# Patient Record
Sex: MALE | Race: WHITE | Employment: OTHER | URBAN - METROPOLITAN AREA
[De-identification: names, ages, dates, MRNs, and addresses within clinical notes are randomized per-mention and may not be internally consistent; named-entity substitution may affect disease eponyms.]

---

## 2017-02-15 ENCOUNTER — OFFICE VISIT (OUTPATIENT)
Dept: INTERNAL MEDICINE CLINIC | Age: 72
End: 2017-02-15

## 2017-02-15 VITALS
BODY MASS INDEX: 28.14 KG/M2 | WEIGHT: 201 LBS | SYSTOLIC BLOOD PRESSURE: 120 MMHG | RESPIRATION RATE: 14 BRPM | HEIGHT: 71 IN | HEART RATE: 70 BPM | DIASTOLIC BLOOD PRESSURE: 80 MMHG

## 2017-02-15 DIAGNOSIS — N13.8 ENLARGED PROSTATE WITH URINARY OBSTRUCTION: ICD-10-CM

## 2017-02-15 DIAGNOSIS — I10 ESSENTIAL HYPERTENSION: ICD-10-CM

## 2017-02-15 DIAGNOSIS — K21.9 GASTROESOPHAGEAL REFLUX DISEASE WITHOUT ESOPHAGITIS: ICD-10-CM

## 2017-02-15 DIAGNOSIS — J44.9 OBSTRUCTIVE CHRONIC BRONCHITIS WITHOUT EXACERBATION (HCC): ICD-10-CM

## 2017-02-15 DIAGNOSIS — J44.9 CHRONIC OBSTRUCTIVE PULMONARY DISEASE, UNSPECIFIED COPD TYPE (HCC): ICD-10-CM

## 2017-02-15 DIAGNOSIS — N40.1 ENLARGED PROSTATE WITH URINARY OBSTRUCTION: ICD-10-CM

## 2017-02-15 DIAGNOSIS — I48.0 PAROXYSMAL ATRIAL FIBRILLATION (HCC): ICD-10-CM

## 2017-02-15 DIAGNOSIS — R91.1 PULMONARY NODULE, RIGHT: ICD-10-CM

## 2017-02-15 DIAGNOSIS — I10 ESSENTIAL HYPERTENSION: Primary | ICD-10-CM

## 2017-02-15 LAB
A/G RATIO: 1.3 (ref 1.1–2.2)
ALBUMIN SERPL-MCNC: 4.3 G/DL (ref 3.4–5)
ALP BLD-CCNC: 81 U/L (ref 40–129)
ALT SERPL-CCNC: 7 U/L (ref 10–40)
ANION GAP SERPL CALCULATED.3IONS-SCNC: 15 MMOL/L (ref 3–16)
AST SERPL-CCNC: 9 U/L (ref 15–37)
BASOPHILS ABSOLUTE: 0.1 K/UL (ref 0–0.2)
BASOPHILS RELATIVE PERCENT: 0.9 %
BILIRUB SERPL-MCNC: 0.3 MG/DL (ref 0–1)
BUN BLDV-MCNC: 15 MG/DL (ref 7–20)
CALCIUM SERPL-MCNC: 9.6 MG/DL (ref 8.3–10.6)
CHLORIDE BLD-SCNC: 100 MMOL/L (ref 99–110)
CO2: 22 MMOL/L (ref 21–32)
CREAT SERPL-MCNC: 1.2 MG/DL (ref 0.8–1.3)
EOSINOPHILS ABSOLUTE: 0.2 K/UL (ref 0–0.6)
EOSINOPHILS RELATIVE PERCENT: 2.4 %
GFR AFRICAN AMERICAN: >60
GFR NON-AFRICAN AMERICAN: 60
GLOBULIN: 3.2 G/DL
GLUCOSE BLD-MCNC: 86 MG/DL (ref 70–99)
HCT VFR BLD CALC: 44.1 % (ref 40.5–52.5)
HEMOGLOBIN: 15 G/DL (ref 13.5–17.5)
LYMPHOCYTES ABSOLUTE: 3.3 K/UL (ref 1–5.1)
LYMPHOCYTES RELATIVE PERCENT: 36.9 %
MCH RBC QN AUTO: 31.2 PG (ref 26–34)
MCHC RBC AUTO-ENTMCNC: 33.9 G/DL (ref 31–36)
MCV RBC AUTO: 92 FL (ref 80–100)
MONOCYTES ABSOLUTE: 0.6 K/UL (ref 0–1.3)
MONOCYTES RELATIVE PERCENT: 7.1 %
NEUTROPHILS ABSOLUTE: 4.7 K/UL (ref 1.7–7.7)
NEUTROPHILS RELATIVE PERCENT: 52.7 %
PDW BLD-RTO: 15 % (ref 12.4–15.4)
PLATELET # BLD: 270 K/UL (ref 135–450)
PMV BLD AUTO: 8.2 FL (ref 5–10.5)
POTASSIUM SERPL-SCNC: 4.3 MMOL/L (ref 3.5–5.1)
RBC # BLD: 4.79 M/UL (ref 4.2–5.9)
SODIUM BLD-SCNC: 137 MMOL/L (ref 136–145)
TOTAL PROTEIN: 7.5 G/DL (ref 6.4–8.2)
WBC # BLD: 8.9 K/UL (ref 4–11)

## 2017-02-15 PROCEDURE — 99214 OFFICE O/P EST MOD 30 MIN: CPT | Performed by: INTERNAL MEDICINE

## 2017-02-15 RX ORDER — ATORVASTATIN CALCIUM 10 MG/1
TABLET, FILM COATED ORAL
Qty: 30 TABLET | Refills: 5 | Status: SHIPPED | OUTPATIENT
Start: 2017-02-15 | End: 2017-08-15 | Stop reason: SDUPTHER

## 2017-02-15 RX ORDER — LISINOPRIL AND HYDROCHLOROTHIAZIDE 20; 12.5 MG/1; MG/1
TABLET ORAL
Qty: 30 TABLET | Refills: 5 | Status: SHIPPED | OUTPATIENT
Start: 2017-02-15 | End: 2017-08-15 | Stop reason: SDUPTHER

## 2017-02-15 RX ORDER — TAMSULOSIN HYDROCHLORIDE 0.4 MG/1
0.4 CAPSULE ORAL DAILY
Qty: 30 CAPSULE | Refills: 5 | Status: SHIPPED | OUTPATIENT
Start: 2017-02-15 | End: 2017-08-15 | Stop reason: SDUPTHER

## 2017-02-15 RX ORDER — ALBUTEROL SULFATE 90 UG/1
2 AEROSOL, METERED RESPIRATORY (INHALATION) EVERY 6 HOURS PRN
Qty: 1 INHALER | Refills: 0 | Status: SHIPPED | OUTPATIENT
Start: 2017-02-15 | End: 2018-01-10 | Stop reason: SDUPTHER

## 2017-02-15 RX ORDER — DILTIAZEM HYDROCHLORIDE 240 MG/1
CAPSULE, COATED, EXTENDED RELEASE ORAL
Qty: 90 CAPSULE | Refills: 2 | Status: SHIPPED | OUTPATIENT
Start: 2017-02-15 | End: 2017-08-15 | Stop reason: SDUPTHER

## 2017-02-15 RX ORDER — BUDESONIDE AND FORMOTEROL FUMARATE DIHYDRATE 160; 4.5 UG/1; UG/1
2 AEROSOL RESPIRATORY (INHALATION) 2 TIMES DAILY
Qty: 1 INHALER | Refills: 5 | Status: SHIPPED | OUTPATIENT
Start: 2017-02-15 | End: 2017-08-15 | Stop reason: SDUPTHER

## 2017-02-15 RX ORDER — OMEPRAZOLE 40 MG/1
CAPSULE, DELAYED RELEASE ORAL
Qty: 30 CAPSULE | Refills: 5 | Status: SHIPPED | OUTPATIENT
Start: 2017-02-15 | End: 2017-08-15 | Stop reason: SDUPTHER

## 2017-02-15 ASSESSMENT — ENCOUNTER SYMPTOMS
SHORTNESS OF BREATH: 0
VOMITING: 0
BACK PAIN: 0
NAUSEA: 0
RHINORRHEA: 0
ABDOMINAL PAIN: 0
WHEEZING: 0

## 2017-04-19 ENCOUNTER — HOSPITAL ENCOUNTER (OUTPATIENT)
Dept: CT IMAGING | Facility: MEDICAL CENTER | Age: 72
Discharge: HOME | End: 2017-04-19
Attending: INTERNAL MEDICINE | Admitting: INTERNAL MEDICINE

## 2017-04-19 DIAGNOSIS — R91.1 PULMONARY NODULE, RIGHT: ICD-10-CM

## 2017-04-19 DIAGNOSIS — R91.1 SOLITARY PULMONARY NODULE: ICD-10-CM

## 2017-04-27 ENCOUNTER — TELEPHONE (OUTPATIENT)
Dept: INTERNAL MEDICINE CLINIC | Age: 72
End: 2017-04-27

## 2017-06-19 ENCOUNTER — CARE COORDINATION (OUTPATIENT)
Dept: CARE COORDINATION | Age: 72
End: 2017-06-19

## 2017-08-15 ENCOUNTER — TELEPHONE (OUTPATIENT)
Dept: INTERNAL MEDICINE CLINIC | Age: 72
End: 2017-08-15

## 2017-08-15 ENCOUNTER — OFFICE VISIT (OUTPATIENT)
Dept: INTERNAL MEDICINE CLINIC | Age: 72
End: 2017-08-15

## 2017-08-15 VITALS
HEIGHT: 69 IN | HEART RATE: 144 BPM | DIASTOLIC BLOOD PRESSURE: 80 MMHG | WEIGHT: 189 LBS | BODY MASS INDEX: 27.99 KG/M2 | SYSTOLIC BLOOD PRESSURE: 130 MMHG | RESPIRATION RATE: 14 BRPM

## 2017-08-15 DIAGNOSIS — I10 ESSENTIAL HYPERTENSION: ICD-10-CM

## 2017-08-15 DIAGNOSIS — N40.1 ENLARGED PROSTATE WITH URINARY OBSTRUCTION: ICD-10-CM

## 2017-08-15 DIAGNOSIS — K21.9 GASTROESOPHAGEAL REFLUX DISEASE WITHOUT ESOPHAGITIS: ICD-10-CM

## 2017-08-15 DIAGNOSIS — I71.20 THORACIC AORTIC ANEURYSM WITHOUT RUPTURE: ICD-10-CM

## 2017-08-15 DIAGNOSIS — Z00.00 ROUTINE MEDICAL EXAM: ICD-10-CM

## 2017-08-15 DIAGNOSIS — I10 ESSENTIAL HYPERTENSION: Primary | ICD-10-CM

## 2017-08-15 DIAGNOSIS — I48.0 PAROXYSMAL ATRIAL FIBRILLATION (HCC): ICD-10-CM

## 2017-08-15 DIAGNOSIS — N13.8 ENLARGED PROSTATE WITH URINARY OBSTRUCTION: ICD-10-CM

## 2017-08-15 DIAGNOSIS — J44.9 CHRONIC OBSTRUCTIVE PULMONARY DISEASE, UNSPECIFIED COPD TYPE (HCC): ICD-10-CM

## 2017-08-15 DIAGNOSIS — Z00.00 ROUTINE MEDICAL EXAM: Primary | ICD-10-CM

## 2017-08-15 DIAGNOSIS — R00.0 TACHYCARDIA: ICD-10-CM

## 2017-08-15 LAB
A/G RATIO: 1.5 (ref 1.1–2.2)
ALBUMIN SERPL-MCNC: 4.4 G/DL (ref 3.4–5)
ALP BLD-CCNC: 70 U/L (ref 40–129)
ALT SERPL-CCNC: 19 U/L (ref 10–40)
ANION GAP SERPL CALCULATED.3IONS-SCNC: 16 MMOL/L (ref 3–16)
AST SERPL-CCNC: 20 U/L (ref 15–37)
BASOPHILS ABSOLUTE: 0.1 K/UL (ref 0–0.2)
BASOPHILS RELATIVE PERCENT: 0.8 %
BILIRUB SERPL-MCNC: 0.5 MG/DL (ref 0–1)
BUN BLDV-MCNC: 22 MG/DL (ref 7–20)
CALCIUM SERPL-MCNC: 9.4 MG/DL (ref 8.3–10.6)
CHLORIDE BLD-SCNC: 102 MMOL/L (ref 99–110)
CHOLESTEROL, TOTAL: 136 MG/DL (ref 0–199)
CO2: 22 MMOL/L (ref 21–32)
CREAT SERPL-MCNC: 1.2 MG/DL (ref 0.8–1.3)
EOSINOPHILS ABSOLUTE: 0.2 K/UL (ref 0–0.6)
EOSINOPHILS RELATIVE PERCENT: 2.5 %
GFR AFRICAN AMERICAN: >60
GFR NON-AFRICAN AMERICAN: 60
GLOBULIN: 2.9 G/DL
GLUCOSE BLD-MCNC: 88 MG/DL (ref 70–99)
HCT VFR BLD CALC: 43.1 % (ref 40.5–52.5)
HDLC SERPL-MCNC: 49 MG/DL (ref 40–60)
HEMOGLOBIN: 14.3 G/DL (ref 13.5–17.5)
LDL CHOLESTEROL CALCULATED: 72 MG/DL
LYMPHOCYTES ABSOLUTE: 2.4 K/UL (ref 1–5.1)
LYMPHOCYTES RELATIVE PERCENT: 28.5 %
MCH RBC QN AUTO: 31.6 PG (ref 26–34)
MCHC RBC AUTO-ENTMCNC: 33.2 G/DL (ref 31–36)
MCV RBC AUTO: 95.1 FL (ref 80–100)
MONOCYTES ABSOLUTE: 0.6 K/UL (ref 0–1.3)
MONOCYTES RELATIVE PERCENT: 7.6 %
NEUTROPHILS ABSOLUTE: 5.1 K/UL (ref 1.7–7.7)
NEUTROPHILS RELATIVE PERCENT: 60.6 %
PDW BLD-RTO: 16.1 % (ref 12.4–15.4)
PLATELET # BLD: 227 K/UL (ref 135–450)
PMV BLD AUTO: 8.8 FL (ref 5–10.5)
POTASSIUM SERPL-SCNC: 4.3 MMOL/L (ref 3.5–5.1)
PROSTATE SPECIFIC ANTIGEN: 2.9 NG/ML (ref 0–4)
RBC # BLD: 4.53 M/UL (ref 4.2–5.9)
SODIUM BLD-SCNC: 140 MMOL/L (ref 136–145)
TOTAL PROTEIN: 7.3 G/DL (ref 6.4–8.2)
TRIGL SERPL-MCNC: 77 MG/DL (ref 0–150)
TSH SERPL DL<=0.05 MIU/L-ACNC: 1.63 UIU/ML (ref 0.27–4.2)
URIC ACID, SERUM: 6.6 MG/DL (ref 3.5–7.2)
VLDLC SERPL CALC-MCNC: 15 MG/DL
WBC # BLD: 8.3 K/UL (ref 4–11)

## 2017-08-15 PROCEDURE — 99215 OFFICE O/P EST HI 40 MIN: CPT | Performed by: INTERNAL MEDICINE

## 2017-08-15 PROCEDURE — 93000 ELECTROCARDIOGRAM COMPLETE: CPT | Performed by: INTERNAL MEDICINE

## 2017-08-15 RX ORDER — DILTIAZEM HYDROCHLORIDE 240 MG/1
CAPSULE, COATED, EXTENDED RELEASE ORAL
Qty: 90 CAPSULE | Refills: 1 | Status: ON HOLD | OUTPATIENT
Start: 2017-08-15 | End: 2017-08-18 | Stop reason: HOSPADM

## 2017-08-15 RX ORDER — BUDESONIDE AND FORMOTEROL FUMARATE DIHYDRATE 160; 4.5 UG/1; UG/1
2 AEROSOL RESPIRATORY (INHALATION) 2 TIMES DAILY
Qty: 3 INHALER | Refills: 1 | Status: SHIPPED | OUTPATIENT
Start: 2017-08-15 | End: 2018-10-11 | Stop reason: SDUPTHER

## 2017-08-15 RX ORDER — LISINOPRIL AND HYDROCHLOROTHIAZIDE 20; 12.5 MG/1; MG/1
TABLET ORAL
Qty: 90 TABLET | Refills: 1 | Status: ON HOLD | OUTPATIENT
Start: 2017-08-15 | End: 2017-08-18 | Stop reason: HOSPADM

## 2017-08-15 RX ORDER — MELOXICAM 15 MG/1
15 TABLET ORAL DAILY
Qty: 30 TABLET | Refills: 0 | Status: ON HOLD | OUTPATIENT
Start: 2017-08-15 | End: 2017-08-18 | Stop reason: HOSPADM

## 2017-08-15 RX ORDER — ATORVASTATIN CALCIUM 10 MG/1
TABLET, FILM COATED ORAL
Qty: 90 TABLET | Refills: 1 | Status: SHIPPED | OUTPATIENT
Start: 2017-08-15 | End: 2017-10-04 | Stop reason: SDUPTHER

## 2017-08-15 RX ORDER — OMEPRAZOLE 40 MG/1
CAPSULE, DELAYED RELEASE ORAL
Qty: 90 CAPSULE | Refills: 1 | Status: SHIPPED | OUTPATIENT
Start: 2017-08-15 | End: 2018-01-11 | Stop reason: SDUPTHER

## 2017-08-15 RX ORDER — TAMSULOSIN HYDROCHLORIDE 0.4 MG/1
0.4 CAPSULE ORAL DAILY
Qty: 90 CAPSULE | Refills: 1 | Status: SHIPPED | OUTPATIENT
Start: 2017-08-15 | End: 2018-09-04

## 2017-08-15 ASSESSMENT — ENCOUNTER SYMPTOMS
ABDOMINAL PAIN: 0
VOMITING: 0
SHORTNESS OF BREATH: 0
NAUSEA: 0
WHEEZING: 0
RHINORRHEA: 0
BACK PAIN: 0

## 2017-08-17 ENCOUNTER — TELEPHONE (OUTPATIENT)
Dept: CASE MANAGEMENT | Age: 72
End: 2017-08-17

## 2017-08-21 ENCOUNTER — TELEPHONE (OUTPATIENT)
Dept: PHARMACY | Facility: CLINIC | Age: 72
End: 2017-08-21

## 2017-08-21 ENCOUNTER — TELEPHONE (OUTPATIENT)
Dept: INTERNAL MEDICINE CLINIC | Age: 72
End: 2017-08-21

## 2017-08-21 ENCOUNTER — CARE COORDINATION (OUTPATIENT)
Dept: CASE MANAGEMENT | Age: 72
End: 2017-08-21

## 2017-08-23 ENCOUNTER — TELEPHONE (OUTPATIENT)
Dept: INTERNAL MEDICINE CLINIC | Age: 72
End: 2017-08-23

## 2017-08-25 ENCOUNTER — CARE COORDINATION (OUTPATIENT)
Dept: CASE MANAGEMENT | Age: 72
End: 2017-08-25

## 2017-08-28 ENCOUNTER — OFFICE VISIT (OUTPATIENT)
Dept: INTERNAL MEDICINE CLINIC | Age: 72
End: 2017-08-28

## 2017-08-28 VITALS
SYSTOLIC BLOOD PRESSURE: 150 MMHG | HEIGHT: 71 IN | WEIGHT: 194 LBS | RESPIRATION RATE: 14 BRPM | BODY MASS INDEX: 27.16 KG/M2 | HEART RATE: 70 BPM | DIASTOLIC BLOOD PRESSURE: 80 MMHG

## 2017-08-28 DIAGNOSIS — Z23 NEED FOR PNEUMOCOCCAL VACCINATION: ICD-10-CM

## 2017-08-28 DIAGNOSIS — I50.22 CHRONIC SYSTOLIC CHF (CONGESTIVE HEART FAILURE) (HCC): ICD-10-CM

## 2017-08-28 DIAGNOSIS — I10 ESSENTIAL HYPERTENSION: ICD-10-CM

## 2017-08-28 DIAGNOSIS — I48.3 TYPICAL ATRIAL FLUTTER (HCC): Primary | ICD-10-CM

## 2017-08-28 DIAGNOSIS — J44.9 CHRONIC OBSTRUCTIVE PULMONARY DISEASE, UNSPECIFIED COPD TYPE (HCC): ICD-10-CM

## 2017-08-28 DIAGNOSIS — J18.9 CAP (COMMUNITY ACQUIRED PNEUMONIA): ICD-10-CM

## 2017-08-28 DIAGNOSIS — I42.0 DILATED CARDIOMYOPATHY (HCC): ICD-10-CM

## 2017-08-28 PROCEDURE — G0009 ADMIN PNEUMOCOCCAL VACCINE: HCPCS | Performed by: INTERNAL MEDICINE

## 2017-08-28 PROCEDURE — 90732 PPSV23 VACC 2 YRS+ SUBQ/IM: CPT | Performed by: INTERNAL MEDICINE

## 2017-08-28 PROCEDURE — 99495 TRANSJ CARE MGMT MOD F2F 14D: CPT | Performed by: INTERNAL MEDICINE

## 2017-08-28 RX ORDER — FUROSEMIDE 40 MG/1
40 TABLET ORAL DAILY
Qty: 30 TABLET | Refills: 2 | Status: SHIPPED | OUTPATIENT
Start: 2017-08-28 | End: 2017-10-04 | Stop reason: SDUPTHER

## 2017-08-28 RX ORDER — POTASSIUM CHLORIDE 20 MEQ/1
20 TABLET, EXTENDED RELEASE ORAL DAILY
Qty: 30 TABLET | Refills: 2 | Status: SHIPPED | OUTPATIENT
Start: 2017-08-28 | End: 2017-10-04 | Stop reason: SDUPTHER

## 2017-09-01 ENCOUNTER — CARE COORDINATION (OUTPATIENT)
Dept: CASE MANAGEMENT | Age: 72
End: 2017-09-01

## 2017-09-18 RX ORDER — CARVEDILOL 6.25 MG/1
TABLET ORAL
Qty: 60 TABLET | Refills: 0 | Status: SHIPPED | OUTPATIENT
Start: 2017-09-18 | End: 2017-09-20

## 2017-09-20 RX ORDER — CARVEDILOL 6.25 MG/1
TABLET ORAL
Qty: 60 TABLET | Refills: 1 | Status: SHIPPED | OUTPATIENT
Start: 2017-09-20 | End: 2017-10-04 | Stop reason: SDUPTHER

## 2017-09-20 RX ORDER — RIVAROXABAN 20 MG/1
TABLET, FILM COATED ORAL
Qty: 30 TABLET | Refills: 1 | Status: SHIPPED | OUTPATIENT
Start: 2017-09-20 | End: 2017-10-04 | Stop reason: SDUPTHER

## 2017-10-04 ENCOUNTER — OFFICE VISIT (OUTPATIENT)
Dept: CARDIOLOGY CLINIC | Age: 72
End: 2017-10-04

## 2017-10-04 VITALS
OXYGEN SATURATION: 96 % | DIASTOLIC BLOOD PRESSURE: 78 MMHG | HEIGHT: 71 IN | WEIGHT: 200.12 LBS | SYSTOLIC BLOOD PRESSURE: 134 MMHG | BODY MASS INDEX: 28.02 KG/M2 | HEART RATE: 65 BPM

## 2017-10-04 DIAGNOSIS — I73.9 CLAUDICATION OF RIGHT LOWER EXTREMITY (HCC): ICD-10-CM

## 2017-10-04 DIAGNOSIS — I50.22 CHRONIC SYSTOLIC CHF (CONGESTIVE HEART FAILURE) (HCC): ICD-10-CM

## 2017-10-04 DIAGNOSIS — I42.0 DILATED CARDIOMYOPATHY (HCC): ICD-10-CM

## 2017-10-04 DIAGNOSIS — J44.9 CHRONIC OBSTRUCTIVE PULMONARY DISEASE, UNSPECIFIED COPD TYPE (HCC): ICD-10-CM

## 2017-10-04 DIAGNOSIS — I48.3 TYPICAL ATRIAL FLUTTER (HCC): Primary | ICD-10-CM

## 2017-10-04 DIAGNOSIS — I10 ESSENTIAL HYPERTENSION: ICD-10-CM

## 2017-10-04 PROCEDURE — 99214 OFFICE O/P EST MOD 30 MIN: CPT | Performed by: INTERNAL MEDICINE

## 2017-10-04 RX ORDER — POTASSIUM CHLORIDE 20 MEQ/1
20 TABLET, EXTENDED RELEASE ORAL DAILY
Qty: 30 TABLET | Refills: 11 | Status: SHIPPED | OUTPATIENT
Start: 2017-10-04 | End: 2017-10-04 | Stop reason: DRUGHIGH

## 2017-10-04 RX ORDER — FUROSEMIDE 40 MG/1
40 TABLET ORAL DAILY
Qty: 30 TABLET | Refills: 11 | Status: SHIPPED | OUTPATIENT
Start: 2017-10-04 | End: 2017-10-04 | Stop reason: DRUGHIGH

## 2017-10-04 RX ORDER — LOSARTAN POTASSIUM 25 MG/1
25 TABLET ORAL DAILY
Qty: 30 TABLET | Refills: 11 | Status: SHIPPED | OUTPATIENT
Start: 2017-10-04 | End: 2018-12-18 | Stop reason: SDUPTHER

## 2017-10-04 RX ORDER — CARVEDILOL 6.25 MG/1
6.25 TABLET ORAL 2 TIMES DAILY WITH MEALS
Qty: 60 TABLET | Refills: 11 | Status: SHIPPED | OUTPATIENT
Start: 2017-10-04 | End: 2018-11-15 | Stop reason: SDUPTHER

## 2017-10-04 RX ORDER — AMIODARONE HYDROCHLORIDE 200 MG/1
200 TABLET ORAL DAILY
Qty: 30 TABLET | Refills: 11 | Status: SHIPPED | OUTPATIENT
Start: 2017-10-04 | End: 2018-11-27 | Stop reason: SDUPTHER

## 2017-10-04 RX ORDER — ATORVASTATIN CALCIUM 10 MG/1
TABLET, FILM COATED ORAL
Qty: 30 TABLET | Refills: 11 | Status: SHIPPED | OUTPATIENT
Start: 2017-10-04 | End: 2018-10-11 | Stop reason: SDUPTHER

## 2017-10-04 RX ORDER — POTASSIUM CHLORIDE 20 MEQ/1
10 TABLET, EXTENDED RELEASE ORAL DAILY
Qty: 30 TABLET | Refills: 11 | Status: SHIPPED
Start: 2017-10-04 | End: 2019-01-25 | Stop reason: SDUPTHER

## 2017-10-04 RX ORDER — FUROSEMIDE 40 MG/1
20 TABLET ORAL DAILY
Qty: 30 TABLET | Refills: 11 | Status: SHIPPED
Start: 2017-10-04 | End: 2018-11-27 | Stop reason: SDUPTHER

## 2017-10-04 NOTE — COMMUNICATION BODY
[facility] Office Note  10/4/2017     Subjective:  Mr. Baldomero Mcfarland is is being seen today for hospital follow up of new aflutter, cmp, htn, systolic chf, copd  Today he reports to feeling much better and reports SOB has improved, still feels weak. He reports returning to hospital a few days after DC for breathing issues SOB dx with exac copd. Denies chest pain, shortness of breath, edema, dizziness, palpitations and syncope. He reports stopping smoking 8/15/17. He reports pain in right leg between hip and thigh. Nothing improves pain unless he stretches leg out at night. Nothing aggravates his pain. HPI: Mari Wesley is a 81 y.o. patient went to see his family doctor for 6 month check up 8/15/17. He was found to have irreg heart beat and sent to hospital. He has atrial flutter 2:1 conduction. Remote history of atrial fib 5 yrs ago when he had pneumonia. He has been under significant financial stress last few weeks eating only one meal a day and has lost 10 lbs weight. He denies any chest pain SOB palp or syncope. He very tired and weak    Review of Systems:  12 point ROS negative in all areas as listed below except as in [address]  Constitutional, EENT, Cardiovascular, pulmonary, GI, , Musculoskeletal, skin, neurological, hematological, endocrine, Psychiatric    Reviewed past medical history, social, and family history.      Past Medical History:   Diagnosis Date    A-fib [facility])     2/14/12    Atrial fibrillation with RVR ([facility] [address] [facility]     2/14/12     Avulsion fracture of ankle 9/21/2015    Benign localized hyperplasia of prostate with urinary obstruction and other lower urinary tract symptoms (LUTS) 8/17/2016    Chronic systolic CHF (congestive heart failure) ([address] 8/28/2017    Contusion of leg, right 9/21/2015    COPD (chronic obstructive pulmonary disease) (HCC)     Fractures     GERD (gastroesophageal reflux disease) 6/15/2015    Hypertension     Hypertrophy of prostate without urinary

## 2017-10-04 NOTE — PROGRESS NOTES
[address] Office Note  10/4/2017     Subjective:  Mr. Ashley Jimenes is is being seen today for hospital follow up of new aflutter, cmp, htn, systolic chf, copd  Today he reports to feeling much better and reports SOB has improved, still feels weak. He reports returning to hospital a few days after DC for breathing issues SOB dx with exac copd. Denies chest pain, shortness of breath, edema, dizziness, palpitations and syncope. He reports stopping smoking 8/15/17. He reports pain in right leg between hip and thigh. Nothing improves pain unless he stretches leg out at night. Nothing aggravates his pain. HPI: Madison Raman is a 81 y.o. patient went to see his family doctor for 6 month check up 8/15/17. He was found to have irreg heart beat and sent to hospital. He has atrial flutter 2:1 conduction. Remote history of atrial fib 5 yrs ago when he had pneumonia. He has been under significant financial stress last few weeks eating only one meal a day and has lost 10 lbs weight. He denies any chest pain SOB palp or syncope. He very tired and weak    Review of Systems:  12 point ROS negative in all areas as listed below except as in [address]  Constitutional, EENT, Cardiovascular, pulmonary, GI, , Musculoskeletal, skin, neurological, hematological, endocrine, Psychiatric    Reviewed past medical history, social, and family history.      Past Medical History:   Diagnosis Date    A-fib [facility])     2/14/12    Atrial fibrillation with RVR ([facility] [address]     2/14/12     Avulsion fracture of ankle 9/21/2015    Benign localized hyperplasia of prostate with urinary obstruction and other lower urinary tract symptoms (LUTS) 8/17/2016    Chronic systolic CHF (congestive heart failure) ([address] 8/28/2017    Contusion of leg, right 9/21/2015    COPD (chronic obstructive pulmonary disease) (HCC)     Fractures     GERD (gastroesophageal reflux disease) 6/15/2015    Hypertension     Hypertrophy of prostate without urinary obstruction and other lower urinary tract symptoms (LUTS) 6/15/2015    No history of procedure     no previos colonoscopy    Pneumonia     Thoracic aortic aneurysm [facility])      Past Surgical History:   Procedure Laterality Date    COLONOSCOPY  2/24/2016    sigmoid polyps    HERNIA REPAIR         Objective:   /78  Pulse 65  Ht 5' 11\" (1.803 m)  Wt 200 lb 1.9 oz (90.8 kg)  SpO2 96%  BMI 27.91 kg/m2    Wt Readings from Last 3 Encounters:   10/04/17 200 lb 1.9 oz (90.8 kg)   08/28/17 194 lb (88 kg)   08/19/17 190 lb (86.2 kg)       Physical Exam:  General: No Respiratory distress, appears well developed and well nourished. Eyes:  Sclera nonicteric  Nose/Sinuses:  negative findings: nose shows no deformity, asymmetry, or inflammation, nasal mucosa normal, septum midline with no perforation or bleeding  Back:  no pain to palpation  Joint:  no active joint inflammation  Musculoskeletal:  negative  Skin:  Warm and dry  Neck:  Negative for JVD and Carotid Bruits. Chest:  Clear to auscultation, respiration easy  Cardiovascular:  RRR, 64 bpm S1S2 normal, no murmur, no rub or thrill.   Abdomen:  Soft normal liver and spleen  Extremities:   No edema, clubbing, cyanosis,  Absent pedal pulses 2+ left femoral absent right femoral  Pulses: Femoral and pedal pulses are normal.  Neuro: intact    Medications:   Outpatient Encounter Prescriptions as of 10/4/2017   Medication Sig Dispense Refill    carvedilol (COREG) 6.25 MG tablet Take 1 tablet by mouth 2 times daily (with meals) 60 tablet 11    rivaroxaban (XARELTO) 20 MG TABS tablet Take 1 tablet by mouth daily (with breakfast) 30 tablet 11    amiodarone (CORDARONE) 200 MG tablet Take 1 tablet by mouth daily 30 tablet 11    losartan (COZAAR) 25 MG tablet Take 1 tablet by mouth daily 30 tablet 11    atorvastatin (LIPITOR) 10 MG tablet TAKE 1 TABLET BY MOUTH DAILY 30 tablet 11    furosemide (LASIX) 40 MG tablet Take 0.5 tablets by mouth daily 30 tablet 11    Component Value Date    CHOL 136 08/15/2017    CHOL 173 08/17/2016    CHOL 207 (H) 02/17/2016    CHOL 156 02/17/2016     Lab Results   Component Value Date    TRIG 77 08/15/2017    TRIG 96 08/17/2016    TRIG 115 02/17/2016     Lab Results   Component Value Date    HDL 49 08/15/2017    HDL 49 08/17/2016    HDL 51 02/17/2016     Lab Results   Component Value Date    LDLCALC 72 08/15/2017    LDLCALC 105 (H) 08/17/2016    LDLCALC 133 (H) 02/17/2016     Lab Results   Component Value Date    LABVLDL 15 08/15/2017    LABVLDL 19 08/17/2016     Lab Results   Component Value Date    CHOLHDLRATIO 4.1 02/17/2016     PT/INR: No results for input(s): PROTIME, INR in the last 72 hours. A1C: No results found for: LABA1C  BNP:  No results for input(s): BNP in the last 72 hours. IMAGING:   NANCY 8/17/17  Summary   There is no evidence of mass or thrombus in the left atrium or appendage.   Mild mitral regurgitation.   Moderate tricuspid regurgitation.   A small mobile filament is attached to the aortic valve which is felt to be   Lambl's excrescence in the absence of the aortic regurgitation.   Moderate amount of layered and protruding plaque in the aorta. EKG 8.17.17  Atrial flutter RVR  Echo doppler of heart 8.16.17  Summary   The ejection fraction measured by Miller's method is 33 %.   The left ventricular systolic function is moderately reduced with an   ejection fraction of 30-35 %.   Moderate global hypokinesis is present.   Normal left ventricular diastolic filling pressure.   The left atrium is moderately dilated.   The right atrium is moderately dilated.   Mild mitral annular calcification.   Mild mitral regurgitation.   Moderate tricuspid regurgitation.   Systolic pulmonary artery pressure (SPAP) is estimated at 41 mmHg consistent   with mild pulmonary hypertension (RA pressure 15 mmHg).    Assessment:  Krystal Alexandre was seen today for follow-up from hospital, discuss labs, results, cardiomyopathy, congestive heart failure and

## 2017-10-04 NOTE — MR AVS SNAPSHOT
After Visit Summary             Starla Levy   10/4/2017 3:45 PM   Office Visit    Description:  Male : 1945   Provider:  Je Swanson MD   Department:  [facility] Cardiology [facility] [address]              Your Follow-Up and Future Appointments         Below is a list of your follow-up and future appointments. This may not be a complete list as you may have made appointments directly with providers that we are not aware of or your providers may have made some for you. Please call your providers to confirm appointments. It is important to keep your appointments. Please bring your current insurance card, photo ID, co-pay, and all medication bottles to your appointment. If self-pay, payment is expected at the time of service. Your To-Do List     Future Appointments Provider Department Dept Phone    10/9/2017 2:30 PM Doyle Cogan, MD [facility] [phone]    Please arrive 15 minutes prior to appointment, bring photo ID and insurance card. Future Orders Complete By Expires    Ultrasound doppler arterial legs bilateral [CAR28 Custom]  10/5/2017 (Approximate) 10/4/2018    Follow-Up    Return in about 6 months (around 2018). Information from Your Visit        Department     Name Address Phone Fax    [facility] Cardiology [facility] [address] [facility] [place] [address] [phone]      You Were Seen for:         Comments    Typical atrial flutter [facility])   [[id number]]         Vital Signs     Blood Pressure Pulse Height Weight Oxygen Saturation Body Mass Index    134/78 65 5' 11\" (1.803 m) 200 lb 1.9 oz (90.8 kg) 96% 27.91 kg/m2    Smoking Status                   Former Smoker           Additional Information about your Body Mass Index (BMI)           Your BMI as listed above is considered overweight (25.0-29.9). BMI is an estimate of body fat, calculated from your height and weight.   The higher your BMI, the greater your risk of heart disease, high blood pressure, type 2 diabetes, stroke, gallstones, arthritis, sleep apnea, and certain cancers. BMI is not perfect. It may overestimate body fat in athletes and people who are more muscular. If your body fat is high you can improve your BMI by decreasing your calorie intake and becoming more physically active. Learn more at: [employer]             Today's Medication Changes          These changes are accurate as of: 10/4/17  1:47 PM.  If you have any questions, ask your nurse or doctor. CHANGE how you take these medications           carvedilol 6.25 MG tablet   Commonly known as:  COREG   Instructions: Take 1 tablet by mouth 2 times daily (with meals)   Quantity:  60 tablet   Refills:  11   What changed:  See the new instructions. Changed by:  Marti Nielsen MD       rivaroxaban 20 MG Tabs tablet   Commonly known as:  XARELTO   Instructions: Take 1 tablet by mouth daily (with breakfast)   Quantity:  30 tablet   Refills:  11   What changed:  See the new instructions.    Changed by:  Marti Nielsen MD            Where to Get Your Medications      These medications were sent to [address] [place] [address] [phone]  [address] [place] [address], [address] [place] [address],[address] [place] [address]     Phone:  [phone]     amiodarone 200 MG tablet    atorvastatin 10 MG tablet    carvedilol 6.25 MG tablet    furosemide 40 MG tablet    losartan 25 MG tablet    potassium chloride 20 MEQ extended release tablet    rivaroxaban 20 MG Tabs tablet               Your Current Medications Are              carvedilol (COREG) 6.25 MG tablet Take 1 tablet by mouth 2 times daily (with meals)    rivaroxaban (XARELTO) 20 MG TABS tablet Take 1 tablet by mouth daily (with breakfast)    amiodarone (CORDARONE) 200 MG tablet Take 1 tablet by mouth daily furosemide (LASIX) 40 MG tablet Take 1 tablet by mouth daily    potassium chloride (KLOR-CON M) 20 MEQ extended release tablet Take 1 tablet by mouth daily    losartan (COZAAR) 25 MG tablet Take 1 tablet by mouth daily    atorvastatin (LIPITOR) 10 MG tablet TAKE 1 TABLET BY MOUTH DAILY    omeprazole (PRILOSEC) 40 MG delayed release capsule TAKE 1 CAPSULE BY MOUTH DAILY    tamsulosin (FLOMAX) 0.4 MG capsule Take 1 capsule by mouth daily    budesonide-formoterol (SYMBICORT) 160-4.5 MCG/ACT AERO Inhale 2 puffs into the lungs 2 times daily    albuterol sulfate HFA (PROVENTIL HFA) 108 (90 BASE) MCG/ACT inhaler Inhale 2 puffs into the lungs every 6 hours as needed for Wheezing (with spacer)      Allergies           No Known Allergies         Additional Information        Basic Information     Date Of Birth Sex Race Ethnicity Preferred Language    1945 Male White Non-/Non  English      Problem List as of 10/4/2017  Date Reviewed: 10/4/2017                Chronic systolic CHF (congestive heart failure) (HCC)    Dilated cardiomyopathy (HCC)    Typical atrial flutter (HCC)    Thoracic aortic aneurysm without rupture (HCC)    Enlarged prostate with urinary obstruction    Pulmonary nodule, right    COPD (chronic obstructive pulmonary disease) (HCC)    GERD (gastroesophageal reflux disease)    Hoarseness of voice    Hypertension      Immunizations as of 10/4/2017     Name Date    Influenza Whole 9/15/2011    Pneumococcal 13-valent Conjugate (Cqcobbq84) 8/17/2016    Pneumococcal Polysaccharide ([id number]) 8/28/2017, 2/12/2012, 9/13/2007    Td 4/10/2012      Preventive Care        Date Due    Hepatitis C screening is recommended for all adults regardless of risk factors born between [facility] at least once (lifetime) who have never been tested.  1945    Diabetes Screening 6/2/1987    Zoster Vaccine 6/2/2007    Tetanus Combination Vaccine (1 - Tdap) 4/11/2012    Yearly Flu Vaccine (1) 9/1/2017 Cholesterol Screening 8/15/2022    Colonoscopy 2/17/2026            MyChart Signup           [employer] allows you to send messages to your doctor, view your test results, renew your prescriptions, schedule appointments, view visit notes, and more. How Do I Sign Up? 1. In your Internet browser, go to [email or web address]  2. Click on the Sign Up Now link in the Sign In box. You will see the New Member Sign Up page. 3. Enter your [employer] Access Code exactly as it appears below. You will not need to use this code after youve completed the sign-up process. If you do not sign up before the expiration date, you must request a new code. [employer] Access Code: [email or web address]  Expires: 10/14/2017 10:24 AM    4. Enter your Social Security Number ([id number]) and Date of Birth (mm/dd/yyyy) as indicated and click Submit. You will be taken to the next sign-up page. 5. Create a [employer] ID. This will be your [employer] login ID and cannot be changed, so think of one that is secure and easy to remember. 6. Create a [employer] password. You can change your password at any time. 7. Enter your Password Reset Question and Answer. This can be used at a later time if you forget your password. 8. Enter your e-mail address. You will receive e-mail notification when new information is available in [address] 9. Click Sign Up. You can now view your medical record. Additional Information  If you have questions, please contact the physician practice where you receive care. Remember, [employer] is NOT to be used for urgent needs. For medical emergencies, dial 911. For questions regarding your [employer] account call [phone]. If you have a clinical question, please call your doctor's office.

## 2017-10-04 NOTE — LETTER
[address] [facility] [address]  Phone: [phone]  Fax: [phone] [address] [place], [place]        October 4, 2017     Elana Mayfield MD  [address], [address] [facility] [address]    Patient: Glory Ennis  MR Number: [id number]  YOB: 1945  Date of Visit: 10/4/2017    Dear Dr. Elana Mayfield:    Thank you for the request for consultation for Evelyne Hogan to me for the evaluation. Below are the relevant portions of my assessment and plan of care. [address] Office Note  10/4/2017     Subjective:  Mr. Dallas Singh is is being seen today for hospital follow up of new aflutter, cmp, htn, systolic chf, copd  Today he reports to feeling much better and reports SOB has improved, still feels weak. He reports returning to hospital a few days after DC for breathing issues SOB dx with exac copd. Denies chest pain, shortness of breath, edema, dizziness, palpitations and syncope. He reports stopping smoking 8/15/17. He reports pain in right leg between hip and thigh. Nothing improves pain unless he stretches leg out at night. Nothing aggravates his pain. HPI: Glory Ennis is a 81 y.o. patient went to see his family doctor for 6 month check up 8/15/17. He was found to have irreg heart beat and sent to hospital. He has atrial flutter 2:1 conduction. Remote history of atrial fib 5 yrs ago when he had pneumonia. He has been under significant financial stress last few weeks eating only one meal a day and has lost 10 lbs weight. He denies any chest pain SOB palp or syncope. He very tired and weak    Review of Systems:  12 point ROS negative in all areas as listed below except as in [address]  Constitutional, EENT, Cardiovascular, pulmonary, GI, , Musculoskeletal, skin, neurological, hematological, endocrine, Psychiatric    Reviewed past medical history, social, and family history.      Past Medical History: Diagnosis Date    A-fib [facility])     2/14/12    Atrial fibrillation with RVR ([facility]     2/14/12     Avulsion fracture of ankle 9/21/2015    Benign localized hyperplasia of prostate with urinary obstruction and other lower urinary tract symptoms (LUTS) 8/17/2016    Chronic systolic CHF (congestive heart failure) ([facility] [facility] 8/28/2017    Contusion of leg, right 9/21/2015    COPD (chronic obstructive pulmonary disease) (HCC)     Fractures     GERD (gastroesophageal reflux disease) 6/15/2015    Hypertension     Hypertrophy of prostate without urinary obstruction and other lower urinary tract symptoms (LUTS) 6/15/2015    No history of procedure     no previos colonoscopy    Pneumonia     Thoracic aortic aneurysm [facility])      Past Surgical History:   Procedure Laterality Date    COLONOSCOPY  2/24/2016    sigmoid polyps    HERNIA REPAIR         Objective:   /78  Pulse 65  Ht 5' 11\" (1.803 m)  Wt 200 lb 1.9 oz (90.8 kg)  SpO2 96%  BMI 27.91 kg/m2    Wt Readings from Last 3 Encounters:   10/04/17 200 lb 1.9 oz (90.8 kg)   08/28/17 194 lb (88 kg)   08/19/17 190 lb (86.2 kg)       Physical Exam:  General: No Respiratory distress, appears well developed and well nourished. Eyes:  Sclera nonicteric  Nose/Sinuses:  negative findings: nose shows no deformity, asymmetry, or inflammation, nasal mucosa normal, septum midline with no perforation or bleeding  Back:  no pain to palpation  Joint:  no active joint inflammation  Musculoskeletal:  negative  Skin:  Warm and dry  Neck:  Negative for JVD and Carotid Bruits. Chest:  Clear to auscultation, respiration easy  Cardiovascular:  RRR, 64 bpm S1S2 normal, no murmur, no rub or thrill.   Abdomen:  Soft normal liver and spleen  Extremities:   No edema, clubbing, cyanosis,  Absent pedal pulses 2+ left femoral absent right femoral  Pulses: Femoral and pedal pulses are normal.  Neuro: intact    Medications:   Outpatient Encounter Prescriptions as of 10/4/2017 Medication Sig Dispense Refill    carvedilol (COREG) 6.25 MG tablet Take 1 tablet by mouth 2 times daily (with meals) 60 tablet 11    rivaroxaban (XARELTO) 20 MG TABS tablet Take 1 tablet by mouth daily (with breakfast) 30 tablet 11    amiodarone (CORDARONE) 200 MG tablet Take 1 tablet by mouth daily 30 tablet 11    losartan (COZAAR) 25 MG tablet Take 1 tablet by mouth daily 30 tablet 11    atorvastatin (LIPITOR) 10 MG tablet TAKE 1 TABLET BY MOUTH DAILY 30 tablet 11    furosemide (LASIX) 40 MG tablet Take 0.5 tablets by mouth daily 30 tablet 11    potassium chloride (KLOR-CON M) 20 MEQ extended release tablet Take 0.5 tablets by mouth daily 30 tablet 11    omeprazole (PRILOSEC) 40 MG delayed release capsule TAKE 1 CAPSULE BY MOUTH DAILY 90 capsule 1    tamsulosin (FLOMAX) 0.4 MG capsule Take 1 capsule by mouth daily 90 capsule 1    budesonide-formoterol (SYMBICORT) 160-4.5 MCG/ACT AERO Inhale 2 puffs into the lungs 2 times daily 3 Inhaler 1    albuterol sulfate HFA (PROVENTIL HFA) 108 (90 BASE) MCG/ACT inhaler Inhale 2 puffs into the lungs every 6 hours as needed for Wheezing (with spacer) 1 Inhaler 0    [DISCONTINUED] furosemide (LASIX) 40 MG tablet Take 1 tablet by mouth daily 30 tablet 11    [DISCONTINUED] potassium chloride (KLOR-CON M) 20 MEQ extended release tablet Take 1 tablet by mouth daily 30 tablet 11    [DISCONTINUED] carvedilol (COREG) 6.25 MG tablet TAKE 1 TABLET BY MOUTH 2 TIMES DAILY (WITH MEALS) 60 tablet 1    [DISCONTINUED] XARELTO 20 MG TABS tablet TAKE ONE TABLET BY MOUTH DAILY 30 tablet 1    [DISCONTINUED] amiodarone (CORDARONE) 200 MG tablet Take 1 tablet by mouth daily 30 tablet 2    [DISCONTINUED] furosemide (LASIX) 40 MG tablet Take 1 tablet by mouth daily 30 tablet 2    [DISCONTINUED] potassium chloride (KLOR-CON M) 20 MEQ extended release tablet Take 1 tablet by mouth daily 30 tablet 2    [DISCONTINUED] losartan (COZAAR) 25 MG tablet Take 1 tablet by mouth daily 30 tablet 1    [DISCONTINUED] atorvastatin (LIPITOR) 10 MG tablet TAKE 1 TABLET BY MOUTH DAILY 90 tablet 1     No facility-administered encounter medications on file as of 10/4/2017. Lab Data:  CBC: No results for input(s): WBC, HGB, HCT, MCV, PLT in the last 72 hours. BMP: No results for input(s): NA, K, CL, CO2, PHOS, BUN, CREATININE in the last 72 hours. Invalid input(s): CA  LIVER PROFILE: No results for input(s): AST, ALT, LIPASE, BILIDIR, BILITOT, ALKPHOS in the last 72 hours. Invalid input(s): AMYLASE,  ALB  LIPID:   Lab Results   Component Value Date    CHOL 136 08/15/2017    CHOL 173 08/17/2016    CHOL 207 (H) 02/17/2016    CHOL 156 02/17/2016     Lab Results   Component Value Date    TRIG 77 08/15/2017    TRIG 96 08/17/2016    TRIG 115 02/17/2016     Lab Results   Component Value Date    HDL 49 08/15/2017    HDL 49 08/17/2016    HDL 51 02/17/2016     Lab Results   Component Value Date    LDLCALC 72 08/15/2017    LDLCALC 105 (H) 08/17/2016    LDLCALC 133 (H) 02/17/2016     Lab Results   Component Value Date    LABVLDL 15 08/15/2017    LABVLDL 19 08/17/2016     Lab Results   Component Value Date    CHOLHDLRATIO 4.1 02/17/2016     PT/INR: No results for input(s): PROTIME, INR in the last 72 hours. A1C: No results found for: LABA1C  BNP:  No results for input(s): BNP in the last 72 hours. IMAGING:   NANCY 8/17/17  Summary   There is no evidence of mass or thrombus in the left atrium or appendage.   Mild mitral regurgitation.   Moderate tricuspid regurgitation.   A small mobile filament is attached to the aortic valve which is felt to be   Lambl's excrescence in the absence of the aortic regurgitation.   Moderate amount of layered and protruding plaque in the aorta.   EKG 8.17.17  Atrial flutter RVR  Echo doppler of heart 8.16.17  Summary   The ejection fraction measured by Miller's method is 33 %.   The left ventricular systolic function is moderately reduced with an to following Sarah Dean along with you.     Sincerely,        Yuliet Montoya MD

## 2017-10-05 ENCOUNTER — HOSPITAL ENCOUNTER (OUTPATIENT)
Dept: VASCULAR LAB | Age: 72
Discharge: HOME | End: 2017-10-05
Attending: INTERNAL MEDICINE | Admitting: INTERNAL MEDICINE

## 2017-10-05 DIAGNOSIS — I73.9 PERIPHERAL VASCULAR DISEASE (HCC): ICD-10-CM

## 2017-10-06 ENCOUNTER — TELEPHONE (OUTPATIENT)
Dept: CARDIOLOGY CLINIC | Age: 72
End: 2017-10-06

## 2017-10-06 NOTE — TELEPHONE ENCOUNTER
Mr Keny Olvia notified of Dr Lee Delarosa message. I gave him Dr Scott Heath number, [phone]. He voiced his understanding.

## 2017-10-06 NOTE — TELEPHONE ENCOUNTER
----- Message from Gamaliel Jack MD sent at 10/6/2017  1:28 PM EDT -----  He needs to see Dr Renate Smith for circulation problem in right leg.  Please call patient he needs to schedule visit with Dr Caleb Palencia give patient phone number

## 2017-10-09 ENCOUNTER — OFFICE VISIT (OUTPATIENT)
Dept: INTERNAL MEDICINE CLINIC | Age: 72
End: 2017-10-09

## 2017-10-09 VITALS
DIASTOLIC BLOOD PRESSURE: 80 MMHG | BODY MASS INDEX: 27.86 KG/M2 | SYSTOLIC BLOOD PRESSURE: 130 MMHG | RESPIRATION RATE: 14 BRPM | HEART RATE: 70 BPM | HEIGHT: 71 IN | WEIGHT: 199 LBS

## 2017-10-09 DIAGNOSIS — I73.9 PAD (PERIPHERAL ARTERY DISEASE) (HCC): ICD-10-CM

## 2017-10-09 DIAGNOSIS — Z23 NEED FOR INFLUENZA VACCINATION: ICD-10-CM

## 2017-10-09 DIAGNOSIS — I10 ESSENTIAL HYPERTENSION: Primary | ICD-10-CM

## 2017-10-09 PROCEDURE — G0008 ADMIN INFLUENZA VIRUS VAC: HCPCS | Performed by: INTERNAL MEDICINE

## 2017-10-09 PROCEDURE — 90662 IIV NO PRSV INCREASED AG IM: CPT | Performed by: INTERNAL MEDICINE

## 2017-10-09 PROCEDURE — 99213 OFFICE O/P EST LOW 20 MIN: CPT | Performed by: INTERNAL MEDICINE

## 2017-10-09 RX ORDER — CILOSTAZOL 100 MG/1
100 TABLET ORAL 2 TIMES DAILY
Qty: 60 TABLET | Refills: 2 | Status: SHIPPED | OUTPATIENT
Start: 2017-10-09 | End: 2018-01-10 | Stop reason: SDUPTHER

## 2017-10-09 ASSESSMENT — ENCOUNTER SYMPTOMS
RHINORRHEA: 0
VOMITING: 0
WHEEZING: 0
SHORTNESS OF BREATH: 0
ABDOMINAL PAIN: 0
NAUSEA: 0
BACK PAIN: 0

## 2017-10-09 NOTE — PROGRESS NOTES
Rfl: 1    tamsulosin (FLOMAX) 0.4 MG capsule, Take 1 capsule by mouth daily, Disp: 90 capsule, Rfl: 1    budesonide-formoterol (SYMBICORT) 160-4.5 MCG/ACT AERO, Inhale 2 puffs into the lungs 2 times daily, Disp: 3 Inhaler, Rfl: 1    albuterol sulfate HFA (PROVENTIL HFA) 108 (90 BASE) MCG/ACT inhaler, Inhale 2 puffs into the lungs every 6 hours as needed for Wheezing (with spacer), Disp: 1 Inhaler, Rfl: 0    /80 (Site: Right Arm, Position: Sitting, Cuff Size: Medium Adult)   Pulse 70   Resp 14   Ht 5' 11\" (1.803 m)   Wt 199 lb (90.3 kg)   BMI 27.75 kg/m²     Objective:   Physical Exam   Constitutional: He is oriented to person, place, and time. He appears well-developed and well-nourished. HENT:   Head: Normocephalic. Eyes: Conjunctivae and EOM are normal. Pupils are equal, round, and reactive to light. Neck: Trachea normal and normal range of motion. Neck supple. No JVD present. Carotid bruit is not present. No thyroid mass and no thyromegaly present. Cardiovascular: Normal rate and normal heart sounds. An irregular rhythm present. Exam reveals no gallop. No murmur heard. Pulmonary/Chest: Effort normal and breath sounds normal. No respiratory distress. He has no wheezes. He has no rales. Abdominal: Soft. Bowel sounds are normal. He exhibits no distension and no mass. There is no splenomegaly or hepatomegaly. There is no tenderness. Musculoskeletal: Normal range of motion. He exhibits no edema. Lymphadenopathy:     He has no cervical adenopathy. Neurological: He is alert and oriented to person, place, and time. He has normal strength and normal reflexes. No cranial nerve deficit. Skin: Skin is warm and dry. No rash noted. Psychiatric: He has a normal mood and affect. His behavior is normal. Judgment and thought content normal.       Assessment:      1. Essential hypertension     2. PAD (peripheral artery disease) (HCC)             Plan:      BP is stable.   Start Pletal.

## 2017-10-20 ENCOUNTER — OFFICE VISIT (OUTPATIENT)
Dept: VASCULAR SURGERY | Age: 72
End: 2017-10-20

## 2017-10-20 VITALS
WEIGHT: 201.8 LBS | HEART RATE: 87 BPM | OXYGEN SATURATION: 96 % | SYSTOLIC BLOOD PRESSURE: 120 MMHG | HEIGHT: 71 IN | DIASTOLIC BLOOD PRESSURE: 68 MMHG | BODY MASS INDEX: 28.25 KG/M2

## 2017-10-20 DIAGNOSIS — I73.9 PAD (PERIPHERAL ARTERY DISEASE) (HCC): Primary | ICD-10-CM

## 2017-10-20 DIAGNOSIS — M79.651 PAIN OF RIGHT THIGH: ICD-10-CM

## 2017-10-20 PROCEDURE — 99203 OFFICE O/P NEW LOW 30 MIN: CPT | Performed by: SURGERY

## 2017-10-20 NOTE — PROGRESS NOTES
Outpatient Consultation / H&P    Date of Consultation:  10/20/2017    PCP:  Boo Campos MD     Referring Provider:  Dr. Jourdan Young     Chief Complaint:   Chief Complaint   Patient presents with    Other     patient was sent her by DR Jourdan Young for right le arterial stenosis. pamlr        History of Present Illness: We are asked to see this patient in consultation by Dr. Jourdan Young regarding Vascular disease. Skylar Mortensen is a 81 y.o. male who has a history of atrial fib/flutter status post cardioversion. Patient reports numbness and tingling in the right thigh. This occurs at rest in both the sitting and standing position. Does report when he walks he has some discomfort in his right hip but adamantly denies any calf tightness or cramping. He denies any pain in his foot. He does have a significant smoking history having smoked at least 2 packs a day for some time he quit several months ago.      Past Medical History:  Past Medical History:   Diagnosis Date    A-fib [facility])     2/14/12    Atrial fibrillation with RVR ([facility] [address]     2/14/12     Avulsion fracture of ankle 9/21/2015    Benign localized hyperplasia of prostate with urinary obstruction and other lower urinary tract symptoms (LUTS)(600.21) 8/17/2016    Chronic systolic CHF (congestive heart failure) ([facility]) 8/28/2017    Contusion of leg, right 9/21/2015    COPD (chronic obstructive pulmonary disease) ([facility])     Fractures     GERD (gastroesophageal reflux disease) 6/15/2015    Hypertension     Hypertrophy of prostate without urinary obstruction and other lower urinary tract symptoms (LUTS) 6/15/2015    No history of procedure     no previos colonoscopy    PAD (peripheral artery disease) ([facility] [address] 10/9/2017    Pneumonia     Thoracic aortic aneurysm [facility])        Past Surgical History:  Past Surgical History:   Procedure Laterality Date    COLONOSCOPY  2/24/2016    sigmoid polyps    HERNIA REPAIR         Home Medications:   Prior to Admission medications    Medication Sig Start Date End Date Taking? Authorizing Provider   cilostazol (PLETAL) 100 MG tablet Take 1 tablet by mouth 2 times daily 10/9/17  Yes Chaim Leger MD   carvedilol (COREG) 6.25 MG tablet Take 1 tablet by mouth 2 times daily (with meals) 10/4/17  Yes Camryn Ingram MD   rivaroxaban (XARELTO) 20 MG TABS tablet Take 1 tablet by mouth daily (with breakfast) 10/4/17  Yes Camryn Ingram MD   amiodarone (CORDARONE) 200 MG tablet Take 1 tablet by mouth daily 10/4/17  Yes Camryn Ingram MD   losartan (COZAAR) 25 MG tablet Take 1 tablet by mouth daily 10/4/17  Yes Camryn Ingram MD   atorvastatin (LIPITOR) 10 MG tablet TAKE 1 TABLET BY MOUTH DAILY 10/4/17  Yes Carmyn Ingram MD   furosemide (LASIX) 40 MG tablet Take 0.5 tablets by mouth daily 10/4/17  Yes Camryn Ingram MD   potassium chloride (KLOR-CON M) 20 MEQ extended release tablet Take 0.5 tablets by mouth daily 10/4/17  Yes Camryn Ingram MD   omeprazole (PRILOSEC) 40 MG delayed release capsule TAKE 1 CAPSULE BY MOUTH DAILY 8/15/17  Yes Chaim Leger MD   tamsulosin (FLOMAX) 0.4 MG capsule Take 1 capsule by mouth daily 8/15/17  Yes Chaim Leger MD   budesonide-formoterol (SYMBICORT) 160-4.5 MCG/ACT AERO Inhale 2 puffs into the lungs 2 times daily 8/15/17  Yes Chaim Leger MD   albuterol sulfate HFA (PROVENTIL HFA) 108 (90 BASE) MCG/ACT inhaler Inhale 2 puffs into the lungs every 6 hours as needed for Wheezing (with spacer) 2/15/17  Yes Chaim Leger MD        Allergies:  Review of patient's allergies indicates no known allergies. Social History:      Social History     Social History    Marital status:      Spouse name: N/A    Number of children: N/A    Years of education: N/A     Occupational History    Not on file.      Social History Main Topics    Smoking status: Former Smoker     Packs/day: 1.00     Years: 55.00     Quit date: 8/22/2017    Smokeless tobacco: Never Used      Comment: smoked 2 darnell a day for 55 years    Alcohol use Yes      Comment: occassionally    Drug use: No    Sexual activity: Not Currently     Other Topics Concern    Not on file     Social History Narrative    No narrative on file       Family History:    No family history on file. Review of Systems:  A 14 point review of systems was completed. Pertinent positives identified in the HPI, all other review of systems negative. Physical Examination:    /68 (Site: Left Arm)   Pulse 87   Ht 5' 11\" (1.803 m)   Wt 201 lb 12.8 oz (91.5 kg)   SpO2 96%   BMI 28.15 kg/m²     Weight: 201 lb 12.8 oz (91.5 kg)       General appearance: NAD  Eyes: PERRLA  Neck: no JVD, no lymphadenopathy. Respiratory: effort is unlabored, no crackles, wheezes or rubs. Cardiovascular: regular, no murmur. No carotid bruits. No edema or varicosities. Pulses:    femoral DP   RIGHT 2 1   LEFT 2 2   GI: abdomen soft, nondistended, no organomegaly. Musculoskeletal: strength and tone normal.  Extremities: warm and pink. Skin: no dermatitis or ulceration. Neuro/psychiatric: grossly intact. MEDICAL DECISION MAKING/TESTING      Lower Extremity Arterial:    Right Impression   1. The right ankle/brachial index is 0.89.   2. Segmental pressures are abnormal indicating superficial femoral to   popliteal artery disease. 3. Pulse volume recordings are abnormal indicating superficial femoral to   popliteal artery disease. 4. Continuous wave Doppler of the common femoral artery demonstrates   multiphasic flow, monophasic flow is demonstrates in the popliteal, posterior   tibial and peroneal veins. Left Impression   1. The left ankle/brachial index is 1.21.   2. Segmental pressures are within normal limits in the dorsalis pedis and   calf, the thigh pressures are falsely elevated.  The posterior tibial artery   pressure is abnormal.   3. Pulse volume recordings are abnormal indicating calf vessel

## 2017-11-14 ENCOUNTER — TELEPHONE (OUTPATIENT)
Dept: INTERNAL MEDICINE CLINIC | Age: 72
End: 2017-11-14

## 2017-11-14 NOTE — TELEPHONE ENCOUNTER
Called patient to find out if he has had eye exam per insurance. Left message for patient to call back.

## 2018-01-10 ENCOUNTER — OFFICE VISIT (OUTPATIENT)
Dept: INTERNAL MEDICINE CLINIC | Age: 73
End: 2018-01-10

## 2018-01-10 VITALS
HEIGHT: 71 IN | WEIGHT: 210 LBS | RESPIRATION RATE: 14 BRPM | DIASTOLIC BLOOD PRESSURE: 80 MMHG | HEART RATE: 70 BPM | SYSTOLIC BLOOD PRESSURE: 110 MMHG | BODY MASS INDEX: 29.4 KG/M2

## 2018-01-10 DIAGNOSIS — N13.8 ENLARGED PROSTATE WITH URINARY OBSTRUCTION: ICD-10-CM

## 2018-01-10 DIAGNOSIS — N40.1 ENLARGED PROSTATE WITH URINARY OBSTRUCTION: ICD-10-CM

## 2018-01-10 DIAGNOSIS — J44.9 CHRONIC OBSTRUCTIVE PULMONARY DISEASE, UNSPECIFIED COPD TYPE (HCC): ICD-10-CM

## 2018-01-10 DIAGNOSIS — I73.9 PAD (PERIPHERAL ARTERY DISEASE) (HCC): ICD-10-CM

## 2018-01-10 DIAGNOSIS — K21.9 GASTROESOPHAGEAL REFLUX DISEASE WITHOUT ESOPHAGITIS: ICD-10-CM

## 2018-01-10 DIAGNOSIS — I50.22 HEART FAILURE, SYSTOLIC, CHRONIC (HCC): ICD-10-CM

## 2018-01-10 DIAGNOSIS — I50.22 CHRONIC SYSTOLIC CHF (CONGESTIVE HEART FAILURE) (HCC): ICD-10-CM

## 2018-01-10 DIAGNOSIS — I42.0 DILATED CARDIOMYOPATHY (HCC): ICD-10-CM

## 2018-01-10 DIAGNOSIS — R07.9 CHEST PAIN, UNSPECIFIED TYPE: ICD-10-CM

## 2018-01-10 DIAGNOSIS — I10 ESSENTIAL HYPERTENSION: Primary | ICD-10-CM

## 2018-01-10 DIAGNOSIS — I71.20 THORACIC AORTIC ANEURYSM WITHOUT RUPTURE: ICD-10-CM

## 2018-01-10 DIAGNOSIS — I48.3 TYPICAL ATRIAL FLUTTER (HCC): ICD-10-CM

## 2018-01-10 DIAGNOSIS — I73.9 PERIPHERAL VASCULAR DISEASE (HCC): ICD-10-CM

## 2018-01-10 DIAGNOSIS — J44.9 OBSTRUCTIVE CHRONIC BRONCHITIS WITHOUT EXACERBATION (HCC): ICD-10-CM

## 2018-01-10 PROCEDURE — 99215 OFFICE O/P EST HI 40 MIN: CPT | Performed by: INTERNAL MEDICINE

## 2018-01-10 PROCEDURE — 93000 ELECTROCARDIOGRAM COMPLETE: CPT | Performed by: INTERNAL MEDICINE

## 2018-01-10 RX ORDER — ALBUTEROL SULFATE 90 UG/1
2 AEROSOL, METERED RESPIRATORY (INHALATION) EVERY 6 HOURS PRN
Qty: 1 INHALER | Refills: 2 | Status: SHIPPED | OUTPATIENT
Start: 2018-01-10 | End: 2018-04-16 | Stop reason: SDUPTHER

## 2018-01-10 RX ORDER — CILOSTAZOL 100 MG/1
100 TABLET ORAL 2 TIMES DAILY
Qty: 60 TABLET | Refills: 2 | Status: SHIPPED | OUTPATIENT
Start: 2018-01-10 | End: 2018-04-16 | Stop reason: SDUPTHER

## 2018-01-10 ASSESSMENT — ENCOUNTER SYMPTOMS
NAUSEA: 0
BACK PAIN: 0
SHORTNESS OF BREATH: 0
ABDOMINAL PAIN: 0
RHINORRHEA: 0
WHEEZING: 0
VOMITING: 0

## 2018-01-10 ASSESSMENT — PATIENT HEALTH QUESTIONNAIRE - PHQ9
SUM OF ALL RESPONSES TO PHQ9 QUESTIONS 1 & 2: 0
SUM OF ALL RESPONSES TO PHQ QUESTIONS 1-9: 0
1. LITTLE INTEREST OR PLEASURE IN DOING THINGS: 0
2. FEELING DOWN, DEPRESSED OR HOPELESS: 0

## 2018-01-10 NOTE — PROGRESS NOTES
Hypertension     Hypertrophy of prostate without urinary obstruction and other lower urinary tract symptoms (LUTS) 6/15/2015    No history of procedure     no previos colonoscopy    PAD (peripheral artery disease) ([facility] [address] 10/9/2017    Pneumonia     Thoracic aortic aneurysm [facility])        Past Surgical History:   Procedure Laterality Date    COLONOSCOPY  2/24/2016    sigmoid polyps    HERNIA REPAIR       No family history on file. Social History   Substance Use Topics    Smoking status: Former Smoker     Packs/day: 1.00     Years: 55.00     Quit date: 8/22/2017    Smokeless tobacco: Never Used      Comment: smoked 2 darnell a day for 55 years    Alcohol use Yes      Comment: occassionally   .     Current Outpatient Prescriptions:     cilostazol (PLETAL) 100 MG tablet, Take 1 tablet by mouth 2 times daily, Disp: 60 tablet, Rfl: 2    carvedilol (COREG) 6.25 MG tablet, Take 1 tablet by mouth 2 times daily (with meals), Disp: 60 tablet, Rfl: 11    rivaroxaban (XARELTO) 20 MG TABS tablet, Take 1 tablet by mouth daily (with breakfast), Disp: 30 tablet, Rfl: 11    amiodarone (CORDARONE) 200 MG tablet, Take 1 tablet by mouth daily, Disp: 30 tablet, Rfl: 11    losartan (COZAAR) 25 MG tablet, Take 1 tablet by mouth daily, Disp: 30 tablet, Rfl: 11    atorvastatin (LIPITOR) 10 MG tablet, TAKE 1 TABLET BY MOUTH DAILY, Disp: 30 tablet, Rfl: 11    furosemide (LASIX) 40 MG tablet, Take 0.5 tablets by mouth daily, Disp: 30 tablet, Rfl: 11    potassium chloride (KLOR-CON M) 20 MEQ extended release tablet, Take 0.5 tablets by mouth daily, Disp: 30 tablet, Rfl: 11    omeprazole (PRILOSEC) 40 MG delayed release capsule, TAKE 1 CAPSULE BY MOUTH DAILY, Disp: 90 capsule, Rfl: 1    tamsulosin (FLOMAX) 0.4 MG capsule, Take 1 capsule by mouth daily, Disp: 90 capsule, Rfl: 1    budesonide-formoterol (SYMBICORT) 160-4.5 MCG/ACT AERO, Inhale 2 puffs into the lungs 2 times daily, Disp: 3 Inhaler, Rfl: 1    albuterol sulfate HFA

## 2018-01-12 RX ORDER — OMEPRAZOLE 40 MG/1
CAPSULE, DELAYED RELEASE ORAL
Qty: 90 CAPSULE | Refills: 0 | Status: SHIPPED | OUTPATIENT
Start: 2018-01-12 | End: 2018-04-16 | Stop reason: SDUPTHER

## 2018-01-24 ENCOUNTER — HOSPITAL ENCOUNTER (OUTPATIENT)
Dept: NON INVASIVE DIAGNOSTICS | Age: 73
Discharge: HOME | End: 2018-01-24
Attending: INTERNAL MEDICINE | Admitting: INTERNAL MEDICINE

## 2018-01-24 VITALS — TEMPERATURE: 98.1 F

## 2018-01-24 LAB
LV EF: 62 %
LVEF MODALITY: NORMAL

## 2018-01-24 RX ORDER — AMINOPHYLLINE DIHYDRATE 25 MG/ML
500 INJECTION, SOLUTION INTRAVENOUS ONCE
Status: COMPLETED | OUTPATIENT
Start: 2018-01-24 | End: 2018-01-24

## 2018-01-24 RX ADMIN — AMINOPHYLLINE DIHYDRATE 100 MG: 25 INJECTION, SOLUTION INTRAVENOUS at 09:44

## 2018-01-24 ASSESSMENT — PAIN - FUNCTIONAL ASSESSMENT
PAIN_FUNCTIONAL_ASSESSMENT: 0-10
PAIN_FUNCTIONAL_ASSESSMENT: 0-10

## 2018-03-14 ENCOUNTER — OFFICE VISIT (OUTPATIENT)
Dept: INTERNAL MEDICINE CLINIC | Age: 73
End: 2018-03-14

## 2018-03-14 VITALS
DIASTOLIC BLOOD PRESSURE: 80 MMHG | HEIGHT: 71 IN | SYSTOLIC BLOOD PRESSURE: 140 MMHG | BODY MASS INDEX: 30.8 KG/M2 | TEMPERATURE: 97.6 F | OXYGEN SATURATION: 94 % | WEIGHT: 220 LBS | HEART RATE: 84 BPM

## 2018-03-14 DIAGNOSIS — J44.1 CHRONIC OBSTRUCTIVE PULMONARY DISEASE WITH ACUTE EXACERBATION (HCC): Primary | ICD-10-CM

## 2018-03-14 DIAGNOSIS — I10 ESSENTIAL HYPERTENSION: ICD-10-CM

## 2018-03-14 DIAGNOSIS — J44.9 CHRONIC OBSTRUCTIVE PULMONARY DISEASE, UNSPECIFIED COPD TYPE (HCC): ICD-10-CM

## 2018-03-14 PROCEDURE — 99213 OFFICE O/P EST LOW 20 MIN: CPT | Performed by: PHYSICIAN ASSISTANT

## 2018-03-14 RX ORDER — DOXYCYCLINE HYCLATE 100 MG
100 TABLET ORAL 2 TIMES DAILY
Qty: 20 TABLET | Refills: 0 | Status: SHIPPED | OUTPATIENT
Start: 2018-03-14 | End: 2018-03-24

## 2018-03-14 RX ORDER — PREDNISONE 20 MG/1
40 TABLET ORAL DAILY
Qty: 10 TABLET | Refills: 0 | Status: SHIPPED | OUTPATIENT
Start: 2018-03-14 | End: 2018-03-19

## 2018-03-14 ASSESSMENT — ENCOUNTER SYMPTOMS
WHEEZING: 0
SINUS PAIN: 0
NAUSEA: 0
ABDOMINAL DISTENTION: 0
SORE THROAT: 0
COUGH: 0
VOMITING: 0
SHORTNESS OF BREATH: 1
RHINORRHEA: 0
DIARRHEA: 0

## 2018-03-14 NOTE — PROGRESS NOTES
Chief Complaint:   Bonnie Moore is a 81 y.o. male who presents for   Chief Complaint   Patient presents with    Shortness of Breath       HPI:    Pt states he has had increasing dyspnea since august of last year. He states it has been getting worse in the past month or so. He endorses feeling short of breath with exertion & orthopnea. Pt states he does have a hx of COPD and systolic CHF. He is not on home O2. He is a former smoker and quit 7 months ago. He denies any cough, LE swelling, chest pain, and palpitations. He denies sick contacts. Review of Systems  Review of Systems   Constitutional: Positive for unexpected weight change (states he put on about 50 lb since he quit smoking in August). Negative for appetite change. HENT: Negative for congestion, rhinorrhea, sinus pain and sore throat. Respiratory: Positive for shortness of breath. Negative for cough and wheezing. Cardiovascular: Negative for chest pain and palpitations. Gastrointestinal: Negative for abdominal distention, diarrhea, nausea and vomiting. Musculoskeletal: Negative for arthralgias and myalgias. Allergies  Patient has no known allergies.       Vitals  BP (!) 140/80   Pulse 84   Temp 97.6 °F (36.4 °C)   Ht 5' 11\" (1.803 m)   Wt 220 lb (99.8 kg)   SpO2 94%   BMI 30.68 kg/m²     Current Medications  Current Outpatient Prescriptions   Medication Sig Dispense Refill    predniSONE (DELTASONE) 20 MG tablet Take 2 tablets by mouth daily for 5 days 10 tablet 0    doxycycline hyclate (VIBRA-TABS) 100 MG tablet Take 1 tablet by mouth 2 times daily for 10 days 20 tablet 0    omeprazole (PRILOSEC) 40 MG delayed release capsule TAKE 1 CAPSULE BY MOUTH DAILY 90 capsule 0    cilostazol (PLETAL) 100 MG tablet Take 1 tablet by mouth 2 times daily 60 tablet 2    albuterol sulfate HFA (PROVENTIL HFA) 108 (90 Base) MCG/ACT inhaler Inhale 2 puffs into the lungs every 6 hours as needed for Wheezing (with spacer) 1 Inhaler 2

## 2018-04-16 ENCOUNTER — OFFICE VISIT (OUTPATIENT)
Dept: INTERNAL MEDICINE CLINIC | Age: 73
End: 2018-04-16

## 2018-04-16 VITALS
SYSTOLIC BLOOD PRESSURE: 130 MMHG | BODY MASS INDEX: 30.8 KG/M2 | WEIGHT: 220 LBS | HEART RATE: 72 BPM | HEIGHT: 71 IN | DIASTOLIC BLOOD PRESSURE: 80 MMHG | RESPIRATION RATE: 14 BRPM

## 2018-04-16 DIAGNOSIS — Z00.00 ROUTINE GENERAL MEDICAL EXAMINATION AT A HEALTH CARE FACILITY: ICD-10-CM

## 2018-04-16 DIAGNOSIS — R53.83 FATIGUE, UNSPECIFIED TYPE: ICD-10-CM

## 2018-04-16 DIAGNOSIS — I10 ESSENTIAL HYPERTENSION: Primary | ICD-10-CM

## 2018-04-16 DIAGNOSIS — F17.219 NICOTINE DEPENDENCE, CIGARETTES, WITH UNSPECIFIED NICOTINE-INDUCED DISORDERS: ICD-10-CM

## 2018-04-16 DIAGNOSIS — I10 ESSENTIAL HYPERTENSION: ICD-10-CM

## 2018-04-16 DIAGNOSIS — J44.9 CHRONIC OBSTRUCTIVE PULMONARY DISEASE, UNSPECIFIED COPD TYPE (HCC): ICD-10-CM

## 2018-04-16 DIAGNOSIS — K21.9 GASTROESOPHAGEAL REFLUX DISEASE WITHOUT ESOPHAGITIS: ICD-10-CM

## 2018-04-16 PROCEDURE — G0296 VISIT TO DETERM LDCT ELIG: HCPCS | Performed by: INTERNAL MEDICINE

## 2018-04-16 PROCEDURE — G0439 PPPS, SUBSEQ VISIT: HCPCS | Performed by: INTERNAL MEDICINE

## 2018-04-16 RX ORDER — ALBUTEROL SULFATE 90 UG/1
2 AEROSOL, METERED RESPIRATORY (INHALATION) EVERY 6 HOURS PRN
Qty: 1 INHALER | Refills: 11 | Status: SHIPPED | OUTPATIENT
Start: 2018-04-16

## 2018-04-16 RX ORDER — CILOSTAZOL 100 MG/1
100 TABLET ORAL 2 TIMES DAILY
Qty: 60 TABLET | Refills: 11 | Status: SHIPPED | OUTPATIENT
Start: 2018-04-16 | End: 2018-09-04

## 2018-04-16 RX ORDER — OMEPRAZOLE 40 MG/1
CAPSULE, DELAYED RELEASE ORAL
Qty: 90 CAPSULE | Refills: 3 | Status: SHIPPED | OUTPATIENT
Start: 2018-04-16 | End: 2018-11-26 | Stop reason: SDUPTHER

## 2018-04-16 ASSESSMENT — LIFESTYLE VARIABLES: HOW OFTEN DO YOU HAVE A DRINK CONTAINING ALCOHOL: 0

## 2018-04-16 ASSESSMENT — ANXIETY QUESTIONNAIRES: GAD7 TOTAL SCORE: 2

## 2018-04-16 ASSESSMENT — PATIENT HEALTH QUESTIONNAIRE - PHQ9: SUM OF ALL RESPONSES TO PHQ QUESTIONS 1-9: 2

## 2018-04-17 LAB
A/G RATIO: 1.6 (ref 1.1–2.2)
ALBUMIN SERPL-MCNC: 4.4 G/DL (ref 3.4–5)
ALP BLD-CCNC: 68 U/L (ref 40–129)
ALT SERPL-CCNC: 16 U/L (ref 10–40)
ANION GAP SERPL CALCULATED.3IONS-SCNC: 16 MMOL/L (ref 3–16)
AST SERPL-CCNC: 15 U/L (ref 15–37)
BASOPHILS ABSOLUTE: 0.1 K/UL (ref 0–0.2)
BASOPHILS RELATIVE PERCENT: 1.6 %
BILIRUB SERPL-MCNC: 0.4 MG/DL (ref 0–1)
BUN BLDV-MCNC: 18 MG/DL (ref 7–20)
CALCIUM SERPL-MCNC: 9.1 MG/DL (ref 8.3–10.6)
CHLORIDE BLD-SCNC: 98 MMOL/L (ref 99–110)
CO2: 24 MMOL/L (ref 21–32)
CREAT SERPL-MCNC: 1.6 MG/DL (ref 0.8–1.3)
EOSINOPHILS ABSOLUTE: 0.5 K/UL (ref 0–0.6)
EOSINOPHILS RELATIVE PERCENT: 7.1 %
GFR AFRICAN AMERICAN: 52
GFR NON-AFRICAN AMERICAN: 43
GLOBULIN: 2.8 G/DL
GLUCOSE BLD-MCNC: 97 MG/DL (ref 70–99)
HCT VFR BLD CALC: 41.7 % (ref 40.5–52.5)
HEMOGLOBIN: 14.1 G/DL (ref 13.5–17.5)
LYMPHOCYTES ABSOLUTE: 2.5 K/UL (ref 1–5.1)
LYMPHOCYTES RELATIVE PERCENT: 33.8 %
MCH RBC QN AUTO: 31 PG (ref 26–34)
MCHC RBC AUTO-ENTMCNC: 33.9 G/DL (ref 31–36)
MCV RBC AUTO: 91.4 FL (ref 80–100)
MONOCYTES ABSOLUTE: 0.7 K/UL (ref 0–1.3)
MONOCYTES RELATIVE PERCENT: 9.9 %
NEUTROPHILS ABSOLUTE: 3.5 K/UL (ref 1.7–7.7)
NEUTROPHILS RELATIVE PERCENT: 47.6 %
PDW BLD-RTO: 15.2 % (ref 12.4–15.4)
PLATELET # BLD: 255 K/UL (ref 135–450)
PMV BLD AUTO: 8.6 FL (ref 5–10.5)
POTASSIUM SERPL-SCNC: 4.5 MMOL/L (ref 3.5–5.1)
RBC # BLD: 4.56 M/UL (ref 4.2–5.9)
SODIUM BLD-SCNC: 138 MMOL/L (ref 136–145)
TOTAL PROTEIN: 7.2 G/DL (ref 6.4–8.2)
TSH SERPL DL<=0.05 MIU/L-ACNC: 4.41 UIU/ML (ref 0.27–4.2)
VITAMIN B-12: 368 PG/ML (ref 211–911)
WBC # BLD: 7.4 K/UL (ref 4–11)

## 2018-04-23 ENCOUNTER — OFFICE VISIT (OUTPATIENT)
Dept: INTERNAL MEDICINE CLINIC | Age: 73
End: 2018-04-23

## 2018-04-23 VITALS
WEIGHT: 218 LBS | OXYGEN SATURATION: 94 % | SYSTOLIC BLOOD PRESSURE: 130 MMHG | BODY MASS INDEX: 30.52 KG/M2 | HEART RATE: 90 BPM | HEIGHT: 71 IN | RESPIRATION RATE: 16 BRPM | DIASTOLIC BLOOD PRESSURE: 80 MMHG

## 2018-04-23 DIAGNOSIS — J20.9 ACUTE BRONCHITIS, UNSPECIFIED ORGANISM: ICD-10-CM

## 2018-04-23 DIAGNOSIS — J44.1 COPD EXACERBATION (HCC): Primary | ICD-10-CM

## 2018-04-23 PROCEDURE — 99213 OFFICE O/P EST LOW 20 MIN: CPT | Performed by: INTERNAL MEDICINE

## 2018-04-23 RX ORDER — PREDNISONE 10 MG/1
TABLET ORAL
Qty: 30 TABLET | Refills: 0 | Status: SHIPPED | OUTPATIENT
Start: 2018-04-23 | End: 2018-06-25 | Stop reason: ALTCHOICE

## 2018-04-23 RX ORDER — IPRATROPIUM BROMIDE AND ALBUTEROL SULFATE 2.5; .5 MG/3ML; MG/3ML
1 SOLUTION RESPIRATORY (INHALATION) EVERY 4 HOURS PRN
Qty: 360 ML | Refills: 2 | Status: SHIPPED | OUTPATIENT
Start: 2018-04-23 | End: 2018-07-20 | Stop reason: SDUPTHER

## 2018-04-23 RX ORDER — DOXYCYCLINE HYCLATE 100 MG
100 TABLET ORAL 2 TIMES DAILY
Qty: 14 TABLET | Refills: 0 | Status: SHIPPED | OUTPATIENT
Start: 2018-04-23 | End: 2018-04-30

## 2018-04-23 ASSESSMENT — ENCOUNTER SYMPTOMS
WHEEZING: 1
COUGH: 1
SHORTNESS OF BREATH: 1

## 2018-04-26 ENCOUNTER — TELEPHONE (OUTPATIENT)
Dept: CASE MANAGEMENT | Age: 73
End: 2018-04-26

## 2018-04-26 ENCOUNTER — HOSPITAL ENCOUNTER (OUTPATIENT)
Dept: CT IMAGING | Facility: MEDICAL CENTER | Age: 73
Discharge: HOME | End: 2018-04-26
Attending: INTERNAL MEDICINE | Admitting: INTERNAL MEDICINE

## 2018-04-26 DIAGNOSIS — F17.219 CIGARETTE NICOTINE DEPENDENCE WITH NICOTINE-INDUCED DISORDER: ICD-10-CM

## 2018-04-26 DIAGNOSIS — F17.219 NICOTINE DEPENDENCE, CIGARETTES, WITH UNSPECIFIED NICOTINE-INDUCED DISORDERS: ICD-10-CM

## 2018-05-10 ENCOUNTER — TELEPHONE (OUTPATIENT)
Dept: INTERNAL MEDICINE CLINIC | Age: 73
End: 2018-05-10

## 2018-05-11 ENCOUNTER — TELEPHONE (OUTPATIENT)
Dept: INTERNAL MEDICINE CLINIC | Age: 73
End: 2018-05-11

## 2018-05-14 ENCOUNTER — TELEPHONE (OUTPATIENT)
Dept: INTERNAL MEDICINE CLINIC | Age: 73
End: 2018-05-14

## 2018-05-14 ENCOUNTER — TELEPHONE (OUTPATIENT)
Dept: CASE MANAGEMENT | Age: 73
End: 2018-05-14

## 2018-06-01 ENCOUNTER — OFFICE VISIT (OUTPATIENT)
Dept: INTERNAL MEDICINE CLINIC | Age: 73
End: 2018-06-01

## 2018-06-01 VITALS
HEIGHT: 71 IN | SYSTOLIC BLOOD PRESSURE: 120 MMHG | HEART RATE: 88 BPM | BODY MASS INDEX: 30.52 KG/M2 | OXYGEN SATURATION: 97 % | DIASTOLIC BLOOD PRESSURE: 80 MMHG | WEIGHT: 218 LBS

## 2018-06-01 DIAGNOSIS — J44.1 COPD WITH ACUTE EXACERBATION (HCC): Primary | ICD-10-CM

## 2018-06-01 PROCEDURE — 99213 OFFICE O/P EST LOW 20 MIN: CPT | Performed by: INTERNAL MEDICINE

## 2018-06-01 RX ORDER — PREDNISONE 20 MG/1
TABLET ORAL
Qty: 15 TABLET | Refills: 0 | Status: SHIPPED | OUTPATIENT
Start: 2018-06-01 | End: 2018-06-25 | Stop reason: ALTCHOICE

## 2018-06-01 RX ORDER — DOXYCYCLINE HYCLATE 100 MG
100 TABLET ORAL 2 TIMES DAILY
Qty: 14 TABLET | Refills: 0 | Status: SHIPPED | OUTPATIENT
Start: 2018-06-01 | End: 2018-06-08

## 2018-06-01 ASSESSMENT — ENCOUNTER SYMPTOMS
VOMITING: 0
CHEST TIGHTNESS: 0
COUGH: 1
BLOOD IN STOOL: 0
WHEEZING: 1
ABDOMINAL PAIN: 0
DIARRHEA: 0
SHORTNESS OF BREATH: 1
NAUSEA: 0

## 2018-07-20 ENCOUNTER — OFFICE VISIT (OUTPATIENT)
Dept: INTERNAL MEDICINE CLINIC | Age: 73
End: 2018-07-20

## 2018-07-20 VITALS
DIASTOLIC BLOOD PRESSURE: 80 MMHG | WEIGHT: 226 LBS | HEART RATE: 80 BPM | SYSTOLIC BLOOD PRESSURE: 136 MMHG | BODY MASS INDEX: 31.64 KG/M2 | RESPIRATION RATE: 14 BRPM | HEIGHT: 71 IN

## 2018-07-20 DIAGNOSIS — I42.0 DILATED CARDIOMYOPATHY (HCC): ICD-10-CM

## 2018-07-20 DIAGNOSIS — I50.22 CHRONIC SYSTOLIC CHF (CONGESTIVE HEART FAILURE) (HCC): ICD-10-CM

## 2018-07-20 DIAGNOSIS — J44.9 CHRONIC OBSTRUCTIVE PULMONARY DISEASE, UNSPECIFIED COPD TYPE (HCC): Primary | ICD-10-CM

## 2018-07-20 DIAGNOSIS — I73.9 PAD (PERIPHERAL ARTERY DISEASE) (HCC): ICD-10-CM

## 2018-07-20 DIAGNOSIS — K21.9 GASTROESOPHAGEAL REFLUX DISEASE WITHOUT ESOPHAGITIS: ICD-10-CM

## 2018-07-20 DIAGNOSIS — I48.3 TYPICAL ATRIAL FLUTTER (HCC): ICD-10-CM

## 2018-07-20 DIAGNOSIS — I10 ESSENTIAL HYPERTENSION: ICD-10-CM

## 2018-07-20 DIAGNOSIS — I71.20 THORACIC AORTIC ANEURYSM WITHOUT RUPTURE: ICD-10-CM

## 2018-07-20 PROCEDURE — 99214 OFFICE O/P EST MOD 30 MIN: CPT | Performed by: INTERNAL MEDICINE

## 2018-07-20 RX ORDER — ALBUTEROL SULFATE 2.5 MG/3ML
2.5 SOLUTION RESPIRATORY (INHALATION) EVERY 4 HOURS PRN
Qty: 50 EACH | Refills: 2 | Status: CANCELLED | OUTPATIENT
Start: 2018-07-20

## 2018-07-20 RX ORDER — IPRATROPIUM BROMIDE AND ALBUTEROL SULFATE 2.5; .5 MG/3ML; MG/3ML
1 SOLUTION RESPIRATORY (INHALATION) EVERY 4 HOURS PRN
Qty: 360 ML | Refills: 2 | Status: SHIPPED | OUTPATIENT
Start: 2018-07-20 | End: 2018-10-15 | Stop reason: SDUPTHER

## 2018-07-20 ASSESSMENT — ENCOUNTER SYMPTOMS
BACK PAIN: 0
RHINORRHEA: 0
VOMITING: 0
ABDOMINAL PAIN: 0
SHORTNESS OF BREATH: 0
NAUSEA: 0
WHEEZING: 0

## 2018-07-20 NOTE — PROGRESS NOTES
Subjective:      Patient ID: Hilda Stone is a 72 y.o. male. HPI      Patient is here for a follow up. He has a history of hypertension. It is well controlled. He is compliant and has no med side effects. Patient's COPD is controlled on present bronchodilator regimen. Patient is taking medications as instructed, no medication side effects noted, no significant ongoing wheezing or shortness of breath, using bronchodilator MDI less than twice a week. He quit smoking for over 4 months. No ER visits. No flare up in six months. He has history of a fib. He is in NSR. He has GERD. It is stable. He is urinating well. Review of Systems   Constitutional: Negative for activity change and appetite change. HENT: Negative for postnasal drip and rhinorrhea. Respiratory: Negative for shortness of breath and wheezing. Cardiovascular: Negative for chest pain, palpitations and leg swelling. Gastrointestinal: Negative for abdominal pain, nausea and vomiting. Genitourinary: Positive for frequency. Negative for difficulty urinating. Musculoskeletal: Negative for back pain and joint swelling. Skin: Negative for rash. Neurological: Negative for light-headedness. Psychiatric/Behavioral: Negative for sleep disturbance.      Past Medical History:   Diagnosis Date    A-fib [facility])     2/14/12    Atrial fibrillation with RVR ([facility] [address]     2/14/12     Avulsion fracture of ankle 9/21/2015    Benign localized hyperplasia of prostate with urinary obstruction and other lower urinary tract symptoms (LUTS)(600.21) 8/17/2016    Chronic systolic CHF (congestive heart failure) ([facility]) 8/28/2017    Contusion of leg, right 9/21/2015    COPD (chronic obstructive pulmonary disease) ([facility])     Fractures     GERD (gastroesophageal reflux disease) 6/15/2015    Hypertension     Hypertrophy of prostate without urinary obstruction and other lower urinary tract symptoms (LUTS) 6/15/2015    No history of procedure     no which are stable since 2015.  As there is a history of smoking,   recommend yearly low-dose lung cancer screening CT. Assessment:       Diagnosis Orders   1. Chronic obstructive pulmonary disease, unspecified COPD type ([facility] [address]     2. [facility]     3. Typical atrial flutter ([facility]     4. Dilated cardiomyopathy ([facility]     5. Chronic systolic CHF (congestive heart failure) ([facility]     6. PAD (peripheral artery disease) ([facility]     7. Essential hypertension     8. Gastroesophageal reflux disease without esophagitis            Plan:          #  Hypertension:  Well controlled. Check labs. His HR is up. #  A fib: in SR. #  COPD stable. No flare up. on Symbicort. Proventil prn. #  GERD: on Prilosec. #  PN right: stable. #  BPH with symptoms. On Flomax. #  Hyperlipidemia on Lipitor. Discussed use, benefit, and side effects of prescribed medications. Barriers to medication compliance addressed. All patient questions answered. Pt voiced understanding. Decrease calorie intake. Exercise,weight loss recommended. The current medical regimen is effective;  continue present plan and medications. See orders.

## 2018-08-22 ENCOUNTER — OFFICE VISIT (OUTPATIENT)
Dept: INTERNAL MEDICINE CLINIC | Age: 73
End: 2018-08-22

## 2018-08-22 VITALS
SYSTOLIC BLOOD PRESSURE: 130 MMHG | BODY MASS INDEX: 31.08 KG/M2 | RESPIRATION RATE: 14 BRPM | HEIGHT: 71 IN | DIASTOLIC BLOOD PRESSURE: 86 MMHG | HEART RATE: 80 BPM | WEIGHT: 222 LBS

## 2018-08-22 DIAGNOSIS — I42.0 DILATED CARDIOMYOPATHY (HCC): ICD-10-CM

## 2018-08-22 DIAGNOSIS — Z01.818 PREOP EXAM FOR INTERNAL MEDICINE: Primary | ICD-10-CM

## 2018-08-22 DIAGNOSIS — I50.22 CHRONIC SYSTOLIC CHF (CONGESTIVE HEART FAILURE) (HCC): ICD-10-CM

## 2018-08-22 DIAGNOSIS — I73.9 PAD (PERIPHERAL ARTERY DISEASE) (HCC): ICD-10-CM

## 2018-08-22 DIAGNOSIS — J44.9 CHRONIC OBSTRUCTIVE PULMONARY DISEASE, UNSPECIFIED COPD TYPE (HCC): ICD-10-CM

## 2018-08-22 DIAGNOSIS — K21.9 GASTROESOPHAGEAL REFLUX DISEASE WITHOUT ESOPHAGITIS: ICD-10-CM

## 2018-08-22 DIAGNOSIS — I71.20 THORACIC AORTIC ANEURYSM WITHOUT RUPTURE: ICD-10-CM

## 2018-08-22 DIAGNOSIS — H25.9 AGE-RELATED CATARACT OF BOTH EYES, UNSPECIFIED AGE-RELATED CATARACT TYPE: ICD-10-CM

## 2018-08-22 DIAGNOSIS — I10 ESSENTIAL HYPERTENSION: ICD-10-CM

## 2018-08-22 DIAGNOSIS — I48.3 TYPICAL ATRIAL FLUTTER (HCC): ICD-10-CM

## 2018-08-22 PROCEDURE — 99214 OFFICE O/P EST MOD 30 MIN: CPT | Performed by: INTERNAL MEDICINE

## 2018-09-04 NOTE — PRE-PROCEDURE INSTRUCTIONS

## 2018-09-06 ENCOUNTER — ANESTHESIA (OUTPATIENT)
Dept: OPERATING ROOM | Age: 73
End: 2018-09-06
Payer: MEDICARE

## 2018-09-06 ENCOUNTER — HOSPITAL ENCOUNTER (OUTPATIENT)
Age: 73
Setting detail: OUTPATIENT SURGERY
Discharge: HOME | End: 2018-09-06
Attending: OPHTHALMOLOGY | Admitting: OPHTHALMOLOGY
Payer: MEDICARE

## 2018-09-06 ENCOUNTER — ANESTHESIA EVENT (OUTPATIENT)
Dept: OPERATING ROOM | Age: 73
End: 2018-09-06
Payer: MEDICARE

## 2018-09-06 VITALS — DIASTOLIC BLOOD PRESSURE: 103 MMHG | SYSTOLIC BLOOD PRESSURE: 175 MMHG | OXYGEN SATURATION: 100 %

## 2018-09-06 VITALS
WEIGHT: 222 LBS | TEMPERATURE: 98.4 F | SYSTOLIC BLOOD PRESSURE: 174 MMHG | RESPIRATION RATE: 16 BRPM | BODY MASS INDEX: 31.08 KG/M2 | OXYGEN SATURATION: 97 % | HEIGHT: 71 IN | HEART RATE: 64 BPM | DIASTOLIC BLOOD PRESSURE: 88 MMHG

## 2018-09-06 PROCEDURE — 6370000000 HC RX 637 (ALT 250 FOR IP): Performed by: ANESTHESIOLOGY

## 2018-09-06 PROCEDURE — 2500000003 HC RX 250 WO HCPCS: Performed by: NURSE ANESTHETIST, CERTIFIED REGISTERED

## 2018-09-06 PROCEDURE — 6370000000 HC RX 637 (ALT 250 FOR IP): Performed by: OPHTHALMOLOGY

## 2018-09-06 PROCEDURE — 2709999900 HC NON-CHARGEABLE SUPPLY: Performed by: OPHTHALMOLOGY

## 2018-09-06 PROCEDURE — 3700000000 HC ANESTHESIA ATTENDED CARE: Performed by: OPHTHALMOLOGY

## 2018-09-06 PROCEDURE — 7100000010 HC PHASE II RECOVERY - FIRST 15 MIN: Performed by: OPHTHALMOLOGY

## 2018-09-06 PROCEDURE — 6360000002 HC RX W HCPCS: Performed by: OPHTHALMOLOGY

## 2018-09-06 PROCEDURE — 2500000003 HC RX 250 WO HCPCS: Performed by: ANESTHESIOLOGY

## 2018-09-06 PROCEDURE — 6360000002 HC RX W HCPCS: Performed by: NURSE ANESTHETIST, CERTIFIED REGISTERED

## 2018-09-06 PROCEDURE — 3700000001 HC ADD 15 MINUTES (ANESTHESIA): Performed by: OPHTHALMOLOGY

## 2018-09-06 PROCEDURE — 2500000003 HC RX 250 WO HCPCS: Performed by: OPHTHALMOLOGY

## 2018-09-06 PROCEDURE — 7100000011 HC PHASE II RECOVERY - ADDTL 15 MIN: Performed by: OPHTHALMOLOGY

## 2018-09-06 PROCEDURE — V2632 POST CHMBR INTRAOCULAR LENS: HCPCS | Performed by: OPHTHALMOLOGY

## 2018-09-06 PROCEDURE — 2580000003 HC RX 258: Performed by: ANESTHESIOLOGY

## 2018-09-06 PROCEDURE — 3600000003 HC SURGERY LEVEL 3 BASE: Performed by: OPHTHALMOLOGY

## 2018-09-06 PROCEDURE — 2580000003 HC RX 258: Performed by: NURSE ANESTHETIST, CERTIFIED REGISTERED

## 2018-09-06 PROCEDURE — 3600000013 HC SURGERY LEVEL 3 ADDTL 15MIN: Performed by: OPHTHALMOLOGY

## 2018-09-06 DEVICE — ACRYSOF(R) SINGLE-PIECE ACRYLIC FOLDABLE IOL,UV-ABSORBING POSTERIOR CHAMBER LENS (IOL/PC),13.0MM LENGTH, 6.0MM BICONVEX OPTIC, PLANARHAPTICS.
Type: IMPLANTABLE DEVICE | Site: EYE | Status: FUNCTIONAL
Brand: ACRYSOF®

## 2018-09-06 RX ORDER — LIDOCAINE HYDROCHLORIDE 10 MG/ML
0.3 INJECTION, SOLUTION EPIDURAL; INFILTRATION; INTRACAUDAL; PERINEURAL
Status: COMPLETED | OUTPATIENT
Start: 2018-09-06 | End: 2018-09-06

## 2018-09-06 RX ORDER — SODIUM CHLORIDE 0.9 % (FLUSH) 0.9 %
10 SYRINGE (ML) INJECTION PRN
Status: DISCONTINUED | OUTPATIENT
Start: 2018-09-06 | End: 2018-09-06 | Stop reason: HOSPADM

## 2018-09-06 RX ORDER — PROPOFOL 10 MG/ML
INJECTION, EMULSION INTRAVENOUS PRN
Status: DISCONTINUED | OUTPATIENT
Start: 2018-09-06 | End: 2018-09-06 | Stop reason: SDUPTHER

## 2018-09-06 RX ORDER — PREDNISOLONE ACETATE 10 MG/ML
1 SUSPENSION/ DROPS OPHTHALMIC SEE ADMIN INSTRUCTIONS
Status: DISCONTINUED | OUTPATIENT
Start: 2018-09-06 | End: 2018-09-06 | Stop reason: HOSPADM

## 2018-09-06 RX ORDER — LIDOCAINE HYDROCHLORIDE 20 MG/ML
INJECTION, SOLUTION INFILTRATION; PERINEURAL PRN
Status: DISCONTINUED | OUTPATIENT
Start: 2018-09-06 | End: 2018-09-06 | Stop reason: SDUPTHER

## 2018-09-06 RX ORDER — PREDNISOLONE ACETATE 10 MG/ML
SUSPENSION/ DROPS OPHTHALMIC PRN
Status: DISCONTINUED | OUTPATIENT
Start: 2018-09-06 | End: 2018-09-06 | Stop reason: HOSPADM

## 2018-09-06 RX ORDER — SODIUM CHLORIDE, SODIUM LACTATE, POTASSIUM CHLORIDE, CALCIUM CHLORIDE 600; 310; 30; 20 MG/100ML; MG/100ML; MG/100ML; MG/100ML
INJECTION, SOLUTION INTRAVENOUS CONTINUOUS PRN
Status: DISCONTINUED | OUTPATIENT
Start: 2018-09-06 | End: 2018-09-06 | Stop reason: SDUPTHER

## 2018-09-06 RX ORDER — TOBRAMYCIN AND DEXAMETHASONE 3; 1 MG/ML; MG/ML
SUSPENSION/ DROPS OPHTHALMIC PRN
Status: DISCONTINUED | OUTPATIENT
Start: 2018-09-06 | End: 2018-09-06 | Stop reason: HOSPADM

## 2018-09-06 RX ORDER — CARVEDILOL 6.25 MG/1
6.25 TABLET ORAL ONCE
Status: COMPLETED | OUTPATIENT
Start: 2018-09-06 | End: 2018-09-06

## 2018-09-06 RX ORDER — SODIUM CHLORIDE 0.9 % (FLUSH) 0.9 %
10 SYRINGE (ML) INJECTION EVERY 12 HOURS SCHEDULED
Status: DISCONTINUED | OUTPATIENT
Start: 2018-09-06 | End: 2018-09-06 | Stop reason: HOSPADM

## 2018-09-06 RX ORDER — TETRACAINE HYDROCHLORIDE 5 MG/ML
SOLUTION OPHTHALMIC PRN
Status: DISCONTINUED | OUTPATIENT
Start: 2018-09-06 | End: 2018-09-06 | Stop reason: HOSPADM

## 2018-09-06 RX ORDER — TOBRAMYCIN AND DEXAMETHASONE 3; 1 MG/ML; MG/ML
1 SUSPENSION/ DROPS OPHTHALMIC SEE ADMIN INSTRUCTIONS
Status: DISCONTINUED | OUTPATIENT
Start: 2018-09-06 | End: 2018-09-06 | Stop reason: HOSPADM

## 2018-09-06 RX ORDER — SODIUM CHLORIDE, SODIUM LACTATE, POTASSIUM CHLORIDE, CALCIUM CHLORIDE 600; 310; 30; 20 MG/100ML; MG/100ML; MG/100ML; MG/100ML
INJECTION, SOLUTION INTRAVENOUS CONTINUOUS
Status: DISCONTINUED | OUTPATIENT
Start: 2018-09-06 | End: 2018-09-06 | Stop reason: HOSPADM

## 2018-09-06 RX ORDER — PILOCARPINE HYDROCHLORIDE 20 MG/ML
SOLUTION/ DROPS OPHTHALMIC PRN
Status: DISCONTINUED | OUTPATIENT
Start: 2018-09-06 | End: 2018-09-06 | Stop reason: HOSPADM

## 2018-09-06 RX ADMIN — LIDOCAINE HYDROCHLORIDE 0.3 ML: 10 INJECTION, SOLUTION EPIDURAL; INFILTRATION; INTRACAUDAL; PERINEURAL at 09:47

## 2018-09-06 RX ADMIN — LIDOCAINE HYDROCHLORIDE 30 MG: 20 INJECTION, SOLUTION INFILTRATION; PERINEURAL at 10:10

## 2018-09-06 RX ADMIN — Medication 0.3 ML: at 09:23

## 2018-09-06 RX ADMIN — PROPOFOL 80 MG: 10 INJECTION, EMULSION INTRAVENOUS at 10:10

## 2018-09-06 RX ADMIN — SODIUM CHLORIDE, POTASSIUM CHLORIDE, SODIUM LACTATE AND CALCIUM CHLORIDE: 600; 310; 30; 20 INJECTION, SOLUTION INTRAVENOUS at 10:10

## 2018-09-06 RX ADMIN — Medication 0.3 ML: at 09:34

## 2018-09-06 RX ADMIN — Medication 0.3 ML: at 09:47

## 2018-09-06 RX ADMIN — SODIUM CHLORIDE, POTASSIUM CHLORIDE, SODIUM LACTATE AND CALCIUM CHLORIDE: 600; 310; 30; 20 INJECTION, SOLUTION INTRAVENOUS at 09:47

## 2018-09-06 RX ADMIN — BROMFENAC SODIUM 1 DROP: 0.7 SOLUTION/ DROPS OPHTHALMIC at 09:23

## 2018-09-06 RX ADMIN — CARVEDILOL 6.25 MG: 6.25 TABLET, FILM COATED ORAL at 09:50

## 2018-09-06 ASSESSMENT — PULMONARY FUNCTION TESTS
PIF_VALUE: 0

## 2018-09-06 ASSESSMENT — PAIN SCALES - GENERAL
PAINLEVEL_OUTOF10: 0
PAINLEVEL_OUTOF10: 0

## 2018-09-06 ASSESSMENT — COPD QUESTIONNAIRES: CAT_SEVERITY: MODERATE

## 2018-09-06 NOTE — OP NOTE
OPERATIVE NOTE    Patient Name   Date of Birth Age  MRN#  Alvaro Adjutant   1945   72 y.o.  [id number]       Surgeon        Surgery Date  Brita Litten        9/6/2018       Preoperative Diagnosis: Nuclear Sclerotic Cataract right eye    Postoperative Diagnosis: Nuclear Sclerotic Cataract right eye    Operative Procedure: Phacoemulsification/ Posterior Chamber Intraocular Lens Implantation          Anesthesia:   Peribulbar Block with MAC    Details of Procedure: The patient was brought to the operating room on the operative stretcher in the supine position with the head resting in the head rest.  After appropriate anesthesia monitoring devices were applied, IV sedation was carried out per the anesthesia service. Once sedation was achieved, a peribulbar block was performed, using a 5 mL combination solution containing 4 mL of 2% lidocaine with 1 mL of Vitrase. Approximately 2-3 mL of this solution was administered in a peribulbar fashion through the lower lid of the operative eye. Once appropriate akinesia and anesthesia were demonstrated, the operative eye was prepped and draped in the usual sterile fashion. Tobradex drops and Tetracain drops were administered. A wire lid speculum was then inserted into the operative eye. A paracentesis was then performed using a 15 degree supersharp blade to enter the globe at the limbus, and a .12 mm colibri forceps was used to stabilize the globe. Viscoat was then instilled into the anterior chamber. A temporal clear cornea groove was then created using the 15 degree supersharp blade. The cornea was then entered using the Jacinto 2.4 mm clearcut keratome blade. The capsulotomy was then performed using a cystotome, and the capsulorrhexis was completed using uttrata forceps. The nucleus of the cataract was then hydrodissected and hydrodelineated using sterile BSS on a 27 gauge cannula.   The nucleus of the cataract was then removed using the phacoemilsification/aspiration device. This was performed in a bimanual/CHOP technique. The remaining cortex was then removed using the irrigation/aspiration device. The posterior capsule was polished using the I/A device with CAP/VAC settings. The capsular bag was reformed using Provisc. The previously selected Jacinto Acrysof +17.0 diopter SA60AT intraocular lens implant was then inserted using the cartridge system. It was spun in place using a Kuglen hook. It was centered and found to be in good, stable position. The remaining viscoelastic was then removed using the I/A device. The corneal incision was then hydrated using sterile BSS on a 30 gauge cannula. It was checked and found to be water-tight. Pilocarpine 2%, followed by Tobradex ophthalmic solutions were then instilled into the operative eye. The wire lid speculum was removed, and a postoperative protective shield was applied. The patient was then transferred to the postanesthesia recovery unit in good stable condition. The patient tolerated the procedure well without complications. A postoperative instruction sheet was given, and the patient will follow up with Dr. Cavanaugh Cast office as scheduled.       EBL: none    Specimen: none

## 2018-09-06 NOTE — ANESTHESIA PRE PROCEDURE
Department of Anesthesiology  Preprocedure Note       Name:  Rodo Pradhan   Age:  72 y.o.  :  1945                                          MRN:  [id number]         Date:  2018      Surgeon:   Erika Sesay MD    Procedure:  DR SEJAL HENDRICKSON BRIT [facility] EMULSIFICATION OF CATARACT WITH INTRAOCULAR LENS IMPLANT RIGHT EYE    HPI:  This is a 73 y/o male with multiple co-morbidities including COPD, A Flutter, PVD, CM and GERD, who presents for treatment of a right eye cataract. Medications prior to admission:   ipratropium-albuterol (DUONEB)  nebulizer solution Inhale 3 mLs into the lungs every 4 hours as needed for Shortness of Breath   albuterol (PROVENTIL) (2.5 MG/3ML) 0.083% nebulizer solution Take 3 mLs by nebulization every 4 hours as needed for Wheezing   methylPREDNISolone (MEDROL, JONATHON,) 4 MG tablet Take by mouth.    omeprazole (PRILOSEC) 40 MG delayed release capsule TAKE 1 CAPSULE BY MOUTH DAILY   albuterol sulfate HFA (PROVENTIL HFA)   inhaler Inhale 2 puffs  every 6 hours as needed for Wheezing (with spacer)   carvedilol (COREG) 6.25 MG tablet Take 1 tablet by mouth 2 times daily (with meals)   rivaroxaban (XARELTO) 20 MG TABS tablet Take 1 tablet by mouth daily (with breakfast)   amiodarone (CORDARONE) 200 MG tablet Take 1 tablet by mouth daily   losartan (COZAAR) 25 MG tablet Take 1 tablet by mouth daily   atorvastatin (LIPITOR) 10 MG tablet TAKE 1 TABLET BY MOUTH DAILY   furosemide (LASIX) 40 MG tablet Take 0.5 tablets by mouth daily   potassium chloride (KLOR-CON M) 20 MEQ ER Take 0.5 tablets by mouth daily   budesonide-formoterol (SYMBICORT) 160-4.5 MCG/ACT AERO Inhale 2 puffs into the lungs 2 times daily     Allergies:  No Known Allergies    Problem List:     Hypertension I10    Hoarseness of voice R49.0    COPD (chronic obstructive pulmonary disease) (HCC) J44.9    GERD (gastroesophageal reflux disease) K21.9    Pulmonary nodule, right R91.1    Enlarged prostate with urinary obstruction N40.1, N13.8  Thoracic aortic aneurysm without rupture (HCC) I71.2    Typical atrial flutter ([facility]) I48.3    Dilated cardiomyopathy ([facility]) I42.0    Chronic systolic CHF (congestive heart failure) ([facility]) I50.22    PAD (peripheral artery disease) ([facility]) I73.9    Pain of right thigh M79.651     Past Medical History:     A-fib [facility]     12    Atrial fibrillation with RVR ([facility]     12     Avulsion fracture of ankle 2015    Benign localized hyperplasia of prostate with urinary obstruction and other lower urinary tract symptoms (LUTS)(600.21) 2016    Chronic systolic CHF (congestive heart failure) ([facility]) 2017    Contusion of leg, right 2015    COPD (chronic obstructive pulmonary disease) ([facility])     Fractures     GERD (gastroesophageal reflux disease) 6/15/2015    Hypertension     Hypertrophy of prostate without urinary obstruction and other lower urinary tract symptoms (LUTS) 6/15/2015    No history of procedure     no previos colonoscopy    PAD (peripheral artery disease) ([facility] 10/9/2017    Pneumonia     Thoracic aortic aneurysm ([facility])      Past Surgical History:     COLONOSCOPY  2016    sigmoid polyps    HERNIA REPAIR       Social History:     Smoking status: Former Smoker     Packs/day: 2.00     Years: 55.00     Quit date: 2017    Smokeless tobacco: Never Used      Comment: smoked 2 darnell a day for 55 years    Alcohol use Yes      Comment: occassionally     Vital Signs (Current): BP: 184/98  Pulse: 64 Resp: 14  SpO2: 97 Temp: 98.2 °F (36.8 °C) Height: 5' 11\" (1.803 m)  (18)  Weight: 222 lb (100.7 kg)  (18) BMI: 31    NPO Status:> 8 hrs    EK2018  Normal sinus rhythm 71; Normal axis; when compared with ECG of 2018: No significant change was found   ECHO: The left ventricular systolic function is moderately reduced with an ejection fraction of 30-35 %. Moderate global hypokinesis is present. Normal left ventricular diastolic filling pressure.  The left atrium is moderately dilated. The right atrium is moderately dilated. Mild mitral annular calcification. Mild mitral regurgitation. Moderate tricuspid regurgitation. Systolic pulmonary artery pressure (SPAP) is estimated at 41 mmHg consistent with mild pulmonary hypertension (RA pressure 15 mmHg). CBC:    WBC 6.6 06/25/2018    RBC 4.44 06/25/2018    HGB 13.8 06/25/2018    HCT 41.3 06/25/2018     06/25/2018     CMP:     06/25/2018    K 4.1 06/25/2018     06/25/2018    CO2 25 06/25/2018    BUN 22 06/25/2018    CREATININE 1.3 06/25/2018    GLUCOSE 119 06/25/2018    PROT 7.6 06/25/2018    CALCIUM 9.4 06/25/2018    BILITOT <0.2 06/25/2018    ALKPHOS 68 06/25/2018    AST 14 06/25/2018    ALT 15 06/25/2018     Coags:    PROTIME 25.4 08/19/2017    INR 2.22 08/19/2017    APTT 32.3 04/17/2012     Anesthesia Evaluation  Patient summary reviewed and Nursing notes reviewed  Airway: Mallampati: II  TM distance: >3 FB   Neck ROM: full  Mouth opening: > = 3 FB Dental:    (+) edentulous, upper dentures and lower dentures      Pulmonary:   (+) COPD: moderate and no interval change,                             Cardiovascular:    (+) hypertension:, CHF:,       ECG reviewed      Echocardiogram reviewed         Beta Blocker:  Dose within 24 Hrs      ROS comment: +Cardiomyopathy with LVEF 30-35%     Neuro/Psych:      (-) seizures, TIA and CVA           GI/Hepatic/Renal:   (+) GERD: well controlled,      (-) no renal disease       Endo/Other:        (-) diabetes mellitus, hypothyroidism               Abdominal:           Vascular:     - DVT and PE.       ROS comment: On Xarelto for A Flutter- none x 2 days. Anesthesia Plan      TIVA and MAC     ASA 3       Induction: intravenous. Anesthetic plan and risks discussed with patient. Plan discussed with CRNA.             Isael Man MD

## 2018-09-10 ENCOUNTER — TELEPHONE (OUTPATIENT)
Dept: INTERNAL MEDICINE CLINIC | Age: 73
End: 2018-09-10

## 2018-09-10 ENCOUNTER — HOSPITAL ENCOUNTER (OUTPATIENT)
Age: 73
Discharge: HOME | End: 2018-09-10
Payer: MEDICARE

## 2018-09-10 ENCOUNTER — HOSPITAL ENCOUNTER (OUTPATIENT)
Dept: GENERAL RADIOLOGY | Age: 73
Discharge: HOME | End: 2018-09-10
Payer: MEDICARE

## 2018-09-10 DIAGNOSIS — R93.89 OPACITY NOTED ON IMAGING STUDY: Primary | ICD-10-CM

## 2018-09-10 DIAGNOSIS — R06.02 SHORTNESS OF BREATH: ICD-10-CM

## 2018-09-10 DIAGNOSIS — R05.9 COUGH: ICD-10-CM

## 2018-09-10 DIAGNOSIS — R06.02 SHORTNESS OF BREATH: Primary | ICD-10-CM

## 2018-09-10 PROCEDURE — 71046 X-RAY EXAM CHEST 2 VIEWS: CPT

## 2018-09-10 RX ORDER — DOXYCYCLINE HYCLATE 100 MG
100 TABLET ORAL 2 TIMES DAILY
Qty: 14 TABLET | Refills: 0 | Status: SHIPPED | OUTPATIENT
Start: 2018-09-10 | End: 2018-09-17

## 2018-09-10 NOTE — TELEPHONE ENCOUNTER
----- Message from Mckenzie Cui MD sent at 9/10/2018 12:07 PM EDT -----  Contact: pt [phone]  Start Levaquin 500 mg po daily # 7  ----- Message -----  From: Corrine Milner  Sent: 9/10/2018   8:51 AM  To: Mckenzie Cui MD    Pt thinks he has pneumonia, coughing really bad, shortness of breath and sweating.     -am

## 2018-09-17 ENCOUNTER — HOSPITAL ENCOUNTER (EMERGENCY)
Age: 73
Discharge: HOME | End: 2018-09-17
Attending: EMERGENCY MEDICINE
Payer: MEDICARE

## 2018-09-17 ENCOUNTER — APPOINTMENT (OUTPATIENT)
Dept: CT IMAGING | Age: 73
End: 2018-09-17
Payer: MEDICARE

## 2018-09-17 ENCOUNTER — TELEPHONE (OUTPATIENT)
Dept: INTERNAL MEDICINE CLINIC | Age: 73
End: 2018-09-17

## 2018-09-17 VITALS
HEIGHT: 70 IN | WEIGHT: 220 LBS | TEMPERATURE: 97.7 F | OXYGEN SATURATION: 97 % | BODY MASS INDEX: 31.5 KG/M2 | SYSTOLIC BLOOD PRESSURE: 122 MMHG | HEART RATE: 78 BPM | RESPIRATION RATE: 18 BRPM | DIASTOLIC BLOOD PRESSURE: 78 MMHG

## 2018-09-17 DIAGNOSIS — J44.1 COPD EXACERBATION (HCC): Primary | ICD-10-CM

## 2018-09-17 LAB
ANION GAP SERPL CALCULATED.3IONS-SCNC: 13 MMOL/L (ref 3–16)
BASOPHILS ABSOLUTE: 0.1 K/UL (ref 0–0.2)
BASOPHILS RELATIVE PERCENT: 1.2 %
BUN BLDV-MCNC: 18 MG/DL (ref 7–20)
CALCIUM SERPL-MCNC: 9 MG/DL (ref 8.3–10.6)
CHLORIDE BLD-SCNC: 103 MMOL/L (ref 99–110)
CO2: 24 MMOL/L (ref 21–32)
CREAT SERPL-MCNC: 1.4 MG/DL (ref 0.8–1.3)
EOSINOPHILS ABSOLUTE: 1 K/UL (ref 0–0.6)
EOSINOPHILS RELATIVE PERCENT: 10.6 %
GFR AFRICAN AMERICAN: >60
GFR NON-AFRICAN AMERICAN: 50
GLUCOSE BLD-MCNC: 143 MG/DL (ref 70–99)
HCT VFR BLD CALC: 41.6 % (ref 40.5–52.5)
HEMOGLOBIN: 13.6 G/DL (ref 13.5–17.5)
LYMPHOCYTES ABSOLUTE: 1.8 K/UL (ref 1–5.1)
LYMPHOCYTES RELATIVE PERCENT: 19.1 %
MAGNESIUM: 2.2 MG/DL (ref 1.8–2.4)
MCH RBC QN AUTO: 30.5 PG (ref 26–34)
MCHC RBC AUTO-ENTMCNC: 32.7 G/DL (ref 31–36)
MCV RBC AUTO: 93.1 FL (ref 80–100)
MONOCYTES ABSOLUTE: 0.7 K/UL (ref 0–1.3)
MONOCYTES RELATIVE PERCENT: 7.4 %
NEUTROPHILS ABSOLUTE: 5.9 K/UL (ref 1.7–7.7)
NEUTROPHILS RELATIVE PERCENT: 61.7 %
PDW BLD-RTO: 15.7 % (ref 12.4–15.4)
PLATELET # BLD: 221 K/UL (ref 135–450)
PMV BLD AUTO: 8.2 FL (ref 5–10.5)
POTASSIUM SERPL-SCNC: 4.2 MMOL/L (ref 3.5–5.1)
RBC # BLD: 4.47 M/UL (ref 4.2–5.9)
SODIUM BLD-SCNC: 140 MMOL/L (ref 136–145)
WBC # BLD: 9.6 K/UL (ref 4–11)

## 2018-09-17 PROCEDURE — 93010 ELECTROCARDIOGRAM REPORT: CPT | Performed by: INTERNAL MEDICINE

## 2018-09-17 PROCEDURE — 71260 CT THORAX DX C+: CPT

## 2018-09-17 PROCEDURE — 6360000002 HC RX W HCPCS: Performed by: EMERGENCY MEDICINE

## 2018-09-17 PROCEDURE — 6370000000 HC RX 637 (ALT 250 FOR IP): Performed by: EMERGENCY MEDICINE

## 2018-09-17 PROCEDURE — 85025 COMPLETE CBC W/AUTO DIFF WBC: CPT

## 2018-09-17 PROCEDURE — 83735 ASSAY OF MAGNESIUM: CPT

## 2018-09-17 PROCEDURE — 99285 EMERGENCY DEPT VISIT HI MDM: CPT

## 2018-09-17 PROCEDURE — 80048 BASIC METABOLIC PNL TOTAL CA: CPT

## 2018-09-17 PROCEDURE — 36415 COLL VENOUS BLD VENIPUNCTURE: CPT

## 2018-09-17 PROCEDURE — 93005 ELECTROCARDIOGRAM TRACING: CPT | Performed by: EMERGENCY MEDICINE

## 2018-09-17 PROCEDURE — 96374 THER/PROPH/DIAG INJ IV PUSH: CPT

## 2018-09-17 PROCEDURE — 6360000004 HC RX CONTRAST MEDICATION: Performed by: EMERGENCY MEDICINE

## 2018-09-17 RX ORDER — IPRATROPIUM BROMIDE AND ALBUTEROL SULFATE 2.5; .5 MG/3ML; MG/3ML
1 SOLUTION RESPIRATORY (INHALATION) ONCE
Status: COMPLETED | OUTPATIENT
Start: 2018-09-17 | End: 2018-09-17

## 2018-09-17 RX ORDER — PREDNISONE 10 MG/1
TABLET ORAL
Qty: 20 TABLET | Refills: 0 | Status: SHIPPED | OUTPATIENT
Start: 2018-09-18 | End: 2018-09-22

## 2018-09-17 RX ORDER — GUAIFENESIN 600 MG/1
1200 TABLET, EXTENDED RELEASE ORAL 2 TIMES DAILY
Qty: 20 TABLET | Refills: 0 | Status: SHIPPED | OUTPATIENT
Start: 2018-09-17 | End: 2018-09-22

## 2018-09-17 RX ORDER — GUAIFENESIN 600 MG/1
600 TABLET, EXTENDED RELEASE ORAL ONCE
Status: COMPLETED | OUTPATIENT
Start: 2018-09-17 | End: 2018-09-17

## 2018-09-17 RX ORDER — DEXAMETHASONE SODIUM PHOSPHATE 10 MG/ML
10 INJECTION INTRAMUSCULAR; INTRAVENOUS ONCE
Status: COMPLETED | OUTPATIENT
Start: 2018-09-17 | End: 2018-09-17

## 2018-09-17 RX ADMIN — IPRATROPIUM BROMIDE AND ALBUTEROL SULFATE 1 AMPULE: .5; 3 SOLUTION RESPIRATORY (INHALATION) at 08:31

## 2018-09-17 RX ADMIN — IPRATROPIUM BROMIDE AND ALBUTEROL SULFATE 1 AMPULE: .5; 3 SOLUTION RESPIRATORY (INHALATION) at 09:11

## 2018-09-17 RX ADMIN — DEXAMETHASONE SODIUM PHOSPHATE 10 MG: 10 INJECTION, SOLUTION INTRAMUSCULAR; INTRAVENOUS at 08:31

## 2018-09-17 RX ADMIN — IOPAMIDOL 75 ML: 755 INJECTION, SOLUTION INTRAVENOUS at 09:01

## 2018-09-17 RX ADMIN — GUAIFENESIN 600 MG: 600 TABLET, EXTENDED RELEASE ORAL at 09:11

## 2018-09-17 NOTE — ED PROVIDER NOTES
[facility]  eMERGENCY dEPARTMENT eNCOUnter        Pt Name: Charla Vasques  MRN: [id number]  [place] 1945  Date of evaluation: 9/17/2018  Provider: Catalina Young DO  PCP: Kelley Shafer MD      [address] [place] [address]       Chief Complaint   Patient presents with    Shortness of Breath     Patient complains of shortness of breath ongoing for 1 week, with increase in difficulty. Patient also complains of forceful non productive cough. HISTORY OF PRESENT ILLNESS   (Location/Symptom, Timing/Onset, Context/Setting, Quality, Duration, Modifying Factors, Severity)  Note limiting factors. Charla Vasques is a 72 y.o. male  with a history of a known thoracic aneurysm, A. fib, hypertension, reflux disease, known COPD without oxygen dependency who presented today because of shortness of breath and coughing. He does have a history of heart failure as well. His been sick for a little over a week. He's been coughing, is wheezing and short of breath. The dry hacking cough. He denies fevers or chills, reason why is here now because he cannot sleep at all over the past couple nights because of shortness of breath. He does become very dyspneic when he walks around and he denies any chest pain or tightness of any kind. Denies swelling or weight gain. He called his doctor several days ago, was prescribed doxycycline. He was last on steroids about 2-3 weeks ago for similar reasons. Apparently had an x-ray of his chest done that showed \"a spot on my lung\" and was scheduled to have a CT of the chest done tomorrow. He is not really keeping make the appointment. Denies travel history, hormone use, leg cramping or other risk factor for PE or DVT. He is very weak and very fatigued and has a loss of appetite. He denies any recent sick contacts or other hospitalization. He denies any phlegm or sputum.     Nursing Notes were all reviewed and agreed with or any disagreements were daily    ATORVASTATIN (LIPITOR) 10 MG TABLET    TAKE 1 TABLET BY MOUTH DAILY    BUDESONIDE-FORMOTEROL (SYMBICORT) 160-4.5 MCG/ACT AERO    Inhale 2 puffs into the lungs 2 times daily    CARVEDILOL (COREG) 6.25 MG TABLET    Take 1 tablet by mouth 2 times daily (with meals)    DOXYCYCLINE HYCLATE (VIBRA-TABS) 100 MG TABLET    Take 1 tablet by mouth 2 times daily for 7 days    FUROSEMIDE (LASIX) 40 MG TABLET    Take 0.5 tablets by mouth daily    IPRATROPIUM-ALBUTEROL (DUONEB) 0.5-2.5 (3) MG/3ML SOLN NEBULIZER SOLUTION    Inhale 3 mLs into the lungs every 4 hours as needed for Shortness of Breath    LOSARTAN (COZAAR) 25 MG TABLET    Take 1 tablet by mouth daily    OMEPRAZOLE (PRILOSEC) 40 MG DELAYED RELEASE CAPSULE    TAKE 1 CAPSULE BY MOUTH DAILY    POTASSIUM CHLORIDE (KLOR-CON M) 20 MEQ EXTENDED RELEASE TABLET    Take 0.5 tablets by mouth daily    RIVAROXABAN (XARELTO) 20 MG TABS TABLET    Take 1 tablet by mouth daily (with breakfast)       ALLERGIES     Patient has no known allergies. FAMILY HISTORY     History reviewed. No pertinent family history.        SOCIAL HISTORY       Social History     Social History    Marital status:      Spouse name: N/A    Number of children: N/A    Years of education: N/A     Social History Main Topics    Smoking status: Former Smoker     Packs/day: 2.00     Years: 55.00     Quit date: 8/22/2017    Smokeless tobacco: Never Used      Comment: smoked 2 darnell a day for 54 years    Alcohol use Yes      Comment: occassionally    Drug use: No    Sexual activity: Not Currently     Other Topics Concern    None     Social History Narrative    None       SCREENINGS             PHYSICAL EXAM    (up to 7 for level 4, 8 or more for level 5)     ED Triage Vitals [09/17/18 0802]   BP Temp Temp Source Pulse Resp SpO2 Height Weight   (!) 166/95 97.7 °F (36.5 °C) Oral 77 18 96 % 5' 10\" (1.778 m) 220 lb (99.8 kg)       Physical Exam       General Appearance:  Alert, cooperative, no (1.778 m)        Patient was given the following medications:  Medications   dexamethasone (DECADRON) injection 10 mg (10 mg Intravenous Given 9/17/18 0831)   ipratropium-albuterol (DUONEB) nebulizer solution 1 ampule (1 ampule Inhalation Given 9/17/18 0831)   iopamidol (ISOVUE-370) 76 % injection 75 mL (75 mLs Intravenous Given 9/17/18 0901)   ipratropium-albuterol (DUONEB) nebulizer solution 1 ampule (1 ampule Inhalation Given 9/17/18 0911)   guaiFENesin (MUCINEX) extended release tablet 600 mg (600 mg Oral Given 9/17/18 0911)       This is a 61-year-old male presents with coughing and shortness of breath with a history as stated. Differential would include upper versus lower respiratory tract emergencies including bronchitis, pneumonia, COPD with exacerbation, pleural or pericardial disease, heart failure or other acute processes. IV established, given DuoNeb nebs, steroids as well. Laboratory studies are performed. Crying and stable 1.4, CBC and chemistries otherwise are unremarkable. Magnesium normal.  CTA of the chest confirms no PE, granulomatous disease noted, there is a lot of other chronic findings such as possible gallbladder sludge, coronary artery disease, as well as a right-sided renal cyst and a stable aneurysm. At this time he given the number DuoNeb treatment, also gave him some Mucinex for coughing. Patient did ambulate with pulse oximetry, he dropped to 89% very transiently, but as soon as he sat down he was above 94% and he said he only had a slight amount of difficulty breathing. At this time clinical impression is acute exacerbation of COPD. I see no evidence of pneumonia, is already on a 6 out of 7 day regimen of doxycycline. A offer the patient admission but he does feel comfortable going home today.   We'll send him home with steroids, Mucinex, he is to call his family doctor today to review the results of the CAT scan that he had today that was already scheduled to be done,

## 2018-09-17 NOTE — ED NOTES
Pt states he feels \"A little better\" after HHN tx, noted with faint exp wheezes remaining bilat. Resps even and unlab, labs pending.  Pt alert and talking with family @ bedside     [facility]  09/17/18 8086

## 2018-09-17 NOTE — ED NOTES
Pt alert and without s/s distress. Resps even and unlab. Noted with occas NP barky cough. Denies further c/o's.       Namita Damico RN  09/17/18 5943

## 2018-09-18 LAB
EKG ATRIAL RATE: 71 BPM
EKG DIAGNOSIS: NORMAL
EKG P AXIS: 63 DEGREES
EKG P-R INTERVAL: 198 MS
EKG Q-T INTERVAL: 428 MS
EKG QRS DURATION: 98 MS
EKG QTC CALCULATION (BAZETT): 465 MS
EKG R AXIS: 60 DEGREES
EKG T AXIS: 68 DEGREES
EKG VENTRICULAR RATE: 71 BPM

## 2018-10-03 ENCOUNTER — ANESTHESIA EVENT (OUTPATIENT)
Dept: OPERATING ROOM | Age: 73
End: 2018-10-03
Payer: MEDICARE

## 2018-10-03 RX ORDER — PREDNISOLONE ACETATE 10 MG/ML
1 SUSPENSION/ DROPS OPHTHALMIC CONTINUOUS
Status: CANCELLED | OUTPATIENT
Start: 2018-10-04

## 2018-10-03 RX ORDER — TOBRAMYCIN AND DEXAMETHASONE 3; 1 MG/ML; MG/ML
1 SUSPENSION/ DROPS OPHTHALMIC CONTINUOUS
Status: CANCELLED | OUTPATIENT
Start: 2018-10-04

## 2018-10-04 ENCOUNTER — ANESTHESIA (OUTPATIENT)
Dept: OPERATING ROOM | Age: 73
End: 2018-10-04
Payer: MEDICARE

## 2018-10-11 RX ORDER — ATORVASTATIN CALCIUM 10 MG/1
TABLET, FILM COATED ORAL
Qty: 30 TABLET | Refills: 0 | Status: SHIPPED | OUTPATIENT
Start: 2018-10-11 | End: 2018-11-26 | Stop reason: SDUPTHER

## 2018-10-12 RX ORDER — BUDESONIDE AND FORMOTEROL FUMARATE DIHYDRATE 160; 4.5 UG/1; UG/1
2 AEROSOL RESPIRATORY (INHALATION) 2 TIMES DAILY
Qty: 3 INHALER | Refills: 1 | Status: SHIPPED | OUTPATIENT
Start: 2018-10-12 | End: 2018-11-26 | Stop reason: SDUPTHER

## 2018-10-15 ENCOUNTER — OFFICE VISIT (OUTPATIENT)
Dept: INTERNAL MEDICINE CLINIC | Age: 73
End: 2018-10-15

## 2018-10-15 VITALS
HEIGHT: 70 IN | BODY MASS INDEX: 32.5 KG/M2 | DIASTOLIC BLOOD PRESSURE: 80 MMHG | RESPIRATION RATE: 14 BRPM | SYSTOLIC BLOOD PRESSURE: 160 MMHG | WEIGHT: 227 LBS | HEART RATE: 73 BPM | OXYGEN SATURATION: 97 %

## 2018-10-15 DIAGNOSIS — J20.9 ACUTE TRACHEOBRONCHITIS: Primary | ICD-10-CM

## 2018-10-15 DIAGNOSIS — J44.1 COPD EXACERBATION (HCC): ICD-10-CM

## 2018-10-15 PROCEDURE — 99213 OFFICE O/P EST LOW 20 MIN: CPT | Performed by: INTERNAL MEDICINE

## 2018-10-15 RX ORDER — CEFDINIR 300 MG/1
300 CAPSULE ORAL 2 TIMES DAILY
Qty: 14 CAPSULE | Refills: 0 | Status: SHIPPED | OUTPATIENT
Start: 2018-10-15 | End: 2018-10-22

## 2018-10-15 RX ORDER — IPRATROPIUM BROMIDE AND ALBUTEROL SULFATE 2.5; .5 MG/3ML; MG/3ML
1 SOLUTION RESPIRATORY (INHALATION) EVERY 4 HOURS PRN
Qty: 360 ML | Refills: 2 | Status: SHIPPED | OUTPATIENT
Start: 2018-10-15 | End: 2018-11-26 | Stop reason: SDUPTHER

## 2018-10-15 RX ORDER — PREDNISONE 10 MG/1
TABLET ORAL
Qty: 30 TABLET | Refills: 0 | Status: SHIPPED | OUTPATIENT
Start: 2018-10-15 | End: 2019-02-26

## 2018-10-15 RX ORDER — LEVOFLOXACIN 500 MG/1
500 TABLET, FILM COATED ORAL DAILY
Qty: 7 TABLET | Refills: 0 | Status: SHIPPED | OUTPATIENT
Start: 2018-10-15 | End: 2018-10-15

## 2018-10-15 ASSESSMENT — ENCOUNTER SYMPTOMS
SHORTNESS OF BREATH: 1
COUGH: 1
HEMOPTYSIS: 0

## 2018-10-17 ASSESSMENT — COPD QUESTIONNAIRES: CAT_SEVERITY: MODERATE

## 2018-10-17 NOTE — ANESTHESIA PRE PROCEDURE
(chronic obstructive pulmonary disease) ([facility]) J44.9    GERD (gastroesophageal reflux disease) K21.9    Pulmonary nodule, right R91.1    Enlarged prostate with urinary obstruction N40.1, N13.8    Thoracic aortic aneurysm without rupture ([facility]) I71.2    Typical atrial flutter ([facility]) I48.3    Dilated cardiomyopathy ([facility]) I42.0    Chronic systolic CHF (congestive heart failure) ([facility]) I50.22    PAD (peripheral artery disease) ([facility]) I73.9    Pain of right thigh M79.651     Past Medical History:     A-fib ([address]     2/14/12    Atrial fibrillation with RVR ([address]     2/14/12     Avulsion fracture of ankle 9/21/2015    Benign localized hyperplasia of prostate with urinary obstruction and other lower urinary tract symptoms (LUTS)(600.21) 8/17/2016    Chronic systolic CHF (congestive heart failure) ([facility]) 8/28/2017    Contusion of leg, right 9/21/2015    COPD (chronic obstructive pulmonary disease) ([facility])     Fractures     GERD (gastroesophageal reflux disease) 6/15/2015    Hypertension     Hypertrophy of prostate without urinary obstruction and other lower urinary tract symptoms (LUTS) 6/15/2015    No history of procedure     no previos colonoscopy    PAD (peripheral artery disease) ([address] 10/9/2017    Pneumonia     Thoracic aortic aneurysm ([facility])      Past Surgical History:     CATARACT REMOVAL WITH IMPLANT Right 09/06/2018     [facility] EMULSIFICATION OF CATARACT WITH INTRAOCULAR LENS IMPLANT RIGHT EYE    COLONOSCOPY  2/24/2016    sigmoid polyps    HERNIA REPAIR      ID REMV CATARACT EXTRACAP,INSERT LENS Right 9/6/2018    [facility] EMULSIFICATION OF CATARACT WITH INTRAOCULAR LENS IMPLANT RIGHT EYE performed by Anson Dimas MD at [facility] OR     Social History:     Smoking status: Former Smoker     Packs/day: 2.00     Years: 55.00     Quit date: 8/22/2017    Smokeless tobacco: Never Used      Comment: smoked 2 darnell a day for 55 years    Alcohol use Yes      Comment: occassionally     Vital Signs (Current): BP: 170/104 Pulse: 66   Resp: 18 SpO2: 95   Temp: 97.1 °F (36.2 °C)   Height: 6' 5\" (1.956 m)  (10/18/18) Weight: 227 lb (103 kg)  (10/16/18)   BMI: 32.6                      BP Readings from Last 3 Encounters:   10/15/18 (!) 160/80   18 122/78   18 (!) 174/88     NPO Status:>8 hrs    EK-SEP-2018 Normal sinus rhythm; nl axis; PRWP; Nonspecific T wave abnormality; Prolonged QT   ECHO: The left ventricular systolic function is moderately reduced with an ejection fraction of 30-35 %. Moderate global hypokinesis is present. Normal left ventricular diastolic filling pressure. The left atrium is moderately dilated. The right atrium is moderately dilated. Mild mitral annular calcification. Mild mitral regurgitation. Moderate tricuspid regurgitation. Systolic pulmonary artery pressure (SPAP) is estimated at 41 mmHg consistent with mild pulmonary hypertension (RA pressure 15 mmHg).     CBC:    WBC 9.6 2018    HGB 13.6 2018    HCT 41.6 2018     2018     CMP:     2018    K 4.2 2018     2018    CO2 24 2018    BUN 18 2018    CREATININE 1.4 2018    GLUCOSE 143 2018    PROT 7.6 2018    CALCIUM 9.0 2018    BILITOT <0.2 2018    ALKPHOS 68 2018    AST 14 2018    ALT 15 2018     Coags:    PROTIME 25.4 2017    INR 2.22 2017     Anesthesia Evaluation  Patient summary reviewed and Nursing notes reviewed  Airway: Mallampati: II  TM distance: >3 FB   Neck ROM: full  Mouth opening: > = 3 FB Dental:    (+) upper dentures, lower dentures and edentulous      Pulmonary: breath sounds clear to auscultation  (+) COPD: moderate and no interval change,  recent URI: resolved,      (-) rhonchi and wheezes                           Cardiovascular:    (+) hypertension:, CHF:,     (-) murmur    ECG reviewed  Rhythm: regular  Rate: normal  Echocardiogram reviewed               ROS comment: +CM- LVEF

## 2018-10-18 ENCOUNTER — HOSPITAL ENCOUNTER (OUTPATIENT)
Age: 73
Setting detail: OUTPATIENT SURGERY
Discharge: HOME | End: 2018-10-18
Attending: OPHTHALMOLOGY | Admitting: OPHTHALMOLOGY
Payer: MEDICARE

## 2018-10-18 VITALS
WEIGHT: 227 LBS | OXYGEN SATURATION: 95 % | HEIGHT: 77 IN | DIASTOLIC BLOOD PRESSURE: 92 MMHG | TEMPERATURE: 97.7 F | BODY MASS INDEX: 26.8 KG/M2 | HEART RATE: 65 BPM | SYSTOLIC BLOOD PRESSURE: 160 MMHG | RESPIRATION RATE: 18 BRPM

## 2018-10-18 VITALS — DIASTOLIC BLOOD PRESSURE: 96 MMHG | OXYGEN SATURATION: 100 % | SYSTOLIC BLOOD PRESSURE: 160 MMHG

## 2018-10-18 PROCEDURE — 6370000000 HC RX 637 (ALT 250 FOR IP): Performed by: OPHTHALMOLOGY

## 2018-10-18 PROCEDURE — 2580000003 HC RX 258: Performed by: ANESTHESIOLOGY

## 2018-10-18 PROCEDURE — 6360000002 HC RX W HCPCS: Performed by: OPHTHALMOLOGY

## 2018-10-18 PROCEDURE — 7100000011 HC PHASE II RECOVERY - ADDTL 15 MIN: Performed by: OPHTHALMOLOGY

## 2018-10-18 PROCEDURE — 2500000003 HC RX 250 WO HCPCS: Performed by: ANESTHESIOLOGY

## 2018-10-18 PROCEDURE — 2500000003 HC RX 250 WO HCPCS: Performed by: OPHTHALMOLOGY

## 2018-10-18 PROCEDURE — 7100000010 HC PHASE II RECOVERY - FIRST 15 MIN: Performed by: OPHTHALMOLOGY

## 2018-10-18 PROCEDURE — 2709999900 HC NON-CHARGEABLE SUPPLY: Performed by: OPHTHALMOLOGY

## 2018-10-18 PROCEDURE — 3700000001 HC ADD 15 MINUTES (ANESTHESIA): Performed by: OPHTHALMOLOGY

## 2018-10-18 PROCEDURE — 3600000013 HC SURGERY LEVEL 3 ADDTL 15MIN: Performed by: OPHTHALMOLOGY

## 2018-10-18 PROCEDURE — 3700000000 HC ANESTHESIA ATTENDED CARE: Performed by: OPHTHALMOLOGY

## 2018-10-18 PROCEDURE — 3600000003 HC SURGERY LEVEL 3 BASE: Performed by: OPHTHALMOLOGY

## 2018-10-18 PROCEDURE — 6360000002 HC RX W HCPCS: Performed by: NURSE ANESTHETIST, CERTIFIED REGISTERED

## 2018-10-18 PROCEDURE — V2632 POST CHMBR INTRAOCULAR LENS: HCPCS | Performed by: OPHTHALMOLOGY

## 2018-10-18 PROCEDURE — S0028 INJECTION, FAMOTIDINE, 20 MG: HCPCS | Performed by: ANESTHESIOLOGY

## 2018-10-18 DEVICE — ACRYSOF(R) SINGLE-PIECE ACRYLIC FOLDABLE IOL,UV-ABSORBING POSTERIOR CHAMBER LENS (IOL/PC),13.0MM LENGTH, 6.0MM BICONVEX OPTIC, PLANARHAPTICS.
Type: IMPLANTABLE DEVICE | Site: EYE | Status: FUNCTIONAL
Brand: ACRYSOF®

## 2018-10-18 RX ORDER — SODIUM CHLORIDE 9 MG/ML
INJECTION, SOLUTION INTRAVENOUS CONTINUOUS
Status: DISCONTINUED | OUTPATIENT
Start: 2018-10-18 | End: 2018-10-18 | Stop reason: ALTCHOICE

## 2018-10-18 RX ORDER — PROPOFOL 10 MG/ML
INJECTION, EMULSION INTRAVENOUS PRN
Status: DISCONTINUED | OUTPATIENT
Start: 2018-10-18 | End: 2018-10-18 | Stop reason: SDUPTHER

## 2018-10-18 RX ORDER — HYDRALAZINE HYDROCHLORIDE 20 MG/ML
10 INJECTION INTRAMUSCULAR; INTRAVENOUS
Status: CANCELLED | OUTPATIENT
Start: 2018-10-18

## 2018-10-18 RX ORDER — TOBRAMYCIN AND DEXAMETHASONE 3; 1 MG/ML; MG/ML
SUSPENSION/ DROPS OPHTHALMIC PRN
Status: DISCONTINUED | OUTPATIENT
Start: 2018-10-18 | End: 2018-10-18 | Stop reason: HOSPADM

## 2018-10-18 RX ORDER — TOBRAMYCIN AND DEXAMETHASONE 3; 1 MG/ML; MG/ML
1 SUSPENSION/ DROPS OPHTHALMIC SEE ADMIN INSTRUCTIONS
Status: DISCONTINUED | OUTPATIENT
Start: 2018-10-18 | End: 2018-10-18 | Stop reason: HOSPADM

## 2018-10-18 RX ORDER — SODIUM CHLORIDE 0.9 % (FLUSH) 0.9 %
10 SYRINGE (ML) INJECTION EVERY 12 HOURS SCHEDULED
Status: DISCONTINUED | OUTPATIENT
Start: 2018-10-18 | End: 2018-10-18 | Stop reason: HOSPADM

## 2018-10-18 RX ORDER — SODIUM CHLORIDE 0.9 % (FLUSH) 0.9 %
10 SYRINGE (ML) INJECTION PRN
Status: DISCONTINUED | OUTPATIENT
Start: 2018-10-18 | End: 2018-10-18 | Stop reason: HOSPADM

## 2018-10-18 RX ORDER — PREDNISOLONE ACETATE 10 MG/ML
SUSPENSION/ DROPS OPHTHALMIC PRN
Status: DISCONTINUED | OUTPATIENT
Start: 2018-10-18 | End: 2018-10-18 | Stop reason: HOSPADM

## 2018-10-18 RX ORDER — SODIUM CHLORIDE 0.9 % (FLUSH) 0.9 %
10 SYRINGE (ML) INJECTION EVERY 12 HOURS SCHEDULED
Status: DISCONTINUED | OUTPATIENT
Start: 2018-10-18 | End: 2018-10-18 | Stop reason: SDUPTHER

## 2018-10-18 RX ORDER — PREDNISOLONE ACETATE 10 MG/ML
1 SUSPENSION/ DROPS OPHTHALMIC SEE ADMIN INSTRUCTIONS
Status: DISCONTINUED | OUTPATIENT
Start: 2018-10-18 | End: 2018-10-18 | Stop reason: HOSPADM

## 2018-10-18 RX ORDER — PILOCARPINE HYDROCHLORIDE 20 MG/ML
SOLUTION/ DROPS OPHTHALMIC PRN
Status: DISCONTINUED | OUTPATIENT
Start: 2018-10-18 | End: 2018-10-18 | Stop reason: HOSPADM

## 2018-10-18 RX ORDER — SODIUM CHLORIDE, POTASSIUM CHLORIDE, CALCIUM CHLORIDE, MAGNESIUM CHLORIDE, SODIUM ACETATE, AND SODIUM CITRATE 6.4; .75; .48; .3; 3.9; 1.7 MG/ML; MG/ML; MG/ML; MG/ML; MG/ML; MG/ML
SOLUTION IRRIGATION PRN
Status: DISCONTINUED | OUTPATIENT
Start: 2018-10-18 | End: 2018-10-18 | Stop reason: HOSPADM

## 2018-10-18 RX ORDER — SODIUM CHLORIDE, SODIUM LACTATE, POTASSIUM CHLORIDE, CALCIUM CHLORIDE 600; 310; 30; 20 MG/100ML; MG/100ML; MG/100ML; MG/100ML
INJECTION, SOLUTION INTRAVENOUS CONTINUOUS
Status: DISCONTINUED | OUTPATIENT
Start: 2018-10-18 | End: 2018-10-18 | Stop reason: HOSPADM

## 2018-10-18 RX ORDER — LIDOCAINE HYDROCHLORIDE 10 MG/ML
1 INJECTION, SOLUTION EPIDURAL; INFILTRATION; INTRACAUDAL; PERINEURAL
Status: COMPLETED | OUTPATIENT
Start: 2018-10-18 | End: 2018-10-18

## 2018-10-18 RX ORDER — TETRACAINE HYDROCHLORIDE 5 MG/ML
SOLUTION OPHTHALMIC PRN
Status: DISCONTINUED | OUTPATIENT
Start: 2018-10-18 | End: 2018-10-18 | Stop reason: HOSPADM

## 2018-10-18 RX ORDER — SODIUM CHLORIDE 0.9 % (FLUSH) 0.9 %
10 SYRINGE (ML) INJECTION PRN
Status: DISCONTINUED | OUTPATIENT
Start: 2018-10-18 | End: 2018-10-18 | Stop reason: SDUPTHER

## 2018-10-18 RX ADMIN — PROPOFOL 80 MG: 10 INJECTION, EMULSION INTRAVENOUS at 11:58

## 2018-10-18 RX ADMIN — SODIUM CHLORIDE, POTASSIUM CHLORIDE, SODIUM LACTATE AND CALCIUM CHLORIDE: 600; 310; 30; 20 INJECTION, SOLUTION INTRAVENOUS at 11:31

## 2018-10-18 RX ADMIN — LIDOCAINE HYDROCHLORIDE 0.3 ML: 10 INJECTION, SOLUTION EPIDURAL; INFILTRATION; INTRACAUDAL; PERINEURAL at 11:30

## 2018-10-18 RX ADMIN — Medication 0.3 ML: at 11:20

## 2018-10-18 RX ADMIN — Medication 0.3 ML: at 11:07

## 2018-10-18 RX ADMIN — BROMFENAC SODIUM 1 DROP: 0.7 SOLUTION/ DROPS OPHTHALMIC at 11:07

## 2018-10-18 RX ADMIN — FAMOTIDINE 20 MG: 10 INJECTION, SOLUTION INTRAVENOUS at 11:37

## 2018-10-18 RX ADMIN — Medication 0.3 ML: at 11:31

## 2018-10-18 ASSESSMENT — PULMONARY FUNCTION TESTS
PIF_VALUE: 0
PIF_VALUE: 1
PIF_VALUE: 0

## 2018-10-18 NOTE — H&P
No data recorded. CONSTITUTIONAL:  awake, alert, cooperative, no apparent distress, and appears stated age  EYES:  Lids and lashes normal, pupils equal, round and reactive to light, extra ocular muscles intact, sclera clear, conjunctiva normal  ENT:  Normocephalic, without obvious abnormality, atramatic, sinuses nontender on palpation, external ears without lesions, oral pharynx with moist mucus membranes, tonsils without erythema or exudates, gums normal and good dentition. NECK:  Supple, symmetrical, trachea midline, no adenopathy, thyroid symmetric, not enlarged and no tenderness, skin normal  LUNGS:  No increased work of breathing, good air exchange, clear to auscultation bilaterally, no crackles or wheezing  CARDIOVASCULAR:  Normal apical impulse, regular rate and rhythm, normal S1 and S2, no S3 or S4, and no murmur noted  ABDOMEN:  No scars, normal bowel sounds, soft, non-distended, non-tender, no masses palpated, no hepatosplenomegally  MUSCULOSKELETAL:  There is no redness, warmth, or swelling of the joints. Full range of motion noted. Motor strength is 5 out of 5 all extremities bilaterally. Tone is normal.  NEUROLOGIC:  Awake, alert, oriented to name, place and time. Cranial nerves II-XII are grossly intact. Motor is 5 out of 5 bilaterally. Cerebellar finger to nose, heel to shin intact. Sensory is intact.   Babinski down going, Romberg negative, and gait is normal.                 Erby Stalling

## 2018-10-30 RX ORDER — RIVAROXABAN 20 MG/1
TABLET, FILM COATED ORAL
Qty: 30 TABLET | Refills: 0 | Status: SHIPPED | OUTPATIENT
Start: 2018-10-30 | End: 2018-11-27 | Stop reason: SDUPTHER

## 2018-11-15 RX ORDER — CARVEDILOL 6.25 MG/1
TABLET ORAL
Qty: 60 TABLET | Refills: 0 | Status: SHIPPED | OUTPATIENT
Start: 2018-11-15 | End: 2018-11-26 | Stop reason: SDUPTHER

## 2018-11-19 ENCOUNTER — CARE COORDINATION (OUTPATIENT)
Dept: INTERNAL MEDICINE CLINIC | Age: 73
End: 2018-11-19

## 2018-11-26 ENCOUNTER — OFFICE VISIT (OUTPATIENT)
Dept: INTERNAL MEDICINE CLINIC | Age: 73
End: 2018-11-26

## 2018-11-26 VITALS
HEART RATE: 70 BPM | BODY MASS INDEX: 33.36 KG/M2 | SYSTOLIC BLOOD PRESSURE: 130 MMHG | WEIGHT: 233 LBS | RESPIRATION RATE: 14 BRPM | HEIGHT: 70 IN | DIASTOLIC BLOOD PRESSURE: 80 MMHG

## 2018-11-26 DIAGNOSIS — I73.9 PAD (PERIPHERAL ARTERY DISEASE) (HCC): ICD-10-CM

## 2018-11-26 DIAGNOSIS — K21.9 GASTROESOPHAGEAL REFLUX DISEASE WITHOUT ESOPHAGITIS: ICD-10-CM

## 2018-11-26 DIAGNOSIS — N13.8 ENLARGED PROSTATE WITH URINARY OBSTRUCTION: ICD-10-CM

## 2018-11-26 DIAGNOSIS — I71.20 THORACIC AORTIC ANEURYSM WITHOUT RUPTURE: ICD-10-CM

## 2018-11-26 DIAGNOSIS — I10 ESSENTIAL HYPERTENSION: Primary | ICD-10-CM

## 2018-11-26 DIAGNOSIS — I48.3 TYPICAL ATRIAL FLUTTER (HCC): ICD-10-CM

## 2018-11-26 DIAGNOSIS — I10 ESSENTIAL HYPERTENSION: ICD-10-CM

## 2018-11-26 DIAGNOSIS — I50.22 CHRONIC SYSTOLIC CHF (CONGESTIVE HEART FAILURE) (HCC): ICD-10-CM

## 2018-11-26 DIAGNOSIS — J44.9 CHRONIC OBSTRUCTIVE PULMONARY DISEASE, UNSPECIFIED COPD TYPE (HCC): ICD-10-CM

## 2018-11-26 DIAGNOSIS — I42.0 DILATED CARDIOMYOPATHY (HCC): ICD-10-CM

## 2018-11-26 DIAGNOSIS — Z23 NEED FOR INFLUENZA VACCINATION: ICD-10-CM

## 2018-11-26 DIAGNOSIS — N40.1 ENLARGED PROSTATE WITH URINARY OBSTRUCTION: ICD-10-CM

## 2018-11-26 DIAGNOSIS — D49.2 SKIN NEOPLASM: ICD-10-CM

## 2018-11-26 PROCEDURE — 99214 OFFICE O/P EST MOD 30 MIN: CPT | Performed by: INTERNAL MEDICINE

## 2018-11-26 PROCEDURE — G0008 ADMIN INFLUENZA VIRUS VAC: HCPCS | Performed by: INTERNAL MEDICINE

## 2018-11-26 PROCEDURE — 90662 IIV NO PRSV INCREASED AG IM: CPT | Performed by: INTERNAL MEDICINE

## 2018-11-26 RX ORDER — ATORVASTATIN CALCIUM 10 MG/1
TABLET, FILM COATED ORAL
Qty: 30 TABLET | Refills: 2 | Status: SHIPPED | OUTPATIENT
Start: 2018-11-26 | End: 2018-11-27 | Stop reason: DRUGHIGH

## 2018-11-26 RX ORDER — ALBUTEROL SULFATE 2.5 MG/3ML
2.5 SOLUTION RESPIRATORY (INHALATION) EVERY 4 HOURS PRN
Qty: 50 EACH | Refills: 2 | Status: ON HOLD | OUTPATIENT
Start: 2018-11-26 | End: 2019-08-08 | Stop reason: HOSPADM

## 2018-11-26 RX ORDER — CARVEDILOL 6.25 MG/1
TABLET ORAL
Qty: 60 TABLET | Refills: 2 | Status: SHIPPED | OUTPATIENT
Start: 2018-11-26 | End: 2019-02-26 | Stop reason: SDUPTHER

## 2018-11-26 RX ORDER — IPRATROPIUM BROMIDE AND ALBUTEROL SULFATE 2.5; .5 MG/3ML; MG/3ML
1 SOLUTION RESPIRATORY (INHALATION) EVERY 4 HOURS PRN
Qty: 360 ML | Refills: 2 | Status: SHIPPED | OUTPATIENT
Start: 2018-11-26 | End: 2019-07-06 | Stop reason: SDUPTHER

## 2018-11-26 RX ORDER — BUDESONIDE AND FORMOTEROL FUMARATE DIHYDRATE 160; 4.5 UG/1; UG/1
2 AEROSOL RESPIRATORY (INHALATION) 2 TIMES DAILY
Qty: 3 INHALER | Refills: 2 | Status: SHIPPED | OUTPATIENT
Start: 2018-11-26 | End: 2019-02-26 | Stop reason: SDUPTHER

## 2018-11-26 RX ORDER — OMEPRAZOLE 40 MG/1
CAPSULE, DELAYED RELEASE ORAL
Qty: 30 CAPSULE | Refills: 2 | Status: SHIPPED | OUTPATIENT
Start: 2018-11-26 | End: 2019-02-26 | Stop reason: SDUPTHER

## 2018-11-26 ASSESSMENT — ENCOUNTER SYMPTOMS
WHEEZING: 0
NAUSEA: 0
ABDOMINAL PAIN: 0
SHORTNESS OF BREATH: 0
RHINORRHEA: 0
BACK PAIN: 0
VOMITING: 0

## 2018-11-26 NOTE — PROGRESS NOTES
questions answered. Pt voiced understanding. Decrease calorie intake. Exercise,weight loss recommended. The current medical regimen is effective;  continue present plan and medications. See orders.

## 2018-11-26 NOTE — PATIENT INSTRUCTIONS
Patient Self-Management Goal for Health Maintenance  Goal: I will schedule a yearly preventative care visit.   Barriers: none  Plan for overcoming my barriers: N/A  Confidence: 10/10  Anticipated Goal Completion Date: 02/26/19

## 2018-11-27 LAB
A/G RATIO: 1.5 (ref 1.1–2.2)
ALBUMIN SERPL-MCNC: 4.3 G/DL (ref 3.4–5)
ALP BLD-CCNC: 72 U/L (ref 40–129)
ALT SERPL-CCNC: 22 U/L (ref 10–40)
ANION GAP SERPL CALCULATED.3IONS-SCNC: 12 MMOL/L (ref 3–16)
AST SERPL-CCNC: 19 U/L (ref 15–37)
BASOPHILS ABSOLUTE: 0.1 K/UL (ref 0–0.2)
BASOPHILS RELATIVE PERCENT: 1.4 %
BILIRUB SERPL-MCNC: 0.4 MG/DL (ref 0–1)
BUN BLDV-MCNC: 12 MG/DL (ref 7–20)
CALCIUM SERPL-MCNC: 9.1 MG/DL (ref 8.3–10.6)
CHLORIDE BLD-SCNC: 106 MMOL/L (ref 99–110)
CHOLESTEROL, TOTAL: 202 MG/DL (ref 0–199)
CO2: 19 MMOL/L (ref 21–32)
CREAT SERPL-MCNC: 1.1 MG/DL (ref 0.8–1.3)
EOSINOPHILS ABSOLUTE: 0.6 K/UL (ref 0–0.6)
EOSINOPHILS RELATIVE PERCENT: 7.1 %
GFR AFRICAN AMERICAN: >60
GFR NON-AFRICAN AMERICAN: >60
GLOBULIN: 2.8 G/DL
GLUCOSE BLD-MCNC: 86 MG/DL (ref 70–99)
HCT VFR BLD CALC: 40.3 % (ref 40.5–52.5)
HDLC SERPL-MCNC: 68 MG/DL (ref 40–60)
HEMOGLOBIN: 13.3 G/DL (ref 13.5–17.5)
LDL CHOLESTEROL CALCULATED: 113 MG/DL
LYMPHOCYTES ABSOLUTE: 2.2 K/UL (ref 1–5.1)
LYMPHOCYTES RELATIVE PERCENT: 24.6 %
MCH RBC QN AUTO: 30.5 PG (ref 26–34)
MCHC RBC AUTO-ENTMCNC: 33.1 G/DL (ref 31–36)
MCV RBC AUTO: 91.9 FL (ref 80–100)
MONOCYTES ABSOLUTE: 0.9 K/UL (ref 0–1.3)
MONOCYTES RELATIVE PERCENT: 10 %
NEUTROPHILS ABSOLUTE: 5 K/UL (ref 1.7–7.7)
NEUTROPHILS RELATIVE PERCENT: 56.9 %
PDW BLD-RTO: 16.2 % (ref 12.4–15.4)
PLATELET # BLD: 227 K/UL (ref 135–450)
PMV BLD AUTO: 8.5 FL (ref 5–10.5)
POTASSIUM SERPL-SCNC: 4.5 MMOL/L (ref 3.5–5.1)
RBC # BLD: 4.38 M/UL (ref 4.2–5.9)
SODIUM BLD-SCNC: 137 MMOL/L (ref 136–145)
TOTAL PROTEIN: 7.1 G/DL (ref 6.4–8.2)
TRIGL SERPL-MCNC: 106 MG/DL (ref 0–150)
URIC ACID, SERUM: 4.5 MG/DL (ref 3.5–7.2)
VLDLC SERPL CALC-MCNC: 21 MG/DL
WBC # BLD: 8.9 K/UL (ref 4–11)

## 2018-11-27 RX ORDER — ATORVASTATIN CALCIUM 20 MG/1
20 TABLET, FILM COATED ORAL DAILY
Qty: 30 TABLET | Refills: 2 | Status: SHIPPED | OUTPATIENT
Start: 2018-11-27 | End: 2019-02-26 | Stop reason: SDUPTHER

## 2018-11-27 NOTE — TELEPHONE ENCOUNTER
----- Message from Pao Jackman MD sent at 11/27/2018  7:30 AM EST -----  Increase dose of Lipitor to 20 mg daily

## 2018-11-28 RX ORDER — FUROSEMIDE 40 MG/1
TABLET ORAL
Qty: 30 TABLET | Refills: 0 | Status: SHIPPED | OUTPATIENT
Start: 2018-11-28 | End: 2019-02-06 | Stop reason: SDUPTHER

## 2018-11-28 RX ORDER — AMIODARONE HYDROCHLORIDE 200 MG/1
TABLET ORAL
Qty: 30 TABLET | Refills: 0 | Status: SHIPPED | OUTPATIENT
Start: 2018-11-28 | End: 2019-02-06 | Stop reason: SDUPTHER

## 2018-11-28 RX ORDER — RIVAROXABAN 20 MG/1
TABLET, FILM COATED ORAL
Qty: 30 TABLET | Refills: 0 | Status: SHIPPED | OUTPATIENT
Start: 2018-11-28 | End: 2018-12-27 | Stop reason: SDUPTHER

## 2018-12-18 RX ORDER — LOSARTAN POTASSIUM 25 MG/1
TABLET ORAL
Qty: 30 TABLET | Refills: 0 | Status: SHIPPED | OUTPATIENT
Start: 2018-12-18 | End: 2019-02-26 | Stop reason: SDUPTHER

## 2018-12-27 ENCOUNTER — TELEPHONE (OUTPATIENT)
Dept: INTERNAL MEDICINE CLINIC | Age: 73
End: 2018-12-27

## 2018-12-27 NOTE — TELEPHONE ENCOUNTER
----- Message from Amelie Figueroa MD sent at 12/27/2018  2:17 PM EST -----  Contact: 1 [address] [place] [address] [phone]  Can wait  Refill    ----- Message -----  From: Ayaka Suleman  Sent: 12/27/2018   1:56 PM  To: Amelie Figueroa MD    Pharmacy requesting refill for Xarelto, but patient informed pharmacy that he spoke with Dr Romana Murillo about changing it to Plavix. I do not see anything in his chart about that though and it looks like Dr Alexia Wade is the one that prescribes the Xarelto. Please advise.

## 2019-01-02 RX ORDER — RIVAROXABAN 20 MG/1
TABLET, FILM COATED ORAL
Qty: 30 TABLET | Refills: 0 | Status: SHIPPED | OUTPATIENT
Start: 2019-01-02 | End: 2019-02-06 | Stop reason: SDUPTHER

## 2019-01-07 ENCOUNTER — CARE COORDINATION (OUTPATIENT)
Dept: CARE COORDINATION | Age: 74
End: 2019-01-07

## 2019-01-25 RX ORDER — POTASSIUM CHLORIDE 20 MEQ/1
TABLET, EXTENDED RELEASE ORAL
Qty: 30 TABLET | Refills: 0 | Status: SHIPPED | OUTPATIENT
Start: 2019-01-25 | End: 2019-03-25 | Stop reason: SDUPTHER

## 2019-02-07 RX ORDER — FUROSEMIDE 40 MG/1
TABLET ORAL
Qty: 15 TABLET | Refills: 0 | Status: SHIPPED | OUTPATIENT
Start: 2019-02-07 | End: 2019-05-28 | Stop reason: SDUPTHER

## 2019-02-07 RX ORDER — AMIODARONE HYDROCHLORIDE 200 MG/1
TABLET ORAL
Qty: 15 TABLET | Refills: 0 | Status: SHIPPED | OUTPATIENT
Start: 2019-02-07 | End: 2019-05-02 | Stop reason: SINTOL

## 2019-02-07 RX ORDER — RIVAROXABAN 20 MG/1
TABLET, FILM COATED ORAL
Qty: 15 TABLET | Refills: 0 | Status: SHIPPED | OUTPATIENT
Start: 2019-02-07 | End: 2019-02-26 | Stop reason: SDUPTHER

## 2019-02-19 ENCOUNTER — CARE COORDINATION (OUTPATIENT)
Dept: CARE COORDINATION | Age: 74
End: 2019-02-19

## 2019-02-26 ENCOUNTER — OFFICE VISIT (OUTPATIENT)
Dept: INTERNAL MEDICINE CLINIC | Age: 74
End: 2019-02-26

## 2019-02-26 VITALS
WEIGHT: 231 LBS | HEIGHT: 70 IN | SYSTOLIC BLOOD PRESSURE: 160 MMHG | OXYGEN SATURATION: 95 % | DIASTOLIC BLOOD PRESSURE: 80 MMHG | HEART RATE: 76 BPM | RESPIRATION RATE: 14 BRPM | BODY MASS INDEX: 33.07 KG/M2

## 2019-02-26 DIAGNOSIS — I71.20 THORACIC AORTIC ANEURYSM WITHOUT RUPTURE: ICD-10-CM

## 2019-02-26 DIAGNOSIS — N40.1 ENLARGED PROSTATE WITH URINARY OBSTRUCTION: ICD-10-CM

## 2019-02-26 DIAGNOSIS — I48.3 TYPICAL ATRIAL FLUTTER (HCC): ICD-10-CM

## 2019-02-26 DIAGNOSIS — J44.9 CHRONIC OBSTRUCTIVE PULMONARY DISEASE, UNSPECIFIED COPD TYPE (HCC): Primary | ICD-10-CM

## 2019-02-26 DIAGNOSIS — K21.9 GASTROESOPHAGEAL REFLUX DISEASE WITHOUT ESOPHAGITIS: ICD-10-CM

## 2019-02-26 DIAGNOSIS — I73.9 PAD (PERIPHERAL ARTERY DISEASE) (HCC): ICD-10-CM

## 2019-02-26 DIAGNOSIS — I42.0 DILATED CARDIOMYOPATHY (HCC): ICD-10-CM

## 2019-02-26 DIAGNOSIS — I10 ESSENTIAL HYPERTENSION: ICD-10-CM

## 2019-02-26 DIAGNOSIS — I50.22 CHRONIC SYSTOLIC CHF (CONGESTIVE HEART FAILURE) (HCC): ICD-10-CM

## 2019-02-26 DIAGNOSIS — N13.8 ENLARGED PROSTATE WITH URINARY OBSTRUCTION: ICD-10-CM

## 2019-02-26 PROCEDURE — 99214 OFFICE O/P EST MOD 30 MIN: CPT | Performed by: INTERNAL MEDICINE

## 2019-02-26 RX ORDER — BUDESONIDE AND FORMOTEROL FUMARATE DIHYDRATE 160; 4.5 UG/1; UG/1
2 AEROSOL RESPIRATORY (INHALATION) 2 TIMES DAILY
Qty: 3 INHALER | Refills: 2 | Status: SHIPPED | OUTPATIENT
Start: 2019-02-26 | End: 2019-05-28 | Stop reason: SDUPTHER

## 2019-02-26 RX ORDER — LOSARTAN POTASSIUM 25 MG/1
25 TABLET ORAL DAILY
Qty: 30 TABLET | Refills: 2 | Status: SHIPPED | OUTPATIENT
Start: 2019-02-26 | End: 2019-05-28 | Stop reason: SDUPTHER

## 2019-02-26 RX ORDER — CARVEDILOL 6.25 MG/1
TABLET ORAL
Qty: 60 TABLET | Refills: 2 | Status: SHIPPED | OUTPATIENT
Start: 2019-02-26 | End: 2019-05-17 | Stop reason: SDUPTHER

## 2019-02-26 RX ORDER — ATORVASTATIN CALCIUM 20 MG/1
20 TABLET, FILM COATED ORAL DAILY
Qty: 30 TABLET | Refills: 2 | Status: SHIPPED | OUTPATIENT
Start: 2019-02-26 | End: 2019-05-28 | Stop reason: SDUPTHER

## 2019-02-26 RX ORDER — OMEPRAZOLE 40 MG/1
CAPSULE, DELAYED RELEASE ORAL
Qty: 30 CAPSULE | Refills: 2 | Status: SHIPPED | OUTPATIENT
Start: 2019-02-26 | End: 2019-05-28 | Stop reason: SDUPTHER

## 2019-02-26 ASSESSMENT — ENCOUNTER SYMPTOMS
WHEEZING: 0
BACK PAIN: 0
SHORTNESS OF BREATH: 0
NAUSEA: 0
ABDOMINAL PAIN: 0
RHINORRHEA: 0
VOMITING: 0

## 2019-03-25 RX ORDER — POTASSIUM CHLORIDE 20 MEQ/1
TABLET, EXTENDED RELEASE ORAL
Qty: 30 TABLET | Refills: 3 | Status: SHIPPED | OUTPATIENT
Start: 2019-03-25 | End: 2019-05-28 | Stop reason: SDUPTHER

## 2019-03-27 ENCOUNTER — CARE COORDINATION (OUTPATIENT)
Dept: CARE COORDINATION | Age: 74
End: 2019-03-27

## 2019-04-02 ENCOUNTER — INITIAL CONSULT (OUTPATIENT)
Dept: SURGERY | Age: 74
End: 2019-04-02
Payer: MEDICARE

## 2019-04-02 ENCOUNTER — PREP FOR PROCEDURE (OUTPATIENT)
Dept: SURGERY | Age: 74
End: 2019-04-02

## 2019-04-02 VITALS
WEIGHT: 229 LBS | BODY MASS INDEX: 32.78 KG/M2 | DIASTOLIC BLOOD PRESSURE: 82 MMHG | SYSTOLIC BLOOD PRESSURE: 130 MMHG | HEIGHT: 70 IN

## 2019-04-02 DIAGNOSIS — D48.5 NEOPLASM OF UNCERTAIN BEHAVIOR OF SKIN: Primary | ICD-10-CM

## 2019-04-02 PROCEDURE — 99202 OFFICE O/P NEW SF 15 MIN: CPT | Performed by: SURGERY

## 2019-04-02 RX ORDER — SODIUM CHLORIDE 0.9 % (FLUSH) 0.9 %
10 SYRINGE (ML) INJECTION PRN
Status: CANCELLED | OUTPATIENT
Start: 2019-04-02

## 2019-04-02 RX ORDER — SODIUM CHLORIDE 0.9 % (FLUSH) 0.9 %
10 SYRINGE (ML) INJECTION EVERY 12 HOURS SCHEDULED
Status: CANCELLED | OUTPATIENT
Start: 2019-04-02

## 2019-04-02 NOTE — PROGRESS NOTES
New Patient Via Pablo Powell MD    [address], [address] [facility]  [place], [facility] [address]   YOB: 1945    Date of Visit:  4/2/2019    Gerber Alvarez MD    Chief Complaint: Scalp lesion    HPI:  Patient presents for evaluation of a skin lesion on his scalp. He states he's had it for a few months. It is getting larger. It has become irritated. It itches and bleeds when he scratches it.   Some blisters formed around it and then turned in the more lesion    No Known Allergies  Outpatient Medications Marked as Taking for the 4/2/19 encounter (Initial consult) with Radha Sahni MD   Medication Sig Dispense Refill    potassium chloride (KLOR-CON M) 20 MEQ extended release tablet TAKE ONE (1) TABLET BY MOUTH DAILY *NEEDS APPOINTMENT* 30 tablet 3    atorvastatin (LIPITOR) 20 MG tablet Take 1 tablet by mouth daily 30 tablet 2    budesonide-formoterol (SYMBICORT) 160-4.5 MCG/ACT AERO Inhale 2 puffs into the lungs 2 times daily 3 Inhaler 2    carvedilol (COREG) 6.25 MG tablet TAKE ONE (1) TABLET BY MOUTH TWICE DAILY WITH MEALS 60 tablet 2    losartan (COZAAR) 25 MG tablet Take 1 tablet by mouth daily 30 tablet 2    omeprazole (PRILOSEC) 40 MG delayed release capsule TAKE 1 CAPSULE BY MOUTH DAILY 30 capsule 2    rivaroxaban (XARELTO) 20 MG TABS tablet Take 1 tablet by mouth daily (with breakfast) 30 tablet 2    amiodarone (CORDARONE) 200 MG tablet TAKE ONE (1) TABLET BY MOUTH DAILY 15 tablet 0    furosemide (LASIX) 40 MG tablet TAKE ONE (1) TABLET BY MOUTH DAILY 15 tablet 0    ipratropium-albuterol (DUONEB) 0.5-2.5 (3) MG/3ML SOLN nebulizer solution Inhale 3 mLs into the lungs every 4 hours as needed for Shortness of Breath 360 mL 2    albuterol (PROVENTIL) (2.5 MG/3ML) 0.083% nebulizer solution Take 3 mLs by nebulization every 4 hours as needed for Wheezing 50 each 2    albuterol sulfate HFA (PROVENTIL HFA) 108 (90 Base) MCG/ACT inhaler Inhale 2 puffs into the lungs every 6 hours as needed for Wheezing (with spacer) 1 Inhaler 11       Past Medical History:   Diagnosis Date    A-fib ([address]     2/14/12    Atrial fibrillation with RVR ([address]     2/14/12     Avulsion fracture of ankle 9/21/2015    Benign localized hyperplasia of prostate with urinary obstruction and other lower urinary tract symptoms (LUTS)(600.21) 8/17/2016    Chronic systolic CHF [facility]congestive heart failure) ([facility]) 8/28/2017    Contusion of leg, right 9/21/2015    COPD (chronic obstructive pulmonary disease) ([facility])     Fractures     GERD (gastroesophageal reflux disease) 6/15/2015    Hypertension     Hypertrophy of prostate without urinary obstruction and other lower urinary tract symptoms (LUTS) 6/15/2015    No history of procedure     no previos colonoscopy    PAD [facility]peripheral artery disease) ([facility] 10/9/2017    Pneumonia     Thoracic aortic aneurysm [facility])      Past Surgical History:   Procedure Laterality Date    CATARACT REMOVAL WITH IMPLANT Right 09/06/2018     PHACO EMULSIFICATION OF CATARACT WITH INTRAOCULAR LENS IMPLANT RIGHT EYE    COLONOSCOPY  2/24/2016    sigmoid polyps    HERNIA REPAIR      TX REMV CATARACT EXTRACAP,INSERT LENS Right 9/6/2018    PHACO EMULSIFICATION OF CATARACT WITH INTRAOCULAR LENS IMPLANT RIGHT EYE performed by Marli Weeks MD at [facility] Left 10/18/2018    PHACO EMULSIFICATION OF CATARACT WITH  INTRAOCULAR LENS IMPLANT LEFT EYE performed by Marli Weeks MD at [address] [place] [address]. No pertinent family history.   Social History     Socioeconomic History    Marital status:      Spouse name: Not on file    Number of children: Not on file    Years of education: Not on file    Highest education level: Not on file   Occupational History    Not on file   Social Needs    Financial resource strain: Not on file   StudyMax-Rafael no tenderness, supple   Respiratory:  No respiratory distress, normal breath sounds, no rales, no wheezing   Cardiovascular:  Normal rate, normal rhythm  Integument:  There is a 1.5 x 1.2 cm raised brown scalp lesion. On one end of this is a polypoid verruca's protuberance that is inflamed  Lymphatic:  No lymphadenopathy noted   Neurologic:  Alert & oriented x 3, no focal deficits noted   Psychiatric:  Speech and behavior appropriate       DATA:  N/A      Assessment:  1. Neoplasm of uncertain behavior of skin        Plan: Excision of skin lesion. I explained the procedure including risks, benefits, and alternatives. Questions were answered and the patient agrees to proceed.

## 2019-04-09 ENCOUNTER — TELEPHONE (OUTPATIENT)
Dept: INTERNAL MEDICINE CLINIC | Age: 74
End: 2019-04-09

## 2019-04-09 NOTE — TELEPHONE ENCOUNTER
----- Message from Garrett Sultana MD sent at 4/9/2019  4:44 PM EDT -----  Contact: Dr. Leon Curtis office, [phone]  yes  ----- Message -----  From: Manasa Kemp  Sent: 4/9/2019   3:03 PM  To: MD Dr. Leon Del Rio office, [phone], called and said pt is scheduled for an incision on 4-23-19, they want to make sure with you that it is okay to hold pt's Xarelto for 3 days before the incision, they would like a call back. Last appt. 2-26-19. Next appt. 5-28-19.

## 2019-04-23 ENCOUNTER — HOSPITAL ENCOUNTER (EMERGENCY)
Age: 74
Discharge: HOME | End: 2019-04-23
Attending: EMERGENCY MEDICINE
Payer: MEDICARE

## 2019-04-23 ENCOUNTER — APPOINTMENT (OUTPATIENT)
Dept: CT IMAGING | Age: 74
End: 2019-04-23
Payer: MEDICARE

## 2019-04-23 ENCOUNTER — APPOINTMENT (OUTPATIENT)
Dept: GENERAL RADIOLOGY | Age: 74
End: 2019-04-23
Payer: MEDICARE

## 2019-04-23 VITALS
OXYGEN SATURATION: 96 % | DIASTOLIC BLOOD PRESSURE: 87 MMHG | BODY MASS INDEX: 30.1 KG/M2 | RESPIRATION RATE: 21 BRPM | WEIGHT: 215 LBS | TEMPERATURE: 98.1 F | HEART RATE: 92 BPM | HEIGHT: 71 IN | SYSTOLIC BLOOD PRESSURE: 152 MMHG

## 2019-04-23 DIAGNOSIS — R07.9 CHEST PAIN, UNSPECIFIED TYPE: Primary | ICD-10-CM

## 2019-04-23 LAB
A/G RATIO: 1.1 (ref 1.1–2.2)
ALBUMIN SERPL-MCNC: 3.9 G/DL (ref 3.4–5)
ALP BLD-CCNC: 92 U/L (ref 40–129)
ALT SERPL-CCNC: 15 U/L (ref 10–40)
ANION GAP SERPL CALCULATED.3IONS-SCNC: 13 MMOL/L (ref 3–16)
AST SERPL-CCNC: 13 U/L (ref 15–37)
BASOPHILS ABSOLUTE: 0.1 K/UL (ref 0–0.2)
BASOPHILS RELATIVE PERCENT: 0.8 %
BILIRUB SERPL-MCNC: 0.4 MG/DL (ref 0–1)
BUN BLDV-MCNC: 18 MG/DL (ref 7–20)
CALCIUM SERPL-MCNC: 9.5 MG/DL (ref 8.3–10.6)
CHLORIDE BLD-SCNC: 107 MMOL/L (ref 99–110)
CO2: 22 MMOL/L (ref 21–32)
CREAT SERPL-MCNC: 1.1 MG/DL (ref 0.8–1.3)
EKG ATRIAL RATE: 99 BPM
EKG DIAGNOSIS: NORMAL
EKG P AXIS: 61 DEGREES
EKG P-R INTERVAL: 176 MS
EKG Q-T INTERVAL: 368 MS
EKG QRS DURATION: 96 MS
EKG QTC CALCULATION (BAZETT): 472 MS
EKG R AXIS: 3 DEGREES
EKG T AXIS: 51 DEGREES
EKG VENTRICULAR RATE: 99 BPM
EOSINOPHILS ABSOLUTE: 0.5 K/UL (ref 0–0.6)
EOSINOPHILS RELATIVE PERCENT: 7.5 %
GFR AFRICAN AMERICAN: >60
GFR NON-AFRICAN AMERICAN: >60
GLOBULIN: 3.4 G/DL
GLUCOSE BLD-MCNC: 111 MG/DL (ref 70–99)
HCT VFR BLD CALC: 41 % (ref 40.5–52.5)
HEMOGLOBIN: 13.7 G/DL (ref 13.5–17.5)
LYMPHOCYTES ABSOLUTE: 1.9 K/UL (ref 1–5.1)
LYMPHOCYTES RELATIVE PERCENT: 30.3 %
MCH RBC QN AUTO: 29.8 PG (ref 26–34)
MCHC RBC AUTO-ENTMCNC: 33.3 G/DL (ref 31–36)
MCV RBC AUTO: 89.5 FL (ref 80–100)
MONOCYTES ABSOLUTE: 0.8 K/UL (ref 0–1.3)
MONOCYTES RELATIVE PERCENT: 12.5 %
NEUTROPHILS ABSOLUTE: 3 K/UL (ref 1.7–7.7)
NEUTROPHILS RELATIVE PERCENT: 48.9 %
PDW BLD-RTO: 15.5 % (ref 12.4–15.4)
PLATELET # BLD: 218 K/UL (ref 135–450)
PMV BLD AUTO: 8.3 FL (ref 5–10.5)
POTASSIUM SERPL-SCNC: 4.1 MMOL/L (ref 3.5–5.1)
RBC # BLD: 4.58 M/UL (ref 4.2–5.9)
SODIUM BLD-SCNC: 142 MMOL/L (ref 136–145)
TOTAL PROTEIN: 7.3 G/DL (ref 6.4–8.2)
TROPONIN: <0.01 NG/ML
TROPONIN: <0.01 NG/ML
WBC # BLD: 6.2 K/UL (ref 4–11)

## 2019-04-23 PROCEDURE — 85025 COMPLETE CBC W/AUTO DIFF WBC: CPT

## 2019-04-23 PROCEDURE — 84484 ASSAY OF TROPONIN QUANT: CPT

## 2019-04-23 PROCEDURE — 6360000004 HC RX CONTRAST MEDICATION: Performed by: EMERGENCY MEDICINE

## 2019-04-23 PROCEDURE — 99285 EMERGENCY DEPT VISIT HI MDM: CPT

## 2019-04-23 PROCEDURE — 93005 ELECTROCARDIOGRAM TRACING: CPT | Performed by: EMERGENCY MEDICINE

## 2019-04-23 PROCEDURE — 71046 X-RAY EXAM CHEST 2 VIEWS: CPT

## 2019-04-23 PROCEDURE — 80053 COMPREHEN METABOLIC PANEL: CPT

## 2019-04-23 PROCEDURE — 93010 ELECTROCARDIOGRAM REPORT: CPT | Performed by: INTERNAL MEDICINE

## 2019-04-23 PROCEDURE — 71275 CT ANGIOGRAPHY CHEST: CPT

## 2019-04-23 RX ADMIN — IOPAMIDOL 85 ML: 755 INJECTION, SOLUTION INTRAVENOUS at 14:18

## 2019-04-23 ASSESSMENT — PAIN SCALES - GENERAL: PAINLEVEL_OUTOF10: 6

## 2019-04-23 NOTE — ED PROVIDER NOTES
Triage Chief Complaint:    Chest Pain (pt was over in outpt surgery to have a mole removed today then pt suddenly started to have heavy chest pain)    [place]:  Claudia Patino is a 72 y.o. male that presents to emergency Department with complaints of having a chest pain. Patient states that chest pain began while he was here having outpatient procedure done. The patient was seen by the previous physician, and had been worked up with sets of serial cardiac enzymes, along with a CT scan of the chest.    Nursing Notes Reviewed    Physical Exam:  ED Triage Vitals [04/23/19 1127]   BP Temp Temp Source Pulse Resp SpO2 Height Weight   (!) 179/98 98.1 °F (36.7 °C) Oral 102 25 96 % 5' 11\" (1.803 m) 215 lb (97.5 kg)     GENERAL APPEARANCE: Awake and alert. Cooperative. No acute distress. HEAD:Normocephalic. Atraumatic. EYES: EOM's grossly intact. Sclera anicteric. ENT: Mucous membranes are moist. Tolerates saliva. No trismus. NECK: Supple. No meningismus. Trachea midline. HEART: RRR. LUNGS: Respirations unlabored. CTAB  ABDOMEN: Soft. Non-tender. No guarding or rebound. EXTREMITIES: No acute deformities. SKIN: Warm and dry.     Physical Exam    I have reviewed and interpreted all of the currently availablelab results from this visit (if applicable):  Results for orders placed or performed during the hospital encounter of 04/23/19   CBC auto differential   Result Value Ref Range    WBC 6.2 4.0 - 11.0 K/uL    RBC 4.58 4.20 - 5.90 M/uL    Hemoglobin 13.7 13.5 - 17.5 g/dL    Hematocrit 41.0 40.5 - 52.5 %    MCV 89.5 80.0 - 100.0 fL    MCH 29.8 26.0 - 34.0 pg    MCHC 33.3 31.0 - 36.0 g/dL    RDW 15.5 (H) 12.4 - 15.4 %    Platelets 499 032 - 877 K/uL    MPV 8.3 5.0 - 10.5 fL    Neutrophils % 48.9 %    Lymphocytes % 30.3 %    Monocytes % 12.5 %    Eosinophils % 7.5 %    Basophils % 0.8 %    Neutrophils # 3.0 1.7 - 7.7 K/uL    Lymphocytes # 1.9 1.0 - 5.1 K/uL    Monocytes # 0.8 0.0 - 1.3 K/uL    Eosinophils # 0.5 0.0 - 0.6 K/uL    Basophils # 0.1 0.0 - 0.2 K/uL   Comprehensive metabolic panel   Result Value Ref Range    Sodium 142 136 - 145 mmol/L    Potassium 4.1 3.5 - 5.1 mmol/L    Chloride 107 99 - 110 mmol/L    CO2 22 21 - 32 mmol/L    Anion Gap 13 3 - 16    Glucose 111 (H) 70 - 99 mg/dL    BUN 18 7 - 20 mg/dL    CREATININE 1.1 0.8 - 1.3 mg/dL    GFR Non-African American >60 >60    GFR African American >60 >60    Calcium 9.5 8.3 - 10.6 mg/dL    Total Protein 7.3 6.4 - 8.2 g/dL    Alb 3.9 3.4 - 5.0 g/dL    Albumin/Globulin Ratio 1.1 1.1 - 2.2    Total Bilirubin 0.4 0.0 - 1.0 mg/dL    Alkaline Phosphatase 92 40 - 129 U/L    ALT 15 10 - 40 U/L    AST 13 (L) 15 - 37 U/L    Globulin 3.4 g/dL   Troponin   Result Value Ref Range    Troponin <0.01 <0.01 ng/mL   Troponin   Result Value Ref Range    Troponin <0.01 <0.01 ng/mL   EKG 12 Lead   Result Value Ref Range    Ventricular Rate 99 BPM    Atrial Rate 99 BPM    P-R Interval 176 ms    QRS Duration 96 ms    Q-T Interval 368 ms    QTc Calculation (Bazett) 472 ms    P Axis 61 degrees    R Axis 3 degrees    T Axis 51 degrees    Diagnosis       Normal sinus rhythmNonspecific ST abnormalityWhen compared with ECG of 23-APR-2019 08:10, (unconfirmed)No significant change was foundConfirmed by ANIKA Gonzalez MD ([id number]) on 4/23/2019 4:58:35 PM        Radiographs (if obtained):  [] The following radiograph was interpreted by myself in the absence of a radiologist:  [x] Radiologist's Report Reviewed:  CTA CHEST W CONTRAST   Final Result   No thoracic aortic aneurysm or dissection. Stable small ductus diverticulum. No acute findings in the chest.         XR CHEST STANDARD (2 VW)   Final Result   Bibasilar heterogeneous opacities without focal consolidation may relate to   subsegmental atelectasis/scarring. Hyperexpanded lung volume and diffusely prominent interstitium can be seen in   the setting of COPD.              EKG (if obtained): (All EKG's are interpreted by myself in theabsence of a cardiologist)  Normal sinus rhythm, normal QRS, no STEMI. Procedures    MDM:  Patient was turned over to me awaiting the results of the patient's CT scan, and the patient's blood work. The patient's second set of cardiac enzymes did come back within normal limits. The patient also had a chest CT done which shows no signs of any thoracic aortic aneurysm, dissection, pneumothorax, pneumonia, or PE. At this point, I feel the patient can be safely discharged home. I did go over the results with the patient, and he is aware that there is no acute abnormalities on his testing today. The patient will follow with his primary care doctor for scheduled appointment on May 1st.    Clinical Impression:  1.  Chest pain, unspecified type      (Please note that portions of this note Dawit Coulterne been completed with a voice recognition program. Efforts were made to edit the dictations but occasionally words are mis-transcribed.)    MD Edna Huggins MD  04/23/19 7250

## 2019-04-23 NOTE — ED NOTES
Bed: 05  Expected date:   Expected time:   Means of arrival:   Comments:     Tiara Whelan RN  04/23/19 112

## 2019-04-23 NOTE — ED PROVIDER NOTES
[address] ED  eMERGENCYdEPARTMENT eNCOUnter      Pt Name: Luiz Miller  MRN: [id number]  [place] 1945  Date of evaluation: 4/23/2019  Provider:Gen Prieto MD    [address] [place] [address]       Chief Complaint   Patient presents with    Chest Pain     pt was over in outpt surgery to have a mole removed today then pt suddenly started to have heavy chest pain         HISTORY OF PRESENT ILLNESS    Luiz Miller is a 72 y.o. male who presents to the emergency department with chest pain. At roughly 1115 patient was about to have a procedure done to remove a lesion on his head, he had sudden onset of sternal chest pain described as pressure, 6 out of 10 in severity, not better or worse than anything. Associated with shortness of breath. Gradually improving since then without intervention. No syncope. Nursing Notes were reviewed. REVIEW OF SYSTEMS       Review of Systems    10 point review of systems was performed and was negative exceptas specifically noted in the HPI.       PAST MEDICAL HISTORY     Past Medical History:   Diagnosis Date    A-fib [facility])     2/14/12    Atrial fibrillation with RVR ([facility]     2/14/12     Avulsion fracture of ankle 9/21/2015    Benign localized hyperplasia of prostate with urinary obstruction and other lower urinary tract symptoms (LUTS)(600.21) 8/17/2016    Chronic systolic CHF (congestive [facility] failure) [facility] 8/28/2017    Contusion of leg, right 9/21/2015    COPD (chronic obstructive pulmonary disease) ([facility])     Fractures     GERD (gastroesophageal reflux disease) 6/15/2015    Hypertension     Hypertrophy of prostate without urinary obstruction and other lower urinary tract symptoms (LUTS) 6/15/2015    No history of procedure     no previos colonoscopy    PAD (peripheral artery disease) ([facility] 10/9/2017    Pneumonia     Thoracic aortic aneurysm [facility])          SURGICAL HISTORY       Past Surgical History:   Procedure Laterality Date    CATARACT SOCIAL HISTORY       Social History     Socioeconomic History    Marital status:      Spouse name: None    Number of children: None    Years of education: None    Highest education level: None   Occupational History    None   Social Needs    Financial resource strain: None    Food insecurity:     Worry: None     Inability: None    Transportation needs:     Medical: None     Non-medical: None   Tobacco Use    Smoking status: Former Smoker     Packs/day: 2.00     Years: 55.00     Pack years: 110.00     Last attempt to quit: 2017     Years since quittin.6    Smokeless tobacco: Never Used    Tobacco comment: smoked 2 darnell a day for 55 years   Substance and Sexual Activity    Alcohol use: Not Currently     Comment: occassionally    Drug use: No    Sexual activity: Not Currently   Lifestyle    Physical activity:     Days per week: None     Minutes per session: None    Stress: None   Relationships    Social connections:     Talks on phone: None     Gets together: None     Attends Jew service: None     Active member of club or organization: None     Attends meetings of clubs or organizations: None     Relationship status: None    Intimate partner violence:     Fear of current or ex partner: None     Emotionally abused: None     Physically abused: None     Forced sexual activity: None   Other Topics Concern    None   Social History Narrative    None       SCREENINGS   [email or web address] Coma Scale  Eye Opening: Spontaneous  Best Verbal Response: Oriented  Best Motor Response: Obeys commands  José Antonio Coma Scale Score: 15        PHYSICAL EXAM       ED Triage Vitals [19 1127]   BP Temp Temp Source Pulse Resp SpO2 Height Weight   (!) 179/98 98.1 °F (36.7 °C) Oral 102 25 96 % 5' 11\" (1.803 m) 215 lb (97.5 kg)       Physical Exam  General appearance: Alert, cooperative, no distress, appears stated age.   Head:  Normocephalic, without obvious abnormality, atraumatic. HEENT: Mucous membranes moist.  Neck: Full ROM, trachea midline, no JVD  Lungs: No respiratory distress  Cardiovasular: Perfusing extremities  Abdomen: Nontender, no guarding  Extremities: Atraumatic, full ROM  Skin: No rashes or lesions to exposed skin  Neurologic: Alert and oriented x3, motor grossly normal, clear speech    DIAGNOSTIC RESULTS     EKG: EKG shows a normal sinus rhythm without STEMI, arrhythmogenic condition such as Brugada/ARVD. QTC/AL intervals normal.    RADIOLOGY:   Non-plain film images such as CT, Ultrasound and MRI are read by the radiologist.Plain radiographic images are visualized and preliminarily interpreted by the emergency physician with the below findings:    No pneumonia    Interpretation per the Radiologist below, if available at the time of this note:    XR CHEST STANDARD (2 VW)   Final Result   Bibasilar heterogeneous opacities without focal consolidation may relate to   subsegmental atelectasis/scarring. Hyperexpanded lung volume and diffusely prominent interstitium can be seen in   the setting of COPD.          CTA CHEST W CONTRAST    (Results Pending)         EDBEDSIDE ULTRASOUND:   Performed by Shira Snow - none    LABS:  Labs Reviewed   CBC WITH AUTO DIFFERENTIAL - Abnormal; Notable for the following components:       Result Value    RDW 15.5 (*)     All other components within normal limits    Narrative:     Performed at:  [place] [address],  [place], [facility]   Phone [phone]   COMPREHENSIVE METABOLIC PANEL - Abnormal; Notable for the following components:    Glucose 111 (*)     AST 13 (*)     All other components within normal limits    Narrative:     Performed at:  [place] [address],  [place], [facility]   Phone [phone]   TROPONIN    Narrative:     Performed at:  [facility] - [place] [address],  [place], [facility] Phone [phone]   TROPONIN       All other labs were within normal range or not returned as of this dictation. EMERGENCY DEPARTMENT COURSE and DIFFERENTIAL DIAGNOSIS/MDM:   Vitals:    Vitals:    04/23/19 1328 04/23/19 1446 04/23/19 1504 04/23/19 1524   BP: (!) 151/82 (!) 153/93 (!) 157/84 (!) 134/97   Pulse: 91 91 87 88   Resp: 22 15 11 19   Temp:       TempSrc:       SpO2: 94% 93% 93% 94%   Weight:       Height:           MDM  Patient presents with chest pain. At presentation vital signs are stable. On examination no acute abnormal this, when I interview the patient is no longer in any pain and is not having shortness of breath. Initial labs are unremarkable with a negative troponin, no leukocytosis. No unilateral peripheral edema suggestive of DVT/PE. Due to recency of onset of pain we will perform a three-hour delta troponin. Additionally, the patient does have a history of a thoracic aneurysm. Although it is small, we will verify that it hasn't enlarged and no dissection with CTA. Signed out to oncoming provider Dr. Donell Andrew pending CTA and repeat troponin. REASSESSMENT          CRITICAL CARE TIME   Total Critical Care time was 0 minutes, excluding separately reportable procedures. There was a high probability of clinically significant/life threatening deteriorationin the patient's condition which required my urgent intervention. CONSULTS:  None     PROCEDURES:  Unless otherwise noted below, none     Procedures    FINAL IMPRESSION      1.  Chest pain, unspecified type          DISPOSITION/PLAN   DISPOSITION Decision To Discharge 04/23/2019 04:23:06 PM      PATIENT REFERRED TO:  Higinio Keyes [facility]  [address] [facility]  [phone]    Schedule an appointment as soon as possible for a visit         DISCHARGE MEDICATIONS:  New Prescriptions    No medications on file          (Please note that portions of this note were completed with a voicerecognition

## 2019-04-30 NOTE — PROGRESS NOTES
[facility] Office Note  5/1/2019     Subjective:  Mr. Rina Stein is  being seen today for  follow up of  aflutter, afib, cmp, htn, systolic chf, copd; today reports SOB and fatigue   He has not followed up for about 2 yrs with me. [place]:   Today he reports SOB and fatigue with minimal exertion. He reports night sweats x 3-4 months. He was at [facility] for removal of mole and developed Chest pain. He went to the ED Cardiac work up was negative he was discharged home. He denies chest pain since. Denies,, edema, dizziness, palpitations and syncope. He quit smoking 19 months ago       PMH:   aflutter, afib, cmp, htn, systolic chf, copd    Review of Systems:  12 point ROS negative in all areas as listed below except as in [place]  Constitutional, EENT, Cardiovascular, pulmonary, GI, , Musculoskeletal, skin, neurological, hematological, endocrine, Psychiatric        Reviewed past medical history, social, and family history. Smoked 55 yrs until 19 months ago when quit. Smoked 1-3 packs per day.   Past Medical History:   Diagnosis Date    A-fib [facility])     2/14/12    Atrial fibrillation with RVR ([facility]     2/14/12     Avulsion fracture of ankle 9/21/2015    Benign localized hyperplasia of prostate with urinary obstruction and other lower urinary tract symptoms (LUTS)(600.21) 8/17/2016    Chronic systolic CHF (congestive heart failure) ([facility]) 8/28/2017    Contusion of leg, right 9/21/2015    COPD (chronic obstructive pulmonary disease) ([facility])     Fractures     GERD (gastroesophageal reflux disease) 6/15/2015    Hypertension     Hypertrophy of prostate without urinary obstruction and other lower urinary tract symptoms (LUTS) 6/15/2015    No history of procedure     no previos colonoscopy    PAD (peripheral artery disease) ([facility] 10/9/2017    Pneumonia     Thoracic aortic aneurysm [facility])      Past Surgical History:   Procedure Laterality Date    CATARACT REMOVAL WITH IMPLANT Right 09/06/2018     PHACO (COREG) 6.25 MG tablet TAKE ONE (1) TABLET BY MOUTH TWICE DAILY WITH MEALS (Patient taking differently: Take by mouth daily TAKE ONE (1) TABLET BY MOUTH TWICE DAILY WITH MEALS) 60 tablet 2    losartan (COZAAR) 25 MG tablet Take 1 tablet by mouth daily 30 tablet 2    omeprazole (PRILOSEC) 40 MG delayed release capsule TAKE 1 CAPSULE BY MOUTH DAILY 30 capsule 2    rivaroxaban (XARELTO) 20 MG TABS tablet Take 1 tablet by mouth daily (with breakfast) 30 tablet 2    amiodarone (CORDARONE) 200 MG tablet TAKE ONE (1) TABLET BY MOUTH DAILY 15 tablet 0    furosemide (LASIX) 40 MG tablet TAKE ONE (1) TABLET BY MOUTH DAILY 15 tablet 0    ipratropium-albuterol (DUONEB) 0.5-2.5 (3) MG/3ML SOLN nebulizer solution Inhale 3 mLs into the lungs every 4 hours as needed for Shortness of Breath 360 mL 2    albuterol (PROVENTIL) (2.5 MG/3ML) 0.083% nebulizer solution Take 3 mLs by nebulization every 4 hours as needed for Wheezing 50 each 2    albuterol sulfate HFA (PROVENTIL HFA) 108 (90 Base) MCG/ACT inhaler Inhale 2 puffs into the lungs every 6 hours as needed for Wheezing (with spacer) 1 Inhaler 11     No facility-administered encounter medications on file as of 5/1/2019. Lab Data:  CBC: No results for input(s): WBC, HGB, HCT, MCV, PLT in the last 72 hours. BMP: No results for input(s): NA, K, CL, CO2, PHOS, BUN, CREATININE in the last 72 hours. Invalid input(s): CA  LIVER PROFILE: No results for input(s): AST, ALT, LIPASE, BILIDIR, BILITOT, ALKPHOS in the last 72 hours. Invalid input(s):   AMYLASE,  ALB  LIPID:   Lab Results   Component Value Date    CHOL 202 (H) 11/26/2018    CHOL 136 08/15/2017    CHOL 173 08/17/2016     Lab Results   Component Value Date    TRIG 106 11/26/2018    TRIG 77 08/15/2017    TRIG 96 08/17/2016     Lab Results   Component Value Date    HDL 68 (H) 11/26/2018    HDL 49 08/15/2017    HDL 49 08/17/2016     Lab Results   Component Value Date    LDLCALC 113 (H) 11/26/2018    LDLCALC 72 08/15/2017    LDLCALC 105 (H) 08/17/2016     Lab Results   Component Value Date    LABVLDL 21 11/26/2018    LABVLDL 15 08/15/2017    LABVLDL 19 08/17/2016     Lab Results   Component Value Date    CHOLHDLRATIO 4.1 02/17/2016     PT/INR: No results for input(s): PROTIME, INR in the last 72 hours. A1C: No results found for: LABA1C  BNP:  No results for input(s): BNP in the last 72 hours. IMAGING:     Chest Xray 4/23/19    Bibasilar heterogeneous opacities without focal consolidation may relate to   subsegmental atelectasis/scarring.       Hyperexpanded lung volume and diffusely prominent interstitium can be seen in   the setting of COPD. CT chest 4/23/19  No thoracic aortic aneurysm or dissection. Stable small ductus diverticulum. No acute findings in the chest.     ECHO 1/24/18  Summary  There is normal isotope uptake at stress and rest. There is no evidence of  myocardial ischemia or scar. Normal LV function with ejection fraction of  62%. There are no regional wall motion abnormalities. Low risk study. EKG 4/23/19  Normal sinus rhythmNonspecific ST abnormalityWhen compared with ECG of 23-APR-2019 08:10, (unconfirmed)No significant change was foundConfirmed by ANIKA Celis MD ([id number]) on 4/23/2019 4:58:35 PM    Stress Test 1/24/18   Summary  There is normal isotope uptake at stress and rest. There is no evidence of  myocardial ischemia or scar. Normal LV function with ejection fraction of  62%. There are no regional wall motion abnormalities. Low risk study. NANCY 8/17/17  Summary   There is no evidence of mass or thrombus in the left atrium or appendage.   Mild mitral regurgitation.   Moderate tricuspid regurgitation.   A small mobile filament is attached to the aortic valve which is felt to be   Lambl's excrescence in the absence of the aortic regurgitation.   Moderate amount of layered and protruding plaque in the aorta.   EKG 8.17.17  Atrial flutter RVR  Echo doppler of heart anticoagulation:  Yes     This note was scribed in the presence of Iman Tobar MD by Ramya Santos, RN  I, Dr. Natacha Noble, personally performed the services described in this documentation, as scribed by the above signed scribe in my presence. It is both accurate and complete to my knowledge. I agree with the details independently gathered by the clinical support staff, while the remaining scribed note accurately describes my personal service to the patient.       [address] [place] [address] MD Luther 5/1/2019 3:47 PM

## 2019-05-01 ENCOUNTER — HOSPITAL ENCOUNTER (OUTPATIENT)
Age: 74
Discharge: HOME | End: 2019-05-01
Payer: MEDICARE

## 2019-05-01 ENCOUNTER — OFFICE VISIT (OUTPATIENT)
Dept: CARDIOLOGY CLINIC | Age: 74
End: 2019-05-01
Payer: MEDICARE

## 2019-05-01 VITALS
OXYGEN SATURATION: 95 % | DIASTOLIC BLOOD PRESSURE: 84 MMHG | HEART RATE: 95 BPM | WEIGHT: 213 LBS | SYSTOLIC BLOOD PRESSURE: 144 MMHG | HEIGHT: 71 IN | BODY MASS INDEX: 29.82 KG/M2

## 2019-05-01 DIAGNOSIS — I42.0 DILATED CARDIOMYOPATHY (HCC): ICD-10-CM

## 2019-05-01 DIAGNOSIS — R06.02 SOB (SHORTNESS OF BREATH): ICD-10-CM

## 2019-05-01 DIAGNOSIS — I10 ESSENTIAL HYPERTENSION: ICD-10-CM

## 2019-05-01 DIAGNOSIS — J44.9 CHRONIC OBSTRUCTIVE PULMONARY DISEASE, UNSPECIFIED COPD TYPE (HCC): ICD-10-CM

## 2019-05-01 DIAGNOSIS — R06.02 SOB (SHORTNESS OF BREATH): Primary | ICD-10-CM

## 2019-05-01 DIAGNOSIS — I48.3 TYPICAL ATRIAL FLUTTER (HCC): ICD-10-CM

## 2019-05-01 LAB
T4 FREE: >7.8 NG/DL (ref 0.9–1.8)
TSH REFLEX FT4: <0.01 UIU/ML (ref 0.27–4.2)

## 2019-05-01 PROCEDURE — 84443 ASSAY THYROID STIM HORMONE: CPT

## 2019-05-01 PROCEDURE — 99214 OFFICE O/P EST MOD 30 MIN: CPT | Performed by: INTERNAL MEDICINE

## 2019-05-01 PROCEDURE — 84439 ASSAY OF FREE THYROXINE: CPT

## 2019-05-01 PROCEDURE — 36415 COLL VENOUS BLD VENIPUNCTURE: CPT

## 2019-05-01 NOTE — LETTER
[address] Office Note  5/1/2019     Subjective:  Mr. Fany Garcia is  being seen today for  follow up of  aflutter, afib, cmp, htn, systolic chf, copd; today reports SOB and fatigue   He has not followed up for about 2 yrs with me. [place]:   Today he reports SOB and fatigue with minimal exertion. He reports night sweats x 3-4 months. He was at [facility] for removal of mole and developed Chest pain. He went to the ED Cardiac work up was negative he was discharged home. He denies chest pain since. Denies,, edema, dizziness, palpitations and syncope. He quit smoking 19 months ago       PMH:   aflutter, afib, cmp, htn, systolic chf, copd    Review of Systems:  12 point ROS negative in all areas as listed below except as in [place]  Constitutional, EENT, Cardiovascular, pulmonary, GI, , Musculoskeletal, skin, neurological, hematological, endocrine, Psychiatric        Reviewed past medical history, social, and family history. Smoked 55 yrs until 19 months ago when quit. Smoked 1-3 packs per day.   Past Medical History:   Diagnosis Date    A-fib [facility])     2/14/12    Atrial fibrillation with RVR ([facility]     2/14/12     Avulsion fracture of ankle 9/21/2015    Benign localized hyperplasia of prostate with urinary obstruction and other lower urinary tract symptoms (LUTS)(600.21) 8/17/2016    Chronic systolic CHF (congestive heart failure) ([facility]) 8/28/2017    Contusion of leg, right 9/21/2015    COPD (chronic obstructive pulmonary disease) ([facility])     Fractures     GERD (gastroesophageal reflux disease) 6/15/2015    Hypertension     Hypertrophy of prostate without urinary obstruction and other lower urinary tract symptoms (LUTS) 6/15/2015    No history of procedure     no previos colonoscopy    PAD (peripheral artery disease) ([facility] 10/9/2017    Pneumonia     Thoracic aortic aneurysm [facility])      Past Surgical History:   Procedure Laterality Date    CATARACT REMOVAL WITH IMPLANT Right 09/06/2018 PHACO EMULSIFICATION OF CATARACT WITH INTRAOCULAR LENS IMPLANT RIGHT EYE    COLONOSCOPY  2/24/2016    sigmoid polyps    HERNIA REPAIR      PA REMV CATARACT EXTRACAP,INSERT LENS Right 9/6/2018    PHACO EMULSIFICATION OF CATARACT WITH INTRAOCULAR LENS IMPLANT RIGHT EYE performed by Flora Ayala MD at [address] [place] [address] Left 10/18/2018    PHACO EMULSIFICATION OF CATARACT WITH  INTRAOCULAR LENS IMPLANT LEFT EYE performed by Flora Ayala MD at [facility] OR       Objective:   BP (!) 144/84   Pulse 95   Ht 5' 11\" (1.803 m)   Wt 213 lb (96.6 kg)   SpO2 95%   BMI 29.71 kg/m²      Wt Readings from Last 3 Encounters:   05/01/19 213 lb (96.6 kg)   04/23/19 215 lb (97.5 kg)   04/16/19 229 lb (103.9 kg)       Physical Exam:  General: No Respiratory distress, appears well developed and well nourished. Eyes:  Sclera nonicteric  Nose/Sinuses:  negative findings: nose shows no deformity, asymmetry, or inflammation, nasal mucosa normal, septum midline with no perforation or bleeding  Back:  no pain to palpation  Joint:  no active joint inflammation  Musculoskeletal:  negative  Skin:  Warm and dry  Neck:  Negative for JVD and Carotid Bruits. Chest:  Crackles bilateral bases , respiration easy  Cardiovascular:  RRR, 100 bpm S1S2 normal, no murmur, no rub or thrill.   Abdomen:  Soft normal liver and spleen  Extremities:   No edema, clubbing, cyanosis,  Absent pedal pulses 2+ left femoral absent right femoral  Pulses: Femoral and pedal pulses are normal.  Neuro: intact    Medications:   Outpatient Encounter Medications as of 5/1/2019   Medication Sig Dispense Refill    potassium chloride (KLOR-CON M) 20 MEQ extended release tablet TAKE ONE (1) TABLET BY MOUTH DAILY *NEEDS APPOINTMENT* 30 tablet 3    atorvastatin (LIPITOR) 20 MG tablet Take 1 tablet by mouth daily 30 tablet 2    budesonide-formoterol (SYMBICORT) 160-4.5 MCG/ACT AERO Inhale 2 puffs into the lungs 2 times daily 3 Inhaler 2  carvedilol (COREG) 6.25 MG tablet TAKE ONE (1) TABLET BY MOUTH TWICE DAILY WITH MEALS (Patient taking differently: Take by mouth daily TAKE ONE (1) TABLET BY MOUTH TWICE DAILY WITH MEALS) 60 tablet 2    losartan (COZAAR) 25 MG tablet Take 1 tablet by mouth daily 30 tablet 2    omeprazole (PRILOSEC) 40 MG delayed release capsule TAKE 1 CAPSULE BY MOUTH DAILY 30 capsule 2    rivaroxaban (XARELTO) 20 MG TABS tablet Take 1 tablet by mouth daily (with breakfast) 30 tablet 2    amiodarone (CORDARONE) 200 MG tablet TAKE ONE (1) TABLET BY MOUTH DAILY 15 tablet 0    furosemide (LASIX) 40 MG tablet TAKE ONE (1) TABLET BY MOUTH DAILY 15 tablet 0    ipratropium-albuterol (DUONEB) 0.5-2.5 (3) MG/3ML SOLN nebulizer solution Inhale 3 mLs into the lungs every 4 hours as needed for Shortness of Breath 360 mL 2    albuterol (PROVENTIL) (2.5 MG/3ML) 0.083% nebulizer solution Take 3 mLs by nebulization every 4 hours as needed for Wheezing 50 each 2    albuterol sulfate HFA (PROVENTIL HFA) 108 (90 Base) MCG/ACT inhaler Inhale 2 puffs into the lungs every 6 hours as needed for Wheezing (with spacer) 1 Inhaler 11     No facility-administered encounter medications on file as of 5/1/2019. Lab Data:  CBC: No results for input(s): WBC, HGB, HCT, MCV, PLT in the last 72 hours. BMP: No results for input(s): NA, K, CL, CO2, PHOS, BUN, CREATININE in the last 72 hours. Invalid input(s): CA  LIVER PROFILE: No results for input(s): AST, ALT, LIPASE, BILIDIR, BILITOT, ALKPHOS in the last 72 hours. Invalid input(s):   AMYLASE,  ALB  LIPID:   Lab Results   Component Value Date    CHOL 202 (H) 11/26/2018    CHOL 136 08/15/2017    CHOL 173 08/17/2016     Lab Results   Component Value Date    TRIG 106 11/26/2018    TRIG 77 08/15/2017    TRIG 96 08/17/2016     Lab Results   Component Value Date    HDL 68 (H) 11/26/2018    HDL 49 08/15/2017    HDL 49 08/17/2016     Lab Results   Component Value Date    LDLCALC 113 (H) 11/26/2018 Echo doppler of heart 8.16.17  Summary   The ejection fraction measured by Miller's method is 33 %.   The left ventricular systolic function is moderately reduced with an   ejection fraction of 30-35 %.   Moderate global hypokinesis is present.   Normal left ventricular diastolic filling pressure.   The left atrium is moderately dilated.   The right atrium is moderately dilated.   Mild mitral annular calcification.   Mild mitral regurgitation.   Moderate tricuspid regurgitation.   Systolic pulmonary artery pressure (SPAP) is estimated at 41 mmHg consistent   with mild pulmonary hypertension (RA pressure 15 mmHg). Assessment:    Encounter Diagnoses   Name Primary?  Typical atrial flutter (HCC)     Essential hypertension     Dilated cardiomyopathy (HCC)     SOB (shortness of breath) Yes    Chronic obstructive pulmonary disease, unspecified COPD type (HCC)      Dyspnea likely due to COPD but ILD in differential    Typical atrial flutter (HCC) resolved clinically    Essential hypertension    Dilated cardiomyopathy ([address] most recent echo 2018 normal EF    Chronic systolic CHF (congestive heart failure) (HCC) does not appear fluid overloaded    Chronic obstructive pulmonary disease, unspecified COPD type (HCC)    Claudication of right lower extremity (HCC)  -     Ultrasound doppler arterial legs bilateral; Future        Plan: 1. Please call office with current list of medications   2 Will check pulmonary function test   3. Will check echo for heart function and structure   4.will Check lexiscan stress test   5. Will check TSH   6. Follow up in 2 months   I walked him 2 min in office he was very SOB O2 sat 97% but /min      QUALITY MEASURES  1. Tobacco Cessation Counseling: Yes  2. Retake of BP if >140/90:   Yes  3. Documentation to PCP/referring for new patient:  Sent to PCP at close of office visit  4. CAD patient on anti-platelet: anticoag.   5. CAD patient on STATIN therapy:  Yes 6. Patient with CHF and aFib on anticoagulation:  Yes     This note was scribed in the presence of Gama Redd MD by Damion Killian RN  I, Dr. Tee Ziegler, personally performed the services described in this documentation, as scribed by the above signed scribe in my presence. It is both accurate and complete to my knowledge. I agree with the details independently gathered by the clinical support staff, while the remaining scribed note accurately describes my personal service to the patient.       [address] [place] [address] MD [place] 5/1/2019 3:47 PM

## 2019-05-01 NOTE — PATIENT INSTRUCTIONS
Plan: 1. Please call office with current list of medications   2 Will check pulmonary function test   3. Will check echo for heart function and structure   4.will Check lexiscan stress test   5. Will check TSH   6.  Follow up in 2 months

## 2019-05-02 DIAGNOSIS — E05.90 HYPERTHYROIDISM: Primary | ICD-10-CM

## 2019-05-15 ENCOUNTER — HOSPITAL ENCOUNTER (OUTPATIENT)
Dept: NUCLEAR MEDICINE | Age: 74
Discharge: HOME | End: 2019-05-15
Payer: MEDICARE

## 2019-05-15 ENCOUNTER — HOSPITAL ENCOUNTER (OUTPATIENT)
Dept: PULMONOLOGY | Age: 74
Discharge: HOME | End: 2019-05-15
Payer: MEDICARE

## 2019-05-15 ENCOUNTER — HOSPITAL ENCOUNTER (OUTPATIENT)
Dept: NON INVASIVE DIAGNOSTICS | Age: 74
Discharge: HOME | End: 2019-05-15
Payer: MEDICARE

## 2019-05-15 DIAGNOSIS — I42.0 DILATED CARDIOMYOPATHY (HCC): ICD-10-CM

## 2019-05-15 DIAGNOSIS — R06.02 SOB (SHORTNESS OF BREATH): ICD-10-CM

## 2019-05-15 LAB
LV EF: 53 %
LVEF MODALITY: NORMAL

## 2019-05-15 PROCEDURE — 93017 CV STRESS TEST TRACING ONLY: CPT

## 2019-05-15 PROCEDURE — 93306 TTE W/DOPPLER COMPLETE: CPT

## 2019-05-15 PROCEDURE — 94618 PULMONARY STRESS TESTING: CPT

## 2019-05-15 PROCEDURE — A9502 TC99M TETROFOSMIN: HCPCS | Performed by: INTERNAL MEDICINE

## 2019-05-15 PROCEDURE — 6360000002 HC RX W HCPCS: Performed by: INTERNAL MEDICINE

## 2019-05-15 PROCEDURE — 78452 HT MUSCLE IMAGE SPECT MULT: CPT

## 2019-05-15 PROCEDURE — 3430000000 HC RX DIAGNOSTIC RADIOPHARMACEUTICAL: Performed by: INTERNAL MEDICINE

## 2019-05-15 RX ADMIN — TETROFOSMIN 30.3 MILLICURIE: 1.38 INJECTION, POWDER, LYOPHILIZED, FOR SOLUTION INTRAVENOUS at 08:59

## 2019-05-16 LAB
LV EF: 45 %
LVEF MODALITY: NORMAL

## 2019-05-16 RX ADMIN — TETROFOSMIN 33.7 MILLICURIE: 1.38 INJECTION, POWDER, LYOPHILIZED, FOR SOLUTION INTRAVENOUS at 07:38

## 2019-05-16 RX ADMIN — REGADENOSON 0.4 MG: 0.08 INJECTION, SOLUTION INTRAVENOUS at 07:37

## 2019-05-16 NOTE — PROGRESS NOTES
Dr Swathi Meeks update re: pt's atrial fib with intermitt rapid ventricular response. Lexiscan completed Pt tolerated fair . heart rate decreased to 100- 120 .  Pt to take morning medication no acute distress

## 2019-05-16 NOTE — PROGRESS NOTES
Pt educated on cardiac stress testing. Heart sounds irregular , lungs few fine rale left base Pt verbalizes understanding to cardiac stress testing. Questions and concerns addressed. Pt is agreeable to proceed with stress testing.

## 2019-05-17 ENCOUNTER — TELEPHONE (OUTPATIENT)
Dept: CARDIOLOGY CLINIC | Age: 74
End: 2019-05-17

## 2019-05-17 RX ORDER — CARVEDILOL 6.25 MG/1
TABLET ORAL
Qty: 180 TABLET | Refills: 3 | Status: SHIPPED | OUTPATIENT
Start: 2019-05-17 | End: 2019-05-28 | Stop reason: SDUPTHER

## 2019-05-17 NOTE — TELEPHONE ENCOUNTER
----- Message from Mikaela Doty MD sent at 5/17/2019 12:27 PM EDT -----  I tried to call patient unable leave message on his mobile  Voice mail box not set up  His heart rate was fast during his visits for echo and stress  Test  I suggest he should take his coreg ( carvedolol ) twice a day. I have noted in my last office note that he has been taking it only once a day.

## 2019-05-20 ENCOUNTER — TELEPHONE (OUTPATIENT)
Dept: CARDIOLOGY CLINIC | Age: 74
End: 2019-05-20

## 2019-05-20 NOTE — TELEPHONE ENCOUNTER
Notes recorded by Mau Dugan MD on 5/17/2019 at 12:27 PM EDT  I tried to call patient unable leave message on his mobile  Voice mail box not set up  His heart rate was fast during his visits for echo and stress  Test  I suggest he should take his coreg ( carvedolol ) twice a day. I have noted in my last office note that he has been taking it only once a day.            Pt informed of test results per Dr. Devin Delgado. Pt is now taking Coreg BID.

## 2019-05-21 RX ORDER — RIVAROXABAN 20 MG/1
TABLET, FILM COATED ORAL
Qty: 30 TABLET | Refills: 0 | Status: SHIPPED | OUTPATIENT
Start: 2019-05-21 | End: 2019-05-28 | Stop reason: SDUPTHER

## 2019-05-28 ENCOUNTER — HOSPITAL ENCOUNTER (OUTPATIENT)
Dept: GENERAL RADIOLOGY | Age: 74
Discharge: HOME | End: 2019-05-28
Payer: MEDICARE

## 2019-05-28 ENCOUNTER — OFFICE VISIT (OUTPATIENT)
Dept: INTERNAL MEDICINE CLINIC | Age: 74
End: 2019-05-28

## 2019-05-28 ENCOUNTER — HOSPITAL ENCOUNTER (OUTPATIENT)
Age: 74
Discharge: HOME | End: 2019-05-28
Payer: MEDICARE

## 2019-05-28 VITALS
HEART RATE: 70 BPM | HEIGHT: 70 IN | RESPIRATION RATE: 14 BRPM | DIASTOLIC BLOOD PRESSURE: 80 MMHG | WEIGHT: 210 LBS | BODY MASS INDEX: 30.06 KG/M2 | SYSTOLIC BLOOD PRESSURE: 130 MMHG

## 2019-05-28 DIAGNOSIS — M75.101 TEAR OF RIGHT ROTATOR CUFF, UNSPECIFIED TEAR EXTENT: ICD-10-CM

## 2019-05-28 DIAGNOSIS — Z72.89 OTHER PROBLEMS RELATED TO LIFESTYLE: ICD-10-CM

## 2019-05-28 DIAGNOSIS — Z71.89 ACP (ADVANCE CARE PLANNING): ICD-10-CM

## 2019-05-28 DIAGNOSIS — Z00.00 ROUTINE GENERAL MEDICAL EXAMINATION AT A HEALTH CARE FACILITY: ICD-10-CM

## 2019-05-28 DIAGNOSIS — M75.101 TEAR OF RIGHT ROTATOR CUFF, UNSPECIFIED TEAR EXTENT: Primary | ICD-10-CM

## 2019-05-28 LAB — HEPATITIS C ANTIBODY INTERPRETATION: NORMAL

## 2019-05-28 PROCEDURE — 99497 ADVNCD CARE PLAN 30 MIN: CPT | Performed by: INTERNAL MEDICINE

## 2019-05-28 PROCEDURE — 73030 X-RAY EXAM OF SHOULDER: CPT

## 2019-05-28 PROCEDURE — G0439 PPPS, SUBSEQ VISIT: HCPCS | Performed by: INTERNAL MEDICINE

## 2019-05-28 RX ORDER — ATORVASTATIN CALCIUM 20 MG/1
20 TABLET, FILM COATED ORAL DAILY
Qty: 30 TABLET | Refills: 2 | Status: SHIPPED | OUTPATIENT
Start: 2019-05-28 | End: 2019-07-10 | Stop reason: SDUPTHER

## 2019-05-28 RX ORDER — BUDESONIDE AND FORMOTEROL FUMARATE DIHYDRATE 160; 4.5 UG/1; UG/1
2 AEROSOL RESPIRATORY (INHALATION) 2 TIMES DAILY
Qty: 3 INHALER | Refills: 2 | Status: SHIPPED | OUTPATIENT
Start: 2019-05-28 | End: 2019-11-26

## 2019-05-28 RX ORDER — CARVEDILOL 6.25 MG/1
TABLET ORAL
Qty: 180 TABLET | Refills: 2 | Status: SHIPPED | OUTPATIENT
Start: 2019-05-28 | End: 2019-07-10 | Stop reason: SDUPTHER

## 2019-05-28 RX ORDER — LOSARTAN POTASSIUM 25 MG/1
25 TABLET ORAL DAILY
Qty: 30 TABLET | Refills: 2 | Status: SHIPPED | OUTPATIENT
Start: 2019-05-28 | End: 2019-06-18 | Stop reason: SDUPTHER

## 2019-05-28 RX ORDER — FUROSEMIDE 40 MG/1
TABLET ORAL
Qty: 30 TABLET | Refills: 2 | Status: SHIPPED | OUTPATIENT
Start: 2019-05-28 | End: 2019-07-10 | Stop reason: SDUPTHER

## 2019-05-28 RX ORDER — OMEPRAZOLE 40 MG/1
CAPSULE, DELAYED RELEASE ORAL
Qty: 30 CAPSULE | Refills: 2 | Status: SHIPPED | OUTPATIENT
Start: 2019-05-28 | End: 2019-09-03 | Stop reason: SDUPTHER

## 2019-05-28 RX ORDER — POTASSIUM CHLORIDE 20 MEQ/1
TABLET, EXTENDED RELEASE ORAL
Qty: 30 TABLET | Refills: 2 | Status: SHIPPED | OUTPATIENT
Start: 2019-05-28 | End: 2019-09-03 | Stop reason: SDUPTHER

## 2019-05-28 ASSESSMENT — PATIENT HEALTH QUESTIONNAIRE - PHQ9
SUM OF ALL RESPONSES TO PHQ QUESTIONS 1-9: 0
SUM OF ALL RESPONSES TO PHQ QUESTIONS 1-9: 0

## 2019-05-28 ASSESSMENT — LIFESTYLE VARIABLES
HOW OFTEN DO YOU HAVE A DRINK CONTAINING ALCOHOL: 1
HOW MANY STANDARD DRINKS CONTAINING ALCOHOL DO YOU HAVE ON A TYPICAL DAY: 0
HOW OFTEN DO YOU HAVE SIX OR MORE DRINKS ON ONE OCCASION: 0
AUDIT-C TOTAL SCORE: 1

## 2019-05-28 ASSESSMENT — ANXIETY QUESTIONNAIRES: GAD7 TOTAL SCORE: 0

## 2019-05-28 NOTE — PATIENT INSTRUCTIONS
require you to get the form notarized. This means that a person called a  watches you sign the form and then he or she signs the form. Some states also require that two or more witnesses sign the form. Be sure to tell your family members and doctors who your health care agent is. Keep your forms in a safe place. But make sure that your loved ones know where the forms are. This could be in your desk where you keep other important papers. Make sure your doctor has a copy of your forms. Where can you learn more? Go to https://chpepiceweb.[employer]. org and sign in to your [employer] account. Enter [id number] in the Run My Errands box to learn more about \"Learning About Durable Power of  for Health Care. \"     If you do not have an account, please click on the \"Sign Up Now\" link. Current as of: April 18, 2018  Content Version: 12.0  © 0639-4121 [employer]. Care instructions adapted under license by [facility] ([facility]). If you have questions about a medical condition or this instruction, always ask your healthcare professional. [address] any warranty or liability for your use of this information. Learning About Living [place]roy  What is a living will? A living will is a legal form you use to write down the kind of care you want at the end of your life. It is used by the health professionals who will treat you if you aren't able to decide for yourself. If you put your wishes in writing, your loved ones and others will know what kind of care you want. They won't need to guess. This can ease your mind and be helpful to others. A living will is not the same as an estate or property will. An estate will explains what you want to happen with your money and property after you die. Is a living will a legal document? A living will is a legal document. Each state has its own laws about living angela.  If you move to another state, make sure that your living will is legal in the state where you now live. Or you might use a universal form that has been approved by many states. This kind of form can sometimes be completed and stored online. Your electronic copy will then be available wherever you have a connection to the Internet. In most cases, doctors will respect your wishes even if you have a form from a different state. · You don't need an  to complete a living will. But legal advice can be helpful if your state's laws are unclear, your health history is complicated, or your family can't agree on what should be in your living will. · You can change your living will at any time. Some people find that their wishes about end-of-life care change as their health changes. · In addition to making a living will, think about completing a medical power of  form. This form lets you name the person you want to make end-of-life treatment decisions for you (your \"health care agent\") if you're not able to. Many hospitals and nursing homes will give you the forms you need to complete a living will and a medical power of . · Your living will is used only if you can't make or communicate decisions for yourself anymore. If you become able to make decisions again, you can accept or refuse any treatment, no matter what you wrote in your living will. · Your state may offer an online registry. This is a place where you can store your living will online so the doctors and nurses who need to treat you can find it right away. What should you think about when creating a living will? Talk about your end-of-life wishes with your family members and your doctor. Let them know what you want. That way the people making decisions for you won't be surprised by your choices. Think about these questions as you make your living will:  · Do you know enough about life support methods that might be used?  If not, talk to your doctor so you know what might be done if you can't breathe on your own, your heart stops, or you're unable to swallow. · What things would you still want to be able to do after you receive life-support methods? Would you want to be able to walk? To speak? To eat on your own? To live without the help of machines? · If you have a choice, where do you want to be cared for? In your home? At a hospital or nursing home? · Do you want certain Episcopal practices performed if you become very ill? · If you have a choice at the end of your life, where would you prefer to die? At home? In a hospital or nursing home? Somewhere else? · Would you prefer to be buried or cremated? · Do you want your organs to be donated after you die? What should you do with your living will? · Make sure that your family members and your health care agent have copies of your living will. · Give your doctor a copy of your living will to keep in your medical record. If you have more than one doctor, make sure that each one has a copy. · You may want to put a copy of your living will where it can be easily found. Where can you learn more? Go to https://ApakaupeLingdong.[email or web address].[email or web address] org and sign in to your [employer] account. Enter E045 in the [employer] box to learn more about \"Learning About Living True . \"     If you do not have an account, please click on the \"Sign Up Now\" link. Current as of: April 18, 2018  Content Version: 12.0  © 8442-3873 [employer]. Care instructions adapted under license by [facility] ([facility]). If you have questions about a medical condition or this instruction, always ask your healthcare professional. [place] [address] any warranty or liability for your use of this information. Personalized Preventive Plan for Luiz Miller - 5/28/2019  Medicare offers a range of preventive health benefits. Some of the tests and screenings are paid in full while other may be subject to a deductible, co-insurance, and/or copay.     Some of these benefits include a comprehensive review of your medical history including lifestyle, illnesses that may run in your family, and various assessments and screenings as appropriate. After reviewing your medical record and screening and assessments performed today your provider may have ordered immunizations, labs, imaging, and/or referrals for you. A list of these orders (if applicable) as well as your Preventive Care list are included within your After Visit Summary for your review. Other Preventive Recommendations:    · A preventive eye exam performed by an eye specialist is recommended every 1-2 years to screen for glaucoma; cataracts, macular degeneration, and other eye disorders. · A preventive dental visit is recommended every 6 months. · Try to get at least 150 minutes of exercise per week or 10,000 steps per day on a pedometer . · Order or download the FREE \"Exercise & Physical Activity: Your Everyday Guide\" from [employer]. Call 9-889.605.8637 or search [employer] online. · You need 1937-5315 mg of calcium and 8903-0638 IU of vitamin D per day. It is possible to meet your calcium requirement with diet alone, but a vitamin D supplement is usually necessary to meet this goal.  · When exposed to the sun, use a sunscreen that protects against both UVA and UVB radiation with an SPF of 30 or greater. Reapply every 2 to 3 hours or after sweating, drying off with a towel, or swimming. · Always wear a seat belt when traveling in a car. Always wear a helmet when riding a bicycle or motorcycle.

## 2019-05-28 NOTE — PROGRESS NOTES
needed for Wheezing (with spacer)  Hollie Paniagua MD     Past Medical History:   Diagnosis Date    A-fib [facility])     2/14/12    Atrial fibrillation with RVR ([facility]     2/14/12     Avulsion fracture of ankle 9/21/2015    Benign localized hyperplasia of prostate with urinary obstruction and other lower urinary tract symptoms (LUTS)(600.21) 8/17/2016    Chronic systolic CHF (congestive heart failure) ([facility]) 8/28/2017    Contusion of leg, right 9/21/2015    COPD (chronic obstructive pulmonary disease) ([facility])     Fractures     GERD (gastroesophageal reflux disease) 6/15/2015    Hypertension     Hypertrophy of prostate without urinary obstruction and other lower urinary tract symptoms (LUTS) 6/15/2015    No history of procedure     no previos colonoscopy    PAD (peripheral artery disease) ([facility] 10/9/2017    Pneumonia     Thoracic aortic aneurysm [facility])      Past Surgical History:   Procedure Laterality Date    CATARACT REMOVAL WITH IMPLANT Right 09/06/2018     PHACO EMULSIFICATION OF CATARACT WITH INTRAOCULAR LENS IMPLANT RIGHT EYE    COLONOSCOPY  2/24/2016    sigmoid polyps    HERNIA REPAIR      [place] REMV CATARACT EXTRACAP,INSERT LENS Right 9/6/2018    PHACO EMULSIFICATION OF CATARACT WITH INTRAOCULAR LENS IMPLANT RIGHT EYE performed by Shaun Peralta MD at [address] [place] [address] Left 10/18/2018    PHACO EMULSIFICATION OF CATARACT WITH  INTRAOCULAR LENS IMPLANT LEFT EYE performed by Shaun Peralta MD at [facility] OR     No family history on file.     CareTeam (Including outside providers/suppliers regularly involved in providing care):   Patient Care Team:  Hollie Paniagua MD as PCP - Eligio Tom MD as PCP - S Attributed Provider  Remington Mercedes RN as Nurse Navigator    Wt Readings from Last 3 Encounters:   05/28/19 210 lb (95.3 kg)   05/01/19 213 lb (96.6 kg)   04/23/19 215 lb (97.5 kg)     Vitals:    05/28/19 1443   BP: 130/80   Site: Left Upper Arm Position: Sitting   Cuff Size: Medium Adult   Pulse: 70   Resp: 14   Weight: 210 lb (95.3 kg)   Height: 5' 10\" (1.778 m)     Body mass index is 30.13 kg/m². Based upon direct observation of the patient, evaluation of cognition reveals recent and remote memory intact. General Appearance: alert and oriented to person, place and time, well developed and well- nourished, in no acute distress  Skin: warm and dry, no rash or erythema  Head: normocephalic and atraumatic  Eyes: pupils equal, round, and reactive to light, extraocular eye movements intact, conjunctivae normal  ENT: tympanic membrane, external ear and ear canal normal on the right and cerumen left ear canal, nose without deformity, nasal mucosa and turbinates normal without polyps  Neck: supple and non-tender without mass, no thyromegaly or thyroid nodules, no cervical lymphadenopathy  Pulmonary/Chest: clear to auscultation bilaterally- no wheezes, rales or rhonchi, normal air movement, no respiratory distress  Cardiovascular: normal rate, regular rhythm, normal S1 and S2, no murmurs, rubs, clicks, or gallops, distal pulses intact, no carotid bruits  Abdomen: soft, non-tender, non-distended, normal bowel sounds, no masses or organomegaly  Extremities: no cyanosis, clubbing or edema  Musculoskeletal:  Right shoulder shows decreased ROM/abduction limited to 40 degree, weakness present. Internal and external ROM decreased. Neurologic: reflexes normal and symmetric, no cranial nerve deficit, gait, coordination and speech normal    Patient's complete Health Risk Assessment and screening values have been reviewed and are found in Flowsheets. The following problems were reviewed today and where indicated follow up appointments were made and/or referrals ordered.     Positive Risk Factor Screenings with Interventions:     General Health:  General  In general, how would you say your health is?: Good  In the past 7 days, have you experienced any of the following? New or Increased Pain, New or Increased Fatigue, Loneliness, Social Isolation, Stress or Anger?: (!) New or Increased Pain  Do you get the social and emotional support that you need?: Yes  Do you have a Living Will?: (!) No  General Health Risk Interventions:  · Pain issues: right shoulder pain. Check xray. Will need MRI. · No Living Will: referred to spiritual care    Health Habits/Nutrition:  Health Habits/Nutrition  Do you exercise for at least 20 minutes 2-3 times per week?: (!) No  Have you lost any weight without trying in the past 3 months?: (!) Yes  Do you eat fewer than 2 meals per day?: No  Have you seen a dentist within the past year?: (dentures)  Body mass index is 30.13 kg/m².   Health Habits/Nutrition Interventions:  · Inadequate physical activity:  patient agrees to increase physical activity as follows: walk more  · Dental exam overdue:  has dentures    Safety:  Safety  Do you have working smoke detectors?: (!) No  Have all throw rugs been removed or fastened?: Yes  Do you have non-slip mats in all bathtubs?: Yes  Do all of your stairways have a railing or banister?: Yes  Are your doorways, halls and stairs free of clutter?: Yes  Do you always fasten your seatbelt when you are in a car?: Yes  Safety Interventions:  · Home safety tips provided    Personalized Preventive Plan   Current Health Maintenance Status  Immunization History   Administered Date(s) Administered    Influenza Whole 09/15/2011    Influenza, High Dose (Fluzone 65 yrs and older) 10/09/2017, 11/26/2018    Pneumococcal 13-valent Conjugate (Magynye25) 08/17/2016    Pneumococcal Polysaccharide (Wtyiaaxau65) 09/13/2007, 02/12/2012, 08/28/2017    Td, unspecified formulation 04/10/2012        Health Maintenance   Topic Date Due    Hepatitis C screen  1945    Shingles Vaccine (1 of 2) 06/02/1997    DTaP/Tdap/Td vaccine (1 - Tdap) 04/11/2012    Potassium monitoring  04/23/2020    Creatinine monitoring  04/23/2020  Low dose CT lung screening  04/23/2020    Lipid screen  11/26/2023    Colon cancer screen colonoscopy  02/17/2026    Flu vaccine  Completed    Pneumococcal 65+ years Vaccine  Completed    AAA screen  Completed     Recommendations for Preventive Services Due: see orders and patient instructions/AVS.  . Recommended screening schedule for the next 5-10 years is provided to the patient in written form: see Patient Instructions/AVS.      Advance Care Planning   Advanced Care Planning: Discussed the patients choices for care and treatment in case of a health event that adversely affects decision-making abilities. Also discussed the patients long-term treatment options. Reviewed with the patient the [address] [place] [address] Declaration forms  Reviewed the process of designating a competent adult as an Agent (or -in-fact) that could take make health care decisions for the patient if incompetent. Patient was asked to complete the declaration forms, either acknowledge the forms by a public notary or an eligible witness and provide a signed copy to the practice office.   Time spent (minutes): 3    Hepatitis C Screening: Patient's exposure considered high risk due to baby boomer

## 2019-05-30 ENCOUNTER — CLINICAL DOCUMENTATION (OUTPATIENT)
Dept: SPIRITUAL SERVICES | Age: 74
End: 2019-05-30

## 2019-05-30 NOTE — PROGRESS NOTES
5/30/2019 13:29    Outpatient Spiritual Care team member attempted telephone call to patient. There was no answer and a voice message was left encouraging patient to contact [facility]. Contact information for [facility] provided in message.

## 2019-05-31 ENCOUNTER — TELEPHONE (OUTPATIENT)
Dept: CASE MANAGEMENT | Age: 74
End: 2019-05-31

## 2019-06-18 RX ORDER — LOSARTAN POTASSIUM 25 MG/1
TABLET ORAL
Qty: 30 TABLET | Refills: 2 | Status: SHIPPED | OUTPATIENT
Start: 2019-06-18 | End: 2019-07-10 | Stop reason: SDUPTHER

## 2019-06-25 ENCOUNTER — CLINICAL DOCUMENTATION (OUTPATIENT)
Dept: SPIRITUAL SERVICES | Age: 74
End: 2019-06-25

## 2019-06-25 NOTE — PROGRESS NOTES
6/25/2019 14:21    Outpatient Spiritual Care team member telephoned patient this afternoon to discuss Advance Directives. Patient requested documents to be mailed to him. Outpatient Spiritual Care team member mailed packet of Advance Directives documentation to patient which included information regarding Power of , Living Will, and Organ Registry.

## 2019-07-08 RX ORDER — IPRATROPIUM BROMIDE AND ALBUTEROL SULFATE 2.5; .5 MG/3ML; MG/3ML
1 SOLUTION RESPIRATORY (INHALATION) EVERY 4 HOURS PRN
Qty: 360 ML | Refills: 0 | Status: SHIPPED | OUTPATIENT
Start: 2019-07-08 | End: 2019-07-10 | Stop reason: SDUPTHER

## 2019-07-08 NOTE — PROGRESS NOTES
[facility] Office Note  7/10/2019     Subjective:  Mr. Richmond Thomas is  being seen today for  follow up of  Paroxysmal aflutter, Afib, CMP, HTN, systolic CHF, COPD;        [place]:   Today he reports SOB at rest and and with exertion. He reports a frequent cough at night-or day when he lays down, non productive x 2 months. He has followed up with Dr. Pernell Torres who prescribed nebulizer treatment. He reports nebulizer has helped with cough and SOB. Cough is still severe and keeps him awake at night. He is short of breath with cough. Denies chest pain, edema, dizziness, palpitations and syncope. He has COPD      Today he reports SOB and fatigue with minimal exertion. He reports night sweats x 3-4 months. PMH:   aflutter, afib, cmp, htn, systolic chf, copd    Review of Systems:  12 point ROS negative in all areas as listed below except as in [place]  Constitutional, EENT, Cardiovascular, pulmonary, GI, , Musculoskeletal, skin, neurological, hematological, endocrine, Psychiatric        Reviewed past medical history, social, and family history. Smoked 57 yrs until 19 months ago when quit. Smoked 1-3 packs per day.   Past Medical History:   Diagnosis Date    A-fib [facility])     2/14/12    Atrial fibrillation with RVR ([facility]     2/14/12     Avulsion fracture of ankle 9/21/2015    Benign localized hyperplasia of prostate with urinary obstruction and other lower urinary tract symptoms (LUTS)(600.21) 8/17/2016    Chronic systolic CHF (congestive heart failure) ([facility]) 8/28/2017    Contusion of leg, right 9/21/2015    [facility]ch[facility] obstructive [facility] disease) [facility])     Fractures     GERD (gastroesophageal reflux disease) 6/15/2015    Hypertension     Hypertrophy of prostate without urinary obstruction and other lower urinary tract symptoms (LUTS) 6/15/2015    No history of procedure     no previos colonoscopy    PAD (peripheral artery disease) ([facility] 10/9/2017    Pneumonia     Thoracic aortic aneurysm ([facility] Past Surgical History:   Procedure Laterality Date    CATARACT REMOVAL WITH IMPLANT Right 09/06/2018     PHACO EMULSIFICATION OF CATARACT WITH INTRAOCULAR LENS IMPLANT RIGHT EYE    COLONOSCOPY  2/24/2016    sigmoid polyps    HERNIA REPAIR      [place] REMV CATARACT EXTRACAP,INSERT LENS Right 9/6/2018    PHACO EMULSIFICATION OF CATARACT WITH INTRAOCULAR LENS IMPLANT RIGHT EYE performed by Soraida Gomez MD at [address] [place] [address] Left 10/18/2018    PHACO EMULSIFICATION OF CATARACT WITH  INTRAOCULAR LENS IMPLANT LEFT EYE performed by Soraida Gomez MD at [facility] OR       Objective:   /84   Pulse 92   Ht 5' 10\" (1.778 m)   Wt 214 lb 6.4 oz (97.3 kg)   SpO2 92%   BMI 30.76 kg/m²     Wt Readings from Last 3 Encounters:   07/10/19 214 lb 6.4 oz (97.3 kg)   05/28/19 210 lb (95.3 kg)   05/01/19 213 lb (96.6 kg)       Physical Exam:  General: No Respiratory distress, appears well developed and well nourished. Eyes:  Sclera nonicteric  Nose/Sinuses:  negative findings: nose shows no deformity, asymmetry, or inflammation, nasal mucosa normal, septum midline with no perforation or bleeding  Back:  no pain to palpation  Joint:  no active joint inflammation  Musculoskeletal:  negative  Skin:  Warm and dry  Neck:  Negative for JVD and Carotid Bruits. Chest:   Bilateral wheezing  , respiration easy  Cardiovascular:  RRR, 100 bpm S1S2 normal, no murmur, no rub or thrill.   Abdomen:  Soft normal liver and spleen  Extremities:   No edema, clubbing, cyanosis,  Absent pedal pulses 2+ left femoral,   absent right femoral  Neuro: intact    Medications:   Outpatient Encounter Medications as of 7/10/2019   Medication Sig Dispense Refill    losartan (COZAAR) 25 MG tablet TAKE ONE TABLET BY MOUTH DAILY 90 tablet 3    atorvastatin (LIPITOR) 20 MG tablet Take 1 tablet by mouth daily 90 tablet 3    carvedilol (COREG) 6.25 MG tablet TAKE ONE (1) TABLET BY MOUTH TWICE DAILY WITH MEALS 180 tablet 3    102 and the  heart rate maximum was 140. The patient commented on hip pain during  the testing. Chest Xray 4/23/19    Bibasilar heterogeneous opacities without focal consolidation may relate to   subsegmental atelectasis/scarring.       Hyperexpanded lung volume and diffusely prominent interstitium can be seen in   the setting of COPD. CT chest 4/23/19  No thoracic aortic aneurysm or dissection. Stable small ductus diverticulum. No acute findings in the chest.     ECHO 1/24/18  Summary  There is normal isotope uptake at stress and rest. There is no evidence of  myocardial ischemia or scar. Normal LV function with ejection fraction of  62%. There are no regional wall motion abnormalities. Low risk study. EKG 4/23/19  Normal sinus rhythmNonspecific ST abnormalityWhen compared with ECG of 23-APR-2019 08:10, (unconfirmed)No significant change was foundConfirmed by ANIKA Cardona MD ([id number]) on 4/23/2019 4:58:35 PM    Stress Test 1/24/18   Summary  There is normal isotope uptake at stress and rest. There is no evidence of  myocardial ischemia or scar. Normal LV function with ejection fraction of  62%. There are no regional wall motion abnormalities. Low risk study. NANCY 8/17/17  Summary   There is no evidence of mass or thrombus in the left atrium or appendage.   Mild mitral regurgitation.   Moderate tricuspid regurgitation.   A small mobile filament is attached to the aortic valve which is felt to be   Lambl's excrescence in the absence of the aortic regurgitation.   Moderate amount of layered and protruding plaque in the aorta.   EKG 8.17.17  Atrial flutter RVR  Echo doppler of heart 8.16.17  Summary   The ejection fraction measured by Miller's method is 33 %.   The left ventricular systolic function is moderately reduced with an   ejection fraction of 30-35 %.   Moderate global hypokinesis is present.   Normal left ventricular diastolic filling pressure.   The left atrium is moderately

## 2019-07-10 ENCOUNTER — OFFICE VISIT (OUTPATIENT)
Dept: CARDIOLOGY CLINIC | Age: 74
End: 2019-07-10
Payer: MEDICARE

## 2019-07-10 VITALS
HEART RATE: 92 BPM | DIASTOLIC BLOOD PRESSURE: 84 MMHG | OXYGEN SATURATION: 92 % | WEIGHT: 214.4 LBS | HEIGHT: 70 IN | SYSTOLIC BLOOD PRESSURE: 136 MMHG | BODY MASS INDEX: 30.69 KG/M2

## 2019-07-10 DIAGNOSIS — I10 ESSENTIAL HYPERTENSION: ICD-10-CM

## 2019-07-10 DIAGNOSIS — I42.0 DILATED CARDIOMYOPATHY (HCC): ICD-10-CM

## 2019-07-10 DIAGNOSIS — I50.22 CHRONIC SYSTOLIC CHF (CONGESTIVE HEART FAILURE) (HCC): ICD-10-CM

## 2019-07-10 DIAGNOSIS — R06.02 SOB (SHORTNESS OF BREATH): Primary | ICD-10-CM

## 2019-07-10 PROCEDURE — 99214 OFFICE O/P EST MOD 30 MIN: CPT | Performed by: INTERNAL MEDICINE

## 2019-07-10 RX ORDER — CARVEDILOL 6.25 MG/1
TABLET ORAL
Qty: 180 TABLET | Refills: 3 | Status: SHIPPED | OUTPATIENT
Start: 2019-07-10 | End: 2019-09-03 | Stop reason: SDUPTHER

## 2019-07-10 RX ORDER — IPRATROPIUM BROMIDE AND ALBUTEROL SULFATE 2.5; .5 MG/3ML; MG/3ML
1 SOLUTION RESPIRATORY (INHALATION) EVERY 4 HOURS PRN
Qty: 360 ML | Refills: 0 | Status: SHIPPED | OUTPATIENT
Start: 2019-07-10 | End: 2019-07-29 | Stop reason: SDUPTHER

## 2019-07-10 RX ORDER — ATORVASTATIN CALCIUM 20 MG/1
20 TABLET, FILM COATED ORAL DAILY
Qty: 90 TABLET | Refills: 3 | Status: SHIPPED | OUTPATIENT
Start: 2019-07-10 | End: 2019-09-03 | Stop reason: SDUPTHER

## 2019-07-10 RX ORDER — FUROSEMIDE 40 MG/1
TABLET ORAL
Qty: 90 TABLET | Refills: 3 | Status: SHIPPED | OUTPATIENT
Start: 2019-07-10 | End: 2019-09-03 | Stop reason: SDUPTHER

## 2019-07-10 RX ORDER — LOSARTAN POTASSIUM 25 MG/1
TABLET ORAL
Qty: 90 TABLET | Refills: 3 | Status: ON HOLD | OUTPATIENT
Start: 2019-07-10 | End: 2019-08-08 | Stop reason: SDUPTHER

## 2019-07-10 NOTE — LETTER
There was no decrease. The patient's resting heart rate was 102 and the  heart rate maximum was 140. The patient commented on hip pain during  the testing. Chest Xray 4/23/19    Bibasilar heterogeneous opacities without focal consolidation may relate to   subsegmental atelectasis/scarring.       Hyperexpanded lung volume and diffusely prominent interstitium can be seen in   the setting of COPD. CT chest 4/23/19  No thoracic aortic aneurysm or dissection. Stable small ductus diverticulum. No acute findings in the chest.     ECHO 1/24/18  Summary  There is normal isotope uptake at stress and rest. There is no evidence of  myocardial ischemia or scar. Normal LV function with ejection fraction of  62%. There are no regional wall motion abnormalities. Low risk study. EKG 4/23/19  Normal sinus rhythmNonspecific ST abnormalityWhen compared with ECG of 23-APR-2019 08:10, (unconfirmed)No significant change was foundConfirmed by ANIKA Breen MD ([id number]) on 4/23/2019 4:58:35 PM    Stress Test 1/24/18   Summary  There is normal isotope uptake at stress and rest. There is no evidence of  myocardial ischemia or scar. Normal LV function with ejection fraction of  62%. There are no regional wall motion abnormalities. Low risk study. NANCY 8/17/17  Summary   There is no evidence of mass or thrombus in the left atrium or appendage.   Mild mitral regurgitation.   Moderate tricuspid regurgitation.   A small mobile filament is attached to the aortic valve which is felt to be   Lambl's excrescence in the absence of the aortic regurgitation.   Moderate amount of layered and protruding plaque in the aorta. EKG 8.17.17  Atrial flutter RVR  Echo doppler of heart 8.16.17  Summary   The ejection fraction measured by Miller's method is 33 %.   The left ventricular systolic function is moderately reduced with an   ejection fraction of 30-35 %.   Moderate global hypokinesis is present.

## 2019-07-29 RX ORDER — IPRATROPIUM BROMIDE AND ALBUTEROL SULFATE 2.5; .5 MG/3ML; MG/3ML
1 SOLUTION RESPIRATORY (INHALATION) EVERY 4 HOURS PRN
Qty: 360 ML | Refills: 0 | Status: SHIPPED | OUTPATIENT
Start: 2019-07-29 | End: 2019-09-03 | Stop reason: SDUPTHER

## 2019-08-04 ENCOUNTER — APPOINTMENT (OUTPATIENT)
Dept: GENERAL RADIOLOGY | Age: 74
DRG: 189 | End: 2019-08-04
Payer: MEDICARE

## 2019-08-04 ENCOUNTER — HOSPITAL ENCOUNTER (INPATIENT)
Age: 74
LOS: 4 days | Discharge: HOME | DRG: 189 | End: 2019-08-08
Attending: EMERGENCY MEDICINE | Admitting: INTERNAL MEDICINE
Payer: MEDICARE

## 2019-08-04 DIAGNOSIS — H61.91 SKIN LESION OF RIGHT EAR: ICD-10-CM

## 2019-08-04 DIAGNOSIS — R06.02 SHORTNESS OF BREATH: ICD-10-CM

## 2019-08-04 DIAGNOSIS — J44.9 CHRONIC OBSTRUCTIVE PULMONARY DISEASE, UNSPECIFIED COPD TYPE (HCC): ICD-10-CM

## 2019-08-04 DIAGNOSIS — J44.1 COPD EXACERBATION (HCC): Primary | ICD-10-CM

## 2019-08-04 DIAGNOSIS — I16.0 HYPERTENSIVE URGENCY: ICD-10-CM

## 2019-08-04 LAB
A/G RATIO: 1.3 (ref 1.1–2.2)
ALBUMIN SERPL-MCNC: 4.1 G/DL (ref 3.4–5)
ALP BLD-CCNC: 117 U/L (ref 40–129)
ALT SERPL-CCNC: 9 U/L (ref 10–40)
ANION GAP SERPL CALCULATED.3IONS-SCNC: 10 MMOL/L (ref 3–16)
AST SERPL-CCNC: 13 U/L (ref 15–37)
BASOPHILS ABSOLUTE: 0.1 K/UL (ref 0–0.2)
BASOPHILS RELATIVE PERCENT: 1.3 %
BILIRUB SERPL-MCNC: <0.2 MG/DL (ref 0–1)
BUN BLDV-MCNC: 13 MG/DL (ref 7–20)
CALCIUM SERPL-MCNC: 9.7 MG/DL (ref 8.3–10.6)
CHLORIDE BLD-SCNC: 107 MMOL/L (ref 99–110)
CO2: 25 MMOL/L (ref 21–32)
CREAT SERPL-MCNC: 1.1 MG/DL (ref 0.8–1.3)
EOSINOPHILS ABSOLUTE: 1.5 K/UL (ref 0–0.6)
EOSINOPHILS RELATIVE PERCENT: 18.1 %
GFR AFRICAN AMERICAN: >60
GFR NON-AFRICAN AMERICAN: >60
GLOBULIN: 3.2 G/DL
GLUCOSE BLD-MCNC: 127 MG/DL (ref 70–99)
HCT VFR BLD CALC: 43.3 % (ref 40.5–52.5)
HEMOGLOBIN: 13.9 G/DL (ref 13.5–17.5)
LYMPHOCYTES ABSOLUTE: 2.3 K/UL (ref 1–5.1)
LYMPHOCYTES RELATIVE PERCENT: 26.9 %
MCH RBC QN AUTO: 28.5 PG (ref 26–34)
MCHC RBC AUTO-ENTMCNC: 32.1 G/DL (ref 31–36)
MCV RBC AUTO: 88.9 FL (ref 80–100)
MONOCYTES ABSOLUTE: 0.6 K/UL (ref 0–1.3)
MONOCYTES RELATIVE PERCENT: 7.7 %
NEUTROPHILS ABSOLUTE: 3.9 K/UL (ref 1.7–7.7)
NEUTROPHILS RELATIVE PERCENT: 46 %
PDW BLD-RTO: 16.7 % (ref 12.4–15.4)
PLATELET # BLD: 238 K/UL (ref 135–450)
PMV BLD AUTO: 7.8 FL (ref 5–10.5)
POTASSIUM REFLEX MAGNESIUM: 4.3 MMOL/L (ref 3.5–5.1)
PRO-BNP: 99 PG/ML (ref 0–124)
RBC # BLD: 4.87 M/UL (ref 4.2–5.9)
SODIUM BLD-SCNC: 142 MMOL/L (ref 136–145)
TOTAL PROTEIN: 7.3 G/DL (ref 6.4–8.2)
TROPONIN: <0.01 NG/ML
WBC # BLD: 8.4 K/UL (ref 4–11)

## 2019-08-04 PROCEDURE — 6370000000 HC RX 637 (ALT 250 FOR IP): Performed by: EMERGENCY MEDICINE

## 2019-08-04 PROCEDURE — 83880 ASSAY OF NATRIURETIC PEPTIDE: CPT

## 2019-08-04 PROCEDURE — 96374 THER/PROPH/DIAG INJ IV PUSH: CPT

## 2019-08-04 PROCEDURE — 93005 ELECTROCARDIOGRAM TRACING: CPT | Performed by: EMERGENCY MEDICINE

## 2019-08-04 PROCEDURE — 85025 COMPLETE CBC W/AUTO DIFF WBC: CPT

## 2019-08-04 PROCEDURE — 71046 X-RAY EXAM CHEST 2 VIEWS: CPT

## 2019-08-04 PROCEDURE — 6360000002 HC RX W HCPCS: Performed by: EMERGENCY MEDICINE

## 2019-08-04 PROCEDURE — 99285 EMERGENCY DEPT VISIT HI MDM: CPT

## 2019-08-04 PROCEDURE — 6360000002 HC RX W HCPCS: Performed by: INTERNAL MEDICINE

## 2019-08-04 PROCEDURE — 6370000000 HC RX 637 (ALT 250 FOR IP)

## 2019-08-04 PROCEDURE — 36415 COLL VENOUS BLD VENIPUNCTURE: CPT

## 2019-08-04 PROCEDURE — 84484 ASSAY OF TROPONIN QUANT: CPT

## 2019-08-04 PROCEDURE — 6370000000 HC RX 637 (ALT 250 FOR IP): Performed by: INTERNAL MEDICINE

## 2019-08-04 PROCEDURE — 80053 COMPREHEN METABOLIC PANEL: CPT

## 2019-08-04 PROCEDURE — 2060000000 HC ICU INTERMEDIATE R&B

## 2019-08-04 PROCEDURE — 2580000003 HC RX 258: Performed by: INTERNAL MEDICINE

## 2019-08-04 RX ORDER — ALBUTEROL SULFATE 2.5 MG/3ML
2.5 SOLUTION RESPIRATORY (INHALATION) ONCE
Status: COMPLETED | OUTPATIENT
Start: 2019-08-04 | End: 2019-08-04

## 2019-08-04 RX ORDER — ALBUTEROL SULFATE 2.5 MG/3ML
5 SOLUTION RESPIRATORY (INHALATION) ONCE
Status: COMPLETED | OUTPATIENT
Start: 2019-08-04 | End: 2019-08-04

## 2019-08-04 RX ORDER — LABETALOL 200 MG/1
TABLET, FILM COATED ORAL
Status: COMPLETED
Start: 2019-08-04 | End: 2019-08-04

## 2019-08-04 RX ORDER — IPRATROPIUM BROMIDE AND ALBUTEROL SULFATE 2.5; .5 MG/3ML; MG/3ML
2 SOLUTION RESPIRATORY (INHALATION) ONCE
Status: COMPLETED | OUTPATIENT
Start: 2019-08-04 | End: 2019-08-04

## 2019-08-04 RX ORDER — SODIUM CHLORIDE 0.9 % (FLUSH) 0.9 %
10 SYRINGE (ML) INJECTION PRN
Status: DISCONTINUED | OUTPATIENT
Start: 2019-08-04 | End: 2019-08-08 | Stop reason: HOSPADM

## 2019-08-04 RX ORDER — ALBUTEROL SULFATE 2.5 MG/3ML
2.5 SOLUTION RESPIRATORY (INHALATION)
Status: DISCONTINUED | OUTPATIENT
Start: 2019-08-04 | End: 2019-08-08 | Stop reason: HOSPADM

## 2019-08-04 RX ORDER — METHYLPREDNISOLONE SODIUM SUCCINATE 125 MG/2ML
125 INJECTION, POWDER, LYOPHILIZED, FOR SOLUTION INTRAMUSCULAR; INTRAVENOUS ONCE
Status: COMPLETED | OUTPATIENT
Start: 2019-08-04 | End: 2019-08-04

## 2019-08-04 RX ORDER — METHYLPREDNISOLONE SODIUM SUCCINATE 40 MG/ML
40 INJECTION, POWDER, LYOPHILIZED, FOR SOLUTION INTRAMUSCULAR; INTRAVENOUS EVERY 12 HOURS
Status: COMPLETED | OUTPATIENT
Start: 2019-08-05 | End: 2019-08-06

## 2019-08-04 RX ORDER — SODIUM CHLORIDE 9 MG/ML
INJECTION, SOLUTION INTRAVENOUS CONTINUOUS
Status: DISCONTINUED | OUTPATIENT
Start: 2019-08-04 | End: 2019-08-05

## 2019-08-04 RX ORDER — FUROSEMIDE 40 MG/1
40 TABLET ORAL DAILY
Status: DISCONTINUED | OUTPATIENT
Start: 2019-08-05 | End: 2019-08-08 | Stop reason: HOSPADM

## 2019-08-04 RX ORDER — PANTOPRAZOLE SODIUM 40 MG/1
40 TABLET, DELAYED RELEASE ORAL
Status: DISCONTINUED | OUTPATIENT
Start: 2019-08-05 | End: 2019-08-08 | Stop reason: HOSPADM

## 2019-08-04 RX ORDER — ATORVASTATIN CALCIUM 10 MG/1
20 TABLET, FILM COATED ORAL DAILY
Status: DISCONTINUED | OUTPATIENT
Start: 2019-08-05 | End: 2019-08-08 | Stop reason: HOSPADM

## 2019-08-04 RX ORDER — ACETAMINOPHEN 325 MG/1
650 TABLET ORAL EVERY 4 HOURS PRN
Status: DISCONTINUED | OUTPATIENT
Start: 2019-08-04 | End: 2019-08-08 | Stop reason: HOSPADM

## 2019-08-04 RX ORDER — ONDANSETRON 2 MG/ML
4 INJECTION INTRAMUSCULAR; INTRAVENOUS EVERY 6 HOURS PRN
Status: DISCONTINUED | OUTPATIENT
Start: 2019-08-04 | End: 2019-08-08 | Stop reason: HOSPADM

## 2019-08-04 RX ORDER — SODIUM CHLORIDE 0.9 % (FLUSH) 0.9 %
10 SYRINGE (ML) INJECTION EVERY 12 HOURS SCHEDULED
Status: DISCONTINUED | OUTPATIENT
Start: 2019-08-04 | End: 2019-08-08 | Stop reason: HOSPADM

## 2019-08-04 RX ORDER — CARVEDILOL 6.25 MG/1
6.25 TABLET ORAL 2 TIMES DAILY
Status: DISCONTINUED | OUTPATIENT
Start: 2019-08-04 | End: 2019-08-08 | Stop reason: HOSPADM

## 2019-08-04 RX ORDER — LABETALOL 100 MG/1
50 TABLET, FILM COATED ORAL EVERY 6 HOURS PRN
Status: DISCONTINUED | OUTPATIENT
Start: 2019-08-04 | End: 2019-08-08 | Stop reason: HOSPADM

## 2019-08-04 RX ORDER — PREDNISONE 20 MG/1
40 TABLET ORAL DAILY
Status: DISCONTINUED | OUTPATIENT
Start: 2019-08-07 | End: 2019-08-08 | Stop reason: HOSPADM

## 2019-08-04 RX ORDER — IPRATROPIUM BROMIDE AND ALBUTEROL SULFATE 2.5; .5 MG/3ML; MG/3ML
1 SOLUTION RESPIRATORY (INHALATION)
Status: DISCONTINUED | OUTPATIENT
Start: 2019-08-05 | End: 2019-08-05

## 2019-08-04 RX ORDER — MAGNESIUM SULFATE 1 G/100ML
1 INJECTION INTRAVENOUS ONCE
Status: COMPLETED | OUTPATIENT
Start: 2019-08-04 | End: 2019-08-04

## 2019-08-04 RX ORDER — LOSARTAN POTASSIUM 25 MG/1
25 TABLET ORAL DAILY
Status: DISCONTINUED | OUTPATIENT
Start: 2019-08-05 | End: 2019-08-05

## 2019-08-04 RX ADMIN — ALBUTEROL SULFATE 5 MG: 2.5 SOLUTION RESPIRATORY (INHALATION) at 20:51

## 2019-08-04 RX ADMIN — METHYLPREDNISOLONE SODIUM SUCCINATE 125 MG: 125 INJECTION, POWDER, FOR SOLUTION INTRAMUSCULAR; INTRAVENOUS at 19:37

## 2019-08-04 RX ADMIN — MAGNESIUM SULFATE HEPTAHYDRATE 1 G: 1 INJECTION, SOLUTION INTRAVENOUS at 20:51

## 2019-08-04 RX ADMIN — RIVAROXABAN 20 MG: 20 TABLET, FILM COATED ORAL at 23:47

## 2019-08-04 RX ADMIN — ALBUTEROL SULFATE 2.5 MG: 2.5 SOLUTION RESPIRATORY (INHALATION) at 19:37

## 2019-08-04 RX ADMIN — AZITHROMYCIN DIHYDRATE 500 MG: 500 INJECTION, POWDER, LYOPHILIZED, FOR SOLUTION INTRAVENOUS at 23:47

## 2019-08-04 RX ADMIN — Medication 10 ML: at 23:47

## 2019-08-04 RX ADMIN — CARVEDILOL 6.25 MG: 6.25 TABLET, FILM COATED ORAL at 23:47

## 2019-08-04 RX ADMIN — IPRATROPIUM BROMIDE AND ALBUTEROL SULFATE 2 AMPULE: .5; 3 SOLUTION RESPIRATORY (INHALATION) at 19:37

## 2019-08-04 RX ADMIN — LABETALOL HYDROCHLORIDE 50 MG: 200 TABLET, FILM COATED ORAL at 20:50

## 2019-08-04 RX ADMIN — SODIUM CHLORIDE: 9 INJECTION, SOLUTION INTRAVENOUS at 23:48

## 2019-08-04 NOTE — ED PROVIDER NOTES
[facility] EMERGENCY DEPARTMENT      CHIEF COMPLAINT  Shortness of Breath (C/o worsening dyspneal exertion x approx 1 month)       HISTORY OF PRESENT ILLNESS  Kate Gordon is a 69 y.o. male  who presents to the ED complaining of shortness of breath. This is been progressively worsening over the past several months. He is seen his PCP and has been prescribed nebulizers which does seem to improve his symptoms but usually only for about 20 minutes or so and will help when he has a coughing \"fit\". He states that is just progressively worse in the point where he just feels like he cannot catch his breath. No known fevers. Cough has been nonproductive. He does have a significant history of A. fib and is currently anticoagulated. He also has a known history of systolic CHF. No leg swelling. He states that intermittently he will occasionally have chest discomfort the last at maximum 3 minutes. States it is a burning type of discomfort he thought it is \"gas\". No chest pain at this time. He is unsure what brings on the chest discomfort. He states that he is being established with a pulmonologist as he was referred by his PCP but has not yet had an appointment in his appointment is coming up in several weeks on September 17th with Dr. Gurwinder Bay. Due to the worsening symptoms that he presents now for further evaluation. No recent travel. No known sick contacts. No nausea vomiting or diarrhea. No congestion or rhinorrhea. No sore throat. No other complaints, modifying factors or associated symptoms. I have reviewed the following from the nursing documentation.     Past Medical History:   Diagnosis Date    A-fib [facility])     2/14/12    Atrial fibrillation with RVR ([address]     2/14/12     Avulsion fracture of ankle 9/21/2015    Benign localized hyperplasia of prostate with urinary obstruction and other lower urinary tract symptoms (LUTS)(600.21) 8/17/2016    Chronic systolic CHF (congestive heart failure) hospital encounter of 08/04/19   CBC Auto Differential   Result Value Ref Range    WBC 8.4 4.0 - 11.0 K/uL    RBC 4.87 4.20 - 5.90 M/uL    Hemoglobin 13.9 13.5 - 17.5 g/dL    Hematocrit 43.3 40.5 - 52.5 %    MCV 88.9 80.0 - 100.0 fL    MCH 28.5 26.0 - 34.0 pg    MCHC 32.1 31.0 - 36.0 g/dL    RDW 16.7 (H) 12.4 - 15.4 %    Platelets 289 180 - 514 K/uL    MPV 7.8 5.0 - 10.5 fL    Neutrophils % 46.0 %    Lymphocytes % 26.9 %    Monocytes % 7.7 %    Eosinophils % 18.1 %    Basophils % 1.3 %    Neutrophils # 3.9 1.7 - 7.7 K/uL    Lymphocytes # 2.3 1.0 - 5.1 K/uL    Monocytes # 0.6 0.0 - 1.3 K/uL    Eosinophils # 1.5 (H) 0.0 - 0.6 K/uL    Basophils # 0.1 0.0 - 0.2 K/uL   Comprehensive Metabolic Panel w/ Reflex to MG   Result Value Ref Range    Sodium 142 136 - 145 mmol/L    Potassium reflex Magnesium 4.3 3.5 - 5.1 mmol/L    Chloride 107 99 - 110 mmol/L    CO2 25 21 - 32 mmol/L    Anion Gap 10 3 - 16    Glucose 127 (H) 70 - 99 mg/dL    BUN 13 7 - 20 mg/dL    CREATININE 1.1 0.8 - 1.3 mg/dL    GFR Non-African American >60 >60    GFR African American >60 >60    Calcium 9.7 8.3 - 10.6 mg/dL    Total Protein 7.3 6.4 - 8.2 g/dL    Alb 4.1 3.4 - 5.0 g/dL    Albumin/Globulin Ratio 1.3 1.1 - 2.2    Total Bilirubin <0.2 0.0 - 1.0 mg/dL    Alkaline Phosphatase 117 40 - 129 U/L    ALT 9 (L) 10 - 40 U/L    AST 13 (L) 15 - 37 U/L    Globulin 3.2 g/dL   Troponin   Result Value Ref Range    Troponin <0.01 <0.01 ng/mL   Brain Natriuretic Peptide   Result Value Ref Range    Pro-BNP 99 0 - 124 pg/mL   EKG 12 Lead   Result Value Ref Range    Ventricular Rate 88 BPM    Atrial Rate 88 BPM    P-R Interval 168 ms    QRS Duration 90 ms    Q-T Interval 364 ms    QTc Calculation (Bazett) 440 ms    P Axis 83 degrees    R Axis 62 degrees    T Axis 76 degrees    Diagnosis       Normal sinus rhythmNonspecific ST abnormalityAbnormal ECGNo previous ECGs available       ECG  The Ekg interpreted by me shows  normal sinus rhythm with a rate of 88  Axis is Normal  QTc is  normal  Intervals and Durations are unremarkable. ST Segments: nonspecific changes  No significant change from prior EKG dated 4/23/19    RADIOLOGY  Xr Chest Standard (2 Vw)    Result Date: 8/4/2019  EXAMINATION: TWO XRAY VIEWS OF THE CHEST 8/4/2019 7:25 pm COMPARISON: Chest radiograph and CT chest angiogram 04/23/2018. HISTORY: ORDERING SYSTEM PROVIDED HISTORY: sob TECHNOLOGIST PROVIDED HISTORY: Reason for exam:->sob Reason for Exam: SOB Acuity: Acute Type of Exam: Initial Relevant Medical/Surgical History: FORMER SMOKER, PNEUMONIA, COPD, HTN, CHF FINDINGS: The cardiomediastinal silhouette is unchanged. Mild left basilar atelectasis. No pneumothorax, vascular congestion, consolidation, or pleural effusion is identified. No acute osseous abnormality. Mild left basilar atelectasis. Pneumonia not excluded. ED COURSE/MDM  Patient seen and evaluated. Old records reviewed. Labs and imaging reviewed and results discussed with patient. Patient presenting for evaluation of significant shortness of breath and has that this is consistent with acute COPD exacerbation. He does have increased work of breathing with tachypnea which has not significantly improved with 3 nebulized stacked breathing treatments and IV Solu-Medrol. He is observed and continues to have expiratory wheezes throughout and mild tachypnea. BiPAP considered but at this time we will continue with nebulized breathing treatments and also IV magnesium. Chest x-ray concerning for left basilar atelectasis. Pneumonia was not excluded but I feel that this is much less likely given lack of fever, productive cough or leukocytosis. I do not feel that antibiotics are indicated at this time. We will continue with nebulized breathing treatments. Due to persistence of symptoms and continued tachypnea, plan for admission at this time. Patient was very much in agreement with this plan, as well as family who is at bedside.     I

## 2019-08-05 LAB
A/G RATIO: 0.9 (ref 1.1–2.2)
ALBUMIN SERPL-MCNC: 3.7 G/DL (ref 3.4–5)
ALP BLD-CCNC: 112 U/L (ref 40–129)
ALT SERPL-CCNC: 8 U/L (ref 10–40)
ANION GAP SERPL CALCULATED.3IONS-SCNC: 10 MMOL/L (ref 3–16)
AST SERPL-CCNC: 11 U/L (ref 15–37)
BASOPHILS ABSOLUTE: 0 K/UL (ref 0–0.2)
BASOPHILS RELATIVE PERCENT: 0.3 %
BILIRUB SERPL-MCNC: <0.2 MG/DL (ref 0–1)
BUN BLDV-MCNC: 15 MG/DL (ref 7–20)
CALCIUM SERPL-MCNC: 9.4 MG/DL (ref 8.3–10.6)
CHLORIDE BLD-SCNC: 104 MMOL/L (ref 99–110)
CO2: 24 MMOL/L (ref 21–32)
CREAT SERPL-MCNC: 1.1 MG/DL (ref 0.8–1.3)
EKG ATRIAL RATE: 88 BPM
EKG DIAGNOSIS: NORMAL
EKG P AXIS: 83 DEGREES
EKG P-R INTERVAL: 168 MS
EKG Q-T INTERVAL: 364 MS
EKG QRS DURATION: 90 MS
EKG QTC CALCULATION (BAZETT): 440 MS
EKG R AXIS: 62 DEGREES
EKG T AXIS: 76 DEGREES
EKG VENTRICULAR RATE: 88 BPM
EOSINOPHILS ABSOLUTE: 0 K/UL (ref 0–0.6)
EOSINOPHILS RELATIVE PERCENT: 0.1 %
GFR AFRICAN AMERICAN: >60
GFR NON-AFRICAN AMERICAN: >60
GLOBULIN: 4.1 G/DL
GLUCOSE BLD-MCNC: 172 MG/DL (ref 70–99)
HCT VFR BLD CALC: 41.3 % (ref 40.5–52.5)
HEMOGLOBIN: 13.5 G/DL (ref 13.5–17.5)
LYMPHOCYTES ABSOLUTE: 0.6 K/UL (ref 1–5.1)
LYMPHOCYTES RELATIVE PERCENT: 10.4 %
MCH RBC QN AUTO: 29.1 PG (ref 26–34)
MCHC RBC AUTO-ENTMCNC: 32.7 G/DL (ref 31–36)
MCV RBC AUTO: 88.9 FL (ref 80–100)
MONOCYTES ABSOLUTE: 0.1 K/UL (ref 0–1.3)
MONOCYTES RELATIVE PERCENT: 1.2 %
NEUTROPHILS ABSOLUTE: 5.4 K/UL (ref 1.7–7.7)
NEUTROPHILS RELATIVE PERCENT: 88 %
PDW BLD-RTO: 16.5 % (ref 12.4–15.4)
PLATELET # BLD: 241 K/UL (ref 135–450)
PMV BLD AUTO: 8 FL (ref 5–10.5)
POTASSIUM REFLEX MAGNESIUM: 4.5 MMOL/L (ref 3.5–5.1)
PROCALCITONIN: 0.04 NG/ML (ref 0–0.15)
RBC # BLD: 4.65 M/UL (ref 4.2–5.9)
SODIUM BLD-SCNC: 138 MMOL/L (ref 136–145)
TOTAL PROTEIN: 7.8 G/DL (ref 6.4–8.2)
WBC # BLD: 6.2 K/UL (ref 4–11)

## 2019-08-05 PROCEDURE — 6360000002 HC RX W HCPCS: Performed by: INTERNAL MEDICINE

## 2019-08-05 PROCEDURE — 2060000000 HC ICU INTERMEDIATE R&B

## 2019-08-05 PROCEDURE — 99232 SBSQ HOSP IP/OBS MODERATE 35: CPT | Performed by: INTERNAL MEDICINE

## 2019-08-05 PROCEDURE — 80053 COMPREHEN METABOLIC PANEL: CPT

## 2019-08-05 PROCEDURE — 6370000000 HC RX 637 (ALT 250 FOR IP): Performed by: INTERNAL MEDICINE

## 2019-08-05 PROCEDURE — 2700000000 HC OXYGEN THERAPY PER DAY

## 2019-08-05 PROCEDURE — 94150 VITAL CAPACITY TEST: CPT

## 2019-08-05 PROCEDURE — 93010 ELECTROCARDIOGRAM REPORT: CPT | Performed by: INTERNAL MEDICINE

## 2019-08-05 PROCEDURE — 84145 PROCALCITONIN (PCT): CPT

## 2019-08-05 PROCEDURE — 99222 1ST HOSP IP/OBS MODERATE 55: CPT | Performed by: INTERNAL MEDICINE

## 2019-08-05 PROCEDURE — 36415 COLL VENOUS BLD VENIPUNCTURE: CPT

## 2019-08-05 PROCEDURE — 94640 AIRWAY INHALATION TREATMENT: CPT

## 2019-08-05 PROCEDURE — 94761 N-INVAS EAR/PLS OXIMETRY MLT: CPT

## 2019-08-05 PROCEDURE — 2580000003 HC RX 258: Performed by: INTERNAL MEDICINE

## 2019-08-05 PROCEDURE — 85025 COMPLETE CBC W/AUTO DIFF WBC: CPT

## 2019-08-05 RX ORDER — IPRATROPIUM BROMIDE AND ALBUTEROL SULFATE 2.5; .5 MG/3ML; MG/3ML
1 SOLUTION RESPIRATORY (INHALATION) EVERY 4 HOURS
Status: DISCONTINUED | OUTPATIENT
Start: 2019-08-05 | End: 2019-08-06

## 2019-08-05 RX ORDER — LOSARTAN POTASSIUM 25 MG/1
50 TABLET ORAL DAILY
Status: DISCONTINUED | OUTPATIENT
Start: 2019-08-06 | End: 2019-08-07

## 2019-08-05 RX ORDER — GUAIFENESIN/DEXTROMETHORPHAN 100-10MG/5
5 SYRUP ORAL EVERY 4 HOURS PRN
Status: DISCONTINUED | OUTPATIENT
Start: 2019-08-05 | End: 2019-08-08 | Stop reason: HOSPADM

## 2019-08-05 RX ORDER — AZITHROMYCIN 250 MG/1
250 TABLET, FILM COATED ORAL DAILY
Status: DISCONTINUED | OUTPATIENT
Start: 2019-08-05 | End: 2019-08-08 | Stop reason: HOSPADM

## 2019-08-05 RX ADMIN — IPRATROPIUM BROMIDE AND ALBUTEROL SULFATE 1 AMPULE: .5; 3 SOLUTION RESPIRATORY (INHALATION) at 10:36

## 2019-08-05 RX ADMIN — IPRATROPIUM BROMIDE AND ALBUTEROL SULFATE 1 AMPULE: .5; 3 SOLUTION RESPIRATORY (INHALATION) at 07:14

## 2019-08-05 RX ADMIN — CARVEDILOL 6.25 MG: 6.25 TABLET, FILM COATED ORAL at 08:54

## 2019-08-05 RX ADMIN — FUROSEMIDE 40 MG: 40 TABLET ORAL at 08:54

## 2019-08-05 RX ADMIN — IPRATROPIUM BROMIDE AND ALBUTEROL SULFATE 1 AMPULE: .5; 3 SOLUTION RESPIRATORY (INHALATION) at 23:17

## 2019-08-05 RX ADMIN — Medication 10 ML: at 09:04

## 2019-08-05 RX ADMIN — IPRATROPIUM BROMIDE AND ALBUTEROL SULFATE 1 AMPULE: .5; 3 SOLUTION RESPIRATORY (INHALATION) at 20:00

## 2019-08-05 RX ADMIN — RIVAROXABAN 20 MG: 20 TABLET, FILM COATED ORAL at 17:37

## 2019-08-05 RX ADMIN — IPRATROPIUM BROMIDE AND ALBUTEROL SULFATE 1 AMPULE: .5; 3 SOLUTION RESPIRATORY (INHALATION) at 16:07

## 2019-08-05 RX ADMIN — PANTOPRAZOLE SODIUM 40 MG: 40 TABLET, DELAYED RELEASE ORAL at 06:29

## 2019-08-05 RX ADMIN — Medication 10 ML: at 22:27

## 2019-08-05 RX ADMIN — LABETALOL HYDROCHLORIDE 50 MG: 200 TABLET, FILM COATED ORAL at 09:59

## 2019-08-05 RX ADMIN — IPRATROPIUM BROMIDE AND ALBUTEROL SULFATE 1 AMPULE: .5; 3 SOLUTION RESPIRATORY (INHALATION) at 03:14

## 2019-08-05 RX ADMIN — LOSARTAN POTASSIUM 25 MG: 25 TABLET, FILM COATED ORAL at 08:54

## 2019-08-05 RX ADMIN — ACETAMINOPHEN 650 MG: 325 TABLET ORAL at 12:28

## 2019-08-05 RX ADMIN — ALBUTEROL SULFATE 2.5 MG: 2.5 SOLUTION RESPIRATORY (INHALATION) at 13:06

## 2019-08-05 RX ADMIN — METHYLPREDNISOLONE SODIUM SUCCINATE 40 MG: 40 INJECTION, POWDER, FOR SOLUTION INTRAMUSCULAR; INTRAVENOUS at 08:55

## 2019-08-05 RX ADMIN — ALBUTEROL SULFATE 2.5 MG: 2.5 SOLUTION RESPIRATORY (INHALATION) at 01:27

## 2019-08-05 RX ADMIN — ATORVASTATIN CALCIUM 20 MG: 10 TABLET, FILM COATED ORAL at 08:54

## 2019-08-05 RX ADMIN — CARVEDILOL 6.25 MG: 6.25 TABLET, FILM COATED ORAL at 22:26

## 2019-08-05 RX ADMIN — METHYLPREDNISOLONE SODIUM SUCCINATE 40 MG: 40 INJECTION, POWDER, FOR SOLUTION INTRAMUSCULAR; INTRAVENOUS at 22:27

## 2019-08-05 RX ADMIN — AZITHROMYCIN MONOHYDRATE 250 MG: 250 TABLET ORAL at 09:07

## 2019-08-05 ASSESSMENT — PAIN DESCRIPTION - PROGRESSION: CLINICAL_PROGRESSION: GRADUALLY WORSENING

## 2019-08-05 ASSESSMENT — PAIN DESCRIPTION - ORIENTATION: ORIENTATION: UPPER

## 2019-08-05 ASSESSMENT — PAIN DESCRIPTION - FREQUENCY: FREQUENCY: INTERMITTENT

## 2019-08-05 ASSESSMENT — PAIN DESCRIPTION - ONSET: ONSET: GRADUAL

## 2019-08-05 ASSESSMENT — PAIN DESCRIPTION - LOCATION: LOCATION: HEAD

## 2019-08-05 ASSESSMENT — PAIN DESCRIPTION - DESCRIPTORS: DESCRIPTORS: HEADACHE

## 2019-08-05 ASSESSMENT — PAIN DESCRIPTION - PAIN TYPE: TYPE: ACUTE PAIN

## 2019-08-05 ASSESSMENT — PAIN - FUNCTIONAL ASSESSMENT: PAIN_FUNCTIONAL_ASSESSMENT: ACTIVITIES ARE NOT PREVENTED

## 2019-08-05 ASSESSMENT — PAIN SCALES - GENERAL: PAINLEVEL_OUTOF10: 5

## 2019-08-05 NOTE — CONSULTS
EMULSIFICATION OF CATARACT WITH  INTRAOCULAR LENS IMPLANT LEFT EYE performed by Fany Allen MD at [address] [facility]:  family history includes Alcohol Abuse in his sister. SOCIAL HISTORY:   reports that he quit smoking about 1 years ago. He has a 110.00 pack-year smoking history. He has never used smokeless tobacco.    Scheduled Meds:   ipratropium-albuterol  1 ampule Inhalation Q4H    atorvastatin  20 mg Oral Daily    carvedilol  6.25 mg Oral BID    furosemide  40 mg Oral Daily    losartan  25 mg Oral Daily    pantoprazole  40 mg Oral QAM AC    rivaroxaban  20 mg Oral Daily    sodium chloride flush  10 mL Intravenous 2 times per day    methylPREDNISolone  40 mg Intravenous Q12H    Followed by   Debbie Damico ON 8/7/2019] predniSONE  40 mg Oral Daily    azithromycin  500 mg Intravenous Q24H     Continuous Infusions:   sodium chloride 75 mL/hr at 08/04/19 2348     PRN Meds:  guaiFENesin-dextromethorphan, labetalol, sodium chloride flush, magnesium hydroxide, ondansetron, albuterol, acetaminophen    ALLERGIES:  Patient has No Known Allergies. REVIEW OF SYSTEMS:  Constitutional: Negative for fever  HENT: Negative for sore throat  Eyes: Negative for redness   Respiratory: + for dyspnea, cough  Cardiovascular: Negative for chest pain  Gastrointestinal: Negative for vomiting, diarrhea   Genitourinary: Negative for hematuria   Musculoskeletal: Negative for arthralgias   Skin: Negative for rash  Neurological: Negative for syncope  Hematological: Negative for adenopathy  Psychiatric/Behavorial: Negative for anxiety    PHYSICAL EXAM:  Blood pressure (!) 160/96, pulse 87, temperature 97.7 °F (36.5 °C), temperature source Oral, resp. rate 20, height 5' 10\" (1.778 m), weight 209 lb 1.6 oz (94.8 kg), SpO2 95 %.' on 1 L  Gen: No distress. Eyes: PERRL. No sclera icterus. No conjunctival injection. ENT: No discharge. Pharynx clear. Neck: Trachea midline. No obvious mass.     Resp: No accessory muscle D/C IVF  Tobacco cessation has been achieved  I recommend annual low dose screening CT scan for the early detection of lung cancer, in this patient with at least 30 pack year tobacco use & between the ages 54 and 78, per the U.S. Preventive Services Task Force guideline.   This CT should be offered as an outpatient in April 2020  Maybe home tomorrow if improving

## 2019-08-05 NOTE — PROGRESS NOTES
4 Eyes Skin Assessment     The patient is being assess for   Admission    I agree that 2 RN's have performed a thorough Head to Toe Skin Assessment on the patient. ALL assessment sites listed below have been assessed. Areas assessed by both nurses:   [x]   Head, Face, and Ears   [x]   Shoulders, Back, and Chest, Abdomen  [x]   Arms, Elbows, and Hands   [x]   Coccyx, Sacrum, and Ischium  [x]   Legs, Feet, and Heels          Co-signer eSignature: Electronically signed by Paz Rodriguez RN on 8/5/19 at 4:08 AM    Does the Patient have Skin Breakdown?   No          Matt Prevention initiated:  Yes   Wound Care Orders initiated:  No      WOC nurse consulted for Pressure Injury (Stage 3,4, Unstageable, DTI, NWPT, Complex wounds)and New or Established Ostomies:  No      Primary Nurse eSignature: Electronically signed by Charla Tejada RN on 8/5/19 at 4:07 AM

## 2019-08-05 NOTE — PROGRESS NOTES
IMPLANT RIGHT EYE performed by [facility]ced[facility] [address] [facility] 10/18/2018    [facility] EMULSIFICATION OF CATARACT WITH  INTRAOCULAR LENS IMPLANT LEFT EYE performed by Mercedez Lama MD at [facility] OR       Level of Consciousness: Alert, Oriented, and Cooperative = 0    Level of Activity: Walking with assistance = 1    Respiratory Pattern: Dyspnea with exertion;Irregular pattern;or RR less than 6 = 2    Breath Sounds: Absent bilaterally and/or with wheezes = 3    Sputum   ,  , Sputum How Obtained: Spontaneous cough  Cough: Strong, spontaneous, non-productive = 0    Vital Signs   BP (!) 154/106   Pulse 90   Temp 97.9 °F (36.6 °C) (Oral)   Resp 20   Ht 5' 10\" (1.778 m)   Wt 212 lb (96.2 kg)   SpO2 95%   BMI 30.42 kg/m²   SPO2 (COPD values may differ): 90-91% on room air or greater than 92% on FiO2 24- 28% = 1    Peak Flow (asthma only): not applicable = 0    RSI: 6-86 = TID (three times daily) and Q4hr PRN for dyspnea        Plan       Goals: medication delivery and improve oxygenation    Patient/caregiver was educated on the proper method of use for Respiratory Care Devices:  Yes      Level of patient/caregiver understanding able to:   ? Verbalize understanding   ? Demonstrate understanding       ? Teach back        ? Needs reinforcement       ? No available caregiver               ? Other:     Response to education:  Good     Is patient being placed on Home Treatment Regimen? No     Does the patient have everything they need prior to discharge? NA     Comments: chart reviewed and patient assessed    Plan of Care: change duoneb q4wa to duoneb q4 (copd exac x 24 hours)    Electronically signed by Mehran Sevilla RCP on 8/5/2019 at 1:54 AM    Respiratory Protocol Guidelines     1. Assessment and treatment by Respiratory Therapy will be initiated for medication and therapeutic interventions upon initiation of aerosolized medication.   2. Physician will be contacted for respiratory rate (RR) greater than 35 breaths per minute. Therapy will be held for heart rate (HR) greater than 140 beats per minute, pending direction from physician. 3. Bronchodilators will be administered via Metered Dose Inhaler (MDI) with spacer when the following criteria are met:  a. Alert and cooperative     b. HR < 140 bpm  c. RR < 30 bpm                d. Can demonstrate a 2-3 second inspiratory hold  4. Bronchodilators will be administered via Hand Held Nebulizer WILL[facility]) to patients when ANY of the following criteria are met  a. Incognizant or uncooperative          b. Patients treated with HHN at Home        c. Unable to demonstrate proper use of MDI with spacer     d. RR > 30 bpm   5. Bronchodilators will be delivered via Metered Dose Inhaler (MDI), HHN, Aerogen to intubated patients on mechanical ventilation. 6. Inhalation medication orders will be delivered and/or substituted as outlined below. Aerosolized Medications Ordering and Administration Guidelines:    1. All Medications will be ordered by a physician, and their frequency and/or modality will be adjusted as defined by the patients Respiratory Severity Index (RSI) score. 2. If the patient does not have documented COPD, consider discontinuing anticholinergics when RSI is less than 9.  3. If the bronchospasm worsens (increased RSI), then the bronchodilator frequency can be increased to a maximum of every 4 hours. If greater than every 4 hours is required, the physician will be contacted. 4. If the bronchospasm improves, the frequency of the bronchodilator can be decreased, based on the patient's RSI, but not less than home treatment regimen frequency. 5. Bronchodilator(s) will be discontinued if patient has a RSI less than 9 and has received no scheduled or as needed treatment for 72  Hrs. Patients Ordered on a Mucolytic Agent:    1. Must always be administered with a bronchodilator.     2. Discontinue if patient experiences worsened

## 2019-08-05 NOTE — FLOWSHEET NOTE
08/04/19 2246   Vital Signs   Temp 97.9 °F (36.6 °C)   Temp Source Oral   Pulse 90   Heart Rate Source Monitor   Resp 20   BP (!) 154/106   Level of Consciousness 0   MEWS Score 1   Height and Weight   Height 5' 10\" (1.778 m)   Weight 212 lb (96.2 kg)   Weight Method Actual;Standing scale   BSA (Calculated - sq m) 2.18 sq meters   BMI (Calculated) 30.5   Oxygen Therapy   SpO2 95 %   Pulse Oximeter Device Mode Continuous   O2 Device None (Room air)   Pt admitted to room 313-2 from the ED. Vital signs stable. Pt is alert and oriented and complains of mild SOB at the moment. Nothing new noted on head to toe assessment. Wheezes noted through out upon auscultation. Pt is NSR on the monitor. Evening medication administration completed. Normal saline infusing at 75 ml/hr. Wife at bedside. Pt denies any further assistance at the moment. Will continue to monitor.

## 2019-08-05 NOTE — CARE COORDINATION
[address] Transitions Interview     2019    Patient: Cory Paredes Patient : 1945   MRN: [id number]  Reason for Admission: aeCOPD, HTN  RARS: Readmission Risk Score: 15         Spoke with: Jaymie Reyna and spouse      Readmission Risk  Patient Active Problem List   Diagnosis    Hypertension    [facility]    Acute respiratory failure with hypoxia ([facility]    Hoarseness of voice    COPD (chronic obstructive pulmonary disease) ([address]    GERD (gastroesophageal reflux disease)    Pulmonary nodule, right    Enlarged prostate with urinary obstruction    Thoracic aortic aneurysm without rupture ([facility]    Typical atrial flutter (HCC)    Dilated cardiomyopathy ([facility]    Chronic systolic CHF (congestive heart failure) (HCC)    PAD (peripheral artery disease) (HCC)    Pain of right thigh    Shortness of breath    Hypertensive urgency       Inpatient Assessment  Care Transitions Summary    Care Transitions Inpatient Review  Medication Review  Do you have all of your prescriptions and are they filled?:  No   Are you able to afford your medications?:  Yes  How often do you have difficulty taking your medications?:  I always take them as prescribed. Housing Review  Who do you live with?:  Partner/Spouse/SO  Are you an active caregiver in your home?:  No  Social Support  Do you have a ?:  No  Do you have a [address] [place] [address]?:  No  Durable Medical Equipment  Patient Home Equipment:  Nebulizer  Functional Review  Ability to seek help/take action for Emergent/Urgent situations i.e. fire, crime, inclement weather or health crisis. :  Independent  Ability handle personal hygiene needs (bathing/dressing/grooming): Independent  Ability to manage medications: Independent  Ability to prepare food:  Independent  Ability to maintain home (clean home, laundry):   Independent  Ability to drive and/or has transportation:  Independent  Ability to do shopping:

## 2019-08-05 NOTE — PROGRESS NOTES
Pulmonology consult called to Dr. Murtaza Conroy on call. Spoke with [address] [id number].     Mery MAYS/MT  08/05/2019

## 2019-08-05 NOTE — PROGRESS NOTES
Report given to Ellie Thomas RN at bedside for transfer of care. Pt denies needs at this time, call light within reach.

## 2019-08-06 ENCOUNTER — TELEPHONE (OUTPATIENT)
Dept: PULMONOLOGY | Age: 74
End: 2019-08-06

## 2019-08-06 LAB — TROPONIN: <0.01 NG/ML

## 2019-08-06 PROCEDURE — 94761 N-INVAS EAR/PLS OXIMETRY MLT: CPT

## 2019-08-06 PROCEDURE — 6370000000 HC RX 637 (ALT 250 FOR IP): Performed by: INTERNAL MEDICINE

## 2019-08-06 PROCEDURE — 97116 GAIT TRAINING THERAPY: CPT

## 2019-08-06 PROCEDURE — 84484 ASSAY OF TROPONIN QUANT: CPT

## 2019-08-06 PROCEDURE — 2060000000 HC ICU INTERMEDIATE R&B

## 2019-08-06 PROCEDURE — 94640 AIRWAY INHALATION TREATMENT: CPT

## 2019-08-06 PROCEDURE — 6360000002 HC RX W HCPCS: Performed by: INTERNAL MEDICINE

## 2019-08-06 PROCEDURE — 97535 SELF CARE MNGMENT TRAINING: CPT

## 2019-08-06 PROCEDURE — 2580000003 HC RX 258: Performed by: INTERNAL MEDICINE

## 2019-08-06 PROCEDURE — 97166 OT EVAL MOD COMPLEX 45 MIN: CPT

## 2019-08-06 PROCEDURE — 36415 COLL VENOUS BLD VENIPUNCTURE: CPT

## 2019-08-06 PROCEDURE — 99232 SBSQ HOSP IP/OBS MODERATE 35: CPT | Performed by: INTERNAL MEDICINE

## 2019-08-06 PROCEDURE — 94150 VITAL CAPACITY TEST: CPT

## 2019-08-06 PROCEDURE — 97530 THERAPEUTIC ACTIVITIES: CPT

## 2019-08-06 PROCEDURE — 97162 PT EVAL MOD COMPLEX 30 MIN: CPT

## 2019-08-06 PROCEDURE — 2700000000 HC OXYGEN THERAPY PER DAY

## 2019-08-06 RX ORDER — IPRATROPIUM BROMIDE AND ALBUTEROL SULFATE 2.5; .5 MG/3ML; MG/3ML
1 SOLUTION RESPIRATORY (INHALATION)
Status: DISCONTINUED | OUTPATIENT
Start: 2019-08-06 | End: 2019-08-08 | Stop reason: HOSPADM

## 2019-08-06 RX ADMIN — LABETALOL HYDROCHLORIDE 50 MG: 200 TABLET, FILM COATED ORAL at 16:50

## 2019-08-06 RX ADMIN — IPRATROPIUM BROMIDE AND ALBUTEROL SULFATE 1 AMPULE: .5; 3 SOLUTION RESPIRATORY (INHALATION) at 18:48

## 2019-08-06 RX ADMIN — ALBUTEROL SULFATE 2.5 MG: 2.5 SOLUTION RESPIRATORY (INHALATION) at 02:43

## 2019-08-06 RX ADMIN — METHYLPREDNISOLONE SODIUM SUCCINATE 40 MG: 40 INJECTION, POWDER, FOR SOLUTION INTRAMUSCULAR; INTRAVENOUS at 09:22

## 2019-08-06 RX ADMIN — IPRATROPIUM BROMIDE AND ALBUTEROL SULFATE 1 AMPULE: .5; 3 SOLUTION RESPIRATORY (INHALATION) at 15:03

## 2019-08-06 RX ADMIN — IPRATROPIUM BROMIDE AND ALBUTEROL SULFATE 1 AMPULE: .5; 3 SOLUTION RESPIRATORY (INHALATION) at 11:06

## 2019-08-06 RX ADMIN — METHYLPREDNISOLONE SODIUM SUCCINATE 40 MG: 40 INJECTION, POWDER, FOR SOLUTION INTRAMUSCULAR; INTRAVENOUS at 21:20

## 2019-08-06 RX ADMIN — IPRATROPIUM BROMIDE AND ALBUTEROL SULFATE 1 AMPULE: .5; 3 SOLUTION RESPIRATORY (INHALATION) at 07:18

## 2019-08-06 RX ADMIN — Medication 10 ML: at 21:20

## 2019-08-06 RX ADMIN — Medication 10 ML: at 09:21

## 2019-08-06 RX ADMIN — RIVAROXABAN 20 MG: 20 TABLET, FILM COATED ORAL at 18:31

## 2019-08-06 RX ADMIN — CARVEDILOL 6.25 MG: 6.25 TABLET, FILM COATED ORAL at 09:21

## 2019-08-06 RX ADMIN — LOSARTAN POTASSIUM 50 MG: 25 TABLET, FILM COATED ORAL at 09:21

## 2019-08-06 RX ADMIN — CARVEDILOL 6.25 MG: 6.25 TABLET, FILM COATED ORAL at 21:20

## 2019-08-06 RX ADMIN — PANTOPRAZOLE SODIUM 40 MG: 40 TABLET, DELAYED RELEASE ORAL at 06:22

## 2019-08-06 RX ADMIN — AZITHROMYCIN MONOHYDRATE 250 MG: 250 TABLET ORAL at 09:21

## 2019-08-06 RX ADMIN — IPRATROPIUM BROMIDE AND ALBUTEROL SULFATE 1 AMPULE: .5; 3 SOLUTION RESPIRATORY (INHALATION) at 22:40

## 2019-08-06 RX ADMIN — FUROSEMIDE 40 MG: 40 TABLET ORAL at 09:21

## 2019-08-06 RX ADMIN — ATORVASTATIN CALCIUM 20 MG: 10 TABLET, FILM COATED ORAL at 09:21

## 2019-08-06 NOTE — PROGRESS NOTES
Bedside report and transfer of care given to Jg Berumen, [facility]. Pt awake in bed and denies needs.

## 2019-08-06 NOTE — PROGRESS NOTES
maintain SaO2 >92%; wean as tolerated    · Prednisone taper  · Inhaled bronchodilators, may benefit from addition of LAMA as outpatient  · Azithomycin D#3/5 (change to PO)   · Tobacco cessation has been achieved  · I recommend annual low dose screening CT scan for the early detection of lung cancer, in this patient with at least 30 pack year tobacco use & between the ages 54 and 78, per the U.S. Preventive Services Task Force guideline.   This CT should be offered as an outpatient in April 2020  · Maybe home tomorrow if improving

## 2019-08-06 NOTE — PROGRESS NOTES
Inpatient Occupational Therapy  Evaluation and Treatment    Unit: PCU  Date:  2019  Patient Name:    Valentin Gary  Admitting diagnosis:  Hypertensive urgency [I16.0]  Admit Date:  2019  Precautions/Restrictions/WB Status/ Lines/ Wounds/ Oxygen: fall risk, supplemental O2 (2L) and telemetry  fall risk, IV, supplemental O2 (2L), telemetry and continuous pulse ox  Treatment Time:  6781-7137  Treatment Number: 1   Billable Treatment Time: 15 minutes   Total Treatment Time:   35  minutes    Patient Goals for Therapy:  \" return home \"      Discharge Recommendations: Home with PRN assist and home therapy  DME needs for discharge: Shower Chair       Therapy recommendations for staff:   Assist of 1 with use of No AD for all ambulation to/from bathroom      Home Health S4 Level Recommendation:  Level one  AM-PAC Score: 21    Preadmission Environment    Pt. Lives with spouse, works during day  Home environment:    two story home with basement              Bedroom and bathroom on first floor, does not need to go to basement  Steps to enter first floor: 3 + 5 steps to enter and one hand rail  Steps to second floor: Full flight of 12-13  Bathroom: Tub/Shower unit, elevated commode  Equipment owned: crutches   Pt sleeps in recliner     Preadmission Status:  Pt. Able to drive: Yes  Pt Fully independent with ADLs: Yes - reports increased difficulty due to R shoulder  Pt. Required assistance from family for: Independent PTA  Pt. Fully independent for transfers and gait and walked with No Device  History of falls No     Pain   Yes  Location: R shoulder- reports arthritis   Ratin /10  Pain Medicine Status: No request made        Cognition    A&O x4   Able to follow 2 step commands    Subjective  Patient lying supine in bed with  family present  Pt agreeable to this OT eval & tx.      Upper Extremity ROM:    Bilateral shoulder flexion limited to 90 degrees   Upper Extremity Strength:    Bilateral shoulder 3-/5    Upper Extremity Sensation    WFL    Upper Extremity Proprioception:   WFL    Coordination and Tone  WFL    Balance  Functional Sitting Balance:  WFL  Functional Standing Balance:Diminished    Bed mobility:    Supine to sit:   Modified Independent  Sit to supine:   Not Tested  Scooting to head of bed:   Not Tested  Scooting in sitting:  Modified Independent  Rolling:   Not Tested  Bridging:   Not Tested    Transfers:    Sit to stand:  SBA  Stand to sit:  SBA  Bed to [facility]:  Not Tested  Bed to chair:   SBA  Standard toilet: NT    Activity Tolerance   Pt completed therapy session with SOB noted w/activity  SpO2: 92% on 2L O2 supine at rest              88% on 2L O2 with sitting up, < 30 sec to recover to 90%              88% on 2L O2 with ambulation, mod BETANCOURT; ~ 1 min to recover to 90%  HR: 85 bpm supine at rest              91-99 bpm with mobility  BP: 159/91 supine at rest              175/97 following ambulation    Dressing:      UE:   Not Tested  LE:    SBA    Bathing:    UE:  Not Tested  LE:  Not Tested    Eating:   Not Tested    Toileting:  Not Tested    Positioning Needs:   Up in chair, call light and needs in reach. Exercise / Activities Initiated:   N/A    Patient/Family Education:   Role of OT  Recommendations for DC  Energy conservation techniques    Assessment of Deficits: Pt seen for Occupational therapy evaluation in acute care setting. Pt demonstrated decreased Activity Tolerance, Balance and Safety Awareness. Pt functioning below baseline and will likely benefit from skilled occupational therapy services to maximize safety and independence. Goal(s) : To be met in 3 Visits:  1). Bed to toilet/BSC: Supervision    To be met in 5 Visits:  1). Supine to Sit: Independent  2). Upper Body Bathing:  Modified Independent  3). Lower Body Bathing:  Supervision  4). Upper Body Dressing: Modified Independent  5). Lower Body Dressing: Supervision  6).  Pt to alyx UE exs x 15 reps    Rehabilitation Potential:  Good

## 2019-08-06 NOTE — PROGRESS NOTES
Inpatient Physical Therapy Evaluation and Treatment    Unit: PCU  Date:  2019  Patient Name:    Nilsa Buchanan  Admitting diagnosis:  Hypertensive urgency [I16.0]  Admit Date:  2019  Precautions/Restrictions/WB Status/ Lines/ Wounds/ Oxygen: fall risk, IV, supplemental O2 (2L), telemetry and continuous pulse ox    Treatment Time:  8287-5572  Treatment Number:  1   Timed Code Treatment Minutes: 27 minutes  Total Treatment Minutes:  37  minutes    Patient Goals for Therapy: \" Go home \"          Discharge Recommendations: Home with PRN assist and home therapy  DME needs for discharge: shower chair       Therapy recommendation for EMS Transport: NA    Therapy recommendations for staff:   Assist of 1 with use of No AD for all transfers and ambulation within halls    Home Health S4 Level Recommendation:  Level 1 Standard  AM-PAC Mobility Score    AM-PAC Inpatient Mobility Raw Score : 20       Preadmission Environment    Pt. Lives with spouse, works during day  Home environment:  two story home with basement   Bedroom and bathroom on first floor, does not need to go to basement  Steps to enter first floor: 3 + 5 steps to enter and one hand rail  Steps to second floor: Full flight of 12-13  Bathroom: Tub/Shower unit, elevated commode  Equipment owned: crutches   Pt sleeps in recliner    Preadmission Status:  Pt. Able to drive: Yes  Pt Fully independent with ADLs: Yes - reports increased difficulty due to R shoulder  Pt. Required assistance from family for: Independent PTA  Pt. Fully independent for transfers and gait and walked with No Device  History of falls No    Pain   Yes  Location: R shoulder- reports arthritis   Ratin /10  Pain Medicine Status: No request made    Cognition    A&O x4   Able to follow 2 step commands    Subjective  Patient lying supine in bed with spouse present  Pt agreeable to this PT eval & tx. Upper Extremity ROM/Strength  Please see OT evaluation.       Lower Extremity ROM /

## 2019-08-06 NOTE — PROGRESS NOTES
CATARACT WITH INTRAOCULAR LENS IMPLANT RIGHT EYE performed by Ela Jones MD at [address] [facility] Left 10/18/2018    PHACO EMULSIFICATION OF CATARACT WITH  INTRAOCULAR LENS IMPLANT LEFT EYE performed by Ela Jones MD at [facility] OR       Level of Consciousness: Alert, Oriented, and Cooperative = 0    Level of Activity: Mostly sedentary, minimal walking = 2    Respiratory Pattern: Dyspnea with exertion;Irregular pattern;or RR less than 6 = 2    Breath Sounds: Absent bilaterally and/or with wheezes = 3    Sputum   ,  , Sputum How Obtained: Spontaneous cough  Cough: Strong, spontaneous, non-productive = 0    Vital Signs   BP (!) 157/90   Pulse 88   Temp 97.3 °F (36.3 °C)   Resp 22   Ht 5' 10\" (1.778 m)   Wt 209 lb 1.6 oz (94.8 kg)   SpO2 93%   BMI 30.00 kg/m²   SPO2 (COPD values may differ): 88-89% on room air or greater than 92% on FiO2 28- 35% = 2    Peak Flow (asthma only): not applicable = 0    RSI: 37-54 = Q6H or QID and Q4HPRN for dyspnea        Plan       Goals:  Medication delivery    Patient/caregiver was educated on the proper method of use for Respiratory Care Devices:  Yes      Level of patient/caregiver understanding able to:   ? Verbalize understanding   ? Demonstrate understanding       ? Teach back        ? Needs reinforcement       ? No available caregiver               ? Other:     Response to education:  Excellent     Is patient being placed on Home Treatment Regimen? No     Does the patient have everything they need prior to discharge? NA     Comments:  Chart reviewed, patient assessed    Plan of Care:  Duoneb Q4WA, Albuterol prn     Electronically signed by Farzana Davila RCP on 8/6/2019 at 1:33 AM    Respiratory Protocol Guidelines     1. Assessment and treatment by Respiratory Therapy will be initiated for medication and therapeutic interventions upon initiation of aerosolized medication.   2. Physician will be contacted for respiratory rate (RR)

## 2019-08-07 PROCEDURE — 99232 SBSQ HOSP IP/OBS MODERATE 35: CPT | Performed by: INTERNAL MEDICINE

## 2019-08-07 PROCEDURE — 2580000003 HC RX 258: Performed by: INTERNAL MEDICINE

## 2019-08-07 PROCEDURE — 94640 AIRWAY INHALATION TREATMENT: CPT

## 2019-08-07 PROCEDURE — 6370000000 HC RX 637 (ALT 250 FOR IP): Performed by: INTERNAL MEDICINE

## 2019-08-07 PROCEDURE — 97110 THERAPEUTIC EXERCISES: CPT

## 2019-08-07 PROCEDURE — 6360000002 HC RX W HCPCS: Performed by: INTERNAL MEDICINE

## 2019-08-07 PROCEDURE — 97116 GAIT TRAINING THERAPY: CPT

## 2019-08-07 PROCEDURE — 2060000000 HC ICU INTERMEDIATE R&B

## 2019-08-07 PROCEDURE — 94761 N-INVAS EAR/PLS OXIMETRY MLT: CPT

## 2019-08-07 PROCEDURE — 2700000000 HC OXYGEN THERAPY PER DAY

## 2019-08-07 PROCEDURE — 97535 SELF CARE MNGMENT TRAINING: CPT

## 2019-08-07 RX ORDER — AMLODIPINE BESYLATE 5 MG/1
5 TABLET ORAL DAILY
Status: DISCONTINUED | OUTPATIENT
Start: 2019-08-07 | End: 2019-08-08 | Stop reason: HOSPADM

## 2019-08-07 RX ORDER — LOSARTAN POTASSIUM 100 MG/1
100 TABLET ORAL DAILY
Status: DISCONTINUED | OUTPATIENT
Start: 2019-08-07 | End: 2019-08-08 | Stop reason: HOSPADM

## 2019-08-07 RX ADMIN — AMLODIPINE BESYLATE 5 MG: 5 TABLET ORAL at 10:33

## 2019-08-07 RX ADMIN — IPRATROPIUM BROMIDE AND ALBUTEROL SULFATE 1 AMPULE: .5; 3 SOLUTION RESPIRATORY (INHALATION) at 18:39

## 2019-08-07 RX ADMIN — CARVEDILOL 6.25 MG: 6.25 TABLET, FILM COATED ORAL at 10:33

## 2019-08-07 RX ADMIN — LABETALOL HYDROCHLORIDE 50 MG: 200 TABLET, FILM COATED ORAL at 02:47

## 2019-08-07 RX ADMIN — Medication 10 ML: at 21:37

## 2019-08-07 RX ADMIN — AZITHROMYCIN MONOHYDRATE 250 MG: 250 TABLET ORAL at 10:33

## 2019-08-07 RX ADMIN — RIVAROXABAN 20 MG: 20 TABLET, FILM COATED ORAL at 18:21

## 2019-08-07 RX ADMIN — IPRATROPIUM BROMIDE AND ALBUTEROL SULFATE 1 AMPULE: .5; 3 SOLUTION RESPIRATORY (INHALATION) at 15:10

## 2019-08-07 RX ADMIN — ATORVASTATIN CALCIUM 20 MG: 10 TABLET, FILM COATED ORAL at 10:33

## 2019-08-07 RX ADMIN — CARVEDILOL 6.25 MG: 6.25 TABLET, FILM COATED ORAL at 21:37

## 2019-08-07 RX ADMIN — PANTOPRAZOLE SODIUM 40 MG: 40 TABLET, DELAYED RELEASE ORAL at 06:35

## 2019-08-07 RX ADMIN — LOSARTAN POTASSIUM 100 MG: 100 TABLET ORAL at 10:33

## 2019-08-07 RX ADMIN — FUROSEMIDE 40 MG: 40 TABLET ORAL at 10:33

## 2019-08-07 RX ADMIN — IPRATROPIUM BROMIDE AND ALBUTEROL SULFATE 1 AMPULE: .5; 3 SOLUTION RESPIRATORY (INHALATION) at 11:11

## 2019-08-07 RX ADMIN — ALBUTEROL SULFATE 2.5 MG: 2.5 SOLUTION RESPIRATORY (INHALATION) at 02:43

## 2019-08-07 RX ADMIN — PREDNISONE 40 MG: 20 TABLET ORAL at 10:33

## 2019-08-07 RX ADMIN — Medication 10 ML: at 10:33

## 2019-08-07 RX ADMIN — IPRATROPIUM BROMIDE AND ALBUTEROL SULFATE 1 AMPULE: .5; 3 SOLUTION RESPIRATORY (INHALATION) at 22:58

## 2019-08-07 RX ADMIN — IPRATROPIUM BROMIDE AND ALBUTEROL SULFATE 1 AMPULE: .5; 3 SOLUTION RESPIRATORY (INHALATION) at 07:24

## 2019-08-07 ASSESSMENT — PAIN SCALES - GENERAL
PAINLEVEL_OUTOF10: 0

## 2019-08-07 NOTE — FLOWSHEET NOTE
08/07/19 0242   Vital Signs   Temp 97.5 °F (36.4 °C)   Pulse 78   Resp 18   BP (!) 191/104   MAP (mmHg) 133   Oxygen Therapy   SpO2 95 %   Pt noted to be hypertensive. PRN labetalol provided per MD orders. Will continue to monitor.

## 2019-08-07 NOTE — PROGRESS NOTES
Patients family at bedside deny any needs at this time, They have been updated after receiving permission from the patient.

## 2019-08-08 ENCOUNTER — TELEPHONE (OUTPATIENT)
Dept: PULMONOLOGY | Age: 74
End: 2019-08-08

## 2019-08-08 VITALS
WEIGHT: 207.3 LBS | DIASTOLIC BLOOD PRESSURE: 79 MMHG | RESPIRATION RATE: 17 BRPM | OXYGEN SATURATION: 92 % | HEART RATE: 72 BPM | TEMPERATURE: 98.4 F | HEIGHT: 70 IN | SYSTOLIC BLOOD PRESSURE: 156 MMHG | BODY MASS INDEX: 29.68 KG/M2

## 2019-08-08 DIAGNOSIS — J44.9 CHRONIC OBSTRUCTIVE PULMONARY DISEASE, UNSPECIFIED COPD TYPE (HCC): Primary | ICD-10-CM

## 2019-08-08 PROCEDURE — 6370000000 HC RX 637 (ALT 250 FOR IP): Performed by: INTERNAL MEDICINE

## 2019-08-08 PROCEDURE — 99232 SBSQ HOSP IP/OBS MODERATE 35: CPT | Performed by: INTERNAL MEDICINE

## 2019-08-08 PROCEDURE — 94640 AIRWAY INHALATION TREATMENT: CPT

## 2019-08-08 PROCEDURE — 97110 THERAPEUTIC EXERCISES: CPT

## 2019-08-08 PROCEDURE — 6360000002 HC RX W HCPCS: Performed by: INTERNAL MEDICINE

## 2019-08-08 PROCEDURE — 99238 HOSP IP/OBS DSCHRG MGMT 30/<: CPT | Performed by: INTERNAL MEDICINE

## 2019-08-08 PROCEDURE — 97116 GAIT TRAINING THERAPY: CPT

## 2019-08-08 PROCEDURE — 2580000003 HC RX 258: Performed by: INTERNAL MEDICINE

## 2019-08-08 RX ORDER — AMLODIPINE BESYLATE 5 MG/1
5 TABLET ORAL DAILY
Qty: 30 TABLET | Refills: 3 | Status: SHIPPED | OUTPATIENT
Start: 2019-08-09 | End: 2019-09-03 | Stop reason: SDUPTHER

## 2019-08-08 RX ORDER — PREDNISONE 10 MG/1
TABLET ORAL
Qty: 18 TABLET | Refills: 0 | Status: ON HOLD | OUTPATIENT
Start: 2019-08-08 | End: 2019-11-10 | Stop reason: SDUPTHER

## 2019-08-08 RX ORDER — LOSARTAN POTASSIUM 100 MG/1
100 TABLET ORAL DAILY
Qty: 30 TABLET | Refills: 1 | Status: SHIPPED | OUTPATIENT
Start: 2019-08-08 | End: 2019-09-03 | Stop reason: SDUPTHER

## 2019-08-08 RX ADMIN — IPRATROPIUM BROMIDE AND ALBUTEROL SULFATE 1 AMPULE: .5; 3 SOLUTION RESPIRATORY (INHALATION) at 11:07

## 2019-08-08 RX ADMIN — LABETALOL HYDROCHLORIDE 50 MG: 200 TABLET, FILM COATED ORAL at 03:30

## 2019-08-08 RX ADMIN — RIVAROXABAN 20 MG: 20 TABLET, FILM COATED ORAL at 17:40

## 2019-08-08 RX ADMIN — FUROSEMIDE 40 MG: 40 TABLET ORAL at 09:27

## 2019-08-08 RX ADMIN — PREDNISONE 40 MG: 20 TABLET ORAL at 09:28

## 2019-08-08 RX ADMIN — AMLODIPINE BESYLATE 5 MG: 5 TABLET ORAL at 09:27

## 2019-08-08 RX ADMIN — AZITHROMYCIN MONOHYDRATE 250 MG: 250 TABLET ORAL at 09:27

## 2019-08-08 RX ADMIN — ATORVASTATIN CALCIUM 20 MG: 10 TABLET, FILM COATED ORAL at 09:27

## 2019-08-08 RX ADMIN — IPRATROPIUM BROMIDE AND ALBUTEROL SULFATE 1 AMPULE: .5; 3 SOLUTION RESPIRATORY (INHALATION) at 15:19

## 2019-08-08 RX ADMIN — IPRATROPIUM BROMIDE AND ALBUTEROL SULFATE 1 AMPULE: .5; 3 SOLUTION RESPIRATORY (INHALATION) at 06:56

## 2019-08-08 RX ADMIN — Medication 10 ML: at 09:28

## 2019-08-08 RX ADMIN — IPRATROPIUM BROMIDE AND ALBUTEROL SULFATE 1 AMPULE: .5; 3 SOLUTION RESPIRATORY (INHALATION) at 03:21

## 2019-08-08 RX ADMIN — LOSARTAN POTASSIUM 100 MG: 100 TABLET ORAL at 09:27

## 2019-08-08 RX ADMIN — ALBUTEROL SULFATE 2.5 MG: 2.5 SOLUTION RESPIRATORY (INHALATION) at 13:48

## 2019-08-08 RX ADMIN — CARVEDILOL 6.25 MG: 6.25 TABLET, FILM COATED ORAL at 09:27

## 2019-08-08 ASSESSMENT — PAIN SCALES - GENERAL
PAINLEVEL_OUTOF10: 0

## 2019-08-08 NOTE — PLAN OF CARE
Problem: Infection:  Goal: Will remain free from infection  Description  Will remain free from infection  Outcome: Ongoing     Problem: Pain:  Goal: Patient's pain/discomfort is manageable  Description  Patient's pain/discomfort is manageable  Outcome: Ongoing

## 2019-08-08 NOTE — DISCHARGE SUMMARY
PRILOSEC  TAKE 1 CAPSULE BY MOUTH DAILY     potassium chloride 20 MEQ extended release tablet  Commonly known as:  KLOR-CON M  TAKE ONE (1) TABLET BY MOUTH DAILY *NEEDS APPOINTMENT*     rivaroxaban 20 MG Tabs tablet  Commonly known as:  XARELTO  TAKE ONE TABLET BY MOUTH DAILY WITH BREAKFAST. .. NEEDS APPOINTMENT           Where to Get Your Medications      These medications were sent to [address] [place] [address] [phone] F [phone]   [address], [address] [place] [address]    Phone:  [phone]   · amLODIPine 5 MG tablet  · losartan 100 MG tablet  · predniSONE 10 MG tablet  · Umeclidinium Bromide 62.5 MCG/INH Aepb           Discharged in stable condition to home     Follow Up:   Follow up with PCP in 1 week     Seabron Boast, MD 9/11/2019 8:27 AM

## 2019-08-08 NOTE — CARE COORDINATION
DISCHARGE ORDER  Date/Time 2019 2:54 PM  Completed by: Krystyna Thornton, Case Management    Patient Name: Cr Wheeler    : 1945  Admitting Diagnosis: Hypertensive urgency [I16.0]  Admit Date/Time: 2019  7:12 PM    Noted discharge order. Confirmed discharge plan with patient / family (pt and spouse): Yes   Discharge Plan: Reviewed chart. Role of discharge planner explained and patient verbalized understanding. Discharge order is noted. Pt is being d/c'd to home today. New home O2 per [facility]. Shantanu Eubanks to meet with pt/family and deliver portable concentrator today at 4:30pm. Prior to discharge. Pt declines home care and prefers to follow up at [facility] PT. Discussed DCp with Devika Alcala. All DCP needs met.

## 2019-08-08 NOTE — PROGRESS NOTES
Sitting:  Good   Static Standing: Good    Tolerance:   Dynamic Standing: Good - , unilateral UE support for standing exercises    Patient Education      Role of PT, POC, Discharge recommendations, safety awareness, transfer techniques, pursed lip breathing, HEP and calling for assist with mobility. Positioning Needs       Pt sitting up in chair, call light and needs in reach. Activity Tolerance   Pt completed therapy session with SOB noted w/activity, pt able to recover with seated rest break  SpO2: 90% on 2L O2 at rest   87% on 2L O2 with ambulation   92% on 2L O2 sitting up in chair at end of treatment  HR: 80 sitting at rest   78 bpm with ambulation  BP: 149/86 supine at rest   148/87 post ambulation    Pt with moderate BETANCOURT with ambulation, breathing treatment requested, notified RT. Other  None. Assessment :  Patient demonstrates decreased functional activity tolerance with increased BETANCOURT with mobility. Pt requires increased rest breaks and cues for PLB. Seated exercises this date due to BETANCOURT. Continue to recommend home PRN assist and home PT at discharge. Goals (all goals ongoing unless otherwise indicated)  To be met in 3 visits:  1). Independent with LE Ex x 10 reps     To be met in 6 visits:    2). Sit to/from stand: Independent  3). Bed to chair: Independent  4). Gait: Ambulate 150 ft.  with  Supervision  and use of LRAD  5). Tolerate B LE exercises 3 sets of 10-15 reps  6). Ascend/descend 3 + 5 steps with Supervision with use of one hand rail and LRAD. Plan   Continue with plan of care. Try steps. William Nichols, PT, DPT #[id number]    If patient discharges from this facility prior to next visit, this note will serve as the Discharge Summary.

## 2019-08-09 ENCOUNTER — TELEPHONE (OUTPATIENT)
Dept: INTERNAL MEDICINE CLINIC | Age: 74
End: 2019-08-09

## 2019-08-12 ENCOUNTER — TELEPHONE (OUTPATIENT)
Dept: PHARMACY | Facility: CLINIC | Age: 74
End: 2019-08-12

## 2019-08-12 DIAGNOSIS — I16.0 HYPERTENSIVE URGENCY: Primary | ICD-10-CM

## 2019-08-12 PROCEDURE — 1111F DSCHRG MED/CURRENT MED MERGE: CPT | Performed by: PHARMACIST

## 2019-08-12 NOTE — TELEPHONE ENCOUNTER
Reviewed and agree with PharmD candidate below.   · Patient reports much improved BPs 140-145/90s and breathing symptoms (reports no further cough like he'd been experiencing)  · As below, if further concern re breathing s/s, future consideration may be given to metoprolol in place of carvedilol (selective beta blocker to lessen potential for interaction with inhaled beta agonists, in patient also with HF and afib)  · Confirms has Incruse and was instructed on how to use (DPI v MDI inhaler); also counseled re 2p BID regular use of Symbicort    Win Crawford, PharmD, [address]  Direct: [phone]  Department, toll free: [phone], option 7     =======================================================   For Pharmacy Admin Tracking Only    TCM Call Made?: No  [facility] Select Patient?: Yes  Total # of Interventions Recommended: 1 -   - Increased Dose #: 1  Total # Interventions Accepted: 1  Intervention Severity:   - Level 1 Intervention Present?: No   - Level 2 #: 0   - Level 3 #: 1  Outreach Status: Review Complete  Care Coordinator Outreach to Patient?: No  Provider Contacted?: No  Time Spent (min): 20

## 2019-08-13 ENCOUNTER — CARE COORDINATION (OUTPATIENT)
Dept: CASE MANAGEMENT | Age: 74
End: 2019-08-13

## 2019-08-20 ENCOUNTER — CARE COORDINATION (OUTPATIENT)
Dept: CASE MANAGEMENT | Age: 74
End: 2019-08-20

## 2019-08-20 NOTE — CARE COORDINATION
[facility] Transitions Follow Up Call    2019    Patient: German Leon  Patient : 1945   MRN: [id number]  Reason for Admission: aeCOPD, HTN  Discharge Date: 19 RARS: Readmission Risk Score: 13       Unable to reach patient by phone. Message left stating purpose of call with contact information requesting return call.        Follow Up  Future Appointments   Date Time Provider Zhane Gramajo   2019  3:00 PM [facility] PULMONARY FUNCTION TEST[facility] None   2019  4:00 PM Abigail Pretty, MD [facility] PULM [facility]   9/3/2019 10:10 AM MD [facility] None       Norm Leon RN

## 2019-08-22 ENCOUNTER — HOSPITAL ENCOUNTER (OUTPATIENT)
Dept: PULMONOLOGY | Age: 74
Discharge: HOME | End: 2019-08-22
Payer: MEDICARE

## 2019-08-22 ENCOUNTER — OFFICE VISIT (OUTPATIENT)
Dept: PULMONOLOGY | Age: 74
End: 2019-08-22
Payer: MEDICARE

## 2019-08-22 VITALS
WEIGHT: 217.2 LBS | HEIGHT: 70 IN | BODY MASS INDEX: 31.09 KG/M2 | TEMPERATURE: 98.2 F | HEART RATE: 77 BPM | OXYGEN SATURATION: 97 % | RESPIRATION RATE: 16 BRPM | SYSTOLIC BLOOD PRESSURE: 124 MMHG | DIASTOLIC BLOOD PRESSURE: 82 MMHG

## 2019-08-22 VITALS — OXYGEN SATURATION: 97 %

## 2019-08-22 DIAGNOSIS — J44.9 CHRONIC OBSTRUCTIVE PULMONARY DISEASE, UNSPECIFIED COPD TYPE (HCC): ICD-10-CM

## 2019-08-22 DIAGNOSIS — J96.01 ACUTE HYPOXEMIC RESPIRATORY FAILURE (HCC): ICD-10-CM

## 2019-08-22 DIAGNOSIS — J44.9 CHRONIC OBSTRUCTIVE PULMONARY DISEASE, UNSPECIFIED COPD TYPE (HCC): Primary | ICD-10-CM

## 2019-08-22 LAB
DLCO %PRED: 53 %
DLCO PRED: NORMAL ML/MIN/MMHG
DLCO/VA %PRED: NORMAL %
DLCO/VA PRED: NORMAL ML/MIN/MMHG
DLCO/VA: NORMAL ML/MIN/MMHG
DLCO: NORMAL ML/MIN/MMHG
EXPIRATORY TIME-POST: NORMAL SEC
EXPIRATORY TIME: NORMAL SEC
FEF 25-75% %CHNG: NORMAL
FEF 25-75% %PRED-POST: NORMAL %
FEF 25-75% %PRED-PRE: NORMAL L/SEC
FEF 25-75% PRED: NORMAL L/SEC
FEF 25-75%-POST: NORMAL L/SEC
FEF 25-75%-PRE: NORMAL L/SEC
FEV1 %PRED-POST: 73 %
FEV1 %PRED-PRE: 70 %
FEV1 PRED: NORMAL L
FEV1-POST: NORMAL L
FEV1-PRE: NORMAL L
FEV1/FVC %PRED-POST: NORMAL %
FEV1/FVC %PRED-PRE: NORMAL %
FEV1/FVC PRED: NORMAL %
FEV1/FVC-POST: 67 %
FEV1/FVC-PRE: 67 %
FVC %PRED-POST: NORMAL L
FVC %PRED-PRE: NORMAL %
FVC PRED: NORMAL L
FVC-POST: NORMAL L
FVC-PRE: NORMAL L
GAW %PRED: NORMAL %
GAW PRED: NORMAL L/S/CMH2O
GAW: NORMAL L/S/CMH2O
IC %PRED: NORMAL %
IC PRED: NORMAL L
IC: NORMAL L
MEP: NORMAL
MIP: NORMAL
MVV %PRED-PRE: NORMAL %
MVV PRED: NORMAL L/MIN
MVV-PRE: NORMAL L/MIN
PEF %PRED-POST: NORMAL %
PEF %PRED-PRE: NORMAL L/SEC
PEF PRED: NORMAL L/SEC
PEF%CHNG: NORMAL
PEF-POST: NORMAL L/SEC
PEF-PRE: NORMAL L/SEC
RAW %PRED: NORMAL %
RAW PRED: NORMAL CMH2O/L/S
RAW: NORMAL CMH2O/L/S
RV %PRED: NORMAL %
RV PRED: NORMAL L
RV: NORMAL L
SVC %PRED: NORMAL %
SVC PRED: NORMAL L
SVC: NORMAL L
TLC %PRED: 92 %
TLC PRED: NORMAL L
TLC: NORMAL L
VA %PRED: NORMAL %
VA PRED: NORMAL L
VA: NORMAL L
VTG %PRED: NORMAL %
VTG PRED: NORMAL L
VTG: NORMAL L

## 2019-08-22 PROCEDURE — 94640 AIRWAY INHALATION TREATMENT: CPT

## 2019-08-22 PROCEDURE — 94729 DIFFUSING CAPACITY: CPT

## 2019-08-22 PROCEDURE — 94060 EVALUATION OF WHEEZING: CPT

## 2019-08-22 PROCEDURE — 94760 N-INVAS EAR/PLS OXIMETRY 1: CPT

## 2019-08-22 PROCEDURE — 94726 PLETHYSMOGRAPHY LUNG VOLUMES: CPT

## 2019-08-22 PROCEDURE — 99214 OFFICE O/P EST MOD 30 MIN: CPT | Performed by: INTERNAL MEDICINE

## 2019-08-22 PROCEDURE — 6360000002 HC RX W HCPCS: Performed by: INTERNAL MEDICINE

## 2019-08-22 RX ORDER — ALBUTEROL SULFATE 2.5 MG/3ML
2.5 SOLUTION RESPIRATORY (INHALATION) ONCE
Status: COMPLETED | OUTPATIENT
Start: 2019-08-22 | End: 2019-08-22

## 2019-08-22 RX ORDER — CILOSTAZOL 50 MG/1
50 TABLET ORAL DAILY
Status: ON HOLD | COMMUNITY
End: 2019-12-23

## 2019-08-22 RX ORDER — AMIODARONE HYDROCHLORIDE 200 MG/1
200 TABLET ORAL 2 TIMES DAILY
COMMUNITY
End: 2020-01-03 | Stop reason: SDUPTHER

## 2019-08-22 RX ADMIN — ALBUTEROL SULFATE 2.5 MG: 2.5 SOLUTION RESPIRATORY (INHALATION) at 14:46

## 2019-08-22 ASSESSMENT — PULMONARY FUNCTION TESTS
FEV1_PERCENT_PREDICTED_POST: 73
FEV1/FVC_POST: 67
FEV1_PERCENT_PREDICTED_PRE: 70
FEV1/FVC_PRE: 67

## 2019-08-23 ENCOUNTER — CARE COORDINATION (OUTPATIENT)
Dept: CASE MANAGEMENT | Age: 74
End: 2019-08-23

## 2019-09-03 ENCOUNTER — OFFICE VISIT (OUTPATIENT)
Dept: INTERNAL MEDICINE CLINIC | Age: 74
End: 2019-09-03

## 2019-09-03 VITALS
HEIGHT: 70 IN | WEIGHT: 218 LBS | DIASTOLIC BLOOD PRESSURE: 80 MMHG | BODY MASS INDEX: 31.21 KG/M2 | RESPIRATION RATE: 14 BRPM | HEART RATE: 70 BPM | SYSTOLIC BLOOD PRESSURE: 120 MMHG

## 2019-09-03 DIAGNOSIS — I10 ESSENTIAL HYPERTENSION: Primary | ICD-10-CM

## 2019-09-03 DIAGNOSIS — I10 BENIGN ESSENTIAL HYPERTENSION: ICD-10-CM

## 2019-09-03 DIAGNOSIS — I71.20 THORACIC AORTIC ANEURYSM WITHOUT RUPTURE: ICD-10-CM

## 2019-09-03 DIAGNOSIS — I42.0 DILATED CARDIOMYOPATHY (HCC): ICD-10-CM

## 2019-09-03 DIAGNOSIS — J44.9 CHRONIC OBSTRUCTIVE PULMONARY DISEASE, UNSPECIFIED COPD TYPE (HCC): ICD-10-CM

## 2019-09-03 DIAGNOSIS — N13.8 ENLARGED PROSTATE WITH URINARY OBSTRUCTION: ICD-10-CM

## 2019-09-03 DIAGNOSIS — I73.9 PAD (PERIPHERAL ARTERY DISEASE) (HCC): ICD-10-CM

## 2019-09-03 DIAGNOSIS — I50.22 CHRONIC SYSTOLIC CHF (CONGESTIVE HEART FAILURE) (HCC): ICD-10-CM

## 2019-09-03 DIAGNOSIS — N40.1 ENLARGED PROSTATE WITH URINARY OBSTRUCTION: ICD-10-CM

## 2019-09-03 DIAGNOSIS — K21.9 GASTROESOPHAGEAL REFLUX DISEASE WITHOUT ESOPHAGITIS: ICD-10-CM

## 2019-09-03 PROBLEM — I16.0 HYPERTENSIVE URGENCY: Status: RESOLVED | Noted: 2019-08-04 | Resolved: 2019-09-03

## 2019-09-03 PROCEDURE — 99214 OFFICE O/P EST MOD 30 MIN: CPT | Performed by: INTERNAL MEDICINE

## 2019-09-03 RX ORDER — OMEPRAZOLE 40 MG/1
CAPSULE, DELAYED RELEASE ORAL
Qty: 90 CAPSULE | Refills: 0 | Status: ON HOLD | OUTPATIENT
Start: 2019-09-03 | End: 2019-11-13 | Stop reason: HOSPADM

## 2019-09-03 RX ORDER — AMLODIPINE BESYLATE 5 MG/1
5 TABLET ORAL DAILY
Qty: 30 TABLET | Refills: 0 | Status: SHIPPED | OUTPATIENT
Start: 2019-09-03 | End: 2019-11-01 | Stop reason: SDUPTHER

## 2019-09-03 RX ORDER — FUROSEMIDE 40 MG/1
TABLET ORAL
Qty: 90 TABLET | Refills: 0 | Status: SHIPPED | OUTPATIENT
Start: 2019-09-03 | End: 2019-12-16 | Stop reason: SDUPTHER

## 2019-09-03 RX ORDER — POTASSIUM CHLORIDE 20 MEQ/1
TABLET, EXTENDED RELEASE ORAL
Qty: 90 TABLET | Refills: 0 | Status: ON HOLD | OUTPATIENT
Start: 2019-09-03 | End: 2019-11-13 | Stop reason: HOSPADM

## 2019-09-03 RX ORDER — ATORVASTATIN CALCIUM 20 MG/1
20 TABLET, FILM COATED ORAL DAILY
Qty: 90 TABLET | Refills: 0 | Status: SHIPPED | OUTPATIENT
Start: 2019-09-03 | End: 2020-01-03 | Stop reason: SDUPTHER

## 2019-09-03 RX ORDER — IPRATROPIUM BROMIDE AND ALBUTEROL SULFATE 2.5; .5 MG/3ML; MG/3ML
1 SOLUTION RESPIRATORY (INHALATION) EVERY 4 HOURS PRN
Qty: 360 ML | Refills: 0 | Status: SHIPPED | OUTPATIENT
Start: 2019-09-03 | End: 2019-10-09 | Stop reason: SDUPTHER

## 2019-09-03 RX ORDER — CARVEDILOL 6.25 MG/1
TABLET ORAL
Qty: 180 TABLET | Refills: 0 | Status: ON HOLD | OUTPATIENT
Start: 2019-09-03 | End: 2019-12-28 | Stop reason: HOSPADM

## 2019-09-03 RX ORDER — LOSARTAN POTASSIUM 100 MG/1
100 TABLET ORAL DAILY
Qty: 30 TABLET | Refills: 1 | Status: SHIPPED | OUTPATIENT
Start: 2019-09-03 | End: 2019-12-16 | Stop reason: SDUPTHER

## 2019-09-03 ASSESSMENT — ENCOUNTER SYMPTOMS
NAUSEA: 0
RHINORRHEA: 0
ABDOMINAL PAIN: 0
VOMITING: 0
SHORTNESS OF BREATH: 0
WHEEZING: 0
BACK PAIN: 0

## 2019-09-03 NOTE — PROGRESS NOTES
BREAKFAST. .. NEEDS APPOINTMENT, Disp: 90 tablet, Rfl: 0    omeprazole (PRILOSEC) 40 MG delayed release capsule, TAKE 1 CAPSULE BY MOUTH DAILY, Disp: 90 capsule, Rfl: 0    potassium chloride (KLOR-CON M) 20 MEQ extended release tablet, TAKE ONE (1) TABLET BY MOUTH DAILY *NEEDS APPOINTMENT*, Disp: 90 tablet, Rfl: 0    ipratropium-albuterol (DUONEB) 0.5-2.5 (3) MG/3ML SOLN nebulizer solution, Inhale 3 mLs into the lungs every 4 hours as needed for Shortness of Breath, Disp: 360 mL, Rfl: 0    amiodarone (CORDARONE) 200 MG tablet, Take 200 mg by mouth 2 times daily, Disp: , Rfl:     cilostazol (PLETAL) 50 MG tablet, Take 50 mg by mouth daily, Disp: , Rfl:     predniSONE (DELTASONE) 10 MG tablet, 30 mg x 3 days, 20 mg x 3 days, 10 mg x 3 days then stop, Disp: 18 tablet, Rfl: 0    Umeclidinium Bromide (INCRUSE ELLIPTA) 62.5 MCG/INH AEPB, Inhale 1 Inhaler into the lungs daily, Disp: 1 each, Rfl: 0    budesonide-formoterol (SYMBICORT) 160-4.5 MCG/ACT AERO, Inhale 2 puffs into the lungs 2 times daily, Disp: 3 Inhaler, Rfl: 2    albuterol sulfate HFA (PROVENTIL HFA) 108 (90 Base) MCG/ACT inhaler, Inhale 2 puffs into the lungs every 6 hours as needed for Wheezing (with spacer), Disp: 1 Inhaler, Rfl: 11      /80 (Site: Right Upper Arm, Position: Sitting, Cuff Size: Medium Adult)   Pulse 70   Resp 14   Ht 5' 10\" (1.778 m)   Wt 218 lb (98.9 kg)   BMI 31.28 kg/m²      Objective:   Physical Exam   Constitutional: He is oriented to person, place, and time. He appears well-developed and well-nourished. HENT:   Head: Normocephalic. Eyes: Pupils are equal, round, and reactive to light. Conjunctivae and EOM are normal.   Neck: Trachea normal and normal range of motion. Neck supple. No JVD present. Carotid bruit is not present. No thyroid mass and no thyromegaly present. Cardiovascular: Normal rate and normal heart sounds. An irregular rhythm present. Exam reveals no gallop. No murmur heard.   Pulmonary/Chest: medical regimen is effective;  continue present plan and medications. See orders.

## 2019-10-03 ENCOUNTER — TELEPHONE (OUTPATIENT)
Dept: PULMONOLOGY | Age: 74
End: 2019-10-03

## 2019-10-03 RX ORDER — PREDNISONE 10 MG/1
TABLET ORAL
Qty: 30 TABLET | Refills: 0 | Status: SHIPPED | OUTPATIENT
Start: 2019-10-03 | End: 2019-10-15

## 2019-10-09 RX ORDER — IPRATROPIUM BROMIDE AND ALBUTEROL SULFATE 2.5; .5 MG/3ML; MG/3ML
1 SOLUTION RESPIRATORY (INHALATION) EVERY 4 HOURS PRN
Qty: 360 ML | Refills: 0 | Status: SHIPPED | OUTPATIENT
Start: 2019-10-09 | End: 2019-11-15 | Stop reason: SDUPTHER

## 2019-11-01 RX ORDER — AMLODIPINE BESYLATE 5 MG/1
5 TABLET ORAL DAILY
Qty: 30 TABLET | Refills: 1 | Status: SHIPPED | OUTPATIENT
Start: 2019-11-01 | End: 2020-01-03 | Stop reason: SDUPTHER

## 2019-11-04 ENCOUNTER — HOSPITAL ENCOUNTER (INPATIENT)
Age: 74
LOS: 9 days | Discharge: HOME | DRG: 190 | End: 2019-11-13
Attending: INTERNAL MEDICINE | Admitting: INTERNAL MEDICINE
Payer: MEDICARE

## 2019-11-04 ENCOUNTER — APPOINTMENT (OUTPATIENT)
Dept: GENERAL RADIOLOGY | Age: 74
DRG: 190 | End: 2019-11-04
Attending: INTERNAL MEDICINE
Payer: MEDICARE

## 2019-11-04 ENCOUNTER — OFFICE VISIT (OUTPATIENT)
Dept: INTERNAL MEDICINE CLINIC | Age: 74
End: 2019-11-04

## 2019-11-04 VITALS
BODY MASS INDEX: 32.93 KG/M2 | SYSTOLIC BLOOD PRESSURE: 146 MMHG | DIASTOLIC BLOOD PRESSURE: 80 MMHG | RESPIRATION RATE: 26 BRPM | HEIGHT: 70 IN | OXYGEN SATURATION: 94 % | HEART RATE: 92 BPM | WEIGHT: 230 LBS

## 2019-11-04 DIAGNOSIS — J44.1 CHRONIC OBSTRUCTIVE PULMONARY DISEASE WITH ACUTE EXACERBATION (HCC): Primary | ICD-10-CM

## 2019-11-04 PROBLEM — I50.23 ACUTE ON CHRONIC SYSTOLIC CHF (CONGESTIVE HEART FAILURE) (HCC): Status: ACTIVE | Noted: 2017-08-28

## 2019-11-04 LAB
A/G RATIO: 1.1 (ref 1.1–2.2)
ALBUMIN SERPL-MCNC: 4.2 G/DL (ref 3.4–5)
ALP BLD-CCNC: 92 U/L (ref 40–129)
ALT SERPL-CCNC: 8 U/L (ref 10–40)
ANION GAP SERPL CALCULATED.3IONS-SCNC: 11 MMOL/L (ref 3–16)
AST SERPL-CCNC: 12 U/L (ref 15–37)
BASOPHILS ABSOLUTE: 0.1 K/UL (ref 0–0.2)
BASOPHILS RELATIVE PERCENT: 2.1 %
BILIRUB SERPL-MCNC: 0.5 MG/DL (ref 0–1)
BUN BLDV-MCNC: 15 MG/DL (ref 7–20)
CALCIUM SERPL-MCNC: 9.5 MG/DL (ref 8.3–10.6)
CHLORIDE BLD-SCNC: 104 MMOL/L (ref 99–110)
CO2: 27 MMOL/L (ref 21–32)
CREAT SERPL-MCNC: 1.3 MG/DL (ref 0.8–1.3)
EOSINOPHILS ABSOLUTE: 1 K/UL (ref 0–0.6)
EOSINOPHILS RELATIVE PERCENT: 15.6 %
GFR AFRICAN AMERICAN: >60
GFR NON-AFRICAN AMERICAN: 54
GLOBULIN: 3.7 G/DL
GLUCOSE BLD-MCNC: 95 MG/DL (ref 70–99)
HCT VFR BLD CALC: 43.3 % (ref 40.5–52.5)
HEMOGLOBIN: 14.1 G/DL (ref 13.5–17.5)
LACTIC ACID: 1 MMOL/L (ref 0.4–2)
LYMPHOCYTES ABSOLUTE: 1.9 K/UL (ref 1–5.1)
LYMPHOCYTES RELATIVE PERCENT: 29.2 %
MCH RBC QN AUTO: 30.3 PG (ref 26–34)
MCHC RBC AUTO-ENTMCNC: 32.7 G/DL (ref 31–36)
MCV RBC AUTO: 92.8 FL (ref 80–100)
MONOCYTES ABSOLUTE: 0.6 K/UL (ref 0–1.3)
MONOCYTES RELATIVE PERCENT: 9.1 %
NEUTROPHILS ABSOLUTE: 2.9 K/UL (ref 1.7–7.7)
NEUTROPHILS RELATIVE PERCENT: 44 %
PDW BLD-RTO: 16.3 % (ref 12.4–15.4)
PLATELET # BLD: 271 K/UL (ref 135–450)
PMV BLD AUTO: 7.8 FL (ref 5–10.5)
POTASSIUM REFLEX MAGNESIUM: 4.9 MMOL/L (ref 3.5–5.1)
PROCALCITONIN: 0.06 NG/ML (ref 0–0.15)
RBC # BLD: 4.66 M/UL (ref 4.2–5.9)
SODIUM BLD-SCNC: 142 MMOL/L (ref 136–145)
TOTAL PROTEIN: 7.9 G/DL (ref 6.4–8.2)
WBC # BLD: 6.6 K/UL (ref 4–11)

## 2019-11-04 PROCEDURE — 2580000003 HC RX 258: Performed by: NURSE PRACTITIONER

## 2019-11-04 PROCEDURE — 94640 AIRWAY INHALATION TREATMENT: CPT

## 2019-11-04 PROCEDURE — 83605 ASSAY OF LACTIC ACID: CPT

## 2019-11-04 PROCEDURE — 94761 N-INVAS EAR/PLS OXIMETRY MLT: CPT

## 2019-11-04 PROCEDURE — 6360000002 HC RX W HCPCS: Performed by: NURSE PRACTITIONER

## 2019-11-04 PROCEDURE — 6370000000 HC RX 637 (ALT 250 FOR IP): Performed by: NURSE PRACTITIONER

## 2019-11-04 PROCEDURE — 99223 1ST HOSP IP/OBS HIGH 75: CPT | Performed by: INTERNAL MEDICINE

## 2019-11-04 PROCEDURE — 99222 1ST HOSP IP/OBS MODERATE 55: CPT | Performed by: NURSE PRACTITIONER

## 2019-11-04 PROCEDURE — 2700000000 HC OXYGEN THERAPY PER DAY

## 2019-11-04 PROCEDURE — 71046 X-RAY EXAM CHEST 2 VIEWS: CPT

## 2019-11-04 PROCEDURE — 1200000000 HC SEMI PRIVATE

## 2019-11-04 PROCEDURE — 80053 COMPREHEN METABOLIC PANEL: CPT

## 2019-11-04 PROCEDURE — 36415 COLL VENOUS BLD VENIPUNCTURE: CPT

## 2019-11-04 PROCEDURE — 6370000000 HC RX 637 (ALT 250 FOR IP): Performed by: INTERNAL MEDICINE

## 2019-11-04 PROCEDURE — 85025 COMPLETE CBC W/AUTO DIFF WBC: CPT

## 2019-11-04 PROCEDURE — 84145 PROCALCITONIN (PCT): CPT

## 2019-11-04 RX ORDER — CARVEDILOL 6.25 MG/1
6.25 TABLET ORAL 2 TIMES DAILY WITH MEALS
Status: DISCONTINUED | OUTPATIENT
Start: 2019-11-04 | End: 2019-11-13 | Stop reason: HOSPADM

## 2019-11-04 RX ORDER — METHYLPREDNISOLONE SODIUM SUCCINATE 40 MG/ML
40 INJECTION, POWDER, LYOPHILIZED, FOR SOLUTION INTRAMUSCULAR; INTRAVENOUS EVERY 6 HOURS
Status: DISCONTINUED | OUTPATIENT
Start: 2019-11-04 | End: 2019-11-05

## 2019-11-04 RX ORDER — POLYETHYLENE GLYCOL 3350 17 G/17G
17 POWDER, FOR SOLUTION ORAL DAILY PRN
Status: DISCONTINUED | OUTPATIENT
Start: 2019-11-04 | End: 2019-11-13 | Stop reason: HOSPADM

## 2019-11-04 RX ORDER — AMIODARONE HYDROCHLORIDE 200 MG/1
200 TABLET ORAL 2 TIMES DAILY
Status: DISCONTINUED | OUTPATIENT
Start: 2019-11-04 | End: 2019-11-13 | Stop reason: HOSPADM

## 2019-11-04 RX ORDER — POTASSIUM CHLORIDE 20 MEQ/1
20 TABLET, EXTENDED RELEASE ORAL
Status: DISCONTINUED | OUTPATIENT
Start: 2019-11-05 | End: 2019-11-13 | Stop reason: HOSPADM

## 2019-11-04 RX ORDER — SODIUM CHLORIDE 0.9 % (FLUSH) 0.9 %
10 SYRINGE (ML) INJECTION PRN
Status: DISCONTINUED | OUTPATIENT
Start: 2019-11-04 | End: 2019-11-13 | Stop reason: HOSPADM

## 2019-11-04 RX ORDER — IPRATROPIUM BROMIDE AND ALBUTEROL SULFATE 2.5; .5 MG/3ML; MG/3ML
1 SOLUTION RESPIRATORY (INHALATION) EVERY 4 HOURS
Status: DISCONTINUED | OUTPATIENT
Start: 2019-11-04 | End: 2019-11-13 | Stop reason: HOSPADM

## 2019-11-04 RX ORDER — FUROSEMIDE 10 MG/ML
40 INJECTION INTRAMUSCULAR; INTRAVENOUS ONCE
Status: COMPLETED | OUTPATIENT
Start: 2019-11-04 | End: 2019-11-04

## 2019-11-04 RX ORDER — PANTOPRAZOLE SODIUM 40 MG/1
40 TABLET, DELAYED RELEASE ORAL
Status: DISCONTINUED | OUTPATIENT
Start: 2019-11-05 | End: 2019-11-13

## 2019-11-04 RX ORDER — ACETAMINOPHEN 325 MG/1
650 TABLET ORAL EVERY 6 HOURS PRN
Status: DISCONTINUED | OUTPATIENT
Start: 2019-11-04 | End: 2019-11-13 | Stop reason: HOSPADM

## 2019-11-04 RX ORDER — AMLODIPINE BESYLATE 5 MG/1
5 TABLET ORAL DAILY
Status: DISCONTINUED | OUTPATIENT
Start: 2019-11-04 | End: 2019-11-13 | Stop reason: HOSPADM

## 2019-11-04 RX ORDER — ONDANSETRON 2 MG/ML
4 INJECTION INTRAMUSCULAR; INTRAVENOUS EVERY 6 HOURS PRN
Status: DISCONTINUED | OUTPATIENT
Start: 2019-11-04 | End: 2019-11-13 | Stop reason: HOSPADM

## 2019-11-04 RX ORDER — ATORVASTATIN CALCIUM 10 MG/1
20 TABLET, FILM COATED ORAL DAILY
Status: DISCONTINUED | OUTPATIENT
Start: 2019-11-04 | End: 2019-11-13 | Stop reason: HOSPADM

## 2019-11-04 RX ORDER — DOXYCYCLINE HYCLATE 100 MG
100 TABLET ORAL EVERY 12 HOURS
Status: DISCONTINUED | OUTPATIENT
Start: 2019-11-04 | End: 2019-11-12

## 2019-11-04 RX ORDER — IPRATROPIUM BROMIDE AND ALBUTEROL SULFATE 2.5; .5 MG/3ML; MG/3ML
1 SOLUTION RESPIRATORY (INHALATION)
Status: DISCONTINUED | OUTPATIENT
Start: 2019-11-04 | End: 2019-11-04

## 2019-11-04 RX ORDER — SODIUM CHLORIDE 0.9 % (FLUSH) 0.9 %
10 SYRINGE (ML) INJECTION EVERY 12 HOURS SCHEDULED
Status: DISCONTINUED | OUTPATIENT
Start: 2019-11-04 | End: 2019-11-13 | Stop reason: HOSPADM

## 2019-11-04 RX ORDER — FUROSEMIDE 40 MG/1
40 TABLET ORAL DAILY
Status: DISCONTINUED | OUTPATIENT
Start: 2019-11-05 | End: 2019-11-13 | Stop reason: HOSPADM

## 2019-11-04 RX ORDER — PREDNISONE 20 MG/1
40 TABLET ORAL DAILY
Status: DISCONTINUED | OUTPATIENT
Start: 2019-11-06 | End: 2019-11-05

## 2019-11-04 RX ORDER — CILOSTAZOL 100 MG/1
50 TABLET ORAL DAILY
Status: DISCONTINUED | OUTPATIENT
Start: 2019-11-04 | End: 2019-11-13 | Stop reason: HOSPADM

## 2019-11-04 RX ORDER — ERYTHROMYCIN 5 MG/G
OINTMENT OPHTHALMIC EVERY 8 HOURS SCHEDULED
Status: DISCONTINUED | OUTPATIENT
Start: 2019-11-04 | End: 2019-11-13 | Stop reason: HOSPADM

## 2019-11-04 RX ORDER — LOSARTAN POTASSIUM 100 MG/1
100 TABLET ORAL DAILY
Status: DISCONTINUED | OUTPATIENT
Start: 2019-11-04 | End: 2019-11-13 | Stop reason: HOSPADM

## 2019-11-04 RX ORDER — ALBUTEROL SULFATE 2.5 MG/3ML
2.5 SOLUTION RESPIRATORY (INHALATION)
Status: DISCONTINUED | OUTPATIENT
Start: 2019-11-04 | End: 2019-11-05

## 2019-11-04 RX ADMIN — Medication 10 ML: at 20:14

## 2019-11-04 RX ADMIN — ACETAMINOPHEN 650 MG: 325 TABLET ORAL at 22:43

## 2019-11-04 RX ADMIN — ERYTHROMYCIN: 5 OINTMENT OPHTHALMIC at 14:25

## 2019-11-04 RX ADMIN — DOXYCYCLINE HYCLATE 100 MG: 100 TABLET, COATED ORAL at 14:25

## 2019-11-04 RX ADMIN — IPRATROPIUM BROMIDE AND ALBUTEROL SULFATE 1 AMPULE: .5; 3 SOLUTION RESPIRATORY (INHALATION) at 19:05

## 2019-11-04 RX ADMIN — ATORVASTATIN CALCIUM 20 MG: 10 TABLET, FILM COATED ORAL at 20:13

## 2019-11-04 RX ADMIN — ALBUTEROL SULFATE 2.5 MG: 2.5 SOLUTION RESPIRATORY (INHALATION) at 15:50

## 2019-11-04 RX ADMIN — METHYLPREDNISOLONE SODIUM SUCCINATE 40 MG: 40 INJECTION, POWDER, FOR SOLUTION INTRAMUSCULAR; INTRAVENOUS at 13:58

## 2019-11-04 RX ADMIN — IPRATROPIUM BROMIDE AND ALBUTEROL SULFATE 1 AMPULE: .5; 3 SOLUTION RESPIRATORY (INHALATION) at 14:02

## 2019-11-04 RX ADMIN — ERYTHROMYCIN: 5 OINTMENT OPHTHALMIC at 20:14

## 2019-11-04 RX ADMIN — FUROSEMIDE 40 MG: 10 INJECTION, SOLUTION INTRAMUSCULAR; INTRAVENOUS at 13:58

## 2019-11-04 RX ADMIN — AMIODARONE HYDROCHLORIDE 200 MG: 200 TABLET ORAL at 20:13

## 2019-11-04 RX ADMIN — IPRATROPIUM BROMIDE AND ALBUTEROL SULFATE 1 AMPULE: .5; 3 SOLUTION RESPIRATORY (INHALATION) at 23:01

## 2019-11-04 RX ADMIN — CARVEDILOL 6.25 MG: 6.25 TABLET, FILM COATED ORAL at 17:01

## 2019-11-04 RX ADMIN — METHYLPREDNISOLONE SODIUM SUCCINATE 40 MG: 40 INJECTION, POWDER, FOR SOLUTION INTRAMUSCULAR; INTRAVENOUS at 20:13

## 2019-11-04 ASSESSMENT — PAIN DESCRIPTION - PAIN TYPE: TYPE: ACUTE PAIN

## 2019-11-04 ASSESSMENT — ENCOUNTER SYMPTOMS
WHEEZING: 1
SHORTNESS OF BREATH: 1
COUGH: 1
VOMITING: 0
ABDOMINAL PAIN: 0
NAUSEA: 0

## 2019-11-04 ASSESSMENT — PAIN SCALES - GENERAL
PAINLEVEL_OUTOF10: 6
PAINLEVEL_OUTOF10: 0
PAINLEVEL_OUTOF10: 0

## 2019-11-04 ASSESSMENT — PAIN DESCRIPTION - ONSET: ONSET: SUDDEN

## 2019-11-04 ASSESSMENT — PAIN DESCRIPTION - LOCATION: LOCATION: HEAD

## 2019-11-04 ASSESSMENT — PAIN DESCRIPTION - DESCRIPTORS: DESCRIPTORS: HEADACHE

## 2019-11-04 ASSESSMENT — PAIN DESCRIPTION - FREQUENCY: FREQUENCY: INTERMITTENT

## 2019-11-05 ENCOUNTER — APPOINTMENT (OUTPATIENT)
Dept: GENERAL RADIOLOGY | Age: 74
DRG: 190 | End: 2019-11-05
Attending: INTERNAL MEDICINE
Payer: MEDICARE

## 2019-11-05 LAB
ANION GAP SERPL CALCULATED.3IONS-SCNC: 15 MMOL/L (ref 3–16)
BUN BLDV-MCNC: 25 MG/DL (ref 7–20)
CALCIUM SERPL-MCNC: 9.7 MG/DL (ref 8.3–10.6)
CHLORIDE BLD-SCNC: 102 MMOL/L (ref 99–110)
CO2: 23 MMOL/L (ref 21–32)
CREAT SERPL-MCNC: 1.5 MG/DL (ref 0.8–1.3)
GFR AFRICAN AMERICAN: 56
GFR NON-AFRICAN AMERICAN: 46
GLUCOSE BLD-MCNC: 155 MG/DL (ref 70–99)
HCT VFR BLD CALC: 43.1 % (ref 40.5–52.5)
HEMOGLOBIN: 14.3 G/DL (ref 13.5–17.5)
MCH RBC QN AUTO: 30.5 PG (ref 26–34)
MCHC RBC AUTO-ENTMCNC: 33.3 G/DL (ref 31–36)
MCV RBC AUTO: 91.7 FL (ref 80–100)
PDW BLD-RTO: 16.7 % (ref 12.4–15.4)
PLATELET # BLD: 260 K/UL (ref 135–450)
PMV BLD AUTO: 7.8 FL (ref 5–10.5)
POTASSIUM REFLEX MAGNESIUM: 4.6 MMOL/L (ref 3.5–5.1)
RBC # BLD: 4.7 M/UL (ref 4.2–5.9)
SODIUM BLD-SCNC: 140 MMOL/L (ref 136–145)
WBC # BLD: 6.2 K/UL (ref 4–11)

## 2019-11-05 PROCEDURE — G0008 ADMIN INFLUENZA VIRUS VAC: HCPCS | Performed by: INTERNAL MEDICINE

## 2019-11-05 PROCEDURE — 94761 N-INVAS EAR/PLS OXIMETRY MLT: CPT

## 2019-11-05 PROCEDURE — 6370000000 HC RX 637 (ALT 250 FOR IP): Performed by: INTERNAL MEDICINE

## 2019-11-05 PROCEDURE — 80048 BASIC METABOLIC PNL TOTAL CA: CPT

## 2019-11-05 PROCEDURE — 6360000002 HC RX W HCPCS: Performed by: INTERNAL MEDICINE

## 2019-11-05 PROCEDURE — 99232 SBSQ HOSP IP/OBS MODERATE 35: CPT | Performed by: INTERNAL MEDICINE

## 2019-11-05 PROCEDURE — 6360000002 HC RX W HCPCS: Performed by: NURSE PRACTITIONER

## 2019-11-05 PROCEDURE — 85027 COMPLETE CBC AUTOMATED: CPT

## 2019-11-05 PROCEDURE — 92611 MOTION FLUOROSCOPY/SWALLOW: CPT

## 2019-11-05 PROCEDURE — 36415 COLL VENOUS BLD VENIPUNCTURE: CPT

## 2019-11-05 PROCEDURE — 71045 X-RAY EXAM CHEST 1 VIEW: CPT

## 2019-11-05 PROCEDURE — 92526 ORAL FUNCTION THERAPY: CPT

## 2019-11-05 PROCEDURE — 94640 AIRWAY INHALATION TREATMENT: CPT

## 2019-11-05 PROCEDURE — 92610 EVALUATE SWALLOWING FUNCTION: CPT

## 2019-11-05 PROCEDURE — 6370000000 HC RX 637 (ALT 250 FOR IP): Performed by: PHYSICIAN ASSISTANT

## 2019-11-05 PROCEDURE — 90686 IIV4 VACC NO PRSV 0.5 ML IM: CPT | Performed by: INTERNAL MEDICINE

## 2019-11-05 PROCEDURE — 2580000003 HC RX 258: Performed by: NURSE PRACTITIONER

## 2019-11-05 PROCEDURE — 1200000000 HC SEMI PRIVATE

## 2019-11-05 PROCEDURE — 2700000000 HC OXYGEN THERAPY PER DAY

## 2019-11-05 PROCEDURE — 6370000000 HC RX 637 (ALT 250 FOR IP): Performed by: NURSE PRACTITIONER

## 2019-11-05 PROCEDURE — 74230 X-RAY XM SWLNG FUNCJ C+: CPT

## 2019-11-05 PROCEDURE — 99233 SBSQ HOSP IP/OBS HIGH 50: CPT | Performed by: INTERNAL MEDICINE

## 2019-11-05 RX ORDER — GUAIFENESIN/DEXTROMETHORPHAN 100-10MG/5
5 SYRUP ORAL EVERY 4 HOURS PRN
Status: DISCONTINUED | OUTPATIENT
Start: 2019-11-05 | End: 2019-11-13 | Stop reason: HOSPADM

## 2019-11-05 RX ORDER — PREDNISONE 20 MG/1
40 TABLET ORAL DAILY
Status: DISCONTINUED | OUTPATIENT
Start: 2019-11-06 | End: 2019-11-12

## 2019-11-05 RX ORDER — ALBUTEROL SULFATE 2.5 MG/3ML
2.5 SOLUTION RESPIRATORY (INHALATION)
Status: DISCONTINUED | OUTPATIENT
Start: 2019-11-05 | End: 2019-11-13 | Stop reason: HOSPADM

## 2019-11-05 RX ORDER — METHYLPREDNISOLONE SODIUM SUCCINATE 40 MG/ML
40 INJECTION, POWDER, LYOPHILIZED, FOR SOLUTION INTRAMUSCULAR; INTRAVENOUS EVERY 12 HOURS
Status: COMPLETED | OUTPATIENT
Start: 2019-11-05 | End: 2019-11-05

## 2019-11-05 RX ADMIN — AMLODIPINE BESYLATE 5 MG: 5 TABLET ORAL at 12:50

## 2019-11-05 RX ADMIN — Medication 10 ML: at 20:06

## 2019-11-05 RX ADMIN — GUAIFENESIN AND DEXTROMETHORPHAN 5 ML: 100; 10 SYRUP ORAL at 09:29

## 2019-11-05 RX ADMIN — INFLUENZA A VIRUS A/BRISBANE/02/2018 IVR-190 (H1N1) ANTIGEN (PROPIOLACTONE INACTIVATED), INFLUENZA A VIRUS A/KANSAS/14/2017 X-327 (H3N2) ANTIGEN (PROPIOLACTONE INACTIVATED), INFLUENZA B VIRUS B/MARYLAND/15/2016 ANTIGEN (PROPIOLACTONE INACTIVATED), INFLUENZA B VIRUS B/PHUKET/3073/2013 BVR-1B ANTIGEN (PROPIOLACTONE INACTIVATED) 0.5 ML: 15; 15; 15; 15 INJECTION, SUSPENSION INTRAMUSCULAR at 13:06

## 2019-11-05 RX ADMIN — POTASSIUM CHLORIDE 20 MEQ: 20 TABLET, EXTENDED RELEASE ORAL at 12:49

## 2019-11-05 RX ADMIN — ATORVASTATIN CALCIUM 20 MG: 10 TABLET, FILM COATED ORAL at 20:06

## 2019-11-05 RX ADMIN — DOXYCYCLINE HYCLATE 100 MG: 100 TABLET, COATED ORAL at 01:55

## 2019-11-05 RX ADMIN — Medication 10 ML: at 01:55

## 2019-11-05 RX ADMIN — IPRATROPIUM BROMIDE AND ALBUTEROL SULFATE 1 AMPULE: .5; 3 SOLUTION RESPIRATORY (INHALATION) at 10:28

## 2019-11-05 RX ADMIN — ERYTHROMYCIN: 5 OINTMENT OPHTHALMIC at 14:56

## 2019-11-05 RX ADMIN — ALBUTEROL SULFATE 2.5 MG: 2.5 SOLUTION RESPIRATORY (INHALATION) at 16:48

## 2019-11-05 RX ADMIN — LOSARTAN POTASSIUM 100 MG: 100 TABLET ORAL at 12:50

## 2019-11-05 RX ADMIN — DOXYCYCLINE HYCLATE 100 MG: 100 TABLET, COATED ORAL at 14:56

## 2019-11-05 RX ADMIN — METHYLPREDNISOLONE SODIUM SUCCINATE 40 MG: 40 INJECTION, POWDER, FOR SOLUTION INTRAMUSCULAR; INTRAVENOUS at 20:06

## 2019-11-05 RX ADMIN — ALBUTEROL SULFATE 2.5 MG: 2.5 SOLUTION RESPIRATORY (INHALATION) at 08:10

## 2019-11-05 RX ADMIN — CARVEDILOL 6.25 MG: 6.25 TABLET, FILM COATED ORAL at 12:50

## 2019-11-05 RX ADMIN — IPRATROPIUM BROMIDE AND ALBUTEROL SULFATE 1 AMPULE: .5; 3 SOLUTION RESPIRATORY (INHALATION) at 18:33

## 2019-11-05 RX ADMIN — Medication 10 ML: at 08:07

## 2019-11-05 RX ADMIN — METHYLPREDNISOLONE SODIUM SUCCINATE 40 MG: 40 INJECTION, POWDER, FOR SOLUTION INTRAMUSCULAR; INTRAVENOUS at 01:55

## 2019-11-05 RX ADMIN — CARVEDILOL 6.25 MG: 6.25 TABLET, FILM COATED ORAL at 17:50

## 2019-11-05 RX ADMIN — FUROSEMIDE 40 MG: 40 TABLET ORAL at 12:50

## 2019-11-05 RX ADMIN — IPRATROPIUM BROMIDE AND ALBUTEROL SULFATE 1 AMPULE: .5; 3 SOLUTION RESPIRATORY (INHALATION) at 03:07

## 2019-11-05 RX ADMIN — CILOSTAZOL 50 MG: 100 TABLET ORAL at 12:49

## 2019-11-05 RX ADMIN — ERYTHROMYCIN: 5 OINTMENT OPHTHALMIC at 05:58

## 2019-11-05 RX ADMIN — AMIODARONE HYDROCHLORIDE 200 MG: 200 TABLET ORAL at 12:50

## 2019-11-05 RX ADMIN — IPRATROPIUM BROMIDE AND ALBUTEROL SULFATE 1 AMPULE: .5; 3 SOLUTION RESPIRATORY (INHALATION) at 14:25

## 2019-11-05 RX ADMIN — IPRATROPIUM BROMIDE AND ALBUTEROL SULFATE 1 AMPULE: .5; 3 SOLUTION RESPIRATORY (INHALATION) at 23:04

## 2019-11-05 RX ADMIN — IPRATROPIUM BROMIDE AND ALBUTEROL SULFATE 1 AMPULE: .5; 3 SOLUTION RESPIRATORY (INHALATION) at 06:18

## 2019-11-05 RX ADMIN — RIVAROXABAN 20 MG: 20 TABLET, FILM COATED ORAL at 12:50

## 2019-11-05 RX ADMIN — ERYTHROMYCIN: 5 OINTMENT OPHTHALMIC at 20:06

## 2019-11-05 RX ADMIN — AMIODARONE HYDROCHLORIDE 200 MG: 200 TABLET ORAL at 20:06

## 2019-11-05 RX ADMIN — METHYLPREDNISOLONE SODIUM SUCCINATE 40 MG: 40 INJECTION, POWDER, FOR SOLUTION INTRAMUSCULAR; INTRAVENOUS at 08:05

## 2019-11-05 RX ADMIN — PANTOPRAZOLE SODIUM 40 MG: 40 TABLET, DELAYED RELEASE ORAL at 06:12

## 2019-11-05 ASSESSMENT — PAIN SCALES - GENERAL: PAINLEVEL_OUTOF10: 0

## 2019-11-06 ENCOUNTER — APPOINTMENT (OUTPATIENT)
Dept: GENERAL RADIOLOGY | Age: 74
DRG: 190 | End: 2019-11-06
Attending: INTERNAL MEDICINE
Payer: MEDICARE

## 2019-11-06 LAB
ANION GAP SERPL CALCULATED.3IONS-SCNC: 14 MMOL/L (ref 3–16)
BASOPHILS ABSOLUTE: 0 K/UL (ref 0–0.2)
BASOPHILS RELATIVE PERCENT: 0.1 %
BUN BLDV-MCNC: 41 MG/DL (ref 7–20)
CALCIUM SERPL-MCNC: 9.5 MG/DL (ref 8.3–10.6)
CHLORIDE BLD-SCNC: 102 MMOL/L (ref 99–110)
CO2: 23 MMOL/L (ref 21–32)
CREAT SERPL-MCNC: 1.4 MG/DL (ref 0.8–1.3)
EKG ATRIAL RATE: 99 BPM
EKG DIAGNOSIS: NORMAL
EKG P AXIS: 66 DEGREES
EKG P-R INTERVAL: 178 MS
EKG Q-T INTERVAL: 368 MS
EKG QRS DURATION: 96 MS
EKG QTC CALCULATION (BAZETT): 472 MS
EKG R AXIS: 44 DEGREES
EKG T AXIS: 77 DEGREES
EKG VENTRICULAR RATE: 99 BPM
EOSINOPHILS ABSOLUTE: 0 K/UL (ref 0–0.6)
EOSINOPHILS RELATIVE PERCENT: 0 %
GFR AFRICAN AMERICAN: >60
GFR NON-AFRICAN AMERICAN: 50
GLUCOSE BLD-MCNC: 153 MG/DL (ref 70–99)
HCT VFR BLD CALC: 40.8 % (ref 40.5–52.5)
HEMOGLOBIN: 13.4 G/DL (ref 13.5–17.5)
LYMPHOCYTES ABSOLUTE: 1 K/UL (ref 1–5.1)
LYMPHOCYTES RELATIVE PERCENT: 7 %
MCH RBC QN AUTO: 30 PG (ref 26–34)
MCHC RBC AUTO-ENTMCNC: 32.8 G/DL (ref 31–36)
MCV RBC AUTO: 91.5 FL (ref 80–100)
MONOCYTES ABSOLUTE: 0.8 K/UL (ref 0–1.3)
MONOCYTES RELATIVE PERCENT: 5.8 %
NEUTROPHILS ABSOLUTE: 12 K/UL (ref 1.7–7.7)
NEUTROPHILS RELATIVE PERCENT: 87.1 %
PDW BLD-RTO: 16.4 % (ref 12.4–15.4)
PLATELET # BLD: 277 K/UL (ref 135–450)
PMV BLD AUTO: 8.1 FL (ref 5–10.5)
POTASSIUM REFLEX MAGNESIUM: 4.6 MMOL/L (ref 3.5–5.1)
RBC # BLD: 4.46 M/UL (ref 4.2–5.9)
SODIUM BLD-SCNC: 139 MMOL/L (ref 136–145)
TROPONIN: <0.01 NG/ML
WBC # BLD: 13.7 K/UL (ref 4–11)

## 2019-11-06 PROCEDURE — 94640 AIRWAY INHALATION TREATMENT: CPT

## 2019-11-06 PROCEDURE — 6370000000 HC RX 637 (ALT 250 FOR IP): Performed by: PHYSICIAN ASSISTANT

## 2019-11-06 PROCEDURE — 6370000000 HC RX 637 (ALT 250 FOR IP): Performed by: INTERNAL MEDICINE

## 2019-11-06 PROCEDURE — 93010 ELECTROCARDIOGRAM REPORT: CPT | Performed by: INTERNAL MEDICINE

## 2019-11-06 PROCEDURE — 80048 BASIC METABOLIC PNL TOTAL CA: CPT

## 2019-11-06 PROCEDURE — 85025 COMPLETE CBC W/AUTO DIFF WBC: CPT

## 2019-11-06 PROCEDURE — 36415 COLL VENOUS BLD VENIPUNCTURE: CPT

## 2019-11-06 PROCEDURE — 1200000000 HC SEMI PRIVATE

## 2019-11-06 PROCEDURE — 99232 SBSQ HOSP IP/OBS MODERATE 35: CPT | Performed by: INTERNAL MEDICINE

## 2019-11-06 PROCEDURE — 6370000000 HC RX 637 (ALT 250 FOR IP): Performed by: NURSE PRACTITIONER

## 2019-11-06 PROCEDURE — 99233 SBSQ HOSP IP/OBS HIGH 50: CPT | Performed by: INTERNAL MEDICINE

## 2019-11-06 PROCEDURE — 2700000000 HC OXYGEN THERAPY PER DAY

## 2019-11-06 PROCEDURE — 94761 N-INVAS EAR/PLS OXIMETRY MLT: CPT

## 2019-11-06 PROCEDURE — 2580000003 HC RX 258: Performed by: NURSE PRACTITIONER

## 2019-11-06 PROCEDURE — 6360000002 HC RX W HCPCS: Performed by: INTERNAL MEDICINE

## 2019-11-06 PROCEDURE — 71045 X-RAY EXAM CHEST 1 VIEW: CPT

## 2019-11-06 PROCEDURE — 93005 ELECTROCARDIOGRAM TRACING: CPT | Performed by: INTERNAL MEDICINE

## 2019-11-06 PROCEDURE — 6370000000 HC RX 637 (ALT 250 FOR IP)

## 2019-11-06 PROCEDURE — 84484 ASSAY OF TROPONIN QUANT: CPT

## 2019-11-06 RX ORDER — NITROGLYCERIN 0.4 MG/1
0.4 TABLET SUBLINGUAL ONCE
Status: COMPLETED | OUTPATIENT
Start: 2019-11-06 | End: 2019-11-06

## 2019-11-06 RX ORDER — NITROGLYCERIN 0.4 MG/1
TABLET SUBLINGUAL
Status: COMPLETED
Start: 2019-11-06 | End: 2019-11-06

## 2019-11-06 RX ORDER — MORPHINE SULFATE 2 MG/ML
2 INJECTION, SOLUTION INTRAMUSCULAR; INTRAVENOUS ONCE
Status: DISCONTINUED | OUTPATIENT
Start: 2019-11-06 | End: 2019-11-06

## 2019-11-06 RX ADMIN — PREDNISONE 40 MG: 20 TABLET ORAL at 08:22

## 2019-11-06 RX ADMIN — IPRATROPIUM BROMIDE AND ALBUTEROL SULFATE 1 AMPULE: .5; 3 SOLUTION RESPIRATORY (INHALATION) at 18:50

## 2019-11-06 RX ADMIN — IPRATROPIUM BROMIDE AND ALBUTEROL SULFATE 1 AMPULE: .5; 3 SOLUTION RESPIRATORY (INHALATION) at 23:07

## 2019-11-06 RX ADMIN — Medication 10 ML: at 08:22

## 2019-11-06 RX ADMIN — ERYTHROMYCIN: 5 OINTMENT OPHTHALMIC at 07:06

## 2019-11-06 RX ADMIN — FUROSEMIDE 40 MG: 40 TABLET ORAL at 08:22

## 2019-11-06 RX ADMIN — DOXYCYCLINE HYCLATE 100 MG: 100 TABLET, COATED ORAL at 02:01

## 2019-11-06 RX ADMIN — IPRATROPIUM BROMIDE AND ALBUTEROL SULFATE 1 AMPULE: .5; 3 SOLUTION RESPIRATORY (INHALATION) at 15:28

## 2019-11-06 RX ADMIN — AMIODARONE HYDROCHLORIDE 200 MG: 200 TABLET ORAL at 19:54

## 2019-11-06 RX ADMIN — RIVAROXABAN 20 MG: 20 TABLET, FILM COATED ORAL at 08:22

## 2019-11-06 RX ADMIN — DOXYCYCLINE HYCLATE 100 MG: 100 TABLET, COATED ORAL at 13:22

## 2019-11-06 RX ADMIN — CARVEDILOL 6.25 MG: 6.25 TABLET, FILM COATED ORAL at 16:52

## 2019-11-06 RX ADMIN — ERYTHROMYCIN: 5 OINTMENT OPHTHALMIC at 13:22

## 2019-11-06 RX ADMIN — LOSARTAN POTASSIUM 100 MG: 100 TABLET ORAL at 08:22

## 2019-11-06 RX ADMIN — AMIODARONE HYDROCHLORIDE 200 MG: 200 TABLET ORAL at 08:22

## 2019-11-06 RX ADMIN — NITROGLYCERIN: 0.4 TABLET SUBLINGUAL at 09:23

## 2019-11-06 RX ADMIN — PANTOPRAZOLE SODIUM 40 MG: 40 TABLET, DELAYED RELEASE ORAL at 07:06

## 2019-11-06 RX ADMIN — ALBUTEROL SULFATE 2.5 MG: 2.5 SOLUTION RESPIRATORY (INHALATION) at 09:31

## 2019-11-06 RX ADMIN — NITROGLYCERIN 0.4 MG: 0.4 TABLET SUBLINGUAL at 09:15

## 2019-11-06 RX ADMIN — GUAIFENESIN AND DEXTROMETHORPHAN 5 ML: 100; 10 SYRUP ORAL at 07:30

## 2019-11-06 RX ADMIN — AMLODIPINE BESYLATE 5 MG: 5 TABLET ORAL at 08:22

## 2019-11-06 RX ADMIN — CARVEDILOL 6.25 MG: 6.25 TABLET, FILM COATED ORAL at 08:22

## 2019-11-06 RX ADMIN — CILOSTAZOL 50 MG: 100 TABLET ORAL at 08:22

## 2019-11-06 RX ADMIN — Medication 10 ML: at 19:53

## 2019-11-06 RX ADMIN — IPRATROPIUM BROMIDE AND ALBUTEROL SULFATE 1 AMPULE: .5; 3 SOLUTION RESPIRATORY (INHALATION) at 06:30

## 2019-11-06 RX ADMIN — IPRATROPIUM BROMIDE AND ALBUTEROL SULFATE 1 AMPULE: .5; 3 SOLUTION RESPIRATORY (INHALATION) at 02:53

## 2019-11-06 RX ADMIN — POTASSIUM CHLORIDE 20 MEQ: 20 TABLET, EXTENDED RELEASE ORAL at 08:22

## 2019-11-06 RX ADMIN — IPRATROPIUM BROMIDE AND ALBUTEROL SULFATE 1 AMPULE: .5; 3 SOLUTION RESPIRATORY (INHALATION) at 10:55

## 2019-11-06 RX ADMIN — ATORVASTATIN CALCIUM 20 MG: 10 TABLET, FILM COATED ORAL at 19:54

## 2019-11-06 RX ADMIN — ERYTHROMYCIN: 5 OINTMENT OPHTHALMIC at 20:00

## 2019-11-06 ASSESSMENT — PAIN SCALES - GENERAL
PAINLEVEL_OUTOF10: 0
PAINLEVEL_OUTOF10: 0
PAINLEVEL_OUTOF10: 5
PAINLEVEL_OUTOF10: 0
PAINLEVEL_OUTOF10: 5

## 2019-11-07 LAB
ANION GAP SERPL CALCULATED.3IONS-SCNC: 10 MMOL/L (ref 3–16)
BASOPHILS ABSOLUTE: 0 K/UL (ref 0–0.2)
BASOPHILS RELATIVE PERCENT: 0.1 %
BUN BLDV-MCNC: 47 MG/DL (ref 7–20)
CALCIUM SERPL-MCNC: 8.9 MG/DL (ref 8.3–10.6)
CHLORIDE BLD-SCNC: 103 MMOL/L (ref 99–110)
CO2: 27 MMOL/L (ref 21–32)
CREAT SERPL-MCNC: 1.6 MG/DL (ref 0.8–1.3)
EOSINOPHILS ABSOLUTE: 0 K/UL (ref 0–0.6)
EOSINOPHILS RELATIVE PERCENT: 0 %
GFR AFRICAN AMERICAN: 52
GFR NON-AFRICAN AMERICAN: 43
GLUCOSE BLD-MCNC: 118 MG/DL (ref 70–99)
HCT VFR BLD CALC: 40.4 % (ref 40.5–52.5)
HEMOGLOBIN: 13.4 G/DL (ref 13.5–17.5)
LYMPHOCYTES ABSOLUTE: 1.8 K/UL (ref 1–5.1)
LYMPHOCYTES RELATIVE PERCENT: 15.9 %
MCH RBC QN AUTO: 30.6 PG (ref 26–34)
MCHC RBC AUTO-ENTMCNC: 33.1 G/DL (ref 31–36)
MCV RBC AUTO: 92.4 FL (ref 80–100)
MONOCYTES ABSOLUTE: 1.1 K/UL (ref 0–1.3)
MONOCYTES RELATIVE PERCENT: 9.5 %
NEUTROPHILS ABSOLUTE: 8.2 K/UL (ref 1.7–7.7)
NEUTROPHILS RELATIVE PERCENT: 74.5 %
PDW BLD-RTO: 16.8 % (ref 12.4–15.4)
PLATELET # BLD: 245 K/UL (ref 135–450)
PMV BLD AUTO: 8.1 FL (ref 5–10.5)
POTASSIUM REFLEX MAGNESIUM: 4 MMOL/L (ref 3.5–5.1)
RBC # BLD: 4.37 M/UL (ref 4.2–5.9)
SODIUM BLD-SCNC: 140 MMOL/L (ref 136–145)
WBC # BLD: 11 K/UL (ref 4–11)

## 2019-11-07 PROCEDURE — 6370000000 HC RX 637 (ALT 250 FOR IP): Performed by: INTERNAL MEDICINE

## 2019-11-07 PROCEDURE — 85025 COMPLETE CBC W/AUTO DIFF WBC: CPT

## 2019-11-07 PROCEDURE — 92526 ORAL FUNCTION THERAPY: CPT

## 2019-11-07 PROCEDURE — 6370000000 HC RX 637 (ALT 250 FOR IP): Performed by: NURSE PRACTITIONER

## 2019-11-07 PROCEDURE — 99232 SBSQ HOSP IP/OBS MODERATE 35: CPT | Performed by: INTERNAL MEDICINE

## 2019-11-07 PROCEDURE — 2700000000 HC OXYGEN THERAPY PER DAY

## 2019-11-07 PROCEDURE — 94640 AIRWAY INHALATION TREATMENT: CPT

## 2019-11-07 PROCEDURE — 94761 N-INVAS EAR/PLS OXIMETRY MLT: CPT

## 2019-11-07 PROCEDURE — 99233 SBSQ HOSP IP/OBS HIGH 50: CPT | Performed by: INTERNAL MEDICINE

## 2019-11-07 PROCEDURE — 2580000003 HC RX 258: Performed by: NURSE PRACTITIONER

## 2019-11-07 PROCEDURE — 1200000000 HC SEMI PRIVATE

## 2019-11-07 PROCEDURE — 80048 BASIC METABOLIC PNL TOTAL CA: CPT

## 2019-11-07 PROCEDURE — 36415 COLL VENOUS BLD VENIPUNCTURE: CPT

## 2019-11-07 RX ADMIN — IPRATROPIUM BROMIDE AND ALBUTEROL SULFATE 1 AMPULE: .5; 3 SOLUTION RESPIRATORY (INHALATION) at 03:18

## 2019-11-07 RX ADMIN — IPRATROPIUM BROMIDE AND ALBUTEROL SULFATE 1 AMPULE: .5; 3 SOLUTION RESPIRATORY (INHALATION) at 15:17

## 2019-11-07 RX ADMIN — Medication 10 ML: at 19:52

## 2019-11-07 RX ADMIN — POTASSIUM CHLORIDE 20 MEQ: 20 TABLET, EXTENDED RELEASE ORAL at 08:14

## 2019-11-07 RX ADMIN — CARVEDILOL 6.25 MG: 6.25 TABLET, FILM COATED ORAL at 17:36

## 2019-11-07 RX ADMIN — DOXYCYCLINE HYCLATE 100 MG: 100 TABLET, COATED ORAL at 12:59

## 2019-11-07 RX ADMIN — PANTOPRAZOLE SODIUM 40 MG: 40 TABLET, DELAYED RELEASE ORAL at 06:11

## 2019-11-07 RX ADMIN — RIVAROXABAN 20 MG: 20 TABLET, FILM COATED ORAL at 08:14

## 2019-11-07 RX ADMIN — AMLODIPINE BESYLATE 5 MG: 5 TABLET ORAL at 08:14

## 2019-11-07 RX ADMIN — CARVEDILOL 6.25 MG: 6.25 TABLET, FILM COATED ORAL at 08:14

## 2019-11-07 RX ADMIN — FUROSEMIDE 40 MG: 40 TABLET ORAL at 08:13

## 2019-11-07 RX ADMIN — IPRATROPIUM BROMIDE AND ALBUTEROL SULFATE 1 AMPULE: .5; 3 SOLUTION RESPIRATORY (INHALATION) at 18:45

## 2019-11-07 RX ADMIN — LOSARTAN POTASSIUM 100 MG: 100 TABLET ORAL at 08:14

## 2019-11-07 RX ADMIN — ERYTHROMYCIN: 5 OINTMENT OPHTHALMIC at 19:53

## 2019-11-07 RX ADMIN — ERYTHROMYCIN: 5 OINTMENT OPHTHALMIC at 06:11

## 2019-11-07 RX ADMIN — ATORVASTATIN CALCIUM 20 MG: 10 TABLET, FILM COATED ORAL at 19:52

## 2019-11-07 RX ADMIN — Medication 10 ML: at 08:13

## 2019-11-07 RX ADMIN — AMIODARONE HYDROCHLORIDE 200 MG: 200 TABLET ORAL at 08:14

## 2019-11-07 RX ADMIN — IPRATROPIUM BROMIDE AND ALBUTEROL SULFATE 1 AMPULE: .5; 3 SOLUTION RESPIRATORY (INHALATION) at 23:37

## 2019-11-07 RX ADMIN — AMIODARONE HYDROCHLORIDE 200 MG: 200 TABLET ORAL at 19:52

## 2019-11-07 RX ADMIN — DOXYCYCLINE HYCLATE 100 MG: 100 TABLET, COATED ORAL at 00:38

## 2019-11-07 RX ADMIN — CILOSTAZOL 50 MG: 100 TABLET ORAL at 08:14

## 2019-11-07 RX ADMIN — IPRATROPIUM BROMIDE AND ALBUTEROL SULFATE 1 AMPULE: .5; 3 SOLUTION RESPIRATORY (INHALATION) at 06:56

## 2019-11-07 RX ADMIN — IPRATROPIUM BROMIDE AND ALBUTEROL SULFATE 1 AMPULE: .5; 3 SOLUTION RESPIRATORY (INHALATION) at 11:12

## 2019-11-07 RX ADMIN — ERYTHROMYCIN: 5 OINTMENT OPHTHALMIC at 12:59

## 2019-11-07 RX ADMIN — PREDNISONE 40 MG: 20 TABLET ORAL at 08:13

## 2019-11-07 ASSESSMENT — PAIN SCALES - GENERAL
PAINLEVEL_OUTOF10: 0
PAINLEVEL_OUTOF10: 0

## 2019-11-08 PROCEDURE — 94761 N-INVAS EAR/PLS OXIMETRY MLT: CPT

## 2019-11-08 PROCEDURE — 99232 SBSQ HOSP IP/OBS MODERATE 35: CPT | Performed by: INTERNAL MEDICINE

## 2019-11-08 PROCEDURE — 6370000000 HC RX 637 (ALT 250 FOR IP): Performed by: PHYSICIAN ASSISTANT

## 2019-11-08 PROCEDURE — 6370000000 HC RX 637 (ALT 250 FOR IP): Performed by: INTERNAL MEDICINE

## 2019-11-08 PROCEDURE — 2700000000 HC OXYGEN THERAPY PER DAY

## 2019-11-08 PROCEDURE — 2580000003 HC RX 258: Performed by: NURSE PRACTITIONER

## 2019-11-08 PROCEDURE — 94640 AIRWAY INHALATION TREATMENT: CPT

## 2019-11-08 PROCEDURE — 6370000000 HC RX 637 (ALT 250 FOR IP): Performed by: NURSE PRACTITIONER

## 2019-11-08 PROCEDURE — 1200000000 HC SEMI PRIVATE

## 2019-11-08 RX ADMIN — AMIODARONE HYDROCHLORIDE 200 MG: 200 TABLET ORAL at 08:48

## 2019-11-08 RX ADMIN — FUROSEMIDE 40 MG: 40 TABLET ORAL at 08:48

## 2019-11-08 RX ADMIN — IPRATROPIUM BROMIDE AND ALBUTEROL SULFATE 1 AMPULE: .5; 3 SOLUTION RESPIRATORY (INHALATION) at 15:37

## 2019-11-08 RX ADMIN — IPRATROPIUM BROMIDE AND ALBUTEROL SULFATE 1 AMPULE: .5; 3 SOLUTION RESPIRATORY (INHALATION) at 03:54

## 2019-11-08 RX ADMIN — PANTOPRAZOLE SODIUM 40 MG: 40 TABLET, DELAYED RELEASE ORAL at 05:53

## 2019-11-08 RX ADMIN — RIVAROXABAN 20 MG: 20 TABLET, FILM COATED ORAL at 08:49

## 2019-11-08 RX ADMIN — IPRATROPIUM BROMIDE AND ALBUTEROL SULFATE 1 AMPULE: .5; 3 SOLUTION RESPIRATORY (INHALATION) at 22:23

## 2019-11-08 RX ADMIN — IPRATROPIUM BROMIDE AND ALBUTEROL SULFATE 1 AMPULE: .5; 3 SOLUTION RESPIRATORY (INHALATION) at 11:24

## 2019-11-08 RX ADMIN — LOSARTAN POTASSIUM 100 MG: 100 TABLET ORAL at 08:48

## 2019-11-08 RX ADMIN — DOXYCYCLINE HYCLATE 100 MG: 100 TABLET, COATED ORAL at 14:03

## 2019-11-08 RX ADMIN — DOXYCYCLINE HYCLATE 100 MG: 100 TABLET, COATED ORAL at 20:47

## 2019-11-08 RX ADMIN — PREDNISONE 40 MG: 20 TABLET ORAL at 08:49

## 2019-11-08 RX ADMIN — ERYTHROMYCIN: 5 OINTMENT OPHTHALMIC at 14:07

## 2019-11-08 RX ADMIN — AMLODIPINE BESYLATE 5 MG: 5 TABLET ORAL at 08:49

## 2019-11-08 RX ADMIN — AMIODARONE HYDROCHLORIDE 200 MG: 200 TABLET ORAL at 20:48

## 2019-11-08 RX ADMIN — IPRATROPIUM BROMIDE AND ALBUTEROL SULFATE 1 AMPULE: .5; 3 SOLUTION RESPIRATORY (INHALATION) at 18:51

## 2019-11-08 RX ADMIN — CARVEDILOL 6.25 MG: 6.25 TABLET, FILM COATED ORAL at 08:49

## 2019-11-08 RX ADMIN — Medication 10 ML: at 08:51

## 2019-11-08 RX ADMIN — CILOSTAZOL 50 MG: 100 TABLET ORAL at 08:50

## 2019-11-08 RX ADMIN — ATORVASTATIN CALCIUM 20 MG: 10 TABLET, FILM COATED ORAL at 20:47

## 2019-11-08 RX ADMIN — DOXYCYCLINE HYCLATE 100 MG: 100 TABLET, COATED ORAL at 01:31

## 2019-11-08 RX ADMIN — ERYTHROMYCIN: 5 OINTMENT OPHTHALMIC at 05:53

## 2019-11-08 RX ADMIN — GUAIFENESIN AND DEXTROMETHORPHAN 5 ML: 100; 10 SYRUP ORAL at 22:44

## 2019-11-08 RX ADMIN — Medication 10 ML: at 20:48

## 2019-11-08 RX ADMIN — IPRATROPIUM BROMIDE AND ALBUTEROL SULFATE 1 AMPULE: .5; 3 SOLUTION RESPIRATORY (INHALATION) at 07:19

## 2019-11-08 RX ADMIN — ERYTHROMYCIN: 5 OINTMENT OPHTHALMIC at 20:48

## 2019-11-08 RX ADMIN — CARVEDILOL 6.25 MG: 6.25 TABLET, FILM COATED ORAL at 19:30

## 2019-11-08 RX ADMIN — POTASSIUM CHLORIDE 20 MEQ: 20 TABLET, EXTENDED RELEASE ORAL at 08:48

## 2019-11-08 ASSESSMENT — PAIN SCALES - GENERAL
PAINLEVEL_OUTOF10: 0
PAINLEVEL_OUTOF10: 0

## 2019-11-09 PROCEDURE — 99232 SBSQ HOSP IP/OBS MODERATE 35: CPT | Performed by: INTERNAL MEDICINE

## 2019-11-09 PROCEDURE — 1200000000 HC SEMI PRIVATE

## 2019-11-09 PROCEDURE — 6370000000 HC RX 637 (ALT 250 FOR IP): Performed by: INTERNAL MEDICINE

## 2019-11-09 PROCEDURE — 94640 AIRWAY INHALATION TREATMENT: CPT

## 2019-11-09 PROCEDURE — 6370000000 HC RX 637 (ALT 250 FOR IP): Performed by: NURSE PRACTITIONER

## 2019-11-09 PROCEDURE — 2700000000 HC OXYGEN THERAPY PER DAY

## 2019-11-09 PROCEDURE — 94761 N-INVAS EAR/PLS OXIMETRY MLT: CPT

## 2019-11-09 PROCEDURE — 6370000000 HC RX 637 (ALT 250 FOR IP): Performed by: PHYSICIAN ASSISTANT

## 2019-11-09 PROCEDURE — 2580000003 HC RX 258: Performed by: NURSE PRACTITIONER

## 2019-11-09 RX ADMIN — ERYTHROMYCIN: 5 OINTMENT OPHTHALMIC at 20:12

## 2019-11-09 RX ADMIN — CILOSTAZOL 50 MG: 100 TABLET ORAL at 08:10

## 2019-11-09 RX ADMIN — CARVEDILOL 6.25 MG: 6.25 TABLET, FILM COATED ORAL at 17:18

## 2019-11-09 RX ADMIN — IPRATROPIUM BROMIDE AND ALBUTEROL SULFATE 1 AMPULE: .5; 3 SOLUTION RESPIRATORY (INHALATION) at 10:45

## 2019-11-09 RX ADMIN — LOSARTAN POTASSIUM 100 MG: 100 TABLET ORAL at 08:10

## 2019-11-09 RX ADMIN — AMIODARONE HYDROCHLORIDE 200 MG: 200 TABLET ORAL at 20:12

## 2019-11-09 RX ADMIN — IPRATROPIUM BROMIDE AND ALBUTEROL SULFATE 1 AMPULE: .5; 3 SOLUTION RESPIRATORY (INHALATION) at 03:01

## 2019-11-09 RX ADMIN — IPRATROPIUM BROMIDE AND ALBUTEROL SULFATE 1 AMPULE: .5; 3 SOLUTION RESPIRATORY (INHALATION) at 15:23

## 2019-11-09 RX ADMIN — FUROSEMIDE 40 MG: 40 TABLET ORAL at 08:10

## 2019-11-09 RX ADMIN — GUAIFENESIN AND DEXTROMETHORPHAN 5 ML: 100; 10 SYRUP ORAL at 08:09

## 2019-11-09 RX ADMIN — Medication 10 ML: at 08:11

## 2019-11-09 RX ADMIN — IPRATROPIUM BROMIDE AND ALBUTEROL SULFATE 1 AMPULE: .5; 3 SOLUTION RESPIRATORY (INHALATION) at 18:54

## 2019-11-09 RX ADMIN — ERYTHROMYCIN: 5 OINTMENT OPHTHALMIC at 05:10

## 2019-11-09 RX ADMIN — AMLODIPINE BESYLATE 5 MG: 5 TABLET ORAL at 08:10

## 2019-11-09 RX ADMIN — IPRATROPIUM BROMIDE AND ALBUTEROL SULFATE 1 AMPULE: .5; 3 SOLUTION RESPIRATORY (INHALATION) at 22:28

## 2019-11-09 RX ADMIN — AMIODARONE HYDROCHLORIDE 200 MG: 200 TABLET ORAL at 08:10

## 2019-11-09 RX ADMIN — CARVEDILOL 6.25 MG: 6.25 TABLET, FILM COATED ORAL at 08:10

## 2019-11-09 RX ADMIN — PANTOPRAZOLE SODIUM 40 MG: 40 TABLET, DELAYED RELEASE ORAL at 05:09

## 2019-11-09 RX ADMIN — IPRATROPIUM BROMIDE AND ALBUTEROL SULFATE 1 AMPULE: .5; 3 SOLUTION RESPIRATORY (INHALATION) at 06:53

## 2019-11-09 RX ADMIN — POTASSIUM CHLORIDE 20 MEQ: 20 TABLET, EXTENDED RELEASE ORAL at 08:10

## 2019-11-09 RX ADMIN — ERYTHROMYCIN: 5 OINTMENT OPHTHALMIC at 14:54

## 2019-11-09 RX ADMIN — PREDNISONE 40 MG: 20 TABLET ORAL at 08:10

## 2019-11-09 RX ADMIN — ATORVASTATIN CALCIUM 20 MG: 10 TABLET, FILM COATED ORAL at 20:12

## 2019-11-09 RX ADMIN — DOXYCYCLINE HYCLATE 100 MG: 100 TABLET, COATED ORAL at 14:57

## 2019-11-09 RX ADMIN — Medication 10 ML: at 20:12

## 2019-11-09 RX ADMIN — RIVAROXABAN 20 MG: 20 TABLET, FILM COATED ORAL at 08:10

## 2019-11-10 PROCEDURE — 94761 N-INVAS EAR/PLS OXIMETRY MLT: CPT

## 2019-11-10 PROCEDURE — 6370000000 HC RX 637 (ALT 250 FOR IP): Performed by: NURSE PRACTITIONER

## 2019-11-10 PROCEDURE — 6360000002 HC RX W HCPCS: Performed by: INTERNAL MEDICINE

## 2019-11-10 PROCEDURE — 99232 SBSQ HOSP IP/OBS MODERATE 35: CPT | Performed by: INTERNAL MEDICINE

## 2019-11-10 PROCEDURE — 97530 THERAPEUTIC ACTIVITIES: CPT

## 2019-11-10 PROCEDURE — 2700000000 HC OXYGEN THERAPY PER DAY

## 2019-11-10 PROCEDURE — 94640 AIRWAY INHALATION TREATMENT: CPT

## 2019-11-10 PROCEDURE — 2580000003 HC RX 258: Performed by: NURSE PRACTITIONER

## 2019-11-10 PROCEDURE — 6370000000 HC RX 637 (ALT 250 FOR IP): Performed by: INTERNAL MEDICINE

## 2019-11-10 PROCEDURE — 97166 OT EVAL MOD COMPLEX 45 MIN: CPT

## 2019-11-10 PROCEDURE — 97162 PT EVAL MOD COMPLEX 30 MIN: CPT

## 2019-11-10 PROCEDURE — 97535 SELF CARE MNGMENT TRAINING: CPT

## 2019-11-10 PROCEDURE — 1200000000 HC SEMI PRIVATE

## 2019-11-10 RX ORDER — PREDNISONE 10 MG/1
TABLET ORAL
Qty: 18 TABLET | Refills: 0 | Status: SHIPPED | OUTPATIENT
Start: 2019-11-10 | End: 2019-11-13 | Stop reason: SDUPTHER

## 2019-11-10 RX ADMIN — IPRATROPIUM BROMIDE AND ALBUTEROL SULFATE 1 AMPULE: .5; 3 SOLUTION RESPIRATORY (INHALATION) at 15:29

## 2019-11-10 RX ADMIN — CILOSTAZOL 50 MG: 100 TABLET ORAL at 08:12

## 2019-11-10 RX ADMIN — Medication 10 ML: at 13:44

## 2019-11-10 RX ADMIN — DOXYCYCLINE HYCLATE 100 MG: 100 TABLET, COATED ORAL at 23:33

## 2019-11-10 RX ADMIN — ERYTHROMYCIN: 5 OINTMENT OPHTHALMIC at 04:24

## 2019-11-10 RX ADMIN — ERYTHROMYCIN: 5 OINTMENT OPHTHALMIC at 20:57

## 2019-11-10 RX ADMIN — IPRATROPIUM BROMIDE AND ALBUTEROL SULFATE 1 AMPULE: .5; 3 SOLUTION RESPIRATORY (INHALATION) at 10:47

## 2019-11-10 RX ADMIN — ATORVASTATIN CALCIUM 20 MG: 10 TABLET, FILM COATED ORAL at 20:57

## 2019-11-10 RX ADMIN — LOSARTAN POTASSIUM 100 MG: 100 TABLET ORAL at 08:11

## 2019-11-10 RX ADMIN — AMLODIPINE BESYLATE 5 MG: 5 TABLET ORAL at 08:12

## 2019-11-10 RX ADMIN — ALBUTEROL SULFATE 2.5 MG: 2.5 SOLUTION RESPIRATORY (INHALATION) at 04:29

## 2019-11-10 RX ADMIN — POTASSIUM CHLORIDE 20 MEQ: 20 TABLET, EXTENDED RELEASE ORAL at 08:12

## 2019-11-10 RX ADMIN — IPRATROPIUM BROMIDE AND ALBUTEROL SULFATE 1 AMPULE: .5; 3 SOLUTION RESPIRATORY (INHALATION) at 23:12

## 2019-11-10 RX ADMIN — CARVEDILOL 6.25 MG: 6.25 TABLET, FILM COATED ORAL at 17:12

## 2019-11-10 RX ADMIN — RIVAROXABAN 20 MG: 20 TABLET, FILM COATED ORAL at 08:12

## 2019-11-10 RX ADMIN — PANTOPRAZOLE SODIUM 40 MG: 40 TABLET, DELAYED RELEASE ORAL at 04:24

## 2019-11-10 RX ADMIN — IPRATROPIUM BROMIDE AND ALBUTEROL SULFATE 1 AMPULE: .5; 3 SOLUTION RESPIRATORY (INHALATION) at 06:48

## 2019-11-10 RX ADMIN — Medication 10 ML: at 20:57

## 2019-11-10 RX ADMIN — IPRATROPIUM BROMIDE AND ALBUTEROL SULFATE 1 AMPULE: .5; 3 SOLUTION RESPIRATORY (INHALATION) at 03:10

## 2019-11-10 RX ADMIN — ACETAMINOPHEN 650 MG: 325 TABLET ORAL at 04:24

## 2019-11-10 RX ADMIN — FUROSEMIDE 40 MG: 40 TABLET ORAL at 08:12

## 2019-11-10 RX ADMIN — PREDNISONE 40 MG: 20 TABLET ORAL at 08:11

## 2019-11-10 RX ADMIN — AMIODARONE HYDROCHLORIDE 200 MG: 200 TABLET ORAL at 20:57

## 2019-11-10 RX ADMIN — DOXYCYCLINE HYCLATE 100 MG: 100 TABLET, COATED ORAL at 00:06

## 2019-11-10 RX ADMIN — AMIODARONE HYDROCHLORIDE 200 MG: 200 TABLET ORAL at 08:12

## 2019-11-10 RX ADMIN — ERYTHROMYCIN: 5 OINTMENT OPHTHALMIC at 13:44

## 2019-11-10 RX ADMIN — DOXYCYCLINE HYCLATE 100 MG: 100 TABLET, COATED ORAL at 15:28

## 2019-11-10 RX ADMIN — CARVEDILOL 6.25 MG: 6.25 TABLET, FILM COATED ORAL at 08:11

## 2019-11-10 RX ADMIN — IPRATROPIUM BROMIDE AND ALBUTEROL SULFATE 1 AMPULE: .5; 3 SOLUTION RESPIRATORY (INHALATION) at 19:30

## 2019-11-10 ASSESSMENT — PAIN DESCRIPTION - FREQUENCY: FREQUENCY: INTERMITTENT

## 2019-11-10 ASSESSMENT — PAIN DESCRIPTION - PAIN TYPE: TYPE: ACUTE PAIN

## 2019-11-10 ASSESSMENT — PAIN - FUNCTIONAL ASSESSMENT: PAIN_FUNCTIONAL_ASSESSMENT: ACTIVITIES ARE NOT PREVENTED

## 2019-11-10 ASSESSMENT — PAIN SCALES - GENERAL
PAINLEVEL_OUTOF10: 3
PAINLEVEL_OUTOF10: 0

## 2019-11-10 ASSESSMENT — PAIN DESCRIPTION - ONSET: ONSET: AWAKENED FROM SLEEP

## 2019-11-10 ASSESSMENT — PAIN DESCRIPTION - DESCRIPTORS: DESCRIPTORS: ACHING

## 2019-11-10 ASSESSMENT — PAIN DESCRIPTION - LOCATION: LOCATION: OTHER (COMMENT)

## 2019-11-10 ASSESSMENT — PAIN SCALES - WONG BAKER: WONGBAKER_NUMERICALRESPONSE: 2

## 2019-11-11 LAB — TROPONIN: <0.01 NG/ML

## 2019-11-11 PROCEDURE — 99232 SBSQ HOSP IP/OBS MODERATE 35: CPT | Performed by: INTERNAL MEDICINE

## 2019-11-11 PROCEDURE — 6370000000 HC RX 637 (ALT 250 FOR IP): Performed by: INTERNAL MEDICINE

## 2019-11-11 PROCEDURE — 1200000000 HC SEMI PRIVATE

## 2019-11-11 PROCEDURE — 36415 COLL VENOUS BLD VENIPUNCTURE: CPT

## 2019-11-11 PROCEDURE — 6360000002 HC RX W HCPCS: Performed by: INTERNAL MEDICINE

## 2019-11-11 PROCEDURE — 93005 ELECTROCARDIOGRAM TRACING: CPT | Performed by: INTERNAL MEDICINE

## 2019-11-11 PROCEDURE — 94761 N-INVAS EAR/PLS OXIMETRY MLT: CPT

## 2019-11-11 PROCEDURE — 6360000002 HC RX W HCPCS

## 2019-11-11 PROCEDURE — 2700000000 HC OXYGEN THERAPY PER DAY

## 2019-11-11 PROCEDURE — 2580000003 HC RX 258: Performed by: NURSE PRACTITIONER

## 2019-11-11 PROCEDURE — 94640 AIRWAY INHALATION TREATMENT: CPT

## 2019-11-11 PROCEDURE — 84484 ASSAY OF TROPONIN QUANT: CPT

## 2019-11-11 PROCEDURE — 6370000000 HC RX 637 (ALT 250 FOR IP): Performed by: NURSE PRACTITIONER

## 2019-11-11 PROCEDURE — 6360000002 HC RX W HCPCS: Performed by: NURSE PRACTITIONER

## 2019-11-11 RX ORDER — ONDANSETRON 2 MG/ML
INJECTION INTRAMUSCULAR; INTRAVENOUS
Status: COMPLETED
Start: 2019-11-11 | End: 2019-11-11

## 2019-11-11 RX ADMIN — ONDANSETRON HYDROCHLORIDE: 2 INJECTION, SOLUTION INTRAVENOUS at 12:27

## 2019-11-11 RX ADMIN — CARVEDILOL 6.25 MG: 6.25 TABLET, FILM COATED ORAL at 17:07

## 2019-11-11 RX ADMIN — IPRATROPIUM BROMIDE AND ALBUTEROL SULFATE 1 AMPULE: .5; 3 SOLUTION RESPIRATORY (INHALATION) at 11:04

## 2019-11-11 RX ADMIN — POTASSIUM CHLORIDE 20 MEQ: 20 TABLET, EXTENDED RELEASE ORAL at 09:23

## 2019-11-11 RX ADMIN — CARVEDILOL 6.25 MG: 6.25 TABLET, FILM COATED ORAL at 09:23

## 2019-11-11 RX ADMIN — CILOSTAZOL 50 MG: 100 TABLET ORAL at 09:24

## 2019-11-11 RX ADMIN — LIDOCAINE HYDROCHLORIDE: 20 SOLUTION ORAL; TOPICAL at 13:49

## 2019-11-11 RX ADMIN — IPRATROPIUM BROMIDE AND ALBUTEROL SULFATE 1 AMPULE: .5; 3 SOLUTION RESPIRATORY (INHALATION) at 15:07

## 2019-11-11 RX ADMIN — IPRATROPIUM BROMIDE AND ALBUTEROL SULFATE 1 AMPULE: .5; 3 SOLUTION RESPIRATORY (INHALATION) at 06:38

## 2019-11-11 RX ADMIN — ONDANSETRON HYDROCHLORIDE 4 MG: 2 INJECTION, SOLUTION INTRAMUSCULAR; INTRAVENOUS at 09:30

## 2019-11-11 RX ADMIN — ACETAMINOPHEN 650 MG: 325 TABLET ORAL at 23:51

## 2019-11-11 RX ADMIN — ACETAMINOPHEN 650 MG: 325 TABLET ORAL at 19:26

## 2019-11-11 RX ADMIN — RIVAROXABAN 20 MG: 20 TABLET, FILM COATED ORAL at 09:24

## 2019-11-11 RX ADMIN — AMIODARONE HYDROCHLORIDE 200 MG: 200 TABLET ORAL at 09:24

## 2019-11-11 RX ADMIN — PREDNISONE 40 MG: 20 TABLET ORAL at 09:23

## 2019-11-11 RX ADMIN — IPRATROPIUM BROMIDE AND ALBUTEROL SULFATE 1 AMPULE: .5; 3 SOLUTION RESPIRATORY (INHALATION) at 02:55

## 2019-11-11 RX ADMIN — PANTOPRAZOLE SODIUM 40 MG: 40 TABLET, DELAYED RELEASE ORAL at 04:34

## 2019-11-11 RX ADMIN — IPRATROPIUM BROMIDE AND ALBUTEROL SULFATE 1 AMPULE: .5; 3 SOLUTION RESPIRATORY (INHALATION) at 23:28

## 2019-11-11 RX ADMIN — ATORVASTATIN CALCIUM 20 MG: 10 TABLET, FILM COATED ORAL at 19:26

## 2019-11-11 RX ADMIN — ALBUTEROL SULFATE 2.5 MG: 2.5 SOLUTION RESPIRATORY (INHALATION) at 09:41

## 2019-11-11 RX ADMIN — AMLODIPINE BESYLATE 5 MG: 5 TABLET ORAL at 09:23

## 2019-11-11 RX ADMIN — ERYTHROMYCIN: 5 OINTMENT OPHTHALMIC at 17:08

## 2019-11-11 RX ADMIN — DOXYCYCLINE HYCLATE 100 MG: 100 TABLET, COATED ORAL at 13:52

## 2019-11-11 RX ADMIN — FUROSEMIDE 40 MG: 40 TABLET ORAL at 09:23

## 2019-11-11 RX ADMIN — IPRATROPIUM BROMIDE AND ALBUTEROL SULFATE 1 AMPULE: .5; 3 SOLUTION RESPIRATORY (INHALATION) at 19:35

## 2019-11-11 RX ADMIN — AMIODARONE HYDROCHLORIDE 200 MG: 200 TABLET ORAL at 19:26

## 2019-11-11 RX ADMIN — Medication 10 ML: at 09:26

## 2019-11-11 RX ADMIN — DOXYCYCLINE HYCLATE 100 MG: 100 TABLET, COATED ORAL at 23:51

## 2019-11-11 RX ADMIN — LOSARTAN POTASSIUM 100 MG: 100 TABLET ORAL at 09:22

## 2019-11-11 RX ADMIN — Medication 10 ML: at 19:27

## 2019-11-11 RX ADMIN — ONDANSETRON HYDROCHLORIDE 4 MG: 2 INJECTION, SOLUTION INTRAMUSCULAR; INTRAVENOUS at 19:26

## 2019-11-11 ASSESSMENT — PAIN DESCRIPTION - FREQUENCY: FREQUENCY: INTERMITTENT

## 2019-11-11 ASSESSMENT — PAIN DESCRIPTION - PAIN TYPE: TYPE: CHRONIC PAIN

## 2019-11-11 ASSESSMENT — PAIN DESCRIPTION - PROGRESSION: CLINICAL_PROGRESSION: NOT CHANGED

## 2019-11-11 ASSESSMENT — PAIN DESCRIPTION - LOCATION: LOCATION: SHOULDER;ARM

## 2019-11-11 ASSESSMENT — PAIN DESCRIPTION - DESCRIPTORS: DESCRIPTORS: ACHING;DISCOMFORT

## 2019-11-11 ASSESSMENT — PAIN SCALES - GENERAL
PAINLEVEL_OUTOF10: 3
PAINLEVEL_OUTOF10: 3

## 2019-11-11 ASSESSMENT — PAIN DESCRIPTION - ORIENTATION: ORIENTATION: RIGHT;UPPER

## 2019-11-11 ASSESSMENT — PAIN - FUNCTIONAL ASSESSMENT: PAIN_FUNCTIONAL_ASSESSMENT: PREVENTS OR INTERFERES SOME ACTIVE ACTIVITIES AND ADLS

## 2019-11-11 ASSESSMENT — PAIN DESCRIPTION - ONSET: ONSET: ON-GOING

## 2019-11-11 ASSESSMENT — PAIN SCALES - WONG BAKER: WONGBAKER_NUMERICALRESPONSE: 10

## 2019-11-12 LAB
EKG ATRIAL RATE: 90 BPM
EKG DIAGNOSIS: NORMAL
EKG P AXIS: 72 DEGREES
EKG P-R INTERVAL: 182 MS
EKG Q-T INTERVAL: 388 MS
EKG QRS DURATION: 100 MS
EKG QTC CALCULATION (BAZETT): 474 MS
EKG R AXIS: 31 DEGREES
EKG T AXIS: 69 DEGREES
EKG VENTRICULAR RATE: 90 BPM

## 2019-11-12 PROCEDURE — 2700000000 HC OXYGEN THERAPY PER DAY

## 2019-11-12 PROCEDURE — 94640 AIRWAY INHALATION TREATMENT: CPT

## 2019-11-12 PROCEDURE — 6370000000 HC RX 637 (ALT 250 FOR IP): Performed by: INTERNAL MEDICINE

## 2019-11-12 PROCEDURE — 94761 N-INVAS EAR/PLS OXIMETRY MLT: CPT

## 2019-11-12 PROCEDURE — 93010 ELECTROCARDIOGRAM REPORT: CPT | Performed by: INTERNAL MEDICINE

## 2019-11-12 PROCEDURE — 99233 SBSQ HOSP IP/OBS HIGH 50: CPT | Performed by: INTERNAL MEDICINE

## 2019-11-12 PROCEDURE — 99232 SBSQ HOSP IP/OBS MODERATE 35: CPT | Performed by: INTERNAL MEDICINE

## 2019-11-12 PROCEDURE — 1200000000 HC SEMI PRIVATE

## 2019-11-12 PROCEDURE — 2580000003 HC RX 258: Performed by: NURSE PRACTITIONER

## 2019-11-12 PROCEDURE — 6370000000 HC RX 637 (ALT 250 FOR IP): Performed by: NURSE PRACTITIONER

## 2019-11-12 RX ORDER — SUCRALFATE 1 G/1
1 TABLET ORAL EVERY 6 HOURS SCHEDULED
Status: DISCONTINUED | OUTPATIENT
Start: 2019-11-12 | End: 2019-11-13

## 2019-11-12 RX ADMIN — IPRATROPIUM BROMIDE AND ALBUTEROL SULFATE 1 AMPULE: .5; 3 SOLUTION RESPIRATORY (INHALATION) at 23:27

## 2019-11-12 RX ADMIN — CARVEDILOL 6.25 MG: 6.25 TABLET, FILM COATED ORAL at 09:27

## 2019-11-12 RX ADMIN — PREDNISONE 30 MG: 10 TABLET ORAL at 09:28

## 2019-11-12 RX ADMIN — Medication 10 ML: at 20:16

## 2019-11-12 RX ADMIN — AMIODARONE HYDROCHLORIDE 200 MG: 200 TABLET ORAL at 09:27

## 2019-11-12 RX ADMIN — FUROSEMIDE 40 MG: 40 TABLET ORAL at 09:27

## 2019-11-12 RX ADMIN — SUCRALFATE 1 G: 1 TABLET ORAL at 13:30

## 2019-11-12 RX ADMIN — SUCRALFATE 1 G: 1 TABLET ORAL at 20:16

## 2019-11-12 RX ADMIN — IPRATROPIUM BROMIDE AND ALBUTEROL SULFATE 1 AMPULE: .5; 3 SOLUTION RESPIRATORY (INHALATION) at 03:04

## 2019-11-12 RX ADMIN — POTASSIUM CHLORIDE 20 MEQ: 20 TABLET, EXTENDED RELEASE ORAL at 09:28

## 2019-11-12 RX ADMIN — IPRATROPIUM BROMIDE AND ALBUTEROL SULFATE 1 AMPULE: .5; 3 SOLUTION RESPIRATORY (INHALATION) at 15:59

## 2019-11-12 RX ADMIN — CARVEDILOL 6.25 MG: 6.25 TABLET, FILM COATED ORAL at 17:23

## 2019-11-12 RX ADMIN — IPRATROPIUM BROMIDE AND ALBUTEROL SULFATE 1 AMPULE: .5; 3 SOLUTION RESPIRATORY (INHALATION) at 07:27

## 2019-11-12 RX ADMIN — CILOSTAZOL 50 MG: 100 TABLET ORAL at 09:28

## 2019-11-12 RX ADMIN — ATORVASTATIN CALCIUM 20 MG: 10 TABLET, FILM COATED ORAL at 20:15

## 2019-11-12 RX ADMIN — SUCRALFATE 1 G: 1 TABLET ORAL at 09:38

## 2019-11-12 RX ADMIN — PANTOPRAZOLE SODIUM 40 MG: 40 TABLET, DELAYED RELEASE ORAL at 04:08

## 2019-11-12 RX ADMIN — AMIODARONE HYDROCHLORIDE 200 MG: 200 TABLET ORAL at 20:15

## 2019-11-12 RX ADMIN — IPRATROPIUM BROMIDE AND ALBUTEROL SULFATE 1 AMPULE: .5; 3 SOLUTION RESPIRATORY (INHALATION) at 19:47

## 2019-11-12 RX ADMIN — Medication 10 ML: at 09:31

## 2019-11-12 RX ADMIN — POLYETHYLENE GLYCOL (3350) 17 G: 17 POWDER, FOR SOLUTION ORAL at 20:22

## 2019-11-12 RX ADMIN — LOSARTAN POTASSIUM 100 MG: 100 TABLET ORAL at 09:27

## 2019-11-12 RX ADMIN — SUCRALFATE 1 G: 1 TABLET ORAL at 22:55

## 2019-11-12 RX ADMIN — AMLODIPINE BESYLATE 5 MG: 5 TABLET ORAL at 09:28

## 2019-11-12 RX ADMIN — IPRATROPIUM BROMIDE AND ALBUTEROL SULFATE 1 AMPULE: .5; 3 SOLUTION RESPIRATORY (INHALATION) at 11:13

## 2019-11-12 ASSESSMENT — ENCOUNTER SYMPTOMS
ALLERGIC/IMMUNOLOGIC NEGATIVE: 1
EYES NEGATIVE: 1
SHORTNESS OF BREATH: 1
GASTROINTESTINAL NEGATIVE: 1

## 2019-11-12 ASSESSMENT — PAIN SCALES - GENERAL
PAINLEVEL_OUTOF10: 1
PAINLEVEL_OUTOF10: 6

## 2019-11-13 ENCOUNTER — ANESTHESIA EVENT (OUTPATIENT)
Dept: ENDOSCOPY | Age: 74
DRG: 190 | End: 2019-11-13
Payer: MEDICARE

## 2019-11-13 ENCOUNTER — ANESTHESIA (OUTPATIENT)
Dept: ENDOSCOPY | Age: 74
DRG: 190 | End: 2019-11-13
Payer: MEDICARE

## 2019-11-13 VITALS
HEIGHT: 71 IN | RESPIRATION RATE: 22 BRPM | TEMPERATURE: 97.1 F | OXYGEN SATURATION: 92 % | DIASTOLIC BLOOD PRESSURE: 80 MMHG | HEART RATE: 78 BPM | WEIGHT: 238.44 LBS | SYSTOLIC BLOOD PRESSURE: 129 MMHG | BODY MASS INDEX: 33.38 KG/M2

## 2019-11-13 VITALS
DIASTOLIC BLOOD PRESSURE: 74 MMHG | OXYGEN SATURATION: 94 % | RESPIRATION RATE: 35 BRPM | SYSTOLIC BLOOD PRESSURE: 112 MMHG

## 2019-11-13 PROCEDURE — 3609012400 HC EGD TRANSORAL BIOPSY SINGLE/MULTIPLE: Performed by: INTERNAL MEDICINE

## 2019-11-13 PROCEDURE — 3700000001 HC ADD 15 MINUTES (ANESTHESIA): Performed by: INTERNAL MEDICINE

## 2019-11-13 PROCEDURE — 2500000003 HC RX 250 WO HCPCS: Performed by: NURSE ANESTHETIST, CERTIFIED REGISTERED

## 2019-11-13 PROCEDURE — 99238 HOSP IP/OBS DSCHRG MGMT 30/<: CPT | Performed by: INTERNAL MEDICINE

## 2019-11-13 PROCEDURE — 99232 SBSQ HOSP IP/OBS MODERATE 35: CPT | Performed by: INTERNAL MEDICINE

## 2019-11-13 PROCEDURE — 6360000002 HC RX W HCPCS: Performed by: NURSE ANESTHETIST, CERTIFIED REGISTERED

## 2019-11-13 PROCEDURE — 94761 N-INVAS EAR/PLS OXIMETRY MLT: CPT

## 2019-11-13 PROCEDURE — 2580000003 HC RX 258: Performed by: NURSE ANESTHETIST, CERTIFIED REGISTERED

## 2019-11-13 PROCEDURE — 88341 IMHCHEM/IMCYTCHM EA ADD ANTB: CPT

## 2019-11-13 PROCEDURE — 6370000000 HC RX 637 (ALT 250 FOR IP): Performed by: INTERNAL MEDICINE

## 2019-11-13 PROCEDURE — 6370000000 HC RX 637 (ALT 250 FOR IP): Performed by: NURSE PRACTITIONER

## 2019-11-13 PROCEDURE — 2580000003 HC RX 258: Performed by: NURSE PRACTITIONER

## 2019-11-13 PROCEDURE — 3700000000 HC ANESTHESIA ATTENDED CARE: Performed by: INTERNAL MEDICINE

## 2019-11-13 PROCEDURE — 88342 IMHCHEM/IMCYTCHM 1ST ANTB: CPT

## 2019-11-13 PROCEDURE — 88305 TISSUE EXAM BY PATHOLOGIST: CPT

## 2019-11-13 PROCEDURE — 94640 AIRWAY INHALATION TREATMENT: CPT

## 2019-11-13 PROCEDURE — 7100000010 HC PHASE II RECOVERY - FIRST 15 MIN: Performed by: INTERNAL MEDICINE

## 2019-11-13 PROCEDURE — 0DB58ZX EXCISION OF ESOPHAGUS, VIA NATURAL OR ARTIFICIAL OPENING ENDOSCOPIC, DIAGNOSTIC: ICD-10-PCS | Performed by: INTERNAL MEDICINE

## 2019-11-13 PROCEDURE — 2700000000 HC OXYGEN THERAPY PER DAY

## 2019-11-13 PROCEDURE — 7100000011 HC PHASE II RECOVERY - ADDTL 15 MIN: Performed by: INTERNAL MEDICINE

## 2019-11-13 PROCEDURE — 2709999900 HC NON-CHARGEABLE SUPPLY: Performed by: INTERNAL MEDICINE

## 2019-11-13 RX ORDER — PANTOPRAZOLE SODIUM 40 MG/1
40 TABLET, DELAYED RELEASE ORAL
Qty: 60 TABLET | Refills: 0 | Status: ON HOLD | OUTPATIENT
Start: 2019-11-13 | End: 2019-12-23 | Stop reason: ALTCHOICE

## 2019-11-13 RX ORDER — PREDNISONE 10 MG/1
TABLET ORAL
Qty: 10 TABLET | Refills: 0 | Status: SHIPPED | OUTPATIENT
Start: 2019-11-13 | End: 2019-12-23

## 2019-11-13 RX ORDER — PROPOFOL 10 MG/ML
INJECTION, EMULSION INTRAVENOUS PRN
Status: DISCONTINUED | OUTPATIENT
Start: 2019-11-13 | End: 2019-11-13 | Stop reason: SDUPTHER

## 2019-11-13 RX ORDER — SUCRALFATE ORAL 1 G/10ML
1 SUSPENSION ORAL
Qty: 280 ML | Refills: 0 | Status: ON HOLD | OUTPATIENT
Start: 2019-11-13 | End: 2019-12-23 | Stop reason: ALTCHOICE

## 2019-11-13 RX ORDER — PANTOPRAZOLE SODIUM 40 MG/1
40 TABLET, DELAYED RELEASE ORAL
Status: DISCONTINUED | OUTPATIENT
Start: 2019-11-13 | End: 2019-11-13 | Stop reason: HOSPADM

## 2019-11-13 RX ORDER — POTASSIUM CITRATE 15 MEQ/1
15 TABLET, EXTENDED RELEASE ORAL DAILY
Qty: 30 TABLET | Refills: 2 | Status: SHIPPED | OUTPATIENT
Start: 2019-11-13 | End: 2020-01-03 | Stop reason: SDUPTHER

## 2019-11-13 RX ORDER — SUCRALFATE ORAL 1 G/10ML
1 SUSPENSION ORAL EVERY 6 HOURS SCHEDULED
Status: DISCONTINUED | OUTPATIENT
Start: 2019-11-13 | End: 2019-11-13 | Stop reason: HOSPADM

## 2019-11-13 RX ORDER — SODIUM CHLORIDE, SODIUM LACTATE, POTASSIUM CHLORIDE, CALCIUM CHLORIDE 600; 310; 30; 20 MG/100ML; MG/100ML; MG/100ML; MG/100ML
INJECTION, SOLUTION INTRAVENOUS CONTINUOUS PRN
Status: DISCONTINUED | OUTPATIENT
Start: 2019-11-13 | End: 2019-11-13 | Stop reason: SDUPTHER

## 2019-11-13 RX ORDER — LIDOCAINE HYDROCHLORIDE 20 MG/ML
INJECTION, SOLUTION INFILTRATION; PERINEURAL PRN
Status: DISCONTINUED | OUTPATIENT
Start: 2019-11-13 | End: 2019-11-13 | Stop reason: SDUPTHER

## 2019-11-13 RX ADMIN — POLYETHYLENE GLYCOL (3350) 17 G: 17 POWDER, FOR SOLUTION ORAL at 10:16

## 2019-11-13 RX ADMIN — LOSARTAN POTASSIUM 100 MG: 100 TABLET ORAL at 10:15

## 2019-11-13 RX ADMIN — FUROSEMIDE 40 MG: 40 TABLET ORAL at 10:15

## 2019-11-13 RX ADMIN — IPRATROPIUM BROMIDE AND ALBUTEROL SULFATE 1 AMPULE: .5; 3 SOLUTION RESPIRATORY (INHALATION) at 03:32

## 2019-11-13 RX ADMIN — AMLODIPINE BESYLATE 5 MG: 5 TABLET ORAL at 10:15

## 2019-11-13 RX ADMIN — LIDOCAINE HYDROCHLORIDE 40 MG: 20 INJECTION, SOLUTION INFILTRATION; PERINEURAL at 11:47

## 2019-11-13 RX ADMIN — PROPOFOL 120 MG: 10 INJECTION, EMULSION INTRAVENOUS at 11:47

## 2019-11-13 RX ADMIN — Medication 10 ML: at 10:15

## 2019-11-13 RX ADMIN — IPRATROPIUM BROMIDE AND ALBUTEROL SULFATE 1 AMPULE: .5; 3 SOLUTION RESPIRATORY (INHALATION) at 11:08

## 2019-11-13 RX ADMIN — POTASSIUM CHLORIDE 20 MEQ: 20 TABLET, EXTENDED RELEASE ORAL at 10:15

## 2019-11-13 RX ADMIN — AMIODARONE HYDROCHLORIDE 200 MG: 200 TABLET ORAL at 10:15

## 2019-11-13 RX ADMIN — CILOSTAZOL 50 MG: 100 TABLET ORAL at 10:14

## 2019-11-13 RX ADMIN — CARVEDILOL 6.25 MG: 6.25 TABLET, FILM COATED ORAL at 10:15

## 2019-11-13 RX ADMIN — SODIUM CHLORIDE, POTASSIUM CHLORIDE, SODIUM LACTATE AND CALCIUM CHLORIDE: 600; 310; 30; 20 INJECTION, SOLUTION INTRAVENOUS at 11:47

## 2019-11-13 RX ADMIN — IPRATROPIUM BROMIDE AND ALBUTEROL SULFATE 1 AMPULE: .5; 3 SOLUTION RESPIRATORY (INHALATION) at 07:04

## 2019-11-13 RX ADMIN — PREDNISONE 30 MG: 10 TABLET ORAL at 10:14

## 2019-11-13 ASSESSMENT — PAIN - FUNCTIONAL ASSESSMENT: PAIN_FUNCTIONAL_ASSESSMENT: 0-10

## 2019-11-13 ASSESSMENT — PAIN DESCRIPTION - DESCRIPTORS: DESCRIPTORS: BURNING

## 2019-11-13 ASSESSMENT — ENCOUNTER SYMPTOMS: SHORTNESS OF BREATH: 1

## 2019-11-14 ENCOUNTER — CARE COORDINATION (OUTPATIENT)
Dept: CASE MANAGEMENT | Age: 74
End: 2019-11-14

## 2019-11-14 DIAGNOSIS — I50.23 ACUTE ON CHRONIC SYSTOLIC CHF (CONGESTIVE HEART FAILURE) (HCC): Primary | ICD-10-CM

## 2019-11-14 PROCEDURE — 1111F DSCHRG MED/CURRENT MED MERGE: CPT | Performed by: INTERNAL MEDICINE

## 2019-11-15 RX ORDER — IPRATROPIUM BROMIDE AND ALBUTEROL SULFATE 2.5; .5 MG/3ML; MG/3ML
SOLUTION RESPIRATORY (INHALATION)
Qty: 360 ML | Refills: 0 | Status: SHIPPED | OUTPATIENT
Start: 2019-11-15 | End: 2019-12-16 | Stop reason: SDUPTHER

## 2019-11-18 ENCOUNTER — CARE COORDINATION (OUTPATIENT)
Dept: CASE MANAGEMENT | Age: 74
End: 2019-11-18

## 2019-11-22 ENCOUNTER — CARE COORDINATION (OUTPATIENT)
Dept: CASE MANAGEMENT | Age: 74
End: 2019-11-22

## 2019-11-26 ENCOUNTER — OFFICE VISIT (OUTPATIENT)
Dept: PULMONOLOGY | Age: 74
End: 2019-11-26
Payer: MEDICARE

## 2019-11-26 VITALS
WEIGHT: 231.2 LBS | OXYGEN SATURATION: 92 % | TEMPERATURE: 98.9 F | DIASTOLIC BLOOD PRESSURE: 90 MMHG | HEART RATE: 91 BPM | SYSTOLIC BLOOD PRESSURE: 140 MMHG | HEIGHT: 71 IN | BODY MASS INDEX: 32.37 KG/M2

## 2019-11-26 DIAGNOSIS — Z87.891 PERSONAL HISTORY OF TOBACCO USE: ICD-10-CM

## 2019-11-26 DIAGNOSIS — J43.2 CENTRILOBULAR EMPHYSEMA (HCC): Primary | ICD-10-CM

## 2019-11-26 PROCEDURE — 99213 OFFICE O/P EST LOW 20 MIN: CPT | Performed by: INTERNAL MEDICINE

## 2019-11-27 ENCOUNTER — CARE COORDINATION (OUTPATIENT)
Dept: CASE MANAGEMENT | Age: 74
End: 2019-11-27

## 2019-12-16 RX ORDER — FUROSEMIDE 40 MG/1
TABLET ORAL
Qty: 30 TABLET | Refills: 0 | Status: SHIPPED | OUTPATIENT
Start: 2019-12-16 | End: 2020-01-03 | Stop reason: SDUPTHER

## 2019-12-16 RX ORDER — LOSARTAN POTASSIUM 100 MG/1
TABLET ORAL
Qty: 30 TABLET | Refills: 0 | Status: SHIPPED | OUTPATIENT
Start: 2019-12-16 | End: 2020-01-03 | Stop reason: SDUPTHER

## 2019-12-16 RX ORDER — IPRATROPIUM BROMIDE AND ALBUTEROL SULFATE 2.5; .5 MG/3ML; MG/3ML
SOLUTION RESPIRATORY (INHALATION)
Qty: 360 ML | Refills: 0 | Status: SHIPPED | OUTPATIENT
Start: 2019-12-16 | End: 2020-01-03 | Stop reason: SDUPTHER

## 2019-12-23 ENCOUNTER — APPOINTMENT (OUTPATIENT)
Dept: GENERAL RADIOLOGY | Age: 74
DRG: 190 | End: 2019-12-23
Payer: MEDICARE

## 2019-12-23 ENCOUNTER — HOSPITAL ENCOUNTER (INPATIENT)
Age: 74
LOS: 5 days | Discharge: HOME | DRG: 190 | End: 2019-12-28
Attending: EMERGENCY MEDICINE | Admitting: INTERNAL MEDICINE
Payer: MEDICARE

## 2019-12-23 DIAGNOSIS — J44.1 COPD EXACERBATION (HCC): ICD-10-CM

## 2019-12-23 DIAGNOSIS — J96.21 ACUTE ON CHRONIC RESPIRATORY FAILURE WITH HYPOXIA (HCC): Primary | ICD-10-CM

## 2019-12-23 LAB
A/G RATIO: 1.3 (ref 1.1–2.2)
ALBUMIN SERPL-MCNC: 4.3 G/DL (ref 3.4–5)
ALP BLD-CCNC: 78 U/L (ref 40–129)
ALT SERPL-CCNC: 10 U/L (ref 10–40)
ANION GAP SERPL CALCULATED.3IONS-SCNC: 11 MMOL/L (ref 3–16)
AST SERPL-CCNC: 13 U/L (ref 15–37)
BASE EXCESS VENOUS: 0.4 MMOL/L (ref -3–3)
BASOPHILS ABSOLUTE: 0.2 K/UL (ref 0–0.2)
BASOPHILS RELATIVE PERCENT: 2.3 %
BILIRUB SERPL-MCNC: 0.6 MG/DL (ref 0–1)
BUN BLDV-MCNC: 19 MG/DL (ref 7–20)
CALCIUM SERPL-MCNC: 9.6 MG/DL (ref 8.3–10.6)
CARBOXYHEMOGLOBIN: 3.5 % (ref 0–1.5)
CHLORIDE BLD-SCNC: 103 MMOL/L (ref 99–110)
CO2: 27 MMOL/L (ref 21–32)
CREAT SERPL-MCNC: 1.4 MG/DL (ref 0.8–1.3)
EKG ATRIAL RATE: 90 BPM
EKG DIAGNOSIS: NORMAL
EKG P AXIS: 83 DEGREES
EKG P-R INTERVAL: 178 MS
EKG Q-T INTERVAL: 368 MS
EKG QRS DURATION: 94 MS
EKG QTC CALCULATION (BAZETT): 450 MS
EKG R AXIS: 61 DEGREES
EKG T AXIS: 80 DEGREES
EKG VENTRICULAR RATE: 90 BPM
EOSINOPHILS ABSOLUTE: 1.1 K/UL (ref 0–0.6)
EOSINOPHILS RELATIVE PERCENT: 11.9 %
GFR AFRICAN AMERICAN: >60
GFR NON-AFRICAN AMERICAN: 50
GLOBULIN: 3.2 G/DL
GLUCOSE BLD-MCNC: 110 MG/DL (ref 70–99)
HCO3 VENOUS: 24.9 MMOL/L (ref 23–29)
HCT VFR BLD CALC: 42.2 % (ref 40.5–52.5)
HEMOGLOBIN: 13.6 G/DL (ref 13.5–17.5)
LACTIC ACID: 1.3 MMOL/L (ref 0.4–2)
LYMPHOCYTES ABSOLUTE: 1.8 K/UL (ref 1–5.1)
LYMPHOCYTES RELATIVE PERCENT: 19.8 %
MCH RBC QN AUTO: 30 PG (ref 26–34)
MCHC RBC AUTO-ENTMCNC: 32.3 G/DL (ref 31–36)
MCV RBC AUTO: 93.1 FL (ref 80–100)
METHEMOGLOBIN VENOUS: 0.3 %
MONOCYTES ABSOLUTE: 0.7 K/UL (ref 0–1.3)
MONOCYTES RELATIVE PERCENT: 8.1 %
NEUTROPHILS ABSOLUTE: 5.3 K/UL (ref 1.7–7.7)
NEUTROPHILS RELATIVE PERCENT: 57.9 %
O2 CONTENT, VEN: 14 VOL %
O2 SAT, VEN: 69 %
O2 THERAPY: ABNORMAL
PCO2, VEN: 40 MMHG (ref 40–50)
PDW BLD-RTO: 16.1 % (ref 12.4–15.4)
PH VENOUS: 7.41 (ref 7.35–7.45)
PLATELET # BLD: 247 K/UL (ref 135–450)
PMV BLD AUTO: 8.1 FL (ref 5–10.5)
PO2, VEN: 35.6 MMHG (ref 25–40)
POTASSIUM REFLEX MAGNESIUM: 4.1 MMOL/L (ref 3.5–5.1)
PRO-BNP: 30 PG/ML (ref 0–124)
RBC # BLD: 4.53 M/UL (ref 4.2–5.9)
SODIUM BLD-SCNC: 141 MMOL/L (ref 136–145)
TCO2 CALC VENOUS: 26 MMOL/L
TOTAL PROTEIN: 7.5 G/DL (ref 6.4–8.2)
TROPONIN: <0.01 NG/ML
WBC # BLD: 9.2 K/UL (ref 4–11)

## 2019-12-23 PROCEDURE — 71045 X-RAY EXAM CHEST 1 VIEW: CPT

## 2019-12-23 PROCEDURE — 83605 ASSAY OF LACTIC ACID: CPT

## 2019-12-23 PROCEDURE — 2580000003 HC RX 258: Performed by: NURSE PRACTITIONER

## 2019-12-23 PROCEDURE — 83880 ASSAY OF NATRIURETIC PEPTIDE: CPT

## 2019-12-23 PROCEDURE — 82803 BLOOD GASES ANY COMBINATION: CPT

## 2019-12-23 PROCEDURE — 94150 VITAL CAPACITY TEST: CPT

## 2019-12-23 PROCEDURE — 6370000000 HC RX 637 (ALT 250 FOR IP): Performed by: NURSE PRACTITIONER

## 2019-12-23 PROCEDURE — 6370000000 HC RX 637 (ALT 250 FOR IP): Performed by: INTERNAL MEDICINE

## 2019-12-23 PROCEDURE — 6360000002 HC RX W HCPCS

## 2019-12-23 PROCEDURE — 2700000000 HC OXYGEN THERAPY PER DAY

## 2019-12-23 PROCEDURE — 94761 N-INVAS EAR/PLS OXIMETRY MLT: CPT

## 2019-12-23 PROCEDURE — 85025 COMPLETE CBC W/AUTO DIFF WBC: CPT

## 2019-12-23 PROCEDURE — 84484 ASSAY OF TROPONIN QUANT: CPT

## 2019-12-23 PROCEDURE — 99285 EMERGENCY DEPT VISIT HI MDM: CPT

## 2019-12-23 PROCEDURE — 94640 AIRWAY INHALATION TREATMENT: CPT

## 2019-12-23 PROCEDURE — 93005 ELECTROCARDIOGRAM TRACING: CPT

## 2019-12-23 PROCEDURE — 99223 1ST HOSP IP/OBS HIGH 75: CPT | Performed by: INTERNAL MEDICINE

## 2019-12-23 PROCEDURE — 6360000002 HC RX W HCPCS: Performed by: EMERGENCY MEDICINE

## 2019-12-23 PROCEDURE — 6360000002 HC RX W HCPCS: Performed by: INTERNAL MEDICINE

## 2019-12-23 PROCEDURE — 80053 COMPREHEN METABOLIC PANEL: CPT

## 2019-12-23 PROCEDURE — 36415 COLL VENOUS BLD VENIPUNCTURE: CPT

## 2019-12-23 PROCEDURE — 93010 ELECTROCARDIOGRAM REPORT: CPT | Performed by: INTERNAL MEDICINE

## 2019-12-23 PROCEDURE — 94669 MECHANICAL CHEST WALL OSCILL: CPT

## 2019-12-23 PROCEDURE — 6370000000 HC RX 637 (ALT 250 FOR IP): Performed by: EMERGENCY MEDICINE

## 2019-12-23 PROCEDURE — 96374 THER/PROPH/DIAG INJ IV PUSH: CPT

## 2019-12-23 PROCEDURE — 1200000000 HC SEMI PRIVATE

## 2019-12-23 RX ORDER — IPRATROPIUM BROMIDE AND ALBUTEROL SULFATE 2.5; .5 MG/3ML; MG/3ML
1 SOLUTION RESPIRATORY (INHALATION) EVERY 4 HOURS
Status: DISCONTINUED | OUTPATIENT
Start: 2019-12-23 | End: 2019-12-28 | Stop reason: HOSPADM

## 2019-12-23 RX ORDER — ALBUTEROL SULFATE 2.5 MG/3ML
2.5 SOLUTION RESPIRATORY (INHALATION)
Status: DISCONTINUED | OUTPATIENT
Start: 2019-12-23 | End: 2019-12-28 | Stop reason: HOSPADM

## 2019-12-23 RX ORDER — POTASSIUM CHLORIDE 20MEQ/15ML
15 LIQUID (ML) ORAL DAILY
Status: DISCONTINUED | OUTPATIENT
Start: 2019-12-23 | End: 2019-12-28 | Stop reason: HOSPADM

## 2019-12-23 RX ORDER — METHYLPREDNISOLONE SODIUM SUCCINATE 125 MG/2ML
125 INJECTION, POWDER, LYOPHILIZED, FOR SOLUTION INTRAMUSCULAR; INTRAVENOUS ONCE
Status: COMPLETED | OUTPATIENT
Start: 2019-12-23 | End: 2019-12-23

## 2019-12-23 RX ORDER — AMLODIPINE BESYLATE 5 MG/1
5 TABLET ORAL DAILY
Status: DISCONTINUED | OUTPATIENT
Start: 2019-12-23 | End: 2019-12-28 | Stop reason: HOSPADM

## 2019-12-23 RX ORDER — METHYLPREDNISOLONE SODIUM SUCCINATE 40 MG/ML
40 INJECTION, POWDER, LYOPHILIZED, FOR SOLUTION INTRAMUSCULAR; INTRAVENOUS EVERY 12 HOURS
Status: COMPLETED | OUTPATIENT
Start: 2019-12-23 | End: 2019-12-25

## 2019-12-23 RX ORDER — IPRATROPIUM BROMIDE AND ALBUTEROL SULFATE 2.5; .5 MG/3ML; MG/3ML
1 SOLUTION RESPIRATORY (INHALATION)
Status: DISCONTINUED | OUTPATIENT
Start: 2019-12-23 | End: 2019-12-23

## 2019-12-23 RX ORDER — ONDANSETRON 2 MG/ML
4 INJECTION INTRAMUSCULAR; INTRAVENOUS EVERY 6 HOURS PRN
Status: DISCONTINUED | OUTPATIENT
Start: 2019-12-23 | End: 2019-12-28 | Stop reason: HOSPADM

## 2019-12-23 RX ORDER — IPRATROPIUM BROMIDE AND ALBUTEROL SULFATE 2.5; .5 MG/3ML; MG/3ML
1 SOLUTION RESPIRATORY (INHALATION) ONCE
Status: COMPLETED | OUTPATIENT
Start: 2019-12-23 | End: 2019-12-23

## 2019-12-23 RX ORDER — PANTOPRAZOLE SODIUM 40 MG/1
40 TABLET, DELAYED RELEASE ORAL
Status: DISCONTINUED | OUTPATIENT
Start: 2019-12-23 | End: 2019-12-23

## 2019-12-23 RX ORDER — CILOSTAZOL 100 MG/1
50 TABLET ORAL DAILY
Status: DISCONTINUED | OUTPATIENT
Start: 2019-12-23 | End: 2019-12-23

## 2019-12-23 RX ORDER — CARVEDILOL 6.25 MG/1
6.25 TABLET ORAL 2 TIMES DAILY WITH MEALS
Status: DISCONTINUED | OUTPATIENT
Start: 2019-12-23 | End: 2019-12-27

## 2019-12-23 RX ORDER — PREDNISONE 20 MG/1
40 TABLET ORAL DAILY
Status: DISCONTINUED | OUTPATIENT
Start: 2019-12-25 | End: 2019-12-28 | Stop reason: HOSPADM

## 2019-12-23 RX ORDER — ALBUTEROL SULFATE 2.5 MG/3ML
SOLUTION RESPIRATORY (INHALATION)
Status: COMPLETED
Start: 2019-12-23 | End: 2019-12-23

## 2019-12-23 RX ORDER — LOSARTAN POTASSIUM 100 MG/1
100 TABLET ORAL DAILY
Status: DISCONTINUED | OUTPATIENT
Start: 2019-12-23 | End: 2019-12-28 | Stop reason: HOSPADM

## 2019-12-23 RX ORDER — FUROSEMIDE 40 MG/1
40 TABLET ORAL DAILY
Status: DISCONTINUED | OUTPATIENT
Start: 2019-12-23 | End: 2019-12-28 | Stop reason: HOSPADM

## 2019-12-23 RX ORDER — POLYETHYLENE GLYCOL 3350 17 G/17G
17 POWDER, FOR SOLUTION ORAL DAILY PRN
Status: DISCONTINUED | OUTPATIENT
Start: 2019-12-23 | End: 2019-12-28 | Stop reason: HOSPADM

## 2019-12-23 RX ORDER — AZITHROMYCIN 250 MG/1
500 TABLET, FILM COATED ORAL ONCE
Status: COMPLETED | OUTPATIENT
Start: 2019-12-23 | End: 2019-12-23

## 2019-12-23 RX ORDER — ATORVASTATIN CALCIUM 10 MG/1
20 TABLET, FILM COATED ORAL DAILY
Status: DISCONTINUED | OUTPATIENT
Start: 2019-12-23 | End: 2019-12-28 | Stop reason: HOSPADM

## 2019-12-23 RX ORDER — ALBUTEROL SULFATE 2.5 MG/3ML
2.5 SOLUTION RESPIRATORY (INHALATION) ONCE
Status: COMPLETED | OUTPATIENT
Start: 2019-12-23 | End: 2019-12-23

## 2019-12-23 RX ORDER — SODIUM CHLORIDE 0.9 % (FLUSH) 0.9 %
10 SYRINGE (ML) INJECTION PRN
Status: DISCONTINUED | OUTPATIENT
Start: 2019-12-23 | End: 2019-12-28 | Stop reason: HOSPADM

## 2019-12-23 RX ORDER — AMIODARONE HYDROCHLORIDE 200 MG/1
200 TABLET ORAL 2 TIMES DAILY
Status: DISCONTINUED | OUTPATIENT
Start: 2019-12-23 | End: 2019-12-27

## 2019-12-23 RX ORDER — AZITHROMYCIN 250 MG/1
250 TABLET, FILM COATED ORAL DAILY
Status: COMPLETED | OUTPATIENT
Start: 2019-12-24 | End: 2019-12-27

## 2019-12-23 RX ORDER — SODIUM CHLORIDE 0.9 % (FLUSH) 0.9 %
10 SYRINGE (ML) INJECTION EVERY 12 HOURS SCHEDULED
Status: DISCONTINUED | OUTPATIENT
Start: 2019-12-23 | End: 2019-12-28 | Stop reason: HOSPADM

## 2019-12-23 RX ADMIN — IPRATROPIUM BROMIDE AND ALBUTEROL SULFATE 1 AMPULE: .5; 3 SOLUTION RESPIRATORY (INHALATION) at 19:29

## 2019-12-23 RX ADMIN — ALBUTEROL SULFATE 2.5 MG: 2.5 SOLUTION RESPIRATORY (INHALATION) at 08:23

## 2019-12-23 RX ADMIN — METHYLPREDNISOLONE SODIUM SUCCINATE 40 MG: 40 INJECTION, POWDER, FOR SOLUTION INTRAMUSCULAR; INTRAVENOUS at 17:15

## 2019-12-23 RX ADMIN — CARVEDILOL 6.25 MG: 6.25 TABLET, FILM COATED ORAL at 17:10

## 2019-12-23 RX ADMIN — IPRATROPIUM BROMIDE AND ALBUTEROL SULFATE 1 AMPULE: .5; 3 SOLUTION RESPIRATORY (INHALATION) at 22:58

## 2019-12-23 RX ADMIN — AMIODARONE HYDROCHLORIDE 200 MG: 200 TABLET ORAL at 21:45

## 2019-12-23 RX ADMIN — IPRATROPIUM BROMIDE AND ALBUTEROL SULFATE 1 AMPULE: .5; 3 SOLUTION RESPIRATORY (INHALATION) at 11:39

## 2019-12-23 RX ADMIN — IPRATROPIUM BROMIDE AND ALBUTEROL SULFATE 1 AMPULE: .5; 3 SOLUTION RESPIRATORY (INHALATION) at 06:34

## 2019-12-23 RX ADMIN — AZITHROMYCIN 500 MG: 250 TABLET, FILM COATED ORAL at 07:47

## 2019-12-23 RX ADMIN — METHYLPREDNISOLONE SODIUM SUCCINATE 125 MG: 125 INJECTION, POWDER, FOR SOLUTION INTRAMUSCULAR; INTRAVENOUS at 06:35

## 2019-12-23 RX ADMIN — IPRATROPIUM BROMIDE AND ALBUTEROL SULFATE 1 AMPULE: .5; 3 SOLUTION RESPIRATORY (INHALATION) at 15:43

## 2019-12-23 RX ADMIN — Medication 10 ML: at 21:45

## 2019-12-23 RX ADMIN — Medication 10 ML: at 17:10

## 2019-12-23 RX ADMIN — ALBUTEROL SULFATE 2.5 MG: 2.5 SOLUTION RESPIRATORY (INHALATION) at 08:11

## 2019-12-23 RX ADMIN — IPRATROPIUM BROMIDE AND ALBUTEROL SULFATE 1 AMPULE: .5; 3 SOLUTION RESPIRATORY (INHALATION) at 06:58

## 2019-12-24 LAB
ANION GAP SERPL CALCULATED.3IONS-SCNC: 11 MMOL/L (ref 3–16)
BUN BLDV-MCNC: 25 MG/DL (ref 7–20)
CALCIUM SERPL-MCNC: 9.6 MG/DL (ref 8.3–10.6)
CHLORIDE BLD-SCNC: 101 MMOL/L (ref 99–110)
CO2: 23 MMOL/L (ref 21–32)
CREAT SERPL-MCNC: 1.1 MG/DL (ref 0.8–1.3)
GFR AFRICAN AMERICAN: >60
GFR NON-AFRICAN AMERICAN: >60
GLUCOSE BLD-MCNC: 150 MG/DL (ref 70–99)
HCT VFR BLD CALC: 41.4 % (ref 40.5–52.5)
HEMOGLOBIN: 13.4 G/DL (ref 13.5–17.5)
MCH RBC QN AUTO: 30.2 PG (ref 26–34)
MCHC RBC AUTO-ENTMCNC: 32.3 G/DL (ref 31–36)
MCV RBC AUTO: 93.5 FL (ref 80–100)
PDW BLD-RTO: 15.7 % (ref 12.4–15.4)
PLATELET # BLD: 253 K/UL (ref 135–450)
PMV BLD AUTO: 8.5 FL (ref 5–10.5)
POTASSIUM REFLEX MAGNESIUM: 4.6 MMOL/L (ref 3.5–5.1)
RBC # BLD: 4.42 M/UL (ref 4.2–5.9)
SODIUM BLD-SCNC: 135 MMOL/L (ref 136–145)
TROPONIN: <0.01 NG/ML
TROPONIN: <0.01 NG/ML
WBC # BLD: 12.8 K/UL (ref 4–11)

## 2019-12-24 PROCEDURE — 36415 COLL VENOUS BLD VENIPUNCTURE: CPT

## 2019-12-24 PROCEDURE — 6370000000 HC RX 637 (ALT 250 FOR IP): Performed by: INTERNAL MEDICINE

## 2019-12-24 PROCEDURE — 80048 BASIC METABOLIC PNL TOTAL CA: CPT

## 2019-12-24 PROCEDURE — 2580000003 HC RX 258: Performed by: NURSE PRACTITIONER

## 2019-12-24 PROCEDURE — 6370000000 HC RX 637 (ALT 250 FOR IP): Performed by: NURSE PRACTITIONER

## 2019-12-24 PROCEDURE — 84484 ASSAY OF TROPONIN QUANT: CPT

## 2019-12-24 PROCEDURE — 6370000000 HC RX 637 (ALT 250 FOR IP): Performed by: PHYSICIAN ASSISTANT

## 2019-12-24 PROCEDURE — 6360000002 HC RX W HCPCS: Performed by: INTERNAL MEDICINE

## 2019-12-24 PROCEDURE — 1200000000 HC SEMI PRIVATE

## 2019-12-24 PROCEDURE — 6360000002 HC RX W HCPCS: Performed by: NURSE PRACTITIONER

## 2019-12-24 PROCEDURE — 93005 ELECTROCARDIOGRAM TRACING: CPT | Performed by: INTERNAL MEDICINE

## 2019-12-24 PROCEDURE — 2700000000 HC OXYGEN THERAPY PER DAY

## 2019-12-24 PROCEDURE — 94761 N-INVAS EAR/PLS OXIMETRY MLT: CPT

## 2019-12-24 PROCEDURE — 85027 COMPLETE CBC AUTOMATED: CPT

## 2019-12-24 PROCEDURE — 99233 SBSQ HOSP IP/OBS HIGH 50: CPT | Performed by: INTERNAL MEDICINE

## 2019-12-24 PROCEDURE — 6360000002 HC RX W HCPCS

## 2019-12-24 PROCEDURE — 94640 AIRWAY INHALATION TREATMENT: CPT

## 2019-12-24 PROCEDURE — 94669 MECHANICAL CHEST WALL OSCILL: CPT

## 2019-12-24 RX ORDER — MORPHINE SULFATE 2 MG/ML
2 INJECTION, SOLUTION INTRAMUSCULAR; INTRAVENOUS ONCE
Status: DISCONTINUED | OUTPATIENT
Start: 2019-12-24 | End: 2019-12-24 | Stop reason: CLARIF

## 2019-12-24 RX ORDER — SODIUM CHLORIDE 0.9 % (FLUSH) 0.9 %
SYRINGE (ML) INJECTION
Status: DISPENSED
Start: 2019-12-24 | End: 2019-12-25

## 2019-12-24 RX ORDER — NITROGLYCERIN 0.4 MG/1
TABLET SUBLINGUAL
Status: DISPENSED
Start: 2019-12-24 | End: 2019-12-25

## 2019-12-24 RX ORDER — NITROGLYCERIN 0.4 MG/1
0.4 TABLET SUBLINGUAL EVERY 5 MIN PRN
Status: DISCONTINUED | OUTPATIENT
Start: 2019-12-24 | End: 2019-12-28 | Stop reason: HOSPADM

## 2019-12-24 RX ORDER — PANTOPRAZOLE SODIUM 20 MG/1
20 TABLET, DELAYED RELEASE ORAL DAILY
Status: DISCONTINUED | OUTPATIENT
Start: 2019-12-24 | End: 2019-12-26

## 2019-12-24 RX ORDER — TETRAHYDROZOLINE HCL 0.05 %
1 DROPS OPHTHALMIC (EYE) 3 TIMES DAILY
Status: DISCONTINUED | OUTPATIENT
Start: 2019-12-24 | End: 2019-12-28 | Stop reason: HOSPADM

## 2019-12-24 RX ORDER — ACETAMINOPHEN 325 MG/1
650 TABLET ORAL EVERY 4 HOURS PRN
Status: DISCONTINUED | OUTPATIENT
Start: 2019-12-24 | End: 2019-12-28 | Stop reason: HOSPADM

## 2019-12-24 RX ORDER — MORPHINE SULFATE 4 MG/ML
INJECTION, SOLUTION INTRAMUSCULAR; INTRAVENOUS
Status: COMPLETED
Start: 2019-12-24 | End: 2019-12-24

## 2019-12-24 RX ORDER — MORPHINE SULFATE 2 MG/ML
2 INJECTION, SOLUTION INTRAMUSCULAR; INTRAVENOUS ONCE
Status: COMPLETED | OUTPATIENT
Start: 2019-12-24 | End: 2019-12-26

## 2019-12-24 RX ADMIN — AMLODIPINE BESYLATE 5 MG: 5 TABLET ORAL at 08:21

## 2019-12-24 RX ADMIN — Medication 10 ML: at 08:22

## 2019-12-24 RX ADMIN — RIVAROXABAN 20 MG: 20 TABLET, FILM COATED ORAL at 17:14

## 2019-12-24 RX ADMIN — AZITHROMYCIN 250 MG: 250 TABLET, FILM COATED ORAL at 08:22

## 2019-12-24 RX ADMIN — IPRATROPIUM BROMIDE AND ALBUTEROL SULFATE 1 AMPULE: .5; 3 SOLUTION RESPIRATORY (INHALATION) at 02:49

## 2019-12-24 RX ADMIN — PANTOPRAZOLE SODIUM 20 MG: 20 TABLET, DELAYED RELEASE ORAL at 17:14

## 2019-12-24 RX ADMIN — Medication 10 ML: at 21:04

## 2019-12-24 RX ADMIN — NITROGLYCERIN 0.4 MG: 0.4 TABLET SUBLINGUAL at 12:08

## 2019-12-24 RX ADMIN — IPRATROPIUM BROMIDE AND ALBUTEROL SULFATE 1 AMPULE: .5; 3 SOLUTION RESPIRATORY (INHALATION) at 11:53

## 2019-12-24 RX ADMIN — ATORVASTATIN CALCIUM 20 MG: 10 TABLET, FILM COATED ORAL at 08:22

## 2019-12-24 RX ADMIN — ONDANSETRON HYDROCHLORIDE 4 MG: 2 INJECTION, SOLUTION INTRAMUSCULAR; INTRAVENOUS at 23:17

## 2019-12-24 RX ADMIN — ACETAMINOPHEN 650 MG: 325 TABLET ORAL at 23:55

## 2019-12-24 RX ADMIN — IPRATROPIUM BROMIDE AND ALBUTEROL SULFATE 1 AMPULE: .5; 3 SOLUTION RESPIRATORY (INHALATION) at 19:00

## 2019-12-24 RX ADMIN — CARVEDILOL 6.25 MG: 6.25 TABLET, FILM COATED ORAL at 17:14

## 2019-12-24 RX ADMIN — IPRATROPIUM BROMIDE AND ALBUTEROL SULFATE 1 AMPULE: .5; 3 SOLUTION RESPIRATORY (INHALATION) at 07:49

## 2019-12-24 RX ADMIN — ALBUTEROL SULFATE 2.5 MG: 2.5 SOLUTION RESPIRATORY (INHALATION) at 05:39

## 2019-12-24 RX ADMIN — POTASSIUM CHLORIDE 15 MEQ: 20 SOLUTION ORAL at 08:25

## 2019-12-24 RX ADMIN — TETRAHYDROZOLINE HYDROCHLORIDE 1 DROP: 0.05 SOLUTION/ DROPS OPHTHALMIC at 11:33

## 2019-12-24 RX ADMIN — AMIODARONE HYDROCHLORIDE 200 MG: 200 TABLET ORAL at 08:22

## 2019-12-24 RX ADMIN — TETRAHYDROZOLINE HYDROCHLORIDE 1 DROP: 0.05 SOLUTION/ DROPS OPHTHALMIC at 21:03

## 2019-12-24 RX ADMIN — AMIODARONE HYDROCHLORIDE 200 MG: 200 TABLET ORAL at 21:02

## 2019-12-24 RX ADMIN — LOSARTAN POTASSIUM 100 MG: 100 TABLET, FILM COATED ORAL at 08:21

## 2019-12-24 RX ADMIN — IPRATROPIUM BROMIDE AND ALBUTEROL SULFATE 1 AMPULE: .5; 3 SOLUTION RESPIRATORY (INHALATION) at 15:10

## 2019-12-24 RX ADMIN — METHYLPREDNISOLONE SODIUM SUCCINATE 40 MG: 40 INJECTION, POWDER, FOR SOLUTION INTRAMUSCULAR; INTRAVENOUS at 05:46

## 2019-12-24 RX ADMIN — FUROSEMIDE 40 MG: 40 TABLET ORAL at 08:22

## 2019-12-24 RX ADMIN — CARVEDILOL 6.25 MG: 6.25 TABLET, FILM COATED ORAL at 08:22

## 2019-12-24 RX ADMIN — METHYLPREDNISOLONE SODIUM SUCCINATE 40 MG: 40 INJECTION, POWDER, FOR SOLUTION INTRAMUSCULAR; INTRAVENOUS at 17:14

## 2019-12-24 RX ADMIN — MORPHINE SULFATE 4 MG: 4 INJECTION, SOLUTION INTRAMUSCULAR; INTRAVENOUS at 13:38

## 2019-12-24 RX ADMIN — TETRAHYDROZOLINE HYDROCHLORIDE 1 DROP: 0.05 SOLUTION/ DROPS OPHTHALMIC at 17:14

## 2019-12-24 RX ADMIN — IPRATROPIUM BROMIDE AND ALBUTEROL SULFATE 1 AMPULE: .5; 3 SOLUTION RESPIRATORY (INHALATION) at 22:40

## 2019-12-24 ASSESSMENT — PAIN DESCRIPTION - PAIN TYPE
TYPE: ACUTE PAIN
TYPE: ACUTE PAIN

## 2019-12-24 ASSESSMENT — PAIN DESCRIPTION - ORIENTATION
ORIENTATION: MID
ORIENTATION: MID

## 2019-12-24 ASSESSMENT — PAIN DESCRIPTION - DESCRIPTORS
DESCRIPTORS: TIGHTNESS;SORE;ACHING
DESCRIPTORS: PRESSURE

## 2019-12-24 ASSESSMENT — PAIN DESCRIPTION - LOCATION
LOCATION: CHEST
LOCATION: CHEST

## 2019-12-24 ASSESSMENT — PAIN SCALES - GENERAL
PAINLEVEL_OUTOF10: 8
PAINLEVEL_OUTOF10: 6

## 2019-12-25 ENCOUNTER — APPOINTMENT (OUTPATIENT)
Dept: GENERAL RADIOLOGY | Age: 74
DRG: 190 | End: 2019-12-25
Payer: MEDICARE

## 2019-12-25 LAB
ANION GAP SERPL CALCULATED.3IONS-SCNC: 8 MMOL/L (ref 3–16)
BASOPHILS ABSOLUTE: 0 K/UL (ref 0–0.2)
BASOPHILS RELATIVE PERCENT: 0.1 %
BUN BLDV-MCNC: 33 MG/DL (ref 7–20)
CALCIUM SERPL-MCNC: 9.4 MG/DL (ref 8.3–10.6)
CHLORIDE BLD-SCNC: 102 MMOL/L (ref 99–110)
CO2: 25 MMOL/L (ref 21–32)
CREAT SERPL-MCNC: 1.3 MG/DL (ref 0.8–1.3)
EKG ATRIAL RATE: 89 BPM
EKG DIAGNOSIS: NORMAL
EKG P AXIS: 68 DEGREES
EKG P-R INTERVAL: 188 MS
EKG Q-T INTERVAL: 356 MS
EKG QRS DURATION: 94 MS
EKG QTC CALCULATION (BAZETT): 433 MS
EKG R AXIS: 6 DEGREES
EKG T AXIS: 77 DEGREES
EKG VENTRICULAR RATE: 89 BPM
EOSINOPHILS ABSOLUTE: 0 K/UL (ref 0–0.6)
EOSINOPHILS RELATIVE PERCENT: 0 %
GFR AFRICAN AMERICAN: >60
GFR NON-AFRICAN AMERICAN: 54
GLUCOSE BLD-MCNC: 142 MG/DL (ref 70–99)
HCT VFR BLD CALC: 39.8 % (ref 40.5–52.5)
HEMOGLOBIN: 13.2 G/DL (ref 13.5–17.5)
LYMPHOCYTES ABSOLUTE: 1 K/UL (ref 1–5.1)
LYMPHOCYTES RELATIVE PERCENT: 7.3 %
MCH RBC QN AUTO: 30.7 PG (ref 26–34)
MCHC RBC AUTO-ENTMCNC: 33.1 G/DL (ref 31–36)
MCV RBC AUTO: 92.8 FL (ref 80–100)
MONOCYTES ABSOLUTE: 0.8 K/UL (ref 0–1.3)
MONOCYTES RELATIVE PERCENT: 5.6 %
NEUTROPHILS ABSOLUTE: 11.9 K/UL (ref 1.7–7.7)
NEUTROPHILS RELATIVE PERCENT: 87 %
PDW BLD-RTO: 16.3 % (ref 12.4–15.4)
PLATELET # BLD: 263 K/UL (ref 135–450)
PMV BLD AUTO: 7.8 FL (ref 5–10.5)
POTASSIUM SERPL-SCNC: 4.9 MMOL/L (ref 3.5–5.1)
RBC # BLD: 4.29 M/UL (ref 4.2–5.9)
SODIUM BLD-SCNC: 135 MMOL/L (ref 136–145)
WBC # BLD: 13.7 K/UL (ref 4–11)

## 2019-12-25 PROCEDURE — 84443 ASSAY THYROID STIM HORMONE: CPT

## 2019-12-25 PROCEDURE — 94761 N-INVAS EAR/PLS OXIMETRY MLT: CPT

## 2019-12-25 PROCEDURE — 85025 COMPLETE CBC W/AUTO DIFF WBC: CPT

## 2019-12-25 PROCEDURE — 6370000000 HC RX 637 (ALT 250 FOR IP): Performed by: INTERNAL MEDICINE

## 2019-12-25 PROCEDURE — 99233 SBSQ HOSP IP/OBS HIGH 50: CPT | Performed by: INTERNAL MEDICINE

## 2019-12-25 PROCEDURE — 94640 AIRWAY INHALATION TREATMENT: CPT

## 2019-12-25 PROCEDURE — 99232 SBSQ HOSP IP/OBS MODERATE 35: CPT | Performed by: INTERNAL MEDICINE

## 2019-12-25 PROCEDURE — 71045 X-RAY EXAM CHEST 1 VIEW: CPT

## 2019-12-25 PROCEDURE — 6360000002 HC RX W HCPCS: Performed by: INTERNAL MEDICINE

## 2019-12-25 PROCEDURE — 1200000000 HC SEMI PRIVATE

## 2019-12-25 PROCEDURE — 36415 COLL VENOUS BLD VENIPUNCTURE: CPT

## 2019-12-25 PROCEDURE — 2700000000 HC OXYGEN THERAPY PER DAY

## 2019-12-25 PROCEDURE — 80048 BASIC METABOLIC PNL TOTAL CA: CPT

## 2019-12-25 PROCEDURE — 94669 MECHANICAL CHEST WALL OSCILL: CPT

## 2019-12-25 PROCEDURE — 93010 ELECTROCARDIOGRAM REPORT: CPT | Performed by: INTERNAL MEDICINE

## 2019-12-25 PROCEDURE — 6370000000 HC RX 637 (ALT 250 FOR IP): Performed by: NURSE PRACTITIONER

## 2019-12-25 PROCEDURE — 2580000003 HC RX 258: Performed by: NURSE PRACTITIONER

## 2019-12-25 RX ADMIN — IPRATROPIUM BROMIDE AND ALBUTEROL SULFATE 1 AMPULE: .5; 3 SOLUTION RESPIRATORY (INHALATION) at 23:10

## 2019-12-25 RX ADMIN — PREDNISONE 40 MG: 20 TABLET ORAL at 17:18

## 2019-12-25 RX ADMIN — AMIODARONE HYDROCHLORIDE 200 MG: 200 TABLET ORAL at 08:14

## 2019-12-25 RX ADMIN — AZITHROMYCIN 250 MG: 250 TABLET, FILM COATED ORAL at 08:14

## 2019-12-25 RX ADMIN — FUROSEMIDE 40 MG: 40 TABLET ORAL at 08:14

## 2019-12-25 RX ADMIN — CARVEDILOL 6.25 MG: 6.25 TABLET, FILM COATED ORAL at 08:14

## 2019-12-25 RX ADMIN — PANTOPRAZOLE SODIUM 20 MG: 20 TABLET, DELAYED RELEASE ORAL at 06:34

## 2019-12-25 RX ADMIN — AMLODIPINE BESYLATE 5 MG: 5 TABLET ORAL at 08:14

## 2019-12-25 RX ADMIN — Medication 5 ML: at 19:14

## 2019-12-25 RX ADMIN — TETRAHYDROZOLINE HYDROCHLORIDE 1 DROP: 0.05 SOLUTION/ DROPS OPHTHALMIC at 13:31

## 2019-12-25 RX ADMIN — CARVEDILOL 6.25 MG: 6.25 TABLET, FILM COATED ORAL at 17:18

## 2019-12-25 RX ADMIN — IPRATROPIUM BROMIDE AND ALBUTEROL SULFATE 1 AMPULE: .5; 3 SOLUTION RESPIRATORY (INHALATION) at 02:40

## 2019-12-25 RX ADMIN — TETRAHYDROZOLINE HYDROCHLORIDE 1 DROP: 0.05 SOLUTION/ DROPS OPHTHALMIC at 20:15

## 2019-12-25 RX ADMIN — IPRATROPIUM BROMIDE AND ALBUTEROL SULFATE 1 AMPULE: .5; 3 SOLUTION RESPIRATORY (INHALATION) at 10:59

## 2019-12-25 RX ADMIN — RIVAROXABAN 20 MG: 20 TABLET, FILM COATED ORAL at 17:17

## 2019-12-25 RX ADMIN — AMIODARONE HYDROCHLORIDE 200 MG: 200 TABLET ORAL at 20:14

## 2019-12-25 RX ADMIN — LOSARTAN POTASSIUM 100 MG: 100 TABLET, FILM COATED ORAL at 08:14

## 2019-12-25 RX ADMIN — IPRATROPIUM BROMIDE AND ALBUTEROL SULFATE 1 AMPULE: .5; 3 SOLUTION RESPIRATORY (INHALATION) at 06:59

## 2019-12-25 RX ADMIN — Medication 10 ML: at 08:15

## 2019-12-25 RX ADMIN — Medication 10 ML: at 20:14

## 2019-12-25 RX ADMIN — POTASSIUM CHLORIDE 15 MEQ: 20 SOLUTION ORAL at 08:14

## 2019-12-25 RX ADMIN — IPRATROPIUM BROMIDE AND ALBUTEROL SULFATE 1 AMPULE: .5; 3 SOLUTION RESPIRATORY (INHALATION) at 15:08

## 2019-12-25 RX ADMIN — IPRATROPIUM BROMIDE AND ALBUTEROL SULFATE 1 AMPULE: .5; 3 SOLUTION RESPIRATORY (INHALATION) at 19:10

## 2019-12-25 RX ADMIN — ACETAMINOPHEN 650 MG: 325 TABLET ORAL at 10:13

## 2019-12-25 RX ADMIN — ATORVASTATIN CALCIUM 20 MG: 10 TABLET, FILM COATED ORAL at 08:14

## 2019-12-25 RX ADMIN — TETRAHYDROZOLINE HYDROCHLORIDE 1 DROP: 0.05 SOLUTION/ DROPS OPHTHALMIC at 08:19

## 2019-12-25 RX ADMIN — METHYLPREDNISOLONE SODIUM SUCCINATE 40 MG: 40 INJECTION, POWDER, FOR SOLUTION INTRAMUSCULAR; INTRAVENOUS at 06:34

## 2019-12-25 RX ADMIN — Medication 5 ML: at 13:31

## 2019-12-25 ASSESSMENT — PAIN SCALES - GENERAL
PAINLEVEL_OUTOF10: 0
PAINLEVEL_OUTOF10: 5
PAINLEVEL_OUTOF10: 0

## 2019-12-26 PROCEDURE — 6370000000 HC RX 637 (ALT 250 FOR IP): Performed by: INTERNAL MEDICINE

## 2019-12-26 PROCEDURE — 6370000000 HC RX 637 (ALT 250 FOR IP): Performed by: NURSE PRACTITIONER

## 2019-12-26 PROCEDURE — 2580000003 HC RX 258: Performed by: NURSE PRACTITIONER

## 2019-12-26 PROCEDURE — 94150 VITAL CAPACITY TEST: CPT

## 2019-12-26 PROCEDURE — 94640 AIRWAY INHALATION TREATMENT: CPT

## 2019-12-26 PROCEDURE — 99223 1ST HOSP IP/OBS HIGH 75: CPT | Performed by: INTERNAL MEDICINE

## 2019-12-26 PROCEDURE — 6360000002 HC RX W HCPCS: Performed by: INTERNAL MEDICINE

## 2019-12-26 PROCEDURE — 2700000000 HC OXYGEN THERAPY PER DAY

## 2019-12-26 PROCEDURE — 99233 SBSQ HOSP IP/OBS HIGH 50: CPT | Performed by: INTERNAL MEDICINE

## 2019-12-26 PROCEDURE — 94761 N-INVAS EAR/PLS OXIMETRY MLT: CPT

## 2019-12-26 PROCEDURE — 2060000000 HC ICU INTERMEDIATE R&B

## 2019-12-26 RX ORDER — MORPHINE SULFATE 2 MG/ML
2 INJECTION, SOLUTION INTRAMUSCULAR; INTRAVENOUS EVERY 4 HOURS PRN
Status: DISCONTINUED | OUTPATIENT
Start: 2019-12-26 | End: 2019-12-27

## 2019-12-26 RX ORDER — MORPHINE SULFATE 2 MG/ML
2 INJECTION, SOLUTION INTRAMUSCULAR; INTRAVENOUS ONCE
Status: COMPLETED | OUTPATIENT
Start: 2019-12-26 | End: 2019-12-26

## 2019-12-26 RX ORDER — PANTOPRAZOLE SODIUM 40 MG/1
40 TABLET, DELAYED RELEASE ORAL
Status: DISCONTINUED | OUTPATIENT
Start: 2019-12-27 | End: 2019-12-27

## 2019-12-26 RX ORDER — PANTOPRAZOLE SODIUM 20 MG/1
20 TABLET, DELAYED RELEASE ORAL ONCE
Status: COMPLETED | OUTPATIENT
Start: 2019-12-26 | End: 2019-12-26

## 2019-12-26 RX ADMIN — AZITHROMYCIN 250 MG: 250 TABLET, FILM COATED ORAL at 08:11

## 2019-12-26 RX ADMIN — FUROSEMIDE 40 MG: 40 TABLET ORAL at 08:11

## 2019-12-26 RX ADMIN — IPRATROPIUM BROMIDE AND ALBUTEROL SULFATE 1 AMPULE: .5; 3 SOLUTION RESPIRATORY (INHALATION) at 15:28

## 2019-12-26 RX ADMIN — ATORVASTATIN CALCIUM 20 MG: 10 TABLET, FILM COATED ORAL at 08:11

## 2019-12-26 RX ADMIN — PANTOPRAZOLE SODIUM 20 MG: 20 TABLET, DELAYED RELEASE ORAL at 15:18

## 2019-12-26 RX ADMIN — IPRATROPIUM BROMIDE AND ALBUTEROL SULFATE 1 AMPULE: .5; 3 SOLUTION RESPIRATORY (INHALATION) at 22:50

## 2019-12-26 RX ADMIN — Medication 10 ML: at 08:11

## 2019-12-26 RX ADMIN — Medication 5 ML: at 20:39

## 2019-12-26 RX ADMIN — AMIODARONE HYDROCHLORIDE 200 MG: 200 TABLET ORAL at 08:10

## 2019-12-26 RX ADMIN — Medication 5 ML: at 10:09

## 2019-12-26 RX ADMIN — IPRATROPIUM BROMIDE AND ALBUTEROL SULFATE 1 AMPULE: .5; 3 SOLUTION RESPIRATORY (INHALATION) at 11:05

## 2019-12-26 RX ADMIN — POTASSIUM CHLORIDE 15 MEQ: 20 SOLUTION ORAL at 08:24

## 2019-12-26 RX ADMIN — MORPHINE SULFATE 2 MG: 2 INJECTION, SOLUTION INTRAMUSCULAR; INTRAVENOUS at 12:35

## 2019-12-26 RX ADMIN — IPRATROPIUM BROMIDE AND ALBUTEROL SULFATE 1 AMPULE: .5; 3 SOLUTION RESPIRATORY (INHALATION) at 19:41

## 2019-12-26 RX ADMIN — RIVAROXABAN 20 MG: 20 TABLET, FILM COATED ORAL at 17:01

## 2019-12-26 RX ADMIN — TETRAHYDROZOLINE HYDROCHLORIDE 1 DROP: 0.05 SOLUTION/ DROPS OPHTHALMIC at 15:18

## 2019-12-26 RX ADMIN — AMLODIPINE BESYLATE 5 MG: 5 TABLET ORAL at 08:11

## 2019-12-26 RX ADMIN — Medication 5 ML: at 05:31

## 2019-12-26 RX ADMIN — TETRAHYDROZOLINE HYDROCHLORIDE 1 DROP: 0.05 SOLUTION/ DROPS OPHTHALMIC at 20:38

## 2019-12-26 RX ADMIN — LOSARTAN POTASSIUM 100 MG: 100 TABLET, FILM COATED ORAL at 08:11

## 2019-12-26 RX ADMIN — PREDNISONE 40 MG: 20 TABLET ORAL at 08:11

## 2019-12-26 RX ADMIN — CARVEDILOL 6.25 MG: 6.25 TABLET, FILM COATED ORAL at 08:11

## 2019-12-26 RX ADMIN — CARVEDILOL 6.25 MG: 6.25 TABLET, FILM COATED ORAL at 17:01

## 2019-12-26 RX ADMIN — Medication 10 ML: at 20:39

## 2019-12-26 RX ADMIN — MORPHINE SULFATE 2 MG: 2 INJECTION, SOLUTION INTRAMUSCULAR; INTRAVENOUS at 12:55

## 2019-12-26 RX ADMIN — MORPHINE SULFATE 2 MG: 2 INJECTION, SOLUTION INTRAMUSCULAR; INTRAVENOUS at 12:54

## 2019-12-26 RX ADMIN — IPRATROPIUM BROMIDE AND ALBUTEROL SULFATE 1 AMPULE: .5; 3 SOLUTION RESPIRATORY (INHALATION) at 03:20

## 2019-12-26 RX ADMIN — IPRATROPIUM BROMIDE AND ALBUTEROL SULFATE 1 AMPULE: .5; 3 SOLUTION RESPIRATORY (INHALATION) at 07:17

## 2019-12-26 RX ADMIN — AMIODARONE HYDROCHLORIDE 200 MG: 200 TABLET ORAL at 20:39

## 2019-12-26 RX ADMIN — PANTOPRAZOLE SODIUM 20 MG: 20 TABLET, DELAYED RELEASE ORAL at 05:31

## 2019-12-26 RX ADMIN — TETRAHYDROZOLINE HYDROCHLORIDE 1 DROP: 0.05 SOLUTION/ DROPS OPHTHALMIC at 08:15

## 2019-12-26 ASSESSMENT — PAIN SCALES - GENERAL
PAINLEVEL_OUTOF10: 7
PAINLEVEL_OUTOF10: 9
PAINLEVEL_OUTOF10: 5
PAINLEVEL_OUTOF10: 2
PAINLEVEL_OUTOF10: 9

## 2019-12-26 ASSESSMENT — PAIN DESCRIPTION - PAIN TYPE: TYPE: ACUTE PAIN

## 2019-12-26 ASSESSMENT — PAIN DESCRIPTION - ORIENTATION: ORIENTATION: MID

## 2019-12-26 ASSESSMENT — PAIN DESCRIPTION - LOCATION: LOCATION: CHEST

## 2019-12-26 ASSESSMENT — PAIN DESCRIPTION - DESCRIPTORS: DESCRIPTORS: SORE;TIGHTNESS

## 2019-12-27 LAB — TSH REFLEX FT4: 1.3 UIU/ML (ref 0.27–4.2)

## 2019-12-27 PROCEDURE — 6370000000 HC RX 637 (ALT 250 FOR IP): Performed by: INTERNAL MEDICINE

## 2019-12-27 PROCEDURE — 92610 EVALUATE SWALLOWING FUNCTION: CPT

## 2019-12-27 PROCEDURE — 99233 SBSQ HOSP IP/OBS HIGH 50: CPT | Performed by: INTERNAL MEDICINE

## 2019-12-27 PROCEDURE — 94761 N-INVAS EAR/PLS OXIMETRY MLT: CPT

## 2019-12-27 PROCEDURE — 94640 AIRWAY INHALATION TREATMENT: CPT

## 2019-12-27 PROCEDURE — 6370000000 HC RX 637 (ALT 250 FOR IP): Performed by: NURSE PRACTITIONER

## 2019-12-27 PROCEDURE — 2700000000 HC OXYGEN THERAPY PER DAY

## 2019-12-27 PROCEDURE — 2060000000 HC ICU INTERMEDIATE R&B

## 2019-12-27 PROCEDURE — 6360000002 HC RX W HCPCS: Performed by: NURSE PRACTITIONER

## 2019-12-27 PROCEDURE — 99232 SBSQ HOSP IP/OBS MODERATE 35: CPT | Performed by: INTERNAL MEDICINE

## 2019-12-27 PROCEDURE — 2580000003 HC RX 258: Performed by: NURSE PRACTITIONER

## 2019-12-27 PROCEDURE — 92526 ORAL FUNCTION THERAPY: CPT

## 2019-12-27 RX ORDER — AMIODARONE HYDROCHLORIDE 200 MG/1
200 TABLET ORAL DAILY
Status: DISCONTINUED | OUTPATIENT
Start: 2019-12-28 | End: 2019-12-27

## 2019-12-27 RX ORDER — PANTOPRAZOLE SODIUM 40 MG/1
40 TABLET, DELAYED RELEASE ORAL
Status: DISCONTINUED | OUTPATIENT
Start: 2019-12-27 | End: 2019-12-28 | Stop reason: HOSPADM

## 2019-12-27 RX ORDER — METOPROLOL SUCCINATE 50 MG/1
50 TABLET, EXTENDED RELEASE ORAL DAILY
Status: DISCONTINUED | OUTPATIENT
Start: 2019-12-28 | End: 2019-12-28 | Stop reason: HOSPADM

## 2019-12-27 RX ADMIN — AZITHROMYCIN 250 MG: 250 TABLET, FILM COATED ORAL at 08:26

## 2019-12-27 RX ADMIN — Medication 10 ML: at 08:28

## 2019-12-27 RX ADMIN — RIVAROXABAN 20 MG: 20 TABLET, FILM COATED ORAL at 18:32

## 2019-12-27 RX ADMIN — Medication 5 ML: at 12:57

## 2019-12-27 RX ADMIN — Medication 10 ML: at 21:10

## 2019-12-27 RX ADMIN — POTASSIUM CHLORIDE 15 MEQ: 20 SOLUTION ORAL at 08:25

## 2019-12-27 RX ADMIN — IPRATROPIUM BROMIDE AND ALBUTEROL SULFATE 1 AMPULE: .5; 3 SOLUTION RESPIRATORY (INHALATION) at 10:39

## 2019-12-27 RX ADMIN — IPRATROPIUM BROMIDE AND ALBUTEROL SULFATE 1 AMPULE: .5; 3 SOLUTION RESPIRATORY (INHALATION) at 03:51

## 2019-12-27 RX ADMIN — TETRAHYDROZOLINE HYDROCHLORIDE 1 DROP: 0.05 SOLUTION/ DROPS OPHTHALMIC at 21:10

## 2019-12-27 RX ADMIN — AMLODIPINE BESYLATE 5 MG: 5 TABLET ORAL at 08:26

## 2019-12-27 RX ADMIN — PANTOPRAZOLE SODIUM 40 MG: 40 TABLET, DELAYED RELEASE ORAL at 07:02

## 2019-12-27 RX ADMIN — LOSARTAN POTASSIUM 100 MG: 100 TABLET, FILM COATED ORAL at 08:27

## 2019-12-27 RX ADMIN — ATORVASTATIN CALCIUM 20 MG: 10 TABLET, FILM COATED ORAL at 08:27

## 2019-12-27 RX ADMIN — TETRAHYDROZOLINE HYDROCHLORIDE 1 DROP: 0.05 SOLUTION/ DROPS OPHTHALMIC at 15:01

## 2019-12-27 RX ADMIN — FUROSEMIDE 40 MG: 40 TABLET ORAL at 08:26

## 2019-12-27 RX ADMIN — PANTOPRAZOLE SODIUM 40 MG: 40 TABLET, DELAYED RELEASE ORAL at 15:07

## 2019-12-27 RX ADMIN — PREDNISONE 40 MG: 20 TABLET ORAL at 08:26

## 2019-12-27 RX ADMIN — CARVEDILOL 6.25 MG: 6.25 TABLET, FILM COATED ORAL at 08:26

## 2019-12-27 RX ADMIN — AMIODARONE HYDROCHLORIDE 200 MG: 200 TABLET ORAL at 08:26

## 2019-12-27 RX ADMIN — IPRATROPIUM BROMIDE AND ALBUTEROL SULFATE 1 AMPULE: .5; 3 SOLUTION RESPIRATORY (INHALATION) at 06:43

## 2019-12-27 RX ADMIN — TETRAHYDROZOLINE HYDROCHLORIDE 1 DROP: 0.05 SOLUTION/ DROPS OPHTHALMIC at 08:25

## 2019-12-27 RX ADMIN — IPRATROPIUM BROMIDE AND ALBUTEROL SULFATE 1 AMPULE: .5; 3 SOLUTION RESPIRATORY (INHALATION) at 20:05

## 2019-12-27 RX ADMIN — IPRATROPIUM BROMIDE AND ALBUTEROL SULFATE 1 AMPULE: .5; 3 SOLUTION RESPIRATORY (INHALATION) at 14:36

## 2019-12-27 ASSESSMENT — PAIN SCALES - GENERAL: PAINLEVEL_OUTOF10: 0

## 2019-12-28 VITALS
HEART RATE: 74 BPM | RESPIRATION RATE: 18 BRPM | SYSTOLIC BLOOD PRESSURE: 117 MMHG | DIASTOLIC BLOOD PRESSURE: 73 MMHG | BODY MASS INDEX: 32.14 KG/M2 | HEIGHT: 71 IN | OXYGEN SATURATION: 95 % | WEIGHT: 229.6 LBS | TEMPERATURE: 98.3 F

## 2019-12-28 LAB
ANION GAP SERPL CALCULATED.3IONS-SCNC: 10 MMOL/L (ref 3–16)
BASOPHILS ABSOLUTE: 0 K/UL (ref 0–0.2)
BASOPHILS RELATIVE PERCENT: 0 %
BUN BLDV-MCNC: 35 MG/DL (ref 7–20)
CALCIUM SERPL-MCNC: 9 MG/DL (ref 8.3–10.6)
CHLORIDE BLD-SCNC: 103 MMOL/L (ref 99–110)
CO2: 27 MMOL/L (ref 21–32)
CREAT SERPL-MCNC: 1.4 MG/DL (ref 0.8–1.3)
EOSINOPHILS ABSOLUTE: 0.2 K/UL (ref 0–0.6)
EOSINOPHILS RELATIVE PERCENT: 2 %
GFR AFRICAN AMERICAN: >60
GFR NON-AFRICAN AMERICAN: 50
GLUCOSE BLD-MCNC: 159 MG/DL (ref 70–99)
HCT VFR BLD CALC: 45.1 % (ref 40.5–52.5)
HEMOGLOBIN: 14.5 G/DL (ref 13.5–17.5)
LYMPHOCYTES ABSOLUTE: 3.9 K/UL (ref 1–5.1)
LYMPHOCYTES RELATIVE PERCENT: 32 %
MCH RBC QN AUTO: 30.2 PG (ref 26–34)
MCHC RBC AUTO-ENTMCNC: 32.1 G/DL (ref 31–36)
MCV RBC AUTO: 94.1 FL (ref 80–100)
MONOCYTES ABSOLUTE: 0.6 K/UL (ref 0–1.3)
MONOCYTES RELATIVE PERCENT: 5 %
NEUTROPHILS ABSOLUTE: 7.4 K/UL (ref 1.7–7.7)
NEUTROPHILS RELATIVE PERCENT: 61 %
PDW BLD-RTO: 15.9 % (ref 12.4–15.4)
PLATELET # BLD: 293 K/UL (ref 135–450)
PLATELET SLIDE REVIEW: ADEQUATE
PMV BLD AUTO: 8.1 FL (ref 5–10.5)
POTASSIUM SERPL-SCNC: 3.5 MMOL/L (ref 3.5–5.1)
RBC # BLD: 4.8 M/UL (ref 4.2–5.9)
SLIDE REVIEW: ABNORMAL
SODIUM BLD-SCNC: 140 MMOL/L (ref 136–145)
WBC # BLD: 12.2 K/UL (ref 4–11)

## 2019-12-28 PROCEDURE — 6370000000 HC RX 637 (ALT 250 FOR IP): Performed by: INTERNAL MEDICINE

## 2019-12-28 PROCEDURE — 85025 COMPLETE CBC W/AUTO DIFF WBC: CPT

## 2019-12-28 PROCEDURE — 80048 BASIC METABOLIC PNL TOTAL CA: CPT

## 2019-12-28 PROCEDURE — 94669 MECHANICAL CHEST WALL OSCILL: CPT

## 2019-12-28 PROCEDURE — 94640 AIRWAY INHALATION TREATMENT: CPT

## 2019-12-28 PROCEDURE — 99232 SBSQ HOSP IP/OBS MODERATE 35: CPT | Performed by: INTERNAL MEDICINE

## 2019-12-28 PROCEDURE — 2580000003 HC RX 258: Performed by: NURSE PRACTITIONER

## 2019-12-28 PROCEDURE — 36415 COLL VENOUS BLD VENIPUNCTURE: CPT

## 2019-12-28 PROCEDURE — 6370000000 HC RX 637 (ALT 250 FOR IP): Performed by: NURSE PRACTITIONER

## 2019-12-28 RX ORDER — PREDNISONE 10 MG/1
TABLET ORAL
Qty: 30 TABLET | Refills: 0 | Status: ON HOLD | OUTPATIENT
Start: 2019-12-28 | End: 2020-01-14 | Stop reason: HOSPADM

## 2019-12-28 RX ORDER — METOPROLOL SUCCINATE 50 MG/1
50 TABLET, EXTENDED RELEASE ORAL DAILY
Qty: 30 TABLET | Refills: 3 | Status: SHIPPED | OUTPATIENT
Start: 2019-12-29 | End: 2020-01-03 | Stop reason: SDUPTHER

## 2019-12-28 RX ORDER — PANTOPRAZOLE SODIUM 40 MG/1
40 TABLET, DELAYED RELEASE ORAL DAILY
Qty: 30 TABLET | Refills: 3 | Status: SHIPPED | OUTPATIENT
Start: 2019-12-28 | End: 2020-01-03 | Stop reason: SDUPTHER

## 2019-12-28 RX ORDER — POLYETHYLENE GLYCOL 3350 17 G/17G
17 POWDER, FOR SOLUTION ORAL DAILY PRN
Qty: 527 G | Refills: 1 | Status: SHIPPED | OUTPATIENT
Start: 2019-12-28 | End: 2020-01-27

## 2019-12-28 RX ADMIN — AMLODIPINE BESYLATE 5 MG: 5 TABLET ORAL at 08:20

## 2019-12-28 RX ADMIN — PANTOPRAZOLE SODIUM 40 MG: 40 TABLET, DELAYED RELEASE ORAL at 16:03

## 2019-12-28 RX ADMIN — IPRATROPIUM BROMIDE AND ALBUTEROL SULFATE 1 AMPULE: .5; 3 SOLUTION RESPIRATORY (INHALATION) at 15:10

## 2019-12-28 RX ADMIN — ATORVASTATIN CALCIUM 20 MG: 10 TABLET, FILM COATED ORAL at 08:20

## 2019-12-28 RX ADMIN — IPRATROPIUM BROMIDE AND ALBUTEROL SULFATE 1 AMPULE: .5; 3 SOLUTION RESPIRATORY (INHALATION) at 10:50

## 2019-12-28 RX ADMIN — PREDNISONE 40 MG: 20 TABLET ORAL at 08:19

## 2019-12-28 RX ADMIN — TETRAHYDROZOLINE HYDROCHLORIDE 1 DROP: 0.05 SOLUTION/ DROPS OPHTHALMIC at 08:20

## 2019-12-28 RX ADMIN — TETRAHYDROZOLINE HYDROCHLORIDE 1 DROP: 0.05 SOLUTION/ DROPS OPHTHALMIC at 16:03

## 2019-12-28 RX ADMIN — FUROSEMIDE 40 MG: 40 TABLET ORAL at 08:20

## 2019-12-28 RX ADMIN — PANTOPRAZOLE SODIUM 40 MG: 40 TABLET, DELAYED RELEASE ORAL at 06:44

## 2019-12-28 RX ADMIN — POTASSIUM CHLORIDE 15 MEQ: 20 SOLUTION ORAL at 08:20

## 2019-12-28 RX ADMIN — LOSARTAN POTASSIUM 100 MG: 100 TABLET, FILM COATED ORAL at 08:19

## 2019-12-28 RX ADMIN — IPRATROPIUM BROMIDE AND ALBUTEROL SULFATE 1 AMPULE: .5; 3 SOLUTION RESPIRATORY (INHALATION) at 07:08

## 2019-12-28 RX ADMIN — METOPROLOL SUCCINATE 50 MG: 50 TABLET, EXTENDED RELEASE ORAL at 08:19

## 2019-12-28 RX ADMIN — IPRATROPIUM BROMIDE AND ALBUTEROL SULFATE 1 AMPULE: .5; 3 SOLUTION RESPIRATORY (INHALATION) at 00:00

## 2019-12-28 RX ADMIN — IPRATROPIUM BROMIDE AND ALBUTEROL SULFATE 1 AMPULE: .5; 3 SOLUTION RESPIRATORY (INHALATION) at 03:00

## 2019-12-28 RX ADMIN — Medication 10 ML: at 08:20

## 2019-12-28 ASSESSMENT — PAIN SCALES - GENERAL: PAINLEVEL_OUTOF10: 0

## 2019-12-29 ENCOUNTER — CARE COORDINATION (OUTPATIENT)
Dept: CASE MANAGEMENT | Age: 74
End: 2019-12-29

## 2019-12-29 DIAGNOSIS — J44.1 COPD EXACERBATION (HCC): Primary | ICD-10-CM

## 2019-12-29 PROCEDURE — 1111F DSCHRG MED/CURRENT MED MERGE: CPT | Performed by: INTERNAL MEDICINE

## 2020-01-02 ENCOUNTER — CARE COORDINATION (OUTPATIENT)
Dept: CASE MANAGEMENT | Age: 75
End: 2020-01-02

## 2020-01-02 NOTE — CARE COORDINATION
[facility] Transitions Follow Up Call    2020    Patient: Chiara Guerra  Patient : 1945   MRN: [id number]  Reason for Admission: resp failure  Discharge Date: 19 RARS: Readmission Risk Score: 19       Unable to reach patient by phone at home or mobile numbers listed. Unable to leave message. Noted patient has TCM visit tomorrow. Care transition following.         Follow Up  Future Appointments   Date Time Provider Zhane Gramajo   1/3/2020  1:00 PM CORRINE Pena - CNP [facility] None   2020  1:00 PM MD Heather Flor [facility]   2020  2:30 PM MHC CT MAIN [facility] CT [facility]   2020  3:30 PM Miladis Huang MD [facility] PULM MMA       Noman Paez RN

## 2020-01-03 ENCOUNTER — OFFICE VISIT (OUTPATIENT)
Dept: INTERNAL MEDICINE CLINIC | Age: 75
End: 2020-01-03

## 2020-01-03 VITALS
DIASTOLIC BLOOD PRESSURE: 60 MMHG | OXYGEN SATURATION: 96 % | HEIGHT: 71 IN | BODY MASS INDEX: 32.48 KG/M2 | WEIGHT: 232 LBS | HEART RATE: 73 BPM | RESPIRATION RATE: 16 BRPM | SYSTOLIC BLOOD PRESSURE: 110 MMHG

## 2020-01-03 PROCEDURE — 99213 OFFICE O/P EST LOW 20 MIN: CPT | Performed by: NURSE PRACTITIONER

## 2020-01-03 RX ORDER — LOSARTAN POTASSIUM 100 MG/1
TABLET ORAL
Qty: 30 TABLET | Refills: 1 | Status: SHIPPED | OUTPATIENT
Start: 2020-01-03 | End: 2020-01-29 | Stop reason: SDUPTHER

## 2020-01-03 RX ORDER — AMLODIPINE BESYLATE 5 MG/1
5 TABLET ORAL DAILY
Qty: 30 TABLET | Refills: 1 | Status: SHIPPED | OUTPATIENT
Start: 2020-01-03 | End: 2020-01-29 | Stop reason: SDUPTHER

## 2020-01-03 RX ORDER — METOPROLOL SUCCINATE 50 MG/1
50 TABLET, EXTENDED RELEASE ORAL DAILY
Qty: 30 TABLET | Refills: 1 | Status: SHIPPED | OUTPATIENT
Start: 2020-01-03 | End: 2020-01-29 | Stop reason: SDUPTHER

## 2020-01-03 RX ORDER — ATORVASTATIN CALCIUM 20 MG/1
20 TABLET, FILM COATED ORAL DAILY
Qty: 30 TABLET | Refills: 1 | Status: SHIPPED | OUTPATIENT
Start: 2020-01-03 | End: 2020-01-29 | Stop reason: SDUPTHER

## 2020-01-03 RX ORDER — FUROSEMIDE 40 MG/1
40 TABLET ORAL DAILY
Qty: 30 TABLET | Refills: 1 | Status: SHIPPED | OUTPATIENT
Start: 2020-01-03 | End: 2020-01-29 | Stop reason: SDUPTHER

## 2020-01-03 RX ORDER — IPRATROPIUM BROMIDE AND ALBUTEROL SULFATE 2.5; .5 MG/3ML; MG/3ML
SOLUTION RESPIRATORY (INHALATION)
Qty: 360 ML | Refills: 0 | Status: SHIPPED | OUTPATIENT
Start: 2020-01-03 | End: 2020-02-05

## 2020-01-03 RX ORDER — POTASSIUM CITRATE 15 MEQ/1
15 TABLET, EXTENDED RELEASE ORAL DAILY
Qty: 30 TABLET | Refills: 1 | Status: SHIPPED | OUTPATIENT
Start: 2020-01-03 | End: 2020-01-29 | Stop reason: SDUPTHER

## 2020-01-03 RX ORDER — AMIODARONE HYDROCHLORIDE 200 MG/1
200 TABLET ORAL 2 TIMES DAILY
Qty: 30 TABLET | Refills: 1 | Status: SHIPPED | OUTPATIENT
Start: 2020-01-03 | End: 2020-01-29

## 2020-01-03 RX ORDER — PANTOPRAZOLE SODIUM 40 MG/1
40 TABLET, DELAYED RELEASE ORAL DAILY
Qty: 30 TABLET | Refills: 3 | Status: SHIPPED | OUTPATIENT
Start: 2020-01-03 | End: 2020-04-30 | Stop reason: SDUPTHER

## 2020-01-03 NOTE — PROGRESS NOTES
Post-Discharge Transitional Care Management Services or Hospital Follow Up      Phil Dior   YOB: 1945    Date of Office Visit:  1/3/2020  Date of Hospital Admission: 12/23/19  Date of Hospital Discharge: 12/28/19  Readmission Risk Score(high >=14%.  Medium >=10%):Readmission Risk Score: 19      Care management risk score Rising risk (score 2-5) and Complex Care (Scores >=6): 11     Non face to face  following discharge, date last encounter closed (first attempt may have been earlier): 12/29/2019 10:13 AM 12/29/2019 10:13 AM    Call initiated 2 business days of discharge: Yes     Patient Active Problem List   Diagnosis    COPD exacerbation ([address]    Acute on chronic respiratory failure with hypoxia (HCC)    Hoarseness of voice    Atrial fibrillation, controlled ([address]    COPD (chronic obstructive pulmonary disease) ([address]    GERD (gastroesophageal reflux disease)    Pulmonary nodule, right    Enlarged prostate with urinary obstruction    Thoracic aortic aneurysm without rupture ([address]    Typical atrial flutter ([address]    Dilated cardiomyopathy ([address]    Acute on chronic systolic CHF (congestive heart failure) ([address]    PAD (peripheral artery disease) (HCC)    Pain of right thigh    Shortness of breath    Benign essential hypertension    Acute conjunctivitis of both eyes    Chronic anticoagulation    Chest pain    Epigastric pain    PAF (paroxysmal atrial fibrillation) (HCC)       No Known Allergies    Medications listed as ordered at the time of discharge from [facility] Medication Instructions ROBERT:    Printed on:01/03/20 2969   Medication Information                      albuterol sulfate HFA (PROVENTIL HFA) 108 (90 Base) MCG/ACT inhaler  Inhale 2 puffs into the lungs every 6 hours as needed for Wheezing (with spacer)             amiodarone (CORDARONE) 200 MG tablet  Take 200 mg by mouth 2 times daily             amLODIPine (NORVASC) 5 MG tablet  Take 1 tablet by mouth daily             atorvastatin (LIPITOR) 20 MG tablet  Take 1 tablet by mouth daily             furosemide (LASIX) 40 MG tablet  TAKE ONE (1) TABLET BY MOUTH DAILY             ipratropium-albuterol (DUONEB) 0.5-2.5 (3) MG/3ML SOLN nebulizer solution  USE ONE UNIT DOSE (3ML) IN NEBULIZER AND INHALE INTO THE LUNGS EVERY 4 HOURS AS NEEDED FOR SHORTNESS OF BREATH             losartan (COZAAR) 100 MG tablet  TAKE ONE TABLET BY MOUTH DAILY             metoprolol succinate (TOPROL XL) 50 MG extended release tablet  Take 1 tablet by mouth daily             pantoprazole (PROTONIX) 40 MG tablet  Take 1 tablet by mouth daily             polyethylene glycol (GLYCOLAX) packet  Take 17 g by mouth daily as needed for Constipation             Potassium Citrate ER 15 MEQ (1620 MG) TBCR  Take 15 mEq by mouth daily             predniSONE (DELTASONE) 10 MG tablet  Take 4 tablets once a day for 3 days, then take 3 tablets once a day for 3 days, then take 2 tablets once a day for 3 days, then take 1 tablet once a day for 3 days, then stop.             rivaroxaban (XARELTO) 20 MG TABS tablet  TAKE ONE TABLET BY MOUTH DAILY WITH BREAKFAST             Umeclidinium Bromide (INCRUSE ELLIPTA) 62.5 MCG/INH AEPB  Inhale 1 Inhaler into the lungs daily                   Medications marked \"taking\" at this time  No outpatient medications have been marked as taking for the 1/3/20 encounter (Office Visit) with CORRINE Gonzales CNP. Medications patient taking as of now reconciled against medications ordered at time of hospital discharge: Yes    Chief Complaint   Patient presents with    Follow-Up from Hospital       HPI    Inpatient course: Discharge summary reviewed- see chart. Interval history/Current status: he is doing fairly well. Cough is better. Shortness of breath is improved. He wears oxygen as needed at 3 L. He states he had an episode of shortness of breath last night.   He c/o stinging sensation to normal.             Assessment/Plan:  1. Chronic obstructive pulmonary disease with acute exacerbation ([facility]  - patient is improved after being admitted to the hospital. Completed prednisone taper  - notify provider if symptoms return/worsen.          Medical Decision Making: moderate complexity

## 2020-01-06 ENCOUNTER — CARE COORDINATION (OUTPATIENT)
Dept: CASE MANAGEMENT | Age: 75
End: 2020-01-06

## 2020-01-06 NOTE — CARE COORDINATION
[facility] Transitions Follow Up Call    2020    Patient: Maritza Becerra  Patient : 1945   MRN: [id number]  Reason for Admission: respiratory failure  Discharge Date: 19 RARS: Readmission Risk Score: 19         Spoke with: Maritza Becerra (patient)    Ashley Lala on his mobile number. He insists he is well. He did not give weight readings. States he is staying \"within one to two pounds\". Reviewed his weight at discharge 228# and at #. CHF education:  -weigh daily in the morning at the same time and in the same clothing  -report weight gain of >3#/day or >5#/week  -report increased SOB, edema, abd fullness, difficulty lying flat  -report palpitations, cough, difficulty urinating  -review of red/yellow/green CHF Zone Tool    Encouraged him to look back to Monday last week and compare today's weight. If >5# to call the doctor SAME DAY. He v/u. States he feels he is doing well limiting salt and fluid but also states they continue to eat out often. Encouraged him to always request his food be prepared without added salt and to pick a fresh side dish rather than a prepared on. He again says he is good. Denies needs today. Agreeable to one more follow up. He was encouraged to call CTN for weight gains or other s/s as listed above. Care transition following.        Care Transitions Subsequent and Final Call    Schedule Follow Up Appointment with PCP:  Completed  Subsequent and Final Calls  Do you have any ongoing symptoms?:  No  Have your medications changed?:  No  Do you have any questions related to your medications?:  No  Do you currently have any active services?:  No  Are you currently active with any services?:  Other, Outpatient/Community Services  Do you have any needs or concerns that I can assist you with?:  No  Identified Barriers:  Impairment  Care Transitions Interventions     Other Services:  Declined (Comment: HC)                           Other Interventions: Follow Up  Future Appointments   Date Time Provider Zhane Mendozai   1/29/2020  1:00 PM MD Jennifer Yeung [facility]   4/13/2020  1:00 PM Verónica Warner MD [facility] None   4/22/2020  2:30 PM MHC CT MAIN [facility] CT [facility]   4/22/2020  3:30 PM Laith Alcaraz MD [facility] PULM MMA       Heriberto Hannah RN

## 2020-01-09 ASSESSMENT — ENCOUNTER SYMPTOMS
SHORTNESS OF BREATH: 1
COUGH: 1
DIARRHEA: 0
VOMITING: 0
NAUSEA: 0

## 2020-01-10 ENCOUNTER — CARE COORDINATION (OUTPATIENT)
Dept: CARE COORDINATION | Age: 75
End: 2020-01-10

## 2020-01-10 ENCOUNTER — CARE COORDINATION (OUTPATIENT)
Dept: CASE MANAGEMENT | Age: 75
End: 2020-01-10

## 2020-01-10 NOTE — LETTER
1/10/2020    [address] [place], [place] [address]r Jacques Carrillo,    My name is Fiona Williamson and I am a registered nurse who partners with Natalia Nath MD to improve patients' health. Natalia Nath MD believes you would benefit from working with me. As a member of your health care team, I would work with other providers involved in your care, offer education for your specific health conditions, and connect you with additional resources as needed. I will collaborate with Natalia Nath MD to support you in following your treatment plan. The additional support I provide is no additional cost to you. My primary focus is to help you achieve specific goals and improve your health. We are committed to walk with you on this journey and look forward to working with you. Please call me to further discuss your healthcare needs. I am available by phone or for appointments at the office. You can reach me at [phone].     In good health,     Fiona Williamson RN

## 2020-01-11 ENCOUNTER — HOSPITAL ENCOUNTER (INPATIENT)
Age: 75
LOS: 3 days | Discharge: HOME HEALTH | DRG: 871 | End: 2020-01-14
Attending: EMERGENCY MEDICINE | Admitting: INTERNAL MEDICINE
Payer: MEDICARE

## 2020-01-11 ENCOUNTER — APPOINTMENT (OUTPATIENT)
Dept: GENERAL RADIOLOGY | Age: 75
DRG: 871 | End: 2020-01-11
Payer: MEDICARE

## 2020-01-11 LAB
ANION GAP SERPL CALCULATED.3IONS-SCNC: 13 MMOL/L (ref 3–16)
BASE EXCESS VENOUS: 0.6 MMOL/L (ref -3–3)
BASOPHILS ABSOLUTE: 0.1 K/UL (ref 0–0.2)
BASOPHILS RELATIVE PERCENT: 0.9 %
BUN BLDV-MCNC: 19 MG/DL (ref 7–20)
CALCIUM SERPL-MCNC: 9.3 MG/DL (ref 8.3–10.6)
CARBOXYHEMOGLOBIN: 2.3 % (ref 0–1.5)
CHLORIDE BLD-SCNC: 103 MMOL/L (ref 99–110)
CO2: 25 MMOL/L (ref 21–32)
CREAT SERPL-MCNC: 1.8 MG/DL (ref 0.8–1.3)
EOSINOPHILS ABSOLUTE: 0.5 K/UL (ref 0–0.6)
EOSINOPHILS RELATIVE PERCENT: 4.3 %
GFR AFRICAN AMERICAN: 45
GFR NON-AFRICAN AMERICAN: 37
GLUCOSE BLD-MCNC: 102 MG/DL (ref 70–99)
HCO3 VENOUS: 25.4 MMOL/L (ref 23–29)
HCT VFR BLD CALC: 42.3 % (ref 40.5–52.5)
HEMOGLOBIN: 13.7 G/DL (ref 13.5–17.5)
LACTIC ACID: 1.7 MMOL/L (ref 0.4–2)
LYMPHOCYTES ABSOLUTE: 0.9 K/UL (ref 1–5.1)
LYMPHOCYTES RELATIVE PERCENT: 7.4 %
MCH RBC QN AUTO: 30.2 PG (ref 26–34)
MCHC RBC AUTO-ENTMCNC: 32.3 G/DL (ref 31–36)
MCV RBC AUTO: 93.4 FL (ref 80–100)
METHEMOGLOBIN VENOUS: 0.3 %
MONOCYTES ABSOLUTE: 0.7 K/UL (ref 0–1.3)
MONOCYTES RELATIVE PERCENT: 6 %
NEUTROPHILS ABSOLUTE: 9.8 K/UL (ref 1.7–7.7)
NEUTROPHILS RELATIVE PERCENT: 81.4 %
O2 CONTENT, VEN: 9 VOL %
O2 SAT, VEN: 45 %
O2 THERAPY: ABNORMAL
PCO2, VEN: 41.2 MMHG (ref 40–50)
PDW BLD-RTO: 16.4 % (ref 12.4–15.4)
PH VENOUS: 7.41 (ref 7.35–7.45)
PLATELET # BLD: 186 K/UL (ref 135–450)
PMV BLD AUTO: 8 FL (ref 5–10.5)
PO2, VEN: 24.8 MMHG (ref 25–40)
POTASSIUM REFLEX MAGNESIUM: 4.6 MMOL/L (ref 3.5–5.1)
PRO-BNP: 42 PG/ML (ref 0–124)
PROCALCITONIN: 0.13 NG/ML (ref 0–0.15)
RAPID INFLUENZA  B AGN: NEGATIVE
RAPID INFLUENZA A AGN: NEGATIVE
RBC # BLD: 4.53 M/UL (ref 4.2–5.9)
SODIUM BLD-SCNC: 141 MMOL/L (ref 136–145)
TCO2 CALC VENOUS: 27 MMOL/L
TROPONIN: <0.01 NG/ML
WBC # BLD: 12 K/UL (ref 4–11)

## 2020-01-11 PROCEDURE — 96365 THER/PROPH/DIAG IV INF INIT: CPT

## 2020-01-11 PROCEDURE — 99285 EMERGENCY DEPT VISIT HI MDM: CPT

## 2020-01-11 PROCEDURE — 96368 THER/DIAG CONCURRENT INF: CPT

## 2020-01-11 PROCEDURE — 84484 ASSAY OF TROPONIN QUANT: CPT

## 2020-01-11 PROCEDURE — 80048 BASIC METABOLIC PNL TOTAL CA: CPT

## 2020-01-11 PROCEDURE — 6370000000 HC RX 637 (ALT 250 FOR IP): Performed by: EMERGENCY MEDICINE

## 2020-01-11 PROCEDURE — G0378 HOSPITAL OBSERVATION PER HR: HCPCS

## 2020-01-11 PROCEDURE — 87040 BLOOD CULTURE FOR BACTERIA: CPT

## 2020-01-11 PROCEDURE — 83880 ASSAY OF NATRIURETIC PEPTIDE: CPT

## 2020-01-11 PROCEDURE — 83605 ASSAY OF LACTIC ACID: CPT

## 2020-01-11 PROCEDURE — 82803 BLOOD GASES ANY COMBINATION: CPT

## 2020-01-11 PROCEDURE — 96375 TX/PRO/DX INJ NEW DRUG ADDON: CPT

## 2020-01-11 PROCEDURE — 6360000002 HC RX W HCPCS: Performed by: EMERGENCY MEDICINE

## 2020-01-11 PROCEDURE — 96374 THER/PROPH/DIAG INJ IV PUSH: CPT

## 2020-01-11 PROCEDURE — 1200000000 HC SEMI PRIVATE

## 2020-01-11 PROCEDURE — 85025 COMPLETE CBC W/AUTO DIFF WBC: CPT

## 2020-01-11 PROCEDURE — 71045 X-RAY EXAM CHEST 1 VIEW: CPT

## 2020-01-11 PROCEDURE — 84145 PROCALCITONIN (PCT): CPT

## 2020-01-11 PROCEDURE — 93005 ELECTROCARDIOGRAM TRACING: CPT | Performed by: EMERGENCY MEDICINE

## 2020-01-11 PROCEDURE — 96367 TX/PROPH/DG ADDL SEQ IV INF: CPT

## 2020-01-11 PROCEDURE — 6360000002 HC RX W HCPCS: Performed by: INTERNAL MEDICINE

## 2020-01-11 PROCEDURE — 87804 INFLUENZA ASSAY W/OPTIC: CPT

## 2020-01-11 PROCEDURE — 2580000003 HC RX 258: Performed by: EMERGENCY MEDICINE

## 2020-01-11 PROCEDURE — 2580000003 HC RX 258: Performed by: INTERNAL MEDICINE

## 2020-01-11 PROCEDURE — 36415 COLL VENOUS BLD VENIPUNCTURE: CPT

## 2020-01-11 RX ORDER — METOPROLOL SUCCINATE 50 MG/1
50 TABLET, EXTENDED RELEASE ORAL DAILY
Status: DISCONTINUED | OUTPATIENT
Start: 2020-01-12 | End: 2020-01-14 | Stop reason: HOSPADM

## 2020-01-11 RX ORDER — ACETAMINOPHEN 325 MG/1
650 TABLET ORAL EVERY 4 HOURS PRN
Status: DISCONTINUED | OUTPATIENT
Start: 2020-01-11 | End: 2020-01-14 | Stop reason: HOSPADM

## 2020-01-11 RX ORDER — IPRATROPIUM BROMIDE AND ALBUTEROL SULFATE 2.5; .5 MG/3ML; MG/3ML
1 SOLUTION RESPIRATORY (INHALATION)
Status: DISCONTINUED | OUTPATIENT
Start: 2020-01-12 | End: 2020-01-12

## 2020-01-11 RX ORDER — METHYLPREDNISOLONE SODIUM SUCCINATE 125 MG/2ML
125 INJECTION, POWDER, LYOPHILIZED, FOR SOLUTION INTRAMUSCULAR; INTRAVENOUS ONCE
Status: COMPLETED | OUTPATIENT
Start: 2020-01-11 | End: 2020-01-11

## 2020-01-11 RX ORDER — ATORVASTATIN CALCIUM 10 MG/1
20 TABLET, FILM COATED ORAL DAILY
Status: DISCONTINUED | OUTPATIENT
Start: 2020-01-12 | End: 2020-01-14 | Stop reason: HOSPADM

## 2020-01-11 RX ORDER — VANCOMYCIN HYDROCHLORIDE 1 G/20ML
INJECTION, POWDER, LYOPHILIZED, FOR SOLUTION INTRAVENOUS
Status: DISCONTINUED
Start: 2020-01-11 | End: 2020-01-11

## 2020-01-11 RX ORDER — AMLODIPINE BESYLATE 5 MG/1
5 TABLET ORAL DAILY
Status: DISCONTINUED | OUTPATIENT
Start: 2020-01-12 | End: 2020-01-14 | Stop reason: HOSPADM

## 2020-01-11 RX ORDER — PREDNISONE 20 MG/1
40 TABLET ORAL
Status: DISCONTINUED | OUTPATIENT
Start: 2020-01-14 | End: 2020-01-14 | Stop reason: HOSPADM

## 2020-01-11 RX ORDER — ONDANSETRON 2 MG/ML
4 INJECTION INTRAMUSCULAR; INTRAVENOUS EVERY 6 HOURS PRN
Status: DISCONTINUED | OUTPATIENT
Start: 2020-01-11 | End: 2020-01-14 | Stop reason: HOSPADM

## 2020-01-11 RX ORDER — POLYETHYLENE GLYCOL 3350 17 G/17G
17 POWDER, FOR SOLUTION ORAL DAILY PRN
Status: DISCONTINUED | OUTPATIENT
Start: 2020-01-11 | End: 2020-01-14 | Stop reason: HOSPADM

## 2020-01-11 RX ORDER — IPRATROPIUM BROMIDE AND ALBUTEROL SULFATE 2.5; .5 MG/3ML; MG/3ML
1 SOLUTION RESPIRATORY (INHALATION) ONCE
Status: COMPLETED | OUTPATIENT
Start: 2020-01-11 | End: 2020-01-11

## 2020-01-11 RX ORDER — DEXTROSE MONOHYDRATE 50 MG/ML
INJECTION, SOLUTION INTRAVENOUS
Status: DISCONTINUED
Start: 2020-01-11 | End: 2020-01-11

## 2020-01-11 RX ORDER — AMIODARONE HYDROCHLORIDE 200 MG/1
200 TABLET ORAL 2 TIMES DAILY
Status: DISCONTINUED | OUTPATIENT
Start: 2020-01-12 | End: 2020-01-14 | Stop reason: HOSPADM

## 2020-01-11 RX ORDER — METHYLPREDNISOLONE SODIUM SUCCINATE 125 MG/2ML
INJECTION, POWDER, LYOPHILIZED, FOR SOLUTION INTRAMUSCULAR; INTRAVENOUS
Status: DISCONTINUED
Start: 2020-01-11 | End: 2020-01-11

## 2020-01-11 RX ORDER — IPRATROPIUM BROMIDE AND ALBUTEROL SULFATE 2.5; .5 MG/3ML; MG/3ML
SOLUTION RESPIRATORY (INHALATION)
Status: DISCONTINUED
Start: 2020-01-11 | End: 2020-01-11

## 2020-01-11 RX ORDER — ALBUTEROL SULFATE 2.5 MG/3ML
2.5 SOLUTION RESPIRATORY (INHALATION)
Status: DISCONTINUED | OUTPATIENT
Start: 2020-01-11 | End: 2020-01-14 | Stop reason: HOSPADM

## 2020-01-11 RX ORDER — SODIUM CHLORIDE 9 MG/ML
INJECTION, SOLUTION INTRAVENOUS CONTINUOUS
Status: DISPENSED | OUTPATIENT
Start: 2020-01-12 | End: 2020-01-13

## 2020-01-11 RX ORDER — METHYLPREDNISOLONE SODIUM SUCCINATE 40 MG/ML
40 INJECTION, POWDER, LYOPHILIZED, FOR SOLUTION INTRAMUSCULAR; INTRAVENOUS EVERY 12 HOURS
Status: COMPLETED | OUTPATIENT
Start: 2020-01-12 | End: 2020-01-13

## 2020-01-11 RX ORDER — PANTOPRAZOLE SODIUM 40 MG/1
40 TABLET, DELAYED RELEASE ORAL DAILY
Status: DISCONTINUED | OUTPATIENT
Start: 2020-01-12 | End: 2020-01-14 | Stop reason: HOSPADM

## 2020-01-11 RX ADMIN — CEFEPIME 2 G: 2 INJECTION, POWDER, FOR SOLUTION INTRAVENOUS at 18:37

## 2020-01-11 RX ADMIN — IPRATROPIUM BROMIDE AND ALBUTEROL SULFATE 1 AMPULE: .5; 3 SOLUTION RESPIRATORY (INHALATION) at 18:08

## 2020-01-11 RX ADMIN — IPRATROPIUM BROMIDE AND ALBUTEROL SULFATE 1 AMPULE: .5; 3 SOLUTION RESPIRATORY (INHALATION) at 20:15

## 2020-01-11 RX ADMIN — VANCOMYCIN HYDROCHLORIDE 2000 MG: 1 INJECTION, POWDER, LYOPHILIZED, FOR SOLUTION INTRAVENOUS at 21:13

## 2020-01-11 RX ADMIN — METHYLPREDNISOLONE SODIUM SUCCINATE 125 MG: 125 INJECTION, POWDER, FOR SOLUTION INTRAMUSCULAR; INTRAVENOUS at 18:08

## 2020-01-11 ASSESSMENT — PAIN DESCRIPTION - PAIN TYPE: TYPE: ACUTE PAIN

## 2020-01-11 ASSESSMENT — PAIN SCALES - GENERAL: PAINLEVEL_OUTOF10: 5

## 2020-01-11 ASSESSMENT — PAIN DESCRIPTION - LOCATION: LOCATION: CHEST

## 2020-01-11 NOTE — ED PROVIDER NOTES
 Number of children: Not on file    Years of education: Not on file    Highest education level: Not on file   Occupational History    Not on file   Social Needs    Financial resource strain: Not on file    Food insecurity:     Worry: Not on file     Inability: Not on file    Transportation needs:     Medical: Not on file     Non-medical: Not on file   Tobacco Use    Smoking status: Former Smoker     Packs/day: 2.00     Years: 55.00     Pack years: 110.00     Last attempt to quit: 2017     Years since quittin.3    Smokeless tobacco: Never Used    Tobacco comment: smoked 2 darnell a day for 55 years   Substance and Sexual Activity    Alcohol use: Not Currently     Comment: occassionally    Drug use: No    Sexual activity: Not Currently     Partners: Female   Lifestyle    Physical activity:     Days per week: Not on file     Minutes per session: Not on file    Stress: Not on file   Relationships    Social connections:     Talks on phone: Not on file     Gets together: Not on file     Attends Orthodoxy service: Not on file     Active member of club or organization: Not on file     Attends meetings of clubs or organizations: Not on file     Relationship status: Not on file    Intimate partner violence:     Fear of current or ex partner: Not on file     Emotionally abused: Not on file     Physically abused: Not on file     Forced sexual activity: Not on file   Other Topics Concern    Not on file   Social History Narrative    Not on file     Current Facility-Administered Medications   Medication Dose Route Frequency Provider Last Rate Last Dose    vancomycin 1.5 g in dextrose 5% 300 mL IVPB  1,500 mg Intravenous Q24H Ann Mckee MD        cefepime (MAXIPIME) 2 g IVPB minibag  2 g Intravenous Q12H Ann Mckee MD        ipratropium-albuterol (DUONEB) nebulizer solution 1 ampule  1 ampule Inhalation Q4H While awake Ann Mckee MD   1 ampule at 20 0885    amiodarone (CORDARONE) tablet 200 mg  200 mg Oral BID Duane Nuñez MD   200 mg at 01/12/20 0759    amLODIPine (NORVASC) tablet 5 mg  5 mg Oral Daily Duane Nuñez MD   5 mg at 01/12/20 0800    atorvastatin (LIPITOR) tablet 20 mg  20 mg Oral Daily Duane Nuñez MD   20 mg at 01/12/20 0801    metoprolol succinate (TOPROL XL) extended release tablet 50 mg  50 mg Oral Daily Duane Nuñez MD   50 mg at 01/12/20 0801    polyethylene glycol (GLYCOLAX) packet 17 g  17 g Oral Daily PRN Duane Nuñez MD        rivaroxaban (XARELTO) tablet 15 mg  15 mg Oral Daily Duane Nuñez MD        pantoprazole (PROTONIX) tablet 40 mg  40 mg Oral Daily Duane Nuñez MD   40 mg at 01/12/20 0500    magnesium hydroxide (MILK OF MAGNESIA) 400 MG/5ML suspension 30 mL  30 mL Oral Daily PRN Duane Nuñez MD        ondansetron [facility]) injection 4 mg  4 mg Intravenous Q6H PRN Duane Nuñez MD        albuterol (PROVENTIL) nebulizer solution 2.5 mg  2.5 mg Nebulization Q2H PRN Hamida Lira MD   2.5 mg at 01/12/20 0058    0.9 % sodium chloride infusion   Intravenous Continuous Duane Nuñez MD 75 mL/hr at 01/12/20 0018      methylPREDNISolone sodium (SOLU-MEDROL) injection 40 mg  40 mg Intravenous Q12H Duane Nuñez MD   40 mg at 01/12/20 0801    Followed by   Flako Garsia ON 1/14/2020] predniSONE (DELTASONE) tablet 40 mg  40 mg Oral Daily with breakfast Duane Nuñez MD        acetaminophen (TYLENOL) tablet 650 mg  650 mg Oral Q4H PRN Duane Nuñez MD         No Known Allergies    Nursing Notes Reviewed    Physical Exam:  Triage VS:    ED Triage Vitals   Enc Vitals Group      BP       Pulse       Resp       Temp       Temp src       SpO2       Weight       Height       Head Circumference       Peak Flow       Pain Score       Pain Loc       Pain Edu? Excl. in [address]? My pulse ox interpretation is - normal    General appearance: Distress  Skin:  Warm. Dry. No pallor. No rash.     Eye:  Normal conjuctiva. no Icterus. Ears, nose, mouth and throat:  Oral mucosa moist   Heart:  Regular rate and rhythm, normal S1 & S2, no extra heart sounds, no murmurs. Perfusion:  Within normal limits. Respiratory: Tachypnea, positive expiratory wheezing,     Abdominal:  Soft. Nontender. Non distended. Extremity:  No edema or tenderness  Neurological:  Alert and oriented,  No focal neuro deficits.      I have reviewed and interpreted all of the currently available lab results from this visit (if applicable):  Results for orders placed or performed during the hospital encounter of 01/11/20   Rapid influenza A/B antigens   Result Value Ref Range    Rapid Influenza A Ag Negative Negative    Rapid Influenza B Ag Negative Negative   Basic Metabolic Panel w/ Reflex to MG   Result Value Ref Range    Sodium 141 136 - 145 mmol/L    Potassium reflex Magnesium 4.6 3.5 - 5.1 mmol/L    Chloride 103 99 - 110 mmol/L    CO2 25 21 - 32 mmol/L    Anion Gap 13 3 - 16    Glucose 102 (H) 70 - 99 mg/dL    BUN 19 7 - 20 mg/dL    CREATININE 1.8 (H) 0.8 - 1.3 mg/dL    GFR Non- 37 (A) >60    GFR  45 (A) >60    Calcium 9.3 8.3 - 10.6 mg/dL   CBC Auto Differential   Result Value Ref Range    WBC 12.0 (H) 4.0 - 11.0 K/uL    RBC 4.53 4.20 - 5.90 M/uL    Hemoglobin 13.7 13.5 - 17.5 g/dL    Hematocrit 42.3 40.5 - 52.5 %    MCV 93.4 80.0 - 100.0 fL    MCH 30.2 26.0 - 34.0 pg    MCHC 32.3 31.0 - 36.0 g/dL    RDW 16.4 (H) 12.4 - 15.4 %    Platelets 296 810 - 138 K/uL    MPV 8.0 5.0 - 10.5 fL    Neutrophils % 81.4 %    Lymphocytes % 7.4 %    Monocytes % 6.0 %    Eosinophils % 4.3 %    Basophils % 0.9 %    Neutrophils Absolute 9.8 (H) 1.7 - 7.7 K/uL    Lymphocytes Absolute 0.9 (L) 1.0 - 5.1 K/uL    Monocytes Absolute 0.7 0.0 - 1.3 K/uL    Eosinophils Absolute 0.5 0.0 - 0.6 K/uL    Basophils Absolute 0.1 0.0 - 0.2 K/uL   Troponin   Result Value Ref Range    Troponin <0.01 <0.01 ng/mL   Brain Natriuretic Peptide Result Value Ref Range    Pro-BNP 42 0 - 124 pg/mL   Blood Gas, Venous   Result Value Ref Range    pH, Hoang 7.407 7.350 - 7.450    pCO2, Hoang 41.2 40.0 - 50.0 mmHg    pO2, Hoang 24.8 (L) 25.0 - 40.0 mmHg    HCO3, Venous 25.4 23.0 - 29.0 mmol/L    Base Excess, Hoang 0.6 -3.0 - 3.0 mmol/L    O2 Sat, Hoang 45 Not Established %    Carboxyhemoglobin 2.3 (H) 0.0 - 1.5 %    MetHgb, Hoang 0.3 <1.5 %    TC02 (Calc), Hoang 27 Not Established mmol/L    O2 Content, Hoang 9 Not Established VOL %    O2 Therapy Unknown    Lactic Acid, Plasma   Result Value Ref Range    Lactic Acid 1.7 0.4 - 2.0 mmol/L   Procalcitonin   Result Value Ref Range    Procalcitonin 0.13 0.00 - 0.15 ng/mL   Urinalysis Reflex to Culture   Result Value Ref Range    Color, UA Yellow Straw/Yellow    Clarity, UA Clear Clear    Glucose, Ur Negative Negative mg/dL    Bilirubin Urine Negative Negative    Ketones, Urine Negative Negative mg/dL    Specific Gravity, UA 1.015 1.005 - 1.030    Blood, Urine TRACE-INTACT (A) Negative    pH, UA 7.5 5.0 - 8.0    Protein, UA TRACE (A) Negative mg/dL    Urobilinogen, Urine 0.2 <2.0 E.U./dL    Nitrite, Urine Negative Negative    Leukocyte Esterase, Urine Negative Negative    Microscopic Examination YES     Urine Type NotGiven     Urine Reflex to Culture Not Indicated    CBC auto differential   Result Value Ref Range    WBC 8.9 4.0 - 11.0 K/uL    RBC 4.16 (L) 4.20 - 5.90 M/uL    Hemoglobin 12.8 (L) 13.5 - 17.5 g/dL    Hematocrit 39.7 (L) 40.5 - 52.5 %    MCV 95.4 80.0 - 100.0 fL    MCH 30.8 26.0 - 34.0 pg    MCHC 32.2 31.0 - 36.0 g/dL    RDW 17.0 (H) 12.4 - 15.4 %    Platelets 636 375 - 599 K/uL    MPV 8.6 5.0 - 10.5 fL    Neutrophils % 95.7 %    Lymphocytes % 3.0 %    Monocytes % 0.9 %    Eosinophils % 0.0 %    Basophils % 0.4 %    Neutrophils Absolute 8.5 (H) 1.7 - 7.7 K/uL    Lymphocytes Absolute 0.3 (L) 1.0 - 5.1 K/uL    Monocytes Absolute 0.1 0.0 - 1.3 K/uL    Eosinophils Absolute 0.0 0.0 - 0.6 K/uL    Basophils Absolute 0.0 0.0 - 0.2 K/uL   Comprehensive Metabolic Panel w/ Reflex to MG   Result Value Ref Range    Sodium 132 (L) 136 - 145 mmol/L    Potassium reflex Magnesium 4.7 3.5 - 5.1 mmol/L    Chloride 100 99 - 110 mmol/L    CO2 20 (L) 21 - 32 mmol/L    Anion Gap 12 3 - 16    Glucose 210 (H) 70 - 99 mg/dL    BUN 28 (H) 7 - 20 mg/dL    CREATININE 1.7 (H) 0.8 - 1.3 mg/dL    GFR Non- 40 (A) >60    GFR  48 (A) >60    Calcium 9.2 8.3 - 10.6 mg/dL    Total Protein 6.8 6.4 - 8.2 g/dL    Alb 3.6 3.4 - 5.0 g/dL    Albumin/Globulin Ratio 1.1 1.1 - 2.2    Total Bilirubin 0.5 0.0 - 1.0 mg/dL    Alkaline Phosphatase 55 40 - 129 U/L    ALT 12 10 - 40 U/L    AST 11 (L) 15 - 37 U/L    Globulin 3.2 g/dL   Microscopic Urinalysis   Result Value Ref Range    Mucus, UA Rare (A) /LPF    WBC, UA 0-2 0 - 5 /HPF    RBC, UA 3-5 (A) 0 - 2 /HPF    Epi Cells 0-2 /HPF   EKG 12 Lead   Result Value Ref Range    Ventricular Rate 105 BPM    Atrial Rate 105 BPM    P-R Interval 170 ms    QRS Duration 90 ms    Q-T Interval 332 ms    QTc Calculation (Bazett) 438 ms    P Axis 76 degrees    R Axis 64 degrees    T Axis 67 degrees    Diagnosis       Sinus tachycardiaNonspecific ST abnormalityAbnormal ECGNo previous ECGs availableConfirmed by JENIFER Delong ([id number]) on 1/12/2020 2:37:18 PM      Radiographs (if obtained):  Radiologist's Report Reviewed:  No results found. EKG (if obtained): (All EKG's are interpreted by myself in the absence of a cardiologist)      MDM:  Presents to the ER for evaluation of febrile illness cough congestion shortness of breath, with evidence of possible nosocomial pneumonia now with positive fever and increasing shortness of breath. He status post hospitalization and discharged on 3 January 2020. He is on chronic oral factor Xa inhibitors. history of atrial fibrillation presents with acute on subacute COPD exacerbation with underlying pneumonia.   Patient received IV steroids respiratory treatments and IV

## 2020-01-12 LAB
A/G RATIO: 1.1 (ref 1.1–2.2)
ALBUMIN SERPL-MCNC: 3.6 G/DL (ref 3.4–5)
ALP BLD-CCNC: 55 U/L (ref 40–129)
ALT SERPL-CCNC: 12 U/L (ref 10–40)
ANION GAP SERPL CALCULATED.3IONS-SCNC: 12 MMOL/L (ref 3–16)
AST SERPL-CCNC: 11 U/L (ref 15–37)
BASOPHILS ABSOLUTE: 0 K/UL (ref 0–0.2)
BASOPHILS RELATIVE PERCENT: 0.4 %
BILIRUB SERPL-MCNC: 0.5 MG/DL (ref 0–1)
BILIRUBIN URINE: NEGATIVE
BLOOD, URINE: ABNORMAL
BUN BLDV-MCNC: 28 MG/DL (ref 7–20)
CALCIUM SERPL-MCNC: 9.2 MG/DL (ref 8.3–10.6)
CHLORIDE BLD-SCNC: 100 MMOL/L (ref 99–110)
CLARITY: CLEAR
CO2: 20 MMOL/L (ref 21–32)
COLOR: YELLOW
CREAT SERPL-MCNC: 1.7 MG/DL (ref 0.8–1.3)
EKG ATRIAL RATE: 105 BPM
EKG DIAGNOSIS: NORMAL
EKG P AXIS: 76 DEGREES
EKG P-R INTERVAL: 170 MS
EKG Q-T INTERVAL: 332 MS
EKG QRS DURATION: 90 MS
EKG QTC CALCULATION (BAZETT): 438 MS
EKG R AXIS: 64 DEGREES
EKG T AXIS: 67 DEGREES
EKG VENTRICULAR RATE: 105 BPM
EOSINOPHILS ABSOLUTE: 0 K/UL (ref 0–0.6)
EOSINOPHILS RELATIVE PERCENT: 0 %
EPITHELIAL CELLS, UA: ABNORMAL /HPF
GFR AFRICAN AMERICAN: 48
GFR NON-AFRICAN AMERICAN: 40
GLOBULIN: 3.2 G/DL
GLUCOSE BLD-MCNC: 210 MG/DL (ref 70–99)
GLUCOSE URINE: NEGATIVE MG/DL
HCT VFR BLD CALC: 39.7 % (ref 40.5–52.5)
HEMOGLOBIN: 12.8 G/DL (ref 13.5–17.5)
KETONES, URINE: NEGATIVE MG/DL
LEUKOCYTE ESTERASE, URINE: NEGATIVE
LYMPHOCYTES ABSOLUTE: 0.3 K/UL (ref 1–5.1)
LYMPHOCYTES RELATIVE PERCENT: 3 %
MCH RBC QN AUTO: 30.8 PG (ref 26–34)
MCHC RBC AUTO-ENTMCNC: 32.2 G/DL (ref 31–36)
MCV RBC AUTO: 95.4 FL (ref 80–100)
MICROSCOPIC EXAMINATION: YES
MONOCYTES ABSOLUTE: 0.1 K/UL (ref 0–1.3)
MONOCYTES RELATIVE PERCENT: 0.9 %
MUCUS: ABNORMAL /LPF
NEUTROPHILS ABSOLUTE: 8.5 K/UL (ref 1.7–7.7)
NEUTROPHILS RELATIVE PERCENT: 95.7 %
NITRITE, URINE: NEGATIVE
PDW BLD-RTO: 17 % (ref 12.4–15.4)
PH UA: 7.5 (ref 5–8)
PLATELET # BLD: 167 K/UL (ref 135–450)
PMV BLD AUTO: 8.6 FL (ref 5–10.5)
POTASSIUM REFLEX MAGNESIUM: 4.7 MMOL/L (ref 3.5–5.1)
PROTEIN UA: ABNORMAL MG/DL
RBC # BLD: 4.16 M/UL (ref 4.2–5.9)
RBC UA: ABNORMAL /HPF (ref 0–2)
SODIUM BLD-SCNC: 132 MMOL/L (ref 136–145)
SPECIFIC GRAVITY UA: 1.01 (ref 1–1.03)
TOTAL PROTEIN: 6.8 G/DL (ref 6.4–8.2)
URINE REFLEX TO CULTURE: ABNORMAL
URINE TYPE: ABNORMAL
UROBILINOGEN, URINE: 0.2 E.U./DL
WBC # BLD: 8.9 K/UL (ref 4–11)
WBC UA: ABNORMAL /HPF (ref 0–5)

## 2020-01-12 PROCEDURE — 1200000000 HC SEMI PRIVATE

## 2020-01-12 PROCEDURE — 93010 ELECTROCARDIOGRAM REPORT: CPT | Performed by: INTERNAL MEDICINE

## 2020-01-12 PROCEDURE — 81001 URINALYSIS AUTO W/SCOPE: CPT

## 2020-01-12 PROCEDURE — 96366 THER/PROPH/DIAG IV INF ADDON: CPT

## 2020-01-12 PROCEDURE — 6370000000 HC RX 637 (ALT 250 FOR IP): Performed by: INTERNAL MEDICINE

## 2020-01-12 PROCEDURE — G0378 HOSPITAL OBSERVATION PER HR: HCPCS

## 2020-01-12 PROCEDURE — 2700000000 HC OXYGEN THERAPY PER DAY

## 2020-01-12 PROCEDURE — 80053 COMPREHEN METABOLIC PANEL: CPT

## 2020-01-12 PROCEDURE — 6360000002 HC RX W HCPCS: Performed by: INTERNAL MEDICINE

## 2020-01-12 PROCEDURE — 94640 AIRWAY INHALATION TREATMENT: CPT

## 2020-01-12 PROCEDURE — 94761 N-INVAS EAR/PLS OXIMETRY MLT: CPT

## 2020-01-12 PROCEDURE — 85025 COMPLETE CBC W/AUTO DIFF WBC: CPT

## 2020-01-12 PROCEDURE — 94669 MECHANICAL CHEST WALL OSCILL: CPT

## 2020-01-12 PROCEDURE — 36415 COLL VENOUS BLD VENIPUNCTURE: CPT

## 2020-01-12 PROCEDURE — 2580000003 HC RX 258: Performed by: INTERNAL MEDICINE

## 2020-01-12 PROCEDURE — 96376 TX/PRO/DX INJ SAME DRUG ADON: CPT

## 2020-01-12 PROCEDURE — 99223 1ST HOSP IP/OBS HIGH 75: CPT | Performed by: INTERNAL MEDICINE

## 2020-01-12 PROCEDURE — 80299 QUANTITATIVE ASSAY DRUG: CPT

## 2020-01-12 RX ORDER — IPRATROPIUM BROMIDE AND ALBUTEROL SULFATE 2.5; .5 MG/3ML; MG/3ML
1 SOLUTION RESPIRATORY (INHALATION)
Status: DISCONTINUED | OUTPATIENT
Start: 2020-01-12 | End: 2020-01-14 | Stop reason: HOSPADM

## 2020-01-12 RX ORDER — IPRATROPIUM BROMIDE AND ALBUTEROL SULFATE 2.5; .5 MG/3ML; MG/3ML
1 SOLUTION RESPIRATORY (INHALATION) EVERY 4 HOURS
Status: DISCONTINUED | OUTPATIENT
Start: 2020-01-12 | End: 2020-01-12

## 2020-01-12 RX ORDER — IPRATROPIUM BROMIDE AND ALBUTEROL SULFATE 2.5; .5 MG/3ML; MG/3ML
1 SOLUTION RESPIRATORY (INHALATION)
Status: DISCONTINUED | OUTPATIENT
Start: 2020-01-12 | End: 2020-01-12

## 2020-01-12 RX ORDER — SODIUM CHLORIDE 0.9 % (FLUSH) 0.9 %
SYRINGE (ML) INJECTION
Status: DISPENSED
Start: 2020-01-12 | End: 2020-01-12

## 2020-01-12 RX ADMIN — IPRATROPIUM BROMIDE AND ALBUTEROL SULFATE 1 AMPULE: .5; 3 SOLUTION RESPIRATORY (INHALATION) at 06:38

## 2020-01-12 RX ADMIN — METHYLPREDNISOLONE SODIUM SUCCINATE 40 MG: 40 INJECTION, POWDER, FOR SOLUTION INTRAMUSCULAR; INTRAVENOUS at 08:01

## 2020-01-12 RX ADMIN — ATORVASTATIN CALCIUM 20 MG: 10 TABLET, FILM COATED ORAL at 08:01

## 2020-01-12 RX ADMIN — AMIODARONE HYDROCHLORIDE 200 MG: 200 TABLET ORAL at 21:27

## 2020-01-12 RX ADMIN — IPRATROPIUM BROMIDE AND ALBUTEROL SULFATE 1 AMPULE: .5; 3 SOLUTION RESPIRATORY (INHALATION) at 23:05

## 2020-01-12 RX ADMIN — IPRATROPIUM BROMIDE AND ALBUTEROL SULFATE 1 AMPULE: .5; 3 SOLUTION RESPIRATORY (INHALATION) at 14:55

## 2020-01-12 RX ADMIN — VANCOMYCIN HYDROCHLORIDE 1.5 G: 5 INJECTION, POWDER, LYOPHILIZED, FOR SOLUTION INTRAVENOUS at 21:27

## 2020-01-12 RX ADMIN — METHYLPREDNISOLONE SODIUM SUCCINATE 40 MG: 40 INJECTION, POWDER, FOR SOLUTION INTRAMUSCULAR; INTRAVENOUS at 21:27

## 2020-01-12 RX ADMIN — AMLODIPINE BESYLATE 5 MG: 5 TABLET ORAL at 08:00

## 2020-01-12 RX ADMIN — SODIUM CHLORIDE: 9 INJECTION, SOLUTION INTRAVENOUS at 00:18

## 2020-01-12 RX ADMIN — IPRATROPIUM BROMIDE AND ALBUTEROL SULFATE 1 AMPULE: .5; 3 SOLUTION RESPIRATORY (INHALATION) at 10:40

## 2020-01-12 RX ADMIN — ALBUTEROL SULFATE 2.5 MG: 2.5 SOLUTION RESPIRATORY (INHALATION) at 00:58

## 2020-01-12 RX ADMIN — AMIODARONE HYDROCHLORIDE 200 MG: 200 TABLET ORAL at 07:59

## 2020-01-12 RX ADMIN — METOPROLOL SUCCINATE 50 MG: 50 TABLET, EXTENDED RELEASE ORAL at 08:01

## 2020-01-12 RX ADMIN — IPRATROPIUM BROMIDE AND ALBUTEROL SULFATE 1 AMPULE: .5; 3 SOLUTION RESPIRATORY (INHALATION) at 18:48

## 2020-01-12 RX ADMIN — CEFEPIME 2 G: 2 INJECTION, POWDER, FOR SOLUTION INTRAVENOUS at 05:00

## 2020-01-12 RX ADMIN — CEFEPIME 2 G: 2 INJECTION, POWDER, FOR SOLUTION INTRAVENOUS at 18:31

## 2020-01-12 RX ADMIN — IPRATROPIUM BROMIDE AND ALBUTEROL SULFATE 1 AMPULE: .5; 3 SOLUTION RESPIRATORY (INHALATION) at 03:02

## 2020-01-12 RX ADMIN — PANTOPRAZOLE SODIUM 40 MG: 40 TABLET, DELAYED RELEASE ORAL at 05:00

## 2020-01-12 NOTE — PROGRESS NOTES
Assessment complete. Vital signs stable on  3l per NC. Call light within reach. ID band and tele intact. Medications given per MAR. Patient noted to be short of breath when talking. Denies any needs at this time. Will continue to monitor.

## 2020-01-12 NOTE — PROGRESS NOTES
Bedside nursing handoff to [facility], [address] [place] [address].   Angeles Bruno RN

## 2020-01-12 NOTE — CONSULTS
CATARACT REMOVAL WITH IMPLANT Right 09/06/2018     PHACO EMULSIFICATION OF CATARACT WITH INTRAOCULAR LENS IMPLANT RIGHT EYE    COLONOSCOPY  2/24/2016    sigmoid polyps    ENDOSCOPY, COLON, DIAGNOSTIC  11/13/2019    egd; esophagitis/gastritis    HERNIA REPAIR      [facility] RMVL INSJ IO LENS PROSTH W/O ECP Right 9/6/2018    PHACO EMULSIFICATION OF CATARACT WITH INTRAOCULAR LENS IMPLANT RIGHT EYE performed by Misty Rodríguez MD at [address] RMVL INSJ IO LENS PROSTH W/O ECP Left 10/18/2018    PHACO EMULSIFICATION OF CATARACT WITH  INTRAOCULAR LENS IMPLANT LEFT EYE performed by Misty Rodríguez MD at [address] N/A 11/13/2019    EGD BIOPSY performed by Jorge Baltazar MD at [facility] :  family history includes Alcohol Abuse in his sister. SOCIAL HISTORY:   reports that he quit smoking about 2 years ago. He has a 110.00 pack-year smoking history. He has never used smokeless tobacco.    Scheduled Meds:   vancomycin  1,500 mg Intravenous Q24H    sodium chloride flush        ipratropium-albuterol  1 ampule Inhalation Q4H    amiodarone  200 mg Oral BID    amLODIPine  5 mg Oral Daily    atorvastatin  20 mg Oral Daily    metoprolol succinate  50 mg Oral Daily    rivaroxaban  15 mg Oral Daily    pantoprazole  40 mg Oral Daily    methylPREDNISolone  40 mg Intravenous Q12H    Followed by   Hoda Gudino ON 1/14/2020] predniSONE  40 mg Oral Daily with breakfast    cefepime  2 g Intravenous Q12H     Continuous Infusions:   sodium chloride 75 mL/hr at 01/12/20 0018     PRN Meds:  polyethylene glycol, magnesium hydroxide, ondansetron, albuterol, acetaminophen    ALLERGIES:  Patient has No Known Allergies.     REVIEW OF SYSTEMS:  Constitutional: Negative for fever  HENT: Negative for sore throat  Eyes: Negative for redness   Respiratory: +  for dyspnea,+ cough  Cardiovascular: Negative for chest pain  Gastrointestinal: Negative [id number], PO2ART in the last 72 hours. Chest imaging was reviewed by me and showed:  CXR: Increasing bibasilar hazy airspace opacities compared with films from 2 weeks previous    ASSESSMENT:  ·  Acute on chronic respiratory failure with hypoxemia- possible HCAP with COPD AE  · Healthcare associated pneumonia - probable gram negative, risk for methicillin resistant Staph aureus as well  · COPD with AE- followed by Dr. Liliana Lee  · Sepsis secondary to pneumonia  · TRACY- baseline creat 1.4  · P afib    PLAN:  Supplemental oxygen to maintain SaO2 >92%; wean as tolerated  Non-invasive positive pressure ventilation if needed  Antibiotics to cover HCAP (vanc and cefepime)  Inhaled bronchodilators   IV steroids  Follow sputum and blood cultures  Continue xarelto  · IVF resuscitation  · Lactic acid is normal      Thank you for the consult.

## 2020-01-12 NOTE — PROGRESS NOTES
01/12/20  0409    132*   K 4.6 4.7    100   CO2 25 20*   BUN 19 28*   CREATININE 1.8* 1.7*   CALCIUM 9.3 9.2     Recent Labs     01/12/20  0409   AST 11*   ALT 12   BILITOT 0.5   ALKPHOS 55     No results for input(s): INR in the last 72 hours. Recent Labs     01/11/20  1810   TROPONINI <0.01       Urinalysis:      Lab Results   Component Value Date    NITRU Negative 01/12/2020    WBCUA 0-2 01/12/2020    BACTERIA 2+ 02/11/2012    RBCUA 3-5 01/12/2020    BLOODU TRACE-INTACT 01/12/2020    SPECGRAV 1.015 01/12/2020    GLUCOSEU Negative 01/12/2020    GLUCOSEU Neg 04/10/2012       Radiology:  XR CHEST PORTABLE   Final Result   Increasing left basilar airspace opacity either atelectasis or pneumonia in   the appropriate clinical setting. Assessment/Plan:    Active Hospital Problems    Diagnosis    HCAP (healthcare-associated pneumonia) [J18.9]     Priority: High    PAF (paroxysmal atrial fibrillation) ([facility] [I48.0]    Acute on chronic respiratory failure with hypoxia ([facility]) [J96.21]    COPD exacerbation ([facility] [address] [J44.1]         1. Severe sepsis (WBC 12, Tmax 100.6, HRmax 106, RRmax 24, likely related to #3 - improved    2. Acute on chronic respiratory failure with hypoxia, increased WOB/resp distress, likely related to #3, supplemental O2 to maintain SPO2 ? 92%, continuous pulse ox. He is currently requiring 3L continuously whereas he normally only requires it PRN and with exertion. Wean as tolerated to home O2 of 3L PRN. Pulm c/s. 3. HCAP with probable aeCOPD, IV solumedrol/pred, IV vanco and cefepime, PRN/CHANDU intensive NEB therapy. Check respiratory culture, normal procalcitonin. Pulm consulted. Hold home regimen for now. 4. TRACY on CKD, Cr 1.8 (baseline 1.3-1.4), gentle IVF (w/ 24 expiration), repeat in am.  Hold home Lasix and ARB. 5. Leukocytosis, 12.0, recently finished steroid taper OP (12/26 DC from hosp on 12 day steroid taper) but also possibly related to sepsis.

## 2020-01-12 NOTE — ED NOTES
[facility], PT AWARE OF TIME FOR TRANSPORT. No New concerns noted at this time.      Nohemy Vázquez RN  01/11/20 2126

## 2020-01-12 NOTE — PROGRESS NOTES
RESPIRATORY THERAPY ASSESSMENT    Name:  [facility] Record Number:  [id number]  Age: 69 y.o. Gender: male  : 1945  Today's Date:  2020  Room:  /0304-01    Assessment     Is the patient being admitted for a COPD or Asthma exacerbation? Yes   (If yes the patient will be seen every 4 hours for the first 24 hours and then reassessed)    Patient Admission Diagnosis      Allergies  No Known Allergies    Minimum Predicted Vital Capacity:     1170          Actual Vital Capacity:      2000              Pulmonary History:COPD and CHF/Pulmonary Edema  Home Oxygen Therapy:  3L  Home Respiratory Therapy:albuterol/duoneb/incruse ellipta   Current Respiratory Therapy:  duoneb q4wa  Treatment Type: HHN  Medications: Albuterol    Respiratory Severity Index(RSI)   Patients with orders for inhalation medications, oxygen, or any therapeutic treatment modality will be placed on Respiratory Protocol. They will be assessed with the first treatment and at least every 72 hours thereafter. The following severity scale will be used to determine frequency of treatment intervention.     Smoking History: Mild Exacerbation = 3    Social History  Social History     Tobacco Use    Smoking status: Former Smoker     Packs/day: 2.00     Years: 55.00     Pack years: 110.00     Last attempt to quit: 2017     Years since quittin.3    Smokeless tobacco: Never Used    Tobacco comment: smoked 2 darnell a day for 55 years   Substance Use Topics    Alcohol use: Not Currently     Comment: occassionally    Drug use: No       Recent Surgical History: None = 0  Past Surgical History  Past Surgical History:   Procedure Laterality Date    CATARACT REMOVAL WITH IMPLANT Right 2018     PHACO EMULSIFICATION OF CATARACT WITH INTRAOCULAR LENS IMPLANT RIGHT EYE    COLONOSCOPY  2016    sigmoid polyps    ENDOSCOPY, COLON, DIAGNOSTIC  2019    egd; esophagitis/gastritis    HERNIA REPAIR      [facility] INSJ IO LENS PROSTH W/O ECP Right 9/6/2018    PHACO EMULSIFICATION OF CATARACT WITH INTRAOCULAR LENS IMPLANT RIGHT EYE performed by Arjun Zimmer MD at [address] [place] [address] RMVL INSJ IO LENS PROSTH W/O ECP Left 10/18/2018    PHACO EMULSIFICATION OF CATARACT WITH  INTRAOCULAR LENS IMPLANT LEFT EYE performed by Arjun Zimmer MD at [address] [facility] N/A 11/13/2019    EGD BIOPSY performed by Jeb Mendoza MD at [facility]       Level of Consciousness: Alert, Oriented, and Cooperative = 0    Level of Activity: Walking unassisted = 0    Respiratory Pattern: Dyspnea with exertion;Irregular pattern;or RR less than 6 = 2    Breath Sounds: Absent bilaterally and/or with wheezes = 3    Sputum   ,  ,    Cough: Strong, spontaneous, non-productive = 0    Vital Signs   /86   Pulse 87   Temp 97 °F (36.1 °C) (Oral)   Resp 20   Ht 5' 11\" (1.803 m)   Wt 225 lb 1.6 oz (102.1 kg)   SpO2 97%   BMI 31.40 kg/m²   SPO2 (COPD values may differ): 88-89% on room air or greater than 92% on FiO2 28- 35% = 2    Peak Flow (asthma only): not applicable = 0    RSI: 6-83 = TID (three times daily) and Q4hr PRN for dyspnea        Plan       Goals: medication delivery    Patient/caregiver was educated on the proper method of use for Respiratory Care Devices:  Yes      Level of patient/caregiver understanding able to:   ? Verbalize understanding   ? Demonstrate understanding       ? Teach back        ? Needs reinforcement       ? No available caregiver               ? Other:     Response to education:  Good     Is patient being placed on Home Treatment Regimen? No     Does the patient have everything they need prior to discharge? NA     Comments: chart reviewed and patient assessed    Plan of Care: change duoneb q4wa to duoneb q4(copd exac x 24 hours)    Electronically signed by Jarrett Valle RCP on 1/12/2020 at 1:22 AM    Respiratory Protocol Guidelines     1.  Assessment and treatment by Respiratory Therapy will be initiated for medication and therapeutic interventions upon initiation of aerosolized medication. 2. Physician will be contacted for respiratory rate (RR) greater than 35 breaths per minute. Therapy will be held for heart rate (HR) greater than 140 beats per minute, pending direction from physician. 3. Bronchodilators will be administered via Metered Dose Inhaler (MDI) with spacer when the following criteria are met:  a. Alert and cooperative     b. HR < 140 bpm  c. RR < 30 bpm                d. Can demonstrate a 2-3 second inspiratory hold  4. Bronchodilators will be administered via Hand Held Nebulizer [facility]) to patients when ANY of the following criteria are met  a. Incognizant or uncooperative          b. Patients treated with HHN at Home        c. Unable to demonstrate proper use of MDI with spacer     d. RR > 30 bpm   5. Bronchodilators will be delivered via Metered Dose Inhaler (MDI), HHN, Aerogen to intubated patients on mechanical ventilation. 6. Inhalation medication orders will be delivered and/or substituted as outlined below. Aerosolized Medications Ordering and Administration Guidelines:    1. All Medications will be ordered by a physician, and their frequency and/or modality will be adjusted as defined by the patients Respiratory Severity Index (RSI) score. 2. If the patient does not have documented COPD, consider discontinuing anticholinergics when RSI is less than 9.  3. If the bronchospasm worsens (increased RSI), then the bronchodilator frequency can be increased to a maximum of every 4 hours. If greater than every 4 hours is required, the physician will be contacted. 4. If the bronchospasm improves, the frequency of the bronchodilator can be decreased, based on the patient's RSI, but not less than home treatment regimen frequency.   5. Bronchodilator(s) will be discontinued if patient has a RSI less than 9 and has received no scheduled or as needed

## 2020-01-12 NOTE — H&P
Hospital Medicine History & Physical      PCP: Tex Moctezuma MD    Date of Service: Pt seen/examined on 1/11/20 and admitted on 1/11/20 to Inpatient     Chief Complaint   Patient presents with    Shortness of Breath     sts it started this morning and has gotten worse. has hx of COPD. History Of Present Illness: The patient is a 69 y.o. male with PMH below, presented to [place] [address] with SOB/BETANCOURT, non-productive cough and congestion. Pt reports the above sx began worsening this am and progressively worsened through out the day. He normally wears 3L O2 at home PRN. Per ED vitals flowsheet he arrived on RA at 92%. He was placed on on 3L and sats have been 95-97%. He is requiring 3L continuously. No CP at this time. Pt was recently admitted for resp failure on 12/26 on 12 day steroid taper which he recently finished.   He completed a course of azithro while in hospital.      Past Medical History:        Diagnosis Date    A-fib [facility])     2/14/12    Acute respiratory failure with hypoxia ([address] 2/13/2012    Atrial fibrillation with RVR ([address]     2/14/12     Avulsion fracture of ankle 9/21/2015    Benign localized hyperplasia of prostate with urinary obstruction and other lower urinary tract symptoms (LUTS)(600.21) 8/17/2016    [facility] systolic [facility]congest[facility] failure[facility] 8/28/2017    Contusion of leg, right 9/21/2015    COPD (chronic obstructive pulmonary disease) ([facility]     Fractures     GERD (gastroesophageal reflux disease) 6/15/2015    Hypertension     Hypertensive urgency 8/4/2019    Hypertrophy of prostate without urinary obstruction and other lower urinary tract symptoms (LUTS) 6/15/2015    No history of procedure     no previos colonoscopy    PAD (peripheral artery disease) ([facility] 10/9/2017    Pneumonia     Thoracic aortic aneurysm [facility])        Past Surgical History:        Procedure Laterality Date    CATARACT REMOVAL WITH IMPLANT Right 09/06/2018     PHACO EMULSIFICATION OF CATARACT WITH INTRAOCULAR LENS IMPLANT RIGHT EYE    COLONOSCOPY  2/24/2016    sigmoid polyps    ENDOSCOPY, COLON, DIAGNOSTIC  11/13/2019    egd; esophagitis/gastritis    HERNIA REPAIR      MI XCAPSL CTRC RMVL INSJ IO LENS PROSTH W/O ECP Right 9/6/2018    PHACO EMULSIFICATION OF CATARACT WITH INTRAOCULAR LENS IMPLANT RIGHT EYE performed by Hoda Galvan MD at [address] [facility] RMVL INSJ IO LENS PROSTH W/O ECP Left 10/18/2018    PHACO EMULSIFICATION OF CATARACT WITH  INTRAOCULAR LENS IMPLANT LEFT EYE performed by Hoda Galvan MD at [facility] N/A 11/13/2019    EGD BIOPSY performed by Shruti Bucio MD at [address] [facility]       Medications Prior to Admission:    Prior to Admission medications    Medication Sig Start Date End Date Taking?  Authorizing Provider   pantoprazole (PROTONIX) 40 MG tablet Take 1 tablet by mouth daily 1/3/20   Micheline Schumacher APRN - CNP   amLODIPine (NORVASC) 5 MG tablet Take 1 tablet by mouth daily 1/3/20   Micheline Schumacher APRN - CNP   atorvastatin (LIPITOR) 20 MG tablet Take 1 tablet by mouth daily 1/3/20   Micheline Murilloine, APRN - CNP   furosemide (LASIX) 40 MG tablet Take 1 tablet by mouth daily 1/3/20   Micheline Murilloine, APRN - CNP   ipratropium-albuterol (DUONEB) 0.5-2.5 (3) MG/3ML SOLN nebulizer solution USE ONE UNIT DOSE (3ML) IN NEBULIZER AND INHALE INTO THE LUNGS EVERY 4 HOURS AS NEEDED FOR SHORTNESS OF BREATH 1/3/20   Micheline Schumacher APRN - CNP   losartan (COZAAR) 100 MG tablet TAKE ONE TABLET BY MOUTH DAILY 1/3/20   Micheline Schumacher, APRN - CNP   metoprolol succinate (TOPROL XL) 50 MG extended release tablet Take 1 tablet by mouth daily 1/3/20   Micheline Schumacher, APRN - CNP   Potassium Citrate ER 15 MEQ (1620 MG) TBCR Take 15 mEq by mouth daily 1/3/20   Micheline Murilloine, APRN - CNP   rivaroxaban (XARELTO) 20 MG TABS tablet TAKE ONE TABLET BY MOUTH DAILY WITH BREAKFAST 1/3/20   Micheline Schumacher APRN - CNP Brisk cap refill. PSY:  Thought process intact, affect appropriate. NORI:  CN III-XII intact, moves all 4 spontaneously, sensory grossly intact. SKIN: No rash or lesions on visible skin. Chart review shows recent radiographs:  Xr Chest Portable    Result Date: 1/11/2020  EXAMINATION: ONE XRAY VIEW OF THE CHEST 1/11/2020 6:05 pm COMPARISON: 12/25/2019 HISTORY: ORDERING SYSTEM PROVIDED HISTORY: sob TECHNOLOGIST PROVIDED HISTORY: Reason for exam:->sob Reason for Exam: SOB, COPD Acuity: Acute Type of Exam: Initial FINDINGS: Increasing left basilar airspace opacity. No other focal consolidation, pleural effusion or pneumothorax. The cardiomediastinal silhouette is stable. No overt pulmonary edema. The osseous structures are stable. Increasing left basilar airspace opacity either atelectasis or pneumonia in the appropriate clinical setting. Xr Chest Portable    Result Date: 12/25/2019  EXAMINATION: ONE XRAY VIEW OF THE CHEST 12/25/2019 12:29 pm COMPARISON: 12/23/2019 HISTORY: ORDERING SYSTEM PROVIDED HISTORY: shortness of breath TECHNOLOGIST PROVIDED HISTORY: Reason for exam:->shortness of breath Reason for Exam: SOB Acuity: Acute Type of Exam: Initial FINDINGS: There has been interval development of linear opacities within the lingula and medial segment of the right lung base. Heart size and vascularity are stable. There is no pneumothorax or effusion. Developing mild bibasilar segmental atelectasis versus pneumonia. Xr Chest Portable    Result Date: 12/23/2019  EXAMINATION: ONE XRAY VIEW OF THE CHEST 12/23/2019 6:31 am COMPARISON: 11/06/2019 HISTORY: ORDERING SYSTEM PROVIDED HISTORY: shortness of breath TECHNOLOGIST PROVIDED HISTORY: Reason for exam:->shortness of breath Reason for Exam: SOB, COPD Acuity: Acute Type of Exam: Initial FINDINGS: Chronic scarring/atelectasis is noted within the lingula. The lungs are otherwise clear.   Heart size, mediastinal contours and pulmonary vascularity are stable. There is no pneumothorax or effusion. No acute pulmonary process. EKG:    EKG 12 Lead [[id number]]    Collected: 01/11/20 1805    Updated: 01/11/20 1805     Ventricular Rate 105 BPM    Atrial Rate 105 BPM    P-R Interval 170 ms    QRS Duration 90 ms    Q-T Interval 332 ms    QTc Calculation (Bazett) 438 ms    P Axis 76 degrees    R Axis 64 degrees    T Axis 67 degrees    Diagnosis Sinus tachycardiaNonspecific ST abnormalityAbnormal ECGNo previous ECGs available         CBC:  Recent Labs     01/11/20 1810   WBC 12.0*   HGB 13.7   HCT 42.3           RENAL  Recent Labs     01/11/20 1810      K 4.6      CO2 25   BUN 19   CREATININE 1.8*   GLUCOSE 102*     CARDIAC ENZYMES:   Recent Labs     01/11/20 1810   TROPONINI <0.01     Lab Results   Component Value Date    PROBNP 42 01/11/2020    PROBNP 30 12/23/2019    PROBNP 99 08/04/2019       LACTIC ACID:  Recent Labs     01/11/20 1810   LACTA 1.7     VBG:  Recent Labs     01/11/20 1810   PHVEN 7.407   QHB8DXK 41.2   AOX0QWC 25.4   PO2VEN 24.8*   A6AUYWHV 45        PHYSICIAN CERTIFICATION  I certify that Nikki Arreola is expected to be hospitalized for 2 midnights based on the following assessment and plan:    ASSESSMENT/PLAN:  1. Severe sepsis (WBC 12, Tmax 100.6, HRmax 106, RRmax 24, likely related to #3.    2. Acute on chronic respiratory failure with hypoxia, increased WOB/resp distress, likely related to #3, supplemental O2 to maintain SPO2 ? 92%, continuous pulse ox. He is currently requiring 3L continuously whereas he normally only requires it PRN and with exertion. Wean as tolerated to home O2 of 3L PRN. Pulm c/s. 3. HCAP with probable aeCOPD, IV solumedrol/pred, IV vanco and cefepime, PRN/CHANDU intensive NEB therapy. Check respiratory culture and procalcitonin. Pulm consult. Hold home regimen for now. 4. TRACY on CKD, Cr 1.8 (baseline 1.3-1.4), gentle IVF (w/ 24 expiration), repeat in am.  Hold home Lasix and ARB. 5. Leukocytosis, 12.0, recently finished steroid taper OP (12/26 DC from hosp on 12 day steroid taper) but also possibly related to sepsis. Monitor. 6. HTN, controlled, cont home regimen except as above. 7. Hx a fib, currently sinus tachy, cont BB, Xarelto (reduced dose to 15 mg/d 2/2 CrCl 46). 8. Hx CHF, BNP 42, think pt actually dry at this time. Cont home regimen except as above. Monitor for vol OL. Daily weights and I/Os. DVT Prophylaxis: Xarelto  Diet: clears pending SLP eval.  Code Status: Full Code   PT/OT Eval Status: Will order if needed and as patient condition allows  Dispo - Admit to inpatient 2w w/ tele. Finn Garrido MD    Thank you Natalia Nath MD for the opportunity to be involved in this patient's care. If you have any questions or concerns please feel free to contact me via the [employer] at [phone]. This chart was generated using the [address] [place] [address] [employer] system. I created this record but it may contain dictation errors given the limitations of this technology.

## 2020-01-12 NOTE — PROGRESS NOTES
4 Eyes Skin Assessment     The patient is being assess for   Admission    I agree that 2 RN's have performed a thorough Head to Toe Skin Assessment on the patient. ALL assessment sites listed below have been assessed. Areas assessed by both nurses:   [x]   Head, Face, and Ears   [x]   Shoulders, Back, and Chest, Abdomen  [x]   Arms, Elbows, and Hands   [x]   Coccyx, Sacrum, and Ischium  [x]   Legs, Feet, and Heels        No skin issues on admission. **SHARE this note so that the co-signing nurse is able to place an eSignature**    Co-signer eSignature: Electronically signed by Bebe Mustafa RN on 1/12/20 at 12:16 AM    Does the Patient have Skin Breakdown?   No          Matt Prevention initiated:  No   Wound Care Orders initiated:  NA      [facility] nurse consulted for Pressure Injury (Stage 3,4, Unstageable, DTI, NWPT, Complex wounds)and New or Established Ostomies:  NA      Primary Nurse eSignature: Electronically signed by Mich Guerra RN on 1/12/20 at 12:04 AM

## 2020-01-12 NOTE — PROGRESS NOTES
Patient admitted from [place] to -1 in no acute distress. NSR on telemetry. 97% on 3L NC. Admission completed. Orientation provided. Instructed to call for assist before attempting out of bed & patient verbalized good understanding. Call in easy reach. Continue to monitor closely.   Mich Guerra RN

## 2020-01-12 NOTE — ED PROVIDER NOTES
Emergency Department Encounter  Location: [address] [place] [address],[address] EMERGENCY DEPARTMENT    Patient: Jaime Ventura  MRN: [id number]  : 1945  Date of evaluation: 2020  ED Provider: Rodrick Colorado MD    7:51 PM  Jaime Ventura was checked out to me by Dr. Pooja Ruiz. Please see his/her initial documentation for details of the patient's initial ED presentation, physical exam and completed studies. In brief, Jaime Ventura is a 69 y.o. male who presented to the emergency department for shortness of breath. Current studies pending and/or plan: Admission    Physical Exam  Vitals signs and nursing note reviewed. Constitutional:       Appearance: Normal appearance. He is ill-appearing. Cardiovascular:      Rate and Rhythm: Regular rhythm. Tachycardia present. Pulses: Normal pulses. Heart sounds: Normal heart sounds. Pulmonary:      Effort: Tachypnea present. No accessory muscle usage. Breath sounds: Decreased air movement present. Wheezing present. Neurological:      Mental Status: He is alert.            I have reviewed and interpreted all of the currently available lab results and diagnostics from this visit:      Procedures/interventions/images ordered for this visit  Orders Placed This Encounter   Procedures    Culture blood #1    Culture blood #2    Rapid influenza A/B antigens    XR CHEST PORTABLE    Basic Metabolic Panel w/ Reflex to MG    CBC Auto Differential    Troponin    Brain Natriuretic Peptide    Blood Gas, Venous    Lactic Acid, Plasma    Procalcitonin    Telemetry Monitoring    Inpatient consult to Hospitalist    EKG 12 Lead    PATIENT STATUS (FROM ED OR OR/PROCEDURAL) Inpatient       Medications ordered for this visit  Orders Placed This Encounter   Medications    ipratropium-albuterol (DUONEB) nebulizer solution 1 ampule    methylPREDNISolone sodium (SOLU-MEDROL) injection 125 mg    ipratropium-albuterol (DUONEB) 0.5-2.5 (3) MG/3ML nebulizer solution

## 2020-01-13 ENCOUNTER — CARE COORDINATION (OUTPATIENT)
Dept: CARE COORDINATION | Age: 75
End: 2020-01-13

## 2020-01-13 ENCOUNTER — APPOINTMENT (OUTPATIENT)
Dept: GENERAL RADIOLOGY | Age: 75
DRG: 871 | End: 2020-01-13
Payer: MEDICARE

## 2020-01-13 PROBLEM — R06.00 DYSPNEA: Status: ACTIVE | Noted: 2019-05-01

## 2020-01-13 LAB
ANION GAP SERPL CALCULATED.3IONS-SCNC: 12 MMOL/L (ref 3–16)
BUN BLDV-MCNC: 28 MG/DL (ref 7–20)
CALCIUM SERPL-MCNC: 9.2 MG/DL (ref 8.3–10.6)
CHLORIDE BLD-SCNC: 105 MMOL/L (ref 99–110)
CO2: 20 MMOL/L (ref 21–32)
CREAT SERPL-MCNC: 1.3 MG/DL (ref 0.8–1.3)
GFR AFRICAN AMERICAN: >60
GFR NON-AFRICAN AMERICAN: 54
GLUCOSE BLD-MCNC: 228 MG/DL (ref 70–99)
INR BLD: 1.49 (ref 0.86–1.14)
POTASSIUM REFLEX MAGNESIUM: 4.8 MMOL/L (ref 3.5–5.1)
PROTHROMBIN TIME: 17.4 SEC (ref 10–13.2)
SODIUM BLD-SCNC: 137 MMOL/L (ref 136–145)

## 2020-01-13 PROCEDURE — 99232 SBSQ HOSP IP/OBS MODERATE 35: CPT | Performed by: INTERNAL MEDICINE

## 2020-01-13 PROCEDURE — 92610 EVALUATE SWALLOWING FUNCTION: CPT

## 2020-01-13 PROCEDURE — G0378 HOSPITAL OBSERVATION PER HR: HCPCS

## 2020-01-13 PROCEDURE — 92526 ORAL FUNCTION THERAPY: CPT

## 2020-01-13 PROCEDURE — 2580000003 HC RX 258: Performed by: INTERNAL MEDICINE

## 2020-01-13 PROCEDURE — C1751 CATH, INF, PER/CENT/MIDLINE: HCPCS

## 2020-01-13 PROCEDURE — 80048 BASIC METABOLIC PNL TOTAL CA: CPT

## 2020-01-13 PROCEDURE — 85610 PROTHROMBIN TIME: CPT

## 2020-01-13 PROCEDURE — 96376 TX/PRO/DX INJ SAME DRUG ADON: CPT

## 2020-01-13 PROCEDURE — 96366 THER/PROPH/DIAG IV INF ADDON: CPT

## 2020-01-13 PROCEDURE — 02HV33Z INSERTION OF INFUSION DEVICE INTO SUPERIOR VENA CAVA, PERCUTANEOUS APPROACH: ICD-10-PCS | Performed by: INTERNAL MEDICINE

## 2020-01-13 PROCEDURE — 2700000000 HC OXYGEN THERAPY PER DAY

## 2020-01-13 PROCEDURE — 94761 N-INVAS EAR/PLS OXIMETRY MLT: CPT

## 2020-01-13 PROCEDURE — 36415 COLL VENOUS BLD VENIPUNCTURE: CPT

## 2020-01-13 PROCEDURE — 6370000000 HC RX 637 (ALT 250 FOR IP): Performed by: INTERNAL MEDICINE

## 2020-01-13 PROCEDURE — 94669 MECHANICAL CHEST WALL OSCILL: CPT

## 2020-01-13 PROCEDURE — 99233 SBSQ HOSP IP/OBS HIGH 50: CPT | Performed by: INTERNAL MEDICINE

## 2020-01-13 PROCEDURE — 36573 INSJ PICC RS&I 5 YR+: CPT

## 2020-01-13 PROCEDURE — 6360000002 HC RX W HCPCS: Performed by: INTERNAL MEDICINE

## 2020-01-13 PROCEDURE — 1200000000 HC SEMI PRIVATE

## 2020-01-13 PROCEDURE — 94640 AIRWAY INHALATION TREATMENT: CPT

## 2020-01-13 RX ORDER — SODIUM CHLORIDE 0.9 % (FLUSH) 0.9 %
10 SYRINGE (ML) INJECTION PRN
Status: DISCONTINUED | OUTPATIENT
Start: 2020-01-13 | End: 2020-01-14 | Stop reason: HOSPADM

## 2020-01-13 RX ORDER — SODIUM CHLORIDE 0.9 % (FLUSH) 0.9 %
10 SYRINGE (ML) INJECTION EVERY 12 HOURS SCHEDULED
Status: DISCONTINUED | OUTPATIENT
Start: 2020-01-13 | End: 2020-01-14 | Stop reason: HOSPADM

## 2020-01-13 RX ORDER — LIDOCAINE HYDROCHLORIDE 10 MG/ML
5 INJECTION, SOLUTION INFILTRATION; PERINEURAL ONCE
Status: DISCONTINUED | OUTPATIENT
Start: 2020-01-13 | End: 2020-01-14 | Stop reason: HOSPADM

## 2020-01-13 RX ADMIN — AMIODARONE HYDROCHLORIDE 200 MG: 200 TABLET ORAL at 20:53

## 2020-01-13 RX ADMIN — METOPROLOL SUCCINATE 50 MG: 50 TABLET, EXTENDED RELEASE ORAL at 10:13

## 2020-01-13 RX ADMIN — CEFEPIME 2 G: 2 INJECTION, POWDER, FOR SOLUTION INTRAVENOUS at 06:03

## 2020-01-13 RX ADMIN — AMLODIPINE BESYLATE 5 MG: 5 TABLET ORAL at 10:13

## 2020-01-13 RX ADMIN — RIVAROXABAN 15 MG: 15 TABLET, FILM COATED ORAL at 10:13

## 2020-01-13 RX ADMIN — IPRATROPIUM BROMIDE AND ALBUTEROL SULFATE 1 AMPULE: .5; 3 SOLUTION RESPIRATORY (INHALATION) at 07:33

## 2020-01-13 RX ADMIN — CEFEPIME 2 G: 2 INJECTION, POWDER, FOR SOLUTION INTRAVENOUS at 17:20

## 2020-01-13 RX ADMIN — METHYLPREDNISOLONE SODIUM SUCCINATE 40 MG: 40 INJECTION, POWDER, FOR SOLUTION INTRAMUSCULAR; INTRAVENOUS at 15:12

## 2020-01-13 RX ADMIN — PANTOPRAZOLE SODIUM 40 MG: 40 TABLET, DELAYED RELEASE ORAL at 06:03

## 2020-01-13 RX ADMIN — IPRATROPIUM BROMIDE AND ALBUTEROL SULFATE 1 AMPULE: .5; 3 SOLUTION RESPIRATORY (INHALATION) at 10:56

## 2020-01-13 RX ADMIN — IPRATROPIUM BROMIDE AND ALBUTEROL SULFATE 1 AMPULE: .5; 3 SOLUTION RESPIRATORY (INHALATION) at 23:55

## 2020-01-13 RX ADMIN — IPRATROPIUM BROMIDE AND ALBUTEROL SULFATE 1 AMPULE: .5; 3 SOLUTION RESPIRATORY (INHALATION) at 19:41

## 2020-01-13 RX ADMIN — IPRATROPIUM BROMIDE AND ALBUTEROL SULFATE 1 AMPULE: .5; 3 SOLUTION RESPIRATORY (INHALATION) at 14:54

## 2020-01-13 RX ADMIN — IPRATROPIUM BROMIDE AND ALBUTEROL SULFATE 1 AMPULE: .5; 3 SOLUTION RESPIRATORY (INHALATION) at 02:53

## 2020-01-13 RX ADMIN — ATORVASTATIN CALCIUM 20 MG: 10 TABLET, FILM COATED ORAL at 10:13

## 2020-01-13 RX ADMIN — Medication 10 ML: at 20:53

## 2020-01-13 RX ADMIN — VANCOMYCIN HYDROCHLORIDE 1.5 G: 5 INJECTION, POWDER, LYOPHILIZED, FOR SOLUTION INTRAVENOUS at 21:20

## 2020-01-13 RX ADMIN — METHYLPREDNISOLONE SODIUM SUCCINATE 40 MG: 40 INJECTION, POWDER, FOR SOLUTION INTRAMUSCULAR; INTRAVENOUS at 20:53

## 2020-01-13 RX ADMIN — AMIODARONE HYDROCHLORIDE 200 MG: 200 TABLET ORAL at 10:13

## 2020-01-13 NOTE — PLAN OF CARE
Problem: Nutrition  Intervention: Swallowing evaluation  Note:   Education completed re: result and recommendations, safe swallow strategies, signs of aspiration. Problem: Nutrition  Intervention: Aspiration precautions  Note:   Education completed re: result and recommendations, safe swallow strategies, signs of aspiration.

## 2020-01-13 NOTE — CARE COORDINATION
Case Management Assessment  Initial Evaluation    Date/Time of Evaluation: 1/13/2020 10:32 AM  Assessment Completed by: Antonette Gipson    Patient Name: Frederick Baker  YOB: 1945  Diagnosis: Acute on chronic respiratory failure with hypoxia ([facility] [J96.21]  Acute on chronic respiratory failure with hypoxia ([facility] [J96.21]  Date / Time: 1/11/2020  5:56 PM  Admission status/Date:in-pt  Chart Reviewed: Yes      Patient Interviewed: Yes   Family Interviewed:  No      Hospitalization in the last 30 days:  Yes    Contacts  :     Relationship to Patient:   Phone Number:    Alternate Contact:     Relationship to Patient:     Phone Number:    Met with:    Current PCP  Thomas Holman MD      Financial  Commercial  Precert required for SNF : Y, N        3 night stay required - Y, N    ADLS  Support Systems:    Transportation: self    Meal Preparation: self    Housing  Home Environment: home with spouse  Steps: two  Plans to Return to Present Housing: Yes  Other Identified Issues: no    Home Care Information  Currently active with [address] : No     Passport/Waiver : No  :                      Phone Number:    Passport/Waiver Services: no          Durable Medical Equipment   DME Provider: [facility]  Equipment: Walker___Cane___RTS___ BSC___Shower Chair___  02_x_ at __3__Liter(s)---Uses___prn_____HHN__x_ CPAP___ BiPap___ Hospital Bed___W/C____Other________      Has Home O2 in place on admit:  Yes  Informed of need to bring portable home O2 tank on day of discharge for nursing to connect prior to leaving:   Yes  Verbalized agreement/Understanding:   Yes    Community Service Affiliation  Dialysis:  No    · Name:  · Location  · Dialysis Schedule:  · Phone:   · Fax: Outpatient PT/OT: No    Cancer Center: No     CHF Clinic: No     Pulmonary Rehab: No  Pain Clinic: No  Community Mental Health: No    Wound Clinic: No     Other: no    DISCHARGE PLAN: Reviewed Chart.  Met with the pt. Role of dcp explained. Pt from home with spouse. Pt active with [facility] for home O2/HHN. Pt declines hhc at this time. Follow. Explained Case Management role/services.

## 2020-01-13 NOTE — PROGRESS NOTES
Speech Language Pathology  Attempt Note    Order received for swallow eval.  ST attempted to see pt, however pt is off the floor for PICC placement. ST to follow up at later time as schedule and pt status allow. Thank you. Nisha Sheth. Kenia Arthur M.A.  [address] [facility]  Speech-Language Pathologist

## 2020-01-13 NOTE — CARE COORDINATION
ACM attempted to reach patient. Unable to leave message. CTN referral. D?C 12/28/19. 2nd attempt. Letter previously mailed.

## 2020-01-13 NOTE — PROGRESS NOTES
decreased, based on the patient's RSI, but not less than home treatment regimen frequency. 5. Bronchodilator(s) will be discontinued if patient has a RSI less than 9 and has received no scheduled or as needed treatment for 72  Hrs. Patients Ordered on a Mucolytic Agent:    1. Must always be administered with a bronchodilator. 2. Discontinue if patient experiences worsened bronchospasm, or secretions have lessened to the point that the patient is able to clear them with a cough. Anti-inflammatory and Combination Medications:    1. If the patient lacks prior history of lung disease, is not using inhaled anti-inflammatory medication at home, and lacks wheezing by examination or by history for at least 24 hours, contact physician for possible discontinuation.

## 2020-01-13 NOTE — PROGRESS NOTES
Vancomycin Day: 3    Patient's labs, cultures, vitals, and vancomycin regimen reviewed. No changes today.   Trough due on 14th at 2030  Zhane GARCIA 1/13/202011:07 AM  .

## 2020-01-13 NOTE — PROGRESS NOTES
Pt is lying in bed with their eyes closed. Respirations are easy and even. Call light within reach bed in lowest position with the wheels locked. Will continue to monitor.  Angle Sham

## 2020-01-13 NOTE — PROGRESS NOTES
was referred for a bedside swallow evaluation to assess the efficiency of his swallow function, identify signs and symptoms of aspiration and make recommendations regarding safe dietary consistencies, effective compensatory strategies, and safe eating environment. Impression  Per MD note, \"The patient is a 69 y.o. male with PMH below, presented to [facility] [place] [address] with SOB/BETANCOURT, non-productive cough and congestion. Pt reports the above sx began worsening this am and progressively worsened through out the day. He normally wears 3L O2 at home PRN. Per ED vitals flowsheet he arrived on RA at 92%. He was placed on on 3L and sats have been 95-97%. He is requiring 3L continuously. No CP at this time. Pt was recently admitted for resp failure on 12/26 on 12 day steroid taper which he recently finished. He completed a course of azithro while in hospital.\"     Pt seen by ST 11/5/2019 for MBS during recent admit. 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 No penetration / aspiration noted with any PO trials during study. No significant pharyngeal residue post-swallow with any PO trials\" Recommendations at that time were for regular solids and thin liquids. Taniya/EZEKIEL bach  for entry. Pt on 3L O2 per nasal cannula which is home oxygen baseline. Pt reports no difficulty swallowing, however he reports he has not been able to refill his reflux medication. Pt reports occasionally feeing food stop in mid-sternal region. Oral motor exam appears WNL. Slight left side asymmetry of lips at rest.  Retraction in symmetrical. Volitional cough is strong. Trials of ice chips, thin liquids, by tsp, cup, and straw, puree, and solids result in timely oral prep and transit. No oral residue remains. No overt clinical signs of aspiration are observed with any consistency assessed.   Pt completes 3 ounce water Pt alert and oriented x4. Pleasant and cooperative. Pt sitting upright in bed. No family present. Behavior/Cognition: Alert; Cooperative;Pleasant mood  Respiratory Status: O2 via nasual cannula  O2 Device: Nasal cannula  Liters of Oxygen: 3 L  Communication Observation: Functional  Follows Directions: Complex  Dentition: Dentures top;Dentures bottom  Patient Positioning: Upright in bed  Baseline Vocal Quality: Hoarse(Mildly hoarse)  Volitional Cough: Strong  Prior Dysphagia History: Pt seen for MBS 11/5/2019 with oropharyngeal swallow deemed WFL. Consistencies Administered: Reg solid;Pudding - teaspoon; Thin - teaspoon; Thin - cup; Thin - straw; Ice Chips           Vision/Hearing  Vision  Vision: Within Functional Limits  Hearing  Hearing: Within functional limits    Oral Motor Deficits  Oral/Motor  Oral Motor: Within functional limits    Oral Phase Dysfunction  Oral Phase  Oral Phase: WFL     Indicators of Pharyngeal Phase Dysfunction   Pharyngeal Phase   Pharyngeal: No overt clinical signs of aspiration observed. Prognosis  Prognosis  Prognosis for safe diet advancement: good  Individuals consulted  Consulted and agree with results and recommendations: Patient;RN    Education  Patient Education: Education completed re: results and recommendations and safe swallow strategies. Patient Education Response: Verbalizes understanding;Demonstrated understanding  Safety Devices in place: Yes  Type of devices: Call light within reach;Nurse notified; Left in bed       Therapy Time  SLP Individual Minutes  Time In: 8841  Time Out: Geraldine 16  Minutes: [address]. Pina Ireland M.A.  [address] [place] [address], [facility]  1/13/2020 3:53 PM

## 2020-01-13 NOTE — PROGRESS NOTES
Pt's IV has infiltrated. IV removed. Dressing applied. Attempted to start new IV but unsuccessful. Clinical RN notified and will attempt to start new IV.

## 2020-01-13 NOTE — FLOWSHEET NOTE
01/12/20 2120   Vital Signs   Temp 97.3 °F (36.3 °C)   Temp Source Oral   Pulse 85   Heart Rate Source Monitor   Resp 18   BP (!) 148/88   BP Location Right upper arm   BP Upper/Lower Upper   Level of Consciousness 0   MEWS Score 1   Oxygen Therapy   SpO2 97 %   O2 Device Nasal cannula   O2 Flow Rate (L/min) 3 L/min   Pt assessment complete. Pt lying in bed quietly. Lung sounds diminished with expiratory wheezes. Pt remains on 02 3liters via nasal canula. Nightly medications given. Box lunch provided per patient request.  No other needs at this time. Call light within reach.

## 2020-01-13 NOTE — CARE COORDINATION
are they filled?:  Yes   Are you able to afford your medications?:  Yes  How often do you have difficulty taking your medications?:  I always take them as prescribed. Housing Review  Who do you live with?:  Partner/Spouse/SO  Are you an active caregiver in your home?:  No  Social Support  Durable Medical Equipment  Patient Home Equipment:  Nebulizer, Oxygen  Functional Review  Ability to seek help/take action for Emergent/Urgent situations i.e. fire, crime, inclement weather or health crisis. :  Independent  Ability handle personal hygiene needs (bathing/dressing/grooming): Independent  Ability to manage medications: Independent  Ability to prepare food:  Independent  Ability to maintain home (clean home, laundry): Independent  Ability to drive and/or has transportation:  Independent  Ability to do shopping:  Independent  Ability to manage finances:   Independent  Is patient able to live independently?:  Yes  Hearing and Vision  Visual Impairment:  Visual impairment (Glasses/contacts)  Hearing Impairment:  None  Care Transitions Interventions                                 Follow Up  Future Appointments   Date Time Provider Zhane Gramajo   1/29/2020  1:00 PM MD Zaira Amaya [facility]   4/13/2020  1:00 PM Boris Gaytan MD [place] [facility] None   4/22/2020  2:30 PM MHC CT MAIN [facility] [place] [facility]   4/22/2020  3:30 PM Yael Mcfarland MD [facility]       Health Maintenance  Health Maintenance Due   Topic Date Due    Lipid screen  11/26/2019       Ari Ayala RN

## 2020-01-14 VITALS
SYSTOLIC BLOOD PRESSURE: 143 MMHG | RESPIRATION RATE: 17 BRPM | WEIGHT: 227.5 LBS | HEART RATE: 81 BPM | HEIGHT: 71 IN | TEMPERATURE: 97.3 F | OXYGEN SATURATION: 95 % | BODY MASS INDEX: 31.85 KG/M2 | DIASTOLIC BLOOD PRESSURE: 79 MMHG

## 2020-01-14 LAB
AMIODARONE LEVEL: <0.3 UG/ML (ref 0.5–2)
DES-AMIOD: <0.3 UG/ML
VANCOMYCIN TROUGH: 8 UG/ML (ref 10–20)

## 2020-01-14 PROCEDURE — 96366 THER/PROPH/DIAG IV INF ADDON: CPT

## 2020-01-14 PROCEDURE — 2580000003 HC RX 258: Performed by: INTERNAL MEDICINE

## 2020-01-14 PROCEDURE — 6360000002 HC RX W HCPCS: Performed by: INTERNAL MEDICINE

## 2020-01-14 PROCEDURE — 6370000000 HC RX 637 (ALT 250 FOR IP): Performed by: INTERNAL MEDICINE

## 2020-01-14 PROCEDURE — 97161 PT EVAL LOW COMPLEX 20 MIN: CPT

## 2020-01-14 PROCEDURE — 97165 OT EVAL LOW COMPLEX 30 MIN: CPT

## 2020-01-14 PROCEDURE — 97116 GAIT TRAINING THERAPY: CPT

## 2020-01-14 PROCEDURE — 94669 MECHANICAL CHEST WALL OSCILL: CPT

## 2020-01-14 PROCEDURE — 94761 N-INVAS EAR/PLS OXIMETRY MLT: CPT

## 2020-01-14 PROCEDURE — G0378 HOSPITAL OBSERVATION PER HR: HCPCS

## 2020-01-14 PROCEDURE — 97530 THERAPEUTIC ACTIVITIES: CPT

## 2020-01-14 PROCEDURE — 2700000000 HC OXYGEN THERAPY PER DAY

## 2020-01-14 PROCEDURE — 99239 HOSP IP/OBS DSCHRG MGMT >30: CPT | Performed by: INTERNAL MEDICINE

## 2020-01-14 PROCEDURE — 2580000003 HC RX 258

## 2020-01-14 PROCEDURE — 80202 ASSAY OF VANCOMYCIN: CPT

## 2020-01-14 PROCEDURE — 94640 AIRWAY INHALATION TREATMENT: CPT

## 2020-01-14 PROCEDURE — 99233 SBSQ HOSP IP/OBS HIGH 50: CPT | Performed by: INTERNAL MEDICINE

## 2020-01-14 RX ORDER — SODIUM CHLORIDE 9 MG/ML
INJECTION, SOLUTION INTRAVENOUS
Status: COMPLETED
Start: 2020-01-14 | End: 2020-01-14

## 2020-01-14 RX ORDER — SODIUM CHLORIDE 9 MG/ML
INJECTION, SOLUTION INTRAVENOUS
Status: DISPENSED
Start: 2020-01-14 | End: 2020-01-14

## 2020-01-14 RX ORDER — PREDNISONE 10 MG/1
TABLET ORAL
Qty: 30 TABLET | Refills: 0 | Status: SHIPPED | OUTPATIENT
Start: 2020-01-14 | End: 2020-02-11 | Stop reason: ALTCHOICE

## 2020-01-14 RX ADMIN — IPRATROPIUM BROMIDE AND ALBUTEROL SULFATE 1 AMPULE: .5; 3 SOLUTION RESPIRATORY (INHALATION) at 11:19

## 2020-01-14 RX ADMIN — IPRATROPIUM BROMIDE AND ALBUTEROL SULFATE 1 AMPULE: .5; 3 SOLUTION RESPIRATORY (INHALATION) at 15:33

## 2020-01-14 RX ADMIN — METOPROLOL SUCCINATE 50 MG: 50 TABLET, EXTENDED RELEASE ORAL at 09:22

## 2020-01-14 RX ADMIN — VANCOMYCIN HYDROCHLORIDE 1.5 G: 5 INJECTION, POWDER, LYOPHILIZED, FOR SOLUTION INTRAVENOUS at 18:24

## 2020-01-14 RX ADMIN — RIVAROXABAN 15 MG: 15 TABLET, FILM COATED ORAL at 09:22

## 2020-01-14 RX ADMIN — IPRATROPIUM BROMIDE AND ALBUTEROL SULFATE 1 AMPULE: .5; 3 SOLUTION RESPIRATORY (INHALATION) at 07:30

## 2020-01-14 RX ADMIN — IPRATROPIUM BROMIDE AND ALBUTEROL SULFATE 1 AMPULE: .5; 3 SOLUTION RESPIRATORY (INHALATION) at 19:57

## 2020-01-14 RX ADMIN — AMLODIPINE BESYLATE 5 MG: 5 TABLET ORAL at 09:22

## 2020-01-14 RX ADMIN — SODIUM CHLORIDE 250 ML: 9 INJECTION, SOLUTION INTRAVENOUS at 18:24

## 2020-01-14 RX ADMIN — ATORVASTATIN CALCIUM 20 MG: 10 TABLET, FILM COATED ORAL at 09:22

## 2020-01-14 RX ADMIN — CEFEPIME 2 G: 2 INJECTION, POWDER, FOR SOLUTION INTRAVENOUS at 16:59

## 2020-01-14 RX ADMIN — CEFEPIME 2 G: 2 INJECTION, POWDER, FOR SOLUTION INTRAVENOUS at 05:00

## 2020-01-14 RX ADMIN — PANTOPRAZOLE SODIUM 40 MG: 40 TABLET, DELAYED RELEASE ORAL at 05:00

## 2020-01-14 RX ADMIN — ALBUTEROL SULFATE 2.5 MG: 2.5 SOLUTION RESPIRATORY (INHALATION) at 03:51

## 2020-01-14 RX ADMIN — PREDNISONE 40 MG: 20 TABLET ORAL at 09:22

## 2020-01-14 RX ADMIN — AMIODARONE HYDROCHLORIDE 200 MG: 200 TABLET ORAL at 09:22

## 2020-01-14 RX ADMIN — Medication 10 ML: at 09:23

## 2020-01-14 NOTE — PROGRESS NOTES
pt able to perform all bed mobility, transfers, and gait without ROM limitation. Upper Extremity Strength:    Strength Assessment (measured on a 0-5 scale):    B UEs 5/5    Upper Extremity Sensation    WFL    Upper Extremity Proprioception:   WFL    Coordination and Tone  WFL    Balance  Functional Sitting Balance:  WFL  Functional Standing Balance:WFL    Bed mobility:    Supine to sit: Independent  Sit to supine:   Not Tested  Scooting to head of bed:   Not Tested  Scooting in sitting:  Independent  Rolling:   Not Tested  Bridging:   Not Tested    Transfers:    Sit to stand:  Independent  Stand to sit: Independent  Bed to [facility]:  Not Tested  Bed to chair:   Independent  Standard toilet: Not Tested    Activity Tolerance   Pt completed therapy session with SOB noted with activity  SpO2: 96% at rest. Dropped to 93% with mobility. HR: 70s  BP:     Dressing:      UE:   Not Tested  LE:    Independent with donning socks     Bathing:    UE:  Not Tested  LE:  Not Tested    Eating:   Independent    Toileting:  Not Tested    Positioning Needs:   Up in chair, call light and needs in reach. Exercise / Activities Initiated:   N/A    Patient/Family Education:   Role of OT  Recommendations for DC    Assessment of Deficits: Pt seen for Occupational therapy evaluation in acute care setting. Pt demonstrated decreased Activity Tolerance and Strength. Pt functioning below baseline and will likely benefit from skilled occupational therapy services to maximize safety and independence. Goal(s) : To be met in 3 Visits:  1). Bed to toilet/BSC: Independent    To be met in 5 Visits:  1). Supine to Sit: Independent  2). Upper Body Bathing:  Independent  3). Lower Body Bathing:  Independent  4). Upper Body Dressing: Independent  5). Lower Body Dressing: Independent  6). Pt to alyx UE exs x 15 reps    Rehabilitation Potential:  Good for goals listed above.     Strengths for achieving goals include: Pt motivated, PLOF and Pt cooperative  Barriers to achieving goals include:  Complexity of condition     Plan: To be seen for 1-2 visits while in acute care setting for therapeutic exercises, bed mobility, transfers, dressing, bathing, family/patient education with adaptive equipment, breathing technique instruction.        Radha Dubon, OTR/L #[id number]        If patient discharges from this facility prior to next visit, this note will serve as the Discharge Summary

## 2020-01-14 NOTE — DISCHARGE SUMMARY
Discharge Medications     Medication List      START taking these medications    cefepime  infusion  Commonly known as:  MAXIPIME  Infuse 2,000 mg intravenously every 12 hours for 4 days Compound per protocol     vancomycin  infusion  Commonly known as:  VANCOCIN  Infuse 1,000 mg intravenously every 24 hours for 4 days Compound per protocol.         CHANGE how you take these medications    predniSONE 10 MG tablet  Commonly known as:  DELTASONE  4 tabs for 3 days 3 tabs for 3 days 2 tabs for 3 days 1 tabs for 3 days  What changed:  additional instructions        CONTINUE taking these medications    albuterol sulfate  (90 Base) MCG/ACT inhaler  Commonly known as:  PROVENTIL HFA  Inhale 2 puffs into the lungs every 6 hours as needed for Wheezing (with spacer)     amiodarone 200 MG tablet  Commonly known as:  CORDARONE  Take 1 tablet by mouth 2 times daily     amLODIPine 5 MG tablet  Commonly known as:  NORVASC  Take 1 tablet by mouth daily     atorvastatin 20 MG tablet  Commonly known as:  LIPITOR  Take 1 tablet by mouth daily     furosemide 40 MG tablet  Commonly known as:  LASIX  Take 1 tablet by mouth daily     ipratropium-albuterol 0.5-2.5 (3) MG/3ML Soln nebulizer solution  Commonly known as:  DUONEB  USE ONE UNIT DOSE (3ML) IN NEBULIZER AND INHALE INTO THE LUNGS EVERY 4 HOURS AS NEEDED FOR SHORTNESS OF BREATH     losartan 100 MG tablet  Commonly known as:  COZAAR  TAKE ONE TABLET BY MOUTH DAILY     metoprolol succinate 50 MG extended release tablet  Commonly known as:  TOPROL XL  Take 1 tablet by mouth daily     pantoprazole 40 MG tablet  Commonly known as:  PROTONIX  Take 1 tablet by mouth daily     polyethylene glycol packet  Commonly known as:  GLYCOLAX  Take 17 g by mouth daily as needed for Constipation     Potassium Citrate ER 15 MEQ (1620 MG) Tbcr  Take 15 mEq by mouth daily     rivaroxaban 20 MG Tabs tablet  Commonly known as:  XARELTO  TAKE ONE TABLET BY MOUTH DAILY WITH BREAKFAST Umeclidinium Bromide 62.5 MCG/INH Aepb  Commonly known as:  INCRUSE ELLIPTA  Inhale 1 Inhaler into the lungs daily           Where to Get Your Medications      These medications were sent to [address] [place] [address] [phone] - [phone]   [address], [address] [place] [address]    Phone:  [phone]   · predniSONE 10 MG tablet     You can get these medications from any pharmacy    Bring a paper prescription for each of these medications  · cefepime  infusion  · vancomycin  infusion       Discharged in stable condition to home with [facility] Sanches for IV Abx    Follow Up:   Follow up with PCP in 1 week       Spent more than 30 minutes on discharge      [facility] 1/15/2020 7:19 PM

## 2020-01-14 NOTE — CARE COORDINATION
DISCHARGE ORDER  Date/Time 2020 2:41 PM  Completed by: Sarah Platt, Case Management    Patient Name: Jose Lomeli    : 1945  Admitting Diagnosis: Acute on chronic respiratory failure with hypoxia ([facility] [J96.21]  Acute on chronic respiratory failure with hypoxia ([facility] [J96.21]      Admit order Date and Status:2020 inpt  (verify MD's last order for status of admission)      Noted discharge order. Confirmed discharge plan with patient / family (pt): Yes    If pt confirmed DC plan does family need to be contacted by CM No  Discharge Plan: Chart reviewed and role of dcp explained. Pt is returning home with spouse and [facility]. Referral called to [facility] with [facility]. Per Tagoodies they are out of network and she will run benefits. TC from [facility] and cost of ivabx will be $13.46 per day. Met with the pt at this time and he is now stating he does not want anyone in his house. TC to Taniya at [facility] to cancel ivabx. Dr Josie Pineda and writer met with pt at bedside and he is now agreeable to ivabx at home. Pt is aware the cost is $53.84 for the three days and he is agreeable. TC to [facility] and made her aware as well as Taniya [facility] [address].     1600 TC to Taniya @ [facility] and prescriptions faxed to her at this time. Taniya states she has all needed documentation for a [facility] tomorrow morning. 1605 TC to Timmy Healy @ [facility] and she is aware pt will discharge home today for a [facility] tomorrow morning. All needed documentation faxed to her @ [phone] at this time. No further dc needs voiced or identified. Reviewed chart. Role of discharge planner explained and patient verbalized understanding. Discharge order is noted.     Has Home O2 in place on admit:  Yes  Informed of need to bring portable home O2 tank on day of discharge for nursing to connect prior to leaving:   Yes  Verbalized agreement/Understanding:   Yes  Pt is being d/c'd to home today. Pt's O2 sats are 96% on 3. Discharge timeout done with Taniya. All discharge needs and concerns addressed.

## 2020-01-14 NOTE — FLOWSHEET NOTE
01/14/20 0920   Vital Signs   Temp 96.9 °F (36.1 °C)   Temp Source Oral   Pulse 78   Heart Rate Source Monitor   Resp 22   /74   BP Location Left upper arm   Level of Consciousness 0   MEWS Score 2   Patient Currently in Pain Denies   Oxygen Therapy   SpO2 97 %   O2 Device Nasal cannula   O2 Flow Rate (L/min) 3 L/min   Assessment completed and documented VSS, SR on monitor and remains on 3L NC.

## 2020-01-14 NOTE — PLAN OF CARE
Problem: Infection - Central Venous Catheter-Associated Bloodstream Infection:  Goal: Will show no infection signs and symptoms  Description  Will show no infection signs and symptoms  1/14/2020 0007 by Harvey Velazquez RN  Outcome: Ongoing

## 2020-01-14 NOTE — CARE COORDINATION
[facility]  Received referral regarding home IV infusion from [facility]. [facility] and [facility] are not in network with pt's insurance. Telephone call to [facility] and LM with referral information for intake. Cefepime 2g IV Q12 x4 days and Vancomycin 1g IV Q24 x4 days. Informed Kitty RUSSELL.    2:04 Spoke with Taniya with [facility]. Pt will be covered at 85% until his total OOP is met. Pt has a OOP of $3000. Pt has met $23 of total OOP. Pt will have a copay of $13.46 per day for meds and supplies. [facility] is still looking for [facility]. agency. Informed Kitty VILLASENOR    2:28 Received call from [facility] stating pt is now refusing home IV infusion. Per Cherrie Calvo, she has notified [facility]. 2:35 Received call from [facility] that pt is now agreeable to home IV infusion. Per Cherrie Calvo, [facility] and [facility] will be servicing pt for [facility]. needs.       Electronically signed by Michael Fraga RN on 1/14/2020 at 2:30 PM

## 2020-01-14 NOTE — PROGRESS NOTES
Pulmonary Progress Note  CC: pneumonia, sob    Subjective:   Still has ayala. Less pleuritic pain    PICC 1/13    EXAM: /74   Pulse 78   Temp 96.9 °F (36.1 °C) (Oral)   Resp 22   Ht 5' 11\" (1.803 m)   Wt 227 lb 8 oz (103.2 kg)   SpO2 97%   BMI 31.73 kg/m²  on 3lpm  Constitutional:  No acute distress. Eyes: PERRL. Conjunctivae anicteric. ENT: Normal nose. Normal tongue. Neck:  Trachea is midline. No thyroid tenderness. Respiratory: No accessory muscle usage. + wheezes. No rales. No Rhonchi. Cardiovascular: Normal S1S2. No digit clubbing. No digit cyanosis. No LE edema. Psychiatric: No anxiety or Agitation. Alert and Oriented to person, place and time.     Scheduled Meds:   lidocaine 1 % injection  5 mL Intradermal Once    sodium chloride flush  10 mL Intravenous 2 times per day    vancomycin  1,500 mg Intravenous Q24H    cefepime  2 g Intravenous Q12H    ipratropium-albuterol  1 ampule Inhalation Q4H While awake    amiodarone  200 mg Oral BID    amLODIPine  5 mg Oral Daily    atorvastatin  20 mg Oral Daily    metoprolol succinate  50 mg Oral Daily    rivaroxaban  15 mg Oral Daily    pantoprazole  40 mg Oral Daily    predniSONE  40 mg Oral Daily with breakfast     Continuous Infusions:   sodium chloride       PRN Meds:  sodium chloride flush, polyethylene glycol, magnesium hydroxide, ondansetron, albuterol, acetaminophen    Labs:  CBC:   Recent Labs     01/11/20  1810 01/12/20  0409   WBC 12.0* 8.9   HGB 13.7 12.8*   HCT 42.3 39.7*   MCV 93.4 95.4    167     BMP:   Recent Labs     01/11/20  1810 01/12/20  0409 01/13/20  0902    132* 137   K 4.6 4.7 4.8    100 105   CO2 25 20* 20*   BUN 19 28* 28*   CREATININE 1.8* 1.7* 1.3       Cultures:    Films:    ASSESSMENT:  · Sepsis  · Acute hypoxic respiratory failure  · Pneumonia  · COPD AE  · TRACY with metabolic acidosis  · paroxysmal afib      PLAN:   Supplemental oxygen to maintain SaO2 >92%; wean as tolerated   Intensive inhaled bronchodilator therapy. Prednisone taper   vanc and Cefepime for 7-8 days  PICC placed  Sputum GS&C.   Felipe Womack planning on IV abx

## 2020-01-14 NOTE — PLAN OF CARE
Problem: Discharge Planning:  Goal: Discharged to appropriate level of care  Description  Discharged to appropriate level of care  1/14/2020 0006 by Carol Ann Mccoy RN  Outcome: Ongoing    Goal: Participates in care planning  Description  Participates in care planning  1/14/2020 0006 by Carol Ann Mccoy RN  Outcome: Ongoing       Problem: Airway Clearance - Ineffective:  Goal: Clear lung sounds  Description  Clear lung sounds  1/14/2020 0006 by Carol Ann Mccoy RN  Outcome: Ongoing    Goal: Ability to maintain a clear airway will improve  Description  Ability to maintain a clear airway will improve  1/14/2020 0006 by Carol Ann Mccoy RN  Outcome: Ongoing       Problem: Fluid Volume - Deficit:  Goal: Achieves intake and output within specified parameters  Description  Achieves intake and output within specified parameters  1/14/2020 0006 by Carol Ann Mccoy RN  Outcome: Ongoing       Problem: Gas Exchange - Impaired:  Goal: Levels of oxygenation will improve  Description  Levels of oxygenation will improve  1/14/2020 0006 by Carol Ann Mccoy RN  Outcome: Ongoing    Problem: Hyperthermia:  Goal: Ability to maintain a body temperature in the normal range will improve  Description  Ability to maintain a body temperature in the normal range will improve  1/14/2020 0006 by Carol Ann Mccoy RN  Outcome: Ongoing       Problem: OXYGENATION/RESPIRATORY FUNCTION  Goal: Patient will maintain patent airway  1/14/2020 0006 by Carol Ann Mccoy RN  Outcome: Ongoing    Goal: Patient will achieve/maintain normal respiratory rate/effort  Description  Respiratory rate and effort will be within normal limits for the patient  1/14/2020 0006 by Carol Ann Mccoy RN  Outcome: Ongoing       Problem: HEMODYNAMIC STATUS  Goal: Patient has stable vital signs and fluid balance  1/14/2020 0006 by Carol Ann Mccoy RN  Outcome: Ongoing       Problem: FLUID AND ELECTROLYTE IMBALANCE  Goal: Fluid and electrolyte balance are

## 2020-01-14 NOTE — PROGRESS NOTES
step commands    Subjective  Patient lying supine in bed with no family present  Pt agreeable to this PT eval & tx. Upper Extremity ROM/Strength  Please see OT evaluation. Lower Extremity ROM / Strength    AROM WFL: Yes  ROM limitations: N/A    Strength Assessment (measured on a 0-5 scale):  R LE   Quad   5/5   Ant Tib  5/5   Hamstring 5/5   Iliopsoas 5/5  L LE  Quad   5/5   Ant Tib  5/5   Hamstring 4+   Iliopsoas 5/5    Lower Extremity Sensation    WFL    Lower Extremity Proprioception:   NT    Coordination and Tone  NT    Balance  Static Sitting:  Normal  Dynamic Sitting:  Normal  Static Standing: Good    Tolerance: x 8 minutes  Dynamic Standing: Good     Bed Mobility   Supine to Sit:   Modified Independent  Sit to Supine:  Not Tested  Rolling:   Not Tested  Scooting at EOB: Independent  Scooting to [facility]:  Not Tested    Transfer Training     Sit to stand:   Independent  Stand to sit: Independent  Bed to Chair:  Independent with use of No AD    Gait gait completed as indicated below  Distance:  80 ft  Deviations (firm surface/linoleum): decreased david and Increased JW   Assistive Device Used:  No AD  Level of Assist: SBA  Comment: Patient manages O2 tubing independently. Requires cues for pursed lip breathing as he prefers to talk while he is walking, and becomes SOB.  Required 2x standing rest breaks to recover from SOB    Stair Training deferred, pt unsafe/not appropriate to complete stairs at this time      Activity Tolerance   Pt completed therapy session with SOB noted with ambulation  SpO2: 93-95% on 3L O2  HR: 70s    Positioning Needs   Pt instructed and educated on use of call light to call for assist if desiring to get up or change position, call light handed to pt and needs within reach., Pt sitting up in chair and call light provided and all needs within reach    Exercises Initiated    N/A    Patient/Family Education   Pt educated on role of inpatient PT, POC, importance of continued activity, DC recommendations, pursed lip breathing, energy conservation, pacing activity and calling for assist with mobility. Assessment  Pt seen for Physical Therapy evaluation in acute care setting. Pt demonstrated decreased Activity tolerance and decreased independence with Ambulation. Pt demos SOB with ambulation, and requires cues throughout to perform pursed lip breathing, which he has difficulty with due to wanting to talk while walking. Patient will continue to benefit from skilled physical therapy to address functional mobility deficits and improve independence for recommended discharge to home with PRN assist, and eventual pulmonary rehabilitation. Goals : To be met in 3 visits:  1). Independent with LE Ex x 10 reps    To be met in 6 visits:  1). Supine to/from sit: N/A (pt demos Mod I at [facility])  2). Sit to/from stand: N/A (pt demos I at [facility])  3). Bed to chair: N/A (pt demos I at [facility])  4). Gait: Ambulate 150 ft.  with  Independent  and use of No AD, demonstrating pursed lip breathing throughout  5). Tolerate B LE exercises 3 sets of 10-15 reps  6). Ascend/descend 7 steps with SBA with use of one hand rail and No AD. Rehabilitation Potential: Good  Strengths for achieving goals include:   Pt motivated and Pt cooperative  Barriers to achieving goals include:    Complexity of condition    Plan    To be seen 3-5 x / week  while in acute care setting for therapeutic exercises, bed mobility, transfers, progressive gait training, balance training, and family/patient education. Adam Pruitt, PT, DPT      If patient discharges from this facility prior to next visit, this note will serve as the Discharge Summary.

## 2020-01-14 NOTE — DISCHARGE INSTR - COC
12/14/2016    Influenza Whole 09/15/2011    Influenza, High Dose (Fluzone 65 yrs and older) 10/09/2017, 11/26/2018    Influenza, Quadv, IM, PF (6 mo and older Fluzone, Flulaval, Fluarix, and 3 yrs and older Afluria) 11/05/2019    Pneumococcal Conjugate 13-valent ([id number]) 08/17/2016    Pneumococcal Polysaccharide ([id number]) 09/13/2007, 02/12/2012, 08/28/2017    Td, unspecified formulation 04/10/2012       Active Problems:  Patient Active Problem List   Diagnosis Code    HCAP (healthcare-associated pneumonia) J18.9    [facility] J44.1    Acute on chronic respiratory failure with hypoxia ([facility]) J96.21    Hoarseness of voice R49.0    Atrial fibrillation, controlled ([facility] I48.91    COPD (chronic obstructive pulmonary disease) ([facility]) J44.9    GERD (gastroesophageal reflux disease) K21.9    Pulmonary nodule, right R91.1    Enlarged prostate with urinary obstruction N40.1, N13.8    Thoracic aortic aneurysm without rupture ([facility]) I71.2    Typical atrial flutter ([facility]) I48.3    Dilated cardiomyopathy ([facility]) I42.0    Acute on chronic systolic CHF (congestive heart failure) ([facility]) I50.23    PAD (peripheral artery disease) ([facility]) I73.9    Pain of right thigh M79.651    Dyspnea R06.00    Benign essential hypertension I10    Acute conjunctivitis of both eyes H10.33    Chronic anticoagulation Z79.01    Chest pain R07.9    Epigastric pain R10.13    PAF (paroxysmal atrial fibrillation) ([facility]) I48.0       Isolation/Infection:   Isolation          No Isolation        Patient Infection Status     None to display          Nurse Assessment:  Last Vital Signs: BP (!) 143/79   Pulse 81   Temp 97.3 °F (36.3 °C)   Resp 17   Ht 5' 11\" (1.803 m)   Wt 227 lb 8 oz (103.2 kg)   SpO2 94%   BMI 31.73 kg/m²     Last documented pain score (0-10 scale): Pain Level: 5  Last Weight:   Wt Readings from Last 1 Encounters:   01/14/20 227 lb 8 oz (103.2 kg)     Mental Status:  {IP PT MENTAL STATUS:20030}    IV Access:  [address] IV ACCESS:954848296}    Nursing Mobility/ADLs:  Walking   {P DME YSYJ:169570061}  Transfer  {CHP DME UWND:968136166}  Bathing  {CHP DME WRHZ:896812142}  Dressing  {CHP DME HOME:590215202}  Toileting  {CHP DME GJLL:762953451}  Feeding  {[facility] DME VHXX:972968300}  Med Admin  {P DME GJMZ:747519543}  Med Delivery   {[facility] MED Delivery:679515269}    Wound Care Documentation and Therapy:        Elimination:  Continence:   · Bowel: {YES / WI:43336}  · Bladder: {YES / JI:67210}  Urinary Catheter: {Urinary Catheter:410473969}   Colostomy/Ileostomy/Ileal Conduit: {YES / AP:55866}       Date of Last BM: ***    Intake/Output Summary (Last 24 hours) at 2020 1605  Last data filed at 2020 0923  Gross per 24 hour   Intake 830 ml   Output 850 ml   Net -20 ml     I/O last 3 completed shifts:   In: 1 [P.O.:480; I.V.:300; IV Piggyback:50]  Out: 850 [Urine:850]    Safety Concerns:     [address] Safety Concerns:373159470}    Impairments/Disabilities:      [address] Impairments/Disabilities:946629517}    Nutrition Therapy:  Current Nutrition Therapy:   [address] Diet List:058948972}    Routes of Feeding: {[facility] DME Other Feedings:639548195}  Liquids: {Slp liquid thickness:27816}  Daily Fluid Restriction: {P DME Yes amt example:610841389}  Last Modified Barium Swallow with Video (Video Swallowing Test): {Done Not Done NUFM:988819821}    Treatments at the Time of Hospital Discharge:   Respiratory Treatments: ***  Oxygen Therapy:  {Therapy; copd oxygen:35278}  Ventilator:    {[facility] Vent FSPV:634984498}    Rehab Therapies: {THERAPEUTIC INTERVENTION:0835894378}  Weight Bearing Status/Restrictions: [address]  Weight Bearin}  Other Medical Equipment (for information only, NOT a DME order):  {EQUIPMENT:079885571}  Other Treatments: ***    Patient's personal belongings (please select all that are sent with patient):  {[facility] DME Belongings:131433769}    RN SIGNATURE:  {Esignature:775227015}    CASE MANAGEMENT/SOCIAL WORK SECTION    Inpatient Status Date: 1/11/2020    Readmission Risk Assessment Score:  Readmission Risk              Risk of Unplanned Readmission:        24           Discharging to Facility/ Agency   · Name: [facility]  · Address:  · Phone:[phone]  · Fax:[phone]    Dialysis Facility (if applicable)   · Name:  · Address:  · Dialysis Schedule:  · Phone:  · Fax:    / signature: Electronically signed by Renee Pena RN on 1/14/20 at 4:06 PM    PHYSICIAN SECTION    Prognosis: Good    Condition at Discharge: Stable    Rehab Potential (if transferring to Rehab):     Recommended Labs or Other Treatments After Discharge:     Physician Certification: I certify the above information and transfer of Jamas Risk  is necessary for the continuing treatment of the diagnosis listed and that he requires Home Care for less 30 days.      Update Admission H&P: No change in H&P    PHYSICIAN SIGNATURE: CECILIO Gillette MD/ Electronically signed by Renee Pena RN on 1/14/20 at 4:06 PM

## 2020-01-15 ENCOUNTER — CARE COORDINATION (OUTPATIENT)
Dept: CARE COORDINATION | Age: 75
End: 2020-01-15

## 2020-01-15 ENCOUNTER — CARE COORDINATION (OUTPATIENT)
Dept: CASE MANAGEMENT | Age: 75
End: 2020-01-15

## 2020-01-15 ENCOUNTER — TELEPHONE (OUTPATIENT)
Dept: PULMONOLOGY | Age: 75
End: 2020-01-15

## 2020-01-15 NOTE — CARE COORDINATION
Spoke with Jessica Pena CM in follow up. Change in med order for home IV infusion. Received new script from [facility]. Telephone call to [facility] and spoke with Maribeth Berry, Pharmacy. Informed Maribeth Berry in change of med order. Change to Vancomycin 1000mg IV BID x4 days with labs. Faxed updated script to Maribeth Berry at [phone]. Telephone call to [facility] to update. Informed Sandra Clarke of change in medication and need for labs.     Electronically signed by Keagan Carlson RN on 1/15/2020 at 2:30 PM

## 2020-01-15 NOTE — CARE COORDINATION
Ambulatory Care Coordination Note  1/15/2020  CM Risk Score: 11  Charlson 10 Year Mortality Risk Score: 98%     ACC: Sean Hoyos, RN    Summary Note: ACM outreach to patient per request of Sharona. She was having difficult getting him to answer the phone from her location. ACM called from PCP office and was able to reach him by cell. He was winded when he answered the phone but says it is not any worse but it was because he was up and moving around. Patient had not yet spoken with Care Coordination for home health set up. Explained to him the importance of connecting with him to complete his antibiotics. He is home with PICC line and to start IV antibiotics. He told me that the supplies were on the front porch when he arrived home from hospital. I have provided him with the number for [facility] an asked him to call and schedule. I have also provided him the number for [facility] that will follow him during care transition period. Advised him that he need to contact her as well for follow up. Explained that I would also be working with him after CTN is complete. He is agreeable. During call, he did tell me that there someone in the driveway and he thought it may be the nurse. Patient completed call.      Past Medical History:   Diagnosis Date    A-fib [facility])     2/14/12    Acute respiratory failure with hypoxia ([facility] [address] 2/13/2012    Atrial fibrillation with RVR ([facility] [address]     2/14/12     Avulsion fracture of ankle 9/21/2015    Benign localized hyperplasia of prostate with urinary obstruction and other lower urinary tract symptoms (LUTS)(600.21) 8/17/2016    Chronic systolic CHF (congestive heart failure) (HCC) 8/28/2017    Contusion of leg, right 9/21/2015    COPD (chronic obstructive pulmonary disease) ([facility])     Fractures     GERD (gastroesophageal reflux disease) 6/15/2015    Hypertension     Hypertensive urgency 8/4/2019    Hypertrophy of prostate without urinary obstruction and other lower urinary XL) 50 MG extended release tablet Take 1 tablet by mouth daily 1/3/20   Leva Serge, APRN - CNP   Potassium Citrate ER 15 MEQ (1620 MG) TBCR Take 15 mEq by mouth daily 1/3/20   Leva Serge, APRN - CNP   rivaroxaban (XARELTO) 20 MG TABS tablet TAKE ONE TABLET BY MOUTH DAILY WITH BREAKFAST 1/3/20   Leva Serge, APRN - CNP   amiodarone (CORDARONE) 200 MG tablet Take 1 tablet by mouth 2 times daily 1/3/20   Leva Serge, APRN - CNP   polyethylene glycol [facility]) packet Take 17 g by mouth daily as needed for Constipation 12/28/19 1/27/20  Christine Sparks MD   Umeclidinium Bromide (INCRUSE ELLIPTA) 62.5 MCG/INH AEPB Inhale 1 Inhaler into the lungs daily 8/8/19   Suri Garcia MD   albuterol sulfate HFA (PROVENTIL HFA) 108 (90 Base) MCG/ACT inhaler Inhale 2 puffs into the lungs every 6 hours as needed for Wheezing (with spacer) 4/16/18   Jessica Pabon MD       Future Appointments   Date Time Provider Zhane Gramajo   1/29/2020  1:00 PM Darinel Santana MD [facility]   4/13/2020  1:00 PM Jessica Pabon MD [facility] None   4/22/2020  2:30 PM MHC CT MAIN [facility]Z CT [facility] [place] [facility]   4/22/2020  3:30 PM Stefan Dailey MD CLERM PULM MMA

## 2020-01-15 NOTE — TELEPHONE ENCOUNTER
Pt has f/u on 4/22/2020 for ct chest and same day appt with Dr Brenda Grier. Is this date soon enough for hospital f/u as well? 1/14/2020 hospital Dr Breen    ASSESSMENT:  · Sepsis  · Acute hypoxic respiratory failure  · Pneumonia  · COPD AE  · TRACY with metabolic acidosis  · paroxysmal afib        PLAN:   · Supplemental oxygen to maintain SaO2 >92%; wean as tolerated   · Intensive inhaled bronchodilator therapy. · Prednisone taper   · vanc and Cefepime for 7-8 days  · PICC placed  · Sputum GS&C.   · Xarelto  · Dc planning on IV abx

## 2020-01-15 NOTE — CARE COORDINATION
Audra 45 Transitions Initial Follow Up Call    Call within 2 business days of discharge: Yes    Patient: Chiara Guerra Patient : 1945   MRN: [id number]  Reason for Admission: sepsis, HCAP, TRACY  Discharge Date: 20 RARS: Readmission Risk Score: 24      Last Discharge [facility]       Complaint Diagnosis Description Type Department Provider    20 Shortness of Breath COPD exacerbation ([facility] [address] . .. ED to Hosp-Admission (Discharged) (ADMITTED) [address] [facility] Morena Laureano MD; [facility]. .. Spoke with: Chiara Guerra (patient)    Facility: [facility]    Non-face-to-face services provided:  Communication with home health agencies or other community services the patient is currently using-[facility]       Unable to reach patient at home or mobile numbers. Only able to leave urgent message on mobile for him to return call. Included request for him to also call Dr Kyle Rojas office to schedule hospital follow up. Will await return call. Attempted to reach spouse Zeke on her mobile (patient gave permission to writer to call her). Unable to reach or leave message - no voice mail. Outreach to [facility]. Per Castro villalta, they are also unable to reach him. They have a call out to [facility] to see if they were able to make contact to deliver IV abx and supplies. [facility] will continue to try to reach patient. Outreach to [facility] requesting her to attempt outreach from PCP office line to see if Jairo Raman might answer that number. She will attempt outreach and ask him to call Ivett Segovia, someone. See her note for outcome. Care transition following.        Follow Up  Future Appointments   Date Time Provider Zhane Gramajo   2020  1:00 PM MD Heather Bradley MMA   2020  1:00 PM MD [facility] Int None   2020  2:30 PM MHC CT MAIN MHCZ CT SC [facility]   2020  3:30 PM MD DANA MarksRM PULRAMY MMA Heriberto Hannah, RN

## 2020-01-15 NOTE — TELEPHONE ENCOUNTER
Altagracia Pugh MD  [address] [facility] [address] [place]             Needs f/u for pneumonia and copd

## 2020-01-16 ENCOUNTER — CARE COORDINATION (OUTPATIENT)
Dept: CASE MANAGEMENT | Age: 75
End: 2020-01-16

## 2020-01-16 ENCOUNTER — TELEPHONE (OUTPATIENT)
Dept: INTERNAL MEDICINE CLINIC | Age: 75
End: 2020-01-16

## 2020-01-16 LAB
BLOOD CULTURE, ROUTINE: NORMAL
CULTURE, BLOOD 2: NORMAL

## 2020-01-16 PROCEDURE — 1111F DSCHRG MED/CURRENT MED MERGE: CPT | Performed by: INTERNAL MEDICINE

## 2020-01-16 NOTE — CARE COORDINATION
Audra 45 Transitions Follow Up Call    2020    Patient: Nikki Arreola  Patient : 1945   MRN: [id number]  Reason for Admission: resp failure  Discharge Date: 20 RARS: Readmission Risk Score: 24       Received return call back from [facility] at Dr Suhas Bowman office. Nikki Arreola is to resume his furosemide at discharge. Outreach to Nikki Arreola. Told him to resume his furosemide and take a dose NOW for today. He states he will right after this call. He is audibly SOB in conversation at rest. Again reviewed wearing oxygen continuous, using nebulizer, energy conservation techniques. He voices understanding. Told him to get on the scale to see his weight today to start the new habit and again in the morning. Outreach to [facility]. Spoke with clinical Alyssa Gusman. Reviewed that patient was holding his furosemide but was instructed to resume today. Also reviewed oxygen is continuous. She states after [facility] yesterday the doctor changed the IV order to just vanco and a trough is due tomorrow morning. The nurse is scheduled to go out for this. She will contact the RN case manager and update her about the furosemide and the oxygen so additional education and monitoring can be done. Care transition following.      Follow Up  Future Appointments   Date Time Provider Zhane Gramajo   2020  2:00 PM CORRINE Mills - CNP [facility] None   2020  1:00 PM MD Coty Ferris [facility]   2020  2:30 PM MD Taty Manning MMA   2020  1:00 PM Jessica Pabon MD [place] [facility] None   2020  2:30 PM MHC CT MAIN MHCZ CT SC [facility]   2020  3:30 PM MD Taty Manning, EZEKIEL
from hospital on Tuesday. He states he does not remember to weigh daily and laughs. He has a scale. Educated him again on the importance of daily weights, fluid restrictions, salt restrictions and s/s to report. Suggested he hang a reminder near the scale to weigh daily in the morning - same time/same clothes and log this weight for comparisons. CHF education:  -weigh daily in the morning at the same time and in the same clothing  -report weight gain of >3#/day or >5#/week  -report increased SOB, edema, abd fullness, difficulty lying flat  -report palpitations, cough, difficulty urinating    Writer will call Dr Bianca Billings to clarify the furosemide. Notified Maricarmen Juan will call back with recommendation. Outreach to Dr Bianca Billings office. He has left for the day and Mynor Stafford will send the message to Dr María Elena Trujillo for recommendation. Care transition following. Care Transitions 24 Hour Call    Schedule Follow Up Appointment with PCP:  Completed  Do you have any ongoing symptoms?:  Yes  Patient-reported symptoms:  Shortness of Breath  Interventions for patient-reported symptoms:  Notified PCP/Physician  Do you have a copy of your discharge instructions?:  Yes  Do you have all of your prescriptions and are they filled?:  Yes  Have you been contacted by a [id number] [employer] Pharmacist?:  No  Have you scheduled your follow up appointment?:  Yes  How are you going to get to your appointment?:  Car - family or friend to transport  Were you discharged with any Home Care or Post Acute Services:  Yes  Post Acute Services:   Other, Outpatient/Community Services, Home Health (Comment: [facility] (oxygen and nebulizer))  Patient Home Equipment:  Nebulizer, Oxygen  Do you have support at home?:  Partner/Spouse/SO  Do you feel like you have everything you need to keep you well at home?:  Yes  Are you an active caregiver in your home?:  No  Care Transitions Interventions

## 2020-01-17 ENCOUNTER — CARE COORDINATION (OUTPATIENT)
Dept: CASE MANAGEMENT | Age: 75
End: 2020-01-17

## 2020-01-17 ENCOUNTER — HOSPITAL ENCOUNTER (OUTPATIENT)
Age: 75
Setting detail: SPECIMEN
Discharge: HOME | End: 2020-01-17
Payer: MEDICARE

## 2020-01-17 ENCOUNTER — TELEPHONE (OUTPATIENT)
Dept: INTERNAL MEDICINE CLINIC | Age: 75
End: 2020-01-17

## 2020-01-17 LAB — VANCOMYCIN TROUGH: 15.7 UG/ML (ref 10–20)

## 2020-01-17 PROCEDURE — 80202 ASSAY OF VANCOMYCIN: CPT

## 2020-01-17 PROCEDURE — 36415 COLL VENOUS BLD VENIPUNCTURE: CPT

## 2020-01-17 NOTE — TELEPHONE ENCOUNTER
----- Message from Donnell Steen MD sent at 1/17/2020 11:28 AM EST -----  Contact: [phone]    ----- Message -----  From: Ryan Myers  Sent: 1/17/2020  11:04 AM EST  To: Donnell Steen MD    Josephine with [facility] called and stated that Pt will be done with his IV antibiotic this weekend and wants to know if its okay to pull his IV? Please advise. [phone].

## 2020-01-20 ENCOUNTER — OFFICE VISIT (OUTPATIENT)
Dept: INTERNAL MEDICINE CLINIC | Age: 75
End: 2020-01-20

## 2020-01-20 VITALS
OXYGEN SATURATION: 94 % | WEIGHT: 231 LBS | HEIGHT: 71 IN | BODY MASS INDEX: 32.34 KG/M2 | DIASTOLIC BLOOD PRESSURE: 70 MMHG | RESPIRATION RATE: 18 BRPM | SYSTOLIC BLOOD PRESSURE: 118 MMHG | HEART RATE: 72 BPM

## 2020-01-20 PROCEDURE — 99213 OFFICE O/P EST LOW 20 MIN: CPT | Performed by: NURSE PRACTITIONER

## 2020-01-20 ASSESSMENT — ENCOUNTER SYMPTOMS
SHORTNESS OF BREATH: 1
COUGH: 1

## 2020-01-20 NOTE — PROGRESS NOTES
Post-Discharge Transitional Care Management Services or Hospital Follow Up      Rosalee Nesbitt   YOB: 1945    Date of Office Visit:  1/20/2020  Date of Hospital Admission: 1/11/20  Date of Hospital Discharge: 1/14/20  Readmission Risk Score(high >=14%.  Medium >=10%):Readmission Risk Score: 24      Care management risk score Rising risk (score 2-5) and Complex Care (Scores >=6): 11     Non face to face  following discharge, date last encounter closed (first attempt may have been earlier): 1/16/2020  3:11 PM 1/16/2020  3:11 PM    Call initiated 2 business days of discharge: Yes     Patient Active Problem List   Diagnosis    HCAP (healthcare-associated pneumonia)    COPD exacerbation ([address]    Acute on chronic respiratory failure with hypoxia ([address]    Hoarseness of voice    Atrial fibrillation, controlled ([address]    COPD (chronic obstructive pulmonary disease) ([address]    GERD (gastroesophageal reflux disease)    Pulmonary nodule, right    Enlarged prostate with urinary obstruction    Thoracic aortic aneurysm without rupture ([address]    Typical atrial flutter ([address]    Dilated cardiomyopathy ([address]    Acute on chronic systolic CHF (congestive heart failure) ([address]    PAD (peripheral artery disease) ([address]    Pain of right thigh    Dyspnea    Benign essential hypertension    Acute conjunctivitis of both eyes    Chronic anticoagulation    Chest pain    Epigastric pain    PAF (paroxysmal atrial fibrillation) ([address]       No Known Allergies    Medications listed as ordered at the time of discharge from hospital   Byron Ulises Macias   Home Medication Instructions ROBERT:    Printed on:01/20/20 1171   Medication Information                      albuterol sulfate HFA (PROVENTIL HFA) 108 (90 Base) MCG/ACT inhaler  Inhale 2 puffs into the lungs every 6 hours as needed for Wheezing (with spacer)             amiodarone (CORDARONE) 200 MG tablet  Take 1 tablet by mouth 2 times daily             amLODIPine received IVFs. This resolved. He has resumed these medications. Interval history/Current status: He continues to feel fatigued. He is better. He wears oxygen as needed and at night. Shortness of breath with exertion. At baseline for patient. He is taking prednisone taper and has resumed Lasix. He has one more dose of IV Antibiotics. Okay to pull PICC line after this. Review of Systems   Constitutional: Positive for fatigue. Respiratory: Positive for cough and shortness of breath (at baseline). Neurological: Positive for weakness. Vitals:    01/20/20 1344   Weight: 231 lb (104.8 kg)   Height: 5' 11\" (1.803 m)     Body mass index is 32.22 kg/m². Wt Readings from Last 3 Encounters:   01/20/20 231 lb (104.8 kg)   01/14/20 227 lb 8 oz (103.2 kg)   01/03/20 232 lb (105.2 kg)     BP Readings from Last 3 Encounters:   01/14/20 (!) 143/79   01/03/20 110/60   12/28/19 117/73     Vitals:    01/20/20 1344   BP: 118/70   Pulse: 72   Resp: 18   SpO2: 94%       Physical Exam  Constitutional:       Appearance: Normal appearance. HENT:      Head: Normocephalic and atraumatic. Eyes:      Conjunctiva/sclera: Conjunctivae normal.      Pupils: Pupils are equal, round, and reactive to light. Neck:      Musculoskeletal: Neck supple. Cardiovascular:      Rate and Rhythm: Normal rate and regular rhythm. Heart sounds: Normal heart sounds. No murmur. No friction rub. No gallop. Pulmonary:      Effort: Pulmonary effort is normal.      Breath sounds: Normal breath sounds. Comments: LS diminished  Musculoskeletal:      Right lower leg: No edema. Left lower leg: No edema. Lymphadenopathy:      Cervical: No cervical adenopathy. Skin:     General: Skin is warm and dry. Neurological:      General: No focal deficit present. Mental Status: He is alert and oriented to person, place, and time.    Psychiatric:         Mood and Affect: Mood normal.         Behavior: Behavior normal. Thought Content: Thought content normal.         Judgment: Judgment normal.             Assessment/Plan:    Chronic respiratory failure with hypoxia  - supplemental O2. Wears as needed and at night. He leaves it in the car when he goes places. His SpO2 is 94% on RA.      HCAP   -Concern for gram negative organisms, and concern for possible MRSA   -Received IV Vanco and cefepime 4 days while inpatient. PICC line placed. Received   4 more days outpatient  - repeat CXR on 2/13. F/w Dr. Adrianna Steen that same day.      COPD with acute exacerbation  -received IV steroids, IV antibiotics for HCAP as above, IBD  -Supplemental O2 PRN  -complete prednisone taper    CKD Stage III  -taking ARB and Lasix.      HTN  - controlled  - cont home regimen    Hx a fib  - currently sinus   - cont BB, Xarelto     Hx CHF  -continue Lasix/ARB  -has F/u Appointment with Dr. Carlos Garcia. He needs CXR, and F/u with Dr. Adrianna Steen 2/13/2020. Also with need CT chest in the future.      Medical Decision Making: moderate complexity    Brandy Rodriguez [facility]  1/20/2020

## 2020-01-21 ENCOUNTER — CARE COORDINATION (OUTPATIENT)
Dept: CASE MANAGEMENT | Age: 75
End: 2020-01-21

## 2020-01-23 ENCOUNTER — CARE COORDINATION (OUTPATIENT)
Dept: CASE MANAGEMENT | Age: 75
End: 2020-01-23

## 2020-01-24 ENCOUNTER — CARE COORDINATION (OUTPATIENT)
Dept: CASE MANAGEMENT | Age: 75
End: 2020-01-24

## 2020-01-24 NOTE — CARE COORDINATION
Audra 45 Transitions Follow Up Call    2020    Patient: Humberto Lester  Patient : 1945   MRN: [id number]  Reason for Admission: sepsis, HCAP, TRACY  Discharge Date: 20 RARS: Readmission Risk Score: 24         Spoke with: Humberto Lester (patient)    Reached Liang Peres on wife's mobile number. They are locked out of their house waiting for son to arrive with a spare key. Reports he is feeling well. Breathing improved, back to baseline. Oxygen on prn. Monitoring with pulse oximeter. States his weight is down to 218# on home scale. (Weight at TCM visit 4 days ago was documented as 231#.) Denies fever, chills, cough. PICC pulled, abx complete. Reviewed appt with Brooks Buerger next week. Denies needs going into weekend. Reviewed s/s to report SAME DAY to MD or CTN to prevent ED visit or readmission. He verbalizes understanding. Care transition following. Care Transitions Subsequent and Final Call    Schedule Follow Up Appointment with PCP:  Completed  Subsequent and Final Calls  Do you have any ongoing symptoms?:  No  Have your medications changed?:  No  Do you have any questions related to your medications?:  No  Do you currently have any active services?:  Yes  Are you currently active with any services?:  Other, Outpatient/Community Services, Home Health  Do you have any needs or concerns that I can assist you with?:  No  Identified Barriers:  Impairment, Other, Lack of Education, Lack of Motivation  Care Transitions Interventions     Other Services:   (Comment: HC)                           Other Interventions:             Follow Up  Future Appointments   Date Time Provider Zhane Gramajo   2020  1:00 PM MD Sherry Lyons Car MMA   2020  2:30 PM King Cockayne, MD [facility] P[facility]M MMA   2020  1:00 PM Tarun Smith MD [place] [facility] None   2020  2:30 PM MHC CT MAIN MHCZ CT SC [facility]   2020  3:30 PM King Cockayne, MD [facility] PUL MMA       Tiny Pringle RN

## 2020-01-27 NOTE — PROGRESS NOTES
[address] Office Note  1/29/2020     Subjective:  Mr. Charlee Zepeda is  being seen today for  follow up of  Paroxysmal aflutter, Afib, CMP, HTN, systolic CHF, COPD;        Resighini:    He was admitted to hospital 1/11/19 for COPD, HCAP. He reports feeling better. He is wearing oxygen at night and PRN during the day. Denies chest pain, shortness of breath, edema, dizziness, palpitations and syncope. Amiodarone was discontinued, due to thyrotoxicosis but is on his med list post discharge. I personally called the pharmacy he was prescribed Amiodarone at discharge from hospital          PMH:   aflutter, afib, cmp, htn, systolic chf, copd  Admitted 12/23/19 for increased SOB x 2 weeks and diagnosed with COPD exacerbation. Uses 2-L supplemental O2 PRN at home. Now on 5L. Admit EKG 12/23/19 showed NSR; Nonspecific ST abnormality (no change from 11/1/19 EKG). Note CXR 12/25/19 showed developing mild bibasilar segmental atelectasis versus pneumonia. FINN <0.01, <0.01, <0.01; BNP=30; BUN/Cr=33/1.3; K+ 4.9.               12/26/19 developed CP starting under both breasts but radiating into lower mid-chest and mid-epigastrium region. Felt sharp and lasted 5 minutes. No associated symptoms. Relieved with PRN morphine but returned and 2nd dose of pain med relieved entirely. Note he admits to running out of PPI and off for about 1 month. PPI resumed. D/venancio to home treated with steroid taper and Zithromax. Readmission 1/11/19 COPD ex., HCAP, sepsis      Review of Systems:  12 point ROS negative in all areas as listed below except as in Resighini  Constitutional, EENT, Cardiovascular, pulmonary, GI, , Musculoskeletal, skin, neurological, hematological, endocrine, Psychiatric        Reviewed past medical history, social, and family history. Smoked 55 yrs until 19 months ago when quit. Smoked 1-3 packs per day.   Past Medical History:   Diagnosis Date    A-fib [facility])     2/14/12    Acute respiratory failure with hypoxia (HCC) well nourished. Eyes:  Sclera nonicteric  Nose/Sinuses:  negative findings: nose shows no deformity, asymmetry, or inflammation, nasal mucosa normal, septum midline with no perforation or bleeding  Back:  no pain to palpation  Joint:  no active joint inflammation  Musculoskeletal:  negative  Skin:  Warm and dry  Neck:  Negative for JVD and Carotid Bruits. Chest: clear , respiration easy  Cardiovascular:  RRR,  S1S2 normal, no murmur, no rub or thrill.   Abdomen:  Soft normal liver and spleen  Extremities:   No edema, clubbing, cyanosis,  Absent pedal pulses 2+ left femoral,   absent right femoral  Neuro: intact    Medications:   Outpatient Encounter Medications as of 1/29/2020   Medication Sig Dispense Refill    amLODIPine (NORVASC) 5 MG tablet Take 1 tablet by mouth daily 30 tablet 1    atorvastatin (LIPITOR) 20 MG tablet Take 1 tablet by mouth daily 30 tablet 1    furosemide (LASIX) 40 MG tablet Take 1 tablet by mouth daily 30 tablet 1    losartan (COZAAR) 100 MG tablet TAKE ONE TABLET BY MOUTH DAILY 90 tablet 3    metoprolol succinate (TOPROL XL) 50 MG extended release tablet Take 1 tablet by mouth daily 90 tablet 3    Potassium Citrate ER 15 MEQ (1620 MG) TBCR Take 15 mEq by mouth daily 90 tablet 3    rivaroxaban (XARELTO) 20 MG TABS tablet TAKE ONE TABLET BY MOUTH DAILY WITH BREAKFAST 90 tablet 3    predniSONE (DELTASONE) 10 MG tablet 4 tabs for 3 days 3 tabs for 3 days 2 tabs for 3 days 1 tabs for 3 days 30 tablet 0    pantoprazole (PROTONIX) 40 MG tablet Take 1 tablet by mouth daily 30 tablet 3    ipratropium-albuterol (DUONEB) 0.5-2.5 (3) MG/3ML SOLN nebulizer solution USE ONE UNIT DOSE (3ML) IN NEBULIZER AND INHALE INTO THE LUNGS EVERY 4 HOURS AS NEEDED FOR SHORTNESS OF BREATH 360 mL 0    Umeclidinium Bromide (INCRUSE ELLIPTA) 62.5 MCG/INH AEPB Inhale 1 Inhaler into the lungs daily 1 each 0    albuterol sulfate HFA (PROVENTIL HFA) 108 (90 Base) MCG/ACT inhaler Inhale 2 puffs into the lungs LABVLDL 15 08/15/2017    LABVLDL 19 08/17/2016     Lab Results   Component Value Date    LAURIE 4.1 02/17/2016     PT/INR: No results for input(s): PROTIME, INR in the last 72 hours. A1C: No results found for: LABA1C  BNP:  No results for input(s): BNP in the last 72 hours. IMAGING:     EKG 1/11/20   Sinus tachycardiaNonspecific ST abnormalityAbnormal ECGNo previous ECGs availableConfirmed by Ami Ferguson ([id number]) on 1/12/2020 2:37:18 PM    Stress 5/15/19  Summary  Abnormal low risk myocardial perfusion study. There is a fixed defect within the inferior-basal wall consistent with  attenuation artifact vs prior sub endocardial infarct. There is no ischemia. The estimated left ventricular function is 45% with mild global hypokinesis. ECHO 5/15/19  Summary   Left ventricular systolic function is low normal with ejection fraction   estimated at 50-55 %. No regional wall motion abnormalities are noted. Left ventricular size is decreased. There is mild concentric left ventricular hypertrophy. Elevated left ventricular diastolic filling pressure: Septal E/e'' = 16.4 . Moderate posterior mitral annular calcification is present. Mild tricuspid regurgitation. Normal systolic pulmonary artery pressure (SPAP) estimated at 41 mmHg (RA   pressure 3 mmHg). PFT 6 minute walk test 5/15/19  FINDINGS:  1. A six-minute walk test was performed per [facility] protocol. The patient walked 700 feet. The resting SpO2 on room air was 97%. There was no decrease. The patient's resting heart rate was 102 and the  heart rate maximum was 140. The patient commented on hip pain during  the testing. Chest Xray 4/23/19    Bibasilar heterogeneous opacities without focal consolidation may relate to   subsegmental atelectasis/scarring.       Hyperexpanded lung volume and diffusely prominent interstitium can be seen in   the setting of COPD.        CT chest 4/23/19  No thoracic aortic aneurysm or  PAF (paroxysmal atrial fibrillation) (HCC) Yes    Dilated cardiomyopathy (HCC)     Acute on chronic systolic CHF (congestive heart failure) (HCC)     Benign essential hypertension      Dyspnea likely due to COPD but ILD in differential    Typical atrial flutter (HCC) resolved     Essential hypertension    Dilated cardiomyopathy ([address] most recent echo  May 2019 near  normal EF    Chronic systolic CHF (congestive heart failure) (HCC) does not appear fluid overloaded    Chronic obstructive pulmonary disease, unspecified COPD type (HCC)    Claudication of right lower extremity (HCC)  -     Ultrasound doppler arterial legs bilateral; Future        Plan:  1. Meds reviewed. Refills as warranted   2. DO NOT take amiodarone. I personally  called [address] and discontinued. 3. Follow up in 6 months     This note was scribed in the presence of Luis M Marrero MD by Simon Oliver RN      QUALITY MEASURES  1. Tobacco Cessation Counseling: Yes  2. Retake of BP if >140/90:   Yes  3. Documentation to PCP/referring for new patient:  Sent to PCP at close of office visit  4. CAD patient on anti-platelet: anticoag. 5. CAD patient on STATIN therapy:  Yes  6. Patient with CHF and aFib on anticoagulation:  Yes   I, Dr. Dominga Simpson, personally performed the services described in this documentation, as scribed by the above signed scribe in my presence. It is both accurate and complete to my knowledge. I agree with the details independently gathered by the clinical support staff, while the remaining scribed note accurately describes my personal service to the patient.           Josephine Paniagua MD 1/29/2020 1:22 PM

## 2020-01-29 ENCOUNTER — OFFICE VISIT (OUTPATIENT)
Dept: CARDIOLOGY CLINIC | Age: 75
End: 2020-01-29
Payer: MEDICARE

## 2020-01-29 VITALS
SYSTOLIC BLOOD PRESSURE: 118 MMHG | HEART RATE: 66 BPM | DIASTOLIC BLOOD PRESSURE: 70 MMHG | OXYGEN SATURATION: 98 % | BODY MASS INDEX: 32.34 KG/M2 | HEIGHT: 71 IN | WEIGHT: 231 LBS

## 2020-01-29 PROCEDURE — 99214 OFFICE O/P EST MOD 30 MIN: CPT | Performed by: INTERNAL MEDICINE

## 2020-01-29 RX ORDER — FUROSEMIDE 40 MG/1
40 TABLET ORAL DAILY
Qty: 30 TABLET | Refills: 1 | Status: SHIPPED | OUTPATIENT
Start: 2020-01-29 | End: 2020-04-02

## 2020-01-29 RX ORDER — AMLODIPINE BESYLATE 5 MG/1
5 TABLET ORAL DAILY
Qty: 30 TABLET | Refills: 1 | Status: SHIPPED | OUTPATIENT
Start: 2020-01-29 | End: 2020-04-02

## 2020-01-29 RX ORDER — POTASSIUM CITRATE 15 MEQ/1
15 TABLET, EXTENDED RELEASE ORAL DAILY
Qty: 90 TABLET | Refills: 3 | Status: SHIPPED | OUTPATIENT
Start: 2020-01-29 | End: 2021-02-01 | Stop reason: SDUPTHER

## 2020-01-29 RX ORDER — ATORVASTATIN CALCIUM 20 MG/1
20 TABLET, FILM COATED ORAL DAILY
Qty: 30 TABLET | Refills: 1 | Status: SHIPPED | OUTPATIENT
Start: 2020-01-29 | End: 2020-04-02

## 2020-01-29 RX ORDER — AMIODARONE HYDROCHLORIDE 200 MG/1
200 TABLET ORAL 2 TIMES DAILY
Qty: 180 TABLET | Refills: 3 | Status: CANCELLED | OUTPATIENT
Start: 2020-01-29

## 2020-01-29 RX ORDER — METOPROLOL SUCCINATE 50 MG/1
50 TABLET, EXTENDED RELEASE ORAL DAILY
Qty: 90 TABLET | Refills: 3 | Status: SHIPPED | OUTPATIENT
Start: 2020-01-29 | End: 2021-02-01 | Stop reason: SDUPTHER

## 2020-01-29 RX ORDER — LOSARTAN POTASSIUM 100 MG/1
TABLET ORAL
Qty: 90 TABLET | Refills: 3 | Status: SHIPPED | OUTPATIENT
Start: 2020-01-29 | End: 2021-02-01 | Stop reason: SDUPTHER

## 2020-01-29 NOTE — LETTER
[facility] Office Note  1/29/2020     Subjective:  Mr. Lety Isabel is  being seen today for  follow up of  Paroxysmal aflutter, Afib, CMP, HTN, systolic CHF, COPD;        Kiana:    He was admitted to hospital 1/11/19 for COPD, HCAP. He reports feeling better. He is wearing oxygen at night and PRN during the day. Denies chest pain, shortness of breath, edema, dizziness, palpitations and syncope. Amiodarone was discontinued, due to thyrotoxicosis but is on his med list post discharge. I personally called the pharmacy he was prescribed Amiodarone at discharge from hospital          PMH:   aflutter, afib, cmp, htn, systolic chf, copd  Admitted 12/23/19 for increased SOB x 2 weeks and diagnosed with COPD exacerbation. Uses 2-L supplemental O2 PRN at home. Now on 5L. Admit EKG 12/23/19 showed NSR; Nonspecific ST abnormality (no change from 11/1/19 EKG). Note CXR 12/25/19 showed developing mild bibasilar segmental atelectasis versus pneumonia. FINN <0.01, <0.01, <0.01; BNP=30; BUN/Cr=33/1.3; K+ 4.9.               12/26/19 developed CP starting under both breasts but radiating into lower mid-chest and mid-epigastrium region. Felt sharp and lasted 5 minutes. No associated symptoms. Relieved with PRN morphine but returned and 2nd dose of pain med relieved entirely. Note he admits to running out of PPI and off for about 1 month. PPI resumed. D/venancio to home treated with steroid taper and Zithromax. Readmission 1/11/19 COPD ex., HCAP, sepsis      Review of Systems:  12 point ROS negative in all areas as listed below except as in Kiana  Constitutional, EENT, Cardiovascular, pulmonary, GI, , Musculoskeletal, skin, neurological, hematological, endocrine, Psychiatric        Reviewed past medical history, social, and family history. Smoked 55 yrs until 19 months ago when quit. Smoked 1-3 packs per day.   Past Medical History:   Diagnosis Date    A-fib [facility])     2/14/12  albuterol sulfate HFA (PROVENTIL HFA) 108 (90 Base) MCG/ACT inhaler Inhale 2 puffs into the lungs every 6 hours as needed for Wheezing (with spacer) 1 Inhaler 11    [DISCONTINUED] amLODIPine (NORVASC) 5 MG tablet Take 1 tablet by mouth daily 30 tablet 1    [DISCONTINUED] atorvastatin (LIPITOR) 20 MG tablet Take 1 tablet by mouth daily 30 tablet 1    [DISCONTINUED] furosemide (LASIX) 40 MG tablet Take 1 tablet by mouth daily 30 tablet 1    [DISCONTINUED] losartan (COZAAR) 100 MG tablet TAKE ONE TABLET BY MOUTH DAILY 30 tablet 1    [DISCONTINUED] metoprolol succinate (TOPROL XL) 50 MG extended release tablet Take 1 tablet by mouth daily 30 tablet 1    [DISCONTINUED] Potassium Citrate ER 15 MEQ (1620 MG) TBCR Take 15 mEq by mouth daily 30 tablet 1    [DISCONTINUED] rivaroxaban (XARELTO) 20 MG TABS tablet TAKE ONE TABLET BY MOUTH DAILY WITH BREAKFAST 30 tablet 2    [DISCONTINUED] amiodarone (CORDARONE) 200 MG tablet Take 1 tablet by mouth 2 times daily 30 tablet 1    [] polyethylene glycol (GLYCOLAX) packet Take 17 g by mouth daily as needed for Constipation 527 g 1     No facility-administered encounter medications on file as of 2020. Lab Data:  CBC: No results for input(s): WBC, HGB, HCT, MCV, PLT in the last 72 hours. BMP: No results for input(s): NA, K, CL, CO2, PHOS, BUN, CREATININE in the last 72 hours. Invalid input(s): CA  LIVER PROFILE: No results for input(s): AST, ALT, LIPASE, BILIDIR, BILITOT, ALKPHOS in the last 72 hours. Invalid input(s):   AMYLASE,  ALB  LIPID:   Lab Results   Component Value Date    CHOL 202 (H) 2018    CHOL 136 08/15/2017    CHOL 173 2016     Lab Results   Component Value Date    TRIG 106 2018    TRIG 77 08/15/2017    TRIG 96 2016     Lab Results   Component Value Date    HDL 68 (H) 2018    HDL 49 08/15/2017    HDL 49 2016     Lab Results   Component Value Date    LDLCALC 113 (H) 2018 LDLCALC 72 08/15/2017    LDLCALC 105 (H) 08/17/2016     Lab Results   Component Value Date    LABVLDL 21 11/26/2018    LABVLDL 15 08/15/2017    LABVLDL 19 08/17/2016     Lab Results   Component Value Date    CHOLHDLRATIO 4.1 02/17/2016     PT/INR: No results for input(s): PROTIME, INR in the last 72 hours. A1C: No results found for: LABA1C  BNP:  No results for input(s): BNP in the last 72 hours. IMAGING:     EKG 1/11/20   Sinus tachycardiaNonspecific ST abnormalityAbnormal ECGNo previous ECGs availableConfirmed by Neha Oconnor ([id number]) on 1/12/2020 2:37:18 PM    Stress 5/15/19  Summary  Abnormal low risk myocardial perfusion study. There is a fixed defect within the inferior-basal wall consistent with  attenuation artifact vs prior sub endocardial infarct. There is no ischemia. The estimated left ventricular function is 45% with mild global hypokinesis. ECHO 5/15/19  Summary   Left ventricular systolic function is low normal with ejection fraction   estimated at 50-55 %. No regional wall motion abnormalities are noted. Left ventricular size is decreased. There is mild concentric left ventricular hypertrophy. Elevated left ventricular diastolic filling pressure: Septal E/e'' = 16.4 . Moderate posterior mitral annular calcification is present. Mild tricuspid regurgitation. Normal systolic pulmonary artery pressure (SPAP) estimated at 41 mmHg (RA   pressure 3 mmHg). PFT 6 minute walk test 5/15/19  FINDINGS:  1. A six-minute walk test was performed per [facility] protocol. The patient walked 700 feet. The resting SpO2 on room air was 97%. There was no decrease. The patient's resting heart rate was 102 and the  heart rate maximum was 140. The patient commented on hip pain during  the testing.         Chest Xray 4/23/19    Bibasilar heterogeneous opacities without focal consolidation may relate to   subsegmental atelectasis/scarring.     Hyperexpanded lung volume and diffusely prominent interstitium can be seen in   the setting of COPD. CT chest 4/23/19  No thoracic aortic aneurysm or dissection. Stable small ductus diverticulum. No acute findings in the chest.     ECHO 1/24/18  Summary  There is normal isotope uptake at stress and rest. There is no evidence of  myocardial ischemia or scar. Normal LV function with ejection fraction of  62%. There are no regional wall motion abnormalities. Low risk study. EKG 4/23/19  Normal sinus rhythmNonspecific ST abnormalityWhen compared with ECG of 23-APR-2019 08:10, (unconfirmed)No significant change was foundConfirmed by ANIKA Ellsworth MD ([id number]) on 4/23/2019 4:58:35 PM    Stress Test 1/24/18   Summary  There is normal isotope uptake at stress and rest. There is no evidence of  myocardial ischemia or scar. Normal LV function with ejection fraction of  62%. There are no regional wall motion abnormalities. Low risk study. NANCY 8/17/17  Summary   There is no evidence of mass or thrombus in the left atrium or appendage.   Mild mitral regurgitation.   Moderate tricuspid regurgitation.   A small mobile filament is attached to the aortic valve which is felt to be   Lambl's excrescence in the absence of the aortic regurgitation.   Moderate amount of layered and protruding plaque in the aorta.   EKG 8.17.17  Atrial flutter RVR  Echo doppler of heart 8.16.17  Summary   The ejection fraction measured by Miller's method is 33 %.   The left ventricular systolic function is moderately reduced with an   ejection fraction of 30-35 %.   Moderate global hypokinesis is present.   Normal left ventricular diastolic filling pressure.   The left atrium is moderately dilated.   The right atrium is moderately dilated.   Mild mitral annular calcification.   Mild mitral regurgitation.   Moderate tricuspid regurgitation.   Systolic pulmonary artery pressure (SPAP) is estimated at 41 mmHg consistent

## 2020-01-30 ENCOUNTER — CARE COORDINATION (OUTPATIENT)
Dept: CASE MANAGEMENT | Age: 75
End: 2020-01-30

## 2020-01-30 NOTE — CARE COORDINATION
[address] [facility] Transitions Follow Up Call    2020    Patient: Jacques Carrillo  Patient : 1945   MRN: [id number]  Reason for Admission: sepsis, HCAP, TRACY  Discharge Date: 20 RARS: Readmission Risk Score: 24       Unable to reach for final CTN outreach. Will notify ACM.        Follow Up  Future Appointments   Date Time Provider Zhane Gramajo   2020  2:30 PM MD MELYSSA Baumann [facility]   2020  1:00 PM Jaime Castillo MD [facility]   2020  2:30 PM MHC CT MAIN [facility]   2020  3:30 PM MD MELYSSA Baumann [facility]   2020  2:15 PM Jazlyn Zapata MD [facility] JAROD Montgomery RN

## 2020-02-03 ENCOUNTER — CARE COORDINATION (OUTPATIENT)
Dept: CARE COORDINATION | Age: 75
End: 2020-02-03

## 2020-02-04 ENCOUNTER — CARE COORDINATION (OUTPATIENT)
Dept: CARE COORDINATION | Age: 75
End: 2020-02-04

## 2020-02-04 SDOH — ECONOMIC STABILITY: FOOD INSECURITY: WITHIN THE PAST 12 MONTHS, YOU WORRIED THAT YOUR FOOD WOULD RUN OUT BEFORE YOU GOT MONEY TO BUY MORE.: NEVER TRUE

## 2020-02-04 SDOH — SOCIAL STABILITY: SOCIAL INSECURITY: WITHIN THE LAST YEAR, HAVE YOU BEEN HUMILIATED OR EMOTIONALLY ABUSED IN OTHER WAYS BY YOUR PARTNER OR EX-PARTNER?: NO

## 2020-02-04 SDOH — SOCIAL STABILITY: SOCIAL NETWORK: ARE YOU MARRIED, WIDOWED, DIVORCED, SEPARATED, NEVER MARRIED, OR LIVING WITH A PARTNER?: MARRIED

## 2020-02-04 SDOH — ECONOMIC STABILITY: TRANSPORTATION INSECURITY
IN THE PAST 12 MONTHS, HAS THE LACK OF TRANSPORTATION KEPT YOU FROM MEDICAL APPOINTMENTS OR FROM GETTING MEDICATIONS?: NO

## 2020-02-04 SDOH — ECONOMIC STABILITY: INCOME INSECURITY: HOW HARD IS IT FOR YOU TO PAY FOR THE VERY BASICS LIKE FOOD, HOUSING, MEDICAL CARE, AND HEATING?: NOT VERY HARD

## 2020-02-04 SDOH — ECONOMIC STABILITY: TRANSPORTATION INSECURITY
IN THE PAST 12 MONTHS, HAS LACK OF TRANSPORTATION KEPT YOU FROM MEETINGS, WORK, OR FROM GETTING THINGS NEEDED FOR DAILY LIVING?: NO

## 2020-02-04 SDOH — SOCIAL STABILITY: SOCIAL INSECURITY: WITHIN THE LAST YEAR, HAVE YOU BEEN AFRAID OF YOUR PARTNER OR EX-PARTNER?: NO

## 2020-02-04 SDOH — SOCIAL STABILITY: SOCIAL INSECURITY
WITHIN THE LAST YEAR, HAVE TO BEEN RAPED OR FORCED TO HAVE ANY KIND OF SEXUAL ACTIVITY BY YOUR PARTNER OR EX-PARTNER?: NO

## 2020-02-04 SDOH — ECONOMIC STABILITY: FOOD INSECURITY: WITHIN THE PAST 12 MONTHS, THE FOOD YOU BOUGHT JUST DIDN'T LAST AND YOU DIDN'T HAVE MONEY TO GET MORE.: NEVER TRUE

## 2020-02-04 SDOH — HEALTH STABILITY: MENTAL HEALTH
STRESS IS WHEN SOMEONE FEELS TENSE, NERVOUS, ANXIOUS, OR CAN'T SLEEP AT NIGHT BECAUSE THEIR MIND IS TROUBLED. HOW STRESSED ARE YOU?: NOT AT ALL

## 2020-02-04 SDOH — SOCIAL STABILITY: SOCIAL INSECURITY
WITHIN THE LAST YEAR, HAVE YOU BEEN KICKED, HIT, SLAPPED, OR OTHERWISE PHYSICALLY HURT BY YOUR PARTNER OR EX-PARTNER?: NO

## 2020-02-04 SDOH — HEALTH STABILITY: PHYSICAL HEALTH: ON AVERAGE, HOW MANY DAYS PER WEEK DO YOU ENGAGE IN MODERATE TO STRENUOUS EXERCISE (LIKE A BRISK WALK)?: 0 DAYS

## 2020-02-04 SDOH — HEALTH STABILITY: MENTAL HEALTH: HOW OFTEN DO YOU HAVE A DRINK CONTAINING ALCOHOL?: NEVER

## 2020-02-04 SDOH — HEALTH STABILITY: PHYSICAL HEALTH: ON AVERAGE, HOW MANY MINUTES DO YOU ENGAGE IN EXERCISE AT THIS LEVEL?: 0 MIN

## 2020-02-04 NOTE — CARE COORDINATION
Ambulatory Care Coordination Note  2/4/2020  CM Risk Score: 11  Charlson 10 Year Mortality Risk Score: 98%     ACC: Abel Bowden RN    Summary Note: Reports he is doing well. He is no longer active with home health. Able to speak to s/s of chf and copd exacerabations. Zone teaching ongoing. Weight stable at 232 lbs. He has just completed cardiology follow up and has pulmonary follow up planned in 2 weeks. Plan for a follow up CXR per patient. He is working to improve compliance with his fluid limits. Has decreased coffee in take to 2-3 cups a day and using decaf. Reviewed right care, right place, right time. Discussion included when to contact primary care physician, when to seek walk in care and conditions that should result in an ER visit.        Past Medical History:   Diagnosis Date    A-fib [facility])     2/14/12    Acute respiratory failure with hypoxia ([facility] [address] 2/13/2012    Atrial fibrillation with RVR ([facility] [address]     2/14/12     Avulsion fracture of ankle 9/21/2015    Benign localized hyperplasia of prostate with urinary obstruction and other lower urinary tract symptoms (LUTS)(600.21) 8/17/2016    Chronic systolic CHF (congestive heart failure) (HCC) 8/28/2017    Contusion of leg, right 9/21/2015    [facility]ch[facility] obstructive pulmonary [facility])     Fractures     GERD (gastroesophageal reflux disease) 6/15/2015    Hypertension     Hypertensive urgency 8/4/2019    Hypertrophy of prostate without urinary obstruction and other lower urinary tract symptoms (LUTS) 6/15/2015    No history of procedure     no previos colonoscopy    PAD (peripheral artery disease) ([facility] [address] 10/9/2017    Pneumonia     Thoracic aortic aneurysm ([facility] [address]      Plan   Follow up call in 1 week   Plan to complete CHF and COPD education then           Care Coordination Interventions    Program Enrollment:  Rising Risk  Referral from Primary Care Provider:  No  Suggested Interventions and [address]: (Comment: care connections)  Other Services or Interventions:  option are for IV antibiotics at home         Goals Addressed                 This Visit's Progress       Care Coordination     Conditions and Symptoms   Improving     I will schedule office visits, as directed by my provider. I will keep my appointment or reschedule if I have to cancel. I will notify my provider of any barriers to my plan of care. I will follow my Zone Management tool to seek urgent or emergent care. I will notify my provider of any symptoms that indicate a worsening of my condition. Barriers: none  Plan for overcoming my barriers: N/A  Confidence: 7/10  Anticipated Goal Completion Date: 4/20    Reviewed and able to speak to s/s of copd and chf exacerbations. Need to report changes to health care team for early intervention discussed. 2/4/20 trb         Patient Stated (pt-stated)   Improving     Working to decrease his coffee intake and has switched to using decaf  Attempt to control fluid restrictions as directed. Barriers: none  Plan for overcoming my barriers: N/A  Confidence: 7/10  Anticipated Goal Completion Date: 3/20    He is down to 2-3 cups of coffee a day and using decaf mostly unless he eats out. 2/4/20 trb            Prior to Admission medications    Medication Sig Start Date End Date Taking?  Authorizing Provider   amLODIPine (NORVASC) 5 MG tablet Take 1 tablet by mouth daily 1/29/20   Sadia Zaragoza MD   atorvastatin (LIPITOR) 20 MG tablet Take 1 tablet by mouth daily 1/29/20   Sadia Zaragoza MD   furosemide (LASIX) 40 MG tablet Take 1 tablet by mouth daily 1/29/20   Sadia Zaragoza MD   losartan (COZAAR) 100 MG tablet TAKE ONE TABLET BY MOUTH DAILY 1/29/20   Sadia Zaragoza MD   metoprolol succinate (TOPROL XL) 50 MG extended release tablet Take 1 tablet by mouth daily 1/29/20   Sadia Zaragoza MD   Potassium Citrate ER 15 MEQ (1620 MG) TBCR Take 15 mEq by mouth daily 1/29/20   Sadia Zaragoza MD rivaroxaban (XARELTO) 20 MG TABS tablet TAKE ONE TABLET BY MOUTH DAILY WITH BREAKFAST 1/29/20   Jas Salguero MD   predniSONE (DELTASONE) 10 MG tablet 4 tabs for 3 days 3 tabs for 3 days 2 tabs for 3 days 1 tabs for 3 days 1/14/20   Delvin Sandifer, MD   pantoprazole (PROTONIX) 40 MG tablet Take 1 tablet by mouth daily 1/3/20   CORRINE Gonzalez CNP   ipratropium-albuterol (DUONEB) 0.5-2.5 (3) MG/3ML SOLN nebulizer solution USE ONE UNIT DOSE (3ML) IN NEBULIZER AND INHALE INTO THE LUNGS EVERY 4 HOURS AS NEEDED FOR SHORTNESS OF BREATH 1/3/20   CORRINE Gonzalez CNP   Umeclidinium Bromide (INCRUSE ELLIPTA) 62.5 MCG/INH AEPB Inhale 1 Inhaler into the lungs daily 8/8/19   Swathi Starr MD   albuterol sulfate HFA (PROVENTIL HFA) 108 (90 Base) MCG/ACT inhaler Inhale 2 puffs into the lungs every 6 hours as needed for Wheezing (with spacer) 4/16/18   Delvin Sandifer, MD       Future Appointments   Date Time Provider Zhane Gramajo   2/13/2020  2:30 PM MD MELYSSA Robbins [facility]   4/13/2020  1:00 PM Delvin Sandifer, MD [facility] None   4/22/2020  2:30 PM MHC CT MAIN [facility]   4/22/2020  3:30 PM MD MELYSSA Robbins [facility]   7/22/2020  2:15 PM Jas Salguero MD [facility]     ,   Congestive Heart Failure Assessment    Are you currently restricting fluids?:  2000cc  Do you understand a low sodium diet?:  Yes  Do you understand how to read food labels?:  Yes  How many restaurant meals do you eat per week?:  1-2, 0  Do you salt your food before tasting it?:  No     No patient-reported symptoms      Symptoms:   None:  Yes      Symptom course:  stable  Patient-reported weight (lb):  232  Weight trend:  stable  Salt intake watch compared to last visit:  stable      and   COPD Assessment    Does the patient understand envrionmental exposure?:  Yes  Is the patient able to verbalize Rescue vs. Long Acting medications?:  Yes  Does the patient have a

## 2020-02-05 RX ORDER — IPRATROPIUM BROMIDE AND ALBUTEROL SULFATE 2.5; .5 MG/3ML; MG/3ML
SOLUTION RESPIRATORY (INHALATION)
Qty: 360 ML | Refills: 0 | Status: SHIPPED | OUTPATIENT
Start: 2020-02-05 | End: 2020-03-02

## 2020-02-10 ENCOUNTER — TELEPHONE (OUTPATIENT)
Dept: INTERNAL MEDICINE CLINIC | Age: 75
End: 2020-02-10

## 2020-02-11 ENCOUNTER — HOSPITAL ENCOUNTER (INPATIENT)
Age: 75
LOS: 6 days | Discharge: HOME | DRG: 190 | End: 2020-02-17
Attending: EMERGENCY MEDICINE | Admitting: INTERNAL MEDICINE
Payer: MEDICARE

## 2020-02-11 ENCOUNTER — TELEPHONE (OUTPATIENT)
Dept: OTHER | Facility: CLINIC | Age: 75
End: 2020-02-11

## 2020-02-11 ENCOUNTER — APPOINTMENT (OUTPATIENT)
Dept: GENERAL RADIOLOGY | Age: 75
DRG: 190 | End: 2020-02-11
Payer: MEDICARE

## 2020-02-11 ENCOUNTER — APPOINTMENT (OUTPATIENT)
Dept: CT IMAGING | Age: 75
DRG: 190 | End: 2020-02-11
Payer: MEDICARE

## 2020-02-11 LAB
A/G RATIO: 1.2 (ref 1.1–2.2)
ALBUMIN SERPL-MCNC: 3.8 G/DL (ref 3.4–5)
ALP BLD-CCNC: 77 U/L (ref 40–129)
ALT SERPL-CCNC: 8 U/L (ref 10–40)
ANION GAP SERPL CALCULATED.3IONS-SCNC: 13 MMOL/L (ref 3–16)
AST SERPL-CCNC: 12 U/L (ref 15–37)
BASOPHILS ABSOLUTE: 0.1 K/UL (ref 0–0.2)
BASOPHILS RELATIVE PERCENT: 1 %
BILIRUB SERPL-MCNC: 0.3 MG/DL (ref 0–1)
BUN BLDV-MCNC: 20 MG/DL (ref 7–20)
CALCIUM SERPL-MCNC: 9.4 MG/DL (ref 8.3–10.6)
CHLORIDE BLD-SCNC: 106 MMOL/L (ref 99–110)
CO2: 26 MMOL/L (ref 21–32)
CREAT SERPL-MCNC: 1.6 MG/DL (ref 0.8–1.3)
EKG ATRIAL RATE: 89 BPM
EKG DIAGNOSIS: NORMAL
EKG P AXIS: 67 DEGREES
EKG P-R INTERVAL: 192 MS
EKG Q-T INTERVAL: 392 MS
EKG QRS DURATION: 96 MS
EKG QTC CALCULATION (BAZETT): 476 MS
EKG R AXIS: 43 DEGREES
EKG T AXIS: 63 DEGREES
EKG VENTRICULAR RATE: 89 BPM
EOSINOPHILS ABSOLUTE: 0.8 K/UL (ref 0–0.6)
EOSINOPHILS RELATIVE PERCENT: 10.5 %
GFR AFRICAN AMERICAN: 52
GFR NON-AFRICAN AMERICAN: 43
GLOBULIN: 3.3 G/DL
GLUCOSE BLD-MCNC: 115 MG/DL (ref 70–99)
HCT VFR BLD CALC: 39 % (ref 40.5–52.5)
HEMOGLOBIN: 12.7 G/DL (ref 13.5–17.5)
LACTIC ACID: 1.4 MMOL/L (ref 0.4–2)
LYMPHOCYTES ABSOLUTE: 2.2 K/UL (ref 1–5.1)
LYMPHOCYTES RELATIVE PERCENT: 29.3 %
MCH RBC QN AUTO: 30.3 PG (ref 26–34)
MCHC RBC AUTO-ENTMCNC: 32.6 G/DL (ref 31–36)
MCV RBC AUTO: 92.9 FL (ref 80–100)
MONOCYTES ABSOLUTE: 0.6 K/UL (ref 0–1.3)
MONOCYTES RELATIVE PERCENT: 8.4 %
NEUTROPHILS ABSOLUTE: 3.9 K/UL (ref 1.7–7.7)
NEUTROPHILS RELATIVE PERCENT: 50.8 %
PDW BLD-RTO: 16.3 % (ref 12.4–15.4)
PLATELET # BLD: 248 K/UL (ref 135–450)
PMV BLD AUTO: 7.9 FL (ref 5–10.5)
POTASSIUM SERPL-SCNC: 4 MMOL/L (ref 3.5–5.1)
PRO-BNP: 69 PG/ML (ref 0–124)
PROCALCITONIN: 0.08 NG/ML (ref 0–0.15)
RAPID INFLUENZA  B AGN: NEGATIVE
RAPID INFLUENZA A AGN: NEGATIVE
RBC # BLD: 4.2 M/UL (ref 4.2–5.9)
SODIUM BLD-SCNC: 145 MMOL/L (ref 136–145)
TOTAL PROTEIN: 7.1 G/DL (ref 6.4–8.2)
TROPONIN: <0.01 NG/ML
WBC # BLD: 7.6 K/UL (ref 4–11)

## 2020-02-11 PROCEDURE — 99222 1ST HOSP IP/OBS MODERATE 55: CPT | Performed by: INTERNAL MEDICINE

## 2020-02-11 PROCEDURE — 2580000003 HC RX 258: Performed by: PHYSICIAN ASSISTANT

## 2020-02-11 PROCEDURE — 6370000000 HC RX 637 (ALT 250 FOR IP): Performed by: PHYSICIAN ASSISTANT

## 2020-02-11 PROCEDURE — 93005 ELECTROCARDIOGRAM TRACING: CPT | Performed by: EMERGENCY MEDICINE

## 2020-02-11 PROCEDURE — 85025 COMPLETE CBC W/AUTO DIFF WBC: CPT

## 2020-02-11 PROCEDURE — 96365 THER/PROPH/DIAG IV INF INIT: CPT

## 2020-02-11 PROCEDURE — 94761 N-INVAS EAR/PLS OXIMETRY MLT: CPT

## 2020-02-11 PROCEDURE — 84145 PROCALCITONIN (PCT): CPT

## 2020-02-11 PROCEDURE — 94640 AIRWAY INHALATION TREATMENT: CPT

## 2020-02-11 PROCEDURE — 36415 COLL VENOUS BLD VENIPUNCTURE: CPT

## 2020-02-11 PROCEDURE — 71250 CT THORAX DX C-: CPT

## 2020-02-11 PROCEDURE — 6360000002 HC RX W HCPCS: Performed by: PHYSICIAN ASSISTANT

## 2020-02-11 PROCEDURE — 2700000000 HC OXYGEN THERAPY PER DAY

## 2020-02-11 PROCEDURE — 84484 ASSAY OF TROPONIN QUANT: CPT

## 2020-02-11 PROCEDURE — 1200000000 HC SEMI PRIVATE

## 2020-02-11 PROCEDURE — 99223 1ST HOSP IP/OBS HIGH 75: CPT | Performed by: INTERNAL MEDICINE

## 2020-02-11 PROCEDURE — 6370000000 HC RX 637 (ALT 250 FOR IP): Performed by: INTERNAL MEDICINE

## 2020-02-11 PROCEDURE — 80053 COMPREHEN METABOLIC PANEL: CPT

## 2020-02-11 PROCEDURE — 6360000002 HC RX W HCPCS: Performed by: EMERGENCY MEDICINE

## 2020-02-11 PROCEDURE — 2580000003 HC RX 258: Performed by: NURSE PRACTITIONER

## 2020-02-11 PROCEDURE — 83880 ASSAY OF NATRIURETIC PEPTIDE: CPT

## 2020-02-11 PROCEDURE — 2580000003 HC RX 258: Performed by: EMERGENCY MEDICINE

## 2020-02-11 PROCEDURE — 6370000000 HC RX 637 (ALT 250 FOR IP): Performed by: NURSE PRACTITIONER

## 2020-02-11 PROCEDURE — 6370000000 HC RX 637 (ALT 250 FOR IP): Performed by: EMERGENCY MEDICINE

## 2020-02-11 PROCEDURE — 83605 ASSAY OF LACTIC ACID: CPT

## 2020-02-11 PROCEDURE — 93010 ELECTROCARDIOGRAM REPORT: CPT | Performed by: INTERNAL MEDICINE

## 2020-02-11 PROCEDURE — 87804 INFLUENZA ASSAY W/OPTIC: CPT

## 2020-02-11 PROCEDURE — 96375 TX/PRO/DX INJ NEW DRUG ADDON: CPT

## 2020-02-11 PROCEDURE — 6360000002 HC RX W HCPCS: Performed by: NURSE PRACTITIONER

## 2020-02-11 PROCEDURE — 87040 BLOOD CULTURE FOR BACTERIA: CPT

## 2020-02-11 PROCEDURE — 71045 X-RAY EXAM CHEST 1 VIEW: CPT

## 2020-02-11 PROCEDURE — 99285 EMERGENCY DEPT VISIT HI MDM: CPT

## 2020-02-11 RX ORDER — LOSARTAN POTASSIUM 100 MG/1
100 TABLET ORAL DAILY
Status: DISCONTINUED | OUTPATIENT
Start: 2020-02-12 | End: 2020-02-17 | Stop reason: HOSPADM

## 2020-02-11 RX ORDER — METOPROLOL SUCCINATE 50 MG/1
50 TABLET, EXTENDED RELEASE ORAL DAILY
Status: DISCONTINUED | OUTPATIENT
Start: 2020-02-11 | End: 2020-02-17 | Stop reason: HOSPADM

## 2020-02-11 RX ORDER — POTASSIUM CITRATE 15 MEQ/1
15 TABLET, EXTENDED RELEASE ORAL DAILY
Status: DISCONTINUED | OUTPATIENT
Start: 2020-02-11 | End: 2020-02-12 | Stop reason: SDUPTHER

## 2020-02-11 RX ORDER — MEROPENEM 1 G/1
1 INJECTION, POWDER, FOR SOLUTION INTRAVENOUS ONCE
Status: COMPLETED | OUTPATIENT
Start: 2020-02-11 | End: 2020-02-11

## 2020-02-11 RX ORDER — SODIUM CHLORIDE 0.9 % (FLUSH) 0.9 %
10 SYRINGE (ML) INJECTION PRN
Status: DISCONTINUED | OUTPATIENT
Start: 2020-02-11 | End: 2020-02-17 | Stop reason: HOSPADM

## 2020-02-11 RX ORDER — FUROSEMIDE 40 MG/1
40 TABLET ORAL DAILY
Status: DISCONTINUED | OUTPATIENT
Start: 2020-02-11 | End: 2020-02-11

## 2020-02-11 RX ORDER — ALBUTEROL SULFATE 90 UG/1
2 AEROSOL, METERED RESPIRATORY (INHALATION) EVERY 4 HOURS PRN
Status: DISCONTINUED | OUTPATIENT
Start: 2020-02-11 | End: 2020-02-17 | Stop reason: HOSPADM

## 2020-02-11 RX ORDER — ALBUTEROL SULFATE 90 UG/1
2 AEROSOL, METERED RESPIRATORY (INHALATION) EVERY 6 HOURS PRN
Status: DISCONTINUED | OUTPATIENT
Start: 2020-02-11 | End: 2020-02-11

## 2020-02-11 RX ORDER — METHYLPREDNISOLONE SODIUM SUCCINATE 40 MG/ML
40 INJECTION, POWDER, LYOPHILIZED, FOR SOLUTION INTRAMUSCULAR; INTRAVENOUS EVERY 12 HOURS
Status: COMPLETED | OUTPATIENT
Start: 2020-02-11 | End: 2020-02-13

## 2020-02-11 RX ORDER — ATORVASTATIN CALCIUM 10 MG/1
20 TABLET, FILM COATED ORAL DAILY
Status: DISCONTINUED | OUTPATIENT
Start: 2020-02-11 | End: 2020-02-17 | Stop reason: HOSPADM

## 2020-02-11 RX ORDER — ACETAMINOPHEN 325 MG/1
650 TABLET ORAL EVERY 4 HOURS PRN
Status: DISCONTINUED | OUTPATIENT
Start: 2020-02-11 | End: 2020-02-17 | Stop reason: HOSPADM

## 2020-02-11 RX ORDER — LOSARTAN POTASSIUM 100 MG/1
1 TABLET ORAL DAILY
Status: DISCONTINUED | OUTPATIENT
Start: 2020-02-11 | End: 2020-02-11

## 2020-02-11 RX ORDER — SODIUM CHLORIDE 9 MG/ML
INJECTION, SOLUTION INTRAVENOUS
Status: DISPENSED
Start: 2020-02-11 | End: 2020-02-12

## 2020-02-11 RX ORDER — PANTOPRAZOLE SODIUM 40 MG/1
40 TABLET, DELAYED RELEASE ORAL DAILY
Status: DISCONTINUED | OUTPATIENT
Start: 2020-02-11 | End: 2020-02-17 | Stop reason: HOSPADM

## 2020-02-11 RX ORDER — FUROSEMIDE 40 MG/1
40 TABLET ORAL DAILY
Status: DISCONTINUED | OUTPATIENT
Start: 2020-02-12 | End: 2020-02-17 | Stop reason: HOSPADM

## 2020-02-11 RX ORDER — SODIUM CHLORIDE 0.9 % (FLUSH) 0.9 %
10 SYRINGE (ML) INJECTION EVERY 12 HOURS SCHEDULED
Status: DISCONTINUED | OUTPATIENT
Start: 2020-02-11 | End: 2020-02-17 | Stop reason: HOSPADM

## 2020-02-11 RX ORDER — IPRATROPIUM BROMIDE AND ALBUTEROL SULFATE 2.5; .5 MG/3ML; MG/3ML
1 SOLUTION RESPIRATORY (INHALATION) ONCE
Status: COMPLETED | OUTPATIENT
Start: 2020-02-11 | End: 2020-02-11

## 2020-02-11 RX ORDER — METHYLPREDNISOLONE SODIUM SUCCINATE 125 MG/2ML
125 INJECTION, POWDER, LYOPHILIZED, FOR SOLUTION INTRAMUSCULAR; INTRAVENOUS ONCE
Status: COMPLETED | OUTPATIENT
Start: 2020-02-11 | End: 2020-02-11

## 2020-02-11 RX ORDER — IPRATROPIUM BROMIDE AND ALBUTEROL SULFATE 2.5; .5 MG/3ML; MG/3ML
1 SOLUTION RESPIRATORY (INHALATION)
Status: DISCONTINUED | OUTPATIENT
Start: 2020-02-11 | End: 2020-02-11

## 2020-02-11 RX ORDER — ONDANSETRON 2 MG/ML
4 INJECTION INTRAMUSCULAR; INTRAVENOUS EVERY 6 HOURS PRN
Status: DISCONTINUED | OUTPATIENT
Start: 2020-02-11 | End: 2020-02-17 | Stop reason: HOSPADM

## 2020-02-11 RX ORDER — AMLODIPINE BESYLATE 5 MG/1
5 TABLET ORAL DAILY
Status: DISCONTINUED | OUTPATIENT
Start: 2020-02-11 | End: 2020-02-17 | Stop reason: HOSPADM

## 2020-02-11 RX ORDER — IPRATROPIUM BROMIDE AND ALBUTEROL SULFATE 2.5; .5 MG/3ML; MG/3ML
1 SOLUTION RESPIRATORY (INHALATION) EVERY 4 HOURS
Status: DISCONTINUED | OUTPATIENT
Start: 2020-02-11 | End: 2020-02-15

## 2020-02-11 RX ORDER — CARBOXYMETHYLCELLULOSE SODIUM 10 MG/ML
1 GEL OPHTHALMIC 3 TIMES DAILY
Status: DISCONTINUED | OUTPATIENT
Start: 2020-02-11 | End: 2020-02-17 | Stop reason: HOSPADM

## 2020-02-11 RX ORDER — ALBUTEROL SULFATE 2.5 MG/3ML
2.5 SOLUTION RESPIRATORY (INHALATION) ONCE
Status: COMPLETED | OUTPATIENT
Start: 2020-02-11 | End: 2020-02-11

## 2020-02-11 RX ORDER — PREDNISONE 20 MG/1
40 TABLET ORAL DAILY
Status: DISCONTINUED | OUTPATIENT
Start: 2020-02-14 | End: 2020-02-14

## 2020-02-11 RX ADMIN — Medication 10 ML: at 20:35

## 2020-02-11 RX ADMIN — METHYLPREDNISOLONE SODIUM SUCCINATE 40 MG: 40 INJECTION, POWDER, FOR SOLUTION INTRAMUSCULAR; INTRAVENOUS at 20:35

## 2020-02-11 RX ADMIN — METOPROLOL SUCCINATE 50 MG: 50 TABLET, EXTENDED RELEASE ORAL at 13:10

## 2020-02-11 RX ADMIN — VANCOMYCIN HYDROCHLORIDE 750 MG: 10 INJECTION, POWDER, LYOPHILIZED, FOR SOLUTION INTRAVENOUS at 20:43

## 2020-02-11 RX ADMIN — AMLODIPINE BESYLATE 5 MG: 5 TABLET ORAL at 13:10

## 2020-02-11 RX ADMIN — CARBOXYMETHYLCELLULOSE SODIUM 1 DROP: 10 GEL OPHTHALMIC at 15:55

## 2020-02-11 RX ADMIN — RIVAROXABAN 20 MG: 20 TABLET, FILM COATED ORAL at 20:43

## 2020-02-11 RX ADMIN — IPRATROPIUM BROMIDE AND ALBUTEROL SULFATE 1 AMPULE: .5; 3 SOLUTION RESPIRATORY (INHALATION) at 19:35

## 2020-02-11 RX ADMIN — ALBUTEROL SULFATE 2.5 MG: 2.5 SOLUTION RESPIRATORY (INHALATION) at 08:40

## 2020-02-11 RX ADMIN — MEROPENEM 1 G: 1 INJECTION, POWDER, FOR SOLUTION INTRAVENOUS at 17:20

## 2020-02-11 RX ADMIN — MEROPENEM 1 G: 1 INJECTION, POWDER, FOR SOLUTION INTRAVENOUS at 10:01

## 2020-02-11 RX ADMIN — ATORVASTATIN CALCIUM 20 MG: 10 TABLET, FILM COATED ORAL at 13:10

## 2020-02-11 RX ADMIN — IPRATROPIUM BROMIDE AND ALBUTEROL SULFATE 1 AMPULE: .5; 3 SOLUTION RESPIRATORY (INHALATION) at 08:36

## 2020-02-11 RX ADMIN — METHYLPREDNISOLONE SODIUM SUCCINATE 125 MG: 125 INJECTION, POWDER, FOR SOLUTION INTRAMUSCULAR; INTRAVENOUS at 08:36

## 2020-02-11 RX ADMIN — PANTOPRAZOLE SODIUM 40 MG: 40 TABLET, DELAYED RELEASE ORAL at 13:10

## 2020-02-11 RX ADMIN — VANCOMYCIN HYDROCHLORIDE 1000 MG: 1 INJECTION, POWDER, LYOPHILIZED, FOR SOLUTION INTRAVENOUS at 09:15

## 2020-02-11 RX ADMIN — IPRATROPIUM BROMIDE AND ALBUTEROL SULFATE 1 AMPULE: .5; 3 SOLUTION RESPIRATORY (INHALATION) at 23:01

## 2020-02-11 RX ADMIN — CARBOXYMETHYLCELLULOSE SODIUM 1 DROP: 10 GEL OPHTHALMIC at 20:35

## 2020-02-11 RX ADMIN — IPRATROPIUM BROMIDE AND ALBUTEROL SULFATE 1 AMPULE: .5; 3 SOLUTION RESPIRATORY (INHALATION) at 15:18

## 2020-02-11 RX ADMIN — ALBUTEROL SULFATE 2.5 MG: 2.5 SOLUTION RESPIRATORY (INHALATION) at 10:01

## 2020-02-11 ASSESSMENT — ENCOUNTER SYMPTOMS
SPUTUM PRODUCTION: 0
SHORTNESS OF BREATH: 1
RHINORRHEA: 0
VOMITING: 0
DIARRHEA: 0
EYE REDNESS: 0
EYE DISCHARGE: 0
COUGH: 0
ABDOMINAL PAIN: 0
BACK PAIN: 0

## 2020-02-11 ASSESSMENT — PAIN DESCRIPTION - DESCRIPTORS: DESCRIPTORS: ACHING

## 2020-02-11 ASSESSMENT — PAIN DESCRIPTION - PROGRESSION: CLINICAL_PROGRESSION: NOT CHANGED

## 2020-02-11 ASSESSMENT — PAIN DESCRIPTION - FREQUENCY: FREQUENCY: CONTINUOUS

## 2020-02-11 ASSESSMENT — PAIN SCALES - GENERAL
PAINLEVEL_OUTOF10: 0
PAINLEVEL_OUTOF10: 4

## 2020-02-11 ASSESSMENT — PAIN DESCRIPTION - LOCATION: LOCATION: GENERALIZED

## 2020-02-11 ASSESSMENT — PAIN DESCRIPTION - PAIN TYPE: TYPE: ACUTE PAIN

## 2020-02-11 ASSESSMENT — PAIN DESCRIPTION - ONSET: ONSET: ON-GOING

## 2020-02-11 NOTE — ED PROVIDER NOTES
[facility]      Pt Name: Michael Palacois  MRN: [id number]  [place] 1945  Date of evaluation: 2/11/2020  Provider:  Anitha Degroot MD                  HISTORY OF PRESENT ILLNESS   (Location/Symptom, Timing/Onset, Context/Setting, Quality, Duration, Modifying Factors, Severity)  Note limiting factors. Pt states he has a cough and SOB worsening over the last week. He states the cough is dry. He states he has had some swelling to his ankles. He is no longer on lasix but it is included in his pill pack. His wife states he has been wheezing. He did have a nebulizer treatment this morning at 7 am. He is no longer on a steroid. It apparently was tapered off after his last admission. Patient also has inhalers that he is supposed to use but he states he forgets. Pt states he has been wearing his 3L O2 more often- it is not humidified. He is usually PRN. He did use it all day yesterday. Pt is followed by Dr Adrianna Steen for pulmonology. He states he has an appointment Thursday. He is a former smoker x 2 years. Patient denies any chest pain abdominal pain nausea vomiting or diarrhea or dysuria. Patient started smoking when he was 17 and quit smoking approximately 2 years ago. The history is provided by the patient. No  was used. Shortness of Breath   Severity:  Moderate  Onset quality:  Gradual  Timing:  Constant  Progression:  Worsening  Chronicity:  New  Relieved by:  Nothing  Worsened by: Activity and coughing  Ineffective treatments:  Oxygen (nebulizer)  Associated symptoms: no abdominal pain, no chest pain, no cough, no fever, no headaches, no rash, no sputum production and no vomiting            Nursing Notes were reviewed. REVIEW OF SYSTEMS    (2-9 systems for level 4, 10 or more for level 5)     Review of Systems   Constitutional: Negative for fever. HENT: Negative for ear discharge and rhinorrhea.     Eyes: Negative for discharge and redness. Respiratory: Positive for shortness of breath. Negative for cough and sputum production. Cardiovascular: Negative for chest pain and palpitations. Gastrointestinal: Negative for abdominal pain, diarrhea and vomiting. Genitourinary: Negative for difficulty urinating and flank pain. Musculoskeletal: Negative for back pain and joint swelling. Skin: Negative for rash. Neurological: Negative for syncope and headaches. Psychiatric/Behavioral: Negative for confusion. All other systems reviewed and are negative. PAST MEDICAL HISTORY   has a past medical history of A-fib ([facility] Acute respiratory failure with hypoxia ([facility] (2/13/2012), Atrial fibrillation with RVR ([facility] Avulsion fracture of ankle (9/21/2015), Benign localized hyperplasia of prostate with urinary obstruction and other lower urinary tract symptoms (LUTS)(600.21) (4/52/4347), Chronic systolic CHF (congestive heart failure) ([facility] (8/28/2017), Contusion of leg, right (9/21/2015), COPD (chronic obstructive pulmonary disease) ([facility] Fractures, GERD (gastroesophageal reflux disease) (6/15/2015), Hypertension, Hypertensive urgency (8/4/2019), Hypertrophy of prostate without urinary obstruction and other lower urinary tract symptoms (LUTS) (6/15/2015), No history of procedure, PAD (peripheral artery disease) ([facility] (10/9/2017), Pneumonia, and Thoracic aortic aneurysm ([facility] PAST SURGICAL HISTORY   has a past surgical history that includes hernia repair; Colonoscopy (2/24/2016); Cataract removal with implant (Right, 09/06/2018); pr xcapsl ctrc rmvl insj io lens prosth w/o ecp (Right, 9/6/2018); pr xcapsl ctrc rmvl insj io lens prosth w/o ecp (Left, 10/18/2018); Endoscopy, colon, diagnostic (11/13/2019); and Upper gastrointestinal endoscopy (N/A, 11/13/2019). FAMILY HISTORY  family history includes Alcohol Abuse in his sister. SOCIAL HISTORY   reports that he quit smoking about 2 years ago.  He has a 110.00 pack-year smoking history. He has never used smokeless tobacco. He reports previous alcohol use. He reports that he does not use drugs. HOME MEDICATIONS     Prior to Admission medications    Medication Sig Start Date End Date Taking? Authorizing Provider   ipratropium-albuterol (DUONEB) 0.5-2.5 (3) MG/3ML SOLN nebulizer solution USE ONE UNIT DOSE (3ML) IN NEBULIZER AND INHALE INTO THE LUNGS EVERY 4 HOURS AS NEEDED FOR SHORTNESS OF BREATH 2/5/20  Yes Reji Tang MD   amLODIPine (NORVASC) 5 MG tablet Take 1 tablet by mouth daily 1/29/20  Yes Jose Villar MD   atorvastatin (LIPITOR) 20 MG tablet Take 1 tablet by mouth daily 1/29/20  Yes Jose Villar MD   furosemide (LASIX) 40 MG tablet Take 1 tablet by mouth daily 1/29/20  Yes Jose Villar MD   losartan (COZAAR) 100 MG tablet TAKE ONE TABLET BY MOUTH DAILY 1/29/20  Yes Jose Villar MD   metoprolol succinate (TOPROL XL) 50 MG extended release tablet Take 1 tablet by mouth daily 1/29/20  Yes Jose Villar MD   Potassium Citrate ER 15 MEQ (1620 MG) TBCR Take 15 mEq by mouth daily 1/29/20  Yes Jose Villar MD   rivaroxaban (XARELTO) 20 MG TABS tablet TAKE ONE TABLET BY MOUTH DAILY WITH BREAKFAST 1/29/20  Yes Jose Villar MD   pantoprazole (PROTONIX) 40 MG tablet Take 1 tablet by mouth daily 1/3/20  Yes Domonique Massey APRN - CNP   Umeclidinium Bromide (INCRUSE ELLIPTA) 62.5 MCG/INH AEPB Inhale 1 Inhaler into the lungs daily 8/8/19  Yes Adriel Canchola MD   albuterol sulfate HFA (PROVENTIL HFA) 108 (90 Base) MCG/ACT inhaler Inhale 2 puffs into the lungs every 6 hours as needed for Wheezing (with spacer) 4/16/18  Yes Reji Tang MD        ALLERGIES  is allergic to amiodarone.             PHYSICAL EXAM    (up to 7 for level 4, 8 or more for level 5)         ED TRIAGE VITALS      /80   Pulse 95   Temp 98.3 °F (36.8 °C) (Oral)   Resp 20   Ht 5' 11\" (1.803 m)   Wt 234 lb 9.6 oz (106.4 kg)   BMI 32.72 kg/m² Physical Exam  Constitutional:       Appearance: He is well-developed. He is not toxic-appearing or diaphoretic. Comments: Patient does appear dyspneic. HENT:      Head: Normocephalic and atraumatic. Right Ear: Tympanic membrane and ear canal normal. There is no impacted cerumen. Left Ear: Tympanic membrane and ear canal normal. There is no impacted cerumen. Nose: Congestion present. No rhinorrhea. Mouth/Throat:      Pharynx: No oropharyngeal exudate or posterior oropharyngeal erythema. Eyes:      Conjunctiva/sclera: Conjunctivae normal.      Pupils: Pupils are equal, round, and reactive to light. Comments: Patient has slight crusting of his eyelashes. Neck:      Musculoskeletal: Normal range of motion and neck supple. Cardiovascular:      Rate and Rhythm: Normal rate and regular rhythm. Pulses: Normal pulses. Heart sounds: Normal heart sounds. No murmur. No friction rub. No gallop. Pulmonary:      Effort: Pulmonary effort is normal.      Comments: Patient is diminished throughout with rales in the bases. No expiratory wheezes but he is quite diminished. Abdominal:      General: Abdomen is flat. Bowel sounds are normal.      Palpations: Abdomen is soft. Abdomen is not rigid. Tenderness: There is no abdominal tenderness. There is no guarding or rebound. Musculoskeletal: Normal range of motion. Comments: 1+ pedal edema no cords no Homans. Skin:     General: Skin is warm and dry. Capillary Refill: Capillary refill takes less than 2 seconds. Coloration: Skin is not pale. Findings: No bruising, erythema, lesion or rash. Neurological:      General: No focal deficit present. Mental Status: He is alert and oriented to person, place, and time. GCS: GCS eye subscore is 4. GCS verbal subscore is 5. GCS motor subscore is 6. Cranial Nerves: No cranial nerve deficit. Sensory: No sensory deficit.       Motor: No abnormal - 99 mg/dL    BUN 20 7 - 20 mg/dL    CREATININE 1.6 (H) 0.8 - 1.3 mg/dL    GFR Non- 43 (A) >60    GFR  52 (A) >60    Calcium 9.4 8.3 - 10.6 mg/dL    Total Protein 7.1 6.4 - 8.2 g/dL    Alb 3.8 3.4 - 5.0 g/dL    Albumin/Globulin Ratio 1.2 1.1 - 2.2    Total Bilirubin 0.3 0.0 - 1.0 mg/dL    Alkaline Phosphatase 77 40 - 129 U/L    ALT 8 (L) 10 - 40 U/L    AST 12 (L) 15 - 37 U/L    Globulin 3.3 g/dL   Troponin   Result Value Ref Range    Troponin <0.01 <0.01 ng/mL   Brain Natriuretic Peptide   Result Value Ref Range    Pro-BNP 69 0 - 124 pg/mL   Lactic acid, plasma   Result Value Ref Range    Lactic Acid 1.4 0.4 - 2.0 mmol/L   EKG 12 Lead   Result Value Ref Range    Ventricular Rate 89 BPM    Atrial Rate 89 BPM    P-R Interval 192 ms    QRS Duration 96 ms    Q-T Interval 392 ms    QTc Calculation (Bazett) 476 ms    P Axis 67 degrees    R Axis 43 degrees    T Axis 63 degrees    Diagnosis       Normal sinus rhythmNormal ECGNo previous ECGs available               EKG interpreted by   EKG interpreted by Tej Krishnamurthy MD:    Rhythm: normal sinus   Rate: 89  Axis: normal  Ectopy: none  Conduction: normal  ST Segments: no acute change  T Waves: no acute change  Q Waves: none    Similar to previous 1/11/20      PROCEDURES:  Procedures        Emergency Department Course:  7:55 AM  Patient gives permission for family/companions to be present during questioning, answers and results during their emergency room visit. I discussed with the patient a work-up including breathing treatments and steroids. We also discussed the possibility of CHF being involved. And again although patient states he is not on Lasix it is in his pill pack which she is taking. Also discussed the possibility of being admitted. 9:25 AM  Re-evaluated and discussed results with patient. Patient has increased aeration although it is diminished is improved and now has expiratory wheezes throughout.   I discussed with him his results and admission to the hospital and he is agreeable to this. With coughing patient sats will drop to 87 and then improved into the low 90s. He again has no chest pain. 9:49am  Spoke with Carloz NOVAK for  Dr. Arleen Patel and discussed symptoms, exam, objective data and clinical course. Disposition and plan agreed upon. She will admit the patient and give/enter admitting orders. The Clinical Impression is copd exacerbation              RX Monitoring 9/21/2015   Attestation The Prescription Monitoring Report for this patient was reviewed today. Periodic Controlled Substance Monitoring Possible medication side effects, risk of tolerance and/or dependence, and alternative treatments discussed; No signs of potential drug abuse or diversion identified. (Please note that portions of this note were completed with a voice recognition program.  Efforts were made to edit the dictations but occasionally words are mis-transcribed.)    Hilda Pederson (electronically signed)  Attending Emergency Physician      This document serves as a record of the services and decisions personally performed by myself, No att. providers found. It was created on my behalf by Hilda Pederson, 2/11/20   a trained medical scribe. The creation of this document is based on my statements to the medical scribe. This dictation was generated by voice recognition computer software. Although all attempts are made to edit the dictation for accuracy, there may be errors in the transcription that are not intended.            Sandy Bush MD  02/11/20 1547

## 2020-02-11 NOTE — PLAN OF CARE
COPD AE  Possible PNA. Recent admission with HCAP treatment vanc and cefepime, dc'd to home on IV abx and completed 8 day course. CXR with persistent left basilar opacity  No fever, no leucocytosis. Check PCT.   Received vanc and Merrem in ER, held off on further antibiotics on admit orders    Med surg    Severo Spotted PA-C  2/11/2020 10:05 AM

## 2020-02-11 NOTE — CONSULTS
Patient is being seen at the request of Michael Escoto for a consultation for COPD exacerbation    HISTORY OF PRESENT ILLNESS: This is a 68-year-old male with a history of COPD, CHF and atrial fibrillation who was hospitalized 12/23/2019 with COPD and atrial fibrillation, subsequently rehospitalized on 1/11/2020 with pneumonia and COPD, who presented to the emergency department on 2/11/2020 with a one-week history of increasing severe shortness of breath, associated with lower extremity edema and wheezing. This is similar to his prior exacerbations of COPD. He is feeling better after treatment emergency department with inhaled bronchodilators and steroids. Adis Palm     PAST MEDICAL HISTORY:  Past Medical History:   Diagnosis Date    A-fib [facility])     2/14/12    Acute respiratory failure with hypoxia ([address] 2/13/2012    Atrial fibrillation with RVR ([address]     2/14/12     Avulsion fracture of ankle 9/21/2015    Benign localized hyperplasia of prostate with urinary obstruction and other lower urinary tract symptoms (LUTS)(600.21) 8/17/2016    Chronic systolic CHF (congestive heart failure) (HCC) 8/28/2017    Contusion of leg, right 9/21/2015    COPD (chronic obstructive pulmonary disease) (HCC)     Fractures     GERD (gastroesophageal reflux disease) 6/15/2015    Hypertension     Hypertensive urgency 8/4/2019    Hypertrophy of prostate without urinary obstruction and other lower urinary tract symptoms (LUTS) 6/15/2015    No history of procedure     no previos colonoscopy    PAD (peripheral artery disease) ([address] 10/9/2017    Pneumonia     Thoracic aortic aneurysm (HCC)      PAST SURGICAL HISTORY:  Past Surgical History:   Procedure Laterality Date    CATARACT REMOVAL WITH IMPLANT Right 09/06/2018     PHACO EMULSIFICATION OF CATARACT WITH INTRAOCULAR LENS IMPLANT RIGHT EYE    COLONOSCOPY  2/24/2016    sigmoid polyps    ENDOSCOPY, COLON, DIAGNOSTIC  11/13/2019    egd; esophagitis/gastritis    HERNIA REPAIR hematuria   Musculoskeletal: Negative for arthralgias   Skin: Negative for rash  Neurological: Negative for syncope  Hematological: Negative for adenopathy  Psychiatric/Behavorial: Negative for anxiety    PHYSICAL EXAM:  Blood pressure 118/86, pulse 90, temperature 98.3 °F (36.8 °C), temperature source Oral, resp. rate 20, height 5' 11\" (1.803 m), weight 234 lb 9.6 oz (106.4 kg), SpO2 95 %.' on RA  Gen: No distress. Eyes: PERRL. No sclera icterus. No conjunctival injection. ENT: No discharge. Pharynx clear. Neck: Trachea midline. No obvious mass. Resp: No accessory muscle use. No crackles. No wheezes. No rhonchi. No dullness on percussion. CV: Regular rate. Regular rhythm. No murmur or rub. No edema. Peripheral pulses are 2+. Capillary refill is less than 3 seconds. GI: Non-tender. Non-distended. No hernia. Skin: Warm and dry. No nodule on exposed extremities. Lymph: No cervical LAD. No supraclavicular LAD. M/S: No cyanosis. No joint deformity. No clubbing. Neuro: Awake. Alert. Moves all four extremities. Psych: Oriented x 3. No anxiety. LABS:  CBC:   Recent Labs     02/11/20  0825   WBC 7.6   HGB 12.7*   HCT 39.0*   MCV 92.9        BMP:   Recent Labs     02/11/20  0825      K 4.0      CO2 26   BUN 20   CREATININE 1.6*     LIVER PROFILE:   Recent Labs     02/11/20  0825   AST 12*   ALT 8*   BILITOT 0.3   ALKPHOS 77     PT/INR: No results for input(s): PROTIME, INR in the last 72 hours. APTT: No results for input(s): APTT in the last 72 hours. UA:No results for input(s): NITRITE, COLORU, PHUR, LABCAST, WBCUA, RBCUA, MUCUS, TRICHOMONAS, YEAST, BACTERIA, CLARITYU, SPECGRAV, LEUKOCYTESUR, UROBILINOGEN, BILIRUBINUR, BLOODU, GLUCOSEU, AMORPHOUS in the last 72 hours. Invalid input(s): KETONESU  No results for input(s): PHART, UZE3YND, PO2ART in the last 72 hours.     Chest imaging was reviewed by me and showed   2/11/2020 persistent left basilar opacity    ASSESSMENT:  · COPD with acute exacerbation/upper lobe predominant paraseptal and centrilobular emphysema  · I do not favor pneumonia given absence of white count and normal procalcitonin level. The infiltrate is in the same location as his prior imaging. · Persistently abnormal chest x-ray  · Paroxysmal atrial fibrillation on Xarelto  · Chronic kidney disease  · Tobacco abuse in remission, 110-pack-year tobacco history    PLAN:  Supplemental oxygen to maintain SaO2 >92%; wean as tolerated    IV solumedrol with plan to convert to oral prednisone with improvement  Inhaled bronchodilators   Currently on meropenem and vancomycin. I favor discontinuing the antibiotics. Will obtain CT imaging of the chest for persistently abnormal chest x-ray and unless there is an unexpected finding on the CT imaging, will stop the antibiotics at that point.   Tobacco cessation has been achieved

## 2020-02-11 NOTE — PROGRESS NOTES
RESPIRATORY THERAPY ASSESSMENT    Name:  [facility] Record Number:  [id number]  Age: 69 y.o. Gender: male  : 1945  Today's Date:  2020  Room:  0228/0228-02    Assessment     Is the patient being admitted for a COPD or Asthma exacerbation? Yes   (If yes the patient will be seen every 4 hours for the first 24 hours and then reassessed)    Patient Admission Diagnosis      Allergies  Allergies   Allergen Reactions    Amiodarone Anaphylaxis     Thyriod toxic        Minimum Predicted Vital Capacity:     n/a          Actual Vital Capacity:      n/a              Pulmonary History:COPD and CHF/Pulmonary Edema  Home Oxygen Therapy:  3 liters/min via nasal cannula  Home Respiratory Therapy:Albuterol, Albuterol/Ipratropium Bromide HHN and Incruse   Current Respiratory Therapy:  Duoneb Q4H w/a  Treatment Type: HHN  Medications: Albuterol/Ipratropium    Respiratory Severity Index(RSI)   Patients with orders for inhalation medications, oxygen, or any therapeutic treatment modality will be placed on Respiratory Protocol. They will be assessed with the first treatment and at least every 72 hours thereafter. The following severity scale will be used to determine frequency of treatment intervention.     Smoking History: Mild Exacerbation = 3    Social History  Social History     Tobacco Use    Smoking status: Former Smoker     Packs/day: 2.00     Years: 55.00     Pack years: 110.00     Last attempt to quit: 2017     Years since quittin.4    Smokeless tobacco: Never Used    Tobacco comment: smoked 2 darnell a day for 55 years   Substance Use Topics    Alcohol use: Not Currently     Frequency: Never     Comment: occassionally    Drug use: No       Recent Surgical History: None = 0  Past Surgical History  Past Surgical History:   Procedure Laterality Date    CATARACT REMOVAL WITH IMPLANT Right 2018     PHACO EMULSIFICATION OF CATARACT WITH INTRAOCULAR LENS IMPLANT RIGHT EYE    COLONOSCOPY regimen frequency. 5. Bronchodilator(s) will be discontinued if patient has a RSI less than 9 and has received no scheduled or as needed treatment for 72  Hrs. Patients Ordered on a Mucolytic Agent:    1. Must always be administered with a bronchodilator. 2. Discontinue if patient experiences worsened bronchospasm, or secretions have lessened to the point that the patient is able to clear them with a cough. Anti-inflammatory and Combination Medications:    1. If the patient lacks prior history of lung disease, is not using inhaled anti-inflammatory medication at home, and lacks wheezing by examination or by history for at least 24 hours, contact physician for possible discontinuation.

## 2020-02-11 NOTE — H&P
REMOVAL WITH IMPLANT Right 09/06/2018     PHACO EMULSIFICATION OF CATARACT WITH INTRAOCULAR LENS IMPLANT RIGHT EYE    COLONOSCOPY  2/24/2016    sigmoid polyps    ENDOSCOPY, COLON, DIAGNOSTIC  11/13/2019    egd; esophagitis/gastritis    HERNIA REPAIR      KS XCAPSL CTRC RMVL INSJ IO LENS PROSTH W/O ECP Right 9/6/2018    PHACO EMULSIFICATION OF CATARACT WITH INTRAOCULAR LENS IMPLANT RIGHT EYE performed by Leatha Currie MD at [address] [facility] RMVL INSJ IO LENS PROSTH W/O ECP Left 10/18/2018    PHACO EMULSIFICATION OF CATARACT WITH  INTRAOCULAR LENS IMPLANT LEFT EYE performed by Leatha Currie MD at [facility] 11/13/2019    EGD BIOPSY performed by Apolinar Dave MD at [address] [facility]       Medications Prior to Admission:    Prior to Admission medications    Medication Sig Start Date End Date Taking?  Authorizing Provider   ipratropium-albuterol (DUONEB) 0.5-2.5 (3) MG/3ML SOLN nebulizer solution USE ONE UNIT DOSE (3ML) IN NEBULIZER AND INHALE INTO THE LUNGS EVERY 4 HOURS AS NEEDED FOR SHORTNESS OF BREATH 2/5/20  Yes Svetlana Reis MD   amLODIPine (NORVASC) 5 MG tablet Take 1 tablet by mouth daily 1/29/20  Yes Mccoy Shone, MD   atorvastatin (LIPITOR) 20 MG tablet Take 1 tablet by mouth daily 1/29/20  Yes Mccoy Shone, MD   furosemide (LASIX) 40 MG tablet Take 1 tablet by mouth daily 1/29/20  Yes Mccoy Shone, MD   losartan (COZAAR) 100 MG tablet TAKE ONE TABLET BY MOUTH DAILY 1/29/20  Yes Mccoy Shone, MD   metoprolol succinate (TOPROL XL) 50 MG extended release tablet Take 1 tablet by mouth daily 1/29/20  Yes Mccoy Shone, MD   Potassium Citrate ER 15 MEQ (1620 MG) TBCR Take 15 mEq by mouth daily 1/29/20  Yes Mccoy Shone, MD   rivaroxaban (XARELTO) 20 MG TABS tablet TAKE ONE TABLET BY MOUTH DAILY WITH BREAKFAST 1/29/20  Yes Mccoy Shone, MD   pantoprazole (PROTONIX) 40 MG tablet Take 1 tablet by mouth daily 1/3/20  Yes Rebecca Sandoval Daphnie, APRN - CNP   Umeclidinium Bromide (INCRUSE ELLIPTA) 62.5 MCG/INH AEPB Inhale 1 Inhaler into the lungs daily 8/8/19  Yes Danny Minor MD   albuterol sulfate HFA (PROVENTIL HFA) 108 (90 Base) MCG/ACT inhaler Inhale 2 puffs into the lungs every 6 hours as needed for Wheezing (with spacer) 4/16/18  Yes Abhishek Jordan MD       Allergies:  Amiodarone    Social History:    TOBACCO:   reports that he quit smoking about 2 years ago. He has a 110.00 pack-year smoking history. He has never used smokeless tobacco.  ETOH:   reports previous alcohol use. Family History:   Positive as follows:        Problem Relation Age of Onset    Alcohol Abuse Sister        REVIEW OF SYSTEMS:       Constitutional: Negative for chills and fever. HENT: Negative for congestion. Respiratory: Positive for cough, shortness of breath and wheezing. Cardiovascular: Negative for chest pain. Gastrointestinal: Negative for abdominal pain, nausea and vomiting.      PHYSICAL EXAM:    /86   Pulse 90   Temp 98.3 °F (36.8 °C) (Oral)   Resp 20   Ht 5' 11\" (1.803 m)   Wt 234 lb 9.6 oz (106.4 kg)   SpO2 90%   BMI 32.72 kg/m²     Physical Exam   Constitutional: He is oriented to person, place, and time. He appears well-developed and well-nourished. He appears distressed. HENT:   Head: Normocephalic and atraumatic. Eyes: Pupils are equal, round, and reactive to light. Right eye exhibits discharge. Left eye exhibits discharge. Right conjunctiva is injected. Left conjunctiva is injected. Neck: No tracheal deviation present. Cardiovascular: Regular rhythm, regular rate. S1 normal, S2 normal and normal heart sounds. Exam reveals no gallop and no friction rub. No murmur heard. Pulmonary/Chest: Accessory muscle usage present. Tachypnea noted. He has decreased breath sounds. Musculoskeletal: He exhibits edema (Trace BLE). Lymphadenopathy:     He has no cervical adenopathy.    Neurological: He is alert elevated. Continue Losartan, Toprol-XL, Amlodipine. - monitor. GERD  - on PPI. Hyperlipidemia  - on Atorvastatin. CKD stage 3  - stable. Monitor labs.      DVT Prophylaxis: Xarelto    Diet: DIET LOW SODIUM 2 GM;  Code Status: Full Code    Vale Garcia FNP-C  2/11/2020     Agree with above  Changes made to note    Buckley Bence, MD 2/11/2020 4:57 PM

## 2020-02-11 NOTE — PROGRESS NOTES
Admission assessment completed. See flowsheet. A/O x 4, from home with SOB. Lungs sounds with end expiratory wheezes to left side, diminished throughout. Bowel sounds active. Trace BLE edema noted. Urinary continence. Labs reviewed. Cont to monitor.

## 2020-02-11 NOTE — PROGRESS NOTES
4 Eyes Skin Assessment     The patient is being assess for   Admission    I agree that 2 RN's have performed a thorough Head to Toe Skin Assessment on the patient. ALL assessment sites listed below have been assessed. Areas assessed by both nurses:   [x]   Head, Face, and Ears   [x]   Shoulders, Back, and Chest, Abdomen  [x]   Arms, Elbows, and Hands   [x]   Coccyx, Sacrum, and Ischium  [x]   Legs, Feet, and Heels        Scattered bruising, otherwise unremarkable    **SHARE this note so that the co-signing nurse is able to place an eSignature**    Co-signer eSignature: {Esignature:[id number]}    Does the Patient have Skin Breakdown?   No          Matt Prevention initiated:  No   Wound Care Orders initiated:  No      WOC nurse consulted for Pressure Injury (Stage 3,4, Unstageable, DTI, NWPT, Complex wounds)and New or Established Ostomies:  No      Primary Nurse eSignature: Electronically signed by Chuck Hinson RN on 2/11/20 at 12:30 PM

## 2020-02-12 ENCOUNTER — TELEPHONE (OUTPATIENT)
Dept: PULMONOLOGY | Age: 75
End: 2020-02-12

## 2020-02-12 LAB
ANION GAP SERPL CALCULATED.3IONS-SCNC: 15 MMOL/L (ref 3–16)
BASOPHILS ABSOLUTE: 0 K/UL (ref 0–0.2)
BASOPHILS RELATIVE PERCENT: 0.3 %
BUN BLDV-MCNC: 25 MG/DL (ref 7–20)
CALCIUM SERPL-MCNC: 9 MG/DL (ref 8.3–10.6)
CHLORIDE BLD-SCNC: 103 MMOL/L (ref 99–110)
CO2: 22 MMOL/L (ref 21–32)
CREAT SERPL-MCNC: 1.4 MG/DL (ref 0.8–1.3)
EOSINOPHILS ABSOLUTE: 0 K/UL (ref 0–0.6)
EOSINOPHILS RELATIVE PERCENT: 0 %
GFR AFRICAN AMERICAN: >60
GFR NON-AFRICAN AMERICAN: 50
GLUCOSE BLD-MCNC: 152 MG/DL (ref 70–99)
HCT VFR BLD CALC: 37.4 % (ref 40.5–52.5)
HEMOGLOBIN: 12.5 G/DL (ref 13.5–17.5)
LYMPHOCYTES ABSOLUTE: 1 K/UL (ref 1–5.1)
LYMPHOCYTES RELATIVE PERCENT: 9.5 %
MCH RBC QN AUTO: 31.1 PG (ref 26–34)
MCHC RBC AUTO-ENTMCNC: 33.3 G/DL (ref 31–36)
MCV RBC AUTO: 93.2 FL (ref 80–100)
MONOCYTES ABSOLUTE: 0.5 K/UL (ref 0–1.3)
MONOCYTES RELATIVE PERCENT: 4.6 %
NEUTROPHILS ABSOLUTE: 9.2 K/UL (ref 1.7–7.7)
NEUTROPHILS RELATIVE PERCENT: 85.6 %
PDW BLD-RTO: 16.3 % (ref 12.4–15.4)
PLATELET # BLD: 259 K/UL (ref 135–450)
PMV BLD AUTO: 8.1 FL (ref 5–10.5)
POTASSIUM REFLEX MAGNESIUM: 4.5 MMOL/L (ref 3.5–5.1)
RBC # BLD: 4.02 M/UL (ref 4.2–5.9)
SODIUM BLD-SCNC: 140 MMOL/L (ref 136–145)
WBC # BLD: 10.8 K/UL (ref 4–11)

## 2020-02-12 PROCEDURE — 99232 SBSQ HOSP IP/OBS MODERATE 35: CPT | Performed by: INTERNAL MEDICINE

## 2020-02-12 PROCEDURE — 6360000002 HC RX W HCPCS: Performed by: PHYSICIAN ASSISTANT

## 2020-02-12 PROCEDURE — 6370000000 HC RX 637 (ALT 250 FOR IP): Performed by: HOSPITALIST

## 2020-02-12 PROCEDURE — 6370000000 HC RX 637 (ALT 250 FOR IP): Performed by: INTERNAL MEDICINE

## 2020-02-12 PROCEDURE — 36415 COLL VENOUS BLD VENIPUNCTURE: CPT

## 2020-02-12 PROCEDURE — 6360000002 HC RX W HCPCS: Performed by: NURSE PRACTITIONER

## 2020-02-12 PROCEDURE — 6360000002 HC RX W HCPCS: Performed by: INTERNAL MEDICINE

## 2020-02-12 PROCEDURE — 94761 N-INVAS EAR/PLS OXIMETRY MLT: CPT

## 2020-02-12 PROCEDURE — 1200000000 HC SEMI PRIVATE

## 2020-02-12 PROCEDURE — 6370000000 HC RX 637 (ALT 250 FOR IP): Performed by: NURSE PRACTITIONER

## 2020-02-12 PROCEDURE — 80048 BASIC METABOLIC PNL TOTAL CA: CPT

## 2020-02-12 PROCEDURE — 2580000003 HC RX 258: Performed by: NURSE PRACTITIONER

## 2020-02-12 PROCEDURE — 2580000003 HC RX 258: Performed by: PHYSICIAN ASSISTANT

## 2020-02-12 PROCEDURE — 6370000000 HC RX 637 (ALT 250 FOR IP): Performed by: PHYSICIAN ASSISTANT

## 2020-02-12 PROCEDURE — 85025 COMPLETE CBC W/AUTO DIFF WBC: CPT

## 2020-02-12 PROCEDURE — 94640 AIRWAY INHALATION TREATMENT: CPT

## 2020-02-12 PROCEDURE — 2700000000 HC OXYGEN THERAPY PER DAY

## 2020-02-12 RX ORDER — POTASSIUM CITRATE 5 MEQ/1
15 TABLET, EXTENDED RELEASE ORAL DAILY
Status: DISCONTINUED | OUTPATIENT
Start: 2020-02-12 | End: 2020-02-17 | Stop reason: HOSPADM

## 2020-02-12 RX ORDER — BENZONATATE 100 MG/1
100 CAPSULE ORAL 3 TIMES DAILY PRN
Status: DISCONTINUED | OUTPATIENT
Start: 2020-02-12 | End: 2020-02-17 | Stop reason: HOSPADM

## 2020-02-12 RX ORDER — ALBUTEROL SULFATE 2.5 MG/3ML
2.5 SOLUTION RESPIRATORY (INHALATION) EVERY 4 HOURS PRN
Status: DISCONTINUED | OUTPATIENT
Start: 2020-02-12 | End: 2020-02-17 | Stop reason: HOSPADM

## 2020-02-12 RX ADMIN — ATORVASTATIN CALCIUM 20 MG: 10 TABLET, FILM COATED ORAL at 08:41

## 2020-02-12 RX ADMIN — Medication 10 ML: at 08:42

## 2020-02-12 RX ADMIN — PANTOPRAZOLE SODIUM 40 MG: 40 TABLET, DELAYED RELEASE ORAL at 08:41

## 2020-02-12 RX ADMIN — IPRATROPIUM BROMIDE AND ALBUTEROL SULFATE 1 AMPULE: .5; 3 SOLUTION RESPIRATORY (INHALATION) at 23:18

## 2020-02-12 RX ADMIN — VANCOMYCIN HYDROCHLORIDE 750 MG: 10 INJECTION, POWDER, LYOPHILIZED, FOR SOLUTION INTRAVENOUS at 20:01

## 2020-02-12 RX ADMIN — VANCOMYCIN HYDROCHLORIDE 750 MG: 10 INJECTION, POWDER, LYOPHILIZED, FOR SOLUTION INTRAVENOUS at 09:19

## 2020-02-12 RX ADMIN — IPRATROPIUM BROMIDE AND ALBUTEROL SULFATE 1 AMPULE: .5; 3 SOLUTION RESPIRATORY (INHALATION) at 10:58

## 2020-02-12 RX ADMIN — IPRATROPIUM BROMIDE AND ALBUTEROL SULFATE 1 AMPULE: .5; 3 SOLUTION RESPIRATORY (INHALATION) at 07:17

## 2020-02-12 RX ADMIN — MEROPENEM 1 G: 1 INJECTION, POWDER, FOR SOLUTION INTRAVENOUS at 08:42

## 2020-02-12 RX ADMIN — ALBUTEROL SULFATE 2.5 MG: 2.5 SOLUTION RESPIRATORY (INHALATION) at 17:11

## 2020-02-12 RX ADMIN — POTASSIUM CITRATE 15 MEQ: 5 TABLET ORAL at 13:01

## 2020-02-12 RX ADMIN — FUROSEMIDE 40 MG: 40 TABLET ORAL at 08:42

## 2020-02-12 RX ADMIN — AMLODIPINE BESYLATE 5 MG: 5 TABLET ORAL at 08:42

## 2020-02-12 RX ADMIN — Medication 10 ML: at 20:01

## 2020-02-12 RX ADMIN — MEROPENEM 1 G: 1 INJECTION, POWDER, FOR SOLUTION INTRAVENOUS at 00:58

## 2020-02-12 RX ADMIN — METHYLPREDNISOLONE SODIUM SUCCINATE 40 MG: 40 INJECTION, POWDER, FOR SOLUTION INTRAMUSCULAR; INTRAVENOUS at 08:42

## 2020-02-12 RX ADMIN — METHYLPREDNISOLONE SODIUM SUCCINATE 40 MG: 40 INJECTION, POWDER, FOR SOLUTION INTRAMUSCULAR; INTRAVENOUS at 20:02

## 2020-02-12 RX ADMIN — Medication 2 PUFF: at 09:25

## 2020-02-12 RX ADMIN — CARBOXYMETHYLCELLULOSE SODIUM 1 DROP: 10 GEL OPHTHALMIC at 20:01

## 2020-02-12 RX ADMIN — CARBOXYMETHYLCELLULOSE SODIUM 1 DROP: 10 GEL OPHTHALMIC at 14:28

## 2020-02-12 RX ADMIN — IPRATROPIUM BROMIDE AND ALBUTEROL SULFATE 1 AMPULE: .5; 3 SOLUTION RESPIRATORY (INHALATION) at 15:03

## 2020-02-12 RX ADMIN — RIVAROXABAN 20 MG: 20 TABLET, FILM COATED ORAL at 08:42

## 2020-02-12 RX ADMIN — MEROPENEM 1 G: 1 INJECTION, POWDER, FOR SOLUTION INTRAVENOUS at 16:42

## 2020-02-12 RX ADMIN — CARBOXYMETHYLCELLULOSE SODIUM 1 DROP: 10 GEL OPHTHALMIC at 08:42

## 2020-02-12 RX ADMIN — IPRATROPIUM BROMIDE AND ALBUTEROL SULFATE 1 AMPULE: .5; 3 SOLUTION RESPIRATORY (INHALATION) at 03:38

## 2020-02-12 RX ADMIN — METOPROLOL SUCCINATE 50 MG: 50 TABLET, EXTENDED RELEASE ORAL at 08:42

## 2020-02-12 RX ADMIN — IPRATROPIUM BROMIDE AND ALBUTEROL SULFATE 1 AMPULE: .5; 3 SOLUTION RESPIRATORY (INHALATION) at 19:32

## 2020-02-12 RX ADMIN — BENZONATATE 100 MG: 100 CAPSULE ORAL at 03:33

## 2020-02-12 RX ADMIN — LOSARTAN POTASSIUM 100 MG: 100 TABLET, FILM COATED ORAL at 08:41

## 2020-02-12 NOTE — PROGRESS NOTES
sigmoid polyps    ENDOSCOPY, COLON, DIAGNOSTIC  11/13/2019    egd; esophagitis/gastritis    HERNIA REPAIR      CA XCAPSL CTRC RMVL INSJ IO LENS PROSTH W/O ECP Right 9/6/2018    PHACO EMULSIFICATION OF CATARACT WITH INTRAOCULAR LENS IMPLANT RIGHT EYE performed by Misty Rodríguez MD at [address] [place] [address] RMVL INSJ IO LENS PROSTH W/O ECP Left 10/18/2018    PHACO EMULSIFICATION OF CATARACT WITH  INTRAOCULAR LENS IMPLANT LEFT EYE performed by Misty Rodríguez MD at [address] [facility] N/A 11/13/2019    EGD BIOPSY performed by Jorge Baltazar MD at [facility]       Level of Consciousness: Alert, Oriented, and Cooperative = 0    Level of Activity: Walking unassisted = 0    Respiratory Pattern: Dyspnea with exertion;Irregular pattern;or RR less than 6 = 2    Breath Sounds: Absent bilaterally and/or with wheezes = 3    Sputum   ,  , Sputum How Obtained: Spontaneous cough  Cough: Strong, spontaneous, non-productive = 0    Vital Signs   /80   Pulse 85   Temp 97.2 °F (36.2 °C) (Oral)   Resp 22   Ht 5' 11\" (1.803 m)   Wt 235 lb (106.6 kg)   SpO2 94%   BMI 32.78 kg/m²   SPO2 (COPD values may differ): 88-89% on room air or greater than 92% on FiO2 28- 35% = 2    Peak Flow (asthma only): not applicable = 0    RSI: 2-36 = TID (three times daily) and Q4hr PRN for dyspnea        Plan       Goals: medication delivery    Patient/caregiver was educated on the proper method of use for Respiratory Care Devices:  Yes      Level of patient/caregiver understanding able to:   ? Verbalize understanding   ? Demonstrate understanding       ? Teach back        ? Needs reinforcement       ? No available caregiver               ? Other:     Response to education:  Excellent     Is patient being placed on Home Treatment Regimen? No    Does the patient have everything they need prior to discharge?   Yes     Comments: pt assessed, interviewed/chart reviewed    Plan of Care: Duoneb Q4H

## 2020-02-12 NOTE — TELEPHONE ENCOUNTER
Patient's appointment with Beto Santiago canceled for 2/13/20 per . Patient has future follow up with Beto Santiago 4/22/20.

## 2020-02-12 NOTE — PROGRESS NOTES
Progress Note    Admit Date:  2/11/2020    Admitted for COPD AE and possible HCAP    Subjective:  Mr. Apolinar Krabbe still feeling short of breath and wheezy. CT chest shows concerns for pneumonia. Objective:   Patient Vitals for the past 4 hrs:   BP Temp Temp src Pulse Resp SpO2   02/12/20 0927 -- -- -- -- -- 94 %   02/12/20 0838 128/80 97.2 °F (36.2 °C) Oral 85 22 92 %   02/12/20 0719 -- -- -- -- -- 93 %          Intake/Output Summary (Last 24 hours) at 2/12/2020 1100  Last data filed at 2/12/2020 9814  Gross per 24 hour   Intake 730 ml   Output 475 ml   Net 255 ml       Physical Exam:  Gen: No distress. Alert. Eyes: PERRL. No sclera icterus. No conjunctival injection. ENT: No discharge. Pharynx clear. Neck: No JVD. Trachea midline. Resp: No accessory muscle use. No crackles. Diffuse wheezes No rhonchi. Diminished breath sounds throughout, on 3 L nasal cannula O2  CV: Regular rate. Regular rhythm. No murmur. No rub. No edema. Capillary Refill: Brisk,< 3 seconds   Peripheral Pulses: +2 palpable, equal bilaterally   GI: Non-tender. Non-distended. Normal bowel sounds. Skin: Warm and dry. M/S: No cyanosis. No joint deformity. No clubbing. Neuro: Awake. Grossly nonfocal    Psych: Oriented x 3. No anxiety or agitation. I Julia Calvo have reviewed the chart on [facility] and personally interviewed and examined patient, reviewed the data (labs and imaging) and after discussion with my PA formulated the plan. Agree with note with the following edits. HPI:     I reviewed the patient's Past Medical History, Past Surgical History, Medications, and Allergies. CT chest with pna   More sob today     General: elderly male up in bed   Awake, alert and oriented.  Appears to be not in any distress  Mucous Membranes:  Pink , anicteric  Neck: No JVD, no carotid bruit, no thyromegaly  Chest: diminished vickey with scattered wheeze  Cardiovascular:  RRR S1S2 heard, no murmurs or 4. Severe coronary artery disease. XR CHEST PORTABLE   Final Result   Left basilar opacity, pneumonia or recurrent pneumonia. Surveillance until   resolution recommended. Assessment/Plan:    Acute on chronic hypoxic respiratory failure  COPD exacerbation  - patient presented with c/o worsening dyspnea, cough  - Admitted to med-surg.  - pulmonology consulted. - Supplemental O2 at 3 L-->home o2 3L. Wean as tolerated. Wears O2 as needed at home. - IV Solu-medrol, HHN Treatments scheduled/prn. - pulmonology consulted     Possible pneumonia  - afebrile. No leukocytosis, no lactic acidosis. procalcitonin negative  - received IV Merrem and Vanc in ED  - continue Ronald Norah until after seen by pulmonology. - CT chest with concerns for pneumonia, will continue IV Abx     Blepharitis  - Refresh eye ointment ordered.      Chronic systolic CHF  - continue PO lasix, potassium supplementation. - on Losartan.   - daily weight, I&O. Paroxysmal A-fib  - continue Toprol-XL  - AC: Xarelto     Hypertension  - BP elevated. Continue Losartan, Toprol-XL, Amlodipine. - monitor.      GERD  - on PPI.      Hyperlipidemia  - on Atorvastatin.      CKD stage 3  - stable. Monitor labs.      DVT Prophylaxis: Xarelto   Diet: DIET LOW SODIUM 2 GM;  Code Status: Full Code    Nelson Saul 11:00 AM 2/12/2020    Agree with above  Changes made to note    Marek Jain MD 2/12/2020 1:34 PM

## 2020-02-12 NOTE — FLOWSHEET NOTE
02/12/20 0838   Vital Signs   Temp 97.2 °F (36.2 °C)   Temp Source Oral   Pulse 85   Heart Rate Source Monitor   Resp 22   /80   BP Location Left upper arm   BP Upper/Lower Upper   Patient Position Semi fowlers   Level of Consciousness 0   MEWS Score 2   Oxygen Therapy   SpO2 92 %   O2 Device Nasal cannula   O2 Flow Rate (L/min) 3 L/min   Pt semi day in bed, denies needs. Noted short of breath with any activity. AM assessment complete. Call light in reach.

## 2020-02-12 NOTE — PROGRESS NOTES
Pulmonary Progress Note  CC: wheezing, lower extremity edema, copd    Subjective:  More wheezing, more cough    IV line peripheral    EXAM:   /80   Pulse 85   Temp 97.2 °F (36.2 °C) (Oral)   Resp 22   Ht 5' 11\" (1.803 m)   Wt 235 lb (106.6 kg)   SpO2 94%   BMI 32.78 kg/m²  on 3 L  Constitutional:  No acute distress   HEENT: no scleral icterus  Neck: No tracheal deviation present. Cardiovascular: Normal heart sounds. Pulmonary/Chest: + wheezes. No rhonchi. No rales. No decreased breath sounds. + accessory muscle usage   Abdominal: Soft. Musculoskeletal: No cyanosis. No clubbing. Skin: Skin is warm and dry. Scheduled Meds:   amLODIPine  5 mg Oral Daily    atorvastatin  20 mg Oral Daily    metoprolol succinate  50 mg Oral Daily    pantoprazole  40 mg Oral Daily    Potassium Citrate ER  15 mEq Oral Daily    rivaroxaban  20 mg Oral Daily with breakfast    sodium chloride flush  10 mL Intravenous 2 times per day    methylPREDNISolone  40 mg Intravenous Q12H    Followed by   Magdi Reed ON 2/14/2020] predniSONE  40 mg Oral Daily    losartan  100 mg Oral Daily    furosemide  40 mg Oral Daily    carboxymethylcellulose PF  1 drop Both Eyes TID    meropenem  1 g Intravenous Q8H    ipratropium-albuterol  1 ampule Inhalation Q4H    vancomycin  750 mg Intravenous Q12H     Continuous Infusions:    PRN Meds:  benzonatate, albuterol, sodium chloride flush, magnesium hydroxide, ondansetron, acetaminophen, albuterol sulfate HFA    Labs:  CBC:   Recent Labs     02/11/20  0825 02/12/20  0624   WBC 7.6 10.8   HGB 12.7* 12.5*   HCT 39.0* 37.4*   MCV 92.9 93.2    259     BMP:   Recent Labs     02/11/20  0825 02/12/20  0623    140   K 4.0 4.5    103   CO2 26 22   BUN 20 25*   CREATININE 1.6* 1.4*       Cultures:  2/11/20 Blood sent   Respiratory sent   Rapid flu negative    Films:  2/11/2020 persistent left basilar opacity    2/11/2020 CT CHEST  Impression:        1.  Nodular airspace opacities in the left lower lobe, most compatible with  pneumonia, which account for the recent radiographic finding. Fredrica Handler is also  linear atelectasis versus a mild pneumonia in the right lower lobe. Short-term CT follow-up to document resolution is recommended. 2. 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 3. Mild emphysema. 4. Severe coronary artery disease.        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   Narrow abx spectrum based upon sensitivities    · Repeat CT imaging in 3 months   · Tobacco cessation has been achieved

## 2020-02-12 NOTE — PROGRESS NOTES
Pt resting. Resp e/e. Shift assessment completed and charted. No needs. Will monitor.  Colleen Castillo

## 2020-02-13 LAB — VANCOMYCIN TROUGH: 11.1 UG/ML (ref 10–20)

## 2020-02-13 PROCEDURE — 94640 AIRWAY INHALATION TREATMENT: CPT

## 2020-02-13 PROCEDURE — 2580000003 HC RX 258: Performed by: NURSE PRACTITIONER

## 2020-02-13 PROCEDURE — 99232 SBSQ HOSP IP/OBS MODERATE 35: CPT | Performed by: INTERNAL MEDICINE

## 2020-02-13 PROCEDURE — 2700000000 HC OXYGEN THERAPY PER DAY

## 2020-02-13 PROCEDURE — 6360000002 HC RX W HCPCS: Performed by: PHYSICIAN ASSISTANT

## 2020-02-13 PROCEDURE — 2580000003 HC RX 258: Performed by: PHYSICIAN ASSISTANT

## 2020-02-13 PROCEDURE — 80202 ASSAY OF VANCOMYCIN: CPT

## 2020-02-13 PROCEDURE — 6370000000 HC RX 637 (ALT 250 FOR IP): Performed by: PHYSICIAN ASSISTANT

## 2020-02-13 PROCEDURE — 6360000002 HC RX W HCPCS: Performed by: NURSE PRACTITIONER

## 2020-02-13 PROCEDURE — 6370000000 HC RX 637 (ALT 250 FOR IP): Performed by: NURSE PRACTITIONER

## 2020-02-13 PROCEDURE — 6370000000 HC RX 637 (ALT 250 FOR IP): Performed by: INTERNAL MEDICINE

## 2020-02-13 PROCEDURE — 94761 N-INVAS EAR/PLS OXIMETRY MLT: CPT

## 2020-02-13 PROCEDURE — 36415 COLL VENOUS BLD VENIPUNCTURE: CPT

## 2020-02-13 PROCEDURE — 1200000000 HC SEMI PRIVATE

## 2020-02-13 RX ADMIN — METHYLPREDNISOLONE SODIUM SUCCINATE 40 MG: 40 INJECTION, POWDER, FOR SOLUTION INTRAMUSCULAR; INTRAVENOUS at 08:51

## 2020-02-13 RX ADMIN — IPRATROPIUM BROMIDE AND ALBUTEROL SULFATE 1 AMPULE: .5; 3 SOLUTION RESPIRATORY (INHALATION) at 07:40

## 2020-02-13 RX ADMIN — LOSARTAN POTASSIUM 100 MG: 100 TABLET, FILM COATED ORAL at 08:52

## 2020-02-13 RX ADMIN — MEROPENEM 1 G: 1 INJECTION, POWDER, FOR SOLUTION INTRAVENOUS at 16:09

## 2020-02-13 RX ADMIN — CARBOXYMETHYLCELLULOSE SODIUM 1 DROP: 10 GEL OPHTHALMIC at 13:14

## 2020-02-13 RX ADMIN — ATORVASTATIN CALCIUM 20 MG: 10 TABLET, FILM COATED ORAL at 08:52

## 2020-02-13 RX ADMIN — CARBOXYMETHYLCELLULOSE SODIUM 1 DROP: 10 GEL OPHTHALMIC at 19:49

## 2020-02-13 RX ADMIN — RIVAROXABAN 20 MG: 20 TABLET, FILM COATED ORAL at 08:52

## 2020-02-13 RX ADMIN — PANTOPRAZOLE SODIUM 40 MG: 40 TABLET, DELAYED RELEASE ORAL at 08:55

## 2020-02-13 RX ADMIN — METOPROLOL SUCCINATE 50 MG: 50 TABLET, EXTENDED RELEASE ORAL at 08:52

## 2020-02-13 RX ADMIN — VANCOMYCIN HYDROCHLORIDE 750 MG: 10 INJECTION, POWDER, LYOPHILIZED, FOR SOLUTION INTRAVENOUS at 09:49

## 2020-02-13 RX ADMIN — Medication 10 ML: at 08:51

## 2020-02-13 RX ADMIN — POTASSIUM CITRATE 15 MEQ: 5 TABLET ORAL at 08:51

## 2020-02-13 RX ADMIN — MEROPENEM 1 G: 1 INJECTION, POWDER, FOR SOLUTION INTRAVENOUS at 08:51

## 2020-02-13 RX ADMIN — IPRATROPIUM BROMIDE AND ALBUTEROL SULFATE 1 AMPULE: .5; 3 SOLUTION RESPIRATORY (INHALATION) at 23:21

## 2020-02-13 RX ADMIN — AMLODIPINE BESYLATE 5 MG: 5 TABLET ORAL at 08:52

## 2020-02-13 RX ADMIN — IPRATROPIUM BROMIDE AND ALBUTEROL SULFATE 1 AMPULE: .5; 3 SOLUTION RESPIRATORY (INHALATION) at 10:36

## 2020-02-13 RX ADMIN — FUROSEMIDE 40 MG: 40 TABLET ORAL at 08:52

## 2020-02-13 RX ADMIN — IPRATROPIUM BROMIDE AND ALBUTEROL SULFATE 1 AMPULE: .5; 3 SOLUTION RESPIRATORY (INHALATION) at 15:23

## 2020-02-13 RX ADMIN — CARBOXYMETHYLCELLULOSE SODIUM 1 DROP: 10 GEL OPHTHALMIC at 08:52

## 2020-02-13 RX ADMIN — IPRATROPIUM BROMIDE AND ALBUTEROL SULFATE 1 AMPULE: .5; 3 SOLUTION RESPIRATORY (INHALATION) at 02:53

## 2020-02-13 RX ADMIN — IPRATROPIUM BROMIDE AND ALBUTEROL SULFATE 1 AMPULE: .5; 3 SOLUTION RESPIRATORY (INHALATION) at 19:34

## 2020-02-13 RX ADMIN — Medication 10 ML: at 19:48

## 2020-02-13 RX ADMIN — MEROPENEM 1 G: 1 INJECTION, POWDER, FOR SOLUTION INTRAVENOUS at 00:20

## 2020-02-13 RX ADMIN — VANCOMYCIN HYDROCHLORIDE 1000 MG: 1 INJECTION, POWDER, LYOPHILIZED, FOR SOLUTION INTRAVENOUS at 19:48

## 2020-02-13 ASSESSMENT — PAIN SCALES - GENERAL: PAINLEVEL_OUTOF10: 0

## 2020-02-13 NOTE — PROGRESS NOTES
grossly normal    Scheduled Meds:   vancomycin  1,000 mg Intravenous Q12H    potassium citrate  15 mEq Oral Daily    amLODIPine  5 mg Oral Daily    atorvastatin  20 mg Oral Daily    metoprolol succinate  50 mg Oral Daily    pantoprazole  40 mg Oral Daily    rivaroxaban  20 mg Oral Daily with breakfast    sodium chloride flush  10 mL Intravenous 2 times per day    [START ON 2/14/2020] predniSONE  40 mg Oral Daily    losartan  100 mg Oral Daily    furosemide  40 mg Oral Daily    carboxymethylcellulose PF  1 drop Both Eyes TID    meropenem  1 g Intravenous Q8H    ipratropium-albuterol  1 ampule Inhalation Q4H       Continuous Infusions:      PRN Meds:  benzonatate, albuterol, sodium chloride flush, magnesium hydroxide, ondansetron, acetaminophen, albuterol sulfate HFA      Data:  CBC:   Recent Labs     02/11/20  0825 02/12/20  0624   WBC 7.6 10.8   HGB 12.7* 12.5*   HCT 39.0* 37.4*   MCV 92.9 93.2    259     BMP:   Recent Labs     02/11/20  0825 02/12/20  0623    140   K 4.0 4.5    103   CO2 26 22   BUN 20 25*   CREATININE 1.6* 1.4*     LIVER PROFILE:   Recent Labs     02/11/20  0825   AST 12*   ALT 8*   BILITOT 0.3   ALKPHOS 77     CULTURES  Blood Cx: NGTD  Resp Cx: pending   Rapid Flu: negative      RADIOLOGY  CT Chest WO Contrast   Final Result   1. Nodular airspace opacities in the left lower lobe, most compatible with   pneumonia, which account for the recent radiographic finding. There is also   linear atelectasis versus a mild pneumonia in the right lower lobe. Short-term CT follow-up to document resolution is recommended. 2. 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 3. Mild emphysema. 4. Severe coronary artery disease. XR CHEST PORTABLE   Final Result   Left basilar opacity, pneumonia or recurrent pneumonia. Surveillance until   resolution recommended.            Assessment/Plan:    Acute on chronic hypoxic respiratory failure  COPD exacerbation  - patient presented with c/o worsening dyspnea, cough  - Admitted to med-surg.  - pulmonology consulted. - Supplemental O2 at 3 L-->home o2 3L. Wean as tolerated. Wears O2 as needed at home. - IV Solu-medrol, HHN Treatments scheduled/prn. - pulmonology consulted     Possible pneumonia  - afebrile. No leukocytosis, no lactic acidosis. procalcitonin negative  - received IV Merrem and Vanc in ED  - continue Merrem, Vanc D#3-->narrow Abx coverage with resp cx result/clinical improvement   - CT chest with concerns for pneumonia    Blepharitis  - Refresh eye ointment ordered.      Chronic systolic CHF  - continue PO lasix, potassium supplementation. - on Losartan.   - daily weight, I&O. Paroxysmal A-fib  - continue Toprol-XL  - AC: Xarelto     Hypertension  - BP elevated. Continue Losartan, Toprol-XL, Amlodipine. - monitor.      GERD  - on PPI.      Hyperlipidemia  - on Atorvastatin.      CKD stage 3  - stable. Monitor labs.      DVT Prophylaxis: Xarelto   Diet: DIET LOW SODIUM 2 GM;  Code Status: Full Code    Demian Saul 10:30 AM 2/13/2020    Agree with above  Changes made to note    Shawn Spaulding MD 2/13/2020 10:30 AM

## 2020-02-13 NOTE — PROGRESS NOTES
Resting in bed awake. No complaints or needs at present time. Am assessment complete. Alert and oriented. Call light in reach. Patient is able to demonstrated the ability to move from a reclining position to an upright position within the recliner.

## 2020-02-14 LAB
ANION GAP SERPL CALCULATED.3IONS-SCNC: 12 MMOL/L (ref 3–16)
BUN BLDV-MCNC: 27 MG/DL (ref 7–20)
CALCIUM SERPL-MCNC: 8.9 MG/DL (ref 8.3–10.6)
CHLORIDE BLD-SCNC: 103 MMOL/L (ref 99–110)
CO2: 23 MMOL/L (ref 21–32)
CREAT SERPL-MCNC: 1.2 MG/DL (ref 0.8–1.3)
GFR AFRICAN AMERICAN: >60
GFR NON-AFRICAN AMERICAN: 59
GLUCOSE BLD-MCNC: 97 MG/DL (ref 70–99)
POTASSIUM REFLEX MAGNESIUM: 4.1 MMOL/L (ref 3.5–5.1)
SODIUM BLD-SCNC: 138 MMOL/L (ref 136–145)

## 2020-02-14 PROCEDURE — 6370000000 HC RX 637 (ALT 250 FOR IP): Performed by: PHYSICIAN ASSISTANT

## 2020-02-14 PROCEDURE — 2580000003 HC RX 258: Performed by: NURSE PRACTITIONER

## 2020-02-14 PROCEDURE — 6360000002 HC RX W HCPCS: Performed by: INTERNAL MEDICINE

## 2020-02-14 PROCEDURE — 6370000000 HC RX 637 (ALT 250 FOR IP): Performed by: NURSE PRACTITIONER

## 2020-02-14 PROCEDURE — 6360000002 HC RX W HCPCS: Performed by: NURSE PRACTITIONER

## 2020-02-14 PROCEDURE — 2700000000 HC OXYGEN THERAPY PER DAY

## 2020-02-14 PROCEDURE — 6370000000 HC RX 637 (ALT 250 FOR IP): Performed by: HOSPITALIST

## 2020-02-14 PROCEDURE — 99232 SBSQ HOSP IP/OBS MODERATE 35: CPT | Performed by: INTERNAL MEDICINE

## 2020-02-14 PROCEDURE — 94761 N-INVAS EAR/PLS OXIMETRY MLT: CPT

## 2020-02-14 PROCEDURE — 6370000000 HC RX 637 (ALT 250 FOR IP): Performed by: INTERNAL MEDICINE

## 2020-02-14 PROCEDURE — 1200000000 HC SEMI PRIVATE

## 2020-02-14 PROCEDURE — 36415 COLL VENOUS BLD VENIPUNCTURE: CPT

## 2020-02-14 PROCEDURE — 2580000003 HC RX 258: Performed by: PHYSICIAN ASSISTANT

## 2020-02-14 PROCEDURE — 2580000003 HC RX 258

## 2020-02-14 PROCEDURE — 80048 BASIC METABOLIC PNL TOTAL CA: CPT

## 2020-02-14 PROCEDURE — 94640 AIRWAY INHALATION TREATMENT: CPT

## 2020-02-14 RX ORDER — METHYLPREDNISOLONE SODIUM SUCCINATE 40 MG/ML
40 INJECTION, POWDER, LYOPHILIZED, FOR SOLUTION INTRAMUSCULAR; INTRAVENOUS EVERY 12 HOURS
Status: DISCONTINUED | OUTPATIENT
Start: 2020-02-14 | End: 2020-02-17 | Stop reason: HOSPADM

## 2020-02-14 RX ORDER — FUROSEMIDE 10 MG/ML
40 INJECTION INTRAMUSCULAR; INTRAVENOUS ONCE
Status: COMPLETED | OUTPATIENT
Start: 2020-02-14 | End: 2020-02-14

## 2020-02-14 RX ORDER — GUAIFENESIN 100 MG/5ML
400 SOLUTION ORAL 2 TIMES DAILY
Status: DISCONTINUED | OUTPATIENT
Start: 2020-02-14 | End: 2020-02-17 | Stop reason: HOSPADM

## 2020-02-14 RX ORDER — SODIUM CHLORIDE 9 MG/ML
INJECTION, SOLUTION INTRAVENOUS
Status: COMPLETED
Start: 2020-02-14 | End: 2020-02-14

## 2020-02-14 RX ADMIN — SODIUM CHLORIDE 250 ML: 9 INJECTION, SOLUTION INTRAVENOUS at 16:24

## 2020-02-14 RX ADMIN — CARBOXYMETHYLCELLULOSE SODIUM 1 DROP: 10 GEL OPHTHALMIC at 08:34

## 2020-02-14 RX ADMIN — PANTOPRAZOLE SODIUM 40 MG: 40 TABLET, DELAYED RELEASE ORAL at 08:36

## 2020-02-14 RX ADMIN — CARBOXYMETHYLCELLULOSE SODIUM 1 DROP: 10 GEL OPHTHALMIC at 20:51

## 2020-02-14 RX ADMIN — LOSARTAN POTASSIUM 100 MG: 100 TABLET, FILM COATED ORAL at 08:35

## 2020-02-14 RX ADMIN — IPRATROPIUM BROMIDE AND ALBUTEROL SULFATE 1 AMPULE: .5; 3 SOLUTION RESPIRATORY (INHALATION) at 02:53

## 2020-02-14 RX ADMIN — BENZONATATE 100 MG: 100 CAPSULE ORAL at 20:51

## 2020-02-14 RX ADMIN — PREDNISONE 40 MG: 20 TABLET ORAL at 08:35

## 2020-02-14 RX ADMIN — IPRATROPIUM BROMIDE AND ALBUTEROL SULFATE 1 AMPULE: .5; 3 SOLUTION RESPIRATORY (INHALATION) at 07:39

## 2020-02-14 RX ADMIN — CARBOXYMETHYLCELLULOSE SODIUM 1 DROP: 10 GEL OPHTHALMIC at 14:38

## 2020-02-14 RX ADMIN — RIVAROXABAN 20 MG: 20 TABLET, FILM COATED ORAL at 08:36

## 2020-02-14 RX ADMIN — GUAIFENESIN 400 MG: 100 SOLUTION ORAL at 14:38

## 2020-02-14 RX ADMIN — VANCOMYCIN HYDROCHLORIDE 1000 MG: 1 INJECTION, POWDER, LYOPHILIZED, FOR SOLUTION INTRAVENOUS at 20:52

## 2020-02-14 RX ADMIN — IPRATROPIUM BROMIDE AND ALBUTEROL SULFATE 1 AMPULE: .5; 3 SOLUTION RESPIRATORY (INHALATION) at 15:02

## 2020-02-14 RX ADMIN — Medication 10 ML: at 08:33

## 2020-02-14 RX ADMIN — FUROSEMIDE 40 MG: 40 TABLET ORAL at 08:35

## 2020-02-14 RX ADMIN — IPRATROPIUM BROMIDE AND ALBUTEROL SULFATE 1 AMPULE: .5; 3 SOLUTION RESPIRATORY (INHALATION) at 19:54

## 2020-02-14 RX ADMIN — MEROPENEM 1 G: 1 INJECTION, POWDER, FOR SOLUTION INTRAVENOUS at 16:21

## 2020-02-14 RX ADMIN — IPRATROPIUM BROMIDE AND ALBUTEROL SULFATE 1 AMPULE: .5; 3 SOLUTION RESPIRATORY (INHALATION) at 23:20

## 2020-02-14 RX ADMIN — ATORVASTATIN CALCIUM 20 MG: 10 TABLET, FILM COATED ORAL at 08:36

## 2020-02-14 RX ADMIN — VANCOMYCIN HYDROCHLORIDE 1000 MG: 1 INJECTION, POWDER, LYOPHILIZED, FOR SOLUTION INTRAVENOUS at 09:14

## 2020-02-14 RX ADMIN — AMLODIPINE BESYLATE 5 MG: 5 TABLET ORAL at 08:36

## 2020-02-14 RX ADMIN — GUAIFENESIN 400 MG: 100 SOLUTION ORAL at 20:51

## 2020-02-14 RX ADMIN — POTASSIUM CITRATE 15 MEQ: 5 TABLET ORAL at 08:39

## 2020-02-14 RX ADMIN — MEROPENEM 1 G: 1 INJECTION, POWDER, FOR SOLUTION INTRAVENOUS at 23:40

## 2020-02-14 RX ADMIN — METHYLPREDNISOLONE SODIUM SUCCINATE 40 MG: 40 INJECTION, POWDER, FOR SOLUTION INTRAMUSCULAR; INTRAVENOUS at 20:51

## 2020-02-14 RX ADMIN — MEROPENEM 1 G: 1 INJECTION, POWDER, FOR SOLUTION INTRAVENOUS at 08:33

## 2020-02-14 RX ADMIN — Medication 10 ML: at 20:51

## 2020-02-14 RX ADMIN — FUROSEMIDE 40 MG: 10 INJECTION, SOLUTION INTRAMUSCULAR; INTRAVENOUS at 11:00

## 2020-02-14 RX ADMIN — METOPROLOL SUCCINATE 50 MG: 50 TABLET, EXTENDED RELEASE ORAL at 08:35

## 2020-02-14 RX ADMIN — MEROPENEM 1 G: 1 INJECTION, POWDER, FOR SOLUTION INTRAVENOUS at 00:45

## 2020-02-14 RX ADMIN — IPRATROPIUM BROMIDE AND ALBUTEROL SULFATE 1 AMPULE: .5; 3 SOLUTION RESPIRATORY (INHALATION) at 11:19

## 2020-02-14 ASSESSMENT — PAIN SCALES - GENERAL
PAINLEVEL_OUTOF10: 0
PAINLEVEL_OUTOF10: 0

## 2020-02-14 NOTE — PROGRESS NOTES
Resting in bed awake. No complaints or needs at present time. AM assessment complete. Alert and oriented. Shortness of breath noted at rest. Oxygen tube extension provided for pt to walk to bathroom. Call light in reach. Patient is able to demonstrated the ability to move from a reclining position to an upright position within the recliner.

## 2020-02-14 NOTE — PROGRESS NOTES
Up walking in hallway with wife. Oxygen per nasal cannula at 3 liters. Pt pushing wheelchair with oxygen saturation of 93%.

## 2020-02-14 NOTE — PROGRESS NOTES
Pulmonary Progress Note  CC: wheezing, lower extremity edema, copd    Subjective:     shortness of breath about the same    IV line peripheral    EXAM:   /88   Pulse 66   Temp 98.1 °F (36.7 °C) (Oral)   Resp 18   Ht 5' 11\" (1.803 m)   Wt 235 lb 3.2 oz (106.7 kg)   SpO2 94%   BMI 32.80 kg/m²  on 3 L  Constitutional:  No acute distress   HEENT: no scleral icterus  Neck: No tracheal deviation present. Cardiovascular: Normal heart sounds. Pulmonary/Chest: + wheezes. No rhonchi. No rales. No decreased breath sounds. no accessory muscle usage   Abdominal: Soft. Musculoskeletal: No cyanosis. No clubbing. Skin: Skin is warm and dry. Scheduled Meds:   vancomycin  1,000 mg Intravenous Q12H    potassium citrate  15 mEq Oral Daily    amLODIPine  5 mg Oral Daily    atorvastatin  20 mg Oral Daily    metoprolol succinate  50 mg Oral Daily    pantoprazole  40 mg Oral Daily    rivaroxaban  20 mg Oral Daily with breakfast    sodium chloride flush  10 mL Intravenous 2 times per day    predniSONE  40 mg Oral Daily    losartan  100 mg Oral Daily    furosemide  40 mg Oral Daily    carboxymethylcellulose PF  1 drop Both Eyes TID    meropenem  1 g Intravenous Q8H    ipratropium-albuterol  1 ampule Inhalation Q4H     Continuous Infusions:    PRN Meds:  benzonatate, albuterol, sodium chloride flush, magnesium hydroxide, ondansetron, acetaminophen, albuterol sulfate HFA    Labs:  CBC:   Recent Labs     02/12/20  0624   WBC 10.8   HGB 12.5*   HCT 37.4*   MCV 93.2        BMP:   Recent Labs     02/12/20  0623 02/14/20  0611    138   K 4.5 4.1    103   CO2 22 23   BUN 25* 27*   CREATININE 1.4* 1.2       Cultures:  2/11/20 Blood no growth to date   Respiratory was never received by the lab   Rapid flu negative    Films:  2/11/2020 persistent left basilar opacity    2/11/2020 CT CHEST  Impression:        1.  Nodular airspace opacities in the left lower lobe, most compatible with  pneumonia,

## 2020-02-14 NOTE — PROGRESS NOTES
Resting in bed awake with wife at bedside. No complaints or needs at present time. Call light in reach.

## 2020-02-15 LAB
BLOOD CULTURE, ROUTINE: NORMAL
CULTURE, BLOOD 2: NORMAL
VANCOMYCIN TROUGH: 17.2 UG/ML (ref 10–20)

## 2020-02-15 PROCEDURE — 94761 N-INVAS EAR/PLS OXIMETRY MLT: CPT

## 2020-02-15 PROCEDURE — 6370000000 HC RX 637 (ALT 250 FOR IP): Performed by: INTERNAL MEDICINE

## 2020-02-15 PROCEDURE — 94640 AIRWAY INHALATION TREATMENT: CPT

## 2020-02-15 PROCEDURE — 80202 ASSAY OF VANCOMYCIN: CPT

## 2020-02-15 PROCEDURE — 2580000003 HC RX 258: Performed by: NURSE PRACTITIONER

## 2020-02-15 PROCEDURE — 6370000000 HC RX 637 (ALT 250 FOR IP): Performed by: HOSPITALIST

## 2020-02-15 PROCEDURE — 36415 COLL VENOUS BLD VENIPUNCTURE: CPT

## 2020-02-15 PROCEDURE — 6360000002 HC RX W HCPCS: Performed by: NURSE PRACTITIONER

## 2020-02-15 PROCEDURE — 6370000000 HC RX 637 (ALT 250 FOR IP): Performed by: PHYSICIAN ASSISTANT

## 2020-02-15 PROCEDURE — 6360000002 HC RX W HCPCS: Performed by: INTERNAL MEDICINE

## 2020-02-15 PROCEDURE — 6370000000 HC RX 637 (ALT 250 FOR IP): Performed by: NURSE PRACTITIONER

## 2020-02-15 PROCEDURE — 2700000000 HC OXYGEN THERAPY PER DAY

## 2020-02-15 PROCEDURE — 2580000003 HC RX 258: Performed by: PHYSICIAN ASSISTANT

## 2020-02-15 PROCEDURE — 99232 SBSQ HOSP IP/OBS MODERATE 35: CPT | Performed by: INTERNAL MEDICINE

## 2020-02-15 PROCEDURE — 1200000000 HC SEMI PRIVATE

## 2020-02-15 RX ORDER — IPRATROPIUM BROMIDE AND ALBUTEROL SULFATE 2.5; .5 MG/3ML; MG/3ML
1 SOLUTION RESPIRATORY (INHALATION)
Status: DISCONTINUED | OUTPATIENT
Start: 2020-02-15 | End: 2020-02-17 | Stop reason: HOSPADM

## 2020-02-15 RX ADMIN — GUAIFENESIN 400 MG: 100 SOLUTION ORAL at 08:46

## 2020-02-15 RX ADMIN — IPRATROPIUM BROMIDE AND ALBUTEROL SULFATE 1 AMPULE: .5; 3 SOLUTION RESPIRATORY (INHALATION) at 12:39

## 2020-02-15 RX ADMIN — GUAIFENESIN 400 MG: 100 SOLUTION ORAL at 20:42

## 2020-02-15 RX ADMIN — IPRATROPIUM BROMIDE AND ALBUTEROL SULFATE 1 AMPULE: .5; 3 SOLUTION RESPIRATORY (INHALATION) at 19:20

## 2020-02-15 RX ADMIN — Medication 2 PUFF: at 17:24

## 2020-02-15 RX ADMIN — PANTOPRAZOLE SODIUM 40 MG: 40 TABLET, DELAYED RELEASE ORAL at 08:44

## 2020-02-15 RX ADMIN — FUROSEMIDE 40 MG: 40 TABLET ORAL at 08:44

## 2020-02-15 RX ADMIN — POTASSIUM CITRATE 15 MEQ: 5 TABLET ORAL at 08:45

## 2020-02-15 RX ADMIN — MEROPENEM 1 G: 1 INJECTION, POWDER, FOR SOLUTION INTRAVENOUS at 23:07

## 2020-02-15 RX ADMIN — Medication 10 ML: at 20:42

## 2020-02-15 RX ADMIN — IPRATROPIUM BROMIDE AND ALBUTEROL SULFATE 1 AMPULE: .5; 3 SOLUTION RESPIRATORY (INHALATION) at 03:20

## 2020-02-15 RX ADMIN — LOSARTAN POTASSIUM 100 MG: 100 TABLET, FILM COATED ORAL at 08:45

## 2020-02-15 RX ADMIN — VANCOMYCIN HYDROCHLORIDE 1000 MG: 1 INJECTION, POWDER, LYOPHILIZED, FOR SOLUTION INTRAVENOUS at 20:42

## 2020-02-15 RX ADMIN — RIVAROXABAN 20 MG: 20 TABLET, FILM COATED ORAL at 08:45

## 2020-02-15 RX ADMIN — VANCOMYCIN HYDROCHLORIDE 1000 MG: 1 INJECTION, POWDER, LYOPHILIZED, FOR SOLUTION INTRAVENOUS at 08:49

## 2020-02-15 RX ADMIN — CARBOXYMETHYLCELLULOSE SODIUM 1 DROP: 10 GEL OPHTHALMIC at 08:47

## 2020-02-15 RX ADMIN — MEROPENEM 1 G: 1 INJECTION, POWDER, FOR SOLUTION INTRAVENOUS at 17:27

## 2020-02-15 RX ADMIN — MEROPENEM 1 G: 1 INJECTION, POWDER, FOR SOLUTION INTRAVENOUS at 08:50

## 2020-02-15 RX ADMIN — ATORVASTATIN CALCIUM 20 MG: 10 TABLET, FILM COATED ORAL at 08:45

## 2020-02-15 RX ADMIN — IPRATROPIUM BROMIDE AND ALBUTEROL SULFATE 1 AMPULE: .5; 3 SOLUTION RESPIRATORY (INHALATION) at 07:15

## 2020-02-15 RX ADMIN — Medication 10 ML: at 08:47

## 2020-02-15 RX ADMIN — CARBOXYMETHYLCELLULOSE SODIUM 1 DROP: 10 GEL OPHTHALMIC at 20:42

## 2020-02-15 RX ADMIN — AMLODIPINE BESYLATE 5 MG: 5 TABLET ORAL at 08:45

## 2020-02-15 RX ADMIN — IPRATROPIUM BROMIDE AND ALBUTEROL SULFATE 1 AMPULE: .5; 3 SOLUTION RESPIRATORY (INHALATION) at 15:26

## 2020-02-15 RX ADMIN — METHYLPREDNISOLONE SODIUM SUCCINATE 40 MG: 40 INJECTION, POWDER, FOR SOLUTION INTRAMUSCULAR; INTRAVENOUS at 08:47

## 2020-02-15 RX ADMIN — CARBOXYMETHYLCELLULOSE SODIUM 1 DROP: 10 GEL OPHTHALMIC at 14:20

## 2020-02-15 RX ADMIN — METOPROLOL SUCCINATE 50 MG: 50 TABLET, EXTENDED RELEASE ORAL at 08:45

## 2020-02-15 RX ADMIN — METHYLPREDNISOLONE SODIUM SUCCINATE 40 MG: 40 INJECTION, POWDER, FOR SOLUTION INTRAMUSCULAR; INTRAVENOUS at 20:42

## 2020-02-15 ASSESSMENT — PAIN SCALES - GENERAL: PAINLEVEL_OUTOF10: 0

## 2020-02-15 NOTE — PROGRESS NOTES
Pulmonary Progress Note  CC: sob, copd    Subjective:     Still c/o SOB and cough    EXAM:   /82   Pulse 78   Temp 96.8 °F (36 °C) (Oral)   Resp 16   Ht 5' 11\" (1.803 m)   Wt 235 lb 3.2 oz (106.7 kg)   SpO2 96%   BMI 32.80 kg/m²  on 3 L  Constitutional:  No acute distress   HEENT: no scleral icterus  Neck: No tracheal deviation present. Cardiovascular: Normal heart sounds. Pulmonary/Chest: + wheezes. No rhonchi. No rales. No decreased breath sounds. no accessory muscle usage   Abdominal: Soft. Musculoskeletal: No cyanosis. No clubbing. Skin: Skin is warm and dry. Scheduled Meds:   methylPREDNISolone  40 mg Intravenous Q12H    guaiFENesin  400 mg Oral BID    vancomycin  1,000 mg Intravenous Q12H    potassium citrate  15 mEq Oral Daily    amLODIPine  5 mg Oral Daily    atorvastatin  20 mg Oral Daily    metoprolol succinate  50 mg Oral Daily    pantoprazole  40 mg Oral Daily    rivaroxaban  20 mg Oral Daily with breakfast    sodium chloride flush  10 mL Intravenous 2 times per day    losartan  100 mg Oral Daily    furosemide  40 mg Oral Daily    carboxymethylcellulose PF  1 drop Both Eyes TID    meropenem  1 g Intravenous Q8H    ipratropium-albuterol  1 ampule Inhalation Q4H     Continuous Infusions:    PRN Meds:  benzonatate, albuterol, sodium chloride flush, magnesium hydroxide, ondansetron, acetaminophen, albuterol sulfate HFA    Labs:  CBC:   No results for input(s): WBC, HGB, HCT, MCV, PLT in the last 72 hours. BMP:   Recent Labs     02/14/20  0611      K 4.1      CO2 23   BUN 27*   CREATININE 1.2       Cultures:  2/11/20 Blood no growth to date   Respiratory was never received by the lab   Rapid flu negative    Films:  2/11/2020 persistent left basilar opacity    2/11/2020 CT CHEST  Impression:        1.  Nodular airspace opacities in the left lower lobe, most compatible with  pneumonia, which account for the recent radiographic finding. Ina Distel is also  linear

## 2020-02-15 NOTE — PROGRESS NOTES
sigmoid polyps    ENDOSCOPY, COLON, DIAGNOSTIC  11/13/2019    egd; esophagitis/gastritis    HERNIA REPAIR      [place] XCAPSL CTRC RMVL INSJ IO LENS PROSTH W/O ECP Right 9/6/2018    PHACO EMULSIFICATION OF CATARACT WITH INTRAOCULAR LENS IMPLANT RIGHT EYE performed by Misty Rodríguez MD at [address] [place] [address] RMVL INSJ IO LENS PROSTH W/O ECP Left 10/18/2018    PHACO EMULSIFICATION OF CATARACT WITH  INTRAOCULAR LENS IMPLANT LEFT EYE performed by Misty Rodríguez MD at [place] [address]. N/A 11/13/2019    EGD BIOPSY performed by Jorge Baltazar MD at [facility]       Level of Consciousness: Alert, Oriented, and Cooperative = 0    Level of Activity: Walking with assistance = 1    Respiratory Pattern: Regular Pattern; RR 8-20 = 0    Breath Sounds: Diminshed bilaterally and/or crackles = 2    Sputum  Sputum Color: None, Tenacity: None, Sputum How Obtained: Spontaneous cough  Cough: Strong, spontaneous, non-productive = 0    Vital Signs   /86   Pulse 83   Temp 97.4 °F (36.3 °C) (Oral)   Resp 18   Ht 5' 11\" (1.803 m)   Wt 235 lb 3.2 oz (106.7 kg)   SpO2 93%   BMI 32.80 kg/m²   SPO2 (COPD values may differ): 88-89% on room air or greater than 92% on FiO2 28- 35% = 2    Peak Flow (asthma only): not applicable = 0    RSI: 7-8 = BID and Q4HPRN (every four hours as needed) for dyspnea        Plan       Goals: medication delivery, mobilize retained secretions, volume expansion and improve oxygenation    Patient/caregiver was educated on the proper method of use for Respiratory Care Devices:  Yes      Level of patient/caregiver understanding able to:   ? Verbalize understanding   ? Demonstrate understanding       ? Teach back        ? Needs reinforcement       ? No available caregiver               ? Other:     Response to education:  Excellent     Is patient being placed on Home Treatment Regimen?   NA     Does the patient have everything they need prior to bronchodilator can be decreased, based on the patient's RSI, but not less than home treatment regimen frequency. 5. Bronchodilator(s) will be discontinued if patient has a RSI less than 9 and has received no scheduled or as needed treatment for 72  Hrs. Patients Ordered on a Mucolytic Agent:    1. Must always be administered with a bronchodilator. 2. Discontinue if patient experiences worsened bronchospasm, or secretions have lessened to the point that the patient is able to clear them with a cough. Anti-inflammatory and Combination Medications:    1. If the patient lacks prior history of lung disease, is not using inhaled anti-inflammatory medication at home, and lacks wheezing by examination or by history for at least 24 hours, contact physician for possible discontinuation.

## 2020-02-15 NOTE — PROGRESS NOTES
Recent Labs     02/12/20  0623 02/14/20  0611    138   K 4.5 4.1    103   CO2 22 23   BUN 25* 27*   CREATININE 1.4* 1.2          RADIOLOGY  CT Chest WO Contrast   Final Result   1. Nodular airspace opacities in the left lower lobe, most compatible with   pneumonia, which account for the recent radiographic finding. There is also   linear atelectasis versus a mild pneumonia in the right lower lobe. Short-term CT follow-up to document resolution is recommended. 2. 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 3. Mild emphysema. 4. Severe coronary artery disease. XR CHEST PORTABLE   Final Result   Left basilar opacity, pneumonia or recurrent pneumonia. Surveillance until   resolution recommended. Assessment/Plan:    Acute on chronic hypoxic respiratory failure  COPD exacerbation  - patient presented with c/o worsening dyspnea, cough  -home o2 3L. Wean as tolerated. Wears O2 as needed at home. - IV Solu-medrol, HHN Treatments scheduled/prn.        Possible pneumonia  - afebrile. No leukocytosis, no lactic acidosis. procalcitonin negative  - received IV Merrem and Vanc in ED  - continue Merrem, Vanc D#4-->narrow Abx coverage with resp cx result/clinical improvement   - CT chest with concerns for pneumonia    Blepharitis  - Refresh eye ointment ordered.      Chronic systolic CHF  - continue PO lasix, potassium supplementation. - on Losartan. iv lasix once     Paroxysmal A-fib  - continue Toprol-XL  - AC: Xarelto     Hypertension  - BP elevated. Continue Losartan, Toprol-XL, Amlodipine. CKD stage 3  - stable. Monitor labs.      DVT Prophylaxis: Xarelto   Diet: DIET LOW SODIUM 2 GM;  Code Status: Full Code    Piotr Dakota Olbladimir 11:05 PM 2/14/2020 42488 Detailed

## 2020-02-15 NOTE — PROGRESS NOTES
Progress Note    Admit Date:  2/11/2020    Admitted for COPD AE and possible HCAP  Subjective:    Mr. Deshpande Nail  feeling little better. Continues with dry cough   sob wheezes worse with exertion  Ambulating  Objective:   Patient Vitals for the past 4 hrs:   BP Temp Temp src Pulse Resp SpO2   02/15/20 1530 -- -- -- -- 18 93 %   02/15/20 1259 131/86 97.4 °F (36.3 °C) Oral 83 18 93 %   02/15/20 1240 -- -- -- -- -- 95 %       Intake/Output Summary (Last 24 hours) at 2/15/2020 1603  Last data filed at 2/15/2020 0406  Gross per 24 hour   Intake 736 ml   Output 350 ml   Net 386 ml       Physical Exam:  Gen: No distress. Alert. Eyes: PERRL. No sclera icterus. No conjunctival injection. Resp: No accessory muscle use. No crackles. Diffuse wheezes No rhonchi. Diminished breath sounds throughout, on 3 L nasal cannula O2  CV: Regular rate. Regular rhythm. GI: Non-tender. Non-distended. Normal bowel sounds. Skin: Warm and dry. M/S: No cyanosis. No joint deformity. No clubbing. Neuro: Awake. Grossly nonfocal    Psych: Oriented x 3. No anxiety or agitation.      Neurological : grossly normal    Scheduled Meds:   ipratropium-albuterol  1 ampule Inhalation Q4H WA    methylPREDNISolone  40 mg Intravenous Q12H    guaiFENesin  400 mg Oral BID    vancomycin  1,000 mg Intravenous Q12H    potassium citrate  15 mEq Oral Daily    amLODIPine  5 mg Oral Daily    atorvastatin  20 mg Oral Daily    metoprolol succinate  50 mg Oral Daily    pantoprazole  40 mg Oral Daily    rivaroxaban  20 mg Oral Daily with breakfast    sodium chloride flush  10 mL Intravenous 2 times per day    losartan  100 mg Oral Daily    furosemide  40 mg Oral Daily    carboxymethylcellulose PF  1 drop Both Eyes TID    meropenem  1 g Intravenous Q8H       Continuous Infusions:      PRN Meds:  benzonatate, albuterol, sodium chloride flush, magnesium hydroxide, ondansetron, acetaminophen, albuterol sulfate HFA      Data:  CBC:   No results for input(s): WBC, HGB, HCT, MCV, PLT in the last 72 hours. BMP:   Recent Labs     02/14/20  0611      K 4.1      CO2 23   BUN 27*   CREATININE 1.2          RADIOLOGY  CT Chest WO Contrast   Final Result   1. Nodular airspace opacities in the left lower lobe, most compatible with   pneumonia, which account for the recent radiographic finding. There is also   linear atelectasis versus a mild pneumonia in the right lower lobe. Short-term CT follow-up to document resolution is recommended. 2. 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 3. Mild emphysema. 4. Severe coronary artery disease. XR CHEST PORTABLE   Final Result   Left basilar opacity, pneumonia or recurrent pneumonia. Surveillance until   resolution recommended. Assessment/Plan:    Acute on chronic hypoxic respiratory failure  COPD exacerbation  - patient presented with c/o worsening dyspnea, cough  -home o2 3L. Wean as tolerated. Wears O2 as needed at home. - IV Solu-medrol, HHN Treatments scheduled/prn.        Possible pneumonia  - afebrile. No leukocytosis, no lactic acidosis. procalcitonin negative  - received IV Merrem and Vanc in ED  - continue Merrem, Vanc D#5-->narrow Abx coverage with resp cx result/clinical improvement   - CT chest with concerns for pneumonia    Blepharitis  - Refresh eye ointment ordered.      Chronic systolic CHF  - continue PO lasix, potassium supplementation. - on Losartan. iv lasix once     Paroxysmal A-fib  - continue Toprol-XL  - AC: Xarelto     Hypertension  - BP elevated. Continue Losartan, Toprol-XL, Amlodipine. CKD stage 3  - stable. Monitor labs.    ambualte   DVT Prophylaxis: Xarelto   Diet: DIET LOW SODIUM 2 GM;  Code Status: Full Code    Piotr Fierro Ethan 4:03 PM 2/15/2020

## 2020-02-15 NOTE — PROGRESS NOTES
Patient resting in bed. No distress noted. Family at the bedside. Patient has no needs at this time. Will continue to monitor.

## 2020-02-16 PROCEDURE — 6370000000 HC RX 637 (ALT 250 FOR IP): Performed by: NURSE PRACTITIONER

## 2020-02-16 PROCEDURE — 94761 N-INVAS EAR/PLS OXIMETRY MLT: CPT

## 2020-02-16 PROCEDURE — 6370000000 HC RX 637 (ALT 250 FOR IP): Performed by: HOSPITALIST

## 2020-02-16 PROCEDURE — 2580000003 HC RX 258: Performed by: NURSE PRACTITIONER

## 2020-02-16 PROCEDURE — 6370000000 HC RX 637 (ALT 250 FOR IP): Performed by: PHYSICIAN ASSISTANT

## 2020-02-16 PROCEDURE — 6370000000 HC RX 637 (ALT 250 FOR IP): Performed by: INTERNAL MEDICINE

## 2020-02-16 PROCEDURE — 94640 AIRWAY INHALATION TREATMENT: CPT

## 2020-02-16 PROCEDURE — 99232 SBSQ HOSP IP/OBS MODERATE 35: CPT | Performed by: INTERNAL MEDICINE

## 2020-02-16 PROCEDURE — 2700000000 HC OXYGEN THERAPY PER DAY

## 2020-02-16 PROCEDURE — 6360000002 HC RX W HCPCS: Performed by: NURSE PRACTITIONER

## 2020-02-16 PROCEDURE — 6360000002 HC RX W HCPCS: Performed by: INTERNAL MEDICINE

## 2020-02-16 PROCEDURE — 2580000003 HC RX 258: Performed by: PHYSICIAN ASSISTANT

## 2020-02-16 PROCEDURE — 1200000000 HC SEMI PRIVATE

## 2020-02-16 RX ADMIN — MEROPENEM 1 G: 1 INJECTION, POWDER, FOR SOLUTION INTRAVENOUS at 17:33

## 2020-02-16 RX ADMIN — VANCOMYCIN HYDROCHLORIDE 1000 MG: 1 INJECTION, POWDER, LYOPHILIZED, FOR SOLUTION INTRAVENOUS at 08:58

## 2020-02-16 RX ADMIN — CARBOXYMETHYLCELLULOSE SODIUM 1 DROP: 10 GEL OPHTHALMIC at 08:54

## 2020-02-16 RX ADMIN — AMLODIPINE BESYLATE 5 MG: 5 TABLET ORAL at 08:56

## 2020-02-16 RX ADMIN — BENZONATATE 100 MG: 100 CAPSULE ORAL at 00:11

## 2020-02-16 RX ADMIN — CARBOXYMETHYLCELLULOSE SODIUM 1 DROP: 10 GEL OPHTHALMIC at 20:28

## 2020-02-16 RX ADMIN — ATORVASTATIN CALCIUM 20 MG: 10 TABLET, FILM COATED ORAL at 08:55

## 2020-02-16 RX ADMIN — LOSARTAN POTASSIUM 100 MG: 100 TABLET, FILM COATED ORAL at 08:55

## 2020-02-16 RX ADMIN — METHYLPREDNISOLONE SODIUM SUCCINATE 40 MG: 40 INJECTION, POWDER, FOR SOLUTION INTRAMUSCULAR; INTRAVENOUS at 08:57

## 2020-02-16 RX ADMIN — MEROPENEM 1 G: 1 INJECTION, POWDER, FOR SOLUTION INTRAVENOUS at 09:00

## 2020-02-16 RX ADMIN — IPRATROPIUM BROMIDE AND ALBUTEROL SULFATE 1 AMPULE: .5; 3 SOLUTION RESPIRATORY (INHALATION) at 19:24

## 2020-02-16 RX ADMIN — FUROSEMIDE 40 MG: 40 TABLET ORAL at 08:56

## 2020-02-16 RX ADMIN — VANCOMYCIN HYDROCHLORIDE 1000 MG: 1 INJECTION, POWDER, LYOPHILIZED, FOR SOLUTION INTRAVENOUS at 20:28

## 2020-02-16 RX ADMIN — IPRATROPIUM BROMIDE AND ALBUTEROL SULFATE 1 AMPULE: .5; 3 SOLUTION RESPIRATORY (INHALATION) at 10:51

## 2020-02-16 RX ADMIN — Medication 10 ML: at 20:28

## 2020-02-16 RX ADMIN — METOPROLOL SUCCINATE 50 MG: 50 TABLET, EXTENDED RELEASE ORAL at 08:56

## 2020-02-16 RX ADMIN — IPRATROPIUM BROMIDE AND ALBUTEROL SULFATE 1 AMPULE: .5; 3 SOLUTION RESPIRATORY (INHALATION) at 06:48

## 2020-02-16 RX ADMIN — Medication 10 ML: at 08:56

## 2020-02-16 RX ADMIN — POTASSIUM CITRATE 15 MEQ: 5 TABLET ORAL at 09:07

## 2020-02-16 RX ADMIN — GUAIFENESIN 400 MG: 100 SOLUTION ORAL at 08:54

## 2020-02-16 RX ADMIN — BENZONATATE 100 MG: 100 CAPSULE ORAL at 23:16

## 2020-02-16 RX ADMIN — IPRATROPIUM BROMIDE AND ALBUTEROL SULFATE 1 AMPULE: .5; 3 SOLUTION RESPIRATORY (INHALATION) at 14:58

## 2020-02-16 RX ADMIN — CARBOXYMETHYLCELLULOSE SODIUM 1 DROP: 10 GEL OPHTHALMIC at 14:02

## 2020-02-16 RX ADMIN — METHYLPREDNISOLONE SODIUM SUCCINATE 40 MG: 40 INJECTION, POWDER, FOR SOLUTION INTRAMUSCULAR; INTRAVENOUS at 20:28

## 2020-02-16 RX ADMIN — GUAIFENESIN 400 MG: 100 SOLUTION ORAL at 20:27

## 2020-02-16 RX ADMIN — PANTOPRAZOLE SODIUM 40 MG: 40 TABLET, DELAYED RELEASE ORAL at 08:55

## 2020-02-16 RX ADMIN — MEROPENEM 1 G: 1 INJECTION, POWDER, FOR SOLUTION INTRAVENOUS at 23:16

## 2020-02-16 RX ADMIN — IPRATROPIUM BROMIDE AND ALBUTEROL SULFATE 1 AMPULE: .5; 3 SOLUTION RESPIRATORY (INHALATION) at 23:16

## 2020-02-16 RX ADMIN — IPRATROPIUM BROMIDE AND ALBUTEROL SULFATE 1 AMPULE: .5; 3 SOLUTION RESPIRATORY (INHALATION) at 00:41

## 2020-02-16 RX ADMIN — RIVAROXABAN 20 MG: 20 TABLET, FILM COATED ORAL at 08:55

## 2020-02-16 ASSESSMENT — PAIN SCALES - GENERAL: PAINLEVEL_OUTOF10: 0

## 2020-02-16 NOTE — PROGRESS NOTES
Patient resting in bed. No distress noted. Wife at the bedside. Patient has no needs at this time. Call light within reach. Will continue to monitor.

## 2020-02-16 NOTE — PROGRESS NOTES
ondansetron, acetaminophen, albuterol sulfate HFA    Labs:  CBC: No results for input(s): WBC, HGB, HCT, MCV, PLT in the last 72 hours. BMP:   Recent Labs     02/14/20  0611      K 4.1      CO2 23   BUN 27*   CREATININE 1.2     LIVER PROFILE: No results for input(s): AST, ALT, LIPASE, BILIDIR, BILITOT, ALKPHOS in the last 72 hours. Invalid input(s): AMYLASE,  ALB  PT/INR: No results for input(s): PROTIME, INR in the last 72 hours. APTT: No results for input(s): APTT in the last 72 hours. UA:No results for input(s): NITRITE, COLORU, PHUR, LABCAST, WBCUA, RBCUA, MUCUS, TRICHOMONAS, YEAST, BACTERIA, CLARITYU, SPECGRAV, LEUKOCYTESUR, UROBILINOGEN, BILIRUBINUR, BLOODU, GLUCOSEU, AMORPHOUS in the last 72 hours. Invalid input(s): KETONESU  No results for input(s): PHART, CKW7RJR, PO2ART in the last 72 hours. Cultures:   2/11 BC NGTD  2/11 flu negative    Films:  CT chest 2/11  1. Nodular airspace opacities in the left lower lobe,  2. 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 3. Mild emphysema. 4. Severe coronary artery disease.         ASSESSMENT:  · COPD acute exacerbation  · Upper lobe centrilobular emphysema  · Healthcare associated pneumonia - probable gram negative, risk for methicillin resistant Staph aureus as well  · Abnormal CT chest  · Paroxysmal A. 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

## 2020-02-16 NOTE — PROGRESS NOTES
Final Result   1. Nodular airspace opacities in the left lower lobe, most compatible with   pneumonia, which account for the recent radiographic finding. There is also   linear atelectasis versus a mild pneumonia in the right lower lobe. Short-term CT follow-up to document resolution is recommended. 2. 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 3. Mild emphysema. 4. Severe coronary artery disease. XR CHEST PORTABLE   Final Result   Left basilar opacity, pneumonia or recurrent pneumonia. Surveillance until   resolution recommended. Assessment/Plan:    Acute on chronic hypoxic respiratory failure  COPD exacerbation  - patient presented with c/o worsening dyspnea, cough  -home o2 3L.    - IV Solu-medrol, HHN Treatments scheduled/prn. Add mucinex      Possible pneumonia  Pt was dced 2 weessago   - afebrile. No leukocytosis, no lactic acidosis. procalcitonin negative-  - continue Merrem, Vanc D#6-->narrow Abx coverage with resp cx result/clinical improvement   - CT chest with concerns for pneumonia    Blepharitis  - Refresh eye ointment ordered.      Chronic systolic CHF/severeCAD calcifications see nilsa CT   - continue PO lasix, potassium supplementation. - on Losartan. iv lasix once     Paroxysmal A-fib  - continue Toprol-XL  - AC: Xarelto     Hypertension  - BP elevated. Continue Losartan, Toprol-XL, Amlodipine. CKD stage 3  - stable. Monitor labs.    ambualte   DVT Prophylaxis: Xarelto   Diet: DIET LOW SODIUM 2 GM;  Code Status: Full Code    Piotr Jc Favre 4:39 PM 2/16/2020

## 2020-02-16 NOTE — PLAN OF CARE
Problem: Falls - Risk of:  Goal: Will remain free from falls  Description  Will remain free from falls  2/15/2020 1927 by Sandra Sorensen RN  Outcome: Ongoing  2/15/2020 1555 by Adriana Rolon RN  Outcome: Met This Shift  Goal: Absence of physical injury  Description  Absence of physical injury  2/15/2020 1927 by Sandra Sorensen RN  Outcome: Ongoing  2/15/2020 1555 by Adriana Rolon RN  Outcome: Met This Shift     Problem: Infection:  Goal: Will remain free from infection  Description  Will remain free from infection  Outcome: Ongoing     Problem: Safety:  Goal: Free from accidental physical injury  Description  Free from accidental physical injury  2/15/2020 1927 by Sandra Sorensen RN  Outcome: Ongoing  2/15/2020 1555 by Adriana Rolon RN  Outcome: Met This Shift  Goal: Free from intentional harm  Description  Free from intentional harm  Outcome: Ongoing     Problem: Daily Care:  Goal: Daily care needs are met  Description  Daily care needs are met  Outcome: Ongoing     Problem: Pain:  Goal: Patient's pain/discomfort is manageable  Description  Patient's pain/discomfort is manageable  Outcome: Ongoing     Problem: Skin Integrity:  Goal: Skin integrity will stabilize  Description  Skin integrity will stabilize  Outcome: Ongoing     Problem: Discharge Planning:  Goal: Patients continuum of care needs are met  Description  Patients continuum of care needs are met  Outcome: Ongoing     Problem: OXYGENATION/RESPIRATORY FUNCTION  Goal: Patient will maintain patent airway  Outcome: Ongoing  Goal: Patient will achieve/maintain normal respiratory rate/effort  Description  Respiratory rate and effort will be within normal limits for the patient  Outcome: Ongoing     Problem: HEMODYNAMIC STATUS  Goal: Patient has stable vital signs and fluid balance  Outcome: Ongoing     Problem: FLUID AND ELECTROLYTE IMBALANCE  Goal: Fluid and electrolyte balance are achieved/maintained  Outcome: Ongoing     Problem: ACTIVITY

## 2020-02-17 ENCOUNTER — TELEPHONE (OUTPATIENT)
Dept: INTERNAL MEDICINE CLINIC | Age: 75
End: 2020-02-17

## 2020-02-17 VITALS
TEMPERATURE: 97.4 F | RESPIRATION RATE: 18 BRPM | SYSTOLIC BLOOD PRESSURE: 142 MMHG | OXYGEN SATURATION: 94 % | WEIGHT: 234.56 LBS | DIASTOLIC BLOOD PRESSURE: 81 MMHG | HEART RATE: 72 BPM | HEIGHT: 71 IN | BODY MASS INDEX: 32.84 KG/M2

## 2020-02-17 LAB
ANION GAP SERPL CALCULATED.3IONS-SCNC: 11 MMOL/L (ref 3–16)
BUN BLDV-MCNC: 36 MG/DL (ref 7–20)
CALCIUM SERPL-MCNC: 8.7 MG/DL (ref 8.3–10.6)
CHLORIDE BLD-SCNC: 100 MMOL/L (ref 99–110)
CO2: 26 MMOL/L (ref 21–32)
CREAT SERPL-MCNC: 1 MG/DL (ref 0.8–1.3)
GFR AFRICAN AMERICAN: >60
GFR NON-AFRICAN AMERICAN: >60
GLUCOSE BLD-MCNC: 183 MG/DL (ref 70–99)
POTASSIUM SERPL-SCNC: 4.7 MMOL/L (ref 3.5–5.1)
SODIUM BLD-SCNC: 137 MMOL/L (ref 136–145)
VANCOMYCIN TROUGH: 18.9 UG/ML (ref 10–20)

## 2020-02-17 PROCEDURE — 97161 PT EVAL LOW COMPLEX 20 MIN: CPT

## 2020-02-17 PROCEDURE — 97116 GAIT TRAINING THERAPY: CPT

## 2020-02-17 PROCEDURE — 99232 SBSQ HOSP IP/OBS MODERATE 35: CPT | Performed by: INTERNAL MEDICINE

## 2020-02-17 PROCEDURE — 6370000000 HC RX 637 (ALT 250 FOR IP): Performed by: INTERNAL MEDICINE

## 2020-02-17 PROCEDURE — 80202 ASSAY OF VANCOMYCIN: CPT

## 2020-02-17 PROCEDURE — 80048 BASIC METABOLIC PNL TOTAL CA: CPT

## 2020-02-17 PROCEDURE — 97530 THERAPEUTIC ACTIVITIES: CPT

## 2020-02-17 PROCEDURE — 6360000002 HC RX W HCPCS: Performed by: NURSE PRACTITIONER

## 2020-02-17 PROCEDURE — 94640 AIRWAY INHALATION TREATMENT: CPT

## 2020-02-17 PROCEDURE — 2580000003 HC RX 258: Performed by: NURSE PRACTITIONER

## 2020-02-17 PROCEDURE — 6370000000 HC RX 637 (ALT 250 FOR IP): Performed by: NURSE PRACTITIONER

## 2020-02-17 PROCEDURE — 99238 HOSP IP/OBS DSCHRG MGMT 30/<: CPT | Performed by: PHYSICIAN ASSISTANT

## 2020-02-17 PROCEDURE — 6360000002 HC RX W HCPCS: Performed by: INTERNAL MEDICINE

## 2020-02-17 PROCEDURE — 2580000003 HC RX 258: Performed by: PHYSICIAN ASSISTANT

## 2020-02-17 PROCEDURE — 6370000000 HC RX 637 (ALT 250 FOR IP): Performed by: HOSPITALIST

## 2020-02-17 PROCEDURE — 97165 OT EVAL LOW COMPLEX 30 MIN: CPT

## 2020-02-17 PROCEDURE — 36415 COLL VENOUS BLD VENIPUNCTURE: CPT

## 2020-02-17 PROCEDURE — 6370000000 HC RX 637 (ALT 250 FOR IP): Performed by: PHYSICIAN ASSISTANT

## 2020-02-17 RX ORDER — BENZONATATE 100 MG/1
100 CAPSULE ORAL 3 TIMES DAILY PRN
Qty: 20 CAPSULE | Refills: 0 | Status: SHIPPED | OUTPATIENT
Start: 2020-02-17 | End: 2020-02-24

## 2020-02-17 RX ORDER — PREDNISONE 10 MG/1
TABLET ORAL
Qty: 30 TABLET | Refills: 0 | Status: SHIPPED | OUTPATIENT
Start: 2020-02-17 | End: 2020-06-18 | Stop reason: CLARIF

## 2020-02-17 RX ADMIN — METOPROLOL SUCCINATE 50 MG: 50 TABLET, EXTENDED RELEASE ORAL at 08:34

## 2020-02-17 RX ADMIN — CARBOXYMETHYLCELLULOSE SODIUM 1 DROP: 10 GEL OPHTHALMIC at 08:35

## 2020-02-17 RX ADMIN — RIVAROXABAN 20 MG: 20 TABLET, FILM COATED ORAL at 08:34

## 2020-02-17 RX ADMIN — IPRATROPIUM BROMIDE AND ALBUTEROL SULFATE 1 AMPULE: .5; 3 SOLUTION RESPIRATORY (INHALATION) at 06:41

## 2020-02-17 RX ADMIN — AMLODIPINE BESYLATE 5 MG: 5 TABLET ORAL at 08:35

## 2020-02-17 RX ADMIN — MEROPENEM 1 G: 1 INJECTION, POWDER, FOR SOLUTION INTRAVENOUS at 08:40

## 2020-02-17 RX ADMIN — GUAIFENESIN 400 MG: 100 SOLUTION ORAL at 08:34

## 2020-02-17 RX ADMIN — Medication 10 ML: at 08:34

## 2020-02-17 RX ADMIN — IPRATROPIUM BROMIDE AND ALBUTEROL SULFATE 1 AMPULE: .5; 3 SOLUTION RESPIRATORY (INHALATION) at 03:05

## 2020-02-17 RX ADMIN — METHYLPREDNISOLONE SODIUM SUCCINATE 40 MG: 40 INJECTION, POWDER, FOR SOLUTION INTRAMUSCULAR; INTRAVENOUS at 08:34

## 2020-02-17 RX ADMIN — LOSARTAN POTASSIUM 100 MG: 100 TABLET, FILM COATED ORAL at 08:34

## 2020-02-17 RX ADMIN — FUROSEMIDE 40 MG: 40 TABLET ORAL at 08:35

## 2020-02-17 RX ADMIN — VANCOMYCIN HYDROCHLORIDE 1000 MG: 1 INJECTION, POWDER, LYOPHILIZED, FOR SOLUTION INTRAVENOUS at 09:21

## 2020-02-17 RX ADMIN — IPRATROPIUM BROMIDE AND ALBUTEROL SULFATE 1 AMPULE: .5; 3 SOLUTION RESPIRATORY (INHALATION) at 10:42

## 2020-02-17 RX ADMIN — ATORVASTATIN CALCIUM 20 MG: 10 TABLET, FILM COATED ORAL at 08:34

## 2020-02-17 RX ADMIN — POTASSIUM CITRATE 15 MEQ: 5 TABLET ORAL at 08:33

## 2020-02-17 RX ADMIN — PANTOPRAZOLE SODIUM 40 MG: 40 TABLET, DELAYED RELEASE ORAL at 08:34

## 2020-02-17 ASSESSMENT — PAIN SCALES - GENERAL: PAINLEVEL_OUTOF10: 0

## 2020-02-17 NOTE — PROGRESS NOTES
NA /10  Pain Medicine Status: No request made    Cognition    A&O x4   Able to follow 2 step commands    Subjective  Patient lying supine in bed with no family present  Pt agreeable to this PT eval & tx. Upper Extremity ROM/Strength  Please see OT evaluation. Lower Extremity ROM / Strength    AROM WFL: Yes  ROM limitations:     Strength Assessment (measured on a 0-5 scale):  R LE   Quad   5/5   Ant Tib  5/5   Hamstring 5/5   Iliopsoas 4  L LE  Quad   5/5   Ant Tib  5/5   Hamstring 5/5   Iliopsoas 4    Lower Extremity Sensation    WFL - although pt reports sensation of \"numbness\" RLE due to circulation issues. Intact to light touch bilaterally     Lower Extremity Proprioception:   WFL    Coordination and Tone  WFL    Balance  Static Sitting:  Normal   Tolerance:   Dynamic Sitting:  Good -   Static Standing: Good -    Tolerance:   Dynamic Standing: Fair +    Bed Mobility   Supine to Sit:   Independent  Sit to Supine:  Not Tested  Rolling:   Not Tested  Scooting at EOB: Independent  Scooting to [facility]:  Not Tested    Transfer Training     Sit to stand:   Supervision  Stand to sit:   Supervision  Bed to Chair:  Not Tested with use of N/A    Gait gait completed as indicated below  Distance:  422 ft  Deviations (firm surface/linoleum): decreased david and Increased JW   Assistive Device Used:  No AD  Level of Assist: Supervision  Comment: Total assist for O2 line management, cues to take standing rest breaks ~ 1-2 min due to noted increased BETANCOURT    Stair Training deferred, pt unsafe/not appropriate to complete stairs at this time  Pt ascended/descended  stairs with N/A with N/A, and use of N/A.   Pattern: N/A    Activity Tolerance   Pt completed therapy session with SOB noted with ambulation, able to recover with seated rest  SpO2: 93-95% on 3L O2 with mobility  HR: 102 bpm post-ambulation  BP:     Positioning Needs   Pt instructed and educated on use of call light to call for assist if desiring to get up or change position, call light handed to pt and needs within reach and Pt sitting up in chair    Patient/Family Education   Pt educated on role of inpatient PT, POC, importance of continued activity, DC recommendations, safety awareness, pursed lip breathing, pacing activity and calling for assist with mobility. Assessment  Pt seen for Physical Therapy evaluation in acute care setting. Pt demonstrated decreased Activity tolerance and Balance and decreased independence with Ambulation. Pt able to perform ambulation with no more than supervision, although required total assist with O2 line management. Pt fatigued post-ambulation and unable to tolerate further stair training. Pt would benefit from from acute PT services to safely progress IND with functional activity tolerance. Goals : To be met in 3 visits:  1). Independent with LE Ex x 10 reps    To be met in 6 visits:  1). Sit to/from stand: Independent  2). Bed to chair: Independent  3). Gait: Ambulate Darvin's  with  Independent  and use of LRAD  4). Tolerate B LE exercises 3 sets of 10-15 reps  5). Ascend/descend 2 + 5 steps with Supervision with use of one hand rail and LRAD. Rehabilitation Potential: Good  Strengths for achieving goals include:   PLOF and Pt cooperative  Barriers to achieving goals include:    Weakness    Plan    To be seen 2-3 x / week  while in acute care setting for therapeutic exercises, bed mobility, transfers, progressive gait training, balance training, and family/patient education. Adriana Clarke, PT, DPT #[id number]    If patient discharges from this facility prior to next visit, this note will serve as the Discharge Summary.

## 2020-02-17 NOTE — PROGRESS NOTES
Inpatient Occupational Therapy  Evaluation and Treatment    Unit: 2 [place]  Date:  2/17/2020  Patient Name:    Marya Tavares  Admitting [facility]:  [facility] [J[facility]  Admit Date:  2/11/2020  Precautions/Restrictions/WB Status/ Lines/ Wounds/ Oxygen: fall risk, IV, bed/chair alarm and supplemental O2 (3L)    Treatment Time:  9:21-9:57  Treatment Number: 1   Billable Treatment Time: 26 minutes   Total Treatment Time:  36  minutes    Patient Goals for Therapy:  \" To go home \"      Discharge Recommendations: Home with PRN assist, OP pulmonary rehab   DME needs for discharge: Needs Met       Therapy recommendations for staff:   Assist of 1 (supervision) with use of No AD for all ambulation within halls    History of Present Illness: 32-year-old male with a history of COPD, CHF and atrial fibrillation who was hospitalized 12/23/2019 with COPD and atrial fibrillation, subsequently rehospitalized on 1/11/2020 with pneumonia and COPD, who presented to the emergency department on 2/11/2020 with a one-week history of increasing severe shortness of breath, associated with lower extremity edema and wheezing. Home Health S4 Level Recommendation:  Level 1 Standard  AM-PAC Score: 20    Preadmission Environment    Pt. Rick Samaniego spouse; works during day  Home environment:    two story home full finished LL  Steps to enter first floor:   2+5 steps to enter and one hand rail        (25+ steps up to 2nd level, 15 down to LL)  Steps to second floor: N/A; Pt remains on main level  Bathroom:       Tub/Shower unit, Shower Chair  and comfort height toilet without grab bars   Equipment owned:      Shower Chair, home O2 (3 L) PRN and pulse ox, nebulizer   Sleeps in recliner occasionally     Preadmission Status / PLOF:  History of falls             No  Pt. Able to drive          Yes  Pt Fully independent with ADL's         Yes  Pt. Required assistance from family for: nothing;  He does all the cooking, laundry and cleaning. Pt. Fully independent for transfers and gait and walked with: No Device    Pain  No  Rating:NA  Location:  Pain Medicine Status: Denies need      Cognition    A&O x4   Able to follow 2 step commands    Subjective  Patient lying supine in bed with no family present  Pt agreeable to this OT eval & tx. Upper Extremity ROM:    WFL,  pt able to perform all bed mobility, transfers, and gait without ROM limitation. Upper Extremity Strength:    Strength Assessment (measured on a 0-5 scale):    B UEs 5/5    Upper Extremity Sensation    WFL    Upper Extremity Proprioception:   WFL    Coordination and Tone  WFL    Balance  Functional Sitting Balance:  WFL  Functional Standing Balance:WFL    Bed mobility:    Supine to sit: Independent  Sit to supine:   Not Tested  Scooting to head of bed:   Not Tested  Scooting in sitting:  Independent  Rolling:   Independent  Bridging:   Not Tested    Transfers:    Sit to stand:  Supervision  Stand to sit:  Supervision  Bed to [facility]:  Not Tested  Bed to chair:   Supervision  Standard toilet: Not Tested    Activity Tolerance   Pt completed therapy session with SOB noted with activity. Patient completed functional mobility in hallway with supervision and verbal cues to take standing rest breaks. SpO2: >93% on 3L of O2  HR:   BP:     Dressing:      UE:   Not Tested  LE:    Independent    Bathing:    UE:  Not Tested  LE:  Not Tested    Eating:   Independent    Toileting:  Not Tested    Positioning Needs:   Up in chair, call light and needs in reach. Exercise / Activities Initiated:   N/A    Patient/Family Education:   Role of OT  Recommendations for DC  Energy conservation techniques    Assessment of Deficits: Pt seen for Occupational therapy evaluation in acute care setting. Pt demonstrated decreased Activity Tolerance, ADL's, Safety Awareness and Strength.  Pt functioning below baseline and will likely benefit from skilled occupational therapy services to maximize

## 2020-02-17 NOTE — FLOWSHEET NOTE
02/16/20 2024   Vital Signs   Temp 97 °F (36.1 °C)   Temp Source Oral   Pulse 73   Heart Rate Source Monitor   Resp 20   /81   BP Location Left upper arm   Patient Position Semi fowlers   Level of Consciousness 0   MEWS Score 1   Patient Currently in Pain No   Oxygen Therapy   SpO2 92 %   O2 Device Nasal cannula   O2 Flow Rate (L/min) 3 L/min   Assessment complete- see flowsheets. Pt denies needs at this time, has call light within reach. Pt refusing bed alarm at this time. Will continue to monitor.   Jazz Faria RN

## 2020-02-17 NOTE — DISCHARGE INSTR - COC
Manager/ signature: Electronically signed by Natacha Arredondo RN on 2/17/20 at 12:19 PM    PHYSICIAN SECTION    Prognosis: {Prognosis:6137118462}    Condition at Discharge: [address] Patient Condition:[id number]}    Rehab Potential (if transferring to Rehab): {Prognosis:9327887324}    Recommended Labs or Other Treatments After Discharge: ***    Physician Certification: I certify the above information and transfer of Ade Mcclendon  is necessary for the continuing treatment of the diagnosis listed and that he requires Home Care for less 30 days.      Update Admission H&P: Changes in H&P as follows - see attached    PHYSICIAN SIGNATURE: GRACIA Mena MD/ Electronically signed by Natacha Arredondo RN on 2/17/20 at 12:19 PM

## 2020-02-17 NOTE — DISCHARGE SUMMARY
lasix    Paroxysmal A-fib  - continue Toprol-XL  - AC: Xarelto     Hypertension  - BP elevated. Continue Losartan, Toprol-XL, Amlodipine.      CKD stage 3  - stable. Monitor labs.       GERD   - on PPI      HLD  - Continue statin     Procedures (Please Review Full Report for Details)  None     Consults    Pulmonology     Physical Exam at Discharge:    BP (!) 142/81   Pulse 72   Temp 97.4 °F (36.3 °C) (Oral)   Resp 18   Ht 5' 11\" (1.803 m)   Wt 234 lb 9 oz (106.4 kg)   SpO2 94%   BMI 32.71 kg/m²   Gen: No distress. Alert. Eyes: PERRL. No sclera icterus. No conjunctival injection. ENT: No discharge. Pharynx clear. Neck: No JVD. Trachea midline. Resp: No accessory muscle use. No crackles. + wheezes. No rhonchi. On 3L NC O2  CV: Regular rate. Regular rhythm. No murmur. No rub. trace edema. Capillary Refill: Brisk,< 3 seconds   Peripheral Pulses: +2 palpable, equal bilaterally   GI: Non-tender. Non-distended. Normal bowel sounds. Skin: Warm and dry. M/S: No cyanosis. No joint deformity. No clubbing. Neuro: Awake. Grossly nonfocal    Psych: Oriented x 3. No anxiety or agitation. CBC: No results for input(s): WBC, HGB, HCT, MCV, PLT in the last 72 hours. BMP:   Recent Labs     02/17/20  0851      K 4.7      CO2 26   BUN 36*   CREATININE 1.0     CULTURES  Blood Cx: No growth at 4 days   Resp Cx: no result   Rapid Flu: negative     RADIOLOGY  CT Chest WO Contrast   Final Result   1. Nodular airspace opacities in the left lower lobe, most compatible with   pneumonia, which account for the recent radiographic finding. There is also   linear atelectasis versus a mild pneumonia in the right lower lobe. Short-term CT follow-up to document resolution is recommended. 2. 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 3. Mild emphysema. 4. Severe coronary artery disease.          XR CHEST PORTABLE   Final Result   Left

## 2020-02-17 NOTE — CARE COORDINATION
Case Management Assessment  Initial Evaluation  COPD    Date/Time of Evaluation: 2/11/2020 3:33 PM  Assessment Completed by: Reanna Olivera    Patient Name: Rosalee Nesbitt  YOB: 1945  [facility] [J44.[facility]]  Date / Time: 2/11/2020  7:46 AM  Admission status/Date: Inpatient 02/11/2020 1000  Chart Reviewed: Yes      Patient Interviewed: Yes   Family Interviewed:  No      Hospitalization in the last 30 days:  No    Contacts  :     Relationship to Patient:   Phone Number:    Alternate Contact:     Relationship to Patient:     Phone Number:      Current PCP; Tai Rodriguez MD  Pulmonologist/Name: [address] [place] [address],[address] Medicare    ADLS  Support Systems: Spouse/Significant Other  Transportation: self    Meal Preparation: self  Smoke:FORMER amount:     Housing  Home Environment: lives w/spouse   Steps: 2  Plans to Return to Present Housing: Yes  Other Identified Issues: [address]  Currently active with [address] : No  Type of Home Care Services: None         Passport/Waiver : No  Passport/Waiver Services: NA    Durable Medical Equipment   DME Provider: [facility]  Equipment: home O2 3 liters/min per NC, continuously, HHN      Has Home O2 in place on admit:  Yes  Informed of need to bring portable home O2 tank on day of discharge for nursing to connect prior to leaving:   Yes  Verbalized agreement/Understanding:   Yes    Community Service Affiliation  Dialysis:  No  Outpatient PT/OT: No    Cancer Center: No     CHF Clinic: No     Pulmonary Rehab: No    Pain Clinic: No  Community Mental Health: No    Wound Clinic: No     Other: NA    DISCHARGE PLAN: Chart reviewed. Met with pt and spouse at bedside and explained the role of the CM. Plans to return home. Denies any HC or DME needs.  +CM following
HOME CARE        Orders faxed to Shanell Palmer for home care services. [facility] is out of network with the [facility]OR[facility] Raygo[facility].         [facility] Ray[facility]  Work mobile: [phone]  [facility] office: [phone]
HOME CARE      [facility] has accepted the patient and will see them on DC today.               [facility] Raya  Work mobile: [phone]  [facility] office: [phone]
to live independently?:  Yes  Hearing and Vision  Visual Impairment:  Visual impairment (Glasses/contacts)  Hearing Impairment:  None  Care Transitions Interventions     Other Services:   (Comment: HC)                                  Follow Up  Future Appointments   Date Time Provider Zhane Gramajo   4/13/2020  1:00 PM Sheng Madrid MD [facility] None   4/22/2020  2:30 PM MHC CT MAIN [facility] [place] [facility]   4/22/2020  3:30 PM Sherif Cheng MD [facility]   7/22/2020  2:15 PM Sharif Fine MD [address] [facility]       Health Maintenance  Health Maintenance Due   Topic Date Due    Lipid screen  11/26/2019       Diego Saenz, RN

## 2020-02-17 NOTE — PROGRESS NOTES
Pulmonary Progress Note  CC: sob, copd    Subjective:     SOB improved some    EXAM:   BP (!) 142/81   Pulse 72   Temp 97.4 °F (36.3 °C) (Oral)   Resp 18   Ht 5' 11\" (1.803 m)   Wt 234 lb 9 oz (106.4 kg)   SpO2 94%   BMI 32.71 kg/m²  on 3 L  Constitutional:  No acute distress   HEENT: no scleral icterus  Neck: No tracheal deviation present. Cardiovascular: Normal heart sounds. Pulmonary/Chest: + wheezes. No rhonchi. No rales. No decreased breath sounds. no accessory muscle usage   Abdominal: Soft. Musculoskeletal: No cyanosis. No clubbing. Skin: Skin is warm and dry. Scheduled Meds:   ipratropium-albuterol  1 ampule Inhalation Q4H WA    methylPREDNISolone  40 mg Intravenous Q12H    guaiFENesin  400 mg Oral BID    vancomycin  1,000 mg Intravenous Q12H    potassium citrate  15 mEq Oral Daily    amLODIPine  5 mg Oral Daily    atorvastatin  20 mg Oral Daily    metoprolol succinate  50 mg Oral Daily    pantoprazole  40 mg Oral Daily    rivaroxaban  20 mg Oral Daily with breakfast    sodium chloride flush  10 mL Intravenous 2 times per day    losartan  100 mg Oral Daily    furosemide  40 mg Oral Daily    carboxymethylcellulose PF  1 drop Both Eyes TID    meropenem  1 g Intravenous Q8H     Continuous Infusions:    PRN Meds:  benzonatate, albuterol, sodium chloride flush, magnesium hydroxide, ondansetron, acetaminophen, albuterol sulfate HFA    Labs:  CBC:   No results for input(s): WBC, HGB, HCT, MCV, PLT in the last 72 hours. BMP:   Recent Labs     02/17/20  0851      K 4.7      CO2 26   BUN 36*   CREATININE 1.0       Cultures:  2/11/20 Blood no growth to date   Respiratory was never received by the lab   Rapid flu negative    Films:  2/11/2020 persistent left basilar opacity    2/11/2020 CT CHEST  Impression:        1.  Nodular airspace opacities in the left lower lobe, most compatible with  pneumonia, which account for the recent radiographic finding. Rozanna Sluder is also  linear atelectasis versus a mild pneumonia in the right lower lobe. Short-term CT follow-up to document resolution is recommended. 2. 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 3. Mild emphysema.   4. Severe coronary artery disease.        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

## 2020-02-17 NOTE — PROGRESS NOTES
D/c instructions given to pt with prescriptions sent to pharmacy. Verbalized understanding of instructions. Transport placed. Call light in reach.

## 2020-02-17 NOTE — PROGRESS NOTES
Wife going home to get oxygen tank and will return to take pt home. IV removed per policy. Bleeding stopped. Call light in reach.

## 2020-02-18 ENCOUNTER — CARE COORDINATION (OUTPATIENT)
Dept: CASE MANAGEMENT | Age: 75
End: 2020-02-18

## 2020-02-18 PROCEDURE — 1111F DSCHRG MED/CURRENT MED MERGE: CPT | Performed by: INTERNAL MEDICINE

## 2020-02-18 NOTE — CARE COORDINATION
Audra 45 Transitions Initial Follow Up Call    Call within 2 business days of discharge: Yes    Patient: Merlinda Spittle Patient : 1945   MRN: [id number]  Reason for Admission: aeCOPD, possible PNA  Discharge Date: 20 RARS: Readmission Risk Score: 23      Last Discharge [facility]       Complaint Diagnosis Description Type Department Provider    20 Shortness of Breath COPD exacerbation [facility]) ED to Hosp-Admission (Discharged) (ADMIT) [facility] Skylar Ashraf MD; Shona Kendrick ... Spoke with: Merlinda Spittle (patient)    Facility: [facility]    Non-face-to-face services provided:  Obtained and reviewed discharge summary and/or continuity of care documents  Education of patient/family/caregiver/guardian to support self-management-CHF and COPD zone tool review/education  Assessment and support for treatment adherence and medication management-1111F completed with [address] [place] [address] contacted him and will start tomorrow. The nurse's name is Creswell Sofia. He is unsure if he will be able to work much with therapy because of BETANCOURT and weakness but we did discuss that getting physically stronger will allow him to overcome exacerbations and recover better. He is willing to work with therapy and hopefully improve. Today's weight 233#. He was instructed to continue to weigh daily - same time/same clothes/after void/before PO.     CHF education:  -weigh daily in the morning at the same time and in the same clothing  -report weight gain of >3#/day or >5#/week  -report increased SOB, edema, abd fullness, difficulty lying flat  -report palpitations, cough, difficulty urinating  -review of red/yellow/green CHF Zone Tool    He does not add salt to foods but does eat at restaurants often. He was instructed to request no salt in food prep and to make fresh choices when out. He was encouraged to decrease fluid intake still to <64ounces/day.      Wearing oxygen at 3lpm. Still having some BETANCOURT which he states is baseline these days. He has already done a load of laundry and cleaned up the kitchen today. He is feeling improved. Reviewed new medications and that prednisone is tapering until gone and Tessalon prn so will not be added to pill packs. He states he will  and start meds today. Reviewed inhalers and hhn meds. He confirms taking. Writer will call [address] for med review since they pill pack. He denies needs/concerns. Instructed him to call same day for s/s aeCHF/COPD. Outreach to [address] and med review completed with Naa Barker. Medications are accurate with discharge med list.     CTN following.        Care Transitions 24 Hour Call    Schedule Follow Up Appointment with PCP:  Declined  Do you have any ongoing symptoms?:  Yes  Patient-reported symptoms:  Cough  Interventions for patient-reported symptoms:  Other  Do you have a copy of your discharge instructions?:  Yes  Do you have all of your prescriptions and are they filled?:  Yes (Comment: will  today)  Have you been contacted by a [address]?:  No  Have you scheduled your follow up appointment?:  No  Were you discharged with any Home Care or Post Acute Services:  Yes  Post Acute Services:  Home Health (Comment: [facility] [place], [facility]  Patient Home Equipment:  Nebulizer, Oxygen  Do you have support at home?:  Partner/Spouse/SO  Do you feel like you have everything you need to keep you well at home?:  Yes  Are you an active caregiver in your home?:  No  Care Transitions Interventions  No Identified Needs     Other Services:   (Comment: )                                  Follow Up  Future Appointments   Date Time Provider Zhane Gramajo   4/13/2020  1:00 PM Jaleel Porras MD [facility] None   4/22/2020  2:30 PM MHC CT MAIN [address] SC [facility]   4/22/2020  3:30 PM Mich Banks MD [facility] P[facility]   7/22/2020  2:15 PM MD Devin Ballesteros [facility]

## 2020-02-21 ENCOUNTER — CARE COORDINATION (OUTPATIENT)
Dept: CASE MANAGEMENT | Age: 75
End: 2020-02-21

## 2020-02-21 NOTE — CARE COORDINATION
Audra 45 Transitions Follow Up Call    2020    Patient: Merlinda Spittle  Patient : 1945   MRN: [id number]  Reason for Admission: aeCOPD, possible PNA  Discharge Date: 20 RARS: Readmission Risk Score: 23         Spoke with: Merlinda Spittle (patient)    Feels well. Denies edema. Weight stable 234#. Slept well. Appetite good. Denies cough. CHF education:  -weigh daily in the morning at the same time and in the same clothing  -report weight gain of >3#/day or >5#/week  -report increased SOB, edema, abd fullness, difficulty lying flat  -report palpitations, cough, difficulty urinating  -review of red/yellow/green CHF Zone Tool    Again states \"I get no salt\". Reviewed fluid and salt restrictions. HC started but PT hasn't come yet. PT was ill so he told them to wait until Monday. Instructed Ulises to schedule TCM visit with PCP. States he will call Monday. Denies needs going into weekend. Reviewed s/s to report to HC/CTN/MD on the SAME day to prevent readmission. He voices understanding. CTN following. Care Transitions Subsequent and Final Call    Schedule Follow Up Appointment with PCP:  Completed  Subsequent and Final Calls  Do you have any ongoing symptoms?:  No  Have your medications changed?:  No  Do you have any questions related to your medications?:  No  Do you currently have any active services?:  Yes  Are you currently active with any services?:  Home Health  Do you have any needs or concerns that I can assist you with?:  No  Identified Barriers: Other  Care Transitions Interventions  No Identified Needs     Other Services:   (Comment: HC)                           Other Interventions:             Follow Up  Future Appointments   Date Time Provider Zhane Gramajo   2020  1:00 PM Michelle Odom MD [facility] None   2020  2:30 PM MHC CT MAIN [facility] CT SC [facility]   2020  3:30 PM MD MELYSSA Osborne P[facility]M MMA   2020  2:15 PM

## 2020-02-26 ENCOUNTER — CARE COORDINATION (OUTPATIENT)
Dept: CASE MANAGEMENT | Age: 75
End: 2020-02-26

## 2020-02-26 ENCOUNTER — HOSPITAL ENCOUNTER (EMERGENCY)
Age: 75
Discharge: HOME | End: 2020-02-26
Attending: EMERGENCY MEDICINE
Payer: MEDICARE

## 2020-02-26 VITALS
TEMPERATURE: 97.4 F | BODY MASS INDEX: 33.64 KG/M2 | DIASTOLIC BLOOD PRESSURE: 89 MMHG | RESPIRATION RATE: 18 BRPM | HEART RATE: 79 BPM | SYSTOLIC BLOOD PRESSURE: 141 MMHG | WEIGHT: 235 LBS | OXYGEN SATURATION: 94 % | HEIGHT: 70 IN

## 2020-02-26 PROCEDURE — 99282 EMERGENCY DEPT VISIT SF MDM: CPT

## 2020-02-26 RX ORDER — CHOLECALCIFEROL (VITAMIN D3) 25 MCG
CAPSULE ORAL DAILY
COMMUNITY

## 2020-02-26 RX ORDER — BENZONATATE 100 MG/1
100 CAPSULE ORAL 3 TIMES DAILY PRN
COMMUNITY
End: 2020-09-13

## 2020-02-26 RX ORDER — BUDESONIDE AND FORMOTEROL FUMARATE DIHYDRATE 160; 4.5 UG/1; UG/1
2 AEROSOL RESPIRATORY (INHALATION) DAILY
COMMUNITY
Start: 2019-05-28 | End: 2020-12-28

## 2020-02-26 ASSESSMENT — ENCOUNTER SYMPTOMS
SHORTNESS OF BREATH: 0
SORE THROAT: 0
BACK PAIN: 0
ABDOMINAL PAIN: 0
COUGH: 0

## 2020-02-26 ASSESSMENT — PAIN DESCRIPTION - LOCATION: LOCATION: HAND;WRIST

## 2020-02-26 ASSESSMENT — PAIN SCALES - GENERAL: PAINLEVEL_OUTOF10: 6

## 2020-02-26 ASSESSMENT — PAIN DESCRIPTION - PAIN TYPE: TYPE: ACUTE PAIN

## 2020-02-26 ASSESSMENT — PAIN DESCRIPTION - ORIENTATION: ORIENTATION: RIGHT

## 2020-02-27 NOTE — ED PROVIDER NOTES
[facility]      Pt Name: Milka Oconnor  MRN: [id number]  [place] 1945  Date of evaluation: 2/26/2020  Provider: Smith Vallecillo MD    CHIEF COMPLAINT       Chief Complaint   Patient presents with    Hand Injury     pt had tried to retreive a ring from a register vent and rt cut hand/wrist         HISTORY OF PRESENT ILLNESS   (Location/Symptom, Timing/Onset, Context/Setting, Quality, Duration, Modifying Factors, Severity)  Note limiting factors. Milka Son is a 69 y.o. male who presents to the emergency department     Patient is a 72-year-old male with a past medical history of atrial fibrillation on anticoagulation with Xarelto who presents with an injury to his right hand. Patient was trying to retrieve a lost hearing from a heat register and he caught his hand on the edge of the register when he was pulling it out causing a skin tear to the top of his right hand. Patient had difficulty getting it to stop bleeding at home because he is on blood thinner so came to the emergency department for further evaluation. Bleeding is now currently controlled. He denies any numbness, tingling, or weakness. Patient has full range of motion of his right wrist.  Patient reports that his tetanus was last updated a year ago. The history is provided by the patient and a relative. Nursing Notes were reviewed. REVIEW OF SYSTEMS    (2-9 systems for level 4, 10 or more for level 5)     Review of Systems   Constitutional: Negative for chills and fever. HENT: Negative for congestion and sore throat. Eyes: Negative for visual disturbance. Respiratory: Negative for cough and shortness of breath. Cardiovascular: Negative for chest pain. Gastrointestinal: Negative for abdominal pain. Musculoskeletal: Negative for back pain and neck pain. Skin: Positive for wound. Negative for pallor and rash.    Neurological: Negative for light-headedness and Financial resource strain: Not very hard    Food insecurity:     Worry: Never true     Inability: Never true   Calligo needs:     Medical: No     Non-medical: No   Tobacco Use    Smoking status: Former Smoker     Packs/day: 2.00     Years: 55.00     Pack years: 110.00     Last attempt to quit: 2017     Years since quittin.5    Smokeless tobacco: Never Used    Tobacco comment: smoked 2 darnell a day for 55 years   Substance and Sexual Activity    Alcohol use: Not Currently     Frequency: Never     Comment: occassionally    Drug use: No    Sexual activity: Not Currently     Partners: Female   Lifestyle    Physical activity:     Days per week: 0 days     Minutes per session: 0 min    Stress: Not at all   Relationships    Social connections:     Talks on phone: None     Gets together: None     Attends Muslim service: None     Active member of club or organization: None     Attends meetings of clubs or organizations: None     Relationship status:     Intimate partner violence:     Fear of current or ex partner: No     Emotionally abused: No     Physically abused: No     Forced sexual activity: No   Other Topics Concern    None   Social History Narrative    None       SCREENINGS    Merino Coma Scale  Eye Opening: Spontaneous  Best Verbal Response: Oriented  Best Motor Response: Obeys commands  Merino Coma Scale Score: 15          PHYSICAL EXAM    (up to 7 for level 4, 8 or more for level 5)     ED Triage Vitals [20 2148]   BP Temp Temp Source Pulse Resp SpO2 Height Weight   (!) 141/89 97.4 °F (36.3 °C) Oral 79 18 94 % 5' 10\" (1.778 m) 235 lb (106.6 kg)       Physical Exam  Vitals signs and nursing note reviewed. Constitutional:       General: He is not in acute distress. Appearance: Normal appearance. HENT:      Head: Normocephalic and atraumatic. Nose: Nose normal. No congestion.       Mouth/Throat:      Mouth: Mucous membranes are moist.   Eyes: Conjunctiva/sclera: Conjunctivae normal.   Neck:      Musculoskeletal: Normal range of motion and neck supple. Cardiovascular:      Rate and Rhythm: Normal rate. Pulses: Normal pulses. Pulmonary:      Effort: Pulmonary effort is normal.      Breath sounds: Normal breath sounds. Musculoskeletal:        Hands:    Skin:     General: Skin is warm and dry. Neurological:      General: No focal deficit present. Mental Status: He is alert and oriented to person, place, and time. DIAGNOSTIC RESULTS     EKG: All EKG's are interpreted by the Emergency Department Physician who either signs or Co-signs this chart in the absence of a cardiologist.        RADIOLOGY:     Interpretation per the Radiologist below, if available at the time of this note:    No orders to display         LABS:  Labs Reviewed - No data to display    All other labs were within normal range or not returned as of this dictation. EMERGENCY DEPARTMENT COURSE and DIFFERENTIAL DIAGNOSIS/MDM:   Vitals:    Vitals:    02/26/20 2148   BP: (!) 141/89   Pulse: 79   Resp: 18   Temp: 97.4 °F (36.3 °C)   TempSrc: Oral   SpO2: 94%   Weight: 235 lb (106.6 kg)   Height: 5' 10\" (1.778 m)       Patient evaluated and previous record reviewed. Patient presents with injury to his right hand. Vital signs stable and within normal limits. Physical exam as documented above. Patient's wound is cleaned and dressed. No stitches required as injury is very superficial.  Patient's tetanus was updated within the last year. Will discharge patient home with return precautions. CONSULTS:  None    PROCEDURES:  Unless otherwise noted below, none     Procedures      FINAL IMPRESSION      1.  Skin tear of right hand without complication, initial encounter          DISPOSITION/PLAN   DISPOSITION Discharge - Pending Orders Complete 02/26/2020 10:22:44 PM      PATIENT REFERRED TO:  Ayesha Eldridge MD  [address], [facility]

## 2020-02-27 NOTE — ED NOTES
Pt DC home in good condition with family. V/u of Dc instructions. Denies questions or concerns. Teaching done re: s/s to report.      Brandi Balderas RN  02/26/20 1282

## 2020-02-27 NOTE — ED NOTES
Non-adherent pad with polysporin and co-band applied to wound. Pt tolerated well.      Pixie Meckel, RN  02/26/20 3430

## 2020-02-28 ENCOUNTER — CARE COORDINATION (OUTPATIENT)
Dept: CARE COORDINATION | Age: 75
End: 2020-02-28

## 2020-03-02 ENCOUNTER — CARE COORDINATION (OUTPATIENT)
Dept: CARE COORDINATION | Age: 75
End: 2020-03-02

## 2020-03-02 RX ORDER — IPRATROPIUM BROMIDE AND ALBUTEROL SULFATE 2.5; .5 MG/3ML; MG/3ML
SOLUTION RESPIRATORY (INHALATION)
Qty: 360 ML | Refills: 0 | Status: SHIPPED | OUTPATIENT
Start: 2020-03-02 | End: 2020-03-23

## 2020-03-03 ENCOUNTER — CARE COORDINATION (OUTPATIENT)
Dept: CASE MANAGEMENT | Age: 75
End: 2020-03-03

## 2020-03-03 NOTE — CARE COORDINATION
Audra 45 Transitions FINAL Call    3/3/2020    Patient: Mercedes Rivera  Patient : 1945   MRN: [id number]  Reason for Admission: aeCOPD, possible PNA  Discharge Date: 20 RARS: Readmission Risk Score: 23       Noted Pasha Redd had ED visit for hand injury after last CTN outreach. Unable to reach at home or mobile numbers at this time. Unable to leave message. He has PCP appointment tomorrow. Will hand off to [facility] at this time. Care transition outreach complete.      Follow Up  Future Appointments   Date Time Provider Zhane Gramajo   3/4/2020 10:10 AM Chrystine Aschoff, MD [facility] None   2020  1:00 PM Chrystine Aschoff, MD [facility] None   2020  2:30 PM MHC CT MAIN [facility] CT SC [facility]   2020  3:30 PM MD DANA Avendano PUL [facility]   2020  2:15 PM Quincy Chapman MD [facility]       Kasey Vazquez RN

## 2020-03-04 ENCOUNTER — HOSPITAL ENCOUNTER (OUTPATIENT)
Age: 75
Discharge: HOME | End: 2020-03-04
Payer: MEDICARE

## 2020-03-04 ENCOUNTER — OFFICE VISIT (OUTPATIENT)
Dept: INTERNAL MEDICINE CLINIC | Age: 75
End: 2020-03-04

## 2020-03-04 VITALS
SYSTOLIC BLOOD PRESSURE: 110 MMHG | DIASTOLIC BLOOD PRESSURE: 80 MMHG | OXYGEN SATURATION: 97 % | RESPIRATION RATE: 14 BRPM | BODY MASS INDEX: 32.06 KG/M2 | HEIGHT: 71 IN | WEIGHT: 229 LBS | TEMPERATURE: 100.3 F | HEART RATE: 124 BPM

## 2020-03-04 LAB
RAPID INFLUENZA  B AGN: NEGATIVE
RAPID INFLUENZA A AGN: NEGATIVE

## 2020-03-04 PROCEDURE — 99213 OFFICE O/P EST LOW 20 MIN: CPT | Performed by: INTERNAL MEDICINE

## 2020-03-04 PROCEDURE — 87804 INFLUENZA ASSAY W/OPTIC: CPT

## 2020-03-04 RX ORDER — OSELTAMIVIR PHOSPHATE 75 MG/1
75 CAPSULE ORAL 2 TIMES DAILY
Qty: 10 CAPSULE | Refills: 0 | Status: SHIPPED | OUTPATIENT
Start: 2020-03-04 | End: 2020-03-09

## 2020-03-04 ASSESSMENT — ENCOUNTER SYMPTOMS
COUGH: 1
SHORTNESS OF BREATH: 0

## 2020-03-06 RX ORDER — PREDNISONE 10 MG/1
TABLET ORAL
Qty: 30 TABLET | Refills: 0 | Status: SHIPPED | OUTPATIENT
Start: 2020-03-06 | End: 2020-06-18 | Stop reason: CLARIF

## 2020-03-06 RX ORDER — AZITHROMYCIN 250 MG/1
TABLET, FILM COATED ORAL
Qty: 1 PACKET | Refills: 0 | Status: ON HOLD | OUTPATIENT
Start: 2020-03-06 | End: 2020-10-26 | Stop reason: ALTCHOICE

## 2020-03-06 NOTE — TELEPHONE ENCOUNTER
----- Message from Carmen Brannon MD sent at 3/6/2020 10:06 AM EST -----  Contact: [phone]  [place]      Prednisone 10 mg #30    4 tabs for 3 days 3 tabs for 3 days 2 tabs for 3 days 1 tabs for 3 days   ----- Message -----  From: Omar Stewart  Sent: 3/6/2020   9:25 AM EST  To: Carmen Brannon MD    Pt does want to try steroid and abx    [address]  ----- Message -----  From: Bety Escalante  Sent: 3/6/2020   9:14 AM EST  To: Carmen Brannon MD    Pt called stating that he's feeling worse and wants to know if you want him to go to the emergency room. He says he was in on Wednesday and you gave him the option to either go to the emergency room or go home and he went home. Since then he has gotten progressively worse.     [phone]      Past appt-3/4/2020  Future appt-4/13/2020      SE

## 2020-03-10 ENCOUNTER — CARE COORDINATION (OUTPATIENT)
Dept: CARE COORDINATION | Age: 75
End: 2020-03-10

## 2020-03-10 NOTE — LETTER
3/10/2020    [facility]     I have enjoyed working with you to improve your health. I would like to continue to provide you support. However, I have been unable to reach you at, [phone] (home)  and cell. Please let me know if I can help schedule an office visit for you or answer any questions. Manoj Doss MD is interested in your health and hopes to hear from you soon. If you would like continued access to your Nurse Care Coordinator, [address], you can reach me at [phone]. I will wait to hear from you and will no longer reach out to you. Manoj Doss MD and his/her team will continue to provide care and be available for questions.       In good health,     Pierce Dangelo RN BSN

## 2020-03-10 NOTE — CARE COORDINATION
Outreach call made and no answer. Unable to leave message on both home and cell phone. 3 attempts to outreach to patient. No success. Screening out. Discharge letter mailed.     Harriett Christy RN  Ambulatory Care Manager  [phone]

## 2020-03-23 RX ORDER — IPRATROPIUM BROMIDE AND ALBUTEROL SULFATE 2.5; .5 MG/3ML; MG/3ML
SOLUTION RESPIRATORY (INHALATION)
Qty: 360 ML | Refills: 0 | Status: SHIPPED | OUTPATIENT
Start: 2020-03-23 | End: 2020-04-15

## 2020-04-02 RX ORDER — AMLODIPINE BESYLATE 5 MG/1
TABLET ORAL
Qty: 28 TABLET | Refills: 0 | Status: SHIPPED | OUTPATIENT
Start: 2020-04-02 | End: 2020-05-29

## 2020-04-02 RX ORDER — ATORVASTATIN CALCIUM 20 MG/1
TABLET, FILM COATED ORAL
Qty: 28 TABLET | Refills: 0 | Status: SHIPPED | OUTPATIENT
Start: 2020-04-02 | End: 2020-05-29

## 2020-04-02 RX ORDER — FUROSEMIDE 40 MG/1
TABLET ORAL
Qty: 28 TABLET | Refills: 0 | Status: SHIPPED | OUTPATIENT
Start: 2020-04-02 | End: 2020-05-29

## 2020-04-06 RX ORDER — ERYTHROMYCIN 5 MG/G
OINTMENT OPHTHALMIC
Qty: 1 TUBE | Refills: 0 | Status: SHIPPED | OUTPATIENT
Start: 2020-04-06 | End: 2020-09-13

## 2020-04-15 RX ORDER — IPRATROPIUM BROMIDE AND ALBUTEROL SULFATE 2.5; .5 MG/3ML; MG/3ML
SOLUTION RESPIRATORY (INHALATION)
Qty: 360 ML | Refills: 0 | Status: SHIPPED | OUTPATIENT
Start: 2020-04-15 | End: 2020-07-22

## 2020-04-16 ENCOUNTER — TELEPHONE (OUTPATIENT)
Dept: PULMONOLOGY | Age: 75
End: 2020-04-16

## 2020-04-26 ENCOUNTER — APPOINTMENT (OUTPATIENT)
Dept: GENERAL RADIOLOGY | Age: 75
End: 2020-04-26
Payer: MEDICARE

## 2020-04-26 ENCOUNTER — HOSPITAL ENCOUNTER (EMERGENCY)
Age: 75
Discharge: HOME | End: 2020-04-26
Attending: EMERGENCY MEDICINE
Payer: MEDICARE

## 2020-04-26 VITALS
SYSTOLIC BLOOD PRESSURE: 107 MMHG | WEIGHT: 229 LBS | TEMPERATURE: 98.7 F | HEIGHT: 71 IN | HEART RATE: 84 BPM | BODY MASS INDEX: 32.06 KG/M2 | DIASTOLIC BLOOD PRESSURE: 65 MMHG | RESPIRATION RATE: 20 BRPM | OXYGEN SATURATION: 94 %

## 2020-04-26 LAB
ANION GAP SERPL CALCULATED.3IONS-SCNC: 11 MMOL/L (ref 3–16)
BASE EXCESS VENOUS: 1 MMOL/L (ref -3–3)
BASOPHILS ABSOLUTE: 0.1 K/UL (ref 0–0.2)
BASOPHILS RELATIVE PERCENT: 1.3 %
BUN BLDV-MCNC: 15 MG/DL (ref 7–20)
CALCIUM SERPL-MCNC: 9.6 MG/DL (ref 8.3–10.6)
CARBOXYHEMOGLOBIN: 3 % (ref 0–1.5)
CHLORIDE BLD-SCNC: 104 MMOL/L (ref 99–110)
CO2: 27 MMOL/L (ref 21–32)
CREAT SERPL-MCNC: 1.6 MG/DL (ref 0.8–1.3)
EOSINOPHILS ABSOLUTE: 1.4 K/UL (ref 0–0.6)
EOSINOPHILS RELATIVE PERCENT: 14.4 %
GFR AFRICAN AMERICAN: 52
GFR NON-AFRICAN AMERICAN: 43
GLUCOSE BLD-MCNC: 102 MG/DL (ref 70–99)
HCO3 VENOUS: 26.9 MMOL/L (ref 23–29)
HCT VFR BLD CALC: 39.8 % (ref 40.5–52.5)
HEMOGLOBIN: 13 G/DL (ref 13.5–17.5)
LYMPHOCYTES ABSOLUTE: 2.6 K/UL (ref 1–5.1)
LYMPHOCYTES RELATIVE PERCENT: 27 %
MCH RBC QN AUTO: 30.8 PG (ref 26–34)
MCHC RBC AUTO-ENTMCNC: 32.7 G/DL (ref 31–36)
MCV RBC AUTO: 94.3 FL (ref 80–100)
METHEMOGLOBIN VENOUS: 0.3 %
MONOCYTES ABSOLUTE: 0.8 K/UL (ref 0–1.3)
MONOCYTES RELATIVE PERCENT: 8.1 %
NEUTROPHILS ABSOLUTE: 4.7 K/UL (ref 1.7–7.7)
NEUTROPHILS RELATIVE PERCENT: 49.2 %
O2 CONTENT, VEN: 10 VOL %
O2 SAT, VEN: 50 %
O2 THERAPY: ABNORMAL
PCO2, VEN: 47.3 MMHG (ref 40–50)
PDW BLD-RTO: 16.3 % (ref 12.4–15.4)
PH VENOUS: 7.37 (ref 7.35–7.45)
PLATELET # BLD: 272 K/UL (ref 135–450)
PMV BLD AUTO: 8.3 FL (ref 5–10.5)
PO2, VEN: 28 MMHG (ref 25–40)
POTASSIUM REFLEX MAGNESIUM: 4.4 MMOL/L (ref 3.5–5.1)
PRO-BNP: 112 PG/ML (ref 0–124)
RBC # BLD: 4.22 M/UL (ref 4.2–5.9)
SODIUM BLD-SCNC: 142 MMOL/L (ref 136–145)
TCO2 CALC VENOUS: 28 MMOL/L
TROPONIN: <0.01 NG/ML
WBC # BLD: 9.6 K/UL (ref 4–11)

## 2020-04-26 PROCEDURE — 84484 ASSAY OF TROPONIN QUANT: CPT

## 2020-04-26 PROCEDURE — 85025 COMPLETE CBC W/AUTO DIFF WBC: CPT

## 2020-04-26 PROCEDURE — 6370000000 HC RX 637 (ALT 250 FOR IP): Performed by: EMERGENCY MEDICINE

## 2020-04-26 PROCEDURE — 99285 EMERGENCY DEPT VISIT HI MDM: CPT

## 2020-04-26 PROCEDURE — 71045 X-RAY EXAM CHEST 1 VIEW: CPT

## 2020-04-26 PROCEDURE — 82803 BLOOD GASES ANY COMBINATION: CPT

## 2020-04-26 PROCEDURE — 36415 COLL VENOUS BLD VENIPUNCTURE: CPT

## 2020-04-26 PROCEDURE — 80048 BASIC METABOLIC PNL TOTAL CA: CPT

## 2020-04-26 PROCEDURE — 93005 ELECTROCARDIOGRAM TRACING: CPT | Performed by: EMERGENCY MEDICINE

## 2020-04-26 PROCEDURE — 94640 AIRWAY INHALATION TREATMENT: CPT

## 2020-04-26 PROCEDURE — 83880 ASSAY OF NATRIURETIC PEPTIDE: CPT

## 2020-04-26 PROCEDURE — 6360000002 HC RX W HCPCS: Performed by: EMERGENCY MEDICINE

## 2020-04-26 RX ORDER — PREDNISONE 50 MG/1
50 TABLET ORAL DAILY
Qty: 5 TABLET | Refills: 0 | Status: SHIPPED | OUTPATIENT
Start: 2020-04-26 | End: 2020-05-01

## 2020-04-26 RX ORDER — ALBUTEROL SULFATE 2.5 MG/3ML
7.5 SOLUTION RESPIRATORY (INHALATION) ONCE
Status: COMPLETED | OUTPATIENT
Start: 2020-04-26 | End: 2020-04-26

## 2020-04-26 RX ORDER — IPRATROPIUM BROMIDE AND ALBUTEROL SULFATE 2.5; .5 MG/3ML; MG/3ML
1 SOLUTION RESPIRATORY (INHALATION) ONCE
Status: COMPLETED | OUTPATIENT
Start: 2020-04-26 | End: 2020-04-26

## 2020-04-26 RX ORDER — AZITHROMYCIN 250 MG/1
250 TABLET, FILM COATED ORAL SEE ADMIN INSTRUCTIONS
Qty: 6 TABLET | Refills: 0 | Status: SHIPPED | OUTPATIENT
Start: 2020-04-26 | End: 2020-05-01

## 2020-04-26 RX ORDER — PREDNISONE 20 MG/1
60 TABLET ORAL ONCE
Status: COMPLETED | OUTPATIENT
Start: 2020-04-26 | End: 2020-04-26

## 2020-04-26 RX ADMIN — ALBUTEROL SULFATE 7.5 MG: 2.5 SOLUTION RESPIRATORY (INHALATION) at 20:41

## 2020-04-26 RX ADMIN — PREDNISONE 60 MG: 20 TABLET ORAL at 20:42

## 2020-04-26 RX ADMIN — IPRATROPIUM BROMIDE AND ALBUTEROL SULFATE 1 AMPULE: .5; 3 SOLUTION RESPIRATORY (INHALATION) at 20:41

## 2020-04-26 ASSESSMENT — ENCOUNTER SYMPTOMS
NAUSEA: 0
VOMITING: 0
COUGH: 1
ABDOMINAL PAIN: 0
BACK PAIN: 0
RHINORRHEA: 0
PHOTOPHOBIA: 0
DIARRHEA: 0
WHEEZING: 1
SHORTNESS OF BREATH: 1

## 2020-04-27 ENCOUNTER — TELEPHONE (OUTPATIENT)
Dept: PULMONOLOGY | Age: 75
End: 2020-04-27

## 2020-04-27 ENCOUNTER — CARE COORDINATION (OUTPATIENT)
Dept: CARE COORDINATION | Age: 75
End: 2020-04-27

## 2020-04-27 LAB
EKG ATRIAL RATE: 78 BPM
EKG DIAGNOSIS: NORMAL
EKG P AXIS: 68 DEGREES
EKG P-R INTERVAL: 188 MS
EKG Q-T INTERVAL: 398 MS
EKG QRS DURATION: 88 MS
EKG QTC CALCULATION (BAZETT): 453 MS
EKG R AXIS: 71 DEGREES
EKG T AXIS: 72 DEGREES
EKG VENTRICULAR RATE: 78 BPM

## 2020-04-27 PROCEDURE — 93010 ELECTROCARDIOGRAM REPORT: CPT | Performed by: INTERNAL MEDICINE

## 2020-04-27 NOTE — ED PROVIDER NOTES
Emergency Department Provider Note  Location: [facility] EMERGENCY DEPARTMENT  4/26/2020     Patient Identification  Orquidea Dolan is a 69 y.o. male    Chief Complaint  Shortness of Breath (sob started 2-3 days ago continued to get worse. wears 3L O2 at home. hx copd . dry non productive cough.)          HPI  (History provided by patient)  This is a 79-year-old male with multiple comorbidities, A. fib, COPD on 2-3L home O2, CHF EF 55% who presents with shortness of breath progressive over the past few days. Reports that this is similar to previous episodes he had over the last few months. He was admitted for COPD exacerbation in February for acute on chronic hypoxic respiratory failure in the setting of COPD. Patient states that he has had increased cough however dry. No chest pain shoulder pain back pain no diaphoresis no nausea no other autonomic symptoms. He denies any fever chills no known sick contacts no known exposures to coronavirus. Reports improvement when he is taking breathing treatments at home however worse today. He does report mild pedal edema but no significant weight gain. No changes in meds. I have reviewed the following nursing documentation:  Allergies:    Allergies   Allergen Reactions    Amiodarone Anaphylaxis     Thyriod toxic        Past medical history:  has a past medical history of A-fib ([address] Acute respiratory failure with hypoxia ([address] (2/13/2012), Atrial fibrillation with RVR ([address] Avulsion fracture of ankle (9/21/2015), Benign localized hyperplasia of prostate with urinary obstruction and other lower urinary tract symptoms (LUTS)(600.21) (8/48/2806), Chronic systolic CHF (congestive heart failure) ([address] (8/28/2017), Contusion of leg, right (9/21/2015), COPD (chronic obstructive pulmonary disease) ([address] Fractures, GERD (gastroesophageal reflux disease) (6/15/2015), Hypertension, Hypertensive urgency (8/4/2019), Hypertrophy of prostate without urinary duplicate medications found. Please discuss with provider. Disposition referral (if applicable):  No follow-up provider specified. Total critical care time is 0 minutes, which excludes separately billable procedures and updating family. Time spent is specifically for management of the presenting complaint and symptoms initially, direct bedside care, reevaluation, review of records, and consultation. There was a high probability of clinically significant life-threatening deterioration in the patient's condition, which required my urgent intervention. This chart was generated in part by using Dragon Dictation system and may contain errors related to that system including errors in grammar, punctuation, and spelling, as well as words and phrases that may be inappropriate. If there are any questions or concerns please feel free to contact the dictating provider for clarification.      MD Alma Patricio MD  04/26/20 3711       Zeny Beauchamp MD  05/09/20 0149

## 2020-04-27 NOTE — TELEPHONE ENCOUNTER

## 2020-04-28 ENCOUNTER — CARE COORDINATION (OUTPATIENT)
Dept: CARE COORDINATION | Age: 75
End: 2020-04-28

## 2020-05-01 RX ORDER — PANTOPRAZOLE SODIUM 40 MG/1
40 TABLET, DELAYED RELEASE ORAL DAILY
Qty: 30 TABLET | Refills: 0 | Status: SHIPPED | OUTPATIENT
Start: 2020-05-01 | End: 2020-05-29

## 2020-05-06 ENCOUNTER — TELEPHONE (OUTPATIENT)
Dept: PULMONOLOGY | Age: 75
End: 2020-05-06

## 2020-05-06 NOTE — TELEPHONE ENCOUNTER
Patient did not show for f/u increased shortness of breath appointment  with Dr. Jose R Miller on 5/6/20    Same Day Cancellation: Yes. Pt states that he no longer needs appt, will keep f/u as scheduled for 8/12/20.     Patient rescheduled:  Yes    New appointment: 8/12/20    Patient was also no show on: n/a    LOV 11/26/19:    ASSESSMENT:  · Mild COPD    · Upper lobe predominant paraseptal and centrilobular emphysema     Not addressed today:  · Atrial fibrillation on Xarelto     PLAN:  · Has oxygen at home, but saturations have improved and it is no longer needed   · Incruse daily   · PRN nebulized bronchodilators  · Tobacco cessation has been achieved  · I recommend annual low dose screening CT scan for the early detection of lung cancer, in this patient with at least 30 pack year tobacco use & between the ages 54 and 78, per the U.[place]. Preventive Services Task Force guideline.  This CT should be offered as an outpatient in April 2020    · F/U in April

## 2020-05-29 RX ORDER — ATORVASTATIN CALCIUM 20 MG/1
TABLET, FILM COATED ORAL
Qty: 28 TABLET | Refills: 0 | Status: SHIPPED | OUTPATIENT
Start: 2020-05-29 | End: 2020-07-22

## 2020-05-29 RX ORDER — PANTOPRAZOLE SODIUM 40 MG/1
TABLET, DELAYED RELEASE ORAL
Qty: 30 TABLET | Refills: 0 | Status: SHIPPED | OUTPATIENT
Start: 2020-05-29 | End: 2020-06-25

## 2020-05-29 RX ORDER — AMLODIPINE BESYLATE 5 MG/1
TABLET ORAL
Qty: 28 TABLET | Refills: 0 | Status: SHIPPED | OUTPATIENT
Start: 2020-05-29 | End: 2020-07-22

## 2020-05-29 RX ORDER — FUROSEMIDE 40 MG/1
TABLET ORAL
Qty: 28 TABLET | Refills: 0 | Status: SHIPPED | OUTPATIENT
Start: 2020-05-29 | End: 2020-07-22

## 2020-06-12 ENCOUNTER — HOSPITAL ENCOUNTER (OUTPATIENT)
Dept: CT IMAGING | Age: 75
Discharge: HOME | End: 2020-06-12
Payer: MEDICARE

## 2020-06-12 PROCEDURE — G0297 LDCT FOR LUNG CA SCREEN: HCPCS

## 2020-06-16 RX ORDER — DOXYCYCLINE HYCLATE 100 MG
100 TABLET ORAL 2 TIMES DAILY
Qty: 14 TABLET | Refills: 0 | Status: SHIPPED | OUTPATIENT
Start: 2020-06-16 | End: 2020-06-23

## 2020-06-18 ENCOUNTER — VIRTUAL VISIT (OUTPATIENT)
Dept: PULMONOLOGY | Age: 75
End: 2020-06-18
Payer: MEDICARE

## 2020-06-18 PROCEDURE — 99442 PR PHYS/QHP TELEPHONE EVALUATION 11-20 MIN: CPT | Performed by: INTERNAL MEDICINE

## 2020-06-18 RX ORDER — UMECLIDINIUM 62.5 UG/1
1 AEROSOL, POWDER ORAL DAILY
Qty: 1 EACH | Refills: 5 | Status: SHIPPED | OUTPATIENT
Start: 2020-06-18 | End: 2022-03-25

## 2020-07-22 RX ORDER — ATORVASTATIN CALCIUM 20 MG/1
TABLET, FILM COATED ORAL
Qty: 28 TABLET | Refills: 0 | Status: SHIPPED | OUTPATIENT
Start: 2020-07-22 | End: 2020-08-19

## 2020-07-22 RX ORDER — IPRATROPIUM BROMIDE AND ALBUTEROL SULFATE 2.5; .5 MG/3ML; MG/3ML
SOLUTION RESPIRATORY (INHALATION)
Qty: 360 ML | Refills: 0 | Status: SHIPPED | OUTPATIENT
Start: 2020-07-22 | End: 2020-08-20

## 2020-07-22 RX ORDER — AMLODIPINE BESYLATE 5 MG/1
TABLET ORAL
Qty: 28 TABLET | Refills: 0 | Status: SHIPPED | OUTPATIENT
Start: 2020-07-22 | End: 2020-08-19

## 2020-07-22 RX ORDER — FUROSEMIDE 40 MG/1
TABLET ORAL
Qty: 28 TABLET | Refills: 0 | Status: SHIPPED | OUTPATIENT
Start: 2020-07-22 | End: 2020-08-19

## 2020-08-05 RX ORDER — PREDNISONE 10 MG/1
TABLET ORAL
Qty: 30 TABLET | Refills: 0 | Status: ON HOLD | OUTPATIENT
Start: 2020-08-05 | End: 2020-10-26 | Stop reason: ALTCHOICE

## 2020-08-11 ENCOUNTER — TELEPHONE (OUTPATIENT)
Dept: PULMONOLOGY | Age: 75
End: 2020-08-11

## 2020-08-11 NOTE — TELEPHONE ENCOUNTER
[facility] that pt needs to be re-qualified for o2.      LOV: 6/18/20  ASSESSMENT:  · Mild COPD with acute exacerbation    · Upper lobe predominant paraseptal and centrilobular emphysema  · HENRY 6 mm Pulmonary Nodule new on 6/12/20 CT  · Abnormal CT CHEST with patchy lower lobe consolidation lower lung fields bilaterally; community-acquired pneumonia, if not responsive to doxy/pred, will need fiberoptic bronchoscopy as persistent infiltrates     Not addressed today:  · Atrial fibrillation on Xarelto     PLAN:  · PREDNISONE 30 X 5 days (has at home), Doxy X 7 d  · Oxygen 2 L HS  · Incruse daily, add advair 250/50 BID  · PRN nebulized bronchodilators  · Tobacco cessation has been achieved  · CT CHEST no IV dye in 6 months, f/u abnormal CT CHEST, see me after

## 2020-08-19 RX ORDER — ATORVASTATIN CALCIUM 20 MG/1
TABLET, FILM COATED ORAL
Qty: 28 TABLET | Refills: 0 | Status: SHIPPED | OUTPATIENT
Start: 2020-08-19 | End: 2020-10-14

## 2020-08-19 RX ORDER — AMLODIPINE BESYLATE 5 MG/1
TABLET ORAL
Qty: 28 TABLET | Refills: 0 | Status: SHIPPED | OUTPATIENT
Start: 2020-08-19 | End: 2020-10-14

## 2020-08-19 RX ORDER — FUROSEMIDE 40 MG/1
TABLET ORAL
Qty: 28 TABLET | Refills: 0 | Status: SHIPPED | OUTPATIENT
Start: 2020-08-19 | End: 2020-10-14

## 2020-08-20 RX ORDER — IPRATROPIUM BROMIDE AND ALBUTEROL SULFATE 2.5; .5 MG/3ML; MG/3ML
SOLUTION RESPIRATORY (INHALATION)
Qty: 360 ML | Refills: 0 | Status: SHIPPED | OUTPATIENT
Start: 2020-08-20 | End: 2020-09-15

## 2020-09-09 ENCOUNTER — TELEPHONE (OUTPATIENT)
Dept: PULMONOLOGY | Age: 75
End: 2020-09-09

## 2020-09-09 RX ORDER — AMOXICILLIN 500 MG/1
500 CAPSULE ORAL 3 TIMES DAILY
Qty: 21 CAPSULE | Refills: 0 | Status: SHIPPED | OUTPATIENT
Start: 2020-09-09 | End: 2020-09-16

## 2020-09-09 RX ORDER — PREDNISONE 10 MG/1
TABLET ORAL
Qty: 30 TABLET | Refills: 0 | Status: SHIPPED | OUTPATIENT
Start: 2020-09-09 | End: 2020-09-21

## 2020-09-09 NOTE — TELEPHONE ENCOUNTER
Do you have the following symptoms? Shortness of Breath  Yes - at night  Wheezing  yes  Cough  yes  Cough Characteristics:  Productive    yes  Sputum Color    yellow  Hemoptysis   no  Consistency of sputum   thick     Fever:    no    Temp:n/a  Chills/Sweats:  sweats  What other symptoms are you having?:  Pt declines any    How long have you had these symptoms? 1 month     Pharmacy: [facility] ([facility])          Review medications and allergies: Allergies? Allergies   Allergen Reactions    Amiodarone Anaphylaxis     Thyriod toxic    '        Currently on Antibiotics? (Drug/Dose/Frequency and how long on?) no        Systemic Steroids? (Drug/Dose/Frequency and how long on?) no      Last sick call taken on n/a. Meds prescribed were n/a    Pull in last office note.  6/18/20  ASSESSMENT:  · Mild COPD with acute exacerbation    · Upper lobe predominant paraseptal and centrilobular emphysema  · HENRY 6 mm Pulmonary Nodule new on 6/12/20 CT  · Abnormal CT CHEST with patchy lower lobe consolidation lower lung fields bilaterally; community-acquired pneumonia, if not responsive to doxy/pred, will need fiberoptic bronchoscopy as persistent infiltrates     Not addressed today:  · Atrial fibrillation on Xarelto     PLAN:  · PREDNISONE 30 X 5 days (has at home), Doxy X 7 d  · Oxygen 2 L HS  · Incruse daily, add advair 250/50 BID  · PRN nebulized bronchodilators  · Tobacco cessation has been achieved  · CT CHEST no IV dye in 6 months, f/u abnormal CT CHEST, see me after

## 2020-09-13 ENCOUNTER — HOSPITAL ENCOUNTER (EMERGENCY)
Age: 75
Discharge: HOME | End: 2020-09-13
Attending: EMERGENCY MEDICINE
Payer: MEDICARE

## 2020-09-13 ENCOUNTER — APPOINTMENT (OUTPATIENT)
Dept: CT IMAGING | Age: 75
End: 2020-09-13
Payer: MEDICARE

## 2020-09-13 ENCOUNTER — APPOINTMENT (OUTPATIENT)
Dept: GENERAL RADIOLOGY | Age: 75
End: 2020-09-13
Payer: MEDICARE

## 2020-09-13 VITALS
SYSTOLIC BLOOD PRESSURE: 117 MMHG | BODY MASS INDEX: 33.79 KG/M2 | TEMPERATURE: 98.1 F | WEIGHT: 236 LBS | DIASTOLIC BLOOD PRESSURE: 59 MMHG | HEART RATE: 64 BPM | HEIGHT: 70 IN | OXYGEN SATURATION: 95 % | RESPIRATION RATE: 20 BRPM

## 2020-09-13 PROCEDURE — 6370000000 HC RX 637 (ALT 250 FOR IP): Performed by: EMERGENCY MEDICINE

## 2020-09-13 PROCEDURE — 99284 EMERGENCY DEPT VISIT MOD MDM: CPT

## 2020-09-13 PROCEDURE — 72220 X-RAY EXAM SACRUM TAILBONE: CPT

## 2020-09-13 PROCEDURE — 70450 CT HEAD/BRAIN W/O DYE: CPT

## 2020-09-13 PROCEDURE — 72125 CT NECK SPINE W/O DYE: CPT

## 2020-09-13 RX ORDER — OXYCODONE HYDROCHLORIDE AND ACETAMINOPHEN 5; 325 MG/1; MG/1
1 TABLET ORAL ONCE
Status: COMPLETED | OUTPATIENT
Start: 2020-09-13 | End: 2020-09-13

## 2020-09-13 RX ORDER — ONDANSETRON 4 MG/1
4 TABLET, ORALLY DISINTEGRATING ORAL EVERY 8 HOURS PRN
Qty: 21 TABLET | Refills: 0 | Status: SHIPPED | OUTPATIENT
Start: 2020-09-13 | End: 2020-09-20

## 2020-09-13 RX ORDER — GINSENG 100 MG
CAPSULE ORAL
Qty: 1 TUBE | Refills: 0 | Status: SHIPPED | OUTPATIENT
Start: 2020-09-13 | End: 2020-09-20

## 2020-09-13 RX ADMIN — OXYCODONE HYDROCHLORIDE AND ACETAMINOPHEN 1 TABLET: 5; 325 TABLET ORAL at 10:42

## 2020-09-13 ASSESSMENT — PAIN DESCRIPTION - FREQUENCY
FREQUENCY: CONTINUOUS
FREQUENCY: INTERMITTENT

## 2020-09-13 ASSESSMENT — PAIN DESCRIPTION - PAIN TYPE
TYPE: ACUTE PAIN
TYPE: ACUTE PAIN

## 2020-09-13 ASSESSMENT — PAIN SCALES - GENERAL
PAINLEVEL_OUTOF10: 4
PAINLEVEL_OUTOF10: 5

## 2020-09-13 ASSESSMENT — PAIN DESCRIPTION - LOCATION
LOCATION: BUTTOCKS;HEAD
LOCATION: HEAD

## 2020-09-13 ASSESSMENT — PAIN DESCRIPTION - DESCRIPTORS
DESCRIPTORS: SORE;HEADACHE
DESCRIPTORS: ACHING;DISCOMFORT

## 2020-09-13 NOTE — ED NOTES
Discharge instructions given to caregiver. Caregiver verbalized understanding. No distress noted. Pt and caregiver ambulatory from department without difficulty.      Oanh Hanson RN  09/13/20 4523

## 2020-09-13 NOTE — ED PROVIDER NOTES
Emergency Physician Note  [id number] [place] [address]  Dept: [phone]  Loc: [phone]    Chief Complaint  Fall (slipped on water while mopping the kitchen floor. patient states he hit his head on the stove and broke the glass door. patient states he landed hard on his tailbone and is having pain. patient denies LOC)       History of Present Illness  Sil Marquez is a 70 y.o. male  has a past medical history of A-fib ([address] Acute respiratory failure with hypoxia ([address] Atrial fibrillation with RVR ([address] Avulsion fracture of ankle, Benign localized hyperplasia of prostate with urinary obstruction and other lower urinary tract symptoms (IJYD)(752.09), Chronic systolic CHF (congestive heart failure) ([address] Contusion of leg, right, COPD (chronic obstructive pulmonary disease) ([address] Fractures, GERD (gastroesophageal reflux disease), Hypertension, Hypertensive urgency, Hypertrophy of prostate without urinary obstruction and other lower urinary tract symptoms (LUTS), No history of procedure, PAD (peripheral artery disease) ([address] Pneumonia, and Thoracic aortic aneurysm ([address] who presents to the ED for fall. Patient slipped on some water in the kitchen and fell backwards landed on his lower back and then his head hit the floor. Wife reports there was positive loss of consciousness. Patient now complaining of tailbone pain and pain associated where his head hit the floor. Patient has a history of COPD and CHF and is chronically on 3 L nasal cannula for supplemental oxygen. EMS did treat him with 1 nebulizer treatment in route because of wheezing on his lungs. Patient states he is at that his normal baseline respiratory status and does not report any worsening shortness of breath, he denies syncope.   Patient is currently on Xarelto    Denies fever,  chest pain, cough, abdominal pain, nausea, vomiting, diarrhea, headache,  dysuria, upper and mid back pain, rash. No palliative/provocative factors. Unless otherwise stated in this report or unable to obtain because of the patient's clinical or mental status as evidenced by the medical record, this patient's positive and negative responses for review of systems, constitutional, psych, eyes, ENT, cardiovascular, respiratory, gastrointestinal, neurological, genitourinary, musculoskeletal, integument systems and systems related to the presenting problem are either stated in the preceding paragraph or were not pertinent or were negative for the symptoms and/or complaints related to the medical problem. I have reviewed the following from the nursing documentation:      Prior to Admission medications    Medication Sig Start Date End Date Taking?  Authorizing Provider   amoxicillin (AMOXIL) 500 MG capsule Take 1 capsule by mouth 3 times daily for 7 days 9/9/20 9/16/20 Yes Evaristo Quiroga MD   ipratropium-albuterol (DUONEB) 0.5-2.5 (3) MG/3ML SOLN nebulizer solution USE ONE UNIT DOSE (3ML) IN NEBULIZER AND INHALE INTO THE LUNGS EVERY 4 HOURS AS NEEDED FOR SHORTNESS OF BREATH 8/20/20  Yes Michel Macedo MD   amLODIPine (NORVASC) 5 MG tablet TAKE ONE TABLET BY MOUTH EVERY DAY 8/19/20  Yes Corrinne Craze, MD   furosemide (LASIX) 40 MG tablet TAKE ONE TABLET BY MOUTH EVERY DAY 8/19/20  Yes Corrinne Craze, MD   predniSONE (DELTASONE) 10 MG tablet 40mg x 3 days, 30mg x 3 days, 20mg x 3 days, 10mg x 3 days, then stop 8/5/20  Yes Michel Macedo MD   pantoprazole (PROTONIX) 40 MG tablet TAKE ONE TABLET BY MOUTH EVERY DAY 6/25/20  Yes Michel Macedo MD   Umeclidinium Bromide (INCRUSE ELLIPTA) 62.5 MCG/INH AEPB Inhale 1 Inhaler into the lungs daily 6/18/20  Yes Evaristo Quiroga MD   fluticasone-salmeterol (ADVAIR DISKUS) 250-50 MCG/DOSE AEPB Inhale 1 puff into the lungs every 12 hours 6/18/20  Yes Evaristo Quiroga MD   Cholecalciferol (VITAMIN D-3) 25 MCG (1000 UT) CAPS Take by mouth   Yes Historical Provider, MD   budesonide-formoterol (SYMBICORT) 160-4.5 MCG/ACT AERO Inhale 2 puffs into the lungs 5/28/19  Yes Historical Provider, MD   losartan (COZAAR) 100 MG tablet TAKE ONE TABLET BY MOUTH DAILY 1/29/20  Yes Ramu Ponce MD   metoprolol succinate (TOPROL XL) 50 MG extended release tablet Take 1 tablet by mouth daily 1/29/20  Yes Ramu Ponce MD   Potassium Citrate ER 15 MEQ (1620 MG) TBCR Take 15 mEq by mouth daily 1/29/20  Yes Ramu Ponce MD   rivaroxaban (XARELTO) 20 MG TABS tablet TAKE ONE TABLET BY MOUTH DAILY WITH BREAKFAST 1/29/20  Yes Ramu Ponce MD   albuterol sulfate HFA (PROVENTIL HFA) 108 (90 Base) MCG/ACT inhaler Inhale 2 puffs into the lungs every 6 hours as needed for Wheezing (with spacer) 4/16/18  Yes Drea Herrera MD   predniSONE (DELTASONE) 10 MG tablet Take 4 tabs by mouth for three days, then 3 tabs by mouth for 3 days, then 2 tabs by mouth for 3 days, then 1 tab by mouth for 3 days.  9/9/20 9/21/20  Larwence Cushing, MD   atorvastatin (LIPITOR) 20 MG tablet TAKE ONE TABLET BY MOUTH EVERY DAY 8/19/20   Ramu Ponce MD   azithromycin (ZITHROMAX) 250 MG tablet Take 2 tabs (500 mg) on Day 1, and take 1 tab (250 mg) on days 2 through 5. 3/6/20   Drea Herrera MD       Allergies as of 09/13/2020 - Review Complete 09/13/2020   Allergen Reaction Noted    Amiodarone Anaphylaxis 01/29/2020       Past Medical History:   Diagnosis Date    A-fib [facility])     2/14/12    Acute respiratory failure with hypoxia ([facility] 2/13/2012    Atrial fibrillation with RVR ([facility]     2/14/12     Avulsion fracture of ankle 9/21/2015    Benign localized hyperplasia of prostate with urinary obstruction and other lower urinary tract symptoms (LUTS)(600.21) 8/17/2016    Chronic systolic CHF (congestive heart failure) ([facility] 8/28/2017    Contusion of leg, right 9/21/2015    COPD (chronic obstructive pulmonary disease) (HCC)     Fractures     GERD (gastroesophageal reflux disease) 6/15/2015    Hypertension     Hypertensive urgency 8/4/2019    Hypertrophy of prostate without urinary obstruction and other lower urinary tract symptoms (LUTS) 6/15/2015    No history of procedure     no previos colonoscopy    PAD (peripheral artery disease) ([address] 10/9/2017    Pneumonia     Thoracic aortic aneurysm [facility])         Surgical History:   Past Surgical History:   Procedure Laterality Date    CATARACT REMOVAL WITH IMPLANT Right 09/06/2018     PHACO EMULSIFICATION OF CATARACT WITH INTRAOCULAR LENS IMPLANT RIGHT EYE    COLONOSCOPY  2/24/2016    sigmoid polyps    ENDOSCOPY, COLON, DIAGNOSTIC  11/13/2019    egd; esophagitis/gastritis    HERNIA REPAIR      [place] XCAPSL CTRC RMVL INSJ IO LENS PROSTH W/O ECP Right 9/6/2018    PHACO EMULSIFICATION OF CATARACT WITH INTRAOCULAR LENS IMPLANT RIGHT EYE performed by Vinayak Perez MD at [address] [place] [address] RMVL INSJ IO LENS PROSTH W/O ECP Left 10/18/2018    PHACO EMULSIFICATION OF CATARACT WITH  INTRAOCULAR LENS IMPLANT LEFT EYE performed by Vinayak Perez MD at [facility] N/A 11/13/2019    EGD BIOPSY performed by Yoli Olivares MD at [facility]        Family History:    Family History   Problem Relation Age of Onset    Alcohol Abuse Sister        Social History     Socioeconomic History    Marital status:      Spouse name: Not on file    Number of children: Not on file    Years of education: Not on file    Highest education level: Not on file   Occupational History    Not on file   Social Needs    Financial resource strain: Not very hard    Food insecurity     Worry: Never true     Inability: Never true    Transportation needs     Medical: No     Non-medical: No   Tobacco Use    Smoking status: Former Smoker     Packs/day: 2.00     Years: 55.00     Pack years: 110.00     Last attempt to quit: 8/22/2017     Years since quitting: 3.0    Smokeless tobacco: Never Used    Tobacco comment: smoked 2 darnell a day for 55 years   Substance and Sexual Activity    Alcohol use: Not Currently     Frequency: Never     Comment: occassionally    Drug use: No    Sexual activity: Not Currently     Partners: Female   Lifestyle    Physical activity     Days per week: 0 days     Minutes per session: 0 min    Stress: Not at all   Relationships    Social connections     Talks on phone: Not on file     Gets together: Not on file     Attends Evangelical service: Not on file     Active member of club or organization: Not on file     Attends meetings of clubs or organizations: Not on file     Relationship status:     Intimate partner violence     Fear of current or ex partner: No     Emotionally abused: No     Physically abused: No     Forced sexual activity: No   Other Topics Concern    Not on file   Social History Narrative    Not on file       Nursing notes reviewed. ED Triage Vitals [09/13/20 1025]   Enc Vitals Group      /79      Pulse 73      Resp 18      Temp 98.1 °F (36.7 °C)      Temp Source Oral      SpO2 94 %      Weight 236 lb (107 kg)      Height 5' 10\" (1.778 m)      Head Circumference       Peak Flow       Pain Score       Pain Loc       Pain Edu? Excl. in [address]? GENERAL:    Awake, alert. Well developed, well nourished with no apparent distress. Nontoxic-appearing, non-ill-appearing. HENT:    Normocephalic, Atraumatic, superficial abrasions that cover a circular 2 cm radius area of the scalp, it is on posterior superior portion of his scalp -no associated induration/hematoma, no deep laceration. Tympanic membranes are without bulging, without erythema, without hemotympanum, without middle ear effusion bilaterally. Oropharynx clear, no tonsilar inflammation, no tonsilar exudates,   no airway obstruction, moist mucous membranes. Uvula was midline and has symmetric rise.    EYES:    Conjunctiva normal,   Pupils equal round and spondylosis of the cervical spine. CT Head WO Contrast   Final Result   No acute intracranial abnormality. LABS  No results found for this visit on 09/13/20. PROCEDURES  There was no need for a laceration repair as his wound on his scalp were all superficial abrasions    MEDICAL DECISION MAKING    Procedures/interventions/images ordered for this visit  Orders Placed This Encounter   Procedures    CT Head WO Contrast    CT Cervical Spine WO Contrast    XR SACRUM COCCYX (MIN 2 VIEWS)       Medications ordered for this visit  Orders Placed This Encounter   Medications    oxyCODONE-acetaminophen (PERCOCET) 5-325 MG per tablet 1 tablet       ED course notes for this visit       I wore goggles, surgical mask, N95 mask and gloves when I evaluated the patient. I evaluated the patient in room 04/04    This is a pleasant patient who presents status-post a head injury and history/exam consistent with a concussion and a head laceration. Based on history this was not a syncopal episode. Vital signs are stable. CT of the head was offered to rule out an intracranial emergency after the risks and benefits of the study were discussed with the patient or family. From the [facility] CT Head Injury/Trauma Rule:  Major Criteria:  GCS < 15 at 2 hours post-injury  Suspected open or depressed skull fracture  Any sign of basilar skull fracture? (Hemotympanum, Racoon Eyes, Correas Sign, CSF janelle-/rhinorrhea)  ? 2 episodes of vomiting  Age ? 67    Minor Criteria:  Retrograde Amnesia to the Event ? 30 minutes  \"Dangerous\" Mechanism? (Pedestrian struck by motor vehicle, Occupant ejected from motor vehicle, or Fall from > 3 feet or > 5 stairs. Given present of above criteria, patient does require CT Head. They made the informed decision to have the CT based on my explanation of the above [facility] CT Head Injury/Trauma Rule and has no new focal neurological deficits.         A thorough head to toe exam was performed. Vital signs are stable and they are neurovascular status intact. Pain management was addressed. C-spine has been cleared based on Nexus criteria. CT head was negative. On re-evaluation, the patient appears well, and does not exhibit any gross abnormal neurological findings. They feel improved from the time of their arrival.  Based on serial exams here, there is no significant evidence of intracranial injury such as subdural hematoma, subarachnoid hemorrhage, epidural hematoma, depressed skull fracture, basilar skull fracture, or other injury requiring immediate surgical or medical intervention at this time. I feel they are stable for discharge home. Discussed concussion precautions with patient and the need for brain rest until released by their doctor. There is also no significant evidence of a facial bone fracture, neck injury or other injury requiring immediate surgical or medical intervention at this time, other than the wound repair. Counseling was provided on wound care, signs and symptoms of infection, closed head injury precautions and the need for brain rest, as well as the follow-up plan for suture/staple removal.  Please see procedure note for wound repair. We discussed that despite no foreign body seen or palpated on exam, this does not rule out the risk of retained foreign body and if there is a complication with healing, this needs to be considered. The wound was not grossly contaminated therefore prophylactic antibiotics are not indicated at this time. pain management were addressed. I have discussed with the patient my clinical impression and the result of an evidence-based clinical evaluation to screen for intracranial injury, as well as the risk of further testing and hospitalization. The evidence shows that the risk for intracranial injury requiring surgical intervention is well below 1%.  Although the risk of intracranial injury has not been eliminated, the encounter        Blood pressure (!) 117/59, pulse 64, temperature 98.1 °F (36.7 °C), temperature source Oral, resp. rate 20, height 5' 10\" (1.778 m), weight 236 lb (107 kg), SpO2 95 %. Disposition  At this point I do not feel the patient requires further work up and it is reasonable to discharge the patient. I had a discussion with the patient and/or their surrogate regarding diagnosis, diagnostic testing results, treatment/ plan of care, and follow up. Patient and/or companions verbalized understanding of the ED workup, any relevant findings as well as any incidental findings, and the disposition and plan. There was shared decision-making between myself as well as the patient and/or their surrogate and we are all in agreement with discharge home. There was an opportunity for questions and all questions were answered to the best of my ability and to the satisfaction of the patient and/or patient family. Patient agreed to follow upas recommend for further evaluation/treatment. The patient was given strict return precautions as we discussed symptoms that would necessitate return to the ED. Patient will return to ED for new/worsening symptoms. The patient verbalized their understanding and agreement with the above plan. Please refer to AVS for further details regarding discharge instructions. Patient was given scripts for the following medications. I counseled patient how to take these medications. Discharge Medication List as of 9/13/2020 11:50 AM      START taking these medications    Details   ondansetron (ZOFRAN-ODT) 4 MG disintegrating tablet Place 1 tablet under the tongue every 8 hours as needed for Nausea or Vomiting, Disp-21 tablet,R-0May Sub regular tablet (non-ODT) if insurance does not cover ODTPrint      bacitracin 500 UNIT/GM ointment Apply topically 2 times daily, Disp-1 Tube,R-0, Print             Pt is in stable condition upon Discharge to home.     The note was completed using Dragon voice recognition transcription. Every effort was made to ensure accuracy; however, inadvertent transcription errors may be present despite my best efforts to edit errors.     Johnny Cochran MD  [facility]        Johnny Cochran MD  09/13/20 1039

## 2020-09-14 ENCOUNTER — CARE COORDINATION (OUTPATIENT)
Dept: CARE COORDINATION | Age: 75
End: 2020-09-14

## 2020-09-15 NOTE — CARE COORDINATION
Attempted outreach call for ED f/u and COVID-19 monitoring. Voicemail not set up; unable to LM. Will try again tomorrow. Future Appointments   Date Time Provider Zhane Gramajo   12/23/2020  2:30 PM [facility] CT MAIN [facility] CT [facility]   12/23/2020  3:30 PM Doretha Tavares MD [facility]       Wolcott Alicja RUIZ, RN  Ambulatory Care Manager  [phone]  [email or web address]
Final attempt; unable to LM.
COVID-19 and your underlying condition or if you are sick. For more information on steps you can take to protect yourself, see CDC's How to [address] [facility] for follow-up call in 5-7 days based on severity of symptoms and risk factors. Future Appointments   Date Time Provider Zhane Gramajo   12/23/2020  2:30 PM [facility] CT MAIN [place] CT [facility]   12/23/2020  3:30 PM Ciara Williamson MD [facility]       Ezra RUIZ, RN  Ambulatory Care Manager  [phone]  [email or web address]

## 2020-09-16 RX ORDER — IPRATROPIUM BROMIDE AND ALBUTEROL SULFATE 2.5; .5 MG/3ML; MG/3ML
SOLUTION RESPIRATORY (INHALATION)
Qty: 360 ML | Refills: 0 | Status: SHIPPED | OUTPATIENT
Start: 2020-09-16 | End: 2020-11-06

## 2020-09-18 RX ORDER — PANTOPRAZOLE SODIUM 40 MG/1
TABLET, DELAYED RELEASE ORAL
Qty: 28 TABLET | Refills: 0 | Status: SHIPPED | OUTPATIENT
Start: 2020-09-18 | End: 2020-12-10

## 2020-09-22 ENCOUNTER — CARE COORDINATION (OUTPATIENT)
Dept: CARE COORDINATION | Age: 75
End: 2020-09-22

## 2020-09-22 NOTE — CARE COORDINATION
Attempted outreach call.  not set up; unable to LM. Future Appointments   Date Time Provider Zhane Gramajo   12/23/2020  2:30 PM [facility] CT MAIN [facility] CT [place]   12/23/2020  3:30 PM Larwence Cushing, [facility]       Sanpete Conception MSN, RN  Ambulatory Care Manager  [phone]  [email or web address]

## 2020-09-28 ENCOUNTER — TELEPHONE (OUTPATIENT)
Dept: INTERNAL MEDICINE CLINIC | Age: 75
End: 2020-09-28

## 2020-09-28 NOTE — TELEPHONE ENCOUNTER
----- Message from [place] Chintan sent at 9/28/2020 10:04 AM EDT -----  Contact: pt- [phone]  He called stating he needed a new devilbliss nebulizer- he did not know where he got the last one from,whether it was [facility] or the pharm.- he spoke to [facility] and they said they do not have them there -he was confused on where to have the order sent and where to get it - please let him know at [phone] appt- 3-4-20-lr

## 2020-09-29 ENCOUNTER — CARE COORDINATION (OUTPATIENT)
Dept: CARE COORDINATION | Age: 75
End: 2020-09-29

## 2020-09-29 NOTE — CARE COORDINATION
You Patient resolved from the Care Transitions episode on 9/29/20  Discussed COVID-19 related testing which was not done at this time. Test results were not done. Patient informed of results, if available? N/A    Patient/family has been provided the following resources and education related to COVID-19:                         Signs, symptoms and red flags related to COVID-19            CDC exposure and quarantine guidelines            [employer] exposure contact - [phone]            Contact for their local Department of Health                 Patient currently reports that the following symptoms have improved:  14 day outreach; unable to LM as his VM was not set up. No further outreach scheduled with this CTN/ACM. Episode of Care resolved. Patient has this CTN/ACM contact information if future needs arise. Future Appointments   Date Time Provider Zhane Gramajo   12/23/2020  2:30 PM [facility]IN [facility]   12/23/2020  3:30 PM Hong Thomas MD [facility]       Abdi RUIZ, RN  Ambulatory Care Manager  [phone]  [email or web address]

## 2020-10-14 RX ORDER — ATORVASTATIN CALCIUM 20 MG/1
TABLET, FILM COATED ORAL
Qty: 28 TABLET | Refills: 0 | Status: SHIPPED | OUTPATIENT
Start: 2020-10-14 | End: 2020-11-12

## 2020-10-14 RX ORDER — FUROSEMIDE 40 MG/1
TABLET ORAL
Qty: 28 TABLET | Refills: 0 | Status: SHIPPED | OUTPATIENT
Start: 2020-10-14 | End: 2020-11-12

## 2020-10-14 RX ORDER — AMLODIPINE BESYLATE 5 MG/1
TABLET ORAL
Qty: 28 TABLET | Refills: 0 | Status: SHIPPED | OUTPATIENT
Start: 2020-10-14 | End: 2020-11-12

## 2020-10-26 ENCOUNTER — HOSPITAL ENCOUNTER (INPATIENT)
Age: 75
LOS: 3 days | Discharge: HOME | DRG: 190 | End: 2020-10-29
Attending: EMERGENCY MEDICINE | Admitting: INTERNAL MEDICINE
Payer: MEDICARE

## 2020-10-26 ENCOUNTER — APPOINTMENT (OUTPATIENT)
Dept: GENERAL RADIOLOGY | Age: 75
DRG: 190 | End: 2020-10-26
Payer: MEDICARE

## 2020-10-26 LAB
A/G RATIO: 1.5 (ref 1.1–2.2)
ALBUMIN SERPL-MCNC: 4.6 G/DL (ref 3.4–5)
ALP BLD-CCNC: 88 U/L (ref 40–129)
ALT SERPL-CCNC: 9 U/L (ref 10–40)
ANION GAP SERPL CALCULATED.3IONS-SCNC: 8 MMOL/L (ref 3–16)
AST SERPL-CCNC: 15 U/L (ref 15–37)
BASE EXCESS VENOUS: 0.3 MMOL/L (ref -3–3)
BASOPHILS ABSOLUTE: 0.1 K/UL (ref 0–0.2)
BASOPHILS RELATIVE PERCENT: 1.2 %
BILIRUB SERPL-MCNC: 0.5 MG/DL (ref 0–1)
BUN BLDV-MCNC: 19 MG/DL (ref 7–20)
CALCIUM SERPL-MCNC: 9.9 MG/DL (ref 8.3–10.6)
CARBOXYHEMOGLOBIN: 2 % (ref 0–1.5)
CHLORIDE BLD-SCNC: 102 MMOL/L (ref 99–110)
CO2: 29 MMOL/L (ref 21–32)
CREAT SERPL-MCNC: 1.4 MG/DL (ref 0.8–1.3)
EKG ATRIAL RATE: 80 BPM
EKG DIAGNOSIS: NORMAL
EKG P AXIS: 48 DEGREES
EKG P-R INTERVAL: 134 MS
EKG Q-T INTERVAL: 388 MS
EKG QRS DURATION: 90 MS
EKG QTC CALCULATION (BAZETT): 447 MS
EKG R AXIS: 52 DEGREES
EKG T AXIS: 67 DEGREES
EKG VENTRICULAR RATE: 80 BPM
EOSINOPHILS ABSOLUTE: 1.1 K/UL (ref 0–0.6)
EOSINOPHILS RELATIVE PERCENT: 13.3 %
GFR AFRICAN AMERICAN: >60
GFR NON-AFRICAN AMERICAN: 50
GLOBULIN: 3.1 G/DL
GLUCOSE BLD-MCNC: 134 MG/DL (ref 70–99)
HCO3 VENOUS: 26.3 MMOL/L (ref 23–29)
HCT VFR BLD CALC: 42.4 % (ref 40.5–52.5)
HEMOGLOBIN: 13.9 G/DL (ref 13.5–17.5)
LYMPHOCYTES ABSOLUTE: 1.8 K/UL (ref 1–5.1)
LYMPHOCYTES RELATIVE PERCENT: 20.8 %
MCH RBC QN AUTO: 29.5 PG (ref 26–34)
MCHC RBC AUTO-ENTMCNC: 32.8 G/DL (ref 31–36)
MCV RBC AUTO: 89.8 FL (ref 80–100)
METHEMOGLOBIN VENOUS: 0.3 %
MONOCYTES ABSOLUTE: 0.6 K/UL (ref 0–1.3)
MONOCYTES RELATIVE PERCENT: 6.5 %
NEUTROPHILS ABSOLUTE: 5 K/UL (ref 1.7–7.7)
NEUTROPHILS RELATIVE PERCENT: 58.2 %
O2 CONTENT, VEN: 16 VOL %
O2 SAT, VEN: 76 %
O2 THERAPY: ABNORMAL
PCO2, VEN: 47.8 MMHG (ref 40–50)
PDW BLD-RTO: 17.2 % (ref 12.4–15.4)
PH VENOUS: 7.36 (ref 7.35–7.45)
PLATELET # BLD: 229 K/UL (ref 135–450)
PMV BLD AUTO: 8.3 FL (ref 5–10.5)
PO2, VEN: 42.9 MMHG (ref 25–40)
POTASSIUM SERPL-SCNC: 4.3 MMOL/L (ref 3.5–5.1)
PRO-BNP: 28 PG/ML (ref 0–124)
RBC # BLD: 4.72 M/UL (ref 4.2–5.9)
SODIUM BLD-SCNC: 139 MMOL/L (ref 136–145)
TCO2 CALC VENOUS: 28 MMOL/L
TOTAL CK: 269 U/L (ref 39–308)
TOTAL PROTEIN: 7.7 G/DL (ref 6.4–8.2)
TROPONIN: <0.01 NG/ML
WBC # BLD: 8.6 K/UL (ref 4–11)

## 2020-10-26 PROCEDURE — 99222 1ST HOSP IP/OBS MODERATE 55: CPT | Performed by: INTERNAL MEDICINE

## 2020-10-26 PROCEDURE — 6370000000 HC RX 637 (ALT 250 FOR IP): Performed by: INTERNAL MEDICINE

## 2020-10-26 PROCEDURE — 94640 AIRWAY INHALATION TREATMENT: CPT

## 2020-10-26 PROCEDURE — 82803 BLOOD GASES ANY COMBINATION: CPT

## 2020-10-26 PROCEDURE — 84484 ASSAY OF TROPONIN QUANT: CPT

## 2020-10-26 PROCEDURE — 82550 ASSAY OF CK (CPK): CPT

## 2020-10-26 PROCEDURE — 6360000002 HC RX W HCPCS: Performed by: EMERGENCY MEDICINE

## 2020-10-26 PROCEDURE — 85025 COMPLETE CBC W/AUTO DIFF WBC: CPT

## 2020-10-26 PROCEDURE — 83880 ASSAY OF NATRIURETIC PEPTIDE: CPT

## 2020-10-26 PROCEDURE — 94669 MECHANICAL CHEST WALL OSCILL: CPT

## 2020-10-26 PROCEDURE — 2700000000 HC OXYGEN THERAPY PER DAY

## 2020-10-26 PROCEDURE — 99221 1ST HOSP IP/OBS SF/LOW 40: CPT | Performed by: NURSE PRACTITIONER

## 2020-10-26 PROCEDURE — 1200000000 HC SEMI PRIVATE

## 2020-10-26 PROCEDURE — 71045 X-RAY EXAM CHEST 1 VIEW: CPT

## 2020-10-26 PROCEDURE — 96374 THER/PROPH/DIAG INJ IV PUSH: CPT

## 2020-10-26 PROCEDURE — 6370000000 HC RX 637 (ALT 250 FOR IP)

## 2020-10-26 PROCEDURE — 93005 ELECTROCARDIOGRAM TRACING: CPT | Performed by: EMERGENCY MEDICINE

## 2020-10-26 PROCEDURE — 99285 EMERGENCY DEPT VISIT HI MDM: CPT

## 2020-10-26 PROCEDURE — 6370000000 HC RX 637 (ALT 250 FOR IP): Performed by: NURSE PRACTITIONER

## 2020-10-26 PROCEDURE — 93010 ELECTROCARDIOGRAM REPORT: CPT | Performed by: INTERNAL MEDICINE

## 2020-10-26 PROCEDURE — 2580000003 HC RX 258: Performed by: NURSE PRACTITIONER

## 2020-10-26 PROCEDURE — 94761 N-INVAS EAR/PLS OXIMETRY MLT: CPT

## 2020-10-26 PROCEDURE — U0003 INFECTIOUS AGENT DETECTION BY NUCLEIC ACID (DNA OR RNA); SEVERE ACUTE RESPIRATORY SYNDROME CORONAVIRUS 2 (SARS-COV-2) (CORONAVIRUS DISEASE [COVID-19]), AMPLIFIED PROBE TECHNIQUE, MAKING USE OF HIGH THROUGHPUT TECHNOLOGIES AS DESCRIBED BY CMS-2020-01-R: HCPCS

## 2020-10-26 PROCEDURE — 80053 COMPREHEN METABOLIC PANEL: CPT

## 2020-10-26 RX ORDER — IPRATROPIUM BROMIDE AND ALBUTEROL SULFATE 2.5; .5 MG/3ML; MG/3ML
1 SOLUTION RESPIRATORY (INHALATION)
Status: DISCONTINUED | OUTPATIENT
Start: 2020-10-26 | End: 2020-10-26

## 2020-10-26 RX ORDER — ACETAMINOPHEN 325 MG/1
650 TABLET ORAL EVERY 6 HOURS PRN
Status: DISCONTINUED | OUTPATIENT
Start: 2020-10-26 | End: 2020-10-29 | Stop reason: HOSPADM

## 2020-10-26 RX ORDER — IPRATROPIUM BROMIDE AND ALBUTEROL SULFATE 2.5; .5 MG/3ML; MG/3ML
3 SOLUTION RESPIRATORY (INHALATION) ONCE
Status: COMPLETED | OUTPATIENT
Start: 2020-10-26 | End: 2020-10-26

## 2020-10-26 RX ORDER — METOPROLOL SUCCINATE 50 MG/1
50 TABLET, EXTENDED RELEASE ORAL DAILY
Status: DISCONTINUED | OUTPATIENT
Start: 2020-10-27 | End: 2020-10-29 | Stop reason: HOSPADM

## 2020-10-26 RX ORDER — ATORVASTATIN CALCIUM 10 MG/1
20 TABLET, FILM COATED ORAL DAILY
Status: DISCONTINUED | OUTPATIENT
Start: 2020-10-27 | End: 2020-10-29 | Stop reason: HOSPADM

## 2020-10-26 RX ORDER — METHYLPREDNISOLONE SODIUM SUCCINATE 40 MG/ML
40 INJECTION, POWDER, LYOPHILIZED, FOR SOLUTION INTRAMUSCULAR; INTRAVENOUS EVERY 12 HOURS
Status: DISCONTINUED | OUTPATIENT
Start: 2020-10-27 | End: 2020-10-28

## 2020-10-26 RX ORDER — PANTOPRAZOLE SODIUM 40 MG/1
40 TABLET, DELAYED RELEASE ORAL
Status: DISCONTINUED | OUTPATIENT
Start: 2020-10-27 | End: 2020-10-29 | Stop reason: HOSPADM

## 2020-10-26 RX ORDER — METHYLPREDNISOLONE SODIUM SUCCINATE 125 MG/2ML
125 INJECTION, POWDER, LYOPHILIZED, FOR SOLUTION INTRAMUSCULAR; INTRAVENOUS DAILY
Status: DISCONTINUED | OUTPATIENT
Start: 2020-10-26 | End: 2020-10-26

## 2020-10-26 RX ORDER — ALBUTEROL SULFATE 2.5 MG/3ML
2.5 SOLUTION RESPIRATORY (INHALATION) EVERY 4 HOURS PRN
Status: DISCONTINUED | OUTPATIENT
Start: 2020-10-26 | End: 2020-10-29 | Stop reason: HOSPADM

## 2020-10-26 RX ORDER — LOSARTAN POTASSIUM 100 MG/1
100 TABLET ORAL DAILY
Status: DISCONTINUED | OUTPATIENT
Start: 2020-10-27 | End: 2020-10-29 | Stop reason: HOSPADM

## 2020-10-26 RX ORDER — PREDNISONE 20 MG/1
40 TABLET ORAL DAILY
Status: DISCONTINUED | OUTPATIENT
Start: 2020-10-29 | End: 2020-10-28

## 2020-10-26 RX ORDER — DOXYCYCLINE HYCLATE 100 MG
100 TABLET ORAL EVERY 12 HOURS
Status: DISCONTINUED | OUTPATIENT
Start: 2020-10-26 | End: 2020-10-29 | Stop reason: HOSPADM

## 2020-10-26 RX ORDER — ACETAMINOPHEN 650 MG/1
650 SUPPOSITORY RECTAL EVERY 6 HOURS PRN
Status: DISCONTINUED | OUTPATIENT
Start: 2020-10-26 | End: 2020-10-29 | Stop reason: HOSPADM

## 2020-10-26 RX ORDER — SODIUM CHLORIDE 0.9 % (FLUSH) 0.9 %
10 SYRINGE (ML) INJECTION PRN
Status: DISCONTINUED | OUTPATIENT
Start: 2020-10-26 | End: 2020-10-29 | Stop reason: HOSPADM

## 2020-10-26 RX ORDER — GUAIFENESIN 600 MG/1
600 TABLET, EXTENDED RELEASE ORAL 2 TIMES DAILY
Status: DISCONTINUED | OUTPATIENT
Start: 2020-10-26 | End: 2020-10-29 | Stop reason: HOSPADM

## 2020-10-26 RX ORDER — PROMETHAZINE HYDROCHLORIDE 25 MG/1
12.5 TABLET ORAL EVERY 6 HOURS PRN
Status: DISCONTINUED | OUTPATIENT
Start: 2020-10-26 | End: 2020-10-29 | Stop reason: HOSPADM

## 2020-10-26 RX ORDER — ALBUTEROL SULFATE 90 UG/1
2 AEROSOL, METERED RESPIRATORY (INHALATION) EVERY 4 HOURS
Status: DISCONTINUED | OUTPATIENT
Start: 2020-10-26 | End: 2020-10-28

## 2020-10-26 RX ORDER — AMLODIPINE BESYLATE 5 MG/1
5 TABLET ORAL DAILY
Status: DISCONTINUED | OUTPATIENT
Start: 2020-10-27 | End: 2020-10-29 | Stop reason: HOSPADM

## 2020-10-26 RX ORDER — ALBUTEROL SULFATE 90 UG/1
2 AEROSOL, METERED RESPIRATORY (INHALATION) EVERY 6 HOURS PRN
Status: DISCONTINUED | OUTPATIENT
Start: 2020-10-26 | End: 2020-10-26

## 2020-10-26 RX ORDER — POLYETHYLENE GLYCOL 3350 17 G/17G
17 POWDER, FOR SOLUTION ORAL DAILY PRN
Status: DISCONTINUED | OUTPATIENT
Start: 2020-10-26 | End: 2020-10-29 | Stop reason: HOSPADM

## 2020-10-26 RX ORDER — SODIUM CHLORIDE 0.9 % (FLUSH) 0.9 %
10 SYRINGE (ML) INJECTION EVERY 12 HOURS SCHEDULED
Status: DISCONTINUED | OUTPATIENT
Start: 2020-10-26 | End: 2020-10-29 | Stop reason: HOSPADM

## 2020-10-26 RX ORDER — BUDESONIDE AND FORMOTEROL FUMARATE DIHYDRATE 160; 4.5 UG/1; UG/1
2 AEROSOL RESPIRATORY (INHALATION) DAILY
Status: DISCONTINUED | OUTPATIENT
Start: 2020-10-28 | End: 2020-10-29 | Stop reason: HOSPADM

## 2020-10-26 RX ORDER — IPRATROPIUM BROMIDE AND ALBUTEROL SULFATE 2.5; .5 MG/3ML; MG/3ML
SOLUTION RESPIRATORY (INHALATION)
Status: COMPLETED
Start: 2020-10-26 | End: 2020-10-26

## 2020-10-26 RX ORDER — ONDANSETRON 2 MG/ML
4 INJECTION INTRAMUSCULAR; INTRAVENOUS EVERY 6 HOURS PRN
Status: DISCONTINUED | OUTPATIENT
Start: 2020-10-26 | End: 2020-10-29 | Stop reason: HOSPADM

## 2020-10-26 RX ORDER — FUROSEMIDE 40 MG/1
40 TABLET ORAL DAILY
Status: DISCONTINUED | OUTPATIENT
Start: 2020-10-27 | End: 2020-10-29 | Stop reason: HOSPADM

## 2020-10-26 RX ADMIN — Medication 2 PUFF: at 20:20

## 2020-10-26 RX ADMIN — Medication 10 ML: at 21:43

## 2020-10-26 RX ADMIN — Medication 2 PUFF: at 23:33

## 2020-10-26 RX ADMIN — IPRATROPIUM BROMIDE AND ALBUTEROL SULFATE 3 AMPULE: .5; 3 SOLUTION RESPIRATORY (INHALATION) at 14:42

## 2020-10-26 RX ADMIN — Medication 2 PUFF: at 20:21

## 2020-10-26 RX ADMIN — GUAIFENESIN 600 MG: 600 TABLET, EXTENDED RELEASE ORAL at 21:43

## 2020-10-26 RX ADMIN — IPRATROPIUM BROMIDE AND ALBUTEROL SULFATE 3 AMPULE: 2.5; .5 SOLUTION RESPIRATORY (INHALATION) at 14:42

## 2020-10-26 RX ADMIN — Medication 2 PUFF: at 20:15

## 2020-10-26 RX ADMIN — DOXYCYCLINE HYCLATE 100 MG: 100 TABLET, COATED ORAL at 21:43

## 2020-10-26 RX ADMIN — METHYLPREDNISOLONE SODIUM SUCCINATE 125 MG: 125 INJECTION, POWDER, FOR SOLUTION INTRAMUSCULAR; INTRAVENOUS at 14:43

## 2020-10-26 NOTE — CONSULTS
11/13/2019    egd; esophagitis/gastritis    HERNIA REPAIR      [place] XCAPSL [facility] RMVL INSJ IO LENS PROSTH W/O ECP Right 9/6/2018    PHACO EMULSIFICATION OF CATARACT WITH INTRAOCULAR LENS IMPLANT RIGHT EYE performed by Charlie Adams MD at [address] [place] [address] RMVL INSJ IO LENS PROSTH W/O ECP Left 10/18/2018    PHACO EMULSIFICATION OF CATARACT WITH  INTRAOCULAR LENS IMPLANT LEFT EYE performed by Charlie Adams MD at [place] [address] N/A 11/13/2019    EGD BIOPSY performed by Niocla Walker MD at [address] [place] [address] Dr:  family history includes Alcohol Abuse in his sister. SOCIAL HISTORY:   reports that he quit smoking about 3 years ago. He has a 110.00 pack-year smoking history. He has never used smokeless tobacco.    Scheduled Meds:   [START ON 10/27/2020] amLODIPine  5 mg Oral Daily    [START ON 10/27/2020] atorvastatin  20 mg Oral Daily    [START ON 10/27/2020] budesonide-formoterol  2 puff Inhalation Daily    [START ON 10/27/2020] losartan  100 mg Oral Daily    [START ON 10/27/2020] metoprolol succinate  50 mg Oral Daily    [START ON 10/27/2020] pantoprazole  40 mg Oral QAM AC    [START ON 10/27/2020] rivaroxaban  20 mg Oral Daily with breakfast    [START ON 10/27/2020] tiotropium  2 puff Inhalation Daily    sodium chloride flush  10 mL Intravenous 2 times per day    [START ON 10/27/2020] methylPREDNISolone  40 mg Intravenous Q12H    Followed by   Joselyn Acevedo ON 10/29/2020] predniSONE  40 mg Oral Daily    doxycycline hyclate  100 mg Oral Q12H     Continuous Infusions:    PRN Meds:  albuterol sulfate HFA, sodium chloride flush, acetaminophen **OR** acetaminophen, polyethylene glycol, promethazine **OR** ondansetron    ALLERGIES:  Patient is allergic to amiodarone.     REVIEW OF SYSTEMS:  Constitutional: Negative for fever  HENT: Negative for sore throat  Eyes: Negative for redness   Respiratory: + dyspnea, cough  Cardiovascular: Negative for chest pain  Gastrointestinal: Negative for vomiting, diarrhea   Genitourinary: Negative for hematuria   Musculoskeletal: Negative for arthralgias   Skin: Negative for rash  Neurological: Negative for syncope  Hematological: Negative for adenopathy  Psychiatric/Behavorial: Negative for anxiety    PHYSICAL EXAM:  Blood pressure 105/72, pulse 75, temperature 97.5 °F (36.4 °C), temperature source Oral, resp. rate 22, height 5' 11\" (1.803 m), weight 227 lb (103 kg), SpO2 99 %.' on 3LPM   /72   Pulse 75   Temp 97.5 °F (36.4 °C) (Oral)   Resp 22   Ht 5' 11\" (1.803 m)   Wt 227 lb (103 kg)   SpO2 99%   BMI 31.66 kg/m²  on 3LPM   Gen/Constitutional: Mild distress. Appears well-developed and nourished  Eyes: No sclera icterus. EOM intact. No conjunctival injection. No visible discharge  HENT: Head is normocephalic and atraumatic. Mucus membranes are moist and the tongue appears normal. No discharge. Pharynx clear. Normal appearing nose. External Ears normal.    Neck: Trachea midline. No obvious mass. Resp: Mild accessory muscle use. No crackles. Few wheezes. No rhonchi. CV: Regular rate. Regular rhythm. No murmur or rub. 1+ LE edema. GI: Non-distended. No hernia. + BS   Skin: No significant exanthematous lesions or discoloration noted on facial skin. No nodule on exposed extremities. M/S: No cyanosis. No joint deformity. No clubbing. Normal range of motion in the neck. Neuro: Awake. Alert. Moves all four extremities. Following commands. No facial asymmetry. No gaze palsy. Psych: No agitation. Normal affect. No hallucinations. Oriented x 3. No anxiety. Normal judgment and insight.             LABS:  CBC:   Recent Labs     10/26/20  1435   WBC 8.6   HGB 13.9   HCT 42.4   MCV 89.8        BMP:   Recent Labs     10/26/20  1435      K 4.3      CO2 29   BUN 19   CREATININE 1.4*     LIVER PROFILE:   Recent Labs     10/26/20  1435   AST 15   ALT 9*   BILITOT 0.5   ALKPHOS 88 PT/INR: No results for input(s): PROTIME, INR in the last 72 hours. APTT: No results for input(s): APTT in the last 72 hours. UA:No results for input(s): NITRITE, COLORU, PHUR, LABCAST, WBCUA, RBCUA, MUCUS, TRICHOMONAS, YEAST, BACTERIA, CLARITYU, SPECGRAV, LEUKOCYTESUR, UROBILINOGEN, BILIRUBINUR, BLOODU, GLUCOSEU, AMORPHOUS in the last 72 hours. Invalid input(s): KETONESU  No results for input(s): PHART, [id number], PO2ART in the last 72 hours. CXR 10/26 was reviewed by me and showed   Persistent but improved strandy densities at the left lung base which may be   on the basis of atelectasis or post infectious scarring if current infectious   symptoms are absent      ASSESSMENT:  · COPD with AE   · Centrilobular emphysema  · Shortness of breath-secondary to above  · Pulmonary nodules followed by Dr. Jesenia Dodd  · Paroxysmal A. fib on home Xarelto      PLAN:  · Supplemental oxygen to maintain SaO2 >92%; wean as tolerated  · Droplet plus isolation (surgical mask, eye protection, gown, glove)  · Emergent Covid 19 testing  · Doxycycline  · Sputum culture  · Inhaled bronchodilators  · IV Solu-Medrol 40 every 12  · Continue home Felipe Pickens is a 70 y.o. male being evaluated by a Virtual Visit (video visit) using Spotzer robot with stethoscope capability to address concerns as mentioned above. A caregiver was present when appropriate. Due to this being a TeleHealth encounter (During [facility] public health emergency), evaluation of the following organ systems was limited: Vitals/Constitutional/EENT/Resp/CV/GI//MS/Neuro/Skin/Heme-Lymph-Imm.   Pursuant to the emergency declaration under the [address] [place] [address], [address] [place] [address] authority and the [employer] and [employer] Act, this Virtual Visit was conducted with patient's (and/or legal guardian's) consent, to reduce the patient's risk of exposure to COVID-19 and provide necessary medical

## 2020-10-26 NOTE — PROGRESS NOTES
Patient admitted to room 232 from ER. Patient oriented to room, call light, bed rails, phone, lights and bathroom. Patient instructed about the schedule of the day including: vital sign frequency, lab draws, possible tests, frequency of MD and staff rounds, daily weights, I &O's and prescribed diet. Telemetry box in place, patient aware of placement and reason. Bed locked, in lowest position, side rails up 2/4, call light within reach. Recliner Assessment  Patient is able to demonstrated the ability to move from a reclining position to an upright position within the recliner.

## 2020-10-26 NOTE — ED PROVIDER NOTES
[address] MEDICAL-SURGICAL      CHIEF COMPLAINT  Shortness of Breath (resp 40)       HISTORY OF PRESENT ILLNESS  George Davalos is a 70 y.o. male with history of COPD on 3L NC O2 who presents to the ED for evaluation of shortness of breath. Patient reports trying his nebulizer treatments at home with minimal improvement of symptoms. Patient reports having increased cough. Denies having fevers, chills, nausea, vomiting. Denies any recent sick contacts. Denies having chest pain or abdominal pain. Denies hemoptysis. Denies having any other complaints. No other complaints, modifying factors or associated symptoms. I have reviewed the following from the nursing documentation.     Past Medical History:   Diagnosis Date    A-fib [facility])     2/14/12    Acute respiratory failure with hypoxia ([address] 2/13/2012    Atrial fibrillation with RVR ([address]     2/14/12     Avulsion fracture of ankle 9/21/2015    Benign localized hyperplasia of prostate with urinary obstruction and other lower urinary tract symptoms (LUTS)(600.21) 8/17/2016    Chronic systolic CHF (congestive heart failure) (HCC) 8/28/2017    Contusion of leg, right 9/21/2015    COPD [facility]chronic obstructive pulmonary disease) ([facility])     Fractures     GERD (gastroesophageal reflux disease) 6/15/2015    Hypertension     Hypertensive urgency 8/4/2019    Hypertrophy of prostate without urinary obstruction and other lower urinary tract symptoms (LUTS) 6/15/2015    No history of procedure     no previos colonoscopy    P[facility]peripheral artery disease) [facility] 10/9/2017    Pneumonia     Thoracic aortic aneurysm [facility])      Past Surgical History:   Procedure Laterality Date    CATARACT REMOVAL WITH IMPLANT Right 09/06/2018     PHACO EMULSIFICATION OF CATARACT WITH INTRAOCULAR LENS IMPLANT RIGHT EYE    COLONOSCOPY  2/24/2016    sigmoid polyps    ENDOSCOPY, COLON, DIAGNOSTIC  11/13/2019    egd; esophagitis/gastritis    HERNIA REPAIR      FARZANEH Rose Physically abused: No     Forced sexual activity: No   Other Topics Concern    Not on file   Social History Narrative    Not on file     Current Facility-Administered Medications   Medication Dose Route Frequency Provider Last Rate Last Dose    influenza quadrivalent split vaccine (FLUZONE;FLUARIX;FLULAVAL;AFLURIA) injection 0.5 mL  0.5 mL Intramuscular Prior to discharge Nathaniel Guardado MD        amLODIPine (NORVASC) tablet 5 mg  5 mg Oral Daily Towanda Fell, APRN - CNP   5 mg at 10/27/20 0941    atorvastatin (LIPITOR) tablet 20 mg  20 mg Oral Daily Towanda Fell, APRN - CNP   20 mg at 10/27/20 0941    [START ON 10/28/2020] budesonide-formoterol (SYMBICORT) 160-4.5 MCG/ACT inhaler 2 puff  2 puff Inhalation Daily Towanda Fell, APRN - CNP   2 puff at 10/27/20 0729    losartan (COZAAR) tablet 100 mg  100 mg Oral Daily Towanda Fell, APRN - CNP   100 mg at 10/27/20 0941    metoprolol succinate (TOPROL XL) extended release tablet 50 mg  50 mg Oral Daily Towanda Fell, APRN - CNP   50 mg at 10/27/20 0941    pantoprazole (PROTONIX) tablet 40 mg  40 mg Oral QAM AC Towanda Fell, APRN - CNP   40 mg at 10/27/20 2556    rivaroxaban (XARELTO) tablet 20 mg  20 mg Oral Daily with breakfast Towanda Fell, APRN - CNP   20 mg at 10/27/20 0942    sodium chloride flush 0.9 % injection 10 mL  10 mL Intravenous 2 times per day Towanda Fell, APRN - CNP   10 mL at 10/27/20 0942    sodium chloride flush 0.9 % injection 10 mL  10 mL Intravenous PRN Towanda Fell, APRN - CNP        acetaminophen (TYLENOL) tablet 650 mg  650 mg Oral Q6H PRN Towanda Fell, APRN - CNP        Or    acetaminophen (TYLENOL) suppository 650 mg  650 mg Rectal Q6H PRN Towanda Fell, APRN - CNP        polyethylene glycol (GLYCOLAX) packet 17 g  17 g Oral Daily PRN Towanda Fell, APRN - CNP        promethazine (PHENERGAN) tablet 12.5 mg  12.5 mg Oral Q6H PRN Towanda Fell, APRN - CNP        Or    ondansetron (ZOFRAN) injection 4 mg  4 mg Intravenous Q6H PRN CORRINE Thakur CNP        methylPREDNISolone sodium (SOLU-MEDROL) injection 40 mg  40 mg Intravenous Q12H CORRINE Thakur CNP   40 mg at 10/27/20 0527    Followed by   Liana Garnica ON 10/29/2020] predniSONE (DELTASONE) tablet 40 mg  40 mg Oral Daily CORRINE Thakur CNP        doxycycline hyclate (VIBRA-TABS) tablet 100 mg  100 mg Oral Q12H CORRINE Thakur CNP   100 mg at 10/27/20 0527    guaiFENesin (MUCINEX) extended release tablet 600 mg  600 mg Oral BID Jacki Rivera MD   600 mg at 10/27/20 0940    albuterol (PROVENTIL) nebulizer solution 2.5 mg  2.5 mg Nebulization Q4H PRN Ngozi Engel MD        furosemide (LASIX) tablet 40 mg  40 mg Oral Daily CORRINE Thakur CNP   40 mg at 10/27/20 0940    albuterol sulfate  (90 Base) MCG/ACT inhaler 2 puff  2 puff Inhalation Q4H Ngozi Engel MD   2 puff at 10/27/20 1146    ipratropium (ATROVENT HFA) 17 MCG/ACT inhaler 2 puff  2 puff Inhalation Q4H Ngozi Engel MD   2 puff at 10/27/20 1146     Allergies   Allergen Reactions    Amiodarone Anaphylaxis     Thyriod toxic        REVIEW OF SYSTEMS  10 systems reviewed, pertinent positives per HPI otherwise noted to be negative. PHYSICAL EXAM  /73   Pulse 80   Temp 96.9 °F (36.1 °C) (Oral)   Resp 18   Ht 5' 11\" (1.803 m)   Wt 229 lb 6.4 oz (104.1 kg)   SpO2 97%   BMI 31.99 kg/m²    GENERAL APPEARANCE: Awake and alert. Increased work of breathing. HENT: Normocephalic. Atraumatic. CN  2-12 grossly intact. HEART/CHEST: RRR. No murmurs appreciated   LUNGS: Increased work of breathing. On N[place] O2. Wheezing throughout   ABDOMEN: Soft, non-distended abdomen. Non tender to palpation. No guarding. No rebound. EXTREMITIES: no gross deformities. Moving all extremities. All extremities neurovascularly intact. SKIN: Warm and dry. No acute rashes. NEUROLOGICAL: Alert and oriented. CN's 2-12 intact.  No gross facial drooping. Strength 5/5, sensation intact. PSYCHIATRIC: Normal mood and affect.     LABS  Results for orders placed or performed during the hospital encounter of 10/26/20   Blood Gas, Venous   Result Value Ref Range    pH, Hoang 7.359 7.350 - 7.450    pCO2, Hoang 47.8 40.0 - 50.0 mmHg    pO2, Hoang 42.9 (H) 25.0 - 40.0 mmHg    HCO3, Venous 26.3 23.0 - 29.0 mmol/L    Base Excess, Hoang 0.3 -3.0 - 3.0 mmol/L    O2 Sat, Hoang 76 Not Established %    Carboxyhemoglobin 2.0 (H) 0.0 - 1.5 %    MetHgb, Hoang 0.3 <1.5 %    TC02 (Calc), Hoang 28 Not Established mmol/L    O2 Content, Hoang 16 Not Established VOL %    O2 Therapy Unknown    CK   Result Value Ref Range    Total  39 - 308 U/L   CBC Auto Differential   Result Value Ref Range    WBC 8.6 4.0 - 11.0 K/uL    RBC 4.72 4.20 - 5.90 M/uL    Hemoglobin 13.9 13.5 - 17.5 g/dL    Hematocrit 42.4 40.5 - 52.5 %    MCV 89.8 80.0 - 100.0 fL    MCH 29.5 26.0 - 34.0 pg    MCHC 32.8 31.0 - 36.0 g/dL    RDW 17.2 (H) 12.4 - 15.4 %    Platelets 833 080 - 994 K/uL    MPV 8.3 5.0 - 10.5 fL    Neutrophils % 58.2 %    Lymphocytes % 20.8 %    Monocytes % 6.5 %    Eosinophils % 13.3 %    Basophils % 1.2 %    Neutrophils Absolute 5.0 1.7 - 7.7 K/uL    Lymphocytes Absolute 1.8 1.0 - 5.1 K/uL    Monocytes Absolute 0.6 0.0 - 1.3 K/uL    Eosinophils Absolute 1.1 (H) 0.0 - 0.6 K/uL    Basophils Absolute 0.1 0.0 - 0.2 K/uL   Comprehensive Metabolic Panel   Result Value Ref Range    Sodium 139 136 - 145 mmol/L    Potassium 4.3 3.5 - 5.1 mmol/L    Chloride 102 99 - 110 mmol/L    CO2 29 21 - 32 mmol/L    Anion Gap 8 3 - 16    Glucose 134 (H) 70 - 99 mg/dL    BUN 19 7 - 20 mg/dL    CREATININE 1.4 (H) 0.8 - 1.3 mg/dL    GFR Non-African American 50 (A) >60    GFR African American >60 >60    Calcium 9.9 8.3 - 10.6 mg/dL    Total Protein 7.7 6.4 - 8.2 g/dL    Alb 4.6 3.4 - 5.0 g/dL    Albumin/Globulin Ratio 1.5 1.1 - 2.2    Total Bilirubin 0.5 0.0 - 1.0 mg/dL    Alkaline Phosphatase 88 40 - 129 U/L    ALT 9 (L) 10 - 40 U/L    AST 15 15 - 37 U/L    Globulin 3.1 g/dL   Troponin   Result Value Ref Range    Troponin <0.01 <0.01 ng/mL   Brain Natriuretic Peptide   Result Value Ref Range    Pro-BNP 28 0 - 124 pg/mL   CBC   Result Value Ref Range    WBC 8.1 4.0 - 11.0 K/uL    RBC 4.57 4.20 - 5.90 M/uL    Hemoglobin 13.6 13.5 - 17.5 g/dL    Hematocrit 41.7 40.5 - 52.5 %    MCV 91.3 80.0 - 100.0 fL    MCH 29.6 26.0 - 34.0 pg    MCHC 32.5 31.0 - 36.0 g/dL    RDW 17.1 (H) 12.4 - 15.4 %    Platelets 688 201 - 959 K/uL    MPV 8.6 5.0 - 10.5 fL   Basic Metabolic Panel w/ Reflex to MG   Result Value Ref Range    Sodium 135 (L) 136 - 145 mmol/L    Potassium reflex Magnesium 4.7 3.5 - 5.1 mmol/L    Chloride 102 99 - 110 mmol/L    CO2 24 21 - 32 mmol/L    Anion Gap 9 3 - 16    Glucose 153 (H) 70 - 99 mg/dL    BUN 25 (H) 7 - 20 mg/dL    CREATININE 1.3 0.8 - 1.3 mg/dL    GFR Non-African American 54 (A) >60    GFR African American >60 >60    Calcium 9.7 8.3 - 10.6 mg/dL   EKG 12 Lead   Result Value Ref Range    Ventricular Rate 80 BPM    Atrial Rate 80 BPM    P-R Interval 134 ms    QRS Duration 90 ms    Q-T Interval 388 ms    QTc Calculation (Bazett) 447 ms    P Axis 48 degrees    R Axis 52 degrees    T Axis 67 degrees    Diagnosis       Normal sinus rhythmST abnormality anterior leads likely due to artifactNo previous ECGs availableConfirmed by ANIKA SALINAS MD ([id number]) on 10/26/2020 5:43:59 PM       I have reviewed all labs for this visit. RADIOLOGY  Xr Chest Portable    Result Date: 10/26/2020  EXAMINATION: ONE XRAY VIEW OF THE CHEST 10/26/2020 11:57 am COMPARISON: 06/12/2020, 04/26/2020, 02/11/2020 HISTORY: ORDERING SYSTEM PROVIDED HISTORY: wheezing SOB TECHNOLOGIST PROVIDED HISTORY: Reason for exam:->wheezing SOB Reason for Exam: sob FINDINGS: Persistent but decreased strandy densities in the left lung base. No pneumothorax or pleural effusion. Stable cardiomediastinal contours and bony thorax.      Persistent but improved (TYLENOL) tablet 650 mg (has no administration in time range)     Or   acetaminophen (TYLENOL) suppository 650 mg (has no administration in time range)   polyethylene glycol (GLYCOLAX) packet 17 g (has no administration in time range)   promethazine (PHENERGAN) tablet 12.5 mg (has no administration in time range)     Or   ondansetron (ZOFRAN) injection 4 mg (has no administration in time range)   methylPREDNISolone sodium (SOLU-MEDROL) injection 40 mg (40 mg Intravenous Given 10/27/20 0527)     Followed by   predniSONE (DELTASONE) tablet 40 mg (has no administration in time range)   doxycycline hyclate (VIBRA-TABS) tablet 100 mg (100 mg Oral Given 10/27/20 0527)   guaiFENesin (MUCINEX) extended release tablet 600 mg (600 mg Oral Given 10/27/20 0940)   albuterol (PROVENTIL) nebulizer solution 2.5 mg (has no administration in time range)   furosemide (LASIX) tablet 40 mg (40 mg Oral Given 10/27/20 0940)   albuterol sulfate  (90 Base) MCG/ACT inhaler 2 puff (2 puffs Inhalation Given 10/27/20 1146)   ipratropium (ATROVENT HFA) 17 MCG/ACT inhaler 2 puff (2 puffs Inhalation Given 10/27/20 1146)   influenza quadrivalent split vaccine (FLUZONE;FLUARIX;FLULAVAL;AFLURIA) injection 0.5 mL (has no administration in time range)   ipratropium-albuterol (DUONEB) nebulizer solution 3 ampule (3 ampules Inhalation Given 10/26/20 1442)        CLINICAL IMPRESSION  1. COPD exacerbation (HCC)        Blood pressure 112/73, pulse 80, temperature 96.9 °F (36.1 °C), temperature source Oral, resp. rate 18, height 5' 11\" (1.803 m), weight 229 lb 6.4 oz (104.1 kg), SpO2 97 %. Pennie Varghese was admitted to the hospital.     Patient was given scripts for the following medications. I counseled patient how to take these medications. Current Discharge Medication List          Follow-up with:  No follow-up provider specified. DISCLAIMER: This chart was created using Dragon dictation software.   Efforts were made by me to

## 2020-10-26 NOTE — PROGRESS NOTES
Consult has been called to Dr. Elvia Tai on 10/26/20.  Spoke with cesilia. 5:48 PM    Dax Johnston  10/26/2020

## 2020-10-26 NOTE — ED TRIAGE NOTES
Pt at [facility] and became very SOB,. Too SOB to return home and do NEB's. Wheezing and on RA 90% placed on 4 L NC. Pt uses 02 at home. States history of multiple history of Pnuemonia.

## 2020-10-26 NOTE — H&P
Hospital Medicine History & Physical      PCP: Megan Primrose, MD    Date of Admission: 10/26/2020    Date of Service: Pt seen/examined on 10/26/2020     Chief Complaint:    Chief Complaint   Patient presents with    Shortness of Breath     resp 40       History Of Present Illness: The patient is a 70 y.o. male who presents to [facility] with shortness of breath. He states ongoing since January. He was at [facility] eating breakfast when he became short of breath. He states he was unable to eat. He started getting anxious and needed his nebulizer machine. He states he was too far from his nebulizer. Other associated symptoms are non-productive cough. He reports using his nebulizer machine 2 times per hour. He is running out of his Duonebs before it is time for him to get refills. He denies fever, chest pain, abdominal pain, nausea, vomiting, diarrhea, or dysuria. He was tachypneic. He is on 3 L O2. He wears this at home. Labs consistent with CKD. CXR with persistent but improved strandy densities. Patient admitted to med-surg. Pulmonology consulted.       Past Medical History:        Diagnosis Date    A-fib [facility])     2/14/12    Acute respiratory failure with hypoxia ([facility] [address] 2/13/2012    Atrial fibrillation with RVR ([facility] [address]     2/14/12     Avulsion fracture of ankle 9/21/2015    Benign localized hyperplasia of prostate with urinary obstruction and other lower urinary tract symptoms (LUTS)(600.21) 8/17/2016    Chronic systolic CHF (congestive heart failure) ([facility]) 8/28/2017    Contusion of leg, right 9/21/2015    COPD (chronic obstructive pulmonary disease) (formerly [facility])     Fractures     GERD (gastroesophageal reflux disease) 6/15/2015    Hypertension     Hypertensive urgency 8/4/2019    Hypertrophy of prostate without urinary obstruction and other lower urinary tract symptoms (LUTS) 6/15/2015    No history of procedure     no previos colonoscopy    PAD (peripheral artery disease) ([facility] [address] 10/9/2017    Pneumonia     Thoracic aortic aneurysm [facility])        Past Surgical History:        Procedure Laterality Date    CATARACT REMOVAL WITH IMPLANT Right 09/06/2018     PHACO EMULSIFICATION OF CATARACT WITH INTRAOCULAR LENS IMPLANT RIGHT EYE    COLONOSCOPY  2/24/2016    sigmoid polyps    ENDOSCOPY, COLON, DIAGNOSTIC  11/13/2019    egd; esophagitis/gastritis    HERNIA REPAIR      [place] XCAPSL CTRC RMVL INSJ IO LENS PROSTH W/O ECP Right 9/6/2018    PHACO EMULSIFICATION OF CATARACT WITH INTRAOCULAR LENS IMPLANT RIGHT EYE performed by Karthik Medina MD at [address] [place] [address] RMVL INSJ IO LENS PROSTH W/O ECP Left 10/18/2018    PHACO EMULSIFICATION OF CATARACT WITH  INTRAOCULAR LENS IMPLANT LEFT EYE performed by Karthik Medina MD at [address] 11/13/2019    EGD BIOPSY performed by Jt Romano MD at [id number] [place] [address]       Medications Prior to Admission:    Prior to Admission medications    Medication Sig Start Date End Date Taking?  Authorizing Provider   furosemide (LASIX) 40 MG tablet TAKE ONE TABLET BY MOUTH EVERY DAY 10/14/20   Korey Hoyos MD   atorvastatin (LIPITOR) 20 MG tablet TAKE ONE TABLET BY MOUTH EVERY DAY 10/14/20   Korey Hoyos MD   amLODIPine (NORVASC) 5 MG tablet TAKE ONE TABLET BY MOUTH EVERY DAY 10/14/20   Korey Hoyos MD   pantoprazole (PROTONIX) 40 MG tablet TAKE ONE TABLET BY MOUTH EVERY DAY 9/18/20   Apollo Valerio MD   ipratropium-albuterol (DUONEB) 0.5-2.5 (3) MG/3ML SOLN nebulizer solution USE ONE UNIT DOSE (3ML) IN NEBULIZER AND INHALE INTO THE LUNGS EVERY 4 HOURS AS NEEDED FOR SHORTNESS OF BREATH 9/16/20   Apollo Valerio MD   predniSONE (DELTASONE) 10 MG tablet 40mg x 3 days, 30mg x 3 days, 20mg x 3 days, 10mg x 3 days, then stop 8/5/20   Apollo Valerio MD   Umeclidinium Bromide (INCRUSE ELLIPTA) 62.5 MCG/INH AEPB Inhale 1 Inhaler into the lungs daily 6/18/20   Derek Reis MD fluticasone-salmeterol (ADVAIR DISKUS) 250-50 MCG/DOSE AEPB Inhale 1 puff into the lungs every 12 hours 6/18/20   Michel Tavarez MD   azithromycin (ZITHROMAX) 250 MG tablet Take 2 tabs (500 mg) on Day 1, and take 1 tab (250 mg) on days 2 through 5. 3/6/20   Get Snow MD   Cholecalciferol (VITAMIN D-3) 25 MCG (1000 UT) CAPS Take by mouth    Historical Provider, MD   budesonide-formoterol (SYMBICORT) 160-4.5 MCG/ACT AERO Inhale 2 puffs into the lungs 5/28/19   Historical Provider, MD   losartan (COZAAR) 100 MG tablet TAKE ONE TABLET BY MOUTH DAILY 1/29/20   Joycelyn Bonilla MD   metoprolol succinate (TOPROL XL) 50 MG extended release tablet Take 1 tablet by mouth daily 1/29/20   Joycelyn Bonilla MD   Potassium Citrate ER 15 MEQ (1620 MG) TBCR Take 15 mEq by mouth daily 1/29/20   Joycelyn Bonilla MD   rivaroxaban (XARELTO) 20 MG TABS tablet TAKE ONE TABLET BY MOUTH DAILY WITH BREAKFAST 1/29/20   Joycelyn Bonilla MD   albuterol sulfate HFA (PROVENTIL HFA) 108 (90 Base) MCG/ACT inhaler Inhale 2 puffs into the lungs every 6 hours as needed for Wheezing (with spacer) 4/16/18   Get Snow MD       Allergies:  Amiodarone    Social History:    TOBACCO:   reports that he quit smoking about 3 years ago. He has a 110.00 pack-year smoking history. He has never used smokeless tobacco.  ETOH:   reports previous alcohol use.       Family History:   Positive as follows:        Problem Relation Age of Onset    Alcohol Abuse Sister        REVIEW OF SYSTEMS:       Constitutional: Negative for fever   Respiratory: + dyspnea, cough,  Cardiovascular: Negative for chest pain   Gastrointestinal: Negative for vomiting, diarrhea   Genitourinary: Negative for hematuria   Musculoskeletal: Negative for arthralgias   Skin: Negative for rash   Neurological: Negative for syncope   Psychiatric/Behavorial: Negative for anxiety    PHYSICAL EXAM:    /64   Pulse 74   Temp 97.2 °F (36.2 °C) (Oral)   Resp 19   Ht 5' 11\" (1.803 m)   Wt 230 lb (104.3 kg)   SpO2 98%   BMI 32.08 kg/m²     Gen: No distress. Alert. Ill-appearing  Eyes: PERRL. No sclera icterus. No conjunctival injection. ENT: No discharge. Pharynx clear. Neck: No JVD. No Carotid Bruit. Trachea midline. Resp: No accessory muscle use. No crackles. No wheezes. No rhonchi. CV: Regular rate. Regular rhythm. No murmur. No rub. 1+ BLE/ankle edema. GI: Non-tender. Non-distended. Normal bowel sounds. No hernia. Skin: Warm and dry. No nodule on exposed extremities. No rash on exposed extremities. M/S: No cyanosis. No joint deformity. No clubbing. Neuro: Awake. Grossly nonfocal    Psych: Oriented x 3. No anxiety or agitation. CBC:   Recent Labs     10/26/20  1435   WBC 8.6   HGB 13.9   HCT 42.4   MCV 89.8        BMP:   Recent Labs     10/26/20  1435      K 4.3      CO2 29   BUN 19   CREATININE 1.4*     LIVER PROFILE:   Recent Labs     10/26/20  1435   AST 15   ALT 9*   BILITOT 0.5   ALKPHOS 80       CARDIAC ENZYMES  Recent Labs     10/26/20  1435   CKTOTAL 269   TROPONINI <0.01       CULTURES  N/A    EKG:  I have reviewed the EKG with the following interpretation:   Normal sinus rhythm, Nonspecific ST abnormality    RADIOLOGY  XR CHEST PORTABLE   Final Result   Persistent but improved strandy densities at the left lung base which may be   on the basis of atelectasis or post infectious scarring if current infectious   symptoms are absent. Echo 5/15/19   Summary   Left ventricular systolic function is low normal with ejection fraction   estimated at 50-55 %. No regional wall motion abnormalities are noted. Left ventricular size is decreased. There is mild concentric left ventricular hypertrophy. Elevated left ventricular diastolic filling pressure: Septal E/e'' = 16.4 . Moderate posterior mitral annular calcification is present. Mild tricuspid regurgitation.    Normal systolic pulmonary artery pressure (SPAP) estimated at 41 mmHg (RA   pressure 3 mmHg). Active Problems:    COPD exacerbation ([address]  Resolved Problems:    * No resolved hospital problems. *        ASSESSMENT/PLAN:  # Acute on chronic hypoxic respiratory failure  # COPD exacerbation  - patient presented with c/o worsening dyspnea   - initially tachypneic, + accessory muscle use  - admitted to med-surg.   - pulmonology consulted. - supplemental O2.  - currently on 3 L. Wears Oxygen at home. - continue Symbicort. - IV Solu-medrol, Doxy D#1, Duonebs scheduled/Albuterol prn    # COVID rule out  - testing is pending.     # Chronic systolic CHF   - last EF 40-77%  - continue PO lasix. On Losartan. # CKD stage 3  - stable. Monitor labs. # Paroxysmal A-fib  - continue Toprol-XL  - AC: Xarelto     # Hypertension  - BP stable.  Continue Losartan, Toprol-XL, Amlodipine.      # GERD  - on PPI.     DVT Prophylaxis: Xarelto    Diet: DIET LOW SODIUM 2 GM; 1800 ml  Code Status: Full Code    Lizett Young FNP-C  10/26/2020

## 2020-10-27 LAB
ANION GAP SERPL CALCULATED.3IONS-SCNC: 9 MMOL/L (ref 3–16)
BUN BLDV-MCNC: 25 MG/DL (ref 7–20)
CALCIUM SERPL-MCNC: 9.7 MG/DL (ref 8.3–10.6)
CHLORIDE BLD-SCNC: 102 MMOL/L (ref 99–110)
CO2: 24 MMOL/L (ref 21–32)
CREAT SERPL-MCNC: 1.3 MG/DL (ref 0.8–1.3)
GFR AFRICAN AMERICAN: >60
GFR NON-AFRICAN AMERICAN: 54
GLUCOSE BLD-MCNC: 153 MG/DL (ref 70–99)
HCT VFR BLD CALC: 41.7 % (ref 40.5–52.5)
HEMOGLOBIN: 13.6 G/DL (ref 13.5–17.5)
MCH RBC QN AUTO: 29.6 PG (ref 26–34)
MCHC RBC AUTO-ENTMCNC: 32.5 G/DL (ref 31–36)
MCV RBC AUTO: 91.3 FL (ref 80–100)
PDW BLD-RTO: 17.1 % (ref 12.4–15.4)
PLATELET # BLD: 201 K/UL (ref 135–450)
PMV BLD AUTO: 8.6 FL (ref 5–10.5)
POTASSIUM REFLEX MAGNESIUM: 4.7 MMOL/L (ref 3.5–5.1)
RBC # BLD: 4.57 M/UL (ref 4.2–5.9)
SARS-COV-2, PCR: NOT DETECTED
SODIUM BLD-SCNC: 135 MMOL/L (ref 136–145)
WBC # BLD: 8.1 K/UL (ref 4–11)

## 2020-10-27 PROCEDURE — 99232 SBSQ HOSP IP/OBS MODERATE 35: CPT | Performed by: INTERNAL MEDICINE

## 2020-10-27 PROCEDURE — 94669 MECHANICAL CHEST WALL OSCILL: CPT

## 2020-10-27 PROCEDURE — 2580000003 HC RX 258: Performed by: NURSE PRACTITIONER

## 2020-10-27 PROCEDURE — 99233 SBSQ HOSP IP/OBS HIGH 50: CPT | Performed by: INTERNAL MEDICINE

## 2020-10-27 PROCEDURE — 6360000002 HC RX W HCPCS: Performed by: NURSE PRACTITIONER

## 2020-10-27 PROCEDURE — 6370000000 HC RX 637 (ALT 250 FOR IP): Performed by: INTERNAL MEDICINE

## 2020-10-27 PROCEDURE — 94761 N-INVAS EAR/PLS OXIMETRY MLT: CPT

## 2020-10-27 PROCEDURE — 90686 IIV4 VACC NO PRSV 0.5 ML IM: CPT | Performed by: INTERNAL MEDICINE

## 2020-10-27 PROCEDURE — 85027 COMPLETE CBC AUTOMATED: CPT

## 2020-10-27 PROCEDURE — G0008 ADMIN INFLUENZA VIRUS VAC: HCPCS | Performed by: INTERNAL MEDICINE

## 2020-10-27 PROCEDURE — 6370000000 HC RX 637 (ALT 250 FOR IP): Performed by: NURSE PRACTITIONER

## 2020-10-27 PROCEDURE — 6360000002 HC RX W HCPCS: Performed by: INTERNAL MEDICINE

## 2020-10-27 PROCEDURE — 80048 BASIC METABOLIC PNL TOTAL CA: CPT

## 2020-10-27 PROCEDURE — 2700000000 HC OXYGEN THERAPY PER DAY

## 2020-10-27 PROCEDURE — 36415 COLL VENOUS BLD VENIPUNCTURE: CPT

## 2020-10-27 PROCEDURE — 94640 AIRWAY INHALATION TREATMENT: CPT

## 2020-10-27 PROCEDURE — 1200000000 HC SEMI PRIVATE

## 2020-10-27 RX ADMIN — Medication 10 ML: at 09:42

## 2020-10-27 RX ADMIN — FUROSEMIDE 40 MG: 40 TABLET ORAL at 09:40

## 2020-10-27 RX ADMIN — Medication 2 PUFF: at 03:23

## 2020-10-27 RX ADMIN — LOSARTAN POTASSIUM 100 MG: 100 TABLET, FILM COATED ORAL at 09:41

## 2020-10-27 RX ADMIN — Medication 2 PUFF: at 07:29

## 2020-10-27 RX ADMIN — DOXYCYCLINE HYCLATE 100 MG: 100 TABLET, COATED ORAL at 17:53

## 2020-10-27 RX ADMIN — Medication 2 PUFF: at 23:40

## 2020-10-27 RX ADMIN — RIVAROXABAN 20 MG: 20 TABLET, FILM COATED ORAL at 09:42

## 2020-10-27 RX ADMIN — Medication 2 PUFF: at 19:55

## 2020-10-27 RX ADMIN — METHYLPREDNISOLONE SODIUM SUCCINATE 40 MG: 40 INJECTION, POWDER, FOR SOLUTION INTRAMUSCULAR; INTRAVENOUS at 17:53

## 2020-10-27 RX ADMIN — INFLUENZA A VIRUS A/VICTORIA/2454/2019 IVR-207 (H1N1) ANTIGEN (PROPIOLACTONE INACTIVATED), INFLUENZA A VIRUS A/HONG KONG/2671/2019 IVR-208 (H3N2) ANTIGEN (PROPIOLACTONE INACTIVATED), INFLUENZA B VIRUS B/VICTORIA/705/2018 BVR-11 ANTIGEN (PROPIOLACTONE INACTIVATED), INFLUENZA B VIRUS B/PHUKET/3073/2013 BVR-1B ANTIGEN (PROPIOLACTONE INACTIVATED) 0.5 ML: 15; 15; 15; 15 INJECTION, SUSPENSION INTRAMUSCULAR at 17:53

## 2020-10-27 RX ADMIN — Medication 2 PUFF: at 07:28

## 2020-10-27 RX ADMIN — ATORVASTATIN CALCIUM 20 MG: 10 TABLET, FILM COATED ORAL at 09:41

## 2020-10-27 RX ADMIN — Medication 2 PUFF: at 11:46

## 2020-10-27 RX ADMIN — METHYLPREDNISOLONE SODIUM SUCCINATE 40 MG: 40 INJECTION, POWDER, FOR SOLUTION INTRAMUSCULAR; INTRAVENOUS at 05:27

## 2020-10-27 RX ADMIN — GUAIFENESIN 600 MG: 600 TABLET, EXTENDED RELEASE ORAL at 09:40

## 2020-10-27 RX ADMIN — METOPROLOL SUCCINATE 50 MG: 50 TABLET, EXTENDED RELEASE ORAL at 09:41

## 2020-10-27 RX ADMIN — DOXYCYCLINE HYCLATE 100 MG: 100 TABLET, COATED ORAL at 05:27

## 2020-10-27 RX ADMIN — GUAIFENESIN 600 MG: 600 TABLET, EXTENDED RELEASE ORAL at 20:58

## 2020-10-27 RX ADMIN — Medication 10 ML: at 20:58

## 2020-10-27 RX ADMIN — PANTOPRAZOLE SODIUM 40 MG: 40 TABLET, DELAYED RELEASE ORAL at 05:27

## 2020-10-27 RX ADMIN — AMLODIPINE BESYLATE 5 MG: 5 TABLET ORAL at 09:41

## 2020-10-27 NOTE — PLAN OF CARE
Problem: Infection:  Goal: Will remain free from infection  Description: Will remain free from infection  Outcome: Ongoing     Problem: Safety:  Goal: Free from accidental physical injury  Description: Free from accidental physical injury  Outcome: Ongoing  Goal: Free from intentional harm  Description: Free from intentional harm  Outcome: Ongoing     Problem: Daily Care:  Goal: Daily care needs are met  Description: Daily care needs are met  Outcome: Ongoing     Problem: Pain:  Goal: Patient's pain/discomfort is manageable  Description: Patient's pain/discomfort is manageable  Outcome: Ongoing     Problem: Skin Integrity:  Goal: Skin integrity will stabilize  Description: Skin integrity will stabilize  Outcome: Ongoing

## 2020-10-27 NOTE — PROGRESS NOTES
Pulmonary Progress Note    CC: COPD exacerbation    Subjective:   Feels better today  No sputum production  3 L O2  IV line:      Intake/Output Summary (Last 24 hours) at 10/27/2020 0721  Last data filed at 10/26/2020 1748  Gross per 24 hour   Intake 180 ml   Output --   Net 180 ml       Exam:   /88   Pulse 86   Temp 97 °F (36.1 °C) (Oral)   Resp 18   Ht 5' 11\" (1.803 m)   Wt 229 lb 6.4 oz (104.1 kg)   SpO2 96%   BMI 31.99 kg/m²  on 3 L  Gen: No distress. Alert. Eyes: PERRL. No sclera icterus. No conjunctival injection. ENT: No discharge. Pharynx clear. Neck: Trachea midline. Normal thyroid. Resp: No accessory muscle use. No crackles. Bilateral wheezes. No rhonchi. No dullness on percussion. CV: Regular rate. Regular rhythm. No murmur or rub. No edema. GI: Non-tender. Non-distended. No masses. No organomegaly. Normal bowel sounds. No hernia. Skin: Warm and dry. No nodule on exposed extremities. No rash on exposed extremities. Lymph: No cervical LAD. No supraclavicular LAD. M/S: No cyanosis. No joint deformity. No clubbing. Neuro: Awake. Moves all extremities. CN grossly intact. Psych: Oriented x 3. No anxiety or agitation.      Scheduled Meds:   amLODIPine  5 mg Oral Daily    atorvastatin  20 mg Oral Daily    [START ON 10/28/2020] budesonide-formoterol  2 puff Inhalation Daily    losartan  100 mg Oral Daily    metoprolol succinate  50 mg Oral Daily    pantoprazole  40 mg Oral QAM AC    rivaroxaban  20 mg Oral Daily with breakfast    sodium chloride flush  10 mL Intravenous 2 times per day    methylPREDNISolone  40 mg Intravenous Q12H    Followed by   Connor Leaks ON 10/29/2020] predniSONE  40 mg Oral Daily    doxycycline hyclate  100 mg Oral Q12H    guaiFENesin  600 mg Oral BID    furosemide  40 mg Oral Daily    albuterol sulfate HFA  2 puff Inhalation Q4H    ipratropium  2 puff Inhalation Q4H     Continuous Infusions:    PRN Meds:  sodium chloride flush, acetaminophen **OR** acetaminophen, polyethylene glycol, promethazine **OR** ondansetron, albuterol    Labs:  CBC:   Recent Labs     10/26/20  1435 10/27/20  0644   WBC 8.6 8.1   HGB 13.9 13.6   HCT 42.4 41.7   MCV 89.8 91.3    201     BMP:   Recent Labs     10/26/20  1435      K 4.3      CO2 29   BUN 19   CREATININE 1.4*     LIVER PROFILE:   Recent Labs     10/26/20  1435   AST 15   ALT 9*   BILITOT 0.5   ALKPHOS 88     PT/INR: No results for input(s): PROTIME, INR in the last 72 hours. APTT: No results for input(s): APTT in the last 72 hours. UA:No results for input(s): NITRITE, COLORU, PHUR, LABCAST, WBCUA, RBCUA, MUCUS, TRICHOMONAS, YEAST, BACTERIA, CLARITYU, SPECGRAV, LEUKOCYTESUR, UROBILINOGEN, BILIRUBINUR, BLOODU, GLUCOSEU, AMORPHOUS in the last 72 hours. Invalid input(s): KETONESU  No results for input(s): PHART, ZUI0YTY, PO2ART in the last 72 hours.   Cultures:   None    Films:  CXR 10/26 was reviewed by me and showed   Persistent but improved strandy densities at the left lung base which may be   on the basis of atelectasis or post infectious scarring if current infectious   symptoms are absent        ASSESSMENT:  · COPD with AE   · Centrilobular emphysema  · Shortness of breath-secondary to above  · Pulmonary nodules followed by Dr. Chaim Pollock  · Paroxysmal A. fib on home Xarelto        PLAN:  · Supplemental oxygen to maintain SaO2 >92%; wean as tolerated  · Droplet plus isolation (surgical mask, eye protection, gown, glove)  · Emergent Covid 19 testing  · Doxycycline day #2/5  · Sputum culture  · Inhaled bronchodilators  · IV Solu-Medrol 40 every 12  · Continue home Xarelto

## 2020-10-27 NOTE — PROGRESS NOTES
RESPIRATORY THERAPY ASSESSMENT    Name:  [facility] Record Number:  [id number]  Age: 70 y.o. Gender: male  : 1945  Today's Date:  10/26/2020  Room:  0232/0232-01    Assessment     Is the patient being admitted for a COPD or Asthma exacerbation? Yes   (If yes the patient will be seen every 4 hours for the first 24 hours and then reassessed)    Patient Admission Diagnosis      Allergies  Allergies   Allergen Reactions    Amiodarone Anaphylaxis     Thyriod toxic        Minimum Predicted Vital Capacity:               Actual Vital Capacity:                    Pulmonary History:COPD, PNA. Home Oxygen Therapy:  3 liters/min via nasal cannula  Home Respiratory Therapy:Albuterol, Albuterol/Ipratropium Bromide HHN, Budesonide/Formoterol , Salmeterol/Fluticasone Propionate and Umeclidinium Bromide   Current Respiratory Therapy:  Duoneb Q4W/A,  Albuterol 2puffs Q6PRN,  Symbicort BID, Spiriva Daily. , Albuterol. Treatment Type: MDI  Medications: Ipratropium Bromide    Respiratory Severity Index(RSI)   Patients with orders for inhalation medications, oxygen, or any therapeutic treatment modality will be placed on Respiratory Protocol. They will be assessed with the first treatment and at least every 72 hours thereafter. The following severity scale will be used to determine frequency of treatment intervention.     Smoking History: Mild Exacerbation = 3    Social History  Social History     Tobacco Use    Smoking status: Former Smoker     Packs/day: 2.00     Years: 55.00     Pack years: 110.00     Last attempt to quit: 2017     Years since quitting: 3.1    Smokeless tobacco: Never Used    Tobacco comment: smoked 2 darnell a day for 55 years   Substance Use Topics    Alcohol use: Not Currently     Frequency: Never     Comment: occassionally    Drug use: No       Recent Surgical History: None = 0  Past Surgical History  Past Surgical History:   Procedure Laterality Date    CATARACT REMOVAL WITH IMPLANT Right 09/06/2018     PHACO EMULSIFICATION OF CATARACT WITH INTRAOCULAR LENS IMPLANT RIGHT EYE    COLONOSCOPY  2/24/2016    sigmoid polyps    ENDOSCOPY, COLON, DIAGNOSTIC  11/13/2019    egd; esophagitis/gastritis    HERNIA REPAIR      [place] XCAPSL CTRC RMVL INSJ IO LENS PROSTH W/O ECP Right 9/6/2018    PHACO EMULSIFICATION OF CATARACT WITH INTRAOCULAR LENS IMPLANT RIGHT EYE performed by Alejandra Saunders MD at [facility] RMVL INSJ IO LENS PROSTH W/O ECP Left 10/18/2018    PHACO EMULSIFICATION OF CATARACT WITH  INTRAOCULAR LENS IMPLANT LEFT EYE performed by Alejandra Saunders MD at [address] N/A 11/13/2019    [facility]       Level of Consciousness: Alert, Oriented, and Cooperative = 0    Level of Activity: Walking unassisted = 0    Respiratory Pattern: Dyspnea with exertion;Irregular pattern;or RR less than 6 = 2    Breath Sounds: Absent bilaterally and/or with wheezes = 3    Sputum   ,  , Sputum How Obtained: Spontaneous cough  Cough: Strong, spontaneous, non-productive = 0    Vital Signs   /70   Pulse 82   Temp 97.1 °F (36.2 °C) (Oral)   Resp 18   Ht 5' 11\" (1.803 m)   Wt 227 lb (103 kg)   SpO2 97%   BMI 31.66 kg/m²   SPO2 (COPD values may differ): 88-89% on room air or greater than 92% on FiO2 28- 35% = 2    Peak Flow (asthma only): not applicable = 0    RSI: 6-79 = TID (three times daily) and Q4hr PRN for dyspnea        Plan       Goals: medication delivery    Patient/caregiver was educated on the proper method of use for Respiratory Care Devices:  Yes      Level of patient/caregiver understanding able to:   ? Verbalize understanding   ? Demonstrate understanding       ? Teach back        ? Needs reinforcement       ? No available caregiver               ? Other:     Response to education:  Good     Is patient being placed on Home Treatment Regimen?   No     Does the patient have everything they need prior to discharge? NA     Comments: Chart reviewed and patient assessed. Plan of Care: Albuterol 2PUFFS Q4H and Atrovent 2PUFFS Q4H PER 24 HOUR PROTOCOL,    Spiriva Daily,  Symbicort BID. Electronically signed by Lucero Macdonald RCP on 10/26/2020 at 10:13 PM    Respiratory Protocol Guidelines     1. Assessment and treatment by Respiratory Therapy will be initiated for medication and therapeutic interventions upon initiation of aerosolized medication. 2. Physician will be contacted for respiratory rate (RR) greater than 35 breaths per minute. Therapy will be held for heart rate (HR) greater than 140 beats per minute, pending direction from physician. 3. Bronchodilators will be administered via Metered Dose Inhaler (MDI) with spacer when the following criteria are met:  a. Alert and cooperative     b. HR < 140 bpm  c. RR < 30 bpm                d. Can demonstrate a 2-3 second inspiratory hold  4. Bronchodilators will be administered via Hand Held Nebulizer [facility]) to patients when ANY of the following criteria are met  a. Incognizant or uncooperative          b. Patients treated with HHN at Home        c. Unable to demonstrate proper use of MDI with spacer     d. RR > 30 bpm   5. Bronchodilators will be delivered via Metered Dose Inhaler (MDI), HHN, Aerogen to intubated patients on mechanical ventilation. 6. Inhalation medication orders will be delivered and/or substituted as outlined below. Aerosolized Medications Ordering and Administration Guidelines:    1. All Medications will be ordered by a physician, and their frequency and/or modality will be adjusted as defined by the patients Respiratory Severity Index (RSI) score. 2. If the patient does not have documented COPD, consider discontinuing anticholinergics when RSI is less than 9.  3. If the bronchospasm worsens (increased RSI), then the bronchodilator frequency can be increased to a maximum of every 4 hours.   If greater than every 4 hours is required, the physician will be contacted. 4. If the bronchospasm improves, the frequency of the bronchodilator can be decreased, based on the patient's RSI, but not less than home treatment regimen frequency. 5. Bronchodilator(s) will be discontinued if patient has a RSI less than 9 and has received no scheduled or as needed treatment for 72  Hrs. Patients Ordered on a Mucolytic Agent:    1. Must always be administered with a bronchodilator. 2. Discontinue if patient experiences worsened bronchospasm, or secretions have lessened to the point that the patient is able to clear them with a cough. Anti-inflammatory and Combination Medications:    1. If the patient lacks prior history of lung disease, is not using inhaled anti-inflammatory medication at home, and lacks wheezing by examination or by history for at least 24 hours, contact physician for possible discontinuation.

## 2020-10-27 NOTE — PROGRESS NOTES
Spiriva discontinued as a therapeutic duplication to Atrovent MDI, 2 puffs, every four hours.   KATHLEEN Turner.Ph.10/26/37025:31 PM

## 2020-10-27 NOTE — PROGRESS NOTES
Pt's daughter spoke with case management. Verbally Informed of need to contact with medical records for any documentation.

## 2020-10-27 NOTE — PROGRESS NOTES
Handoff report and transfer of care given to [facility] and [facility]. Pt in stable condition. Call light within reach. Bed locked in lowest position. Denies further needs at this time.

## 2020-10-27 NOTE — PROGRESS NOTES
Shift assessment complete; see flow sheet. Scheduled medications administered; See MAR. IV infusing without difficulty. O2 3 LPM nc in place. Pt denies any needs at this time. Call light within reach, bed in low locked position.

## 2020-10-27 NOTE — PROGRESS NOTES
Progress Note    Admit Date:  10/26/2020    Admitted for COPD AE and COVID rule out     Subjective:  Mr. Vivian La says he feels better today    Afebrile, stable on 3L. Objective:   Patient Vitals for the past 4 hrs:   BP Temp Temp src Pulse Resp SpO2   10/27/20 0800 (!) 144/84 98 °F (36.7 °C) Oral 84 18 97 %       Intake/Output Summary (Last 24 hours) at 10/27/2020 1041  Last data filed at 10/27/2020 0838  Gross per 24 hour   Intake 540 ml   Output --   Net 540 ml       Physical Exam:    Gen: No distress. Alert. Eyes: PERRL. No sclera icterus. No conjunctival injection. ENT: No discharge. Pharynx clear. Neck: No JVD. Trachea midline. Resp: No accessory muscle use. No crackles. vickey wheezes. No rhonchi. CV: Regular rate. Regular rhythm. No murmur. No rub. No edema. Capillary Refill: Brisk,< 3 seconds   Peripheral Pulses: +2 palpable, equal bilaterally   GI: Non-tender. Non-distended. Normal bowel sounds. Skin: Warm and dry. No nodule on exposed extremities. No rash on exposed extremities. M/S: No cyanosis. No joint deformity. No clubbing. Neuro: Awake. Grossly nonfocal    Psych: Oriented x 3. No anxiety or agitation.        Scheduled Meds:   amLODIPine  5 mg Oral Daily    atorvastatin  20 mg Oral Daily    [START ON 10/28/2020] budesonide-formoterol  2 puff Inhalation Daily    losartan  100 mg Oral Daily    metoprolol succinate  50 mg Oral Daily    pantoprazole  40 mg Oral QAM AC    rivaroxaban  20 mg Oral Daily with breakfast    sodium chloride flush  10 mL Intravenous 2 times per day    methylPREDNISolone  40 mg Intravenous Q12H    Followed by   Rhea Toribio ON 10/29/2020] predniSONE  40 mg Oral Daily    doxycycline hyclate  100 mg Oral Q12H    guaiFENesin  600 mg Oral BID    furosemide  40 mg Oral Daily    albuterol sulfate HFA  2 puff Inhalation Q4H    ipratropium  2 puff Inhalation Q4H       Continuous Infusions:      PRN Meds:  sodium chloride flush, acetaminophen **OR** acetaminophen, polyethylene glycol, promethazine **OR** ondansetron, albuterol      Data:  CBC:   Recent Labs     10/26/20  1435 10/27/20  0644   WBC 8.6 8.1   HGB 13.9 13.6   HCT 42.4 41.7   MCV 89.8 91.3    201     BMP:   Recent Labs     10/26/20  1435 10/27/20  0644    135*   K 4.3 4.7    102   CO2 29 24   BUN 19 25*   CREATININE 1.4* 1.3     LIVER PROFILE:   Recent Labs     10/26/20  1435   AST 15   ALT 9*   BILITOT 0.5   ALKPHOS 88     CULTURES  COVID-19: pending      RADIOLOGY  XR CHEST PORTABLE   Final Result   Persistent but improved strandy densities at the left lung base which may be   on the basis of atelectasis or post infectious scarring if current infectious   symptoms are absent. Assessment/Plan:  Acute on chronic hypoxic respiratory failure  COPD exacerbation  - patient presented with c/o worsening dyspnea   - initially tachypneic, + accessory muscle use  - pulmonology consulted. - supplemental O2.  - currently on 3 L. Wears Oxygen at home. - continue Symbicort. - IV Solu-medrol, Doxy D#3, Duonebs scheduled/Albuterol prn     COVID rule out  - testing is pending  - DROPLET + precautions      Chronic systolic CHF   - last EF 92-88%  - continue PO lasix. On Losartan.      CKD stage 3  - stable. Monitor labs. Paroxysmal A-fib  - continue Toprol-XL  - AC: Xarelto     Hypertension  - BP stable. Continue Losartan, Toprol-XL, Amlodipine.      GERD  - on PPI. DVT Prophylaxis: Xarelto  Diet: DIET LOW SODIUM 2 GM; 1800 ml  Code Status: Full Code    CARLOS Amor.

## 2020-10-27 NOTE — CARE COORDINATION
Case Management Assessment  Initial Evaluation      Patient Name: Yenni Pickens  YOB: 1945  Diagnosis[facility] [J44.1]  Date / Time: 10/26/2020  2:27 PM    Admission status/Date:  10/27/2020  Chart Reviewed: Yes      Patient Interviewed: Yes   Family Interviewed:  No      Hospitalization in the last 30 days:  No      Health Care Decision Maker :  Wing Reymundo (spouse)      Met with: patient  Corky Duvall conducted  (bedside/phone): phone    Current PCP: [facility] [employer]  Precert required for SNF : YES        3 night stay required - NA    ADLS  Support Systems/Care Needs:    Transportation: self    Meal Preparation: self    Housing  Living Arrangements: lives w/sp, IPTA  Steps: 2  Intent for return to present living arrangements: Yes  Identified Issues: NA    Home Care Information  Active with [address] : No Agency: NA     Passport/Waiver : No  :                      Phone Number:    Passport/Waiver Services: NA          Durable Medical Equiptment   DME Provider: [facility]  Equipment:   Walker___Cane___RTS___ BSC___Shower Chair___Hospital Bed___W/C____Other________  02 at _3___Liter(s)---wears(frequency)___continuously____ HHN _X__ CPAP___ BiPap___   N/A____      Home O2 Use :  Yes    Informed of need for care provider to bring portable home O2 tank on day of discharge for nursing to connect prior to leaving:   Yes  Verbalized agreement/Understanding:   Yes    Community Service Affiliation  Dialysis:  No    · Agency:  · Location:  · Dialysis Schedule:  · Phone:   · Fax: Other Community Services:  NA    DISCHARGE PLAN: Explained Case Management role/services. Chart reviewed. Met with pt via phone due to C19 restrictions and explained the role of the CM. Plan: To return home. Denies any New HHC needs. +[facility] home O2 (3 liters baseline). +CM following.

## 2020-10-27 NOTE — PROGRESS NOTES
Pt awake in bed, dark room. Respirations even and easy. Call light and bedside table in reach. Bed in low locked position. Denies any needs at this time.

## 2020-10-27 NOTE — PROGRESS NOTES
Pt in bed eating dinner. Pleasant to get food, and happy to receive influenza vaccine. Denies needs and wants.

## 2020-10-28 PROCEDURE — 99233 SBSQ HOSP IP/OBS HIGH 50: CPT | Performed by: INTERNAL MEDICINE

## 2020-10-28 PROCEDURE — 2700000000 HC OXYGEN THERAPY PER DAY

## 2020-10-28 PROCEDURE — 94640 AIRWAY INHALATION TREATMENT: CPT

## 2020-10-28 PROCEDURE — 6370000000 HC RX 637 (ALT 250 FOR IP): Performed by: NURSE PRACTITIONER

## 2020-10-28 PROCEDURE — 94761 N-INVAS EAR/PLS OXIMETRY MLT: CPT

## 2020-10-28 PROCEDURE — 6370000000 HC RX 637 (ALT 250 FOR IP): Performed by: INTERNAL MEDICINE

## 2020-10-28 PROCEDURE — 1200000000 HC SEMI PRIVATE

## 2020-10-28 PROCEDURE — 94669 MECHANICAL CHEST WALL OSCILL: CPT

## 2020-10-28 PROCEDURE — 6360000002 HC RX W HCPCS: Performed by: NURSE PRACTITIONER

## 2020-10-28 PROCEDURE — 99232 SBSQ HOSP IP/OBS MODERATE 35: CPT | Performed by: INTERNAL MEDICINE

## 2020-10-28 PROCEDURE — 2580000003 HC RX 258: Performed by: NURSE PRACTITIONER

## 2020-10-28 RX ORDER — ALBUTEROL SULFATE 90 UG/1
2 AEROSOL, METERED RESPIRATORY (INHALATION) EVERY 4 HOURS PRN
Status: DISCONTINUED | OUTPATIENT
Start: 2020-10-28 | End: 2020-10-29 | Stop reason: HOSPADM

## 2020-10-28 RX ORDER — PREDNISONE 20 MG/1
40 TABLET ORAL DAILY
Status: DISCONTINUED | OUTPATIENT
Start: 2020-10-29 | End: 2020-10-29 | Stop reason: HOSPADM

## 2020-10-28 RX ORDER — IPRATROPIUM BROMIDE AND ALBUTEROL SULFATE 2.5; .5 MG/3ML; MG/3ML
1 SOLUTION RESPIRATORY (INHALATION) EVERY 4 HOURS
Status: DISCONTINUED | OUTPATIENT
Start: 2020-10-28 | End: 2020-10-29 | Stop reason: HOSPADM

## 2020-10-28 RX ADMIN — Medication 2 PUFF: at 03:23

## 2020-10-28 RX ADMIN — AMLODIPINE BESYLATE 5 MG: 5 TABLET ORAL at 08:44

## 2020-10-28 RX ADMIN — IPRATROPIUM BROMIDE AND ALBUTEROL SULFATE 1 AMPULE: .5; 3 SOLUTION RESPIRATORY (INHALATION) at 15:26

## 2020-10-28 RX ADMIN — IPRATROPIUM BROMIDE AND ALBUTEROL SULFATE 1 AMPULE: .5; 3 SOLUTION RESPIRATORY (INHALATION) at 23:17

## 2020-10-28 RX ADMIN — Medication 2 PUFF: at 09:34

## 2020-10-28 RX ADMIN — PANTOPRAZOLE SODIUM 40 MG: 40 TABLET, DELAYED RELEASE ORAL at 06:39

## 2020-10-28 RX ADMIN — Medication 10 ML: at 22:24

## 2020-10-28 RX ADMIN — Medication 10 ML: at 06:39

## 2020-10-28 RX ADMIN — IPRATROPIUM BROMIDE AND ALBUTEROL SULFATE 1 AMPULE: .5; 3 SOLUTION RESPIRATORY (INHALATION) at 19:18

## 2020-10-28 RX ADMIN — GUAIFENESIN 600 MG: 600 TABLET, EXTENDED RELEASE ORAL at 08:43

## 2020-10-28 RX ADMIN — RIVAROXABAN 20 MG: 20 TABLET, FILM COATED ORAL at 08:43

## 2020-10-28 RX ADMIN — GUAIFENESIN 600 MG: 600 TABLET, EXTENDED RELEASE ORAL at 22:24

## 2020-10-28 RX ADMIN — DOXYCYCLINE HYCLATE 100 MG: 100 TABLET, COATED ORAL at 18:06

## 2020-10-28 RX ADMIN — METOPROLOL SUCCINATE 50 MG: 50 TABLET, EXTENDED RELEASE ORAL at 08:43

## 2020-10-28 RX ADMIN — Medication 10 ML: at 08:47

## 2020-10-28 RX ADMIN — FUROSEMIDE 40 MG: 40 TABLET ORAL at 08:43

## 2020-10-28 RX ADMIN — LOSARTAN POTASSIUM 100 MG: 100 TABLET, FILM COATED ORAL at 08:43

## 2020-10-28 RX ADMIN — ATORVASTATIN CALCIUM 20 MG: 10 TABLET, FILM COATED ORAL at 08:43

## 2020-10-28 RX ADMIN — DOXYCYCLINE HYCLATE 100 MG: 100 TABLET, COATED ORAL at 06:39

## 2020-10-28 RX ADMIN — IPRATROPIUM BROMIDE AND ALBUTEROL SULFATE 1 AMPULE: .5; 3 SOLUTION RESPIRATORY (INHALATION) at 11:48

## 2020-10-28 RX ADMIN — METHYLPREDNISOLONE SODIUM SUCCINATE 40 MG: 40 INJECTION, POWDER, FOR SOLUTION INTRAMUSCULAR; INTRAVENOUS at 06:39

## 2020-10-28 ASSESSMENT — PAIN SCALES - GENERAL
PAINLEVEL_OUTOF10: 0
PAINLEVEL_OUTOF10: 0

## 2020-10-28 NOTE — CARE COORDINATION
INTERDISCIPLINARY PLAN OF CARE CONFERENCE    Date/Time: 10/28/2020 2:47 PM  Completed by: Dakota Mcguire Case Management      Patient Name:  Joel Garcia  YOB: 1945  Admitting Diagnosis: COPD exacerbation ([facility] [J44.1]     Admit Date/Time:  10/26/2020  2:27 PM    Chart reviewed. Interdisciplinary team contacted or reviewed plan related to patient progress and discharge plans. Disciplines included Case Management, Nursing, and Dietitian. Current Status: Stable  PT/OT recommendation for discharge plan of care: N/A    Expected D/C Disposition:  Home  Confirmed plan with patient  Yes   Discharge Plan Comments: Reviewed chart and met with pt at bedside. Plan remains for home with family. Denies need for HHC. Noted has home O2 in place on admit. Prob NN but will follow. Home O2 in place on admit: Yes  Pt informed of need to bring portable home O2 tank on day of discharge for nursing to connect prior to leaving:  Yes  Verbalized agreement/Understanding:   Yes

## 2020-10-28 NOTE — PROGRESS NOTES
ondansetron, albuterol    Labs:  CBC:   Recent Labs     10/26/20  1435 10/27/20  0644   WBC 8.6 8.1   HGB 13.9 13.6   HCT 42.4 41.7   MCV 89.8 91.3    201     BMP:   Recent Labs     10/26/20  1435 10/27/20  0644    135*   K 4.3 4.7    102   CO2 29 24   BUN 19 25*   CREATININE 1.4* 1.3     LIVER PROFILE:   Recent Labs     10/26/20  1435   AST 15   ALT 9*   BILITOT 0.5   ALKPHOS 88     PT/INR: No results for input(s): PROTIME, INR in the last 72 hours. APTT: No results for input(s): APTT in the last 72 hours. UA:No results for input(s): NITRITE, COLORU, PHUR, LABCAST, WBCUA, RBCUA, MUCUS, TRICHOMONAS, YEAST, BACTERIA, CLARITYU, SPECGRAV, LEUKOCYTESUR, UROBILINOGEN, BILIRUBINUR, BLOODU, GLUCOSEU, AMORPHOUS in the last 72 hours. Invalid input(s): KETONESU  No results for input(s): PHART, PHM9UCX, PO2ART in the last 72 hours.   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 Merrill Harris  · Paroxysmal A. fib on home Xarelto        PLAN:  · Supplemental oxygen to maintain SaO2 >92%; wean as tolerated  · DC droplet plus isolation  · Doxycycline day #3/5  · Inhaled bronchodilators  · D/C Solu Medrol and start Prednisone taper   · Continue home Xarelto

## 2020-10-28 NOTE — PROGRESS NOTES
Right 09/06/2018     PHACO EMULSIFICATION OF CATARACT WITH INTRAOCULAR LENS IMPLANT RIGHT EYE    COLONOSCOPY  2/24/2016    sigmoid polyps    ENDOSCOPY, COLON, DIAGNOSTIC  11/13/2019    egd; esophagitis/gastritis    HERNIA REPAIR      NE XCAPSL CTRC RMVL INSJ IO LENS PROSTH W/O ECP Right 9/6/2018    PHACO EMULSIFICATION OF CATARACT WITH INTRAOCULAR LENS IMPLANT RIGHT EYE performed by Eleni Mejia MD at [facility] RMVL INSJ IO LENS PROSTH W/O ECP Left 10/18/2018    PHACO EMULSIFICATION OF CATARACT WITH  INTRAOCULAR LENS IMPLANT LEFT EYE performed by Eleni Mejia MD at [address] N/A 11/13/2019    EGD BIOPSY performed by Nitza Oliver MD at [facility] END[facility]       Level of Consciousness: Alert, Oriented, and Cooperative = 0    Level of Activity: Walking unassisted = 0    Respiratory Pattern: Dyspnea with exertion;Irregular pattern;or RR less than 6 = 2    Breath Sounds: Absent bilaterally and/or with wheezes = 3    Sputum   ,  , Sputum How Obtained: Spontaneous cough  Cough: Strong, spontaneous, non-productive = 0    Vital Signs   /70   Pulse 72   Temp 97.4 °F (36.3 °C) (Oral)   Resp 20   Ht 5' 11\" (1.803 m)   Wt 229 lb 6.4 oz (104.1 kg)   SpO2 96%   BMI 31.99 kg/m²   SPO2 (COPD values may differ): 88-89% on room air or greater than 92% on FiO2 28- 35% = 2    Peak Flow (asthma only): not applicable = 0    RSI: 0-30 = TID (three times daily) and Q4hr PRN for dyspnea        Plan       Goals: medication delivery    Patient/caregiver was educated on the proper method of use for Respiratory Care Devices:  Yes      Level of patient/caregiver understanding able to:   ? Verbalize understanding   ? Demonstrate understanding       ? Teach back        ? Needs reinforcement       ? No available caregiver               ? Other:     Response to education:  Good     Is patient being placed on Home Treatment Regimen?   No     Does the patient have everything they need prior to discharge? NA     Comments: Chart reviewed and patient assessed. Plan of Care: Albuterol & Atrovent 2PUFFS each Q4H  (patient will benefit from the treatments),  Albuterol Q4PRN,  Symbicort BID. Carolina Montgomery Electronically signed by Marly Eubanks RCP on 10/28/2020 at 1:08 AM    Respiratory Protocol Guidelines     1. Assessment and treatment by Respiratory Therapy will be initiated for medication and therapeutic interventions upon initiation of aerosolized medication. 2. Physician will be contacted for respiratory rate (RR) greater than 35 breaths per minute. Therapy will be held for heart rate (HR) greater than 140 beats per minute, pending direction from physician. 3. Bronchodilators will be administered via Metered Dose Inhaler (MDI) with spacer when the following criteria are met:  a. Alert and cooperative     b. HR < 140 bpm  c. RR < 30 bpm                d. Can demonstrate a 2-3 second inspiratory hold  4. Bronchodilators will be administered via Hand Held Nebulizer [facility]) to patients when ANY of the following criteria are met  a. Incognizant or uncooperative          b. Patients treated with HHN at Home        c. Unable to demonstrate proper use of MDI with spacer     d. RR > 30 bpm   5. Bronchodilators will be delivered via Metered Dose Inhaler (MDI), HHN, Aerogen to intubated patients on mechanical ventilation. 6. Inhalation medication orders will be delivered and/or substituted as outlined below. Aerosolized Medications Ordering and Administration Guidelines:    1. All Medications will be ordered by a physician, and their frequency and/or modality will be adjusted as defined by the patients Respiratory Severity Index (RSI) score. 2. If the patient does not have documented COPD, consider discontinuing anticholinergics when RSI is less than 9.  3. If the bronchospasm worsens (increased RSI), then the bronchodilator frequency can be increased to a maximum of every 4 hours.   If

## 2020-10-28 NOTE — PROGRESS NOTES
Progress Note    Admit Date:  10/26/2020    Admitted for COPD AE and COVID rule out     Subjective:  Mr. Dayne Hill is hort of breath even with mild exertion    COVID negative    Objective:   Patient Vitals for the past 4 hrs:   BP Temp Temp src Pulse Resp SpO2   10/28/20 0805 138/82 98 °F (36.7 °C) Oral 65 20 97 %   10/28/20 0515 110/64 98 °F (36.7 °C) Oral 74 21 95 %       Intake/Output Summary (Last 24 hours) at 10/28/2020 0764  Last data filed at 10/28/2020 5834  Gross per 24 hour   Intake 610 ml   Output --   Net 610 ml       Physical Exam:    Gen: No distress. Alert. Eyes: PERRL. No sclera icterus. No conjunctival injection. ENT: No discharge. Pharynx clear. Neck: No JVD. Trachea midline. Resp: No accessory muscle use. No crackles. vickey wheezes ++. No rhonchi. CV: Regular rate. Regular rhythm. No murmur. No rub. No edema. Capillary Refill: Brisk,< 3 seconds   Peripheral Pulses: +2 palpable, equal bilaterally   GI: Non-tender. Non-distended. Normal bowel sounds. Skin: Warm and dry. No nodule on exposed extremities. No rash on exposed extremities. M/S: No cyanosis. No joint deformity. No clubbing. Neuro: Awake. Grossly nonfocal    Psych: Oriented x 3. No anxiety or agitation.        Scheduled Meds:   amLODIPine  5 mg Oral Daily    atorvastatin  20 mg Oral Daily    budesonide-formoterol  2 puff Inhalation Daily    losartan  100 mg Oral Daily    metoprolol succinate  50 mg Oral Daily    pantoprazole  40 mg Oral QAM AC    rivaroxaban  20 mg Oral Daily with breakfast    sodium chloride flush  10 mL Intravenous 2 times per day    methylPREDNISolone  40 mg Intravenous Q12H    Followed by   Farhana Gomez ON 10/29/2020] predniSONE  40 mg Oral Daily    doxycycline hyclate  100 mg Oral Q12H    guaiFENesin  600 mg Oral BID    furosemide  40 mg Oral Daily    albuterol sulfate HFA  2 puff Inhalation Q4H    ipratropium  2 puff Inhalation Q4H       Continuous Infusions:      PRN Meds:  sodium chloride flush, acetaminophen **OR** acetaminophen, polyethylene glycol, promethazine **OR** ondansetron, albuterol      Data:  CBC:   Recent Labs     10/26/20  1435 10/27/20  0644   WBC 8.6 8.1   HGB 13.9 13.6   HCT 42.4 41.7   MCV 89.8 91.3    201     BMP:   Recent Labs     10/26/20  1435 10/27/20  0644    135*   K 4.3 4.7    102   CO2 29 24   BUN 19 25*   CREATININE 1.4* 1.3     LIVER PROFILE:   Recent Labs     10/26/20  1435   AST 15   ALT 9*   BILITOT 0.5   ALKPHOS 88     CULTURES  COVID-19: pending      RADIOLOGY  XR CHEST PORTABLE   Final Result   Persistent but improved strandy densities at the left lung base which may be   on the basis of atelectasis or post infectious scarring if current infectious   symptoms are absent. Assessment/Plan:  Acute on chronic hypoxic respiratory failure  COPD exacerbation  - patient presented with c/o worsening dyspnea   - initially tachypneic, + accessory muscle use  - pulmonology consulted. - supplemental O2.  - currently on 3 L. Wears Oxygen at home. - continue Symbicort. - IV Solu-medrol, Doxy D#3, Duonebs scheduled/Albuterol prn     COVID negative     Chronic systolic CHF   - last EF 30-79%  - continue PO lasix. On Losartan.      CKD stage 3  - stable. Monitor labs. Paroxysmal A-fib  - continue Toprol-XL  - AC: Xarelto     Hypertension  - BP stable. Continue Losartan, Toprol-XL, Amlodipine.      GERD  - on PPI. DVT Prophylaxis: Xarelto  Diet: DIET LOW SODIUM 2 GM; 1800 ml  Code Status: Full Code    CARLOS Amor.

## 2020-10-29 VITALS
OXYGEN SATURATION: 96 % | HEIGHT: 71 IN | DIASTOLIC BLOOD PRESSURE: 75 MMHG | SYSTOLIC BLOOD PRESSURE: 119 MMHG | RESPIRATION RATE: 18 BRPM | WEIGHT: 228.5 LBS | TEMPERATURE: 97.5 F | BODY MASS INDEX: 31.99 KG/M2 | HEART RATE: 72 BPM

## 2020-10-29 PROCEDURE — 99232 SBSQ HOSP IP/OBS MODERATE 35: CPT | Performed by: INTERNAL MEDICINE

## 2020-10-29 PROCEDURE — 94669 MECHANICAL CHEST WALL OSCILL: CPT

## 2020-10-29 PROCEDURE — 6370000000 HC RX 637 (ALT 250 FOR IP): Performed by: INTERNAL MEDICINE

## 2020-10-29 PROCEDURE — 6370000000 HC RX 637 (ALT 250 FOR IP): Performed by: NURSE PRACTITIONER

## 2020-10-29 PROCEDURE — 94640 AIRWAY INHALATION TREATMENT: CPT

## 2020-10-29 PROCEDURE — 2580000003 HC RX 258: Performed by: NURSE PRACTITIONER

## 2020-10-29 PROCEDURE — 99239 HOSP IP/OBS DSCHRG MGMT >30: CPT | Performed by: INTERNAL MEDICINE

## 2020-10-29 RX ORDER — DOXYCYCLINE HYCLATE 100 MG
100 TABLET ORAL EVERY 12 HOURS
Qty: 4 TABLET | Refills: 0 | Status: SHIPPED | OUTPATIENT
Start: 2020-10-29 | End: 2020-10-31

## 2020-10-29 RX ORDER — PREDNISONE 20 MG/1
TABLET ORAL
Qty: 13 TABLET | Refills: 0 | Status: SHIPPED | OUTPATIENT
Start: 2020-10-29 | End: 2021-05-05

## 2020-10-29 RX ORDER — GUAIFENESIN 600 MG/1
600 TABLET, EXTENDED RELEASE ORAL 2 TIMES DAILY
Qty: 15 TABLET | Refills: 0 | Status: SHIPPED | OUTPATIENT
Start: 2020-10-29 | End: 2020-12-23 | Stop reason: CLARIF

## 2020-10-29 RX ADMIN — PANTOPRAZOLE SODIUM 40 MG: 40 TABLET, DELAYED RELEASE ORAL at 05:58

## 2020-10-29 RX ADMIN — FUROSEMIDE 40 MG: 40 TABLET ORAL at 10:17

## 2020-10-29 RX ADMIN — METOPROLOL SUCCINATE 50 MG: 50 TABLET, EXTENDED RELEASE ORAL at 10:17

## 2020-10-29 RX ADMIN — ATORVASTATIN CALCIUM 20 MG: 10 TABLET, FILM COATED ORAL at 10:16

## 2020-10-29 RX ADMIN — LOSARTAN POTASSIUM 100 MG: 100 TABLET, FILM COATED ORAL at 10:17

## 2020-10-29 RX ADMIN — Medication 10 ML: at 10:20

## 2020-10-29 RX ADMIN — PREDNISONE 40 MG: 20 TABLET ORAL at 10:19

## 2020-10-29 RX ADMIN — AMLODIPINE BESYLATE 5 MG: 5 TABLET ORAL at 10:16

## 2020-10-29 RX ADMIN — Medication 2 PUFF: at 07:31

## 2020-10-29 RX ADMIN — DOXYCYCLINE HYCLATE 100 MG: 100 TABLET, COATED ORAL at 05:58

## 2020-10-29 RX ADMIN — IPRATROPIUM BROMIDE AND ALBUTEROL SULFATE 1 AMPULE: .5; 3 SOLUTION RESPIRATORY (INHALATION) at 02:37

## 2020-10-29 RX ADMIN — RIVAROXABAN 20 MG: 20 TABLET, FILM COATED ORAL at 10:16

## 2020-10-29 RX ADMIN — IPRATROPIUM BROMIDE AND ALBUTEROL SULFATE 1 AMPULE: .5; 3 SOLUTION RESPIRATORY (INHALATION) at 07:31

## 2020-10-29 RX ADMIN — GUAIFENESIN 600 MG: 600 TABLET, EXTENDED RELEASE ORAL at 10:19

## 2020-10-29 RX ADMIN — IPRATROPIUM BROMIDE AND ALBUTEROL SULFATE 1 AMPULE: .5; 3 SOLUTION RESPIRATORY (INHALATION) at 11:20

## 2020-10-29 NOTE — DISCHARGE SUMMARY
Name:  Ivan Caicedo  Room:  2630/0021-74  MRN:    [id number]    Discharge Summary      This discharge summary is in conjunction with a complete physical exam done on the day of discharge. Discharging Physician: Dr. Liliane Mariano: 10/26/2020  Discharge: 10/29/2020     HPI taken from admission H&P:    The patient is a 70 y.o. male who presents to [facility] with shortness of breath. He states ongoing since January. He was at [facility] eating breakfast when he became short of breath. He states he was unable to eat. He started getting anxious and needed his nebulizer machine. He states he was too far from his nebulizer. Other associated symptoms are non-productive cough. He reports using his nebulizer machine 2 times per hour. He is running out of his Duonebs before it is time for him to get refills. He denies fever, chest pain, abdominal pain, nausea, vomiting, diarrhea, or dysuria. He was tachypneic. He is on 3 L O2. He wears this at home. Labs consistent with CKD. CXR with persistent but improved strandy densities. Patient admitted to med-surg. Pulmonology consulted. Diagnoses this Admission and Hospital Course   Acute on chronic hypoxic respiratory failure  COPD exacerbation  - patient presented with c/o worsening dyspnea   - initially tachypneic, no accessory muscle use  - pulmonology consulted. - supplemental O2.  - currently on 3 L. Wears 3L Oxygen at home. - continue Symbicort.   - IV Solu-medrol, Doxy D#4/5, Duonebs scheduled/Albuterol prn-->d/c on steroid taper, doxycycline      COVID negative     Chronic systolic CHF   - last EF 24-24%  - continue PO lasix. On Losartan.      CKD stage 3  - stable. Monitor labs.     Paroxysmal A-fib  - continue Toprol-XL  - AC: Xarelto     Hypertension  - BP stable.  Continue Losartan, Toprol-XL, Amlodipine.      GERD  - on PPI.     Procedures (Please Review Full Report for Details)  None     Consults    Pulmonology    Physical Exam at Discharge:    /75   Pulse 72   Temp 97.5 °F (36.4 °C) (Oral)   Resp 18   Ht 5' 11\" (1.803 m)   Wt 228 lb 8 oz (103.6 kg)   SpO2 92%   BMI 31.87 kg/m²     Gen: No distress. Alert. Eyes: PERRL. No sclera icterus. No conjunctival injection. ENT: No discharge. Pharynx clear. Neck: No JVD. Trachea midline. Resp: No accessory muscle use. No crackles. minimal wheezes . No rhonchi. CV: Regular rate. Regular rhythm. No murmur. No rub. No edema. Capillary Refill: Brisk,< 3 seconds   Peripheral Pulses: +2 palpable, equal bilaterally   GI: Non-tender. Non-distended. Normal bowel sounds. Skin: Warm and dry. No nodule on exposed extremities. No rash on exposed extremities. M/S: No cyanosis. No joint deformity. No clubbing. Neuro: Awake. Grossly nonfocal    Psych: Oriented x 3. No anxiety or agitation. CBC:   Recent Labs     10/26/20  1435 10/27/20  0644   WBC 8.6 8.1   HGB 13.9 13.6   HCT 42.4 41.7   MCV 89.8 91.3    201     BMP:   Recent Labs     10/26/20  1435 10/27/20  0644    135*   K 4.3 4.7    102   CO2 29 24   BUN 19 25*   CREATININE 1.4* 1.3     LIVER PROFILE:   Recent Labs     10/26/20  1435   AST 15   ALT 9*   BILITOT 0.5   ALKPHOS 88     CULTURES  SARS-CoV-2, PCR  Not Detected      RADIOLOGY  XR CHEST PORTABLE   Final Result   Persistent but improved strandy densities at the left lung base which may be   on the basis of atelectasis or post infectious scarring if current infectious   symptoms are absent.              Discharge Medications     Medication List      START taking these medications    doxycycline hyclate 100 MG tablet  Commonly known as:  VIBRA-TABS  Take 1 tablet by mouth every 12 hours for 2 days     guaiFENesin 600 MG extended release tablet  Commonly known as:  MUCINEX  Take 1 tablet by mouth 2 times daily        CHANGE how you take these medications    predniSONE 20 MG tablet  Commonly known as:  DELTASONE  2 qd- 2 days, 1 1/2 qd- 3 days,1 qd- 3 days,1/2 qd- 3 days  What changed:    · medication strength  · See the new instructions.         CONTINUE taking these medications    albuterol sulfate  (90 Base) MCG/ACT inhaler  Commonly known as:  Proventil HFA  Inhale 2 puffs into the lungs every 6 hours as needed for Wheezing (with spacer)     amLODIPine 5 MG tablet  Commonly known as:  NORVASC  TAKE ONE TABLET BY MOUTH EVERY DAY     atorvastatin 20 MG tablet  Commonly known as:  LIPITOR  TAKE ONE TABLET BY MOUTH EVERY DAY     furosemide 40 MG tablet  Commonly known as:  LASIX  TAKE ONE TABLET BY MOUTH EVERY DAY     Incruse Ellipta 62.5 MCG/INH Aepb  Generic drug:  Umeclidinium Bromide  Inhale 1 Inhaler into the lungs daily     ipratropium-albuterol 0.5-2.5 (3) MG/3ML Soln nebulizer solution  Commonly known as:  DUONEB  USE ONE UNIT DOSE (3ML) IN NEBULIZER AND INHALE INTO THE LUNGS EVERY 4 HOURS AS NEEDED FOR SHORTNESS OF BREATH     losartan 100 MG tablet  Commonly known as:  COZAAR  TAKE ONE TABLET BY MOUTH DAILY     metoprolol succinate 50 MG extended release tablet  Commonly known as:  TOPROL XL  Take 1 tablet by mouth daily     pantoprazole 40 MG tablet  Commonly known as:  PROTONIX  TAKE ONE TABLET BY MOUTH EVERY DAY     Potassium Citrate ER 15 MEQ (1620 MG) Tbcr  Take 15 mEq by mouth daily     rivaroxaban 20 MG Tabs tablet  Commonly known as:  Xarelto  TAKE ONE TABLET BY MOUTH DAILY WITH BREAKFAST     Symbicort 160-4.5 MCG/ACT Aero  Generic drug:  budesonide-formoterol     vitamin D-3 25 MCG (1000 UT) Caps        STOP taking these medications    azithromycin 250 MG tablet  Commonly known as:  Zithromax     fluticasone-salmeterol 250-50 MCG/DOSE Aepb  Commonly known as:  Advair Diskus           Where to Get Your Medications      These medications were sent to [address] [place] [address] [phone]  [address] [place] [address] Soy Florin, [address] [place] [address]    Phone:  [phone]   · doxycycline hyclate 100 MG tablet  · guaiFENesin 600 MG extended release tablet  · predniSONE 20 MG tablet       Discharged in stable condition to home     Follow Up: Follow up with PCP in 1 week, pulmonology OP    MAHNAZ Mckeon M.D.

## 2020-10-29 NOTE — FLOWSHEET NOTE
Pt A/Ox4. VSS. Pt unlabored, respirations even & easy. BETANCOURT noted. End expiratory wheezing noted. No distress noted. Shift assessment complete. See flowsheet. PM meds given. See MAR. Denies needs at this time. Bed in low position. Call light within reach. Will continue to monitor.        10/28/20 2102   Vital Signs   Temp 97.1 °F (36.2 °C)   Temp Source Oral   Pulse 70   Heart Rate Source Monitor   Resp 18   /70   BP Location Right upper arm   BP Upper/Lower Upper   Patient Position High fowlers   Level of Consciousness 0   MEWS Score 1   Patient Currently in Pain Denies   Pain Assessment   Pain Assessment 0-10   Pain Level 0   Oxygen Therapy   SpO2 94 %   O2 Device Nasal cannula   O2 Flow Rate (L/min) 3 L/min

## 2020-10-29 NOTE — PROGRESS NOTES
Progress Note    Admit Date:  10/26/2020    Admitted for COPD AE and COVID rule out     Subjective:  Mr. Leroy Montgomery is feeling better today    COVID negative    Objective:   Patient Vitals for the past 4 hrs:   BP Temp Temp src Pulse Resp SpO2 Weight   10/29/20 0417 123/75 97.1 °F (36.2 °C) Oral 70 18 94 % 228 lb 8 oz (103.6 kg)       Intake/Output Summary (Last 24 hours) at 10/29/2020 0739  Last data filed at 10/28/2020 2224  Gross per 24 hour   Intake 190 ml   Output --   Net 190 ml       Physical Exam:    Gen: No distress. Alert. Eyes: PERRL. No sclera icterus. No conjunctival injection. ENT: No discharge. Pharynx clear. Neck: No JVD. Trachea midline. Resp: No accessory muscle use. No crackles. minimal wheezes . No rhonchi. CV: Regular rate. Regular rhythm. No murmur. No rub. No edema. Capillary Refill: Brisk,< 3 seconds   Peripheral Pulses: +2 palpable, equal bilaterally   GI: Non-tender. Non-distended. Normal bowel sounds. Skin: Warm and dry. No nodule on exposed extremities. No rash on exposed extremities. M/S: No cyanosis. No joint deformity. No clubbing. Neuro: Awake. Grossly nonfocal    Psych: Oriented x 3. No anxiety or agitation.        Scheduled Meds:   ipratropium-albuterol  1 ampule Inhalation Q4H    predniSONE  40 mg Oral Daily    amLODIPine  5 mg Oral Daily    atorvastatin  20 mg Oral Daily    budesonide-formoterol  2 puff Inhalation Daily    losartan  100 mg Oral Daily    metoprolol succinate  50 mg Oral Daily    pantoprazole  40 mg Oral QAM AC    rivaroxaban  20 mg Oral Daily with breakfast    sodium chloride flush  10 mL Intravenous 2 times per day    doxycycline hyclate  100 mg Oral Q12H    guaiFENesin  600 mg Oral BID    furosemide  40 mg Oral Daily       Continuous Infusions:      PRN Meds:  albuterol sulfate HFA, ipratropium, sodium chloride flush, acetaminophen **OR** acetaminophen, polyethylene glycol, promethazine **OR** ondansetron, albuterol      Data:  CBC: Recent Labs     10/26/20  1435 10/27/20  0644   WBC 8.6 8.1   HGB 13.9 13.6   HCT 42.4 41.7   MCV 89.8 91.3    201     BMP:   Recent Labs     10/26/20  1435 10/27/20  0644    135*   K 4.3 4.7    102   CO2 29 24   BUN 19 25*   CREATININE 1.4* 1.3     LIVER PROFILE:   Recent Labs     10/26/20  1435   AST 15   ALT 9*   BILITOT 0.5   ALKPHOS 88     CULTURES  COVID-19: pending      RADIOLOGY  XR CHEST PORTABLE   Final Result   Persistent but improved strandy densities at the left lung base which may be   on the basis of atelectasis or post infectious scarring if current infectious   symptoms are absent. Assessment/Plan:  Acute on chronic hypoxic respiratory failure  COPD exacerbation  - patient presented with c/o worsening dyspnea   - initially tachypneic, no accessory muscle use  - pulmonology consulted. - supplemental O2.  - currently on 3 L. Wears Oxygen at home. - continue Symbicort. - IV Solu-medrol, Doxy D#4/5, Duonebs scheduled/Albuterol prn     COVID negative     Chronic systolic CHF   - last EF 23-00%  - continue PO lasix. On Losartan.      CKD stage 3  - stable. Monitor labs. Paroxysmal A-fib  - continue Toprol-XL  - AC: Xarelto     Hypertension  - BP stable. Continue Losartan, Toprol-XL, Amlodipine.      GERD  - on PPI. DVT Prophylaxis: Xarelto  Diet: DIET LOW SODIUM 2 GM; 1800 ml  Code Status: Full Code    VISHAL Amor

## 2020-10-29 NOTE — PROGRESS NOTES
Pulmonary Progress Note    CC: COPD exacerbation    Subjective:   Feels better  Wants to go home  Stable on his home dose of O2    Intake/Output Summary (Last 24 hours) at 10/29/2020 0722  Last data filed at 10/28/2020 2224  Gross per 24 hour   Intake 190 ml   Output --   Net 190 ml       Exam:   /75   Pulse 70   Temp 97.1 °F (36.2 °C) (Oral)   Resp 18   Ht 5' 11\" (1.803 m)   Wt 228 lb 8 oz (103.6 kg)   SpO2 94%   BMI 31.87 kg/m²  on 3 L  Gen: No distress. Alert. Eyes: PERRL. No sclera icterus. No conjunctival injection. ENT: No discharge. Pharynx clear. Neck: Trachea midline. Normal thyroid. Resp: No accessory muscle use. No crackles. Bilateral wheezes. No rhonchi. No dullness on percussion. CV: Regular rate. Regular rhythm. No murmur or rub. No edema. GI: Non-tender. Non-distended. No masses. No organomegaly. Normal bowel sounds. No hernia. Skin: Warm and dry. No nodule on exposed extremities. No rash on exposed extremities. Lymph: No cervical LAD. No supraclavicular LAD. M/S: No cyanosis. No joint deformity. No clubbing. Neuro: Awake. Moves all extremities. CN grossly intact. Psych: Oriented x 3. No anxiety or agitation.      Scheduled Meds:   ipratropium-albuterol  1 ampule Inhalation Q4H    predniSONE  40 mg Oral Daily    amLODIPine  5 mg Oral Daily    atorvastatin  20 mg Oral Daily    budesonide-formoterol  2 puff Inhalation Daily    losartan  100 mg Oral Daily    metoprolol succinate  50 mg Oral Daily    pantoprazole  40 mg Oral QAM AC    rivaroxaban  20 mg Oral Daily with breakfast    sodium chloride flush  10 mL Intravenous 2 times per day    doxycycline hyclate  100 mg Oral Q12H    guaiFENesin  600 mg Oral BID    furosemide  40 mg Oral Daily     Continuous Infusions:    PRN Meds:  albuterol sulfate HFA, ipratropium, sodium chloride flush, acetaminophen **OR** acetaminophen, polyethylene glycol, promethazine **OR** ondansetron, albuterol    Labs:  CBC:   Recent Labs     10/26/20  1435 10/27/20  0644   WBC 8.6 8.1   HGB 13.9 13.6   HCT 42.4 41.7   MCV 89.8 91.3    201     BMP:   Recent Labs     10/26/20  1435 10/27/20  0644    135*   K 4.3 4.7    102   CO2 29 24   BUN 19 25*   CREATININE 1.4* 1.3     LIVER PROFILE:   Recent Labs     10/26/20  1435   AST 15   ALT 9*   BILITOT 0.5   ALKPHOS 88     PT/INR: No results for input(s): PROTIME, INR in the last 72 hours. APTT: No results for input(s): APTT in the last 72 hours. UA:No results for input(s): NITRITE, COLORU, PHUR, LABCAST, WBCUA, RBCUA, MUCUS, TRICHOMONAS, YEAST, BACTERIA, CLARITYU, SPECGRAV, LEUKOCYTESUR, UROBILINOGEN, BILIRUBINUR, BLOODU, GLUCOSEU, AMORPHOUS in the last 72 hours. Invalid input(s): KETONESU  No results for input(s): PHART, LXW3SFN, PO2ART in the last 72 hours.   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 Lilian Hernandez  · Paroxysmal A. fib on home Xarelto        PLAN:  · Supplemental oxygen to maintain SaO2 >92%; wean as tolerated  · Doxycycline day #4/5  · Inhaled bronchodilators  · Prednisone taper   · Continue home Xarelto  · DC planning is okay with pulmonary  · Discussed with internal medicine

## 2020-10-29 NOTE — PLAN OF CARE
Problem: Infection:  Goal: Will remain free from infection  Description: Will remain free from infection  10/29/2020 0450 by Hank Azevedo RN  Outcome: Ongoing  10/28/2020 1833 by Marcy Carson RN  Outcome: Ongoing     Problem: Safety:  Goal: Free from accidental physical injury  Description: Free from accidental physical injury  10/29/2020 0450 by Hank Azevedo RN  Outcome: Ongoing  10/28/2020 1833 by Marcy Carson RN  Outcome: Ongoing  Goal: Free from intentional harm  Description: Free from intentional harm  Outcome: Ongoing     Problem: Daily Care:  Goal: Daily care needs are met  Description: Daily care needs are met  Outcome: Ongoing     Problem: Pain:  Goal: Patient's pain/discomfort is manageable  Description: Patient's pain/discomfort is manageable  Outcome: Ongoing     Problem: Skin Integrity:  Goal: Skin integrity will stabilize  Description: Skin integrity will stabilize  Outcome: Ongoing

## 2020-10-29 NOTE — DISCHARGE INSTR - COC
Continuity of Care Form    Patient Name: Mio Thomas   :  1945  MRN:  [id number]    Admit date:  10/26/2020  Discharge date:  10/29/20    Code Status Order: Full Code   Advance Directives:   Advance Care Flowsheet Documentation     Date/Time Healthcare Directive Type of Healthcare Directive Copy in [address] Agent's Name Healthcare Agent's Phone Number    10/26/20 [phone]  Yes, patient has an advance directive for healthcare treatment  Durable power of  for health care  No, copy requested from family  Healthcare power of   [facility]  [phone]          Admitting Physician:  Chilo Das MD  PCP: Alejandra Orona MD    Discharging Nurse: [address] [facility] Unit/Room#: 2408/0217-44  Discharging Unit Phone Number: [phone]    Emergency Contact:   Extended Emergency Contact Information  Primary Emergency Contact: Rachel Whitman  Address: [address] Phone: [phone]  Mobile Phone: [phone]  Relation: Spouse    Past Surgical History:  Past Surgical History:   Procedure Laterality Date    CATARACT REMOVAL WITH IMPLANT Right 2018     PHACO EMULSIFICATION OF CATARACT WITH INTRAOCULAR LENS IMPLANT RIGHT EYE    COLONOSCOPY  2016    sigmoid polyps    ENDOSCOPY, COLON, DIAGNOSTIC  2019    egd; esophagitis/gastritis    HERNIA REPAIR      ME XCAPSL CTRC RMVL INSJ IO LENS PROSTH W/O ECP Right 2018    PHACO EMULSIFICATION OF CATARACT WITH INTRAOCULAR LENS IMPLANT RIGHT EYE performed by Hong Jhaveri MD at [address] [place] [address] RMVL INSJ IO LENS PROSTH W/O ECP Left 10/18/2018    PHACO EMULSIFICATION OF CATARACT WITH  INTRAOCULAR LENS IMPLANT LEFT EYE performed by Hong Jhaveri MD at [address] [place] [address] 2019    EGD BIOPSY performed by Sydnee Murray MD at [id number] [place] [address] None active    Resolved    COVID-19 Rule Out 10/26/20 10/26/20 10/26/20 COVID-19 (Ordered)   10/27/20 Rule-Out Test Resulted          Nurse Assessment:  Last Vital Signs: /75   Pulse 72   Temp 97.5 °F (36.4 °C) (Oral)   Resp 18   Ht 5' 11\" (1.803 m)   Wt 228 lb 8 oz (103.6 kg)   SpO2 92%   BMI 31.87 kg/m²     Last documented pain score (0-10 scale): Pain Level: 0  Last Weight:   Wt Readings from Last 1 Encounters:   10/29/20 228 lb 8 oz (103.6 kg)     Mental Status:  oriented    IV Access:  - None    Nursing Mobility/ADLs:  Walking   Independent  Transfer  Independent  Bathing  Independent  Dressing  Independent  Toileting  Independent  Feeding  Independent  Med Admin  Independent  Med Delivery   whole    Wound Care Documentation and Therapy:        Elimination:  Continence:   · Bowel: Yes  · Bladder: Yes  Urinary Catheter: None   Colostomy/Ileostomy/Ileal Conduit: No       Date of Last BM: 10/28/20    Intake/Output Summary (Last 24 hours) at 10/29/2020 1040  Last data filed at 10/28/2020 2224  Gross per 24 hour   Intake 10 ml   Output --   Net 10 ml     I/O last 3 completed shifts: In: 190 [P.O.:180; I.V.:10]  Out: -     Safety Concerns:     None    Impairments/Disabilities:      None    Nutrition Therapy:  Current Nutrition Therapy:   - Oral Diet:  Low Sodium (2gm)    Routes of Feeding: Oral  Liquids: No Restrictions  Daily Fluid Restriction: no  Last Modified Barium Swallow with Video (Video Swallowing Test): not done    Treatments at the Time of Hospital Discharge:   Respiratory Treatments: ***  Oxygen Therapy:  is on oxygen at 2 L/min per nasal cannula.   Ventilator:    - No ventilator support    Rehab Therapies: ***  Weight Bearing Status/Restrictions: No weight bearing restirctions  Other Medical Equipment (for information only, NOT a DME order):  ***  Other Treatments: ***    Patient's personal belongings (please select all that are sent with patient):  Bushra Cardona RN SIGNATURE: Electronically signed by Galo Bahena RN on 10/29/20 at 10:42 AM EDT    CASE MANAGEMENT/SOCIAL WORK SECTION    Inpatient Status Date: ***    Readmission Risk Assessment Score:  Readmission Risk              Risk of Unplanned Readmission:        21           Discharging to Facility/ Agency   · Name:   · Address:  · Phone:  · Fax:    Dialysis Facility (if applicable)   · Name:  · Address:  · Dialysis Schedule:  · Phone:  · Fax:    / signature: {Esignature:[id number]}    PHYSICIAN SECTION    Prognosis: {Prognosis:5603376141}    Condition at Discharge: [facility] Patient Condition:777649478}    Rehab Potential (if transferring to Rehab): {Prognosis:6878774356}    Recommended Labs or Other Treatments After Discharge: ***    Physician Certification: I certify the above information and transfer of Chris Paz  is necessary for the continuing treatment of the diagnosis listed and that he requires {Admit to Appropriate Level of Care:65141} for {GREATER/LESS:663467569} 30 days.      Update Admission H&P: {CHP DME Changes in QEV:885548952}    PHYSICIAN SIGNATURE:  {Esignature:[id number]}

## 2020-10-30 ENCOUNTER — CARE COORDINATION (OUTPATIENT)
Dept: CASE MANAGEMENT | Age: 75
End: 2020-10-30

## 2020-10-30 NOTE — CARE COORDINATION
[address] and COVID-19 Initial Outreach    Call within 2 business days of discharge: Yes    Patient: George Davalos Patient : 1945   MRN: [id number]  Discharge Date: 10/29/20   RARS: Readmission Risk Score: 21      Last Discharge [facility]       Complaint Diagnosis Description Type Department Provider    10/26/20 Shortness of Breath COPD exacerbation ([address] ED to Hosp-Admission (Discharged) (ADMITTED) [facility] 2 Annie Singh MD; Kr... COVID-19 Not Detected on 10/26/2020. Patient has following COVID-19 risk factors: heart failure, COPD, immunocompromised and chronic kidney disease. 1st attempt - 10/30/2020 at 1310PM - unable to reach or leave message. Voice mailbox not set up.      Follow Up  Future Appointments   Date Time Provider Zhane Gramajo   2020  2:30 PM [facility] MAIN [facility] Rad   2020  3:30 PM MD Mathew George, RN  Care Transition Nurse  [phone] mobile

## 2020-10-31 ENCOUNTER — CARE COORDINATION (OUTPATIENT)
Dept: CASE MANAGEMENT | Age: 75
End: 2020-10-31

## 2020-10-31 NOTE — CARE COORDINATION
[address] Transitions Initial Follow Up Call    Call within 2 business days of discharge: Yes    Patient: Maximilian Saldana Patient : 1945   MRN: [id number]  Reason for Admission: COPD   Discharge Date: 10/29/20 RARS: Readmission Risk Score: 21      Last Discharge [facility]       Complaint Diagnosis Description Type Department Provider    10/26/20 Shortness of Breath COPD exacerbation [facility]) ED to Hosp-Admission (Discharged) (ADMITTED) [facility] 2 Janessa Maddox MD; Kr... Second attempt made to reach patient for initial post hospital transition call. Received message no VM setup at this time.              Follow Up  Future Appointments   Date Time Provider Zhane Gramajo   2020  2:30 PM [facility]   2020  3:30 PM Verona Keith MD [facility] PUL JAROD Beauchamp RN

## 2020-11-02 ENCOUNTER — CARE COORDINATION (OUTPATIENT)
Dept: CASE MANAGEMENT | Age: 75
End: 2020-11-02

## 2020-11-02 NOTE — CARE COORDINATION
[address] and COVID-19 Initial Outreach     Call within 2 business days of discharge: Yes     Patient: Julissa Cardoza     Patient : 1945   MRN: [id number]     Discharge Date: 10/29/20       RARS: Readmission Risk Score: 21                  Last Discharge [address] [address]        Complaint Diagnosis Description Type Department Provider     10/26/20 Shortness of Breath COPD exacerbation ([address] ED to Hosp-Admission (Discharged) (ADMITTED) [facility] 2 Shanna Cox MD; Kr. ..                        COVID-19 Not Detected on 10/26/2020.      Patient has following COVID-19 risk factors: heart failure, COPD, immunocompromised and chronic kidney disease.      3rd/FINAL attempt - 10/30/2020 at 1310PM - unable to reach or leave message. Voice mailbox not set up. No further CTN outreach. Episode resolved at this time.      Cisco Rosas, RN  Care Transition Nurse  [phone] mobile    Future Appointments   Date Time Provider Zhane Gramajo   2020  2:30 PM [facility] MAIN [facility] Omayra Rad   2020  3:30 PM [facility]rez [facility]

## 2020-11-06 RX ORDER — IPRATROPIUM BROMIDE AND ALBUTEROL SULFATE 2.5; .5 MG/3ML; MG/3ML
SOLUTION RESPIRATORY (INHALATION)
Qty: 360 ML | Refills: 0 | Status: SHIPPED | OUTPATIENT
Start: 2020-11-06 | End: 2020-12-14 | Stop reason: SDUPTHER

## 2020-11-11 ENCOUNTER — OFFICE VISIT (OUTPATIENT)
Dept: INTERNAL MEDICINE CLINIC | Age: 75
End: 2020-11-11

## 2020-11-11 VITALS
OXYGEN SATURATION: 95 % | BODY MASS INDEX: 33.74 KG/M2 | TEMPERATURE: 97.3 F | DIASTOLIC BLOOD PRESSURE: 58 MMHG | SYSTOLIC BLOOD PRESSURE: 110 MMHG | WEIGHT: 241 LBS | HEART RATE: 70 BPM | HEIGHT: 71 IN | RESPIRATION RATE: 18 BRPM

## 2020-11-11 PROCEDURE — 1111F DSCHRG MED/CURRENT MED MERGE: CPT | Performed by: PHYSICIAN ASSISTANT

## 2020-11-11 PROCEDURE — 99495 TRANSJ CARE MGMT MOD F2F 14D: CPT | Performed by: PHYSICIAN ASSISTANT

## 2020-11-11 NOTE — PROGRESS NOTES
Post-Discharge Transitional Care Management Services or Hospital Follow Up      Krzysztof Holder   YOB: 1945    Date of Office Visit:  11/11/2020  Date of Hospital Admission: 10/26/20  Date of Hospital Discharge: 10/29/20  Risk of hospital readmission (high >=14%.  Medium >=10%) :Readmission Risk Score: 21      Care management risk score Rising risk (score 2-5) and Complex Care (Scores >=6): 12     Non face to face  following discharge, date last encounter closed (first attempt may have been earlier): 10/31/2020  2:35 PM    Call initiated 2 business days of discharge: Yes    Patient Active Problem List   Diagnosis    HCAP (healthcare-associated pneumonia)    COPD exacerbation ([address]    Acute on chronic respiratory failure with hypoxia ([address]    Hoarseness of voice    Atrial fibrillation, controlled ([address]    COPD (chronic obstructive pulmonary disease) ([address]    GERD (gastroesophageal reflux disease)    Pulmonary nodule, right    Enlarged prostate with urinary obstruction    Thoracic aortic aneurysm without rupture ([address]    Typical atrial flutter ([address]    Dilated cardiomyopathy ([address]    Acute on chronic systolic CHF (congestive heart failure) ([address]    PAD (peripheral artery disease) ([address]    Pain of right thigh    Dyspnea    Essential hypertension    Acute conjunctivitis of both eyes    Chronic anticoagulation    Chest pain    Epigastric pain    Paroxysmal A-fib (HCC)    Chronic respiratory failure with hypoxia (HCC)    Abnormal CT of the chest    Paroxysmal atrial fibrillation (HCC)    Stage 3 chronic kidney disease       Allergies   Allergen Reactions    Amiodarone Anaphylaxis     Thyriod toxic        Medications listed as ordered at the time of discharge from hospital   Ulises Bertrand   Home Medication Instructions ROBERT:    Printed on:11/11/20 2489   Medication Information                      albuterol sulfate HFA (PROVENTIL HFA) 108 (90 Base) MCG/ACT inhaler  Inhale 2 puffs into the lungs every 6 hours as needed for Wheezing (with spacer)             amLODIPine (NORVASC) 5 MG tablet  TAKE ONE TABLET BY MOUTH EVERY DAY             atorvastatin (LIPITOR) 20 MG tablet  TAKE ONE TABLET BY MOUTH EVERY DAY             budesonide-formoterol (SYMBICORT) 160-4.5 MCG/ACT AERO  Inhale 2 puffs into the lungs daily              Cholecalciferol (VITAMIN D-3) 25 MCG (1000 UT) CAPS  Take by mouth daily              furosemide (LASIX) 40 MG tablet  TAKE ONE TABLET BY MOUTH EVERY DAY             guaiFENesin (MUCINEX) 600 MG extended release tablet  Take 1 tablet by mouth 2 times daily             ipratropium-albuterol (DUONEB) 0.5-2.5 (3) MG/3ML SOLN nebulizer solution  USE ONE UNIT DOSE (3ML) IN NEBULIZER AND INHALE INTO THE LUNGS EVERY 4 HOURS AS NEEDED FOR SHORTNESS OF BREATH             losartan (COZAAR) 100 MG tablet  TAKE ONE TABLET BY MOUTH DAILY             metoprolol succinate (TOPROL XL) 50 MG extended release tablet  Take 1 tablet by mouth daily             pantoprazole (PROTONIX) 40 MG tablet  TAKE ONE TABLET BY MOUTH EVERY DAY             Potassium Citrate ER 15 MEQ (1620 MG) TBCR  Take 15 mEq by mouth daily             predniSONE (DELTASONE) 20 MG tablet  2 qd- 2 days, 1 1/2 qd- 3 days,1 qd- 3 days,1/2 qd- 3 days             rivaroxaban (XARELTO) 20 MG TABS tablet  TAKE ONE TABLET BY MOUTH DAILY WITH BREAKFAST             Umeclidinium Bromide (INCRUSE ELLIPTA) 62.5 MCG/INH AEPB  Inhale 1 Inhaler into the lungs daily                   Medications marked \"taking\" at this time  Outpatient Medications Marked as Taking for the 11/11/20 encounter (Office Visit) with SCARLET Moralez   Medication Sig Dispense Refill    ipratropium-albuterol (DUONEB) 0.5-2.5 (3) MG/3ML SOLN nebulizer solution USE ONE UNIT DOSE (3ML) IN NEBULIZER AND INHALE INTO THE LUNGS EVERY 4 HOURS AS NEEDED FOR SHORTNESS OF BREATH 360 mL 0    predniSONE (DELTASONE) 20 MG tablet 2 qd- 2 days, 1 1/2 qd- 3 days,1 qd- 3 days,1/2 qd- 3 days 13 tablet 0    guaiFENesin (MUCINEX) 600 MG extended release tablet Take 1 tablet by mouth 2 times daily 15 tablet 0    furosemide (LASIX) 40 MG tablet TAKE ONE TABLET BY MOUTH EVERY DAY 28 tablet 0    atorvastatin (LIPITOR) 20 MG tablet TAKE ONE TABLET BY MOUTH EVERY DAY 28 tablet 0    amLODIPine (NORVASC) 5 MG tablet TAKE ONE TABLET BY MOUTH EVERY DAY 28 tablet 0    pantoprazole (PROTONIX) 40 MG tablet TAKE ONE TABLET BY MOUTH EVERY DAY 28 tablet 0    Umeclidinium Bromide (INCRUSE ELLIPTA) 62.5 MCG/INH AEPB Inhale 1 Inhaler into the lungs daily 1 each 5    Cholecalciferol (VITAMIN D-3) 25 MCG (1000 UT) CAPS Take by mouth daily       budesonide-formoterol (SYMBICORT) 160-4.5 MCG/ACT AERO Inhale 2 puffs into the lungs daily       losartan (COZAAR) 100 MG tablet TAKE ONE TABLET BY MOUTH DAILY 90 tablet 3    metoprolol succinate (TOPROL XL) 50 MG extended release tablet Take 1 tablet by mouth daily 90 tablet 3    Potassium Citrate ER 15 MEQ (1620 MG) TBCR Take 15 mEq by mouth daily 90 tablet 3    rivaroxaban (XARELTO) 20 MG TABS tablet TAKE ONE TABLET BY MOUTH DAILY WITH BREAKFAST 90 tablet 3    albuterol sulfate HFA (PROVENTIL HFA) 108 (90 Base) MCG/ACT inhaler Inhale 2 puffs into the lungs every 6 hours as needed for Wheezing (with spacer) 1 Inhaler 11        Medications patient taking as of now reconciled against medications ordered at time of hospital discharge: Yes    Chief Complaint   Patient presents with    Follow-Up from Hospital    COPD       History of Present illness - Follow up of Hospital diagnosis(es):  1. Acute on chronic hypoxic respiratory failure  COPD exacerbation  2. Chronic systolic CHF   3. CKD stage 3  4. Paroxysmal A-fib  5. Hypertension  6. GERD    Inpatient course: Discharge summary reviewed- see chart. Interval history/Current status:     Patient reports he has been doing well since he was discharged on 10/29.  He has completed his Abx but still has a few doses of prednisone left. He feels like his breathing has significantly improved and he has been able to wean off of the nebulizer treatments and feels he is being well controlled with his inhalers. He reports the swelling to his feet and ankles has improved as well. He has been compliant with his other home medications as well. He reports no CP, cough, or fevers but does state that he still feels fatigued but thinks this is slowly improving. He has follow up with Dr. Zafar Reddy Dec 23rd with CT scan that day as well. He reports he is due to see Dr. Chau Craven soon as well. A comprehensive review of systems was negative except for: Constitutional: positive for fatigue    Vitals:    11/11/20 0850   BP: (!) 110/58   Site: Left Upper Arm   Position: Sitting   Pulse: 70   Resp: 18   Temp: 97.3 °F (36.3 °C)   SpO2: 95%   Weight: 241 lb (109.3 kg)   Height: 5' 11\" (1.803 m)     Body mass index is 33.61 kg/m². Wt Readings from Last 3 Encounters:   11/11/20 241 lb (109.3 kg)   10/29/20 228 lb 8 oz (103.6 kg)   09/13/20 236 lb (107 kg)     BP Readings from Last 3 Encounters:   11/11/20 (!) 110/58   10/29/20 119/75   09/13/20 (!) 117/59        Gen: Elderly male, No distress. Alert. Eyes: PERRL. No sclera icterus. No conjunctival injection. ENT: No discharge. Pharynx clear. Neck:  Trachea midline. Resp: No accessory muscle use. No crackles. Faint wheezes in bilateral bases. No rhonchi. Good air movement, on RA  CV: Regular rate. Irregular rhythm. No murmur. No rub. 1-2+ edema to bilateral ankles   Capillary Refill: Brisk,< 3 seconds   Peripheral Pulses: +2 palpable, equal bilaterally   GI: Non-tender. Non-distended. Normal bowel sounds. Skin: Warm and dry. M/S: No cyanosis. No joint deformity. No clubbing. Neuro: Awake. Grossly nonfocal    Psych: Oriented x 3. No anxiety or agitation. Assessment/Plan:  1.  Hospital discharge follow-up  - DE DISCHARGE MEDS RECONCILED W/ CURRENT OUTPATIENT MED

## 2020-11-12 RX ORDER — FUROSEMIDE 40 MG/1
TABLET ORAL
Qty: 28 TABLET | Refills: 0 | Status: SHIPPED | OUTPATIENT
Start: 2020-11-12 | End: 2020-12-10

## 2020-11-12 RX ORDER — AMLODIPINE BESYLATE 5 MG/1
TABLET ORAL
Qty: 28 TABLET | Refills: 0 | Status: SHIPPED | OUTPATIENT
Start: 2020-11-12 | End: 2020-12-10

## 2020-11-12 RX ORDER — ATORVASTATIN CALCIUM 20 MG/1
TABLET, FILM COATED ORAL
Qty: 28 TABLET | Refills: 0 | Status: SHIPPED | OUTPATIENT
Start: 2020-11-12 | End: 2020-12-10

## 2020-12-10 RX ORDER — ATORVASTATIN CALCIUM 20 MG/1
TABLET, FILM COATED ORAL
Qty: 28 TABLET | Refills: 0 | Status: SHIPPED | OUTPATIENT
Start: 2020-12-10 | End: 2021-01-06

## 2020-12-10 RX ORDER — AMLODIPINE BESYLATE 5 MG/1
TABLET ORAL
Qty: 28 TABLET | Refills: 0 | Status: SHIPPED | OUTPATIENT
Start: 2020-12-10 | End: 2021-01-06

## 2020-12-10 RX ORDER — FUROSEMIDE 40 MG/1
TABLET ORAL
Qty: 28 TABLET | Refills: 0 | Status: SHIPPED | OUTPATIENT
Start: 2020-12-10 | End: 2021-01-06

## 2020-12-10 RX ORDER — PANTOPRAZOLE SODIUM 40 MG/1
TABLET, DELAYED RELEASE ORAL
Qty: 90 TABLET | Refills: 0 | Status: SHIPPED | OUTPATIENT
Start: 2020-12-10 | End: 2021-02-01 | Stop reason: SDUPTHER

## 2020-12-14 RX ORDER — IPRATROPIUM BROMIDE AND ALBUTEROL SULFATE 2.5; .5 MG/3ML; MG/3ML
SOLUTION RESPIRATORY (INHALATION)
Qty: 360 ML | Refills: 0 | Status: SHIPPED | OUTPATIENT
Start: 2020-12-14 | End: 2021-01-08

## 2020-12-15 ENCOUNTER — TELEPHONE (OUTPATIENT)
Dept: INTERNAL MEDICINE CLINIC | Age: 75
End: 2020-12-15

## 2020-12-23 ENCOUNTER — VIRTUAL VISIT (OUTPATIENT)
Dept: PULMONOLOGY | Age: 75
End: 2020-12-23
Payer: MEDICARE

## 2020-12-23 ENCOUNTER — HOSPITAL ENCOUNTER (OUTPATIENT)
Dept: CT IMAGING | Age: 75
Discharge: HOME | End: 2020-12-23
Payer: MEDICARE

## 2020-12-23 PROCEDURE — 71250 CT THORAX DX C-: CPT

## 2020-12-23 PROCEDURE — 99441 PR PHYS/QHP TELEPHONE EVALUATION 5-10 MIN: CPT | Performed by: INTERNAL MEDICINE

## 2020-12-23 NOTE — PROGRESS NOTES
Pulmonary Follow-up Note  Chief Complaint: Shortness of breath, COPD  Referring Provider: hospital f/u      Interval history: Amox/Pred in Sept for COPD exacerbation. Hospitalized in October 2020 for COPD exacerbation. On Incruse daily & Symbicort BID, albuterol PRN. Sleeps with oxygen at night. He feels he is doing pretty well right now    Presenting history:  8/4/2019 with a month long history of moderately increased shortness of breath, associated with wheezing and nonproductive cough, became severe. He is feeling better with inhaled bronchodilators given the emergency department. He has had previous episodes of COPD in the past.  He notes he has been waking up frequently at night with wheezing, benefits from his home nebulizer. reports that he quit smoking about 3 years ago. He has a 110.00 pack-year smoking history. He has never used smokeless tobacco.     Review of Systems:    Physical Exam:  There were no vitals taken for this visit. PHONE     Data:   CT CHEST 12/23/20   Lungs/pleura: The central airways are patent.  Right basilar airspace disease   has resolved. Jeanmarie Nanny is minimal linear opacity at the left base consistent   with atelectasis or scarring. Jeanmarie Nanny is no acute airspace disease.  There are   no suspicious pulmonary nodules       Mediastinum: There is no acute mediastinal abnormality       Upper Abdomen: No definite acute abnormality       Soft Tissues/Bones: No suspicious osteolytic or osteoblastic lesions           Impression   No acute intrathoracic abnormality     PFT 8/22/19 FEV1 2.24 L 70% 0.67 TLC 6.45 L 92% DLCO 15.42 53%     ASSESSMENT:  · Mild COPD   · Upper lobe predominant paraseptal and centrilobular emphysema  · HENRY 6 mm Pulmonary Nodule new on 6/12/20 CT    Not addressed today:  · Atrial fibrillation on Xarelto     PLAN:  · Oxygen 2 L HS  · Incruse daily, Symbicort 160 BID  · PRN nebulized bronchodilators  · Tobacco cessation has been achieved · LDCT for LCS in 12 months, see me in 6 months - please call him next week to schedule. He is not at home right now    Screening CT scan was considered in a lung cancer screening counseling and shared decision making visit today that included the following elements:   ? Eligibility: Age: 70. There are no signs or symptoms of lung cancer. Tobacco History 110 pack-years, quit 3 years ago  ? Verbal counseling has been performed by me to include benefits and harms of screening, follow-up diagnostic testing, over-diagnosis, false positive rate, and total radiation exposure;   ? I have counseled on the importance of adherence to annual lung cancer LDCT screening, the impact of comorbidities and patient is willing to undergo diagnosis and treatment;   ? I have provided counseling on the importance of maintaining cigarette smoking abstinence if former smoker; or the importance of smoking cessation if current smoker and, if appropriate, furnishing of information about tobacco cessation interventions; and   ? I have furnished a written order for lung cancer screening with LDCT. ? Order for Screening chest CT scan should be placed with documentation as below:  ? Beneficiary date of birth;   ? Actual pack - year smoking history (number) from above;   ? Current smoking status, and for former smokers, the number of years since quitting smoking from above  ? Beneficiary is asymptomatic   ?  National Provider Identifier (NPI) for Joana Moss [id number] Tito Mcdonald is a 70 y.o. male evaluated via telephone on 12/23/2020. Consent: He and/or health care decision maker is aware that that he may receive a bill for this telephone service, depending on his insurance coverage, and has provided verbal consent to proceed: YES. I communicated with the patient and/or health care decision maker about: see interval history above. Details of this discussion including any medical advice provided: see plan above. Total Time: minutes: 5-10 minutes. I affirm this is a Patient Initiated Episode with a Patient who has not had a related appointment within my department in the past 7 days or scheduled within the next 24 hours. Patient identification was verified at the start of the visit: YES  Pursuant to the emergency declaration under the [facility], [address] [place] [address] waiver authority and the [employer] and [employer] Act, this Telephone Visit was conducted with patient's (and/or legal guardian's) consent, to reduce the patient's risk of exposure to COVID-19 and provide necessary medical care.   The patient (and/or legal guardian) his advised to contact this office for worsening conditions or problems, and seek emergency medical treatment and/or call 911 if urgent care needed,

## 2020-12-28 RX ORDER — BUDESONIDE AND FORMOTEROL FUMARATE DIHYDRATE 160; 4.5 UG/1; UG/1
AEROSOL RESPIRATORY (INHALATION)
Qty: 10.2 G | Refills: 0 | Status: SHIPPED | OUTPATIENT
Start: 2020-12-28 | End: 2021-11-03

## 2021-01-06 RX ORDER — FUROSEMIDE 40 MG/1
TABLET ORAL
Qty: 28 TABLET | Refills: 0 | Status: SHIPPED | OUTPATIENT
Start: 2021-01-06 | End: 2021-02-01 | Stop reason: SDUPTHER

## 2021-01-06 RX ORDER — ATORVASTATIN CALCIUM 20 MG/1
TABLET, FILM COATED ORAL
Qty: 28 TABLET | Refills: 0 | Status: SHIPPED | OUTPATIENT
Start: 2021-01-06 | End: 2021-02-01 | Stop reason: SDUPTHER

## 2021-01-06 RX ORDER — AMLODIPINE BESYLATE 5 MG/1
TABLET ORAL
Qty: 28 TABLET | Refills: 0 | Status: SHIPPED | OUTPATIENT
Start: 2021-01-06 | End: 2021-02-01 | Stop reason: SDUPTHER

## 2021-01-08 RX ORDER — IPRATROPIUM BROMIDE AND ALBUTEROL SULFATE 2.5; .5 MG/3ML; MG/3ML
SOLUTION RESPIRATORY (INHALATION)
Qty: 360 ML | Refills: 0 | Status: SHIPPED | OUTPATIENT
Start: 2021-01-08 | End: 2021-02-22

## 2021-01-11 ENCOUNTER — TELEPHONE (OUTPATIENT)
Dept: CARDIOLOGY CLINIC | Age: 76
End: 2021-01-11

## 2021-01-11 NOTE — TELEPHONE ENCOUNTER
Per Dr. Camacho Sheets  Patient needs to be seen in person or phone in next 4-6 weeks    Tried calling pt no vm set up.

## 2021-02-01 ENCOUNTER — OFFICE VISIT (OUTPATIENT)
Dept: INTERNAL MEDICINE CLINIC | Age: 76
End: 2021-02-01

## 2021-02-01 VITALS
OXYGEN SATURATION: 98 % | HEIGHT: 71 IN | BODY MASS INDEX: 33.88 KG/M2 | WEIGHT: 242 LBS | HEART RATE: 82 BPM | RESPIRATION RATE: 12 BRPM | SYSTOLIC BLOOD PRESSURE: 120 MMHG | TEMPERATURE: 98.4 F | DIASTOLIC BLOOD PRESSURE: 80 MMHG

## 2021-02-01 DIAGNOSIS — J96.11 CHRONIC RESPIRATORY FAILURE WITH HYPOXIA (HCC): ICD-10-CM

## 2021-02-01 DIAGNOSIS — N18.32 STAGE 3B CHRONIC KIDNEY DISEASE (HCC): ICD-10-CM

## 2021-02-01 DIAGNOSIS — I10 ESSENTIAL HYPERTENSION: ICD-10-CM

## 2021-02-01 DIAGNOSIS — J44.1 CHRONIC OBSTRUCTIVE PULMONARY DISEASE WITH ACUTE EXACERBATION (HCC): ICD-10-CM

## 2021-02-01 DIAGNOSIS — K21.9 GASTROESOPHAGEAL REFLUX DISEASE WITHOUT ESOPHAGITIS: ICD-10-CM

## 2021-02-01 DIAGNOSIS — I10 ESSENTIAL HYPERTENSION: Primary | ICD-10-CM

## 2021-02-01 DIAGNOSIS — N18.30 STAGE 3 CHRONIC KIDNEY DISEASE, UNSPECIFIED WHETHER STAGE 3A OR 3B CKD (HCC): ICD-10-CM

## 2021-02-01 DIAGNOSIS — I50.22 CHRONIC SYSTOLIC CHF (CONGESTIVE HEART FAILURE) (HCC): ICD-10-CM

## 2021-02-01 LAB
BASOPHILS ABSOLUTE: 0.1 K/UL (ref 0–0.2)
BASOPHILS RELATIVE PERCENT: 1.5 %
EOSINOPHILS ABSOLUTE: 0.5 K/UL (ref 0–0.6)
EOSINOPHILS RELATIVE PERCENT: 6.5 %
HCT VFR BLD CALC: 43.1 % (ref 40.5–52.5)
HEMOGLOBIN: 14 G/DL (ref 13.5–17.5)
LYMPHOCYTES ABSOLUTE: 1.7 K/UL (ref 1–5.1)
LYMPHOCYTES RELATIVE PERCENT: 21.6 %
MCH RBC QN AUTO: 29.2 PG (ref 26–34)
MCHC RBC AUTO-ENTMCNC: 32.5 G/DL (ref 31–36)
MCV RBC AUTO: 89.8 FL (ref 80–100)
MONOCYTES ABSOLUTE: 0.7 K/UL (ref 0–1.3)
MONOCYTES RELATIVE PERCENT: 8.8 %
NEUTROPHILS ABSOLUTE: 4.9 K/UL (ref 1.7–7.7)
NEUTROPHILS RELATIVE PERCENT: 61.6 %
PDW BLD-RTO: 16.3 % (ref 12.4–15.4)
PLATELET # BLD: 255 K/UL (ref 135–450)
PMV BLD AUTO: 9.1 FL (ref 5–10.5)
RBC # BLD: 4.8 M/UL (ref 4.2–5.9)
WBC # BLD: 8 K/UL (ref 4–11)

## 2021-02-01 PROCEDURE — 99214 OFFICE O/P EST MOD 30 MIN: CPT | Performed by: INTERNAL MEDICINE

## 2021-02-01 RX ORDER — METOPROLOL SUCCINATE 50 MG/1
50 TABLET, EXTENDED RELEASE ORAL DAILY
Qty: 90 TABLET | Refills: 0 | Status: SHIPPED | OUTPATIENT
Start: 2021-02-01 | End: 2021-04-27

## 2021-02-01 RX ORDER — LOSARTAN POTASSIUM 100 MG/1
TABLET ORAL
Qty: 90 TABLET | Refills: 0 | Status: SHIPPED | OUTPATIENT
Start: 2021-02-01 | End: 2021-04-27

## 2021-02-01 RX ORDER — POTASSIUM CITRATE 15 MEQ/1
15 TABLET, EXTENDED RELEASE ORAL DAILY
Qty: 90 TABLET | Refills: 0 | Status: SHIPPED | OUTPATIENT
Start: 2021-02-01 | End: 2021-09-13 | Stop reason: SDUPTHER

## 2021-02-01 RX ORDER — PANTOPRAZOLE SODIUM 40 MG/1
TABLET, DELAYED RELEASE ORAL
Qty: 90 TABLET | Refills: 0 | Status: SHIPPED | OUTPATIENT
Start: 2021-02-01 | End: 2021-04-27

## 2021-02-01 RX ORDER — ATORVASTATIN CALCIUM 20 MG/1
TABLET, FILM COATED ORAL
Qty: 90 TABLET | Refills: 0 | Status: SHIPPED | OUTPATIENT
Start: 2021-02-01 | End: 2021-04-27

## 2021-02-01 RX ORDER — AMLODIPINE BESYLATE 5 MG/1
TABLET ORAL
Qty: 90 TABLET | Refills: 0 | Status: SHIPPED | OUTPATIENT
Start: 2021-02-01 | End: 2021-02-02

## 2021-02-01 RX ORDER — FUROSEMIDE 40 MG/1
TABLET ORAL
Qty: 90 TABLET | Refills: 0 | Status: SHIPPED | OUTPATIENT
Start: 2021-02-01 | End: 2021-04-27

## 2021-02-01 ASSESSMENT — PATIENT HEALTH QUESTIONNAIRE - PHQ9
SUM OF ALL RESPONSES TO PHQ QUESTIONS 1-9: 0
SUM OF ALL RESPONSES TO PHQ QUESTIONS 1-9: 0
2. FEELING DOWN, DEPRESSED OR HOPELESS: 0
SUM OF ALL RESPONSES TO PHQ9 QUESTIONS 1 & 2: 0
1. LITTLE INTEREST OR PLEASURE IN DOING THINGS: 0

## 2021-02-01 ASSESSMENT — ENCOUNTER SYMPTOMS
VOMITING: 0
SHORTNESS OF BREATH: 1
BACK PAIN: 0
ABDOMINAL PAIN: 0
WHEEZING: 0
RHINORRHEA: 0
NAUSEA: 0

## 2021-02-01 NOTE — PROGRESS NOTES
Subjective:      Patient ID: Sandy Pretty is a 70 y.o. male. HPI    Patient is here for a follow up. He has had multiple hospital admits for COPD exacerbation in 2020. He has a history of hypertension. It is well controlled. He is compliant and has no med side effects. No chest pain. Patient's COPD is controlled on present bronchodilator regimen. Patient is taking medications as instructed, no medication side effects noted, no significant ongoing wheezing or shortness of breath, using bronchodilator MDI less than twice a week. He quit smoking. He is doing well. He has gained a lot of weight due to multiple doses of steroids he took for his COPD. He is on 3 L of oxygen at night. He has history of a fib. He is in NSR. He has GERD. It is stable. He is urinating well. Review of Systems   Constitutional: Negative for activity change and appetite change. HENT: Negative for postnasal drip and rhinorrhea. Respiratory: Positive for shortness of breath. Negative for wheezing. Cardiovascular: Negative for chest pain, palpitations and leg swelling. Gastrointestinal: Negative for abdominal pain, nausea and vomiting. Genitourinary: Positive for frequency. Negative for difficulty urinating. Musculoskeletal: Negative for back pain and joint swelling. Skin: Negative for rash. Neurological: Negative for light-headedness. Psychiatric/Behavioral: Negative for sleep disturbance.      Past Medical History:   Diagnosis Date    A-fib [facility])     2/14/12    Acute respiratory failure with hypoxia ([facility] [address] 2/13/2012    Atrial fibrillation with RVR ([facility] [address]     2/14/12     Avulsion fracture of ankle 9/21/2015    Benign localized hyperplasia of prostate with urinary obstruction and other lower urinary tract symptoms (LUTS)(600.21) 8/17/2016    Chronic systolic CHF (congestive heart failure) ([facility] [address] 8/28/2017    Contusion of leg, right 9/21/2015    COPD [facility]chronic obstructive pulmonary disease) ([facility])  Fractures     GERD (gastroesophageal reflux disease) 6/15/2015    Hypertension     Hypertensive urgency 8/4/2019    Hypertrophy of prostate without urinary obstruction and other lower urinary tract symptoms (LUTS) 6/15/2015    No history of procedure     no previos colonoscopy    PAD (peripheral artery disease) ([address] 10/9/2017    Pneumonia     Thoracic aortic aneurysm [facility])        Past Surgical History:   Procedure Laterality Date    CATARACT REMOVAL WITH IMPLANT Right 09/06/2018     PHACO EMULSIFICATION OF CATARACT WITH INTRAOCULAR LENS IMPLANT RIGHT EYE    COLONOSCOPY  2/24/2016    sigmoid polyps    ENDOSCOPY, COLON, DIAGNOSTIC  11/13/2019    egd; esophagitis/gastritis    HERNIA REPAIR      UT XCAPSL CTRC RMVL INSJ IO LENS PROSTH W/O ECP Right 9/6/2018    PHACO EMULSIFICATION OF CATARACT WITH INTRAOCULAR LENS IMPLANT RIGHT EYE performed by Jonatan Edge MD at [address] [place] [address] RMVL INSJ IO LENS PROSTH W/O ECP Left 10/18/2018    PHACO EMULSIFICATION OF CATARACT WITH  INTRAOCULAR LENS IMPLANT LEFT EYE performed by Jonatan Edge MD at [address] [place] [address] N/A 11/13/2019    EGD BIOPSY performed by Juliana Rasheed MD at [facility]     Family History   Problem Relation Age of Onset    Alcohol Abuse Sister      Social History     Tobacco Use    Smoking status: Former Smoker     Packs/day: 2.00     Years: 55.00     Pack years: 110.00     Quit date: 8/22/2017     Years since quitting: 3.4    Smokeless tobacco: Never Used    Tobacco comment: smoked 2 darnell a day for 55 years   Substance Use Topics    Alcohol use: Not Currently     Frequency: Never     Comment: occassionally    Drug use: No   .    Current Outpatient Medications:     ipratropium-albuterol (DUONEB) 0.5-2.5 (3) MG/3ML SOLN nebulizer solution, USE ONE UNIT DOSE (3ML) IN NEBULIZER AND INHALE INTO THE LUNGS EVERY 4 HOURS AS NEEDED FOR SHORTNESS OF BREATH, Disp: 360 mL, Rfl: 0    rivaroxaban (XARELTO) 20 MG TABS tablet, TAKE ONE TABLET BY MOUTH DAILY WITH BREAKFAST, Disp: 28 tablet, Rfl: 0    amLODIPine (NORVASC) 5 MG tablet, TAKE ONE TABLET BY MOUTH EVERY DAY, Disp: 28 tablet, Rfl: 0    furosemide (LASIX) 40 MG tablet, TAKE ONE TABLET BY MOUTH EVERY DAY, Disp: 28 tablet, Rfl: 0    atorvastatin (LIPITOR) 20 MG tablet, TAKE ONE TABLET BY MOUTH EVERY DAY, Disp: 28 tablet, Rfl: 0    SYMBICORT 160-4.5 MCG/ACT AERO, Inhale 2 puffs into the lungs 2 times daily, Disp: 10.2 g, Rfl: 0    pantoprazole (PROTONIX) 40 MG tablet, TAKE ONE TABLET BY MOUTH EVERY DAY, Disp: 90 tablet, Rfl: 0    predniSONE (DELTASONE) 20 MG tablet, 2 qd- 2 days, 1 1/2 qd- 3 days,1 qd- 3 days,1/2 qd- 3 days, Disp: 13 tablet, Rfl: 0    Umeclidinium Bromide (INCRUSE ELLIPTA) 62.5 MCG/INH AEPB, Inhale 1 Inhaler into the lungs daily, Disp: 1 each, Rfl: 5    Cholecalciferol (VITAMIN D-3) 25 MCG (1000 UT) CAPS, Take by mouth daily , Disp: , Rfl:     losartan (COZAAR) 100 MG tablet, TAKE ONE TABLET BY MOUTH DAILY, Disp: 90 tablet, Rfl: 3    metoprolol succinate (TOPROL XL) 50 MG extended release tablet, Take 1 tablet by mouth daily, Disp: 90 tablet, Rfl: 3    Potassium Citrate ER 15 MEQ (1620 MG) TBCR, Take 15 mEq by mouth daily, Disp: 90 tablet, Rfl: 3    albuterol sulfate HFA (PROVENTIL HFA) 108 (90 Base) MCG/ACT inhaler, Inhale 2 puffs into the lungs every 6 hours as needed for Wheezing (with spacer), Disp: 1 Inhaler, Rfl: 11      /80 (Site: Left Upper Arm, Position: Sitting, Cuff Size: Large Adult)   Pulse 82   Temp 98.4 °F (36.9 °C)   Resp 12   Ht 5' 11\" (1.803 m)   Wt 242 lb (109.8 kg)   SpO2 98%   BMI 33.75 kg/m²      Objective:   Physical Exam   Constitutional: He is oriented to person, place, and time. He appears well-developed and well-nourished. HENT:   Head: Normocephalic. Eyes: Pupils are equal, round, and reactive to light.  Conjunctivae and EOM are normal.   Neck: Trachea normal and normal range of motion. Neck supple. No JVD present. Carotid bruit is not present. No thyroid mass and no thyromegaly present. Cardiovascular: Normal rate and normal heart sounds. An irregular rhythm present. Exam reveals no gallop. No murmur heard. Pulmonary/Chest: Effort normal and breath sounds normal. No respiratory distress. He has no wheezes. He has no rales. Abdominal: Soft. Bowel sounds are normal. He exhibits no distension and no mass. There is no splenomegaly or hepatomegaly. There is no abdominal tenderness. Musculoskeletal: Normal range of motion. General: No edema. Lymphadenopathy:     He has no cervical adenopathy. Neurological: He is alert and oriented to person, place, and time. He has normal strength and normal reflexes. No cranial nerve deficit. Skin: Skin is warm and dry. No rash noted. Psychiatric: He has a normal mood and affect. His behavior is normal. Judgment and thought content normal.     CT chest 4/19/2017     Thoracic aortic aneurysm as above, not substantially changed.       Emphysema.       A few nodules which are stable since 2015.  As there is a history of smoking,   recommend yearly low-dose lung cancer screening CT. Assessment:       Diagnosis Orders   1. Essential hypertension  CBC Auto Differential    Comprehensive Metabolic Panel    Uric Acid    Lipid Panel   2. Chronic obstructive pulmonary disease with acute exacerbation ([address]     3. Chronic systolic CHF (congestive heart failure) (HCC)  TSH without Reflex   4. Stage 3b chronic kidney disease     5. Stage 3 chronic kidney disease, unspecified whether stage 3a or 3b CKD     6. Gastroesophageal reflux disease without esophagitis     7. Chronic respiratory failure with hypoxia (HCC)            Plan:        #  Hypertension: Well controlled. #  A fib: in SR. On Xarelto. #  COPD stable. No flare up. on Symbicort. Proventil prn. Use HHn. #  GERD: on Prilosec. #  PN right: stable. #  BPH with symptoms. On Flomax. #  Hyperlipidemia on Lipitor. #  BPH with LUTS stable. #  Chronic respiratory failure on oxygen at hs. Discussed use, benefit, and side effects of prescribed medications. Barriers to medication compliance addressed. All patient questions answered. Pt voiced understanding. Decrease calorie intake. Exercise,weight loss recommended. The current medical regimen is effective;  continue present plan and medications. See orders.

## 2021-02-02 LAB
A/G RATIO: 1.5 (ref 1.1–2.2)
ALBUMIN SERPL-MCNC: 4.6 G/DL (ref 3.4–5)
ALP BLD-CCNC: 92 U/L (ref 40–129)
ALT SERPL-CCNC: 8 U/L (ref 10–40)
ANION GAP SERPL CALCULATED.3IONS-SCNC: 12 MMOL/L (ref 3–16)
AST SERPL-CCNC: 12 U/L (ref 15–37)
BILIRUB SERPL-MCNC: 0.5 MG/DL (ref 0–1)
BUN BLDV-MCNC: 20 MG/DL (ref 7–20)
CALCIUM SERPL-MCNC: 9.5 MG/DL (ref 8.3–10.6)
CHLORIDE BLD-SCNC: 106 MMOL/L (ref 99–110)
CHOLESTEROL, TOTAL: 156 MG/DL (ref 0–199)
CO2: 26 MMOL/L (ref 21–32)
CREAT SERPL-MCNC: 1.7 MG/DL (ref 0.8–1.3)
GFR AFRICAN AMERICAN: 48
GFR NON-AFRICAN AMERICAN: 40
GLOBULIN: 3 G/DL
GLUCOSE BLD-MCNC: 83 MG/DL (ref 70–99)
HDLC SERPL-MCNC: 49 MG/DL (ref 40–60)
LDL CHOLESTEROL CALCULATED: 90 MG/DL
POTASSIUM SERPL-SCNC: 4.6 MMOL/L (ref 3.5–5.1)
SODIUM BLD-SCNC: 144 MMOL/L (ref 136–145)
TOTAL PROTEIN: 7.6 G/DL (ref 6.4–8.2)
TRIGL SERPL-MCNC: 87 MG/DL (ref 0–150)
TSH SERPL DL<=0.05 MIU/L-ACNC: 2.81 UIU/ML (ref 0.27–4.2)
URIC ACID, SERUM: 8.4 MG/DL (ref 3.5–7.2)
VLDLC SERPL CALC-MCNC: 17 MG/DL

## 2021-02-02 RX ORDER — AMLODIPINE BESYLATE 5 MG/1
TABLET ORAL
Qty: 28 TABLET | Refills: 0 | Status: SHIPPED | OUTPATIENT
Start: 2021-02-02 | End: 2021-04-27

## 2021-02-04 ENCOUNTER — IMMUNIZATION (OUTPATIENT)
Dept: PRIMARY CARE CLINIC | Age: 76
End: 2021-02-04
Payer: MEDICARE

## 2021-02-04 PROCEDURE — 91301 COVID-19, MODERNA VACCINE 100MCG/0.5ML DOSE: CPT | Performed by: FAMILY MEDICINE

## 2021-02-04 PROCEDURE — 0011A COVID-19, MODERNA VACCINE 100MCG/0.5ML DOSE: CPT | Performed by: FAMILY MEDICINE

## 2021-02-22 RX ORDER — IPRATROPIUM BROMIDE AND ALBUTEROL SULFATE 2.5; .5 MG/3ML; MG/3ML
SOLUTION RESPIRATORY (INHALATION)
Qty: 360 ML | Refills: 0 | Status: SHIPPED | OUTPATIENT
Start: 2021-02-22 | End: 2021-03-24

## 2021-03-04 ENCOUNTER — IMMUNIZATION (OUTPATIENT)
Dept: PRIMARY CARE CLINIC | Age: 76
End: 2021-03-04
Payer: MEDICARE

## 2021-03-04 PROCEDURE — 91301 COVID-19, MODERNA VACCINE 100MCG/0.5ML DOSE: CPT | Performed by: FAMILY MEDICINE

## 2021-03-04 PROCEDURE — 0012A COVID-19, MODERNA VACCINE 100MCG/0.5ML DOSE: CPT | Performed by: FAMILY MEDICINE

## 2021-03-24 RX ORDER — IPRATROPIUM BROMIDE AND ALBUTEROL SULFATE 2.5; .5 MG/3ML; MG/3ML
SOLUTION RESPIRATORY (INHALATION)
Qty: 360 ML | Refills: 0 | Status: SHIPPED | OUTPATIENT
Start: 2021-03-24 | End: 2021-04-26

## 2021-04-26 RX ORDER — IPRATROPIUM BROMIDE AND ALBUTEROL SULFATE 2.5; .5 MG/3ML; MG/3ML
SOLUTION RESPIRATORY (INHALATION)
Qty: 360 ML | Refills: 0 | Status: SHIPPED | OUTPATIENT
Start: 2021-04-26 | End: 2021-05-27

## 2021-04-27 RX ORDER — ATORVASTATIN CALCIUM 20 MG/1
TABLET, FILM COATED ORAL
Qty: 28 TABLET | Refills: 2 | Status: SHIPPED | OUTPATIENT
Start: 2021-04-27 | End: 2021-07-22

## 2021-04-27 RX ORDER — LOSARTAN POTASSIUM 100 MG/1
TABLET ORAL
Qty: 28 TABLET | Refills: 2 | Status: SHIPPED | OUTPATIENT
Start: 2021-04-27 | End: 2021-07-22

## 2021-04-27 RX ORDER — METOPROLOL SUCCINATE 50 MG/1
TABLET, EXTENDED RELEASE ORAL
Qty: 28 TABLET | Refills: 2 | Status: SHIPPED | OUTPATIENT
Start: 2021-04-27 | End: 2021-07-22

## 2021-04-27 RX ORDER — PANTOPRAZOLE SODIUM 40 MG/1
TABLET, DELAYED RELEASE ORAL
Qty: 28 TABLET | Refills: 2 | Status: SHIPPED | OUTPATIENT
Start: 2021-04-27 | End: 2021-07-22

## 2021-04-27 RX ORDER — AMLODIPINE BESYLATE 5 MG/1
TABLET ORAL
Qty: 28 TABLET | Refills: 2 | Status: SHIPPED | OUTPATIENT
Start: 2021-04-27 | End: 2021-07-22

## 2021-04-27 RX ORDER — FUROSEMIDE 40 MG/1
TABLET ORAL
Qty: 28 TABLET | Refills: 2 | Status: SHIPPED | OUTPATIENT
Start: 2021-04-27 | End: 2021-07-26

## 2021-05-05 ENCOUNTER — OFFICE VISIT (OUTPATIENT)
Dept: INTERNAL MEDICINE CLINIC | Age: 76
End: 2021-05-05

## 2021-05-05 VITALS
OXYGEN SATURATION: 92 % | BODY MASS INDEX: 31.64 KG/M2 | HEART RATE: 70 BPM | WEIGHT: 226 LBS | RESPIRATION RATE: 14 BRPM | TEMPERATURE: 97.5 F | DIASTOLIC BLOOD PRESSURE: 80 MMHG | HEIGHT: 71 IN | SYSTOLIC BLOOD PRESSURE: 110 MMHG

## 2021-05-05 DIAGNOSIS — N18.32 STAGE 3B CHRONIC KIDNEY DISEASE (HCC): ICD-10-CM

## 2021-05-05 DIAGNOSIS — I10 ESSENTIAL HYPERTENSION: Primary | ICD-10-CM

## 2021-05-05 DIAGNOSIS — I48.91 ATRIAL FIBRILLATION, CONTROLLED (HCC): ICD-10-CM

## 2021-05-05 DIAGNOSIS — J96.11 CHRONIC RESPIRATORY FAILURE WITH HYPOXIA (HCC): ICD-10-CM

## 2021-05-05 DIAGNOSIS — J44.1 CHRONIC OBSTRUCTIVE PULMONARY DISEASE WITH ACUTE EXACERBATION (HCC): ICD-10-CM

## 2021-05-05 DIAGNOSIS — I50.22 CHRONIC SYSTOLIC CHF (CONGESTIVE HEART FAILURE) (HCC): ICD-10-CM

## 2021-05-05 DIAGNOSIS — K21.9 GASTROESOPHAGEAL REFLUX DISEASE WITHOUT ESOPHAGITIS: ICD-10-CM

## 2021-05-05 DIAGNOSIS — I10 ESSENTIAL HYPERTENSION: ICD-10-CM

## 2021-05-05 DIAGNOSIS — I73.9 PAD (PERIPHERAL ARTERY DISEASE) (HCC): ICD-10-CM

## 2021-05-05 PROCEDURE — 99214 OFFICE O/P EST MOD 30 MIN: CPT | Performed by: INTERNAL MEDICINE

## 2021-05-05 RX ORDER — PREDNISONE 10 MG/1
TABLET ORAL
Qty: 30 TABLET | Refills: 0 | Status: SHIPPED | OUTPATIENT
Start: 2021-05-05 | End: 2021-07-06

## 2021-05-05 ASSESSMENT — ENCOUNTER SYMPTOMS
VOMITING: 0
RHINORRHEA: 0
BACK PAIN: 0
SHORTNESS OF BREATH: 1
WHEEZING: 0
ABDOMINAL PAIN: 0
NAUSEA: 0

## 2021-05-05 NOTE — PROGRESS NOTES
Subjective:      Patient ID: Francois Bravo is a 70 y.o. male. HPI    Patient is here for a follow up. He has not been hospitalized recently for COVID 19. He has been vaccinated for COVID-Moderna. He has a history of hypertension. It is well controlled. He is compliant and has no med side effects. No chest pain. Patient's COPD is controlled on present bronchodilator regimen. Patient is taking medications as instructed, no medication side effects noted, no significant ongoing wheezing or shortness of breath, using bronchodilator MDI less than twice a week. He quit smoking. He is doing well. He has lost weight  He had gained weight due to steroids. He is on 3 L of oxygen at night. He has history of a fib. He is in NSR. He has GERD. It is stable. He is urinating well. Review of Systems   Constitutional: Negative for activity change and appetite change. HENT: Negative for postnasal drip and rhinorrhea. Respiratory: Positive for shortness of breath. Negative for wheezing. Cardiovascular: Negative for chest pain, palpitations and leg swelling. Gastrointestinal: Negative for abdominal pain, nausea and vomiting. Genitourinary: Positive for frequency. Negative for difficulty urinating. Musculoskeletal: Negative for back pain and joint swelling. Skin: Negative for rash. Neurological: Negative for light-headedness. Psychiatric/Behavioral: Negative for sleep disturbance.      Past Medical History:   Diagnosis Date    A-fib [facility])     2/14/12    Acute respiratory failure with hypoxia ([facility] [address] 2/13/2012    Atrial fibrillation with RVR ([address]     2/14/12     Avulsion fracture of ankle 9/21/2015    Benign localized hyperplasia of prostate with urinary obstruction and other lower urinary tract symptoms (LUTS)(600.21) 8/17/2016    Chronic systolic CHF (congestive heart failure) ([address] 8/28/2017    Contusion of leg, right 9/21/2015    COPD (chronic obstructive pulmonary disease) ([facility] [address]     Rfl: 2    pantoprazole (PROTONIX) 40 MG tablet, TAKE ONE TABLET BY MOUTH EVERY DAY, Disp: 28 tablet, Rfl: 2    furosemide (LASIX) 40 MG tablet, TAKE ONE TABLET BY MOUTH EVERY DAY, Disp: 28 tablet, Rfl: 2    losartan (COZAAR) 100 MG tablet, TAKE ONE TABLET BY MOUTH DAILY, Disp: 28 tablet, Rfl: 2    metoprolol succinate (TOPROL XL) 50 MG extended release tablet, TAKE ONE TABLET BY MOUTH EVERY DAY, Disp: 28 tablet, Rfl: 2    atorvastatin (LIPITOR) 20 MG tablet, TAKE ONE TABLET BY MOUTH EVERY DAY, Disp: 28 tablet, Rfl: 2    rivaroxaban (XARELTO) 20 MG TABS tablet, TAKE ONE TABLET BY MOUTH DAILY WITH BREAKFAST, Disp: 28 tablet, Rfl: 2    ipratropium-albuterol (DUONEB) 0.5-2.5 (3) MG/3ML SOLN nebulizer solution, USE ONE UNIT DOSE (3ML) IN NEBULIZER AND INHALE INTO THE LUNGS EVERY 4 HOURS AS NEEDED FOR SHORTNESS OF BREATH, Disp: 360 mL, Rfl: 0    Potassium Citrate ER 15 MEQ (1620 MG) TBCR, Take 15 mEq by mouth daily, Disp: 90 tablet, Rfl: 0    SYMBICORT 160-4.5 MCG/ACT AERO, Inhale 2 puffs into the lungs 2 times daily, Disp: 10.2 g, Rfl: 0    Umeclidinium Bromide (INCRUSE ELLIPTA) 62.5 MCG/INH AEPB, Inhale 1 Inhaler into the lungs daily, Disp: 1 each, Rfl: 5    Cholecalciferol (VITAMIN D-3) 25 MCG (1000 UT) CAPS, Take by mouth daily , Disp: , Rfl:     albuterol sulfate HFA (PROVENTIL HFA) 108 (90 Base) MCG/ACT inhaler, Inhale 2 puffs into the lungs every 6 hours as needed for Wheezing (with spacer), Disp: 1 Inhaler, Rfl: 11      /80 (Site: Left Upper Arm, Position: Sitting, Cuff Size: Medium Adult)   Pulse 70   Temp 97.5 °F (36.4 °C)   Resp 14   Ht 5' 11\" (1.803 m)   Wt 226 lb (102.5 kg)   SpO2 92%   BMI 31.52 kg/m²      Objective:   Physical Exam   Constitutional: He is oriented to person, place, and time. He appears well-developed and well-nourished. HENT:   Head: Normocephalic. Eyes: Pupils are equal, round, and reactive to light.  Conjunctivae and EOM are normal.   Neck: Trachea normal and normal range of motion. Neck supple. No JVD present. Carotid bruit is not present. No thyroid mass and no thyromegaly present. Cardiovascular: Normal rate, regular rhythm and normal heart sounds. Exam reveals no gallop. No murmur heard. Pulmonary/Chest: Effort normal. No respiratory distress. He has wheezes (mild). He has no rales. Abdominal: Soft. Bowel sounds are normal. He exhibits no distension and no mass. There is no splenomegaly or hepatomegaly. There is no abdominal tenderness. Musculoskeletal: Normal range of motion. General: No edema. Lymphadenopathy:     He has no cervical adenopathy. Neurological: He is alert and oriented to person, place, and time. He has normal strength and normal reflexes. No cranial nerve deficit. Skin: Skin is warm and dry. No rash noted. Psychiatric: He has a normal mood and affect. His behavior is normal. Judgment and thought content normal.     CT chest 4/19/2017     Thoracic aortic aneurysm as above, not substantially changed.       Emphysema.       A few nodules which are stable since 2015.  As there is a history of smoking,   recommend yearly low-dose lung cancer screening CT. Assessment:       Diagnosis Orders   1. Essential hypertension  Comprehensive Metabolic Panel    CBC Auto Differential    Uric Acid   2. Chronic systolic CHF (congestive heart failure) ([address]     3. Chronic obstructive pulmonary disease with acute exacerbation (HCC)     4. Stage 3b chronic kidney disease     5. Gastroesophageal reflux disease without esophagitis     6. Chronic respiratory failure with hypoxia (HCC)     7. Atrial fibrillation, controlled ([address]     8. PAD (peripheral artery disease) ([address]            Plan:        #  Hypertension: Well controlled. #  A fib: in SR. On Xarelto. #  COPD stable. No flare up. on Symbicort. Proventil prn. Use HHn. I wrote for a prednisone taper for an emergency. #  GERD: on Prilosec. #  PN right: stable. #  BPH with symptoms. On Flomax. #  Hyperlipidemia on Lipitor. #  BPH with LUTS stable. #  Chronic respiratory failure on oxygen at hs. #  Chronic systolic CHF. Check labs. Discussed use, benefit, and side effects of prescribed medications. Barriers to medication compliance addressed. All patient questions answered. Pt voiced understanding. Decrease calorie intake. Exercise,weight loss recommended. The current medical regimen is effective;  continue present plan and medications. See orders.

## 2021-05-06 LAB
A/G RATIO: 1.5 (ref 1.1–2.2)
ALBUMIN SERPL-MCNC: 4.4 G/DL (ref 3.4–5)
ALP BLD-CCNC: 86 U/L (ref 40–129)
ALT SERPL-CCNC: 6 U/L (ref 10–40)
ANION GAP SERPL CALCULATED.3IONS-SCNC: 13 MMOL/L (ref 3–16)
AST SERPL-CCNC: 10 U/L (ref 15–37)
BASOPHILS ABSOLUTE: 0.1 K/UL (ref 0–0.2)
BASOPHILS RELATIVE PERCENT: 1.1 %
BILIRUB SERPL-MCNC: 0.4 MG/DL (ref 0–1)
BUN BLDV-MCNC: 18 MG/DL (ref 7–20)
CALCIUM SERPL-MCNC: 9.1 MG/DL (ref 8.3–10.6)
CHLORIDE BLD-SCNC: 104 MMOL/L (ref 99–110)
CO2: 24 MMOL/L (ref 21–32)
CREAT SERPL-MCNC: 1.5 MG/DL (ref 0.8–1.3)
EOSINOPHILS ABSOLUTE: 0.7 K/UL (ref 0–0.6)
EOSINOPHILS RELATIVE PERCENT: 8.1 %
GFR AFRICAN AMERICAN: 55
GFR NON-AFRICAN AMERICAN: 46
GLOBULIN: 3 G/DL
GLUCOSE BLD-MCNC: 76 MG/DL (ref 70–99)
HCT VFR BLD CALC: 41.5 % (ref 40.5–52.5)
HEMOGLOBIN: 13.5 G/DL (ref 13.5–17.5)
LYMPHOCYTES ABSOLUTE: 2.1 K/UL (ref 1–5.1)
LYMPHOCYTES RELATIVE PERCENT: 25.9 %
MCH RBC QN AUTO: 29.1 PG (ref 26–34)
MCHC RBC AUTO-ENTMCNC: 32.4 G/DL (ref 31–36)
MCV RBC AUTO: 89.8 FL (ref 80–100)
MONOCYTES ABSOLUTE: 0.7 K/UL (ref 0–1.3)
MONOCYTES RELATIVE PERCENT: 8.8 %
NEUTROPHILS ABSOLUTE: 4.6 K/UL (ref 1.7–7.7)
NEUTROPHILS RELATIVE PERCENT: 56.1 %
PDW BLD-RTO: 16.7 % (ref 12.4–15.4)
PLATELET # BLD: 249 K/UL (ref 135–450)
PMV BLD AUTO: 9.4 FL (ref 5–10.5)
POTASSIUM SERPL-SCNC: 4.4 MMOL/L (ref 3.5–5.1)
RBC # BLD: 4.62 M/UL (ref 4.2–5.9)
SODIUM BLD-SCNC: 141 MMOL/L (ref 136–145)
TOTAL PROTEIN: 7.4 G/DL (ref 6.4–8.2)
URIC ACID, SERUM: 8.2 MG/DL (ref 3.5–7.2)
WBC # BLD: 8.2 K/UL (ref 4–11)

## 2021-07-06 RX ORDER — PREDNISONE 10 MG/1
TABLET ORAL
Qty: 30 TABLET | Refills: 0 | Status: SHIPPED | OUTPATIENT
Start: 2021-07-06 | End: 2021-09-13 | Stop reason: SDUPTHER

## 2021-07-22 RX ORDER — METOPROLOL SUCCINATE 50 MG/1
TABLET, EXTENDED RELEASE ORAL
Qty: 28 TABLET | Refills: 0 | Status: SHIPPED | OUTPATIENT
Start: 2021-07-22 | End: 2021-08-20

## 2021-07-22 RX ORDER — ATORVASTATIN CALCIUM 20 MG/1
TABLET, FILM COATED ORAL
Qty: 28 TABLET | Refills: 0 | Status: SHIPPED | OUTPATIENT
Start: 2021-07-22 | End: 2021-08-20

## 2021-07-22 RX ORDER — PANTOPRAZOLE SODIUM 40 MG/1
TABLET, DELAYED RELEASE ORAL
Qty: 28 TABLET | Refills: 0 | Status: SHIPPED | OUTPATIENT
Start: 2021-07-22 | End: 2021-08-20

## 2021-07-22 RX ORDER — AMLODIPINE BESYLATE 5 MG/1
TABLET ORAL
Qty: 28 TABLET | Refills: 0 | Status: SHIPPED | OUTPATIENT
Start: 2021-07-22 | End: 2021-08-20

## 2021-07-22 RX ORDER — LOSARTAN POTASSIUM 100 MG/1
TABLET ORAL
Qty: 28 TABLET | Refills: 0 | Status: SHIPPED | OUTPATIENT
Start: 2021-07-22 | End: 2021-08-20

## 2021-07-26 RX ORDER — FUROSEMIDE 40 MG/1
TABLET ORAL
Qty: 28 TABLET | Refills: 0 | Status: SHIPPED | OUTPATIENT
Start: 2021-07-26 | End: 2021-08-20

## 2021-08-20 RX ORDER — PANTOPRAZOLE SODIUM 40 MG/1
TABLET, DELAYED RELEASE ORAL
Qty: 30 TABLET | Refills: 0 | Status: SHIPPED | OUTPATIENT
Start: 2021-08-20 | End: 2021-09-13 | Stop reason: SDUPTHER

## 2021-08-20 RX ORDER — METOPROLOL SUCCINATE 50 MG/1
TABLET, EXTENDED RELEASE ORAL
Qty: 30 TABLET | Refills: 0 | Status: SHIPPED | OUTPATIENT
Start: 2021-08-20 | End: 2021-09-13 | Stop reason: SDUPTHER

## 2021-08-20 RX ORDER — ATORVASTATIN CALCIUM 20 MG/1
TABLET, FILM COATED ORAL
Qty: 30 TABLET | Refills: 0 | Status: SHIPPED | OUTPATIENT
Start: 2021-08-20 | End: 2021-09-13 | Stop reason: SDUPTHER

## 2021-08-20 RX ORDER — FUROSEMIDE 40 MG/1
TABLET ORAL
Qty: 30 TABLET | Refills: 0 | Status: SHIPPED | OUTPATIENT
Start: 2021-08-20 | End: 2021-09-13 | Stop reason: SDUPTHER

## 2021-08-20 RX ORDER — AMLODIPINE BESYLATE 5 MG/1
TABLET ORAL
Qty: 28 TABLET | Refills: 0 | Status: SHIPPED | OUTPATIENT
Start: 2021-08-20 | End: 2021-09-13 | Stop reason: SDUPTHER

## 2021-08-20 RX ORDER — LOSARTAN POTASSIUM 100 MG/1
TABLET ORAL
Qty: 30 TABLET | Refills: 0 | Status: SHIPPED | OUTPATIENT
Start: 2021-08-20 | End: 2021-09-13 | Stop reason: SDUPTHER

## 2021-09-13 ENCOUNTER — OFFICE VISIT (OUTPATIENT)
Dept: INTERNAL MEDICINE CLINIC | Age: 76
End: 2021-09-13

## 2021-09-13 VITALS
BODY MASS INDEX: 33.6 KG/M2 | DIASTOLIC BLOOD PRESSURE: 80 MMHG | WEIGHT: 240 LBS | HEART RATE: 92 BPM | SYSTOLIC BLOOD PRESSURE: 120 MMHG | RESPIRATION RATE: 12 BRPM | OXYGEN SATURATION: 96 % | HEIGHT: 71 IN

## 2021-09-13 DIAGNOSIS — Z00.00 ROUTINE GENERAL MEDICAL EXAMINATION AT A HEALTH CARE FACILITY: ICD-10-CM

## 2021-09-13 DIAGNOSIS — I48.0 PAROXYSMAL ATRIAL FIBRILLATION (HCC): ICD-10-CM

## 2021-09-13 DIAGNOSIS — Z00.00 MEDICARE ANNUAL WELLNESS VISIT, SUBSEQUENT: Primary | ICD-10-CM

## 2021-09-13 DIAGNOSIS — I42.0 DILATED CARDIOMYOPATHY (HCC): ICD-10-CM

## 2021-09-13 DIAGNOSIS — I71.20 THORACIC AORTIC ANEURYSM WITHOUT RUPTURE: ICD-10-CM

## 2021-09-13 DIAGNOSIS — J96.11 CHRONIC RESPIRATORY FAILURE WITH HYPOXIA (HCC): ICD-10-CM

## 2021-09-13 DIAGNOSIS — I73.9 PAD (PERIPHERAL ARTERY DISEASE) (HCC): ICD-10-CM

## 2021-09-13 DIAGNOSIS — I10 ESSENTIAL HYPERTENSION: ICD-10-CM

## 2021-09-13 DIAGNOSIS — J44.1 CHRONIC OBSTRUCTIVE PULMONARY DISEASE WITH ACUTE EXACERBATION (HCC): ICD-10-CM

## 2021-09-13 DIAGNOSIS — I48.91 ATRIAL FIBRILLATION, CONTROLLED (HCC): ICD-10-CM

## 2021-09-13 DIAGNOSIS — Z23 NEED FOR INFLUENZA VACCINATION: ICD-10-CM

## 2021-09-13 DIAGNOSIS — K21.9 GASTROESOPHAGEAL REFLUX DISEASE WITHOUT ESOPHAGITIS: ICD-10-CM

## 2021-09-13 DIAGNOSIS — Z71.89 ACP (ADVANCE CARE PLANNING): ICD-10-CM

## 2021-09-13 DIAGNOSIS — N18.30 STAGE 3 CHRONIC KIDNEY DISEASE, UNSPECIFIED WHETHER STAGE 3A OR 3B CKD (HCC): ICD-10-CM

## 2021-09-13 PROCEDURE — 90662 IIV NO PRSV INCREASED AG IM: CPT | Performed by: INTERNAL MEDICINE

## 2021-09-13 PROCEDURE — G0439 PPPS, SUBSEQ VISIT: HCPCS | Performed by: INTERNAL MEDICINE

## 2021-09-13 PROCEDURE — G0008 ADMIN INFLUENZA VIRUS VAC: HCPCS | Performed by: INTERNAL MEDICINE

## 2021-09-13 PROCEDURE — 99497 ADVNCD CARE PLAN 30 MIN: CPT | Performed by: INTERNAL MEDICINE

## 2021-09-13 RX ORDER — PREDNISONE 10 MG/1
10 TABLET ORAL DAILY PRN
Qty: 30 TABLET | Refills: 0 | Status: SHIPPED | OUTPATIENT
Start: 2021-09-13 | End: 2021-11-03

## 2021-09-13 RX ORDER — LOSARTAN POTASSIUM 100 MG/1
100 TABLET ORAL DAILY
Qty: 30 TABLET | Refills: 2 | Status: SHIPPED | OUTPATIENT
Start: 2021-09-13 | End: 2021-12-09

## 2021-09-13 RX ORDER — AMLODIPINE BESYLATE 5 MG/1
5 TABLET ORAL DAILY
Qty: 30 TABLET | Refills: 2 | Status: SHIPPED | OUTPATIENT
Start: 2021-09-13 | End: 2021-12-09

## 2021-09-13 RX ORDER — ATORVASTATIN CALCIUM 20 MG/1
20 TABLET, FILM COATED ORAL DAILY
Qty: 30 TABLET | Refills: 2 | Status: SHIPPED | OUTPATIENT
Start: 2021-09-13 | End: 2021-12-09

## 2021-09-13 RX ORDER — POTASSIUM CITRATE 15 MEQ/1
15 TABLET, EXTENDED RELEASE ORAL DAILY
Qty: 90 TABLET | Refills: 0 | Status: SHIPPED | OUTPATIENT
Start: 2021-09-13 | End: 2021-12-09

## 2021-09-13 RX ORDER — PANTOPRAZOLE SODIUM 40 MG/1
40 TABLET, DELAYED RELEASE ORAL DAILY
Qty: 30 TABLET | Refills: 2 | Status: SHIPPED | OUTPATIENT
Start: 2021-09-13 | End: 2021-12-09

## 2021-09-13 RX ORDER — FUROSEMIDE 40 MG/1
40 TABLET ORAL DAILY
Qty: 30 TABLET | Refills: 2 | Status: SHIPPED | OUTPATIENT
Start: 2021-09-13 | End: 2021-12-09

## 2021-09-13 RX ORDER — METOPROLOL SUCCINATE 50 MG/1
50 TABLET, EXTENDED RELEASE ORAL DAILY
Qty: 30 TABLET | Refills: 2 | Status: SHIPPED | OUTPATIENT
Start: 2021-09-13 | End: 2021-12-09

## 2021-09-13 ASSESSMENT — PATIENT HEALTH QUESTIONNAIRE - PHQ9
SUM OF ALL RESPONSES TO PHQ QUESTIONS 1-9: 0
1. LITTLE INTEREST OR PLEASURE IN DOING THINGS: 0
2. FEELING DOWN, DEPRESSED OR HOPELESS: 0
SUM OF ALL RESPONSES TO PHQ QUESTIONS 1-9: 0
SUM OF ALL RESPONSES TO PHQ QUESTIONS 1-9: 0
SUM OF ALL RESPONSES TO PHQ9 QUESTIONS 1 & 2: 0

## 2021-09-13 ASSESSMENT — LIFESTYLE VARIABLES: HOW OFTEN DO YOU HAVE A DRINK CONTAINING ALCOHOL: 0

## 2021-09-13 NOTE — PATIENT INSTRUCTIONS
Advance Directives: Care Instructions  Overview  An advance directive is a legal way to state your wishes at the end of your life. It tells your family and your doctor what to do if you can't say what you want. There are two main types of advance directives. You can change them any time your wishes change. Living will. This form tells your family and your doctor your wishes about life support and other treatment. The form is also called a declaration. Medical power of . This form lets you name a person to make treatment decisions for you when you can't speak for yourself. This person is called a health care agent (health care proxy, health care surrogate). The form is also called a durable power of  for health care. If you do not have an advance directive, decisions about your medical care may be made by a family member, or by a doctor or a  who doesn't know you. It may help to think of an advance directive as a gift to the people who care for you. If you have one, they won't have to make tough decisions by themselves. Follow-up care is a key part of your treatment and safety. Be sure to make and go to all appointments, and call your doctor if you are having problems. It's also a good idea to know your test results and keep a list of the medicines you take. What should you include in an advance directive? Many states have a unique advance directive form. (It may ask you to address specific issues.) Or you might use a universal form that's approved by many states. If your form doesn't tell you what to address, it may be hard to know what to include in your advance directive. Use the questions below to help you get started. · Who do you want to make decisions about your medical care if you are not able to? · What life-support measures do you want if you have a serious illness that gets worse over time or can't be cured? · What are you most afraid of that might happen? (Maybe you're afraid of having pain, losing your independence, or being kept alive by machines.)  · Where would you prefer to die? (Your home? A hospital? A nursing home?)  · Do you want to donate your organs when you die? · Do you want certain Mandaeism practices performed before you die? When should you call for help? Be sure to contact your doctor if you have any questions. Where can you learn more? Go to [email or web address]://[email or web address] [email or web address].[email or web address] org and sign in to your [employer] account. Enter R264 in the [employer] box to learn more about \"Advance Directives: Care Instructions. \"     If you do not have an account, please click on the \"Sign Up Now\" link. Current as of: March 17, 2021               Content Version: 12.9  © 2006-2021 Healthwise, [employer]. Care instructions adapted under license by [facility] ([place] [facility]). If you have questions about a medical condition or this instruction, always ask your healthcare professional. [place] [address] any warranty or liability for your use of this information. Learning About Medical Power of   What is a medical power of ? A medical power of , also called a durable power of  for health care, is one type of the legal forms called advance directives. It lets you name the person you want to make treatment decisions for you if you can't speak or decide for yourself. The person you choose is called your health care agent. This person is also called a health care proxy or health care surrogate. A medical power of  may be called something else in your state. How do you choose a health care agent? Choose your health care agent carefully. This person may or may not be a family member. Talk to the person before you make your final decision. Make sure he or she is comfortable with this responsibility. It's a good idea to choose someone who:  · Is at least 25years old.   · Knows you well and understands what makes life meaningful for you. · Understands your Restorationist and moral values. · Will do what you want, not what he or she wants. · Will be able to make difficult choices at a stressful time. · Will be able to refuse or stop treatment, if that is what you would want, even if you could die. · Will be firm and confident with health professionals if needed. · Will ask questions to get needed information. · Lives near you or agrees to travel to you if needed. Your family may help you make medical decisions while you can still be part of that process. But it's important to choose one person to be your health care agent in case you aren't able to make decisions for yourself. If you don't fill out the legal form and name a health care agent, the decisions your family can make may be limited. A health care agent may be called something else in your state. Who will make decisions for you if you don't have a health care agent? If you don't have a health care agent or a living will, you may not get the care you want. Decisions may be made by family members who disagree about your medical care. Or decisions may be made by a medical professional who doesn't know you well. In some cases, a  makes the decisions. When you name a health care agent, it is very clear who has the power to make health decisions for you. How do you name a health care agent? You name your health care agent on a legal form. This form is usually called a medical power of . Ask your hospital, state bar association, or office on aging where to find these forms. You must sign the form to make it legal. Some states require you to get the form notarized. This means that a person called a  watches you sign the form and then he or she signs the form. Some states also require that two or more witnesses sign the form. Be sure to tell your family members and doctors who your health care agent is.   Where can you learn more? Go to [email or web address]://[email or web address] org and sign in to your [employer] account. Enter [id number] in the [facility] box to learn more about \"Learning About [address]. \"     If you do not have an account, please click on the \"Sign Up Now\" link. Current as of: March 17, 2021               Content Version: 12.9  © 2006-2021 Healthwise, [employer]. Care instructions adapted under license by [facility] ([facility]). If you have questions about a medical condition or this instruction, always ask your healthcare professional. [address] any warranty or liability for your use of this information. Learning About Living Perroy  What is a living will? A living will, also called a declaration, is a legal form. It tells your family and your doctor your wishes when you can't speak for yourself. It's used by the health professionals who will treat you as you near the end of your life or if you get seriously hurt or ill. If you put your wishes in writing, your loved ones and others will know what kind of care you want. They won't need to guess. This can ease your mind and be helpful to others. And you can change or cancel your living will at any time. A living will is not the same as an estate or property will. An estate will explains what you want to happen with your money and property after you die. How do you use it? A living will is used to describe the kinds of treatment or life support you want as you near the end of your life or if you get seriously hurt or ill. Keep these facts in mind about living angela. · Your living will is used only if you can't speak or make decisions for yourself. Most often, one or more doctors must certify that you can't speak or decide for yourself before your living will takes effect. · If you get better and can speak for yourself again, you can accept or refuse any treatment.  It doesn't matter what you said in your living will. · Some states may limit your right to refuse treatment in certain cases. For example, you may need to clearly state in your living will that you don't want artificial hydration and nutrition, such as being fed through a tube. Is a living will a legal document? A living will is a legal document. Each state has its own laws about living angela. And a living will may be called something else in your state. Here are some things to know about living angela. · You don't need an  to complete a living will. But legal advice can be helpful if your state's laws are unclear. It can also help if your health history is complicated or your family can't agree on what should be in your living will. · You can change your living will at any time. Some people find that their wishes about end-of-life care change as their health changes. If you make big changes to your living will, complete a new form. · If you move to another state, make sure that your living will is legal in the state where you now live. In most cases, doctors will respect your wishes even if you have a form from a different state. · You might use a universal form that has been approved by many states. This kind of form can sometimes be filled out and stored online. Your digital copy will then be available wherever you have a connection to the internet. The doctors and nurses who need to treat you can find it right away. · Your state may offer an online registry. This is another place where you can store your living will online. · It's a good idea to get your living will notarized. This means using a person called a  to watch two people sign, or witness, your living will. What should you know when you create a living will? Here are some questions to ask yourself as you make your living will:  · Do you know enough about life support methods that might be used?  If not, talk to your doctor so you know what might be done if you can't breathe on your own, your heart stops, or you can't swallow. · What things would you still want to be able to do after you receive life-support methods? Would you want to be able to walk? To speak? To eat on your own? To live without the help of machines? · Do you want certain Holiness practices performed if you become very ill? · If you have a choice, where do you want to be cared for? In your home? At a hospital or nursing home? · If you have a choice at the end of your life, where would you prefer to die? At home? In a hospital or nursing home? Somewhere else? · Would you prefer to be buried or cremated? · Do you want your organs to be donated after you die? What should you do with your living will? · Make sure that your family members and your health care agent have copies of your living will (also called a declaration). · Give your doctor a copy of your living will. Ask him or her to keep it as part of your medical record. If you have more than one doctor, make sure that each one has a copy. · Put a copy of your living will where it can be easily found. For example, some people may put a copy on their refrigerator door. If you are using a digital copy, be sure your doctor, family members, and health care agent know how to find and access it. Where can you learn more? Go to [email or web address] org and sign in to your [employer] account. Enter [id number] in the [facility] box to learn more about \"Learning About Living David. \"     If you do not have an account, please click on the \"Sign Up Now\" link. Current as of: March 17, 2021               Content Version: 12.9  © 9741-5103 Healthwise, [employer]. Care instructions adapted under license by [facility] ([facility]).  If you have questions about a medical condition or this instruction, always ask your healthcare professional. [address] any warranty or liability for your use of this information. Personalized Preventive Plan for George Davalos - 9/13/2021  Medicare offers a range of preventive health benefits. Some of the tests and screenings are paid in full while other may be subject to a deductible, co-insurance, and/or copay. Some of these benefits include a comprehensive review of your medical history including lifestyle, illnesses that may run in your family, and various assessments and screenings as appropriate. After reviewing your medical record and screening and assessments performed today your provider may have ordered immunizations, labs, imaging, and/or referrals for you. A list of these orders (if applicable) as well as your Preventive Care list are included within your After Visit Summary for your review. Other Preventive Recommendations:    · A preventive eye exam performed by an eye specialist is recommended every 1-2 years to screen for glaucoma; cataracts, macular degeneration, and other eye disorders. · A preventive dental visit is recommended every 6 months. · Try to get at least 150 minutes of exercise per week or 10,000 steps per day on a pedometer . · Order or download the FREE \"Exercise & Physical Activity: Your Everyday Guide\" from [employer]. Call 9-143.688.7391 or search [employer] online. · You need 2522-1231 mg of calcium and 5176-2822 IU of vitamin D per day. It is possible to meet your calcium requirement with diet alone, but a vitamin D supplement is usually necessary to meet this goal.  · When exposed to the sun, use a sunscreen that protects against both UVA and UVB radiation with an SPF of 30 or greater. Reapply every 2 to 3 hours or after sweating, drying off with a towel, or swimming. · Always wear a seat belt when traveling in a car. Always wear a helmet when riding a bicycle or motorcycle.

## 2021-09-13 NOTE — PROGRESS NOTES
Medicare Annual Wellness Visit  Name: Ashely Landa Date: 2021   MRN: [id number] Sex: Male   Age: 78 y.o. Ethnicity: Non- / Non    : 1945 Race: White (non-)      Brandon Levine is here for Medicare AWV    Screenings for behavioral, psychosocial and functional/safety risks, and cognitive dysfunction are all negative except as indicated below. These results, as well as other patient data from the [address] form, are documented in Flowsheets linked to this Encounter. He has a history of hypertension. It is well controlled. He is compliant and has no med side effects. No chest pain. Patient's COPD is controlled on present bronchodilator regimen. Patient is taking medications as instructed, no medication side effects noted, no significant ongoing wheezing or shortness of breath, using bronchodilator MDI less than twice a week. He quit smoking. He is doing well. He has gained weight. He is on 3 L of oxygen at night. He uses Bipap at hs. He takes Prednisone prn for cough. He has history of a fib. He is in NSR. He takes Xarelto. He has GERD. It is stable. He is urinating well. Allergies   Allergen Reactions    Amiodarone Anaphylaxis     Thyriod toxic          Prior to Visit Medications    Medication Sig Taking?  Authorizing Provider   pantoprazole (PROTONIX) 40 MG tablet Take 1 tablet by mouth daily Yes Nasir Ch MD   losartan (COZAAR) 100 MG tablet Take 1 tablet by mouth daily Yes Nasir Ch MD   rivaroxaban (XARELTO) 20 MG TABS tablet Take 1 tablet by mouth daily (with breakfast) TAKE ONE TABLET BY MOUTH DAILY WITH BREAKFAST Yes Nasir Ch MD   amLODIPine (NORVASC) 5 MG tablet Take 1 tablet by mouth daily Yes Nasir Ch MD   metoprolol succinate (TOPROL XL) 50 MG extended release tablet Take 1 tablet by mouth daily Yes Nasir Ch MD   atorvastatin (LIPITOR) 20 MG tablet Take 1 tablet by mouth daily Yes Shira Barrientos MD   furosemide (LASIX) 40 MG tablet Take 1 tablet by mouth daily Yes Shira Barrientos MD   predniSONE (DELTASONE) 10 MG tablet Take 1 tablet by mouth daily as needed (cough or dyspnea) Yes Shira Barrientos MD   Potassium Citrate ER 15 MEQ (1620 MG) TBCR Take 15 mEq by mouth daily Yes Shira Barrientos MD   ipratropium-albuterol (DUONEB) 0.5-2.5 (3) MG/3ML SOLN nebulizer solution USE ONE UNIT DOSE (3ML) IN NEBULIZER AND INHALE INTO THE LUNGS EVERY 4 HOURS AS NEEDED FOR SHORTNESS OF BREATH  Shira Barrientos MD   SYMBICORT 160-4.5 MCG/ACT AERO Inhale 2 puffs into the lungs 2 times daily  Shira Barrientos MD   Umeclidinium Bromide (INCRUSE ELLIPTA) 62.5 MCG/INH AEPB Inhale 1 Inhaler into the lungs daily  Pop Dueñas MD   Cholecalciferol (VITAMIN D-3) 25 MCG (1000 UT) CAPS Take by mouth daily   Historical Provider, MD   albuterol sulfate HFA (PROVENTIL HFA) 108 (90 Base) MCG/ACT inhaler Inhale 2 puffs into the lungs every 6 hours as needed for Wheezing (with spacer)  Shira Barrientos MD         Past Medical History:   Diagnosis Date    A-fib ([address]     2/14/12    Acute conjunctivitis of both eyes     Acute on chronic respiratory failure with hypoxia ([address] 2/13/2012    Acute respiratory failure with hypoxia ([address] 2/13/2012    Atrial fibrillation with RVR ([address]     2/14/12     Avulsion fracture of ankle 9/21/2015    Benign localized hyperplasia of prostate with urinary obstruction and other lower urinary tract symptoms (LUTS)(600.21) 8/17/2016    Chronic systolic CHF (congestive heart failure) ([address] 8/28/2017    Contusion of leg, right 9/21/2015    COPD (chronic obstructive pulmonary disease) ([address]     Fractures     GERD (gastroesophageal reflux disease) 6/15/2015    Hypertension     Hypertensive urgency 8/4/2019    Hypertrophy of prostate without urinary obstruction and other lower urinary tract symptoms (LUTS) 6/15/2015  No history of procedure     no previos colonoscopy    PAD (peripheral artery disease) ([address] 10/9/2017    Pneumonia     Thoracic aortic aneurysm [facility])        Past Surgical History:   Procedure Laterality Date    CATARACT REMOVAL WITH IMPLANT Right 09/06/2018     PHACO EMULSIFICATION OF CATARACT WITH INTRAOCULAR LENS IMPLANT RIGHT EYE    COLONOSCOPY  2/24/2016    sigmoid polyps    ENDOSCOPY, COLON, DIAGNOSTIC  11/13/2019    egd; esophagitis/gastritis    HERNIA REPAIR      SD XCAPSL CTRC RMVL INSJ IO LENS PROSTH W/O ECP Right 9/6/2018    PHACO EMULSIFICATION OF CATARACT WITH INTRAOCULAR LENS IMPLANT RIGHT EYE performed by Katerin Osman MD at [address] [facility] RMVL INSJ IO LENS PROSTH W/O ECP Left 10/18/2018    PHACO EMULSIFICATION OF CATARACT WITH  INTRAOCULAR LENS IMPLANT LEFT EYE performed by Katerin Osman MD at [facility] N/A 11/13/2019    EGD BIOPSY performed by Cruz Mirza MD at [facility]         Family History   Problem Relation Age of Onset    Alcohol Abuse Sister        CareTeam (Including outside providers/suppliers regularly involved in providing care):   Patient Care Team:  Dimitris Steel MD as PCP - Jacqui Stout MD as PCP - [facility] Provider  Deb Ray RN as Nurse Navigator  Tiarra Ochoa MD as Consulting Physician (Pulmonology)    Wt Readings from Last 3 Encounters:   09/13/21 240 lb (108.9 kg)   05/05/21 226 lb (102.5 kg)   02/01/21 242 lb (109.8 kg)     Vitals:    09/13/21 1314   BP: 120/80   Site: Right Upper Arm   Position: Sitting   Cuff Size: Medium Adult   Pulse: 92   Resp: 12   SpO2: 96%   Weight: 240 lb (108.9 kg)   Height: 5' 11\" (1.803 m)     Body mass index is 33.47 kg/m². Based upon direct observation of the patient, evaluation of cognition reveals recent and remote memory intact.     General Appearance: alert and oriented to person, place and time, well developed and well- nourished, in no acute distress  Skin: warm and dry, no rash or erythema  Head: normocephalic and atraumatic  Eyes: pupils equal, round, and reactive to light, extraocular eye movements intact, conjunctivae normal  ENT: tympanic membrane, external ear and ear canal normal bilaterally, nose without deformity, nasal mucosa and turbinates normal without polyps  Neck: supple and non-tender without mass, no thyromegaly or thyroid nodules, no cervical lymphadenopathy  Pulmonary/Chest: clear to auscultation bilaterally- no wheezes, rales or rhonchi, diminished air movement, no respiratory distress  Cardiovascular: normal rate, regular rhythm, normal S1 and S2, no murmurs, rubs, clicks, or gallops, distal pulses intact, no carotid bruits  Abdomen: soft, non-tender, non-distended, normal bowel sounds, no masses or organomegaly  Extremities: no cyanosis, clubbing Trace edema  Musculoskeletal: normal range of motion, no joint swelling, deformity or tenderness  Neurologic: reflexes normal and symmetric, no cranial nerve deficit, gait, coordination and speech normal    Patient's complete Health Risk Assessment and screening values have been reviewed and are found in Flowsheets. The following problems were reviewed today and where indicated follow up appointments were made and/or referrals ordered. Positive Risk Factor Screenings with Interventions:            General Health and ACP:  General  In general, how would you say your health is?: Good  In the past 7 days, have you experienced any of the following?  New or Increased Pain, New or Increased Fatigue, Loneliness, Social Isolation, Stress or Anger?: (!) Anger  Do you get the social and emotional support that you need?: Yes  Do you have a Living Will?: (!) No  Advance Directives     Power of  Living Will ACP-Advance Directive ACP-Power of     Not on File Not on File Not on File Not on File      General Health Risk Interventions:  · No Living Will: Advance Care Planning addressed with patient today    Health Habits/Nutrition:  Health Habits/Nutrition  Do you exercise for at least 20 minutes 2-3 times per week?: (!) No  Have you lost any weight without trying in the past 3 months?: No  Do you eat only one meal per day?: No  Have you seen the dentist within the past year?: N/A - wear dentures  Body mass index: (!) 33.47  Health Habits/Nutrition Interventions:  · Inadequate physical activity:  patient is not ready to increase his/her physical activity level at this time  · Nutritional issues:  educational materials for healthy, well-balanced diet provided     Safety:  Safety  Do you have working smoke detectors?: (!) No  Have all throw rugs been removed or fastened?: Yes  Do you have non-slip mats or surfaces in all bathtubs/showers?: Yes  Do all of your stairways have a railing or banister?: Yes  Are your doorways, halls and stairs free of clutter?: Yes  Do you always fasten your seatbelt when you are in a car?: Yes  Safety Interventions:  · Home safety tips provided     Personalized Preventive Plan   Current Health Maintenance Status  Immunization History   Administered Date(s) Administered    COVID-19, Moderna, PF, 100mcg/0.5mL 02/04/2021, 03/04/2021    Influenza Vaccine, unspecified formulation 12/14/2016    Influenza Whole 09/15/2011    Influenza, High Dose (Fluzone 65 yrs and older) 10/09/2017, 11/26/2018    Influenza, High-dose, Quadv, 65 yrs +, IM (Fluzone) 09/13/2021    Influenza, Quadv, IM, PF (6 mo and older Fluzone, Flulaval, Fluarix, and 3 yrs and older Afluria) 11/05/2019, 10/27/2020    Pneumococcal Conjugate 13-valent ([id number]) 08/17/2016    Pneumococcal Polysaccharide ([id number]) 09/13/2007, 02/12/2012, 08/28/2017    Td, unspecified formulation 04/10/2012        Health Maintenance   Topic Date Due    Shingles Vaccine (1 of 2) Never done    DTaP/Tdap/Td vaccine (1 - Tdap) 04/11/2012    Annual Wellness Visit (AWV)  Never done    Low Take 1 tablet by mouth daily  -     furosemide (LASIX) 40 MG tablet; Take 1 tablet by mouth daily  -     predniSONE (DELTASONE) 10 MG tablet; Take 1 tablet by mouth daily as needed (cough or dyspnea)  -     Potassium Citrate ER 15 MEQ (1620 MG) TBCR; Take 15 mEq by mouth daily                 Advance Care Planning   Advanced Care Planning: Discussed the patients choices for care and treatment in case of a health event that adversely affects decision-making abilities. Also discussed the patients long-term treatment options. Reviewed with the patient the [address] [place] [address] Declaration forms  Reviewed the process of designating a competent adult as an Agent (or -in-fact) that could take make health care decisions for the patient if incompetent. Patient was asked to complete the declaration forms, either acknowledge the forms by a public notary or an eligible witness and provide a signed copy to the practice office.   Time spent (minutes): 3

## 2021-11-03 RX ORDER — IPRATROPIUM BROMIDE AND ALBUTEROL SULFATE 2.5; .5 MG/3ML; MG/3ML
SOLUTION RESPIRATORY (INHALATION)
Qty: 360 ML | Refills: 0 | Status: SHIPPED | OUTPATIENT
Start: 2021-11-03 | End: 2021-12-01

## 2021-11-03 RX ORDER — BUDESONIDE AND FORMOTEROL FUMARATE DIHYDRATE 160; 4.5 UG/1; UG/1
AEROSOL RESPIRATORY (INHALATION)
Qty: 10.2 G | Refills: 0 | Status: SHIPPED | OUTPATIENT
Start: 2021-11-03 | End: 2021-12-15 | Stop reason: SDUPTHER

## 2021-11-03 RX ORDER — PREDNISONE 10 MG/1
TABLET ORAL
Qty: 30 TABLET | Refills: 0 | Status: SHIPPED | OUTPATIENT
Start: 2021-11-03 | End: 2022-02-01

## 2021-12-01 RX ORDER — IPRATROPIUM BROMIDE AND ALBUTEROL SULFATE 2.5; .5 MG/3ML; MG/3ML
SOLUTION RESPIRATORY (INHALATION)
Qty: 360 ML | Refills: 0 | Status: SHIPPED | OUTPATIENT
Start: 2021-12-01 | End: 2021-12-15 | Stop reason: SDUPTHER

## 2021-12-09 RX ORDER — LOSARTAN POTASSIUM 100 MG/1
TABLET ORAL
Qty: 28 TABLET | Refills: 0 | Status: SHIPPED | OUTPATIENT
Start: 2021-12-09 | End: 2021-12-15 | Stop reason: SDUPTHER

## 2021-12-09 RX ORDER — ATORVASTATIN CALCIUM 20 MG/1
TABLET, FILM COATED ORAL
Qty: 28 TABLET | Refills: 0 | Status: SHIPPED | OUTPATIENT
Start: 2021-12-09 | End: 2021-12-15 | Stop reason: SDUPTHER

## 2021-12-09 RX ORDER — METOPROLOL SUCCINATE 50 MG/1
TABLET, EXTENDED RELEASE ORAL
Qty: 28 TABLET | Refills: 0 | Status: SHIPPED | OUTPATIENT
Start: 2021-12-09 | End: 2021-12-15 | Stop reason: SDUPTHER

## 2021-12-09 RX ORDER — FUROSEMIDE 40 MG/1
TABLET ORAL
Qty: 28 TABLET | Refills: 0 | Status: SHIPPED | OUTPATIENT
Start: 2021-12-09 | End: 2021-12-15 | Stop reason: SDUPTHER

## 2021-12-09 RX ORDER — RIVAROXABAN 20 MG/1
TABLET, FILM COATED ORAL
Qty: 28 TABLET | Refills: 0 | Status: SHIPPED | OUTPATIENT
Start: 2021-12-09 | End: 2021-12-15 | Stop reason: SDUPTHER

## 2021-12-09 RX ORDER — PANTOPRAZOLE SODIUM 40 MG/1
TABLET, DELAYED RELEASE ORAL
Qty: 28 TABLET | Refills: 0 | Status: SHIPPED | OUTPATIENT
Start: 2021-12-09 | End: 2021-12-15 | Stop reason: SDUPTHER

## 2021-12-09 RX ORDER — AMLODIPINE BESYLATE 5 MG/1
TABLET ORAL
Qty: 28 TABLET | Refills: 0 | Status: SHIPPED | OUTPATIENT
Start: 2021-12-09 | End: 2021-12-15 | Stop reason: SDUPTHER

## 2021-12-09 RX ORDER — POTASSIUM CITRATE 15 MEQ/1
TABLET, EXTENDED RELEASE ORAL
Qty: 28 TABLET | Refills: 0 | Status: SHIPPED | OUTPATIENT
Start: 2021-12-09 | End: 2021-12-15 | Stop reason: SDUPTHER

## 2021-12-15 ENCOUNTER — OFFICE VISIT (OUTPATIENT)
Dept: INTERNAL MEDICINE CLINIC | Age: 76
End: 2021-12-15

## 2021-12-15 VITALS
WEIGHT: 248 LBS | RESPIRATION RATE: 12 BRPM | DIASTOLIC BLOOD PRESSURE: 80 MMHG | SYSTOLIC BLOOD PRESSURE: 128 MMHG | HEART RATE: 70 BPM | HEIGHT: 71 IN | BODY MASS INDEX: 34.72 KG/M2

## 2021-12-15 DIAGNOSIS — J44.9 CHRONIC OBSTRUCTIVE PULMONARY DISEASE, UNSPECIFIED COPD TYPE (HCC): Primary | ICD-10-CM

## 2021-12-15 DIAGNOSIS — K21.9 GASTROESOPHAGEAL REFLUX DISEASE WITHOUT ESOPHAGITIS: ICD-10-CM

## 2021-12-15 DIAGNOSIS — N40.1 ENLARGED PROSTATE WITH URINARY OBSTRUCTION: ICD-10-CM

## 2021-12-15 DIAGNOSIS — I42.0 DILATED CARDIOMYOPATHY (HCC): ICD-10-CM

## 2021-12-15 DIAGNOSIS — I71.20 THORACIC AORTIC ANEURYSM WITHOUT RUPTURE: ICD-10-CM

## 2021-12-15 DIAGNOSIS — N13.8 ENLARGED PROSTATE WITH URINARY OBSTRUCTION: ICD-10-CM

## 2021-12-15 DIAGNOSIS — I10 ESSENTIAL HYPERTENSION: ICD-10-CM

## 2021-12-15 DIAGNOSIS — I48.0 PAROXYSMAL A-FIB (HCC): ICD-10-CM

## 2021-12-15 DIAGNOSIS — J96.11 CHRONIC RESPIRATORY FAILURE WITH HYPOXIA (HCC): ICD-10-CM

## 2021-12-15 PROCEDURE — 99214 OFFICE O/P EST MOD 30 MIN: CPT | Performed by: INTERNAL MEDICINE

## 2021-12-15 RX ORDER — AMLODIPINE BESYLATE 5 MG/1
TABLET ORAL
Qty: 90 TABLET | Refills: 0 | Status: SHIPPED | OUTPATIENT
Start: 2021-12-15 | End: 2022-03-25 | Stop reason: SDUPTHER

## 2021-12-15 RX ORDER — FUROSEMIDE 40 MG/1
TABLET ORAL
Qty: 90 TABLET | Refills: 0 | Status: SHIPPED | OUTPATIENT
Start: 2021-12-15 | End: 2022-03-25 | Stop reason: SDUPTHER

## 2021-12-15 RX ORDER — METOPROLOL SUCCINATE 50 MG/1
TABLET, EXTENDED RELEASE ORAL
Qty: 90 TABLET | Refills: 0 | Status: SHIPPED | OUTPATIENT
Start: 2021-12-15 | End: 2022-03-25 | Stop reason: SDUPTHER

## 2021-12-15 RX ORDER — POTASSIUM CITRATE 15 MEQ/1
TABLET, EXTENDED RELEASE ORAL
Qty: 90 TABLET | Refills: 0 | Status: SHIPPED | OUTPATIENT
Start: 2021-12-15 | End: 2022-03-25 | Stop reason: SDUPTHER

## 2021-12-15 RX ORDER — BUDESONIDE AND FORMOTEROL FUMARATE DIHYDRATE 160; 4.5 UG/1; UG/1
AEROSOL RESPIRATORY (INHALATION)
Qty: 10.2 G | Refills: 0 | Status: SHIPPED | OUTPATIENT
Start: 2021-12-15 | End: 2022-03-25 | Stop reason: SDUPTHER

## 2021-12-15 RX ORDER — LOSARTAN POTASSIUM 100 MG/1
TABLET ORAL
Qty: 90 TABLET | Refills: 0 | Status: SHIPPED | OUTPATIENT
Start: 2021-12-15 | End: 2022-03-25 | Stop reason: SDUPTHER

## 2021-12-15 RX ORDER — PANTOPRAZOLE SODIUM 40 MG/1
TABLET, DELAYED RELEASE ORAL
Qty: 90 TABLET | Refills: 0 | Status: SHIPPED | OUTPATIENT
Start: 2021-12-15 | End: 2022-03-25 | Stop reason: SDUPTHER

## 2021-12-15 RX ORDER — IPRATROPIUM BROMIDE AND ALBUTEROL SULFATE 2.5; .5 MG/3ML; MG/3ML
SOLUTION RESPIRATORY (INHALATION)
Qty: 360 ML | Refills: 0 | Status: SHIPPED | OUTPATIENT
Start: 2021-12-15 | End: 2022-03-25 | Stop reason: SDUPTHER

## 2021-12-15 RX ORDER — ATORVASTATIN CALCIUM 20 MG/1
TABLET, FILM COATED ORAL
Qty: 90 TABLET | Refills: 0 | Status: SHIPPED | OUTPATIENT
Start: 2021-12-15 | End: 2022-03-25 | Stop reason: SDUPTHER

## 2021-12-15 ASSESSMENT — ENCOUNTER SYMPTOMS
ABDOMINAL PAIN: 0
VOMITING: 0
SHORTNESS OF BREATH: 1
RHINORRHEA: 0
WHEEZING: 0
NAUSEA: 0
BACK PAIN: 0

## 2021-12-15 NOTE — PROGRESS NOTES
SW/LUDWIG Discharge Plan  Informed patient is ready for discharge. Patient’s discharge destination is home. Patient to be picked up by spouse.   Patient/interested person has been counseled for post hospitalization care.  Patient agrees and understands goals and plan. Initial implementation of the patient’s discharge plan has been arranged. Patient to follow up with pcp, Dr. Shearer and Dr. Philip.  Dr. Philip's office informed of need to contact patient to schedule post hospital follow up visit. Discharge plan communicated to MD, RN and LUDWIG.        Subjective:      Patient ID: Valarie Valencia is a 78 y.o. male. HPI    Patient is here for a follow up. He has a history of hypertension. It is well controlled. He is compliant and has no med side effects. No chest pain. Patient's COPD is controlled on present bronchodilator regimen. Patient is taking medications as instructed, no medication side effects noted, no significant ongoing wheezing or shortness of breath, using bronchodilator MDI less than twice a week. He quit smoking. He is doing well. He has gained about 8 lbs. He is on 3 L of oxygen at night. He has some pedal edema. He has history of a fib. He is in NSR. He has GERD. It is stable. He is urinating well. Review of Systems   Constitutional: Negative for activity change and appetite change. HENT: Negative for postnasal drip and rhinorrhea. Respiratory: Positive for shortness of breath. Negative for wheezing. Cardiovascular: Positive for leg swelling. Negative for chest pain and palpitations. Gastrointestinal: Negative for abdominal pain, nausea and vomiting. Genitourinary: Positive for frequency. Negative for difficulty urinating. Musculoskeletal: Negative for back pain and joint swelling. Skin: Negative for rash. Neurological: Negative for light-headedness. Psychiatric/Behavioral: Negative for sleep disturbance.      Past Medical History:   Diagnosis Date    A-fib [facility])     2/14/12    Acute conjunctivitis of both eyes     Acute on chronic respiratory failure with hypoxia (HCC) 2/13/2012    Acute respiratory failure with hypoxia ([address] 2/13/2012    Atrial fibrillation with RVR ([address]     2/14/12     Avulsion fracture of ankle 9/21/2015    Benign localized hyperplasia of prostate with urinary obstruction and other lower urinary tract symptoms (LUTS)(600.21) 8/17/2016    Chronic systolic CHF (congestive heart failure) ([address] 8/28/2017    Contusion of leg, right 9/21/2015    [facility]     Fractures     GERD (gastroesophageal reflux disease) 6/15/2015    Hypertension     Hypertensive urgency 2019    Hypertrophy of prostate without urinary obstruction and other lower urinary tract symptoms (LUTS) 6/15/2015    No history of procedure     no previos colonoscopy    P[facility]per[facility]) [facility] 10/9/2017    Pneumonia     Thoracic aortic aneurysm [facility])        Past Surgical History:   Procedure Laterality Date    CATARACT REMOVAL WITH IMPLANT Right 2018     PHACO EMULSIFICATION OF CATARACT WITH INTRAOCULAR LENS IMPLANT RIGHT EYE    COLONOSCOPY  2016    sigmoid polyps    ENDOSCOPY, COLON, DIAGNOSTIC  2019    egd; esophagitis/gastritis    HERNIA REPAIR      AK XCAPSL CTRC RMVL INSJ IO LENS PROSTH W/O ECP Right 2018    PHACO EMULSIFICATION OF CATARACT WITH INTRAOCULAR LENS IMPLANT RIGHT EYE performed by Antonieta Wilkes MD at [address] [place] [address] RMVL INSJ IO LENS PROSTH W/O ECP Left 10/18/2018    PHACO EMULSIFICATION OF CATARACT WITH  INTRAOCULAR LENS IMPLANT LEFT EYE performed by Antonieta Wilkes MD at [place] [address] N/A 2019    EGD BIOPSY performed by Patience Skinner MD at [facility]     Family History   Problem Relation Age of Onset    Alcohol Abuse Sister      Social History     Tobacco Use    Smoking status: Former Smoker     Packs/day: 2.00     Years: 55.00     Pack years: 110.00     Quit date: 2017     Years since quittin.3    Smokeless tobacco: Never Used    Tobacco comment: smoked 2 darnell a day for 54 years   Vaping Use    Vaping Use: Never used   Substance Use Topics    Alcohol use: Not Currently     Comment: occassionally    Drug use: No   .    Current Outpatient Medications:     Potassium Citrate ER 15 MEQ (1620 MG) TBCR, TAKE ONE TABLET BY MOUTH EVERY DAY, Disp: 28 tablet, Rfl: 0    metoprolol succinate (TOPROL XL) 50 MG extended release tablet, TAKE ONE TABLET BY MOUTH EVERY DAY, Disp: 28 tablet, Rfl: 0    furosemide (LASIX) 40 MG tablet, TAKE ONE TABLET BY MOUTH EVERY DAY, Disp: 28 tablet, Rfl: 0    losartan (COZAAR) 100 MG tablet, TAKE ONE TABLET BY MOUTH DAILY, Disp: 28 tablet, Rfl: 0    amLODIPine (NORVASC) 5 MG tablet, TAKE ONE TABLET BY MOUTH EVERY DAY, Disp: 28 tablet, Rfl: 0    XARELTO 20 MG TABS tablet, TAKE ONE TABLET BY MOUTH DAILY WITH BREAKFAST, Disp: 28 tablet, Rfl: 0    pantoprazole (PROTONIX) 40 MG tablet, TAKE ONE TABLET BY MOUTH EVERY DAY, Disp: 28 tablet, Rfl: 0    atorvastatin (LIPITOR) 20 MG tablet, TAKE ONE TABLET BY MOUTH EVERY DAY, Disp: 28 tablet, Rfl: 0    ipratropium-albuterol (DUONEB) 0.5-2.5 (3) MG/3ML SOLN nebulizer solution, USE ONE UNIT DOSE (3ML) IN NEBULIZER AND INHALE INTO THE LUNGS EVERY 4 HOURS AS NEEDED FOR SHORTNESS OF BREATH, Disp: 360 mL, Rfl: 0    predniSONE (DELTASONE) 10 MG tablet, TAKE ONE TABLET BY MOUTH AS NEEDED FOR COUGH, Disp: 30 tablet, Rfl: 0    SYMBICORT 160-4.5 MCG/ACT AERO, Inhale 2 puffs into the lungs 2 times daily, Disp: 10.2 g, Rfl: 0    Umeclidinium Bromide (INCRUSE ELLIPTA) 62.5 MCG/INH AEPB, Inhale 1 Inhaler into the lungs daily, Disp: 1 each, Rfl: 5    Cholecalciferol (VITAMIN D-3) 25 MCG (1000 UT) CAPS, Take by mouth daily , Disp: , Rfl:     albuterol sulfate HFA (PROVENTIL HFA) 108 (90 Base) MCG/ACT inhaler, Inhale 2 puffs into the lungs every 6 hours as needed for Wheezing (with spacer), Disp: 1 Inhaler, Rfl: 11      /80 (Site: Right Upper Arm, Position: Sitting, Cuff Size: Medium Adult)   Pulse 70   Resp 12   Ht 5' 11\" (1.803 m)   Wt 248 lb (112.5 kg)   BMI 34.59 kg/m²      Objective:   Physical Exam  Constitutional:       Appearance: He is well-developed. HENT:      Head: Normocephalic. Eyes:      Conjunctiva/sclera: Conjunctivae normal.      Pupils: Pupils are equal, round, and reactive to light. Neck:      Thyroid: No thyroid mass or thyromegaly.       Vascular: No carotid bruit or JVD. Trachea: Trachea normal.   Cardiovascular:      Rate and Rhythm: Normal rate and regular rhythm. Heart sounds: Normal heart sounds. No murmur heard. No gallop. Pulmonary:      Effort: Pulmonary effort is normal. No respiratory distress. Breath sounds: Wheezing (mild) present. No rales. Abdominal:      General: Bowel sounds are normal. There is no distension. Palpations: Abdomen is soft. There is no hepatomegaly, splenomegaly or mass. Tenderness: There is no abdominal tenderness. Musculoskeletal:         General: Normal range of motion. Cervical back: Normal range of motion and neck supple. Right lower leg: Edema present. Left lower leg: Edema present. Lymphadenopathy:      Cervical: No cervical adenopathy. Skin:     General: Skin is warm and dry. Findings: No rash. Neurological:      Mental Status: He is alert and oriented to person, place, and time. Cranial Nerves: No cranial nerve deficit. Deep Tendon Reflexes: Reflexes are normal and symmetric. Psychiatric:         Behavior: Behavior normal.         Thought Content: Thought content normal.         Judgment: Judgment normal.       CT chest 4/19/2017     Thoracic aortic aneurysm as above, not substantially changed.       Emphysema.       A few nodules which are stable since 2015.  As there is a history of smoking,   recommend yearly low-dose lung cancer screening CT. Assessment:       Diagnosis Orders   1. Chronic obstructive pulmonary disease, unspecified COPD type ([address]     2. Gastroesophageal reflux disease without esophagitis     3. Dilated cardiomyopathy ([address]     4. Enlarged prostate with urinary obstruction     5. Thoracic aortic aneurysm without rupture ([address]     6. Essential hypertension     7. Paroxysmal A-fib ([address]     8. Chronic respiratory failure with hypoxia (HCC)            Plan:        #  Hypertension: Well controlled. Weight loss recommended.   #  A fib: in SR. On Xarelto. #  COPD stable. No flare up. on Symbicort. Proventil prn. Use HHn. He has a prednisone taper for an emergency. #  GERD: on Prilosec. #  PN right: stable. #  BPH with symptoms. On Flomax. #  Hyperlipidemia on Lipitor. #  BPH with LUTS stable. #  Chronic respiratory failure on oxygen at hs. #  Chronic systolic CHF. Stable. On Lasix. Cut back on salt. Discussed use, benefit, and side effects of prescribed medications. Barriers to medication compliance addressed. All patient questions answered. Pt voiced understanding. Decrease calorie intake. Exercise,weight loss recommended. The current medical regimen is effective;  continue present plan and medications. See orders.

## 2021-12-16 ENCOUNTER — TELEPHONE (OUTPATIENT)
Dept: INTERNAL MEDICINE CLINIC | Age: 76
End: 2021-12-16

## 2021-12-16 LAB
A/G RATIO: 1.2 (ref 1.1–2.2)
ALBUMIN SERPL-MCNC: 4.2 G/DL (ref 3.4–5)
ALP BLD-CCNC: 87 U/L (ref 40–129)
ALT SERPL-CCNC: 8 U/L (ref 10–40)
ANION GAP SERPL CALCULATED.3IONS-SCNC: 20 MMOL/L (ref 3–16)
AST SERPL-CCNC: 21 U/L (ref 15–37)
BASOPHILS ABSOLUTE: 0.1 K/UL (ref 0–0.2)
BASOPHILS RELATIVE PERCENT: 1.5 %
BILIRUB SERPL-MCNC: 0.4 MG/DL (ref 0–1)
BUN BLDV-MCNC: 21 MG/DL (ref 7–20)
CALCIUM SERPL-MCNC: 9.6 MG/DL (ref 8.3–10.6)
CHLORIDE BLD-SCNC: 104 MMOL/L (ref 99–110)
CO2: 21 MMOL/L (ref 21–32)
CREAT SERPL-MCNC: 1.7 MG/DL (ref 0.8–1.3)
EOSINOPHILS ABSOLUTE: 0.4 K/UL (ref 0–0.6)
EOSINOPHILS RELATIVE PERCENT: 5.7 %
GFR AFRICAN AMERICAN: 48
GFR NON-AFRICAN AMERICAN: 40
GLUCOSE BLD-MCNC: 94 MG/DL (ref 70–99)
HCT VFR BLD CALC: 42.1 % (ref 40.5–52.5)
HEMOGLOBIN: 13.3 G/DL (ref 13.5–17.5)
LYMPHOCYTES ABSOLUTE: 1.3 K/UL (ref 1–5.1)
LYMPHOCYTES RELATIVE PERCENT: 17.5 %
MCH RBC QN AUTO: 29.4 PG (ref 26–34)
MCHC RBC AUTO-ENTMCNC: 31.5 G/DL (ref 31–36)
MCV RBC AUTO: 93.4 FL (ref 80–100)
MONOCYTES ABSOLUTE: 0.5 K/UL (ref 0–1.3)
MONOCYTES RELATIVE PERCENT: 6.7 %
NEUTROPHILS ABSOLUTE: 5 K/UL (ref 1.7–7.7)
NEUTROPHILS RELATIVE PERCENT: 68.6 %
PDW BLD-RTO: 15.7 % (ref 12.4–15.4)
PLATELET # BLD: 224 K/UL (ref 135–450)
PMV BLD AUTO: 9.1 FL (ref 5–10.5)
POTASSIUM SERPL-SCNC: 5.1 MMOL/L (ref 3.5–5.1)
RBC # BLD: 4.51 M/UL (ref 4.2–5.9)
SODIUM BLD-SCNC: 145 MMOL/L (ref 136–145)
TOTAL PROTEIN: 7.7 G/DL (ref 6.4–8.2)
URIC ACID, SERUM: 8.2 MG/DL (ref 3.5–7.2)
WBC # BLD: 7.3 K/UL (ref 4–11)

## 2021-12-16 NOTE — TELEPHONE ENCOUNTER
[employer] [place] Department states was denied under Part D. May be covered under Part B. Spoke with patient's local pharmacy who has script ready for $0 copay.

## 2021-12-16 NOTE — TELEPHONE ENCOUNTER
----- Message from Soto Cervantes sent at 12/16/2021  2:11 PM EST -----  Find out why  ----- Message -----  From: Soto Cervantes  Sent: 12/16/2021   1:29 PM EST  To: MD SCARLET Huynh

## 2022-01-07 ENCOUNTER — TELEPHONE (OUTPATIENT)
Dept: PULMONOLOGY | Age: 77
End: 2022-01-07

## 2022-01-20 ENCOUNTER — HOSPITAL ENCOUNTER (OUTPATIENT)
Dept: CT IMAGING | Age: 77
Discharge: HOME | End: 2022-01-20
Payer: MEDICARE

## 2022-01-20 ENCOUNTER — TELEPHONE (OUTPATIENT)
Dept: PULMONOLOGY | Age: 77
End: 2022-01-20

## 2022-01-20 ENCOUNTER — VIRTUAL VISIT (OUTPATIENT)
Dept: PULMONOLOGY | Age: 77
End: 2022-01-20
Payer: MEDICARE

## 2022-01-20 DIAGNOSIS — J44.9 CHRONIC OBSTRUCTIVE PULMONARY DISEASE, UNSPECIFIED COPD TYPE (HCC): Primary | ICD-10-CM

## 2022-01-20 DIAGNOSIS — Z87.891 HISTORY OF TOBACCO USE: ICD-10-CM

## 2022-01-20 PROCEDURE — 99213 OFFICE O/P EST LOW 20 MIN: CPT | Performed by: INTERNAL MEDICINE

## 2022-01-20 PROCEDURE — 71271 CT THORAX LUNG CANCER SCR C-: CPT

## 2022-01-20 NOTE — PROGRESS NOTES
Patient understands condition, prognosis and need for follow up care. Pulmonary Follow-up Note  Chief Complaint: Shortness of breath, COPD  Referring Provider: hospital f/u      Interval history:  Had LDCT. Feels fine. Still using symbicort. Doesn't use incruse     Presenting history:  8/4/2019 with a month long history of moderately increased shortness of breath, associated with wheezing and nonproductive cough, became severe. He is feeling better with inhaled bronchodilators given the emergency department. He has had previous episodes of COPD in the past.  He notes he has been waking up frequently at night with wheezing, benefits from his home nebulizer. reports that he quit smoking about 4 years ago. He has a 110.00 pack-year smoking history. He has never used smokeless tobacco.     Review of Systems:    Physical Exam:  There were no vitals taken for this visit. PHONE     Data:   CT CHEST 1/20/22  Impression   Perifissural nodule on the right appears similar, likely intrapulmonary lymph   node. A nodular density in the left upper lobe seen June 2020 no longer noted       Bandlike opacities at the left lung base, increased compared to prior, which   may be due to atelectasis secondary to filling defects-suspected mucous   plugging in the left lower lobe airways.       Stable small aneurysm projecting inferiorly off the aortic arch         PFT 8/22/19 FEV1 2.24 L 70% 0.67 TLC 6.45 L 92% DLCO 15.42 53%     ASSESSMENT:  · Mild COPD   · Upper lobe predominant paraseptal and centrilobular emphysema    Not addressed today:  · Atrial fibrillation on Xarelto     PLAN:  · Oxygen 2 L HS  · Symbicort 160 BID  · PRN nebulized bronchodilators, has albuterol MDI   · Tobacco cessation has been achieved  · LDCT for LCS in 12 months, see me after    Screening CT scan was considered in a lung cancer screening counseling and shared decision making visit today that included the following elements:    Eligibility: Age: 78. There are no signs or symptoms of lung cancer.   Tobacco History 110 pack-years, quit  years ago   Verbal counseling has been performed by me to include benefits and harms of screening, follow-up diagnostic testing, over-diagnosis, false positive rate, and total radiation exposure;    I have counseled on the importance of adherence to annual lung cancer LDCT screening, the impact of comorbidities and patient is willing to undergo diagnosis and treatment;    I have provided counseling on the importance of maintaining cigarette smoking abstinence if former smoker; or the importance of smoking cessation if current smoker and, if appropriate, furnishing of information about tobacco cessation interventions; and    I have furnished a written order for lung cancer screening with LDCT.    Order for Screening chest CT scan should be placed with documentation as below:   Beneficiary date of birth;    Actual pack - year smoking history (number) from above;    Current smoking status, and for former smokers, the number of years since quitting smoking from above  [facility] is [facility]   [employer] for Joaquina Mitchell [phone]    Francois Bravo is a 78 y.o. male evaluated via telephone on 1/20/2022. Consent: He and/or health care decision maker is aware that that he may receive a bill for this telephone service, which includes applicable co-pays, depending on his insurance coverage, and has provided verbal consent to proceed: YES. I communicated with the patient and/or health care decision maker about: see interval history above. Details of this discussion including any medical advice provided: see plan above. Total Time: minutes: 11-20 minutes. I affirm this is a Patient Initiated Episode with a Patient who has not had a related appointment within my department in the past 7 days or scheduled within the next 24 hours. Patient identification was verified at the start of the visit: YES.  The visit was conducted pursuant to the emergency declaration under the [facility], 2095 waiver authority and the [employer] Act. This Telephone Visit was conducted with patient's (and/or legal guardian's) consent, to reduce the patient's risk of exposure to COVID-19 and provide necessary medical care. The patient was located in a state where the provider was licensed to provide care.  The patient (and/or legal guardian) his advised to contact this office for worsening conditions or problems, and seek emergency medical treatment and/or call 911 if urgent care needed.

## 2022-01-20 NOTE — TELEPHONE ENCOUNTER
Within this Telehealth Consent, the terms you and yours refer to the person using the Telehealth Service (Service), or in the case of a use of the Service by or on behalf of a minor, you and yours refer to and include (i) the parent or legal guardian who provides consent to the use of the Service by such minor or uses the Service on behalf of such minor, and (ii) the minor for whom consent is being provided or on whose behalf the Service is being utilized. When using Service, you will be consulting with your health care providers via the use of Telehealth.   Telehealth involves the delivery of healthcare services using electronic communications, information technology or other means between a healthcare provider and a patient who are not in the same physical location. Telehealth may be used for diagnosis, treatment, follow-up and/or patient education, and may include, but is not limited to, one or more of the following:    Electronic transmission of medical records, photo images, personal health information or other data between a patient and a healthcare provider    Interactions between a patient and healthcare provider via audio, video and/or data communications    Use of output data from medical devices, sound and video files    Anticipated Benefits   The use of Telehealth by your Provider(s) through the Service may have the following possible benefits:    Making it easier and more efficient for you to access medical care and treatment for the conditions treated by such Provider(s) utilizing the Service    Allowing you to obtain medical care and treatment by Provider(s) at times that are convenient for you    Enabling you to interact with Provider(s) without the necessity of an in-office appointment     Possible Risks   While the use of Telehealth can provide potential benefits for you, there are also potential risks associated with the use of Telehealth.  These risks include, but may not be limited to the following:    Your Provider(s) may not able to provide medical treatment for your particular condition and you may be required to seek alternative healthcare or emergency care services.  The electronic systems or other security protocols or safeguards used in the Service could fail, causing a breach of privacy of your medical or other information.  Given regulatory requirements in certain jurisdictions, your Provider(s) diagnosis and/or treatment options, especially pertaining to certain prescriptions, may be limited. Acceptance   1. You understand that Services will be provided via Telehealth. This process involves the use of HIPAA compliant and secure, real-time audio-visual interfacing with a qualified and appropriately trained provider located at [facility]. 2. You understand that, under no circumstances, will this session be recorded. 3. You understand that the Provider(s) at [facility] and other clinical participants will be party to the information obtained during the Telehealth session in accordance with best medical practices. 4. You understand that the information obtained during the Telehealth session will be used to help determine the most appropriate treatment options. 5. You understand that You have the right to revoke this consent at any point in time. 6. You understand that Telehealth is voluntary, and that continued treatment is not dependent upon consent. 7. You understand that, in the event of non-consent to Telehealth services and/or technical difficulties, you will obtain services as typically provided in the absence of Telehealth technology. 8. You understand that this consent will be kept in Your medical record. 9. No potential benefits from the use of Telehealth or specific results can be guaranteed. Your condition may not be cured or improved and, in some cases, may get worse.    10. There are limitations in the provision of medical care and treatment via Telehealth and the Service and you may not be able to receive diagnosis and/or treatment through the Service for every condition for which you seek diagnosis and/or treatment. 11. There are potential risks to the use of Telehealth, including but not limited to the risks described in this Telehealth Consent. 12. Your Provider(s) have discussed the use of Telehealth and the Service with you, including the benefits and risks of such and you have provided oral consent to your Provider(s) for the use of Telehealth and the Service. 15. You understand that it is your duty to provide your Provider(s) truthful, accurate and complete information, including all relevant information regarding care that you may have received or may be receiving from other healthcare providers outside of the Service. 14. You understand that each of your Provider(s) may determine in his or sole discretion that your condition is not suitable for diagnosis and/or treatment using the Service, and that you may need to seek medical care and treatment a specialist or other healthcare provider, outside of the Service. 15. You understand that you are fully responsible for payment for all services provided by Provider(s) or through use of the Service and that you may not be able to use third-party insurance. 16. You represent that (a) you have read this Telehealth Consent carefully, (b) you understand the risks and benefits of the Service and the use of Telehealth in the medical care and treatment provided to you by Provider(s) using the Service, and (c) you have the legal capacity and authority to provide this consent for yourself and/or the minor for which you are consenting under applicable federal and state laws, including laws relating to the age of [de-identified] and/or parental/guardian consent.    17. You give your informed consent to the use of Telehealth by Provider(s) using the Service under the terms described in the Terms of Service and this Telehealth Consent. The patient was read the following statement and has consented to the visit as of 1/20/22. The patient has been scheduled for their first telehealth visit on 01/20/22 with Dr. Femi Bone.

## 2022-02-01 RX ORDER — PREDNISONE 10 MG/1
TABLET ORAL
Qty: 30 TABLET | Refills: 2 | Status: SHIPPED | OUTPATIENT
Start: 2022-02-01 | End: 2022-08-04

## 2022-03-25 ENCOUNTER — OFFICE VISIT (OUTPATIENT)
Dept: INTERNAL MEDICINE CLINIC | Age: 77
End: 2022-03-25

## 2022-03-25 VITALS
HEIGHT: 71 IN | BODY MASS INDEX: 34.44 KG/M2 | HEART RATE: 70 BPM | SYSTOLIC BLOOD PRESSURE: 130 MMHG | DIASTOLIC BLOOD PRESSURE: 80 MMHG | RESPIRATION RATE: 12 BRPM | WEIGHT: 246 LBS

## 2022-03-25 DIAGNOSIS — N13.8 ENLARGED PROSTATE WITH URINARY OBSTRUCTION: ICD-10-CM

## 2022-03-25 DIAGNOSIS — I42.0 DILATED CARDIOMYOPATHY (HCC): ICD-10-CM

## 2022-03-25 DIAGNOSIS — I10 ESSENTIAL HYPERTENSION: ICD-10-CM

## 2022-03-25 DIAGNOSIS — I48.0 PAROXYSMAL A-FIB (HCC): ICD-10-CM

## 2022-03-25 DIAGNOSIS — J44.1 CHRONIC OBSTRUCTIVE PULMONARY DISEASE WITH ACUTE EXACERBATION (HCC): ICD-10-CM

## 2022-03-25 DIAGNOSIS — N40.1 ENLARGED PROSTATE WITH URINARY OBSTRUCTION: ICD-10-CM

## 2022-03-25 DIAGNOSIS — N18.30 STAGE 3 CHRONIC KIDNEY DISEASE, UNSPECIFIED WHETHER STAGE 3A OR 3B CKD (HCC): ICD-10-CM

## 2022-03-25 DIAGNOSIS — K21.9 GASTROESOPHAGEAL REFLUX DISEASE WITHOUT ESOPHAGITIS: ICD-10-CM

## 2022-03-25 DIAGNOSIS — I48.91 ATRIAL FIBRILLATION, CONTROLLED (HCC): Primary | ICD-10-CM

## 2022-03-25 DIAGNOSIS — I73.9 PAD (PERIPHERAL ARTERY DISEASE) (HCC): ICD-10-CM

## 2022-03-25 DIAGNOSIS — J96.11 CHRONIC RESPIRATORY FAILURE WITH HYPOXIA (HCC): ICD-10-CM

## 2022-03-25 PROCEDURE — 99214 OFFICE O/P EST MOD 30 MIN: CPT | Performed by: INTERNAL MEDICINE

## 2022-03-25 RX ORDER — BUDESONIDE AND FORMOTEROL FUMARATE DIHYDRATE 160; 4.5 UG/1; UG/1
AEROSOL RESPIRATORY (INHALATION)
Qty: 10.2 G | Refills: 2 | Status: SHIPPED | OUTPATIENT
Start: 2022-03-25 | End: 2022-03-28 | Stop reason: SDUPTHER

## 2022-03-25 RX ORDER — ALBUTEROL SULFATE 90 UG/1
2 AEROSOL, METERED RESPIRATORY (INHALATION) EVERY 6 HOURS PRN
Qty: 18 G | Refills: 3 | Status: SHIPPED | OUTPATIENT
Start: 2022-03-25

## 2022-03-25 RX ORDER — PANTOPRAZOLE SODIUM 40 MG/1
TABLET, DELAYED RELEASE ORAL
Qty: 90 TABLET | Refills: 0 | Status: SHIPPED | OUTPATIENT
Start: 2022-03-25 | End: 2022-06-23

## 2022-03-25 RX ORDER — ATORVASTATIN CALCIUM 20 MG/1
TABLET, FILM COATED ORAL
Qty: 90 TABLET | Refills: 0 | Status: SHIPPED | OUTPATIENT
Start: 2022-03-25 | End: 2022-06-23

## 2022-03-25 RX ORDER — POTASSIUM CITRATE 15 MEQ/1
TABLET, EXTENDED RELEASE ORAL
Qty: 90 TABLET | Refills: 0 | Status: SHIPPED | OUTPATIENT
Start: 2022-03-25 | End: 2022-06-23

## 2022-03-25 RX ORDER — METOPROLOL SUCCINATE 50 MG/1
TABLET, EXTENDED RELEASE ORAL
Qty: 90 TABLET | Refills: 0 | Status: SHIPPED | OUTPATIENT
Start: 2022-03-25 | End: 2022-06-23

## 2022-03-25 RX ORDER — TAMSULOSIN HYDROCHLORIDE 0.4 MG/1
0.4 CAPSULE ORAL DAILY
Qty: 30 CAPSULE | Refills: 2 | Status: SHIPPED | OUTPATIENT
Start: 2022-03-25 | End: 2022-05-26

## 2022-03-25 RX ORDER — AMLODIPINE BESYLATE 5 MG/1
TABLET ORAL
Qty: 90 TABLET | Refills: 0 | Status: SHIPPED | OUTPATIENT
Start: 2022-03-25 | End: 2022-06-23

## 2022-03-25 RX ORDER — LOSARTAN POTASSIUM 100 MG/1
TABLET ORAL
Qty: 90 TABLET | Refills: 0 | Status: SHIPPED | OUTPATIENT
Start: 2022-03-25 | End: 2022-06-23

## 2022-03-25 RX ORDER — FUROSEMIDE 40 MG/1
TABLET ORAL
Qty: 90 TABLET | Refills: 0 | Status: SHIPPED | OUTPATIENT
Start: 2022-03-25 | End: 2022-06-23

## 2022-03-25 RX ORDER — IPRATROPIUM BROMIDE AND ALBUTEROL SULFATE 2.5; .5 MG/3ML; MG/3ML
SOLUTION RESPIRATORY (INHALATION)
Qty: 360 ML | Refills: 0 | Status: SHIPPED | OUTPATIENT
Start: 2022-03-25 | End: 2022-05-24

## 2022-03-25 ASSESSMENT — ENCOUNTER SYMPTOMS
RHINORRHEA: 0
ABDOMINAL PAIN: 0
WHEEZING: 0
BACK PAIN: 0
VOMITING: 0
SHORTNESS OF BREATH: 1
NAUSEA: 0

## 2022-03-25 NOTE — PROGRESS NOTES
Subjective:      Patient ID: Luis Galvan is a 78 y.o. male. HPI    Patient is here for a follow up. He has a history of hypertension. It is well controlled. He is compliant and has no med side effects. No chest pain. Patient's COPD is controlled on present bronchodilator regimen. Patient is taking medications as instructed, no medication side effects noted, no significant ongoing wheezing or shortness of breath, using bronchodilator MDI less than twice a week. He quit smoking. He is doing well. He is on 3 L of oxygen at night. He has some pedal edema. No ER visits. He has some pedal edema. He has history of a fib. He is in NSR. He takes Xarelto. He has GERD. It is stable. He has nocturia, no hematuria. He wakes up several times a night. Review of Systems   Constitutional: Negative for activity change and appetite change. HENT: Negative for postnasal drip and rhinorrhea. Respiratory: Positive for shortness of breath. Negative for wheezing. Cardiovascular: Positive for leg swelling. Negative for chest pain and palpitations. Gastrointestinal: Negative for abdominal pain, nausea and vomiting. Genitourinary: Positive for frequency. Negative for difficulty urinating. Musculoskeletal: Negative for back pain and joint swelling. Skin: Negative for rash. Neurological: Negative for light-headedness. Psychiatric/Behavioral: Negative for sleep disturbance.      Past Medical History:   Diagnosis Date    A-fib [facility])     2/14/12    Acute conjunctivitis of both eyes     Acute on chronic respiratory failure with hypoxia (HCC) 2/13/2012    Acute respiratory failure with hypoxia ([facility] [address] 2/13/2012    Atrial fibrillation with RVR ([facility] [address]     2/14/12     Avulsion fracture of ankle 9/21/2015    Benign localized hyperplasia of prostate with urinary obstruction and other lower urinary tract symptoms (LUTS)(600.21) 8/17/2016    Chronic systolic CHF (congestive heart failure) ([facility] [address] 8/28/2017    Contusion of leg, right 2015    COPD (chronic obstructive pulmonary disease) (HCC)     Fractures     GERD (gastroesophageal reflux disease) 6/15/2015    Hypertension     Hypertensive urgency 2019    Hypertrophy of prostate without urinary obstruction and other lower urinary tract symptoms (LUTS) 6/15/2015    No history of procedure     no previos colonoscopy    PAD (peripheral artery disease) ([address] 10/9/2017    Pneumonia     Thoracic aortic aneurysm (HCC)        Past Surgical History:   Procedure Laterality Date    CATARACT REMOVAL WITH IMPLANT Right 2018     [facility]O EMULSIFICATION OF CATARACT WITH INTRAOCULAR LENS IMPLANT RIGHT EYE    COLONOSCOPY  2016    sigmoid polyps    ENDOSCOPY, COLON, DIAGNOSTIC  2019    egd; esophagitis/gastritis    HERNIA REPAIR      FL XCAPSL CTRC RMVL INSJ IO LENS PROSTH W/O ECP Right 2018    [facility]O EMULSIFICATION OF CATARACT WITH INTRAOCULAR LENS IMPLANT RIGHT EYE performed by Lillian Staples MD at [address] [place] [address] RMVL INSJ IO LENS PROSTH W/O ECP Left 10/18/2018    PHACO EMULSIFICATION OF CATARACT WITH  INTRAOCULAR LENS IMPLANT LEFT EYE performed by Lillian Staples MD at [place] [address] N/A 2019    EGD BIOPSY performed by Lacey Maharaj MD at [facility]     Family History   Problem Relation Age of Onset    Alcohol Abuse Sister      Social History     Tobacco Use    Smoking status: Former Smoker     Packs/day: 2.00     Years: 55.00     Pack years: 110.00     Quit date: 2017     Years since quittin.5    Smokeless tobacco: Never Used    Tobacco comment: smoked 2 darnell a day for 54 years   Vaping Use    Vaping Use: Never used   Substance Use Topics    Alcohol use: Not Currently     Comment: occassionally    Drug use: No   .    Current Outpatient Medications:     predniSONE (DELTASONE) 10 MG tablet, TAKE ONE TABLET BY MOUTH AS NEEDED FOR COUGH, Disp: 30 tablet, Rfl: 2   atorvastatin (LIPITOR) 20 MG tablet, TAKE ONE TABLET BY MOUTH EVERY DAY, Disp: 90 tablet, Rfl: 0    pantoprazole (PROTONIX) 40 MG tablet, TAKE ONE TABLET BY MOUTH EVERY DAY, Disp: 90 tablet, Rfl: 0    rivaroxaban (XARELTO) 20 MG TABS tablet, TAKE ONE TABLET BY MOUTH DAILY WITH BREAKFAST, Disp: 90 tablet, Rfl: 0    amLODIPine (NORVASC) 5 MG tablet, TAKE ONE TABLET BY MOUTH EVERY DAY, Disp: 90 tablet, Rfl: 0    losartan (COZAAR) 100 MG tablet, TAKE ONE TABLET BY MOUTH DAILY, Disp: 90 tablet, Rfl: 0    furosemide (LASIX) 40 MG tablet, TAKE ONE TABLET BY MOUTH EVERY DAY, Disp: 90 tablet, Rfl: 0    metoprolol succinate (TOPROL XL) 50 MG extended release tablet, TAKE ONE TABLET BY MOUTH EVERY DAY, Disp: 90 tablet, Rfl: 0    Potassium Citrate ER 15 MEQ (1620 MG) TBCR, TAKE ONE TABLET BY MOUTH EVERY DAY, Disp: 90 tablet, Rfl: 0    ipratropium-albuterol (DUONEB) 0.5-2.5 (3) MG/3ML SOLN nebulizer solution, USE ONE UNIT DOSE (3ML) IN NEBULIZER AND INHALE INTO THE LUNGS EVERY 4 HOURS AS NEEDED FOR SHORTNESS OF BREATH, Disp: 360 mL, Rfl: 0    budesonide-formoterol (SYMBICORT) 160-4.5 MCG/ACT AERO, Inhale 2 puffs into the lungs 2 times daily, Disp: 10.2 g, Rfl: 0    Umeclidinium Bromide (INCRUSE ELLIPTA) 62.5 MCG/INH AEPB, Inhale 1 Inhaler into the lungs daily, Disp: 1 each, Rfl: 5    Cholecalciferol (VITAMIN D-3) 25 MCG (1000 UT) CAPS, Take by mouth daily , Disp: , Rfl:     albuterol sulfate HFA (PROVENTIL HFA) 108 (90 Base) MCG/ACT inhaler, Inhale 2 puffs into the lungs every 6 hours as needed for Wheezing (with spacer), Disp: 1 Inhaler, Rfl: 11      /80 (Site: Right Upper Arm, Position: Sitting, Cuff Size: Medium Adult)   Pulse 70   Resp 12   Ht 5' 11\" (1.803 m)   Wt 246 lb (111.6 kg)   BMI 34.31 kg/m²      Objective:   Physical Exam  Constitutional:       Appearance: He is well-developed. HENT:      Head: Normocephalic.    Eyes:      Conjunctiva/sclera: Conjunctivae normal.      Pupils: Pupils are equal, round, and reactive to light. Neck:      Thyroid: No thyroid mass or thyromegaly. Vascular: No carotid bruit or JVD. Trachea: Trachea normal.   Cardiovascular:      Rate and Rhythm: Normal rate and regular rhythm. Heart sounds: Normal heart sounds. No murmur heard. No gallop. Pulmonary:      Effort: Pulmonary effort is normal. No respiratory distress. Breath sounds: Wheezing (mild) present. No rales. Abdominal:      General: Bowel sounds are normal. There is no distension. Palpations: Abdomen is soft. There is no hepatomegaly, splenomegaly or mass. Tenderness: There is no abdominal tenderness. Musculoskeletal:         General: Normal range of motion. Cervical back: Normal range of motion and neck supple. Right lower leg: Edema present. Left lower leg: Edema present. Lymphadenopathy:      Cervical: No cervical adenopathy. Skin:     General: Skin is warm and dry. Findings: No rash. Neurological:      Mental Status: He is alert and oriented to person, place, and time. Cranial Nerves: No cranial nerve deficit. Deep Tendon Reflexes: Reflexes are normal and symmetric. Psychiatric:         Behavior: Behavior normal.         Thought Content: Thought content normal.         Judgment: Judgment normal.       CT chest 4/19/2017     Thoracic aortic aneurysm as above, not substantially changed.       Emphysema.       A few nodules which are stable since 2015.  As there is a history of smoking,   recommend yearly low-dose lung cancer screening CT. Assessment:       Diagnosis Orders   1. Atrial fibrillation, controlled ([address]     2. Chronic obstructive pulmonary disease with acute exacerbation ([address]     3. Gastroesophageal reflux disease without esophagitis     4. Enlarged prostate with urinary obstruction     5. Dilated cardiomyopathy ([address]     6. PAD (peripheral artery disease) ([address]     7.  Chronic respiratory failure with hypoxia (HCC) 8. Stage 3 chronic kidney disease, unspecified whether stage 3a or 3b CKD (HCC)  Renal Function Panel   9. Paroxysmal A-fib ([address]     10. Essential hypertension  Lipid Panel          Plan:        #  Hypertension: Well controlled. Weight loss recommended. #  A fib: in SR. On Xarelto. #  COPD stable. No flare up. on Symbicort. Proventil prn. Use HHn. He has a prednisone taper for an emergency. #  GERD: on Prilosec. #  PN right: stable. #  BPH with symptoms. Resume Flomax. #  Hyperlipidemia on Lipitor. #  Chronic respiratory failure on oxygen at hs. #  Chronic systolic CHF. Stable. On Lasix. Cut back on salt. I wrote for a handicap placard. Discussed use, benefit, and side effects of prescribed medications. Barriers to medication compliance addressed. All patient questions answered. Pt voiced understanding. Decrease calorie intake. Exercise,weight loss recommended. The current medical regimen is effective;  continue present plan and medications. See orders.

## 2022-03-26 LAB
ALBUMIN SERPL-MCNC: 4.7 G/DL (ref 3.4–5)
ANION GAP SERPL CALCULATED.3IONS-SCNC: 17 MMOL/L (ref 3–16)
BUN BLDV-MCNC: 21 MG/DL (ref 7–20)
CALCIUM SERPL-MCNC: 9.7 MG/DL (ref 8.3–10.6)
CHLORIDE BLD-SCNC: 104 MMOL/L (ref 99–110)
CHOLESTEROL, TOTAL: 129 MG/DL (ref 0–199)
CO2: 23 MMOL/L (ref 21–32)
CREAT SERPL-MCNC: 1.5 MG/DL (ref 0.8–1.3)
GFR AFRICAN AMERICAN: 55
GFR NON-AFRICAN AMERICAN: 46
GLUCOSE BLD-MCNC: 181 MG/DL (ref 70–99)
HDLC SERPL-MCNC: 47 MG/DL (ref 40–60)
LDL CHOLESTEROL CALCULATED: 71 MG/DL
PHOSPHORUS: 2.1 MG/DL (ref 2.5–4.9)
POTASSIUM SERPL-SCNC: 4.9 MMOL/L (ref 3.5–5.1)
SODIUM BLD-SCNC: 144 MMOL/L (ref 136–145)
TRIGL SERPL-MCNC: 54 MG/DL (ref 0–150)
VLDLC SERPL CALC-MCNC: 11 MG/DL

## 2022-03-28 RX ORDER — BUDESONIDE AND FORMOTEROL FUMARATE DIHYDRATE 160; 4.5 UG/1; UG/1
AEROSOL RESPIRATORY (INHALATION)
Qty: 10.2 G | Refills: 2 | Status: SHIPPED | OUTPATIENT
Start: 2022-03-28

## 2022-05-24 RX ORDER — IPRATROPIUM BROMIDE AND ALBUTEROL SULFATE 2.5; .5 MG/3ML; MG/3ML
SOLUTION RESPIRATORY (INHALATION)
Qty: 360 ML | Refills: 0 | Status: SHIPPED | OUTPATIENT
Start: 2022-05-24 | End: 2022-06-14

## 2022-05-26 RX ORDER — TAMSULOSIN HYDROCHLORIDE 0.4 MG/1
0.4 CAPSULE ORAL DAILY
Qty: 30 CAPSULE | Refills: 0 | Status: SHIPPED | OUTPATIENT
Start: 2022-05-26 | End: 2022-06-23

## 2022-06-02 NOTE — CARE COORDINATION
[facility] Transitions Follow Up Call    2020    Patient: Kassidy Nesbitt  Patient : 1945   MRN: [id number]  Reason for Admission: sepsis, HCAP, TRACY  Discharge Date: 20 RARS: Readmission Risk Score: 24       2nd attempt - unable to reach. Unable to leave message. Care transition following.        Follow Up  Future Appointments   Date Time Provider Zhane Gramajo   2020  1:00 PM Estee Noguera MD [facility]   2020  2:30 PM Lennox Seashore, MD [facility]   2020  1:00 PM Rosie Steel MD [facility] None   2020  2:30 PM [facility] Rad   2020  3:30 PM Lennox Seashore, MD [facility]       Linnea Purcell RN no

## 2022-06-14 RX ORDER — IPRATROPIUM BROMIDE AND ALBUTEROL SULFATE 2.5; .5 MG/3ML; MG/3ML
SOLUTION RESPIRATORY (INHALATION)
Qty: 360 ML | Refills: 0 | Status: SHIPPED | OUTPATIENT
Start: 2022-06-14 | End: 2022-07-05

## 2022-06-23 RX ORDER — LOSARTAN POTASSIUM 100 MG/1
TABLET ORAL
Qty: 30 TABLET | Refills: 0 | Status: SHIPPED | OUTPATIENT
Start: 2022-06-23 | End: 2022-07-21

## 2022-06-23 RX ORDER — PANTOPRAZOLE SODIUM 40 MG/1
TABLET, DELAYED RELEASE ORAL
Qty: 30 TABLET | Refills: 0 | Status: SHIPPED | OUTPATIENT
Start: 2022-06-23 | End: 2022-07-21

## 2022-06-23 RX ORDER — TAMSULOSIN HYDROCHLORIDE 0.4 MG/1
0.4 CAPSULE ORAL DAILY
Qty: 30 CAPSULE | Refills: 0 | Status: SHIPPED | OUTPATIENT
Start: 2022-06-23 | End: 2022-07-21

## 2022-06-23 RX ORDER — AMLODIPINE BESYLATE 5 MG/1
TABLET ORAL
Qty: 30 TABLET | Refills: 0 | Status: SHIPPED | OUTPATIENT
Start: 2022-06-23 | End: 2022-07-21

## 2022-06-23 RX ORDER — ATORVASTATIN CALCIUM 20 MG/1
TABLET, FILM COATED ORAL
Qty: 30 TABLET | Refills: 0 | Status: SHIPPED | OUTPATIENT
Start: 2022-06-23 | End: 2022-07-21

## 2022-06-23 RX ORDER — FUROSEMIDE 40 MG/1
TABLET ORAL
Qty: 30 TABLET | Refills: 0 | Status: SHIPPED | OUTPATIENT
Start: 2022-06-23 | End: 2022-07-21

## 2022-06-23 RX ORDER — METOPROLOL SUCCINATE 50 MG/1
TABLET, EXTENDED RELEASE ORAL
Qty: 30 TABLET | Refills: 0 | Status: SHIPPED | OUTPATIENT
Start: 2022-06-23 | End: 2022-07-21

## 2022-06-23 RX ORDER — POTASSIUM CITRATE 15 MEQ/1
TABLET, EXTENDED RELEASE ORAL
Qty: 30 TABLET | Refills: 0 | Status: SHIPPED | OUTPATIENT
Start: 2022-06-23 | End: 2022-07-21

## 2022-07-05 RX ORDER — IPRATROPIUM BROMIDE AND ALBUTEROL SULFATE 2.5; .5 MG/3ML; MG/3ML
SOLUTION RESPIRATORY (INHALATION)
Qty: 360 ML | Refills: 0 | Status: SHIPPED | OUTPATIENT
Start: 2022-07-05 | End: 2022-08-10

## 2022-07-21 RX ORDER — POTASSIUM CITRATE 15 MEQ/1
TABLET, EXTENDED RELEASE ORAL
Qty: 28 TABLET | Refills: 0 | Status: SHIPPED | OUTPATIENT
Start: 2022-07-21 | End: 2022-07-25 | Stop reason: SDUPTHER

## 2022-07-21 RX ORDER — FUROSEMIDE 40 MG/1
TABLET ORAL
Qty: 28 TABLET | Refills: 0 | Status: SHIPPED | OUTPATIENT
Start: 2022-07-21 | End: 2022-07-25 | Stop reason: SDUPTHER

## 2022-07-21 RX ORDER — AMLODIPINE BESYLATE 5 MG/1
TABLET ORAL
Qty: 28 TABLET | Refills: 0 | Status: SHIPPED | OUTPATIENT
Start: 2022-07-21 | End: 2022-07-25 | Stop reason: SDUPTHER

## 2022-07-21 RX ORDER — LOSARTAN POTASSIUM 100 MG/1
TABLET ORAL
Qty: 28 TABLET | Refills: 0 | Status: SHIPPED | OUTPATIENT
Start: 2022-07-21 | End: 2022-07-25 | Stop reason: SDUPTHER

## 2022-07-21 RX ORDER — METOPROLOL SUCCINATE 50 MG/1
TABLET, EXTENDED RELEASE ORAL
Qty: 28 TABLET | Refills: 0 | Status: SHIPPED | OUTPATIENT
Start: 2022-07-21 | End: 2022-07-25 | Stop reason: SDUPTHER

## 2022-07-21 RX ORDER — TAMSULOSIN HYDROCHLORIDE 0.4 MG/1
0.4 CAPSULE ORAL DAILY
Qty: 28 CAPSULE | Refills: 0 | Status: SHIPPED | OUTPATIENT
Start: 2022-07-21 | End: 2022-07-25 | Stop reason: SDUPTHER

## 2022-07-21 RX ORDER — PANTOPRAZOLE SODIUM 40 MG/1
TABLET, DELAYED RELEASE ORAL
Qty: 28 TABLET | Refills: 0 | Status: SHIPPED | OUTPATIENT
Start: 2022-07-21 | End: 2022-07-25 | Stop reason: SDUPTHER

## 2022-07-21 RX ORDER — ATORVASTATIN CALCIUM 20 MG/1
TABLET, FILM COATED ORAL
Qty: 28 TABLET | Refills: 0 | Status: SHIPPED | OUTPATIENT
Start: 2022-07-21 | End: 2022-07-25 | Stop reason: SDUPTHER

## 2022-07-25 ENCOUNTER — OFFICE VISIT (OUTPATIENT)
Dept: INTERNAL MEDICINE CLINIC | Age: 77
End: 2022-07-25

## 2022-07-25 VITALS
HEIGHT: 71 IN | HEART RATE: 70 BPM | BODY MASS INDEX: 34.44 KG/M2 | WEIGHT: 246 LBS | DIASTOLIC BLOOD PRESSURE: 80 MMHG | SYSTOLIC BLOOD PRESSURE: 130 MMHG | RESPIRATION RATE: 12 BRPM

## 2022-07-25 DIAGNOSIS — I73.9 PAD (PERIPHERAL ARTERY DISEASE) (HCC): ICD-10-CM

## 2022-07-25 DIAGNOSIS — I48.0 PAROXYSMAL A-FIB (HCC): Primary | ICD-10-CM

## 2022-07-25 DIAGNOSIS — N18.30 STAGE 3 CHRONIC KIDNEY DISEASE, UNSPECIFIED WHETHER STAGE 3A OR 3B CKD (HCC): ICD-10-CM

## 2022-07-25 DIAGNOSIS — I42.0 DILATED CARDIOMYOPATHY (HCC): ICD-10-CM

## 2022-07-25 DIAGNOSIS — J96.11 CHRONIC RESPIRATORY FAILURE WITH HYPOXIA (HCC): ICD-10-CM

## 2022-07-25 DIAGNOSIS — J44.1 CHRONIC OBSTRUCTIVE PULMONARY DISEASE WITH ACUTE EXACERBATION (HCC): ICD-10-CM

## 2022-07-25 PROCEDURE — 99214 OFFICE O/P EST MOD 30 MIN: CPT | Performed by: INTERNAL MEDICINE

## 2022-07-25 PROCEDURE — 1123F ACP DISCUSS/DSCN MKR DOCD: CPT | Performed by: INTERNAL MEDICINE

## 2022-07-25 RX ORDER — METOPROLOL SUCCINATE 50 MG/1
TABLET, EXTENDED RELEASE ORAL
Qty: 90 TABLET | Refills: 0 | Status: SHIPPED | OUTPATIENT
Start: 2022-07-25 | End: 2022-10-13

## 2022-07-25 RX ORDER — POTASSIUM CITRATE 15 MEQ/1
TABLET, EXTENDED RELEASE ORAL
Qty: 90 TABLET | Refills: 0 | Status: SHIPPED | OUTPATIENT
Start: 2022-07-25 | End: 2022-10-13

## 2022-07-25 RX ORDER — PANTOPRAZOLE SODIUM 40 MG/1
TABLET, DELAYED RELEASE ORAL
Qty: 90 TABLET | Refills: 0 | Status: SHIPPED | OUTPATIENT
Start: 2022-07-25 | End: 2022-10-13

## 2022-07-25 RX ORDER — LOSARTAN POTASSIUM 100 MG/1
TABLET ORAL
Qty: 90 TABLET | Refills: 0 | Status: SHIPPED | OUTPATIENT
Start: 2022-07-25 | End: 2022-10-13

## 2022-07-25 RX ORDER — ATORVASTATIN CALCIUM 20 MG/1
TABLET, FILM COATED ORAL
Qty: 90 TABLET | Refills: 0 | Status: SHIPPED | OUTPATIENT
Start: 2022-07-25 | End: 2022-10-13

## 2022-07-25 RX ORDER — FUROSEMIDE 40 MG/1
TABLET ORAL
Qty: 90 TABLET | Refills: 0 | Status: SHIPPED | OUTPATIENT
Start: 2022-07-25 | End: 2022-10-13

## 2022-07-25 RX ORDER — AMLODIPINE BESYLATE 5 MG/1
TABLET ORAL
Qty: 90 TABLET | Refills: 0 | Status: SHIPPED | OUTPATIENT
Start: 2022-07-25 | End: 2022-10-13

## 2022-07-25 RX ORDER — TAMSULOSIN HYDROCHLORIDE 0.4 MG/1
0.4 CAPSULE ORAL DAILY
Qty: 90 CAPSULE | Refills: 0 | Status: SHIPPED | OUTPATIENT
Start: 2022-07-25 | End: 2022-10-13

## 2022-07-25 ASSESSMENT — ENCOUNTER SYMPTOMS
NAUSEA: 0
ABDOMINAL PAIN: 0
VOMITING: 0
SHORTNESS OF BREATH: 1
BACK PAIN: 0
RHINORRHEA: 0
WHEEZING: 0

## 2022-07-25 NOTE — PROGRESS NOTES
Subjective:      Patient ID: Eber Palencia is a 78 y.o. male. HPI    Patient is here for a follow up. He has a history of hypertension. It is well controlled. He is compliant and has no med side effects. No chest pain. Patient's COPD is controlled on present bronchodilator regimen. Patient is taking medications as instructed, no medication side effects noted, no significant ongoing wheezing or shortness of breath, using bronchodilator MDI less than twice a week. He quit smoking. He is doing well. He is on 3 L of oxygen at night. He has some pedal edema. No ER visits. He has some pedal edema. He has history of a fib. He is in NSR. He takes Xarelto. He has GERD. It is stable. He has nocturia, no hematuria. He wakes up few times at night. Review of Systems   Constitutional:  Negative for activity change and appetite change. HENT:  Negative for postnasal drip and rhinorrhea. Respiratory:  Positive for shortness of breath. Negative for wheezing. Cardiovascular:  Positive for leg swelling. Negative for chest pain and palpitations. Gastrointestinal:  Negative for abdominal pain, nausea and vomiting. Genitourinary:  Positive for frequency. Negative for difficulty urinating. Musculoskeletal:  Negative for back pain and joint swelling. Skin:  Negative for rash. Neurological:  Negative for light-headedness. Psychiatric/Behavioral:  Negative for sleep disturbance.         Past Medical History:   Diagnosis Date    A-fib ([facility]     2/14/12    Acute conjunctivitis of both eyes     Acute on chronic respiratory failure with hypoxia (HCC) 2/13/2012    Acute respiratory failure with hypoxia (HCC) 2/13/2012    Atrial fibrillation with RVR ([facility]     2/14/12     Avulsion fracture of ankle 9/21/2015    Benign localized hyperplasia of prostate with urinary obstruction and other lower urinary tract symptoms (LUTS)(600.21) 8/17/2016    Chronic systolic CHF (congestive heart failure) ([facility] 8/28/2017 Contusion of leg, right 2015    COPD (chronic obstructive pulmonary disease) (HCC)     Fractures     GERD (gastroesophageal reflux disease) 6/15/2015    Hypertension     Hypertensive urgency 2019    Hypertrophy of prostate without urinary obstruction and other lower urinary tract symptoms (LUTS) 6/15/2015    No history of procedure     no previos colonoscopy    PAD (peripheral artery disease) ([address] 10/9/2017    Pneumonia     Thoracic aortic aneurysm [facility])        Past Surgical History:   Procedure Laterality Date    CATARACT REMOVAL WITH IMPLANT Right 2018     PHACO EMULSIFICATION OF CATARACT WITH INTRAOCULAR LENS IMPLANT RIGHT EYE    COLONOSCOPY  2016    sigmoid polyps    ENDOSCOPY, COLON, DIAGNOSTIC  2019    egd; esophagitis/gastritis    HERNIA REPAIR      MT XCAPSL CTRC RMVL INSJ IO LENS PROSTH W/O ECP Right 2018    PHACO EMULSIFICATION OF CATARACT WITH INTRAOCULAR LENS IMPLANT RIGHT EYE performed by Teresa Chao MD at [address] [place] [address] RMVL INSJ IO LENS PROSTH W/O ECP Left 10/18/2018    PHACO EMULSIFICATION OF CATARACT WITH  INTRAOCULAR LENS IMPLANT LEFT EYE performed by Teresa Chao MD at [facility] N/A 2019    EGD BIOPSY performed by Allison Bauer MD at [address] [place] [address] ENDOSCOPY     Family History   Problem Relation Age of Onset    Alcohol Abuse Sister      Social History     Tobacco Use    Smoking status: Former     Packs/day: 2.00     Years: 55.00     Pack years: 110.00     Types: Cigarettes     Quit date: 2017     Years since quittin.9    Smokeless tobacco: Never    Tobacco comments:     smoked 2 darnell a day for 55 years   Vaping Use    Vaping Use: Never used   Substance Use Topics    Alcohol use: Not Currently     Comment: occassionally    Drug use: No   .    Current Outpatient Medications:     tamsulosin (FLOMAX) 0.4 MG capsule, Take 1 capsule by mouth daily, Disp: 28 capsule, Rfl: 0    Potassium Citrate ER 15 MEQ (1620 MG) TBCR, TAKE ONE TABLET BY MOUTH EVERY DAY, Disp: 28 tablet, Rfl: 0    metoprolol succinate (TOPROL XL) 50 MG extended release tablet, TAKE ONE TABLET BY MOUTH EVERY DAY, Disp: 28 tablet, Rfl: 0    furosemide (LASIX) 40 MG tablet, TAKE ONE TABLET BY MOUTH EVERY DAY, Disp: 28 tablet, Rfl: 0    losartan (COZAAR) 100 MG tablet, TAKE ONE TABLET BY MOUTH DAILY, Disp: 28 tablet, Rfl: 0    amLODIPine (NORVASC) 5 MG tablet, TAKE ONE TABLET BY MOUTH EVERY DAY, Disp: 28 tablet, Rfl: 0    rivaroxaban (XARELTO) 20 MG TABS tablet, TAKE ONE TABLET BY MOUTH DAILY WITH BREAKFAST, Disp: 28 tablet, Rfl: 0    pantoprazole (PROTONIX) 40 MG tablet, TAKE ONE TABLET BY MOUTH EVERY DAY, Disp: 28 tablet, Rfl: 0    atorvastatin (LIPITOR) 20 MG tablet, TAKE ONE TABLET BY MOUTH EVERY DAY, Disp: 28 tablet, Rfl: 0    ipratropium-albuterol (DUONEB) 0.5-2.5 (3) MG/3ML SOLN nebulizer solution, USE ONE UNIT DOSE (3ML) IN NEBULIZER AND INHALE INTO THE LUNGS EVERY 4 HOURS AS NEEDED FOR SHORTNESS OF BREATH, Disp: 360 mL, Rfl: 0    SYMBICORT 160-4.5 MCG/ACT AERO, Inhale 2 puffs into the lungs 2 times daily, Disp: 10.2 g, Rfl: 2    albuterol sulfate  (90 Base) MCG/ACT inhaler, Inhale 2 puffs into the lungs every 6 hours as needed for Wheezing, Disp: 18 g, Rfl: 3    predniSONE (DELTASONE) 10 MG tablet, TAKE ONE TABLET BY MOUTH AS NEEDED FOR COUGH, Disp: 30 tablet, Rfl: 2    Cholecalciferol (VITAMIN D-3) 25 MCG (1000 UT) CAPS, Take by mouth daily , Disp: , Rfl:     albuterol sulfate HFA (PROVENTIL HFA) 108 (90 Base) MCG/ACT inhaler, Inhale 2 puffs into the lungs every 6 hours as needed for Wheezing (with spacer), Disp: 1 Inhaler, Rfl: 11      /80 (Site: Right Upper Arm, Position: Sitting, Cuff Size: Medium Adult)   Pulse 70   Resp 12   Ht 5' 11\" (1.803 m)   Wt 246 lb (111.6 kg)   BMI 34.31 kg/m²      Objective:   Physical Exam  Constitutional:       Appearance: He is well-developed. HENT:      Head: Normocephalic.    Eyes: Conjunctiva/sclera: Conjunctivae normal.      Pupils: Pupils are equal, round, and reactive to light. Neck:      Thyroid: No thyroid mass or thyromegaly. Vascular: No carotid bruit or JVD. Trachea: Trachea normal.   Cardiovascular:      Rate and Rhythm: Normal rate and regular rhythm. Heart sounds: Normal heart sounds. No murmur heard. No gallop. Pulmonary:      Effort: Pulmonary effort is normal. No respiratory distress. Breath sounds: Normal breath sounds. No wheezing or rales. Abdominal:      General: Bowel sounds are normal. There is no distension. Palpations: Abdomen is soft. There is no hepatomegaly, splenomegaly or mass. Tenderness: There is no abdominal tenderness. Musculoskeletal:         General: Normal range of motion. Cervical back: Normal range of motion and neck supple. Lymphadenopathy:      Cervical: No cervical adenopathy. Skin:     General: Skin is warm and dry. Findings: No rash. Neurological:      Mental Status: He is alert and oriented to person, place, and time. Cranial Nerves: No cranial nerve deficit. Deep Tendon Reflexes: Reflexes are normal and symmetric. Psychiatric:         Behavior: Behavior normal.         Thought Content: Thought content normal.         Judgment: Judgment normal.         CT chest 4/19/2017     Thoracic aortic aneurysm as above, not substantially changed. Emphysema. A few nodules which are stable since 2015. As there is a history of smoking,   recommend yearly low-dose lung cancer screening CT. Assessment:       Diagnosis Orders   1. Paroxysmal A-fib ([address]        2. Dilated cardiomyopathy ([address]        3. PAD (peripheral artery disease) (HCC)        4. Stage 3 chronic kidney disease, unspecified whether stage 3a or 3b CKD ([address]        5. Chronic obstructive pulmonary disease with acute exacerbation ([address]        6.  Chronic respiratory failure with hypoxia ([address]               Plan: #  Hypertension: Well controlled. Weight loss recommended. #  A fib: in SR. On Xarelto. #  COPD stable. No flare up. on Symbicort. Proventil prn. Use HHn. He has a prednisone taper for an emergency. #  GERD: on Prilosec. #  PN right: stable. #  BPH with symptoms. Resume Flomax. #  Hyperlipidemia on Lipitor. #  Chronic respiratory failure on oxygen at hs. #  Chronic systolic CHF. Stable. On Lasix. Cut back on salt. #  CKD stable. Discussed use, benefit, and side effects of prescribed medications. Barriers to medication compliance addressed. All patient questions answered. Pt voiced understanding. Decrease calorie intake. Exercise,weight loss recommended. The current medical regimen is effective;  continue present plan and medications. See orders.

## 2022-08-04 RX ORDER — PREDNISONE 10 MG/1
TABLET ORAL
Qty: 30 TABLET | Refills: 0 | Status: SHIPPED | OUTPATIENT
Start: 2022-08-04 | End: 2022-09-21

## 2022-08-10 RX ORDER — IPRATROPIUM BROMIDE AND ALBUTEROL SULFATE 2.5; .5 MG/3ML; MG/3ML
SOLUTION RESPIRATORY (INHALATION)
Qty: 360 ML | Refills: 0 | Status: SHIPPED | OUTPATIENT
Start: 2022-08-10 | End: 2022-09-12

## 2022-09-12 RX ORDER — IPRATROPIUM BROMIDE AND ALBUTEROL SULFATE 2.5; .5 MG/3ML; MG/3ML
SOLUTION RESPIRATORY (INHALATION)
Qty: 360 ML | Refills: 0 | Status: SHIPPED | OUTPATIENT
Start: 2022-09-12 | End: 2022-10-03

## 2022-09-21 RX ORDER — PREDNISONE 10 MG/1
TABLET ORAL
Qty: 30 TABLET | Refills: 0 | Status: SHIPPED | OUTPATIENT
Start: 2022-09-21

## 2022-10-03 RX ORDER — IPRATROPIUM BROMIDE AND ALBUTEROL SULFATE 2.5; .5 MG/3ML; MG/3ML
SOLUTION RESPIRATORY (INHALATION)
Qty: 360 ML | Refills: 0 | Status: SHIPPED | OUTPATIENT
Start: 2022-10-03

## 2022-10-13 RX ORDER — ATORVASTATIN CALCIUM 20 MG/1
TABLET, FILM COATED ORAL
Qty: 28 TABLET | Refills: 0 | Status: SHIPPED | OUTPATIENT
Start: 2022-10-13

## 2022-10-13 RX ORDER — PANTOPRAZOLE SODIUM 40 MG/1
TABLET, DELAYED RELEASE ORAL
Qty: 28 TABLET | Refills: 0 | Status: SHIPPED | OUTPATIENT
Start: 2022-10-13

## 2022-10-13 RX ORDER — AMLODIPINE BESYLATE 5 MG/1
TABLET ORAL
Qty: 28 TABLET | Refills: 0 | Status: SHIPPED | OUTPATIENT
Start: 2022-10-13

## 2022-10-13 RX ORDER — TAMSULOSIN HYDROCHLORIDE 0.4 MG/1
CAPSULE ORAL
Qty: 28 CAPSULE | Refills: 0 | Status: SHIPPED | OUTPATIENT
Start: 2022-10-13

## 2022-10-13 RX ORDER — METOPROLOL SUCCINATE 50 MG/1
TABLET, EXTENDED RELEASE ORAL
Qty: 28 TABLET | Refills: 0 | Status: SHIPPED | OUTPATIENT
Start: 2022-10-13

## 2022-10-13 RX ORDER — FUROSEMIDE 40 MG/1
TABLET ORAL
Qty: 28 TABLET | Refills: 0 | Status: SHIPPED | OUTPATIENT
Start: 2022-10-13

## 2022-10-13 RX ORDER — POTASSIUM CITRATE 15 MEQ/1
TABLET, EXTENDED RELEASE ORAL
Qty: 28 TABLET | Refills: 0 | Status: SHIPPED | OUTPATIENT
Start: 2022-10-13

## 2022-10-13 RX ORDER — LOSARTAN POTASSIUM 100 MG/1
TABLET ORAL
Qty: 28 TABLET | Refills: 0 | Status: SHIPPED | OUTPATIENT
Start: 2022-10-13

## 2022-11-10 RX ORDER — METOPROLOL SUCCINATE 50 MG/1
TABLET, EXTENDED RELEASE ORAL
Qty: 30 TABLET | Refills: 0 | Status: SHIPPED | OUTPATIENT
Start: 2022-11-10 | End: 2022-11-28 | Stop reason: SDUPTHER

## 2022-11-10 RX ORDER — PANTOPRAZOLE SODIUM 40 MG/1
TABLET, DELAYED RELEASE ORAL
Qty: 30 TABLET | Refills: 0 | Status: SHIPPED | OUTPATIENT
Start: 2022-11-10 | End: 2022-11-28 | Stop reason: SDUPTHER

## 2022-11-10 RX ORDER — TAMSULOSIN HYDROCHLORIDE 0.4 MG/1
CAPSULE ORAL
Qty: 30 CAPSULE | Refills: 0 | Status: SHIPPED | OUTPATIENT
Start: 2022-11-10 | End: 2022-11-28 | Stop reason: SDUPTHER

## 2022-11-10 RX ORDER — ATORVASTATIN CALCIUM 20 MG/1
TABLET, FILM COATED ORAL
Qty: 30 TABLET | Refills: 0 | Status: SHIPPED | OUTPATIENT
Start: 2022-11-10 | End: 2022-11-28 | Stop reason: SDUPTHER

## 2022-11-10 RX ORDER — FUROSEMIDE 40 MG/1
TABLET ORAL
Qty: 30 TABLET | Refills: 0 | Status: SHIPPED | OUTPATIENT
Start: 2022-11-10 | End: 2022-11-28 | Stop reason: SDUPTHER

## 2022-11-10 RX ORDER — POTASSIUM CITRATE 15 MEQ/1
TABLET, EXTENDED RELEASE ORAL
Qty: 30 TABLET | Refills: 0 | Status: SHIPPED | OUTPATIENT
Start: 2022-11-10 | End: 2022-11-16

## 2022-11-10 RX ORDER — AMLODIPINE BESYLATE 5 MG/1
TABLET ORAL
Qty: 30 TABLET | Refills: 0 | Status: SHIPPED | OUTPATIENT
Start: 2022-11-10 | End: 2022-11-28 | Stop reason: SDUPTHER

## 2022-11-10 RX ORDER — LOSARTAN POTASSIUM 100 MG/1
TABLET ORAL
Qty: 30 TABLET | Refills: 0 | Status: SHIPPED | OUTPATIENT
Start: 2022-11-10 | End: 2022-11-28 | Stop reason: SDUPTHER

## 2022-11-14 RX ORDER — PREDNISONE 10 MG/1
TABLET ORAL
Qty: 30 TABLET | Refills: 0 | Status: SHIPPED | OUTPATIENT
Start: 2022-11-14

## 2022-11-16 RX ORDER — POTASSIUM CITRATE 15 MEQ/1
TABLET, EXTENDED RELEASE ORAL
Qty: 30 TABLET | Refills: 0 | Status: SHIPPED | OUTPATIENT
Start: 2022-11-16 | End: 2022-11-28 | Stop reason: SDUPTHER

## 2022-11-28 ENCOUNTER — OFFICE VISIT (OUTPATIENT)
Dept: INTERNAL MEDICINE CLINIC | Age: 77
End: 2022-11-28

## 2022-11-28 VITALS — HEIGHT: 71 IN | WEIGHT: 248 LBS | BODY MASS INDEX: 34.72 KG/M2

## 2022-11-28 DIAGNOSIS — I73.9 PAD (PERIPHERAL ARTERY DISEASE) (HCC): ICD-10-CM

## 2022-11-28 DIAGNOSIS — I50.32 CHRONIC DIASTOLIC CHF (CONGESTIVE HEART FAILURE) (HCC): ICD-10-CM

## 2022-11-28 DIAGNOSIS — Z00.00 MEDICARE ANNUAL WELLNESS VISIT, SUBSEQUENT: Primary | ICD-10-CM

## 2022-11-28 DIAGNOSIS — K21.9 GASTROESOPHAGEAL REFLUX DISEASE WITHOUT ESOPHAGITIS: ICD-10-CM

## 2022-11-28 DIAGNOSIS — N18.30 STAGE 3 CHRONIC KIDNEY DISEASE, UNSPECIFIED WHETHER STAGE 3A OR 3B CKD (HCC): ICD-10-CM

## 2022-11-28 DIAGNOSIS — J44.9 CHRONIC OBSTRUCTIVE PULMONARY DISEASE, UNSPECIFIED COPD TYPE (HCC): ICD-10-CM

## 2022-11-28 DIAGNOSIS — I10 ESSENTIAL HYPERTENSION: ICD-10-CM

## 2022-11-28 DIAGNOSIS — J96.11 CHRONIC RESPIRATORY FAILURE WITH HYPOXIA (HCC): ICD-10-CM

## 2022-11-28 DIAGNOSIS — I48.0 PAROXYSMAL A-FIB (HCC): ICD-10-CM

## 2022-11-28 PROCEDURE — 1123F ACP DISCUSS/DSCN MKR DOCD: CPT | Performed by: INTERNAL MEDICINE

## 2022-11-28 PROCEDURE — 90662 IIV NO PRSV INCREASED AG IM: CPT | Performed by: INTERNAL MEDICINE

## 2022-11-28 PROCEDURE — G0439 PPPS, SUBSEQ VISIT: HCPCS | Performed by: INTERNAL MEDICINE

## 2022-11-28 PROCEDURE — G0008 ADMIN INFLUENZA VIRUS VAC: HCPCS | Performed by: INTERNAL MEDICINE

## 2022-11-28 RX ORDER — PANTOPRAZOLE SODIUM 40 MG/1
TABLET, DELAYED RELEASE ORAL
Qty: 30 TABLET | Refills: 2 | Status: SHIPPED | OUTPATIENT
Start: 2022-11-28

## 2022-11-28 RX ORDER — AMLODIPINE BESYLATE 5 MG/1
TABLET ORAL
Qty: 30 TABLET | Refills: 2 | Status: SHIPPED | OUTPATIENT
Start: 2022-11-28

## 2022-11-28 RX ORDER — LOSARTAN POTASSIUM 100 MG/1
TABLET ORAL
Qty: 30 TABLET | Refills: 2 | Status: SHIPPED | OUTPATIENT
Start: 2022-11-28

## 2022-11-28 RX ORDER — ATORVASTATIN CALCIUM 20 MG/1
TABLET, FILM COATED ORAL
Qty: 30 TABLET | Refills: 2 | Status: SHIPPED | OUTPATIENT
Start: 2022-11-28

## 2022-11-28 RX ORDER — METOPROLOL SUCCINATE 50 MG/1
TABLET, EXTENDED RELEASE ORAL
Qty: 30 TABLET | Refills: 2 | Status: SHIPPED | OUTPATIENT
Start: 2022-11-28

## 2022-11-28 RX ORDER — TAMSULOSIN HYDROCHLORIDE 0.4 MG/1
CAPSULE ORAL
Qty: 30 CAPSULE | Refills: 2 | Status: SHIPPED | OUTPATIENT
Start: 2022-11-28

## 2022-11-28 RX ORDER — POTASSIUM CITRATE 15 MEQ/1
TABLET, EXTENDED RELEASE ORAL
Qty: 30 TABLET | Refills: 2 | Status: SHIPPED | OUTPATIENT
Start: 2022-11-28

## 2022-11-28 RX ORDER — FUROSEMIDE 40 MG/1
TABLET ORAL
Qty: 30 TABLET | Refills: 2 | Status: SHIPPED | OUTPATIENT
Start: 2022-11-28

## 2022-11-28 ASSESSMENT — PATIENT HEALTH QUESTIONNAIRE - PHQ9
SUM OF ALL RESPONSES TO PHQ QUESTIONS 1-9: 0
SUM OF ALL RESPONSES TO PHQ QUESTIONS 1-9: 0
SUM OF ALL RESPONSES TO PHQ9 QUESTIONS 1 & 2: 0
1. LITTLE INTEREST OR PLEASURE IN DOING THINGS: 0
SUM OF ALL RESPONSES TO PHQ QUESTIONS 1-9: 0
SUM OF ALL RESPONSES TO PHQ QUESTIONS 1-9: 0
2. FEELING DOWN, DEPRESSED OR HOPELESS: 0

## 2022-11-28 ASSESSMENT — LIFESTYLE VARIABLES
HOW OFTEN DO YOU HAVE A DRINK CONTAINING ALCOHOL: NEVER
HOW MANY STANDARD DRINKS CONTAINING ALCOHOL DO YOU HAVE ON A TYPICAL DAY: PATIENT DOES NOT DRINK

## 2022-11-28 NOTE — PATIENT INSTRUCTIONS
Personalized Preventive Plan for Leif Dumas - 11/28/2022  Medicare offers a range of preventive health benefits. Some of the tests and screenings are paid in full while other may be subject to a deductible, co-insurance, and/or copay. Some of these benefits include a comprehensive review of your medical history including lifestyle, illnesses that may run in your family, and various assessments and screenings as appropriate. After reviewing your medical record and screening and assessments performed today your provider may have ordered immunizations, labs, imaging, and/or referrals for you. A list of these orders (if applicable) as well as your Preventive Care list are included within your After Visit Summary for your review. Other Preventive Recommendations:    A preventive eye exam performed by an eye specialist is recommended every 1-2 years to screen for glaucoma; cataracts, macular degeneration, and other eye disorders. A preventive dental visit is recommended every 6 months. Try to get at least 150 minutes of exercise per week or 10,000 steps per day on a pedometer . Order or download the FREE \"Exercise & Physical Activity: Your Everyday Guide\" from [employer]. Call 2-900.478.1642 or search [employer] online. You need 1311-8451 mg of calcium and 7623-8714 IU of vitamin D per day. It is possible to meet your calcium requirement with diet alone, but a vitamin D supplement is usually necessary to meet this goal.  When exposed to the sun, use a sunscreen that protects against both UVA and UVB radiation with an SPF of 30 or greater. Reapply every 2 to 3 hours or after sweating, drying off with a towel, or swimming. Always wear a seat belt when traveling in a car. Always wear a helmet when riding a bicycle or motorcycle.

## 2022-11-28 NOTE — PROGRESS NOTES
Medicare Annual Wellness Visit    Bebeto Patricia is here for No chief complaint on file. Assessment & Plan   Medicare annual wellness visit, subsequent  Gastroesophageal reflux disease without esophagitis  Paroxysmal A-fib (HCC)  PAD (peripheral artery disease) ([facility])  Stage 3 chronic kidney disease, unspecified whether stage 3a or 3b CKD ([facility]  -     Comprehensive Metabolic Panel; Future  -     CBC with Auto Differential; Future  -     Uric Acid; Future  Chronic respiratory failure with hypoxia ([facility])  Essential hypertension  Chronic obstructive pulmonary disease, unspecified COPD type ([facility])  Chronic diastolic CHF (congestive heart failure) ([facility]        -Medicare physical done  - P a fib. In NSR.  - PAD stable  - CKD stage 3b check labs  - Chronic dCHF. Increase Lasix to bid prn.  - COPD stable    Bebeto Patricia was evaluated today and a DME order was entered for variable height hospital bed because he requires assistance for positioning needs not possible in an ordinary bed, complexity of body positioning needs, requirement of elevation of head of bed more than 30 degrees for the diagnosis of CHF . Patient needs variability of bed height to perform patient transfers and for eating, bathing, toileting, and personal cares. Current body Weight: 248 lb (112.5 kg). The need for this equipment was discussed with the patient and he understands and is in agreement. Recommendations for Preventive Services Due: see orders and patient instructions/AVS.  Recommended screening schedule for the next 5-10 years is provided to the patient in written form: see Patient Instructions/AVS.     No follow-ups on file. Subjective   The following acute and/or chronic problems were also addressed today:    Patient is here for a follow up. He has a history of hypertension. It is well controlled. He is compliant and has no med side effects. No chest pain. Patient's COPD is controlled on present bronchodilator regimen. Patient is taking medications as instructed, no medication side effects noted, no significant ongoing wheezing. He does get exertional dyspnea. He has quit smoking. He is doing well. He is on 3 L of oxygen at night. He has noted more pedal edema. He has history of a fib. He is in NSR. He takes Xarelto. He has GERD. It is stable. He has nocturia, no hematuria. He wakes up few times at night    Patient's complete Health Risk Assessment and screening values have been reviewed and are found in Flowsheets. The following problems were reviewed today and where indicated follow up appointments were made and/or referrals ordered.     Positive Risk Factor Screenings with Interventions:             General Health and ACP:  General  In general, how would you say your health is?: Very Good  In the past 7 days, have you experienced any of the following: New or Increased Pain, New or Increased Fatigue, Loneliness, Social Isolation, Stress or Anger?: No  Do you get the social and emotional support that you need?: Yes  Do you have a Living Will?: (!) No    Advance Directives       Power of  Living Will ACP-Advance Directive ACP-Power of     Not on File Not on File Not on File Not on File        General Health Risk Interventions:  No Living Will: Advance Care Planning addressed with patient today    Health Habits/Nutrition:  Physical Activity: Insufficiently Active    Days of Exercise per Week: 1 day    Minutes of Exercise per Session: 10 min     Have you lost any weight without trying in the past 3 months?: No  Body mass index: (!) 34.59  Have you seen the dentist within the past year?: N/A - wear dentures  Health Habits/Nutrition Interventions:  Nutritional issues:  educational materials for healthy, well-balanced diet provided     Safety:  Do you have working smoke detectors?: (!) No  Do you have any tripping hazards - loose or unsecured carpets or rugs?: No  Do you have any tripping hazards - clutter in doorways, halls, or stairs?: No  Do you have either shower bars, grab bars, non-slip mats or non-slip surfaces in your shower or bathtub?: Yes  Do all of your stairways have a railing or banister?: Yes  Do you always fasten your seatbelt when you are in a car?: Yes  Safety Interventions:  Home safety tips provided           Objective   Vitals:    11/28/22 1254   Weight: 248 lb (112.5 kg)   Height: 5' 11\" (1.803 m)      Body mass index is 34.59 kg/m². General Appearance: alert and oriented to person, place and time, well developed and well- nourished, in no acute distress  Skin: warm and dry, no rash or erythema  Head: normocephalic and atraumatic  Eyes: pupils equal, round, and reactive to light, extraocular eye movements intact, conjunctivae normal  ENT: tympanic membrane, external ear and ear canal normal bilaterally, nose without deformity, nasal mucosa and turbinates normal without polyps  Neck: supple and non-tender without mass, no thyromegaly or thyroid nodules, no cervical lymphadenopathy  Pulmonary/Chest: clear to auscultation bilaterally- no wheezes, rales or rhonchi, diminished air movement, no respiratory distress  Cardiovascular: normal rate, regular rhythm, normal S1 and S2, no murmurs, rubs, clicks, or gallops, distal pulses intact, no carotid bruits  Abdomen: soft, non-tender, non-distended, normal bowel sounds, no masses or organomegaly  Extremities: no cyanosis, clubbing ++ edema  Musculoskeletal: normal range of motion, no joint swelling, deformity or tenderness  Neurologic: reflexes normal and symmetric, no cranial nerve deficit, gait, coordination and speech normal       Allergies   Allergen Reactions    Amiodarone Anaphylaxis     Thyriod toxic      Prior to Visit Medications    Medication Sig Taking?  Authorizing Provider   amLODIPine (NORVASC) 5 MG tablet TAKE ONE TABLET BY MOUTH EVERY DAY Yes Marisol Suero MD   metoprolol succinate (TOPROL XL) 50 MG extended release tablet TAKE Malikaosvelt Bandar Yes Jarred Graham MD   furosemide (LASIX) 40 MG tablet TAKE ONE TABLET BY MOUTH EVERY DAY Yes Jarred Graham MD   pantoprazole (PROTONIX) 40 MG tablet TAKE ONE TABLET BY MOUTH EVERY DAY Yes Jarred Graham MD   losartan (COZAAR) 100 MG tablet TAKE ONE TABLET BY MOUTH DAILY Yes Jarred Graham MD   Potassium Citrate ER (UROCIT-K) 15 MEQ (1620 MG) TBCR extended release tablet TAKE ONE TABLET BY MOUTH EVERY DAY Yes Jarred Graham MD   rivaroxaban (XARELTO) 20 MG TABS tablet TAKE ONE TABLET BY MOUTH DAILY WITH BREAKFAST Yes Jarred Graham MD   atorvastatin (LIPITOR) 20 MG tablet TAKE ONE TABLET BY MOUTH EVERY DAY Yes Jarred Graham MD   tamsulosin (FLOMAX) 0.4 MG capsule Take 1 capsule by mouth daily Yes Jarred Graham MD   predniSONE (DELTASONE) 10 MG tablet TAKE ONE TABLET BY MOUTH AS NEEDED FOR COUGH  Jarred Graham MD   ipratropium-albuterol (DUONEB) 0.5-2.5 (3) MG/3ML SOLN nebulizer solution USE ONE UNIT DOSE (3ML) IN NEBULIZER AND INHALE INTO THE LUNGS EVERY 4 HOURS AS NEEDED FOR SHORTNESS OF BREATH  Jarred Graham MD   SYMBICORT 160-4.5 MCG/ACT AERO Inhale 2 puffs into the lungs 2 times daily  Jarred Graham MD   albuterol sulfate  (90 Base) MCG/ACT inhaler Inhale 2 puffs into the lungs every 6 hours as needed for Wheezing  Jarred Graham MD   Cholecalciferol (VITAMIN D-3) 25 MCG (1000 UT) CAPS Take by mouth daily   Historical Provider, MD   albuterol sulfate HFA (PROVENTIL HFA) 108 (90 Base) MCG/ACT inhaler Inhale 2 puffs into the lungs every 6 hours as needed for Wheezing (with spacer)  Jarred Graham MD       [facility] (Including outside providers/suppliers regularly involved in providing care):   Patient Care Team:  Jarred Graham MD as PCP - Andree Liriano MD as PCP - [facility] Empaneled Provider  Shelbie Pope RN as Nurse Ofe Washington MD as Consulting Physician (Pulmonology)     Reviewed and updated this visit:  Tobacco  Allergies  Meds  Problems  Med Hx  Surg Hx  Soc Hx  Fam Hx

## 2022-11-29 LAB
A/G RATIO: 1.4 (ref 1.1–2.2)
ALBUMIN SERPL-MCNC: 4.4 G/DL (ref 3.4–5)
ALP BLD-CCNC: 92 U/L (ref 40–129)
ALT SERPL-CCNC: 9 U/L (ref 10–40)
ANION GAP SERPL CALCULATED.3IONS-SCNC: 18 MMOL/L (ref 3–16)
AST SERPL-CCNC: 9 U/L (ref 15–37)
BASOPHILS ABSOLUTE: 0.1 K/UL (ref 0–0.2)
BASOPHILS RELATIVE PERCENT: 1.1 %
BILIRUB SERPL-MCNC: 0.5 MG/DL (ref 0–1)
BUN BLDV-MCNC: 22 MG/DL (ref 7–20)
CALCIUM SERPL-MCNC: 9.7 MG/DL (ref 8.3–10.6)
CHLORIDE BLD-SCNC: 102 MMOL/L (ref 99–110)
CO2: 25 MMOL/L (ref 21–32)
CREAT SERPL-MCNC: 1.6 MG/DL (ref 0.8–1.3)
EOSINOPHILS ABSOLUTE: 0.1 K/UL (ref 0–0.6)
EOSINOPHILS RELATIVE PERCENT: 1.8 %
GFR SERPL CREATININE-BSD FRML MDRD: 45 ML/MIN/{1.73_M2}
GLUCOSE BLD-MCNC: 84 MG/DL (ref 70–99)
HCT VFR BLD CALC: 42.5 % (ref 40.5–52.5)
HEMOGLOBIN: 13.6 G/DL (ref 13.5–17.5)
LYMPHOCYTES ABSOLUTE: 1.3 K/UL (ref 1–5.1)
LYMPHOCYTES RELATIVE PERCENT: 18 %
MCH RBC QN AUTO: 29.7 PG (ref 26–34)
MCHC RBC AUTO-ENTMCNC: 32.1 G/DL (ref 31–36)
MCV RBC AUTO: 92.4 FL (ref 80–100)
MONOCYTES ABSOLUTE: 0.6 K/UL (ref 0–1.3)
MONOCYTES RELATIVE PERCENT: 7.7 %
NEUTROPHILS ABSOLUTE: 5.1 K/UL (ref 1.7–7.7)
NEUTROPHILS RELATIVE PERCENT: 71.4 %
PDW BLD-RTO: 16 % (ref 12.4–15.4)
PLATELET # BLD: 226 K/UL (ref 135–450)
PMV BLD AUTO: 9.4 FL (ref 5–10.5)
POTASSIUM SERPL-SCNC: 4.8 MMOL/L (ref 3.5–5.1)
RBC # BLD: 4.6 M/UL (ref 4.2–5.9)
SODIUM BLD-SCNC: 145 MMOL/L (ref 136–145)
TOTAL PROTEIN: 7.5 G/DL (ref 6.4–8.2)
URIC ACID, SERUM: 7.2 MG/DL (ref 3.5–7.2)
WBC # BLD: 7.2 K/UL (ref 4–11)

## 2022-12-07 RX ORDER — IPRATROPIUM BROMIDE AND ALBUTEROL SULFATE 2.5; .5 MG/3ML; MG/3ML
SOLUTION RESPIRATORY (INHALATION)
Qty: 360 ML | Refills: 0 | Status: SHIPPED | OUTPATIENT
Start: 2022-12-07

## 2022-12-07 RX ORDER — ALBUTEROL SULFATE 90 UG/1
2 AEROSOL, METERED RESPIRATORY (INHALATION) EVERY 6 HOURS PRN
Qty: 17 G | Refills: 0 | Status: SHIPPED | OUTPATIENT
Start: 2022-12-07

## 2022-12-27 RX ORDER — PREDNISONE 10 MG/1
TABLET ORAL
Qty: 30 TABLET | Refills: 0 | Status: SHIPPED | OUTPATIENT
Start: 2022-12-27

## 2023-01-24 ENCOUNTER — HOSPITAL ENCOUNTER (EMERGENCY)
Age: 78
Discharge: HOME | End: 2023-01-24
Attending: EMERGENCY MEDICINE
Payer: MEDICARE

## 2023-01-24 ENCOUNTER — APPOINTMENT (OUTPATIENT)
Dept: GENERAL RADIOLOGY | Age: 78
End: 2023-01-24
Payer: MEDICARE

## 2023-01-24 VITALS
OXYGEN SATURATION: 95 % | SYSTOLIC BLOOD PRESSURE: 97 MMHG | TEMPERATURE: 98 F | HEART RATE: 80 BPM | WEIGHT: 248 LBS | BODY MASS INDEX: 34.72 KG/M2 | DIASTOLIC BLOOD PRESSURE: 82 MMHG | RESPIRATION RATE: 20 BRPM | HEIGHT: 71 IN

## 2023-01-24 DIAGNOSIS — J44.1 COPD EXACERBATION (HCC): Primary | ICD-10-CM

## 2023-01-24 DIAGNOSIS — R06.02 SHORTNESS OF BREATH: ICD-10-CM

## 2023-01-24 LAB
A/G RATIO: 1 (ref 1.1–2.2)
ALBUMIN SERPL-MCNC: 3.7 G/DL (ref 3.4–5)
ALP BLD-CCNC: 82 U/L (ref 40–129)
ALT SERPL-CCNC: 6 U/L (ref 10–40)
ANION GAP SERPL CALCULATED.3IONS-SCNC: 10 MMOL/L (ref 3–16)
AST SERPL-CCNC: 8 U/L (ref 15–37)
BASOPHILS ABSOLUTE: 0.1 K/UL (ref 0–0.2)
BASOPHILS RELATIVE PERCENT: 1.2 %
BILIRUB SERPL-MCNC: 0.4 MG/DL (ref 0–1)
BUN BLDV-MCNC: 23 MG/DL (ref 7–20)
CALCIUM SERPL-MCNC: 9.6 MG/DL (ref 8.3–10.6)
CHLORIDE BLD-SCNC: 102 MMOL/L (ref 99–110)
CO2: 29 MMOL/L (ref 21–32)
CREAT SERPL-MCNC: 1.4 MG/DL (ref 0.8–1.3)
EKG ATRIAL RATE: 394 BPM
EKG DIAGNOSIS: NORMAL
EKG Q-T INTERVAL: 352 MS
EKG QRS DURATION: 84 MS
EKG QTC CALCULATION (BAZETT): 423 MS
EKG R AXIS: 66 DEGREES
EKG T AXIS: 63 DEGREES
EKG VENTRICULAR RATE: 87 BPM
EOSINOPHILS ABSOLUTE: 0.6 K/UL (ref 0–0.6)
EOSINOPHILS RELATIVE PERCENT: 7.9 %
GFR SERPL CREATININE-BSD FRML MDRD: 52 ML/MIN/{1.73_M2}
GLUCOSE BLD-MCNC: 89 MG/DL (ref 70–99)
HCT VFR BLD CALC: 40.2 % (ref 40.5–52.5)
HEMOGLOBIN: 13.1 G/DL (ref 13.5–17.5)
LYMPHOCYTES ABSOLUTE: 1.8 K/UL (ref 1–5.1)
LYMPHOCYTES RELATIVE PERCENT: 24.8 %
MCH RBC QN AUTO: 29.2 PG (ref 26–34)
MCHC RBC AUTO-ENTMCNC: 32.6 G/DL (ref 31–36)
MCV RBC AUTO: 89.7 FL (ref 80–100)
MONOCYTES ABSOLUTE: 0.7 K/UL (ref 0–1.3)
MONOCYTES RELATIVE PERCENT: 9.7 %
NEUTROPHILS ABSOLUTE: 4.2 K/UL (ref 1.7–7.7)
NEUTROPHILS RELATIVE PERCENT: 56.4 %
PDW BLD-RTO: 16.3 % (ref 12.4–15.4)
PLATELET # BLD: 178 K/UL (ref 135–450)
PMV BLD AUTO: 8.6 FL (ref 5–10.5)
POTASSIUM REFLEX MAGNESIUM: 4.3 MMOL/L (ref 3.5–5.1)
PRO-BNP: 225 PG/ML (ref 0–449)
RAPID INFLUENZA  B AGN: NEGATIVE
RAPID INFLUENZA A AGN: NEGATIVE
RBC # BLD: 4.48 M/UL (ref 4.2–5.9)
SARS-COV-2, NAAT: NOT DETECTED
SODIUM BLD-SCNC: 141 MMOL/L (ref 136–145)
TOTAL PROTEIN: 7.3 G/DL (ref 6.4–8.2)
TROPONIN: 0.01 NG/ML
WBC # BLD: 7.4 K/UL (ref 4–11)

## 2023-01-24 PROCEDURE — 85025 COMPLETE CBC W/AUTO DIFF WBC: CPT

## 2023-01-24 PROCEDURE — 87635 SARS-COV-2 COVID-19 AMP PRB: CPT

## 2023-01-24 PROCEDURE — 87804 INFLUENZA ASSAY W/OPTIC: CPT

## 2023-01-24 PROCEDURE — 93010 ELECTROCARDIOGRAM REPORT: CPT | Performed by: INTERNAL MEDICINE

## 2023-01-24 PROCEDURE — 80053 COMPREHEN METABOLIC PANEL: CPT

## 2023-01-24 PROCEDURE — 96374 THER/PROPH/DIAG INJ IV PUSH: CPT

## 2023-01-24 PROCEDURE — 6360000002 HC RX W HCPCS: Performed by: EMERGENCY MEDICINE

## 2023-01-24 PROCEDURE — 83880 ASSAY OF NATRIURETIC PEPTIDE: CPT

## 2023-01-24 PROCEDURE — 84484 ASSAY OF TROPONIN QUANT: CPT

## 2023-01-24 PROCEDURE — 6370000000 HC RX 637 (ALT 250 FOR IP): Performed by: EMERGENCY MEDICINE

## 2023-01-24 PROCEDURE — 93005 ELECTROCARDIOGRAM TRACING: CPT | Performed by: EMERGENCY MEDICINE

## 2023-01-24 PROCEDURE — 36415 COLL VENOUS BLD VENIPUNCTURE: CPT

## 2023-01-24 PROCEDURE — 71045 X-RAY EXAM CHEST 1 VIEW: CPT

## 2023-01-24 PROCEDURE — 99285 EMERGENCY DEPT VISIT HI MDM: CPT

## 2023-01-24 RX ORDER — IPRATROPIUM BROMIDE AND ALBUTEROL SULFATE 2.5; .5 MG/3ML; MG/3ML
2 SOLUTION RESPIRATORY (INHALATION) ONCE
Status: COMPLETED | OUTPATIENT
Start: 2023-01-24 | End: 2023-01-24

## 2023-01-24 RX ORDER — PREDNISONE 10 MG/1
TABLET ORAL
Qty: 30 TABLET | Refills: 0 | Status: SHIPPED | OUTPATIENT
Start: 2023-01-24

## 2023-01-24 RX ORDER — METHYLPREDNISOLONE SODIUM SUCCINATE 125 MG/2ML
60 INJECTION, POWDER, LYOPHILIZED, FOR SOLUTION INTRAMUSCULAR; INTRAVENOUS ONCE
Status: COMPLETED | OUTPATIENT
Start: 2023-01-24 | End: 2023-01-24

## 2023-01-24 RX ADMIN — IPRATROPIUM BROMIDE AND ALBUTEROL SULFATE 2 AMPULE: .5; 2.5 SOLUTION RESPIRATORY (INHALATION) at 17:19

## 2023-01-24 RX ADMIN — METHYLPREDNISOLONE SODIUM SUCCINATE 60 MG: 125 INJECTION, POWDER, FOR SOLUTION INTRAMUSCULAR; INTRAVENOUS at 17:19

## 2023-01-24 ASSESSMENT — PAIN - FUNCTIONAL ASSESSMENT: PAIN_FUNCTIONAL_ASSESSMENT: NONE - DENIES PAIN

## 2023-01-24 NOTE — ED PROVIDER NOTES
[facility]      CHIEF COMPLAINT  Shortness of Breath (Hx of COPD and feels like his SOB is worsening. States he uses o2 at night and feels like his nebulizers are not working as well as they should. )       Pennie Shaikh is a 78 y.o. male  who presents to the ED complaining of some shortness of breath. Especially worsened earlier today. Patient states that he takes his prednisone whenever he feels like he has a significant cough and took 1 dose of 10 mg earlier today. He has a significant history of COPD and wears 3 L of oxygen at night and has been using it as needed during the day and seems to be using it more frequently. He is not on CPAP. He follows with Dr. Glo Zendejas for his pulmonary care and also with Dr. Nilam Rodriguez for his overall management of his health. Other than the random as needed doses of prednisone that he has been taking for his cough he has not been on a prednisone burst for some time. He last saw his PCP in November 2022 at which point his COPD was noted to be stable at that time. He does also have a history of paroxysmal A. fib but was in sinus rhythm at that point. He also has chronic diastolic CHF and his Lasix was increased to twice daily as needed during that visit. Patient denies any chest pain associated with his symptoms. No fevers. He states that he has a follow-up visit with his pulmonologist on the 31st of this month for repeat routine monitoring CT scan of the chest with a follow-up visit following the CT scan. He denies any current lower extremity edema but states that he has had it in the past.  Patient and wife both provide history. No other complaints, modifying factors or associated symptoms. I have reviewed the following from the nursing documentation.     Past Medical History:   Diagnosis Date    A-fib ([facility] [address]     2/14/12    Acute conjunctivitis of both eyes     Acute on chronic respiratory failure with hypoxia (HCC) 2/13/2012    Acute respiratory failure with hypoxia (HCC) 2/13/2012    Atrial fibrillation with RVR ([facility] [address]     2/14/12     Avulsion fracture of ankle 9/21/2015    Benign localized hyperplasia of prostate with urinary obstruction and other lower urinary tract symptoms (LUTS)(600.21) 8/17/2016    Chronic systolic CHF (congestive heart failure) ([address] 8/28/2017    Contusion of leg, right 9/21/2015    COPD (chronic obstructive pulmonary disease) ([facility] [address]     Fractures     GERD (gastroesophageal reflux disease) 6/15/2015    Hypertension     Hypertensive urgency 8/4/2019    Hypertrophy of prostate without urinary obstruction and other lower urinary tract symptoms (LUTS) 6/15/2015    No history of procedure     no previos colonoscopy    PAD (peripheral artery disease) ([facility] [address] 10/9/2017    Pneumonia     Thoracic aortic aneurysm      Past Surgical History:   Procedure Laterality Date    CATARACT REMOVAL WITH IMPLANT Right 09/06/2018     PHACO EMULSIFICATION OF CATARACT WITH INTRAOCULAR LENS IMPLANT RIGHT EYE    COLONOSCOPY  2/24/2016    sigmoid polyps    ENDOSCOPY, COLON, DIAGNOSTIC  11/13/2019    egd; esophagitis/gastritis    HERNIA REPAIR      MT XCAPSL [facility] IO LENS PROSTH W/O ECP Right 9/6/2018    PHACO EMULSIFICATION OF CATARACT WITH INTRAOCULAR LENS IMPLANT RIGHT EYE performed by Reece Snow MD at [facility] INSJ IO LENS PROSTH W/O ECP Left 10/18/2018    PHACO EMULSIFICATION OF CATARACT WITH  INTRAOCULAR LENS IMPLANT LEFT EYE performed by Reece Snow MD at [facility] N/A 11/13/2019    EGD BIOPSY performed by Adore Zamora MD at [facility]     Family History   Problem Relation Age of Onset    Alcohol Abuse Sister      Social History     Socioeconomic History    Marital status:      Spouse name: Not on file    Number of children: Not on file    Years of education: Not on file    Highest education level: Not on file   Occupational History    Not on file   Tobacco Use    Smoking status: Former     Packs/day: 2.00     Years: 55.00     Pack years: 110.00     Types: Cigarettes     Quit date: 2017     Years since quittin.4    Smokeless tobacco: Never    Tobacco comments:     smoked 2 darnell a day for 54 years   Vaping Use    Vaping Use: Never used   Substance and Sexual Activity    Alcohol use: Not Currently     Comment: occassionally    Drug use: No    Sexual activity: Not Currently     Partners: Female   Other Topics Concern    Not on file   Social History Narrative    Not on file     Social Determinants of Health     Financial Resource Strain: Not on file   Food Insecurity: Not on file   Transportation Needs: Not on file   Physical Activity: Insufficiently Active    Days of Exercise per Week: 1 day    Minutes of Exercise per Session: 10 min   Stress: Not on file   Social Connections: Not on file   Intimate Partner Violence: Not on file   Housing Stability: Not on file     No current facility-administered medications for this encounter.      Current Outpatient Medications   Medication Sig Dispense Refill    predniSONE (DELTASONE) 10 MG tablet Take 4 tablets daily x3 days by mouth, followed by 3 tablets daily x3 days, 2 tablets daily x3 days, and 1 tablet daily until gone 30 tablet 0    ipratropium-albuterol (DUONEB) 0.5-2.5 (3) MG/3ML SOLN nebulizer solution USE ONE UNIT DOSE (3ML) IN NEBULIZER AND INHALE INTO THE LUNGS EVERY 4 HOURS AS NEEDED FOR SHORTNESS OF BREATH 360 mL 0    albuterol sulfate HFA (PROVENTIL;VENTOLIN;PROAIR) 108 (90 Base) MCG/ACT inhaler Inhale 2 puffs into the lungs every 6 hours as needed for Wheezing 17 g 0    amLODIPine (NORVASC) 5 MG tablet TAKE ONE TABLET BY MOUTH EVERY DAY 30 tablet 2    metoprolol succinate (TOPROL XL) 50 MG extended release tablet TAKE ONE TABLET BY MOUTH EVERY DAY 30 tablet 2    furosemide (LASIX) 40 MG tablet TAKE ONE TABLET BY MOUTH EVERY DAY 30 tablet 2    pantoprazole (PROTONIX) 40 MG tablet TAKE ONE TABLET BY MOUTH EVERY DAY 30 tablet 2    losartan (COZAAR) 100 MG tablet TAKE ONE TABLET BY MOUTH DAILY 30 tablet 2    Potassium Citrate ER (UROCIT-K) 15 MEQ (1620 MG) TBCR extended release tablet TAKE ONE TABLET BY MOUTH EVERY DAY 30 tablet 2    rivaroxaban (XARELTO) 20 MG TABS tablet TAKE ONE TABLET BY MOUTH DAILY WITH BREAKFAST 30 tablet 2    atorvastatin (LIPITOR) 20 MG tablet TAKE ONE TABLET BY MOUTH EVERY DAY 30 tablet 2    tamsulosin (FLOMAX) 0.4 MG capsule Take 1 capsule by mouth daily 30 capsule 2    SYMBICORT 160-4.5 MCG/ACT AERO Inhale 2 puffs into the lungs 2 times daily 10.2 g 2    albuterol sulfate HFA (PROVENTIL HFA) 108 (90 Base) MCG/ACT inhaler Inhale 2 puffs into the lungs every 6 hours as needed for Wheezing (with spacer) 1 Inhaler 11     Allergies   Allergen Reactions    Amiodarone Anaphylaxis     Thyriod toxic        PHYSICAL EXAM  BP 97/82   Pulse 80   Temp 98 °F (36.7 °C) (Oral)   Resp 20   Ht 5' 11\" (1.803 m)   Wt 248 lb (112.5 kg)   SpO2 95%   BMI 34.59 kg/m²    GENERAL APPEARANCE: Awake and alert. Cooperative. No acute distress while at rest.  HENT: Normocephalic. Atraumatic. Mucous membranes are moist.  No drooling or stridor. NECK: Supple. EYES: PERRL. EOM's grossly intact. HEART/CHEST: RRR. No murmurs. 2+ radial DP pulses bilaterally. LUNGS: Respirations unlabored. Diffuse expiratory wheezes throughout. No rales or rhonchi appreciated. Fair air exchange. Speaking in full sentences. ABDOMEN: No tenderness. Soft. Non-distended. No masses. No organomegaly. No guarding or rebound. MUSCULOSKELETAL: No extremity edema. Compartments soft. No deformity. No tenderness in the extremities. All extremities neurovascularly intact. SKIN: Warm and dry. No acute rashes. NEUROLOGICAL: Alert and oriented. CN's 2-12 intact. No gross facial drooping. No gross focal deficits. PSYCHIATRIC: Normal mood and affect. LABS  I have reviewed all labs for this visit. Results for orders placed or performed during the hospital encounter of 01/24/23   COVID-19, Rapid    Specimen: Nasopharyngeal Swab   Result Value Ref Range    SARS-CoV-2, NAAT Not Detected Not Detected   Rapid influenza A/B antigens    Specimen: Nasopharyngeal   Result Value Ref Range    Rapid Influenza A Ag Negative Negative    Rapid Influenza B Ag Negative Negative   Brain Natriuretic Peptide   Result Value Ref Range    Pro- 0 - 449 pg/mL   CBC with Auto Differential   Result Value Ref Range    WBC 7.4 4.0 - 11.0 K/uL    RBC 4.48 4.20 - 5.90 M/uL    Hemoglobin 13.1 (L) 13.5 - 17.5 g/dL    Hematocrit 40.2 (L) 40.5 - 52.5 %    MCV 89.7 80.0 - 100.0 fL    MCH 29.2 26.0 - 34.0 pg    MCHC 32.6 31.0 - 36.0 g/dL    RDW 16.3 (H) 12.4 - 15.4 %    Platelets 883 561 - 722 K/uL    MPV 8.6 5.0 - 10.5 fL    Neutrophils % 56.4 %    Lymphocytes % 24.8 %    Monocytes % 9.7 %    Eosinophils % 7.9 %    Basophils % 1.2 %    Neutrophils Absolute 4.2 1.7 - 7.7 K/uL    Lymphocytes Absolute 1.8 1.0 - 5.1 K/uL    Monocytes Absolute 0.7 0.0 - 1.3 K/uL    Eosinophils Absolute 0.6 0.0 - 0.6 K/uL    Basophils Absolute 0.1 0.0 - 0.2 K/uL   Comprehensive Metabolic Panel w/ Reflex to MG   Result Value Ref Range    Sodium 141 136 - 145 mmol/L    Potassium reflex Magnesium 4.3 3.5 - 5.1 mmol/L    Chloride 102 99 - 110 mmol/L    CO2 29 21 - 32 mmol/L    Anion Gap 10 3 - 16    Glucose 89 70 - 99 mg/dL    BUN 23 (H) 7 - 20 mg/dL    Creatinine 1.4 (H) 0.8 - 1.3 mg/dL    Est, Glom Filt Rate 52 (A) >60    Calcium 9.6 8.3 - 10.6 mg/dL    Total Protein 7.3 6.4 - 8.2 g/dL    Albumin 3.7 3.4 - 5.0 g/dL    Albumin/Globulin Ratio 1.0 (L) 1.1 - 2.2    Total Bilirubin 0.4 0.0 - 1.0 mg/dL    Alkaline Phosphatase 82 40 - 129 U/L    ALT 6 (L) 10 - 40 U/L    AST 8 (L) 15 - 37 U/L   Troponin   Result Value Ref Range    Troponin 0.01 <0.01 ng/mL   EKG 12 Lead   Result Value Ref Range    Ventricular Rate 87 BPM    Atrial Rate 394 BPM    QRS Duration 84 ms Q-T Interval 352 ms    QTc Calculation (Bazett) 423 ms    R Axis 66 degrees    T Axis 63 degrees    Diagnosis       Atrial fibrillation with premature ventricular or aberrantly conducted complexesAbnormal ECGWhen compared with ECG of 26-OCT-2020 15:00,Atrial fibrillation has replaced Sinus rhythm       ECG  The Ekg interpreted by me shows  atrial fibrillation with a rate of 87  Axis is   Normal  QTc is  normal  Intervals and Durations are unremarkable. ST Segments: nonspecific changes  No significant change from prior EKG dated 10/26/20    RADIOLOGY  XR CHEST PORTABLE    Result Date: 1/24/2023  EXAMINATION: ONE XRAY VIEW OF THE CHEST 1/24/2023 4:42 pm COMPARISON: Chest radiograph performed 10/26/2020. HISTORY: ORDERING SYSTEM PROVIDED HISTORY: SOB TECHNOLOGIST PROVIDED HISTORY: Reason for exam:->SOB Reason for Exam: SOB, HX of PE FINDINGS: There is chronic pulmonary change. There are bibasilar infiltrates. There is no pneumothorax. The mediastinal structures are unremarkable. The upper abdomen is unremarkable. The extrathoracic soft tissues are unremarkable. Chronic pulmonary change with bibasilar infiltrates representing atelectasis versus pneumonia. ED COURSE/MDM  Patient presenting with signs and symptoms most consistent with acute COPD exacerbation. Considered possible cardiac cause such as CHF but clinically does not appear to be fluid overloaded without any evidence of pulmonary edema on chest x-ray, no elevation of his BNP on blood work and no significant lower extremity edema. Rapid COVID and flu are all negative and no definite evidence of infection. He has not had increased sputum production, no leukocytosis and no fever to suggest infection. Also, no evidence of cardiac cause such as ACS which was considered but EKG without acute ischemic changes, no chest pain and troponin was not elevated. Creatinine elevated at 1.4 but appears to be at his baseline.   Patient was given nebulized breathing treatments and he states that his nebulizer has not been producing the aerosolized mist that can be seen with the nebulizer here. I suspect that his nebulizer may need to be replaced at home and he states that he will go tomorrow to the pharmacy to get 1 and does have still the albuterol nebulized solution at home. We will also place him on a proper steroid taper rather than having him take the steroids just as needed when he has a cough and was advised on correct use of the prednisone. At this time, he already has scheduled follow-up in 1 week with his pulmonologist I will have him follow-up in the interim with his PCP for recheck to ensure improvement as expected. Patient and his wife who is at bedside were very much in agreement of that plan. Reasons to return to the ER were discussed and all questions answered at time of discharge. I have personally reviewed Xray chest images and radiology confirms the interpretation:  No definite confluent infiltrate the upper lobes but there is some haziness to the bilateral basilar regions which I feel favors atelectasis clinically based on history and exam.  No significant pleural effusion or pneumothorax. Sepsis:  Is this patient to be included in the SEP-1 Core Measure due to severe sepsis or septic shock? No   Exclusion criteria - the patient is NOT to be included for SEP-1 Core Measure due to: Infection is not suspected       Old records reviewed: PCP progress note dated 11/28/2022 was reviewed and noted as per HPI. Labs and imaging reviewed and results discussed with patient. Patient was reassessed as noted above . Plan of care discussed with patient. Patient in agreement with plan. Strict return precautions have been given. I, Dr. Ramo Aj MD, am the primary clinician of record.         During the patient's ED course, the patient was given:  Medications   methylPREDNISolone sodium (SOLU-MEDROL) injection 60 mg (60 mg IntraVENous Given 1/24/23 1719)   ipratropium-albuterol (DUONEB) nebulizer solution 2 ampule (2 ampules Inhalation Given 1/24/23 1719)        CLINICAL IMPRESSION  1. COPD exacerbation ([address]    2. Shortness of breath        Blood pressure 97/82, pulse 80, temperature 98 °F (36.7 °C), temperature source Oral, resp. rate 20, height 5' 11\" (1.803 m), weight 248 lb (112.5 kg), SpO2 95 %. Pennie Varghese was discharged to home in stable condition. Patient was given scripts for the following medications. I counseled patient how to take these medications. New Prescriptions    PREDNISONE (DELTASONE) 10 MG TABLET    Take 4 tablets daily x3 days by mouth, followed by 3 tablets daily x3 days, 2 tablets daily x3 days, and 1 tablet daily until gone       Follow-up with:  Sammie Bedolla MD  [address], [facility]  [phone]    Schedule an appointment as soon as possible for a visit in 2 days  For recheck    Rosemary Hart MD  [id number] [facility],[address] [address] [facility]  [phone]      keep appointment as scheduled    DISCLAIMER: This chart was created using Dragon dictation software. Efforts were made by me to ensure accuracy, however some errors may be present due to limitations of this technology and occasionally words are not transcribed correctly.         Tito Bueno MD  01/24/23 Mahesh Dunaway

## 2023-01-24 NOTE — ED NOTES
Reviewed patient discharge instructions at this time, copy given to patient. No questions or concerns. Patient voiced understanding.         Silvio Mcdaniels RN  01/24/23 0980

## 2023-01-31 ENCOUNTER — TELEPHONE (OUTPATIENT)
Dept: PULMONOLOGY | Age: 78
End: 2023-01-31

## 2023-01-31 DIAGNOSIS — Z87.891 HISTORY OF TOBACCO USE: Primary | ICD-10-CM

## 2023-01-31 NOTE — TELEPHONE ENCOUNTER
Pt cx appointment for 1/31/2023 due to the weather. Needs new order for Ct Lung Screening to be rescheduled. Pt is scheduled 4/4/23 for Dr Ericka Harry.

## 2023-01-31 NOTE — TELEPHONE ENCOUNTER
Called pt to inform of daysi's response with no answer. Unable to leave message due to VM not being setup yet.

## 2023-01-31 NOTE — TELEPHONE ENCOUNTER
Still get the LDCT for LCS soon as it is due Jan 2023. If he would like a sooner f/u appointment than April with Dr. Trinidad Whitehead then OK to see me. Also perfectly OK for the f/u to wait.

## 2023-02-03 NOTE — TELEPHONE ENCOUNTER
Pt called back, rescheduled CT lung screen for 2/14/23. Pt is going to keep follow up as scheduled for next available with Dr. Evelin Ness. Will watch for Ct results.

## 2023-02-14 ENCOUNTER — HOSPITAL ENCOUNTER (OUTPATIENT)
Dept: CT IMAGING | Age: 78
Discharge: HOME | End: 2023-02-14
Payer: MEDICARE

## 2023-02-14 DIAGNOSIS — Z87.891 HISTORY OF TOBACCO USE: ICD-10-CM

## 2023-02-14 PROCEDURE — 71271 CT THORAX LUNG CANCER SCR C-: CPT

## 2023-02-22 RX ORDER — ALBUTEROL SULFATE 90 UG/1
AEROSOL, METERED RESPIRATORY (INHALATION)
Qty: 17 G | Refills: 0 | Status: SHIPPED | OUTPATIENT
Start: 2023-02-22

## 2023-03-02 ENCOUNTER — OFFICE VISIT (OUTPATIENT)
Dept: INTERNAL MEDICINE CLINIC | Age: 78
End: 2023-03-02

## 2023-03-02 VITALS
BODY MASS INDEX: 34.3 KG/M2 | RESPIRATION RATE: 12 BRPM | SYSTOLIC BLOOD PRESSURE: 130 MMHG | OXYGEN SATURATION: 94 % | HEART RATE: 80 BPM | HEIGHT: 71 IN | DIASTOLIC BLOOD PRESSURE: 80 MMHG | WEIGHT: 245 LBS

## 2023-03-02 DIAGNOSIS — I48.0 PAROXYSMAL A-FIB (HCC): ICD-10-CM

## 2023-03-02 DIAGNOSIS — I50.32 CHRONIC DIASTOLIC CHF (CONGESTIVE HEART FAILURE) (HCC): ICD-10-CM

## 2023-03-02 DIAGNOSIS — I42.0 DILATED CARDIOMYOPATHY (HCC): ICD-10-CM

## 2023-03-02 DIAGNOSIS — I10 ESSENTIAL HYPERTENSION: ICD-10-CM

## 2023-03-02 DIAGNOSIS — I73.9 PAD (PERIPHERAL ARTERY DISEASE) (HCC): ICD-10-CM

## 2023-03-02 DIAGNOSIS — J44.9 CHRONIC OBSTRUCTIVE PULMONARY DISEASE, UNSPECIFIED COPD TYPE (HCC): ICD-10-CM

## 2023-03-02 DIAGNOSIS — J96.11 CHRONIC RESPIRATORY FAILURE WITH HYPOXIA (HCC): ICD-10-CM

## 2023-03-02 DIAGNOSIS — N18.30 STAGE 3 CHRONIC KIDNEY DISEASE, UNSPECIFIED WHETHER STAGE 3A OR 3B CKD (HCC): ICD-10-CM

## 2023-03-02 DIAGNOSIS — I10 ESSENTIAL HYPERTENSION: Primary | ICD-10-CM

## 2023-03-02 LAB
CHOLESTEROL, TOTAL: 124 MG/DL (ref 0–199)
HDLC SERPL-MCNC: 51 MG/DL (ref 40–60)
LDL CHOLESTEROL CALCULATED: 59 MG/DL
TRIGL SERPL-MCNC: 72 MG/DL (ref 0–150)
VLDLC SERPL CALC-MCNC: 14 MG/DL

## 2023-03-02 PROCEDURE — 1123F ACP DISCUSS/DSCN MKR DOCD: CPT | Performed by: INTERNAL MEDICINE

## 2023-03-02 PROCEDURE — 3079F DIAST BP 80-89 MM HG: CPT | Performed by: INTERNAL MEDICINE

## 2023-03-02 PROCEDURE — 3075F SYST BP GE 130 - 139MM HG: CPT | Performed by: INTERNAL MEDICINE

## 2023-03-02 PROCEDURE — 99214 OFFICE O/P EST MOD 30 MIN: CPT | Performed by: INTERNAL MEDICINE

## 2023-03-02 RX ORDER — ALBUTEROL SULFATE 90 UG/1
AEROSOL, METERED RESPIRATORY (INHALATION)
Qty: 17 G | Refills: 0 | Status: SHIPPED | OUTPATIENT
Start: 2023-03-02

## 2023-03-02 RX ORDER — LOSARTAN POTASSIUM 100 MG/1
TABLET ORAL
Qty: 30 TABLET | Refills: 2 | Status: SHIPPED | OUTPATIENT
Start: 2023-03-02

## 2023-03-02 RX ORDER — IPRATROPIUM BROMIDE AND ALBUTEROL SULFATE 2.5; .5 MG/3ML; MG/3ML
SOLUTION RESPIRATORY (INHALATION)
Qty: 360 ML | Refills: 0 | Status: SHIPPED | OUTPATIENT
Start: 2023-03-02

## 2023-03-02 RX ORDER — POTASSIUM CITRATE 15 MEQ/1
TABLET, EXTENDED RELEASE ORAL
Qty: 30 TABLET | Refills: 2 | Status: SHIPPED | OUTPATIENT
Start: 2023-03-02

## 2023-03-02 RX ORDER — ALBUTEROL SULFATE 90 UG/1
2 AEROSOL, METERED RESPIRATORY (INHALATION) EVERY 6 HOURS PRN
Qty: 17 G | Refills: 0 | Status: SHIPPED | OUTPATIENT
Start: 2023-03-02

## 2023-03-02 RX ORDER — ATORVASTATIN CALCIUM 20 MG/1
TABLET, FILM COATED ORAL
Qty: 30 TABLET | Refills: 2 | Status: SHIPPED | OUTPATIENT
Start: 2023-03-02

## 2023-03-02 RX ORDER — PANTOPRAZOLE SODIUM 40 MG/1
TABLET, DELAYED RELEASE ORAL
Qty: 30 TABLET | Refills: 2 | Status: SHIPPED | OUTPATIENT
Start: 2023-03-02

## 2023-03-02 RX ORDER — METOPROLOL SUCCINATE 50 MG/1
TABLET, EXTENDED RELEASE ORAL
Qty: 30 TABLET | Refills: 2 | Status: SHIPPED | OUTPATIENT
Start: 2023-03-02

## 2023-03-02 RX ORDER — TAMSULOSIN HYDROCHLORIDE 0.4 MG/1
CAPSULE ORAL
Qty: 30 CAPSULE | Refills: 2 | Status: SHIPPED | OUTPATIENT
Start: 2023-03-02

## 2023-03-02 RX ORDER — AMLODIPINE BESYLATE 5 MG/1
TABLET ORAL
Qty: 30 TABLET | Refills: 2 | Status: SHIPPED | OUTPATIENT
Start: 2023-03-02

## 2023-03-02 RX ORDER — FUROSEMIDE 40 MG/1
TABLET ORAL
Qty: 30 TABLET | Refills: 2 | Status: SHIPPED | OUTPATIENT
Start: 2023-03-02

## 2023-03-02 ASSESSMENT — ENCOUNTER SYMPTOMS
ABDOMINAL PAIN: 0
RHINORRHEA: 0
BACK PAIN: 0
NAUSEA: 0
WHEEZING: 0
SHORTNESS OF BREATH: 1
VOMITING: 0

## 2023-03-02 NOTE — PROGRESS NOTES
Subjective:      Patient ID: Huong Gonzalez is a 78 y.o. male. HPI    Patient is here for a follow up. He has a history of hypertension. It is well controlled. He is compliant and has no med side effects. No chest pain. Patient's COPD is controlled on present bronchodilator regimen. Patient is taking medications as instructed, no medication side effects noted, no significant ongoing wheezing or shortness of breath, using bronchodilator MDI less than twice a week. He quit smoking. He is doing well. He is on 3 L of oxygen at night. He was seen in the ER. He was discharged home. He has some pedal edema. He needs a new HHN  He has history of a fib. He is in NSR. He takes Xarelto. He has GERD. It is stable. He has nocturia, no hematuria. He wakes up few times at night. Review of Systems   Constitutional:  Negative for activity change and appetite change. HENT:  Negative for postnasal drip and rhinorrhea. Respiratory:  Positive for shortness of breath. Negative for wheezing. Cardiovascular:  Negative for chest pain, palpitations and leg swelling. Gastrointestinal:  Negative for abdominal pain, nausea and vomiting. Genitourinary:  Positive for frequency. Negative for difficulty urinating. Musculoskeletal:  Negative for back pain and joint swelling. Skin:  Negative for rash. Neurological:  Negative for light-headedness. Psychiatric/Behavioral:  Negative for sleep disturbance.         Past Medical History:   Diagnosis Date    A-fib ([facility]     2/14/12    Acute conjunctivitis of both eyes     Acute on chronic respiratory failure with hypoxia (HCC) 2/13/2012    Acute respiratory failure with hypoxia (HCC) 2/13/2012    Atrial fibrillation with RVR ([facility]     2/14/12     Avulsion fracture of ankle 9/21/2015    Benign localized hyperplasia of prostate with urinary obstruction and other lower urinary tract symptoms (LUTS)(600.21) 8/17/2016    Chronic systolic CHF (congestive heart failure) ([address] 2017    Contusion of leg, right 2015    COPD (chronic obstructive pulmonary disease) (HCC)     Fractures     GERD (gastroesophageal reflux disease) 6/15/2015    Hypertension     Hypertensive urgency 2019    Hypertrophy of prostate without urinary obstruction and other lower urinary tract symptoms (LUTS) 6/15/2015    No history of procedure     no previos colonoscopy    PAD (peripheral artery disease) ([address] 10/9/2017    Pneumonia     Thoracic aortic aneurysm        Past Surgical History:   Procedure Laterality Date    CATARACT REMOVAL WITH IMPLANT Right 2018     PHACO EMULSIFICATION OF CATARACT WITH INTRAOCULAR LENS IMPLANT RIGHT EYE    COLONOSCOPY  2016    sigmoid polyps    ENDOSCOPY, COLON, DIAGNOSTIC  2019    egd; esophagitis/gastritis    HERNIA REPAIR      NE XCAPSL CTRC RMVL INSJ IO LENS PROSTH W/O ECP Right 2018    PHACO EMULSIFICATION OF CATARACT WITH INTRAOCULAR LENS IMPLANT RIGHT EYE performed by Yamilex Briceño MD at [address] [place] [address] INSJ IO LENS PROSTH W/O ECP Left 10/18/2018    PHACO EMULSIFICATION OF CATARACT WITH  INTRAOCULAR LENS IMPLANT LEFT EYE performed by Yamilex Briceño MD at [address] [place] [address] N/A 2019    EGD BIOPSY performed by Jair Mireles MD at [facility]     Family History   Problem Relation Age of Onset    Alcohol Abuse Sister      Social History     Tobacco Use    Smoking status: Former     Packs/day: 2.00     Years: 55.00     Pack years: 110.00     Types: Cigarettes     Quit date: 2017     Years since quittin.5    Smokeless tobacco: Never    Tobacco comments:     smoked 2 darnell a day for 55 years   Vaping Use    Vaping Use: Never used   Substance Use Topics    Alcohol use: Not Currently     Comment: occassionally    Drug use: No   .    Current Outpatient Medications:     tamsulosin (FLOMAX) 0.4 MG capsule, Take 1 capsule by mouth daily, Disp: 30 capsule, Rfl: 2    atorvastatin (LIPITOR) 20 MG tablet, TAKE ONE TABLET BY MOUTH EVERY DAY, Disp: 30 tablet, Rfl: 2    rivaroxaban (XARELTO) 20 MG TABS tablet, TAKE ONE TABLET BY MOUTH DAILY WITH BREAKFAST, Disp: 30 tablet, Rfl: 2    Potassium Citrate ER (UROCIT-K) 15 MEQ (1620 MG) TBCR extended release tablet, TAKE ONE TABLET BY MOUTH EVERY DAY, Disp: 30 tablet, Rfl: 2    losartan (COZAAR) 100 MG tablet, TAKE ONE TABLET BY MOUTH DAILY, Disp: 30 tablet, Rfl: 2    pantoprazole (PROTONIX) 40 MG tablet, TAKE ONE TABLET BY MOUTH EVERY DAY, Disp: 30 tablet, Rfl: 2    furosemide (LASIX) 40 MG tablet, TAKE ONE TABLET BY MOUTH EVERY DAY, Disp: 30 tablet, Rfl: 2    metoprolol succinate (TOPROL XL) 50 MG extended release tablet, TAKE ONE TABLET BY MOUTH EVERY DAY, Disp: 30 tablet, Rfl: 2    amLODIPine (NORVASC) 5 MG tablet, TAKE ONE TABLET BY MOUTH EVERY DAY, Disp: 30 tablet, Rfl: 2    albuterol sulfate HFA (PROVENTIL;VENTOLIN;PROAIR) 108 (90 Base) MCG/ACT inhaler, Inhale 2 puffs into the lungs every 6 hours as needed for Wheezing, Disp: 17 g, Rfl: 0    ipratropium-albuterol (DUONEB) 0.5-2.5 (3) MG/3ML SOLN nebulizer solution, USE ONE UNIT DOSE (3ML) IN NEBULIZER AND INHALE INTO THE LUNGS EVERY 4 HOURS AS NEEDED FOR SHORTNESS OF BREATH, Disp: 360 mL, Rfl: 0    albuterol sulfate HFA (PROVENTIL;VENTOLIN;PROAIR) 108 (90 Base) MCG/ACT inhaler, Inhale 2 puffs into the lungs every 6 hours as needed for Wheezing, Disp: 17 g, Rfl: 0    Nebulizers (COMPRESSOR/NEBULIZER) MISC, HHN qid, Disp: 1 each, Rfl: 0    predniSONE (DELTASONE) 10 MG tablet, Take 4 tablets daily x3 days by mouth, followed by 3 tablets daily x3 days, 2 tablets daily x3 days, and 1 tablet daily until gone, Disp: 30 tablet, Rfl: 0    SYMBICORT 160-4.5 MCG/ACT AERO, Inhale 2 puffs into the lungs 2 times daily, Disp: 10.2 g, Rfl: 2    albuterol sulfate HFA (PROVENTIL HFA) 108 (90 Base) MCG/ACT inhaler, Inhale 2 puffs into the lungs every 6 hours as needed for Wheezing (with spacer), Disp: 1 Inhaler,  Rfl: 11      /80 (Site: Right Upper Arm, Position: Sitting, Cuff Size: Medium Adult)   Pulse 80   Resp 12   Ht 5' 11\" (1.803 m)   Wt 245 lb (111.1 kg)   BMI 34.17 kg/m²      Objective:   Physical Exam  Constitutional:       Appearance: He is well-developed. HENT:      Head: Normocephalic. Eyes:      Conjunctiva/sclera: Conjunctivae normal.      Pupils: Pupils are equal, round, and reactive to light. Neck:      Thyroid: No thyroid mass or thyromegaly. Vascular: No carotid bruit or JVD. Trachea: Trachea normal.   Cardiovascular:      Rate and Rhythm: Normal rate. Rhythm irregular. Heart sounds: Normal heart sounds. No murmur heard. No gallop. Pulmonary:      Effort: Pulmonary effort is normal. No respiratory distress. Breath sounds: Normal breath sounds. No wheezing or rales. Abdominal:      General: Bowel sounds are normal. There is no distension. Palpations: Abdomen is soft. There is no hepatomegaly, splenomegaly or mass. Tenderness: There is no abdominal tenderness. Musculoskeletal:         General: Normal range of motion. Cervical back: Normal range of motion and neck supple. Lymphadenopathy:      Cervical: No cervical adenopathy. Skin:     General: Skin is warm and dry. Findings: No rash. Neurological:      Mental Status: He is alert and oriented to person, place, and time. Cranial Nerves: No cranial nerve deficit. Deep Tendon Reflexes: Reflexes are normal and symmetric. Psychiatric:         Behavior: Behavior normal.         Thought Content: Thought content normal.         Judgment: Judgment normal.         CT chest 4/19/2017     Thoracic aortic aneurysm as above, not substantially changed. Emphysema. A few nodules which are stable since 2015. As there is a history of smoking,   recommend yearly low-dose lung cancer screening CT. Assessment:       Diagnosis Orders   1.  Essential hypertension  Lipid Panel 2. Chronic obstructive pulmonary disease, unspecified COPD type ([facility]  Nebulizers (COMPRESSOR/NEBULIZER) MISC      3. Chronic diastolic CHF (congestive heart failure) ([facility]        4. PAD (peripheral artery disease) ([facility])        5. Paroxysmal A-fib ([facility])        6. Stage 3 chronic kidney disease, unspecified whether stage 3a or 3b CKD ([facility]        7. Dilated cardiomyopathy ([facility]        8. Chronic respiratory failure with hypoxia ([facility])               Plan:        #  Hypertension: Well controlled. Weight loss recommended. #  A fib: in SR. On Xarelto. #  COPD stable. He was in the ER in Jan 2023. . on Symbicort. Proventil prn. Use HHn. He has a prednisone taper for an emergency. #  GERD: on Prilosec. #  PN right: stable. #  BPH with symptoms. on Flomax. #  Hyperlipidemia on Lipitor. #  Chronic respiratory failure on oxygen at hs. #  Chronic systolic CHF. Stable. On Lasix. He has cut back on salt. #  CKD stable. Discussed use, benefit, and side effects of prescribed medications. Barriers to medication compliance addressed. All patient questions answered. Pt voiced understanding. Decrease calorie intake. Exercise,weight loss recommended. The current medical regimen is effective;  continue present plan and medications. See orders.

## 2023-03-30 RX ORDER — METOPROLOL SUCCINATE 50 MG/1
TABLET, EXTENDED RELEASE ORAL
Qty: 30 TABLET | Refills: 1 | Status: SHIPPED | OUTPATIENT
Start: 2023-03-30

## 2023-03-30 RX ORDER — ATORVASTATIN CALCIUM 20 MG/1
TABLET, FILM COATED ORAL
Qty: 30 TABLET | Refills: 1 | Status: SHIPPED | OUTPATIENT
Start: 2023-03-30

## 2023-03-30 RX ORDER — TAMSULOSIN HYDROCHLORIDE 0.4 MG/1
CAPSULE ORAL
Qty: 30 CAPSULE | Refills: 1 | Status: SHIPPED | OUTPATIENT
Start: 2023-03-30

## 2023-03-30 RX ORDER — LOSARTAN POTASSIUM 100 MG/1
TABLET ORAL
Qty: 30 TABLET | Refills: 1 | Status: SHIPPED | OUTPATIENT
Start: 2023-03-30

## 2023-03-30 RX ORDER — PANTOPRAZOLE SODIUM 40 MG/1
TABLET, DELAYED RELEASE ORAL
Qty: 30 TABLET | Refills: 1 | Status: SHIPPED | OUTPATIENT
Start: 2023-03-30

## 2023-03-30 RX ORDER — FUROSEMIDE 40 MG/1
TABLET ORAL
Qty: 30 TABLET | Refills: 1 | Status: SHIPPED | OUTPATIENT
Start: 2023-03-30

## 2023-03-30 RX ORDER — AMLODIPINE BESYLATE 5 MG/1
TABLET ORAL
Qty: 30 TABLET | Refills: 1 | Status: SHIPPED | OUTPATIENT
Start: 2023-03-30

## 2023-03-30 RX ORDER — POTASSIUM CITRATE 15 MEQ/1
TABLET, EXTENDED RELEASE ORAL
Qty: 30 TABLET | Refills: 1 | Status: SHIPPED | OUTPATIENT
Start: 2023-03-30

## 2023-04-04 ENCOUNTER — OFFICE VISIT (OUTPATIENT)
Dept: PULMONOLOGY | Age: 78
End: 2023-04-04
Payer: MEDICARE

## 2023-04-04 VITALS
WEIGHT: 245 LBS | OXYGEN SATURATION: 95 % | DIASTOLIC BLOOD PRESSURE: 76 MMHG | BODY MASS INDEX: 34.3 KG/M2 | HEART RATE: 87 BPM | HEIGHT: 71 IN | SYSTOLIC BLOOD PRESSURE: 128 MMHG

## 2023-04-04 DIAGNOSIS — R91.1 PULMONARY NODULE: ICD-10-CM

## 2023-04-04 DIAGNOSIS — J44.9 CHRONIC OBSTRUCTIVE PULMONARY DISEASE, UNSPECIFIED COPD TYPE (HCC): Primary | ICD-10-CM

## 2023-04-04 PROCEDURE — 1123F ACP DISCUSS/DSCN MKR DOCD: CPT | Performed by: INTERNAL MEDICINE

## 2023-04-04 PROCEDURE — 3074F SYST BP LT 130 MM HG: CPT | Performed by: INTERNAL MEDICINE

## 2023-04-04 PROCEDURE — 99214 OFFICE O/P EST MOD 30 MIN: CPT | Performed by: INTERNAL MEDICINE

## 2023-04-04 PROCEDURE — 3078F DIAST BP <80 MM HG: CPT | Performed by: INTERNAL MEDICINE

## 2023-04-04 RX ORDER — FLUTICASONE FUROATE, UMECLIDINIUM BROMIDE AND VILANTEROL TRIFENATATE 200; 62.5; 25 UG/1; UG/1; UG/1
1 POWDER RESPIRATORY (INHALATION) DAILY
Qty: 1 EACH | Refills: 5 | Status: SHIPPED | OUTPATIENT
Start: 2023-04-04

## 2023-04-04 RX ORDER — FLUTICASONE FUROATE, UMECLIDINIUM BROMIDE AND VILANTEROL TRIFENATATE 200; 62.5; 25 UG/1; UG/1; UG/1
1 POWDER RESPIRATORY (INHALATION) DAILY
Qty: 1 EACH | Refills: 0 | COMMUNITY
Start: 2023-04-04

## 2023-04-04 NOTE — PROGRESS NOTES
Granulomatous   calcifications are also noted in the right hilum. Moderate underlying   centrilobular emphysema. Other incidentals: The heart is enlarged. There is atherosclerotic   calcification of the coronary arteries. Stable appearance of aneurysm at the   aortic arch that appears to be saccular. Impression   1. Lung-RADS Category: LCS-2   2. Lung-RADS Category S: Negative   3. Chronic airway and peripheral airspace changes in the bilateral lower   lobes that are stable suggesting a chronic history of bronchiolitis and   mucoid impaction. 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      PFT 8/22/19 FEV1 2.24 L 70% 0.67 TLC 6.45 L 92% DLCO 15.42 53%     ASSESSMENT:  COPD   Upper lobe predominant paraseptal and centrilobular emphysema  Pulmonary Nodule left, 5 mm, stable   Chronic hypoxemic respiratory failure on 2 L oxygen HS  Atrial fibrillation on Xarelto     PLAN:  Oxygen 2 L HS - did not qualify for oxygen with exertion today   Trelegy 200 daily, sample X 1 today please  PRN nebulized bronchodilators, has albuterol MDI   Tobacco cessation has been achieved  See me in 6 months, can consider LDCT at that visit

## 2023-06-14 DIAGNOSIS — I10 ESSENTIAL HYPERTENSION: ICD-10-CM

## 2023-06-14 PROBLEM — E66.01 SEVERE OBESITY (BMI 35.0-39.9) WITH COMORBIDITY (HCC): Status: ACTIVE | Noted: 2023-06-14

## 2023-06-14 PROBLEM — D68.69 SECONDARY HYPERCOAGULABLE STATE (HCC): Status: ACTIVE | Noted: 2023-06-14

## 2023-06-15 LAB
ALBUMIN SERPL-MCNC: 4.3 G/DL (ref 3.4–5)
ALBUMIN/GLOB SERPL: 1.3 {RATIO} (ref 1.1–2.2)
ALP SERPL-CCNC: 107 U/L (ref 40–129)
ALT SERPL-CCNC: 8 U/L (ref 10–40)
ANION GAP SERPL CALCULATED.3IONS-SCNC: 15 MMOL/L (ref 3–16)
AST SERPL-CCNC: 10 U/L (ref 15–37)
BASOPHILS # BLD: 0.1 K/UL (ref 0–0.2)
BASOPHILS NFR BLD: 1 %
BILIRUB SERPL-MCNC: 0.7 MG/DL (ref 0–1)
BUN SERPL-MCNC: 18 MG/DL (ref 7–20)
CALCIUM SERPL-MCNC: 9.6 MG/DL (ref 8.3–10.6)
CHLORIDE SERPL-SCNC: 97 MMOL/L (ref 99–110)
CO2 SERPL-SCNC: 26 MMOL/L (ref 21–32)
CREAT SERPL-MCNC: 1.8 MG/DL (ref 0.8–1.3)
DEPRECATED RDW RBC AUTO: 15.8 % (ref 12.4–15.4)
EOSINOPHIL # BLD: 0.3 K/UL (ref 0–0.6)
EOSINOPHIL NFR BLD: 3 %
GFR SERPLBLD CREATININE-BSD FMLA CKD-EPI: 39 ML/MIN/{1.73_M2}
GLUCOSE SERPL-MCNC: 88 MG/DL (ref 70–99)
HCT VFR BLD AUTO: 42.7 % (ref 40.5–52.5)
HGB BLD-MCNC: 14 G/DL (ref 13.5–17.5)
LYMPHOCYTES # BLD: 1.4 K/UL (ref 1–5.1)
LYMPHOCYTES NFR BLD: 15.6 %
MCH RBC QN AUTO: 30.2 PG (ref 26–34)
MCHC RBC AUTO-ENTMCNC: 32.8 G/DL (ref 31–36)
MCV RBC AUTO: 92.2 FL (ref 80–100)
MONOCYTES # BLD: 0.6 K/UL (ref 0–1.3)
MONOCYTES NFR BLD: 7.1 %
NEUTROPHILS # BLD: 6.6 K/UL (ref 1.7–7.7)
NEUTROPHILS NFR BLD: 73.3 %
PLATELET # BLD AUTO: 220 K/UL (ref 135–450)
PMV BLD AUTO: 9.1 FL (ref 5–10.5)
POTASSIUM SERPL-SCNC: 4.5 MMOL/L (ref 3.5–5.1)
PROT SERPL-MCNC: 7.7 G/DL (ref 6.4–8.2)
RBC # BLD AUTO: 4.63 M/UL (ref 4.2–5.9)
SODIUM SERPL-SCNC: 138 MMOL/L (ref 136–145)
URATE SERPL-MCNC: 7.9 MG/DL (ref 3.5–7.2)
WBC # BLD AUTO: 9 K/UL (ref 4–11)

## 2023-06-22 RX ORDER — ATORVASTATIN CALCIUM 20 MG/1
TABLET, FILM COATED ORAL
Qty: 28 TABLET | Refills: 5 | Status: SHIPPED | OUTPATIENT
Start: 2023-06-22

## 2023-06-22 RX ORDER — FUROSEMIDE 40 MG/1
TABLET ORAL
Qty: 28 TABLET | Refills: 5 | Status: SHIPPED | OUTPATIENT
Start: 2023-06-22

## 2023-06-22 RX ORDER — POTASSIUM CITRATE 15 MEQ/1
TABLET, EXTENDED RELEASE ORAL
Qty: 28 TABLET | Refills: 5 | Status: SHIPPED | OUTPATIENT
Start: 2023-06-22

## 2023-06-22 RX ORDER — TAMSULOSIN HYDROCHLORIDE 0.4 MG/1
CAPSULE ORAL
Qty: 28 CAPSULE | Refills: 5 | Status: SHIPPED | OUTPATIENT
Start: 2023-06-22

## 2023-06-22 RX ORDER — PANTOPRAZOLE SODIUM 40 MG/1
TABLET, DELAYED RELEASE ORAL
Qty: 28 TABLET | Refills: 5 | Status: SHIPPED | OUTPATIENT
Start: 2023-06-22

## 2023-06-22 RX ORDER — LOSARTAN POTASSIUM 100 MG/1
TABLET ORAL
Qty: 28 TABLET | Refills: 5 | Status: SHIPPED | OUTPATIENT
Start: 2023-06-22

## 2023-06-22 RX ORDER — METOPROLOL SUCCINATE 50 MG/1
TABLET, EXTENDED RELEASE ORAL
Qty: 28 TABLET | Refills: 5 | Status: SHIPPED | OUTPATIENT
Start: 2023-06-22

## 2023-06-22 RX ORDER — AMLODIPINE BESYLATE 5 MG/1
TABLET ORAL
Qty: 28 TABLET | Refills: 5 | Status: SHIPPED | OUTPATIENT
Start: 2023-06-22

## 2023-08-02 RX ORDER — PREDNISONE 10 MG/1
TABLET ORAL
Qty: 30 TABLET | Refills: 0 | Status: SHIPPED | OUTPATIENT
Start: 2023-08-02

## 2023-08-02 RX ORDER — IPRATROPIUM BROMIDE AND ALBUTEROL SULFATE 2.5; .5 MG/3ML; MG/3ML
SOLUTION RESPIRATORY (INHALATION)
Qty: 360 ML | Refills: 0 | Status: SHIPPED | OUTPATIENT
Start: 2023-08-02

## 2023-08-14 RX ORDER — ALBUTEROL SULFATE 90 UG/1
AEROSOL, METERED RESPIRATORY (INHALATION)
Qty: 17 G | Refills: 0 | Status: SHIPPED | OUTPATIENT
Start: 2023-08-14

## 2023-09-13 RX ORDER — IPRATROPIUM BROMIDE AND ALBUTEROL SULFATE 2.5; .5 MG/3ML; MG/3ML
SOLUTION RESPIRATORY (INHALATION)
Qty: 360 ML | Refills: 0 | Status: SHIPPED | OUTPATIENT
Start: 2023-09-13

## 2023-09-30 ENCOUNTER — APPOINTMENT (OUTPATIENT)
Dept: CT IMAGING | Age: 78
DRG: 392 | End: 2023-09-30
Payer: MEDICARE

## 2023-09-30 ENCOUNTER — HOSPITAL ENCOUNTER (INPATIENT)
Age: 78
LOS: 3 days | Discharge: HOME | DRG: 392 | End: 2023-10-03
Attending: EMERGENCY MEDICINE | Admitting: PEDIATRICS
Payer: MEDICARE

## 2023-09-30 DIAGNOSIS — R10.32 LEFT LOWER QUADRANT ABDOMINAL PAIN: ICD-10-CM

## 2023-09-30 DIAGNOSIS — K57.92 DIVERTICULITIS: Primary | ICD-10-CM

## 2023-09-30 LAB
ALBUMIN SERPL-MCNC: 3.9 G/DL (ref 3.4–5)
ALBUMIN/GLOB SERPL: 1.1 {RATIO} (ref 1.1–2.2)
ALP SERPL-CCNC: 93 U/L (ref 40–129)
ALT SERPL-CCNC: 7 U/L (ref 10–40)
AMORPH SED URNS QL MICRO: ABNORMAL /HPF
ANION GAP SERPL CALCULATED.3IONS-SCNC: 10 MMOL/L (ref 3–16)
AST SERPL-CCNC: 13 U/L (ref 15–37)
BACTERIA URNS QL MICRO: ABNORMAL /HPF
BASOPHILS # BLD: 0.1 K/UL (ref 0–0.2)
BASOPHILS NFR BLD: 1.2 %
BILIRUB SERPL-MCNC: 0.6 MG/DL (ref 0–1)
BILIRUB UR QL STRIP.AUTO: NEGATIVE
BUN SERPL-MCNC: 21 MG/DL (ref 7–20)
CALCIUM SERPL-MCNC: 9.1 MG/DL (ref 8.3–10.6)
CHLORIDE SERPL-SCNC: 105 MMOL/L (ref 99–110)
CLARITY UR: CLEAR
CO2 SERPL-SCNC: 27 MMOL/L (ref 21–32)
COLOR UR: YELLOW
CREAT SERPL-MCNC: 1.7 MG/DL (ref 0.8–1.3)
DEPRECATED RDW RBC AUTO: 16.2 % (ref 12.4–15.4)
EOSINOPHIL # BLD: 0.4 K/UL (ref 0–0.6)
EOSINOPHIL NFR BLD: 5.1 %
EPI CELLS #/AREA URNS HPF: ABNORMAL /HPF (ref 0–5)
GFR SERPLBLD CREATININE-BSD FMLA CKD-EPI: 41 ML/MIN/{1.73_M2}
GLUCOSE SERPL-MCNC: 82 MG/DL (ref 70–99)
GLUCOSE UR STRIP.AUTO-MCNC: NEGATIVE MG/DL
HCT VFR BLD AUTO: 41.9 % (ref 40.5–52.5)
HGB BLD-MCNC: 13.8 G/DL (ref 13.5–17.5)
HGB UR QL STRIP.AUTO: ABNORMAL
KETONES UR STRIP.AUTO-MCNC: NEGATIVE MG/DL
LEUKOCYTE ESTERASE UR QL STRIP.AUTO: NEGATIVE
LIPASE SERPL-CCNC: 21 U/L (ref 13–60)
LYMPHOCYTES # BLD: 1.6 K/UL (ref 1–5.1)
LYMPHOCYTES NFR BLD: 20.8 %
MCH RBC QN AUTO: 29.6 PG (ref 26–34)
MCHC RBC AUTO-ENTMCNC: 32.9 G/DL (ref 31–36)
MCV RBC AUTO: 90.1 FL (ref 80–100)
MONOCYTES # BLD: 0.7 K/UL (ref 0–1.3)
MONOCYTES NFR BLD: 8.7 %
NEUTROPHILS # BLD: 5 K/UL (ref 1.7–7.7)
NEUTROPHILS NFR BLD: 64.2 %
NITRITE UR QL STRIP.AUTO: NEGATIVE
PH UR STRIP.AUTO: 7 [PH] (ref 5–8)
PLATELET # BLD AUTO: 199 K/UL (ref 135–450)
PMV BLD AUTO: 8.6 FL (ref 5–10.5)
POTASSIUM SERPL-SCNC: 4.5 MMOL/L (ref 3.5–5.1)
PROT SERPL-MCNC: 7.4 G/DL (ref 6.4–8.2)
PROT UR STRIP.AUTO-MCNC: NEGATIVE MG/DL
RBC # BLD AUTO: 4.64 M/UL (ref 4.2–5.9)
RBC #/AREA URNS HPF: ABNORMAL /HPF (ref 0–4)
SODIUM SERPL-SCNC: 142 MMOL/L (ref 136–145)
SP GR UR STRIP.AUTO: 1.01 (ref 1–1.03)
UA COMPLETE W REFLEX CULTURE PNL UR: ABNORMAL
UA DIPSTICK W REFLEX MICRO PNL UR: YES
URN SPEC COLLECT METH UR: ABNORMAL
UROBILINOGEN UR STRIP-ACNC: 1 E.U./DL
WBC # BLD AUTO: 7.8 K/UL (ref 4–11)
WBC #/AREA URNS HPF: ABNORMAL /HPF (ref 0–5)

## 2023-09-30 PROCEDURE — 6360000002 HC RX W HCPCS: Performed by: EMERGENCY MEDICINE

## 2023-09-30 PROCEDURE — 96375 TX/PRO/DX INJ NEW DRUG ADDON: CPT

## 2023-09-30 PROCEDURE — 99285 EMERGENCY DEPT VISIT HI MDM: CPT

## 2023-09-30 PROCEDURE — 96374 THER/PROPH/DIAG INJ IV PUSH: CPT

## 2023-09-30 PROCEDURE — 1200000000 HC SEMI PRIVATE

## 2023-09-30 PROCEDURE — 83690 ASSAY OF LIPASE: CPT

## 2023-09-30 PROCEDURE — 85025 COMPLETE CBC W/AUTO DIFF WBC: CPT

## 2023-09-30 PROCEDURE — 87150 DNA/RNA AMPLIFIED PROBE: CPT

## 2023-09-30 PROCEDURE — 80053 COMPREHEN METABOLIC PANEL: CPT

## 2023-09-30 PROCEDURE — 2580000003 HC RX 258: Performed by: EMERGENCY MEDICINE

## 2023-09-30 PROCEDURE — 87040 BLOOD CULTURE FOR BACTERIA: CPT

## 2023-09-30 PROCEDURE — 6370000000 HC RX 637 (ALT 250 FOR IP): Performed by: EMERGENCY MEDICINE

## 2023-09-30 PROCEDURE — 74176 CT ABD & PELVIS W/O CONTRAST: CPT

## 2023-09-30 PROCEDURE — 81001 URINALYSIS AUTO W/SCOPE: CPT

## 2023-09-30 PROCEDURE — 36415 COLL VENOUS BLD VENIPUNCTURE: CPT

## 2023-09-30 RX ORDER — ACETAMINOPHEN 325 MG/1
650 TABLET ORAL ONCE
Status: COMPLETED | OUTPATIENT
Start: 2023-09-30 | End: 2023-09-30

## 2023-09-30 RX ORDER — MORPHINE SULFATE 4 MG/ML
4 INJECTION, SOLUTION INTRAMUSCULAR; INTRAVENOUS ONCE
Status: COMPLETED | OUTPATIENT
Start: 2023-09-30 | End: 2023-09-30

## 2023-09-30 RX ADMIN — PIPERACILLIN AND TAZOBACTAM 3375 MG: 3; .375 INJECTION, POWDER, LYOPHILIZED, FOR SOLUTION INTRAVENOUS at 21:40

## 2023-09-30 RX ADMIN — MORPHINE SULFATE 4 MG: 4 INJECTION, SOLUTION INTRAMUSCULAR; INTRAVENOUS at 21:25

## 2023-09-30 RX ADMIN — ACETAMINOPHEN 650 MG: 325 TABLET ORAL at 20:40

## 2023-09-30 ASSESSMENT — PAIN DESCRIPTION - LOCATION
LOCATION: ABDOMEN

## 2023-09-30 ASSESSMENT — PAIN - FUNCTIONAL ASSESSMENT: PAIN_FUNCTIONAL_ASSESSMENT: 0-10

## 2023-09-30 ASSESSMENT — PAIN SCALES - GENERAL
PAINLEVEL_OUTOF10: 5
PAINLEVEL_OUTOF10: 8
PAINLEVEL_OUTOF10: 9
PAINLEVEL_OUTOF10: 0
PAINLEVEL_OUTOF10: 8

## 2023-09-30 ASSESSMENT — PAIN DESCRIPTION - ORIENTATION
ORIENTATION: LEFT;LOWER
ORIENTATION: LEFT;LOWER

## 2023-10-01 ENCOUNTER — APPOINTMENT (OUTPATIENT)
Dept: GENERAL RADIOLOGY | Age: 78
DRG: 392 | End: 2023-10-01
Payer: MEDICARE

## 2023-10-01 LAB
ANION GAP SERPL CALCULATED.3IONS-SCNC: 9 MMOL/L (ref 3–16)
BUN SERPL-MCNC: 19 MG/DL (ref 7–20)
CALCIUM SERPL-MCNC: 8.4 MG/DL (ref 8.3–10.6)
CHLORIDE SERPL-SCNC: 106 MMOL/L (ref 99–110)
CO2 SERPL-SCNC: 25 MMOL/L (ref 21–32)
CREAT SERPL-MCNC: 1.5 MG/DL (ref 0.8–1.3)
GFR SERPLBLD CREATININE-BSD FMLA CKD-EPI: 48 ML/MIN/{1.73_M2}
GLUCOSE BLD-MCNC: 100 MG/DL (ref 70–99)
GLUCOSE BLD-MCNC: 114 MG/DL (ref 70–99)
GLUCOSE BLD-MCNC: 98 MG/DL (ref 70–99)
GLUCOSE SERPL-MCNC: 102 MG/DL (ref 70–99)
PERFORMED ON: ABNORMAL
PERFORMED ON: ABNORMAL
PERFORMED ON: NORMAL
POTASSIUM SERPL-SCNC: 4.1 MMOL/L (ref 3.5–5.1)
SODIUM SERPL-SCNC: 140 MMOL/L (ref 136–145)

## 2023-10-01 PROCEDURE — 71045 X-RAY EXAM CHEST 1 VIEW: CPT

## 2023-10-01 PROCEDURE — 6370000000 HC RX 637 (ALT 250 FOR IP): Performed by: PEDIATRICS

## 2023-10-01 PROCEDURE — 36415 COLL VENOUS BLD VENIPUNCTURE: CPT

## 2023-10-01 PROCEDURE — 94761 N-INVAS EAR/PLS OXIMETRY MLT: CPT

## 2023-10-01 PROCEDURE — 1200000000 HC SEMI PRIVATE

## 2023-10-01 PROCEDURE — 2580000003 HC RX 258: Performed by: PEDIATRICS

## 2023-10-01 PROCEDURE — 80048 BASIC METABOLIC PNL TOTAL CA: CPT

## 2023-10-01 PROCEDURE — 6360000002 HC RX W HCPCS: Performed by: INTERNAL MEDICINE

## 2023-10-01 PROCEDURE — 2700000000 HC OXYGEN THERAPY PER DAY

## 2023-10-01 PROCEDURE — 6360000002 HC RX W HCPCS: Performed by: PEDIATRICS

## 2023-10-01 PROCEDURE — 94640 AIRWAY INHALATION TREATMENT: CPT

## 2023-10-01 RX ORDER — ONDANSETRON 2 MG/ML
4 INJECTION INTRAMUSCULAR; INTRAVENOUS EVERY 6 HOURS PRN
Status: DISCONTINUED | OUTPATIENT
Start: 2023-10-01 | End: 2023-10-03 | Stop reason: HOSPADM

## 2023-10-01 RX ORDER — TAMSULOSIN HYDROCHLORIDE 0.4 MG/1
0.4 CAPSULE ORAL DAILY
Status: DISCONTINUED | OUTPATIENT
Start: 2023-10-01 | End: 2023-10-03 | Stop reason: HOSPADM

## 2023-10-01 RX ORDER — KETOROLAC TROMETHAMINE 30 MG/ML
15 INJECTION, SOLUTION INTRAMUSCULAR; INTRAVENOUS EVERY 6 HOURS PRN
Status: DISCONTINUED | OUTPATIENT
Start: 2023-10-01 | End: 2023-10-03 | Stop reason: HOSPADM

## 2023-10-01 RX ORDER — PANTOPRAZOLE SODIUM 40 MG/1
40 TABLET, DELAYED RELEASE ORAL DAILY
Status: DISCONTINUED | OUTPATIENT
Start: 2023-10-01 | End: 2023-10-03 | Stop reason: HOSPADM

## 2023-10-01 RX ORDER — MORPHINE SULFATE 2 MG/ML
2 INJECTION, SOLUTION INTRAMUSCULAR; INTRAVENOUS
Status: DISCONTINUED | OUTPATIENT
Start: 2023-10-01 | End: 2023-10-03 | Stop reason: HOSPADM

## 2023-10-01 RX ORDER — SODIUM CHLORIDE 9 MG/ML
INJECTION, SOLUTION INTRAVENOUS CONTINUOUS
Status: DISCONTINUED | OUTPATIENT
Start: 2023-10-01 | End: 2023-10-01

## 2023-10-01 RX ORDER — LOSARTAN POTASSIUM 100 MG/1
100 TABLET ORAL DAILY
Status: DISCONTINUED | OUTPATIENT
Start: 2023-10-01 | End: 2023-10-03 | Stop reason: HOSPADM

## 2023-10-01 RX ORDER — BUDESONIDE AND FORMOTEROL FUMARATE DIHYDRATE 160; 4.5 UG/1; UG/1
2 AEROSOL RESPIRATORY (INHALATION)
Status: DISCONTINUED | OUTPATIENT
Start: 2023-10-01 | End: 2023-10-03 | Stop reason: HOSPADM

## 2023-10-01 RX ORDER — SODIUM CHLORIDE 9 MG/ML
INJECTION, SOLUTION INTRAVENOUS PRN
Status: DISCONTINUED | OUTPATIENT
Start: 2023-10-01 | End: 2023-10-03 | Stop reason: HOSPADM

## 2023-10-01 RX ORDER — SODIUM CHLORIDE 0.9 % (FLUSH) 0.9 %
5-40 SYRINGE (ML) INJECTION EVERY 12 HOURS SCHEDULED
Status: DISCONTINUED | OUTPATIENT
Start: 2023-10-01 | End: 2023-10-03 | Stop reason: HOSPADM

## 2023-10-01 RX ORDER — POTASSIUM CITRATE 15 MEQ/1
15 TABLET, EXTENDED RELEASE ORAL DAILY
Status: DISCONTINUED | OUTPATIENT
Start: 2023-10-01 | End: 2023-10-01

## 2023-10-01 RX ORDER — POLYETHYLENE GLYCOL 3350 17 G/17G
17 POWDER, FOR SOLUTION ORAL DAILY PRN
Status: DISCONTINUED | OUTPATIENT
Start: 2023-10-01 | End: 2023-10-03 | Stop reason: HOSPADM

## 2023-10-01 RX ORDER — METOPROLOL SUCCINATE 50 MG/1
50 TABLET, EXTENDED RELEASE ORAL DAILY
Status: DISCONTINUED | OUTPATIENT
Start: 2023-10-01 | End: 2023-10-03 | Stop reason: HOSPADM

## 2023-10-01 RX ORDER — ACETAMINOPHEN 325 MG/1
650 TABLET ORAL EVERY 6 HOURS PRN
Status: DISCONTINUED | OUTPATIENT
Start: 2023-10-01 | End: 2023-10-03 | Stop reason: HOSPADM

## 2023-10-01 RX ORDER — ACETAMINOPHEN 650 MG/1
650 SUPPOSITORY RECTAL EVERY 6 HOURS PRN
Status: DISCONTINUED | OUTPATIENT
Start: 2023-10-01 | End: 2023-10-03 | Stop reason: HOSPADM

## 2023-10-01 RX ORDER — SODIUM CHLORIDE 0.9 % (FLUSH) 0.9 %
5-40 SYRINGE (ML) INJECTION PRN
Status: DISCONTINUED | OUTPATIENT
Start: 2023-10-01 | End: 2023-10-03 | Stop reason: HOSPADM

## 2023-10-01 RX ORDER — ATORVASTATIN CALCIUM 10 MG/1
20 TABLET, FILM COATED ORAL DAILY
Status: DISCONTINUED | OUTPATIENT
Start: 2023-10-01 | End: 2023-10-03 | Stop reason: HOSPADM

## 2023-10-01 RX ORDER — ENOXAPARIN SODIUM 100 MG/ML
30 INJECTION SUBCUTANEOUS 2 TIMES DAILY
Status: DISCONTINUED | OUTPATIENT
Start: 2023-10-01 | End: 2023-10-01 | Stop reason: ALTCHOICE

## 2023-10-01 RX ORDER — AMLODIPINE BESYLATE 5 MG/1
5 TABLET ORAL DAILY
Status: DISCONTINUED | OUTPATIENT
Start: 2023-10-01 | End: 2023-10-03 | Stop reason: HOSPADM

## 2023-10-01 RX ORDER — ALBUTEROL SULFATE 90 UG/1
2 AEROSOL, METERED RESPIRATORY (INHALATION) EVERY 6 HOURS PRN
Status: DISCONTINUED | OUTPATIENT
Start: 2023-10-01 | End: 2023-10-03 | Stop reason: HOSPADM

## 2023-10-01 RX ORDER — ONDANSETRON 4 MG/1
4 TABLET, ORALLY DISINTEGRATING ORAL EVERY 8 HOURS PRN
Status: DISCONTINUED | OUTPATIENT
Start: 2023-10-01 | End: 2023-10-03 | Stop reason: HOSPADM

## 2023-10-01 RX ORDER — MORPHINE SULFATE 4 MG/ML
4 INJECTION, SOLUTION INTRAMUSCULAR; INTRAVENOUS
Status: DISCONTINUED | OUTPATIENT
Start: 2023-10-01 | End: 2023-10-03 | Stop reason: HOSPADM

## 2023-10-01 RX ORDER — BUDESONIDE AND FORMOTEROL FUMARATE DIHYDRATE 160; 4.5 UG/1; UG/1
2 AEROSOL RESPIRATORY (INHALATION)
Status: DISCONTINUED | OUTPATIENT
Start: 2023-10-01 | End: 2023-10-01

## 2023-10-01 RX ADMIN — SODIUM CHLORIDE: 9 INJECTION, SOLUTION INTRAVENOUS at 01:11

## 2023-10-01 RX ADMIN — RIVAROXABAN 20 MG: 20 TABLET, FILM COATED ORAL at 08:35

## 2023-10-01 RX ADMIN — PANTOPRAZOLE SODIUM 40 MG: 40 TABLET, DELAYED RELEASE ORAL at 06:54

## 2023-10-01 RX ADMIN — ACETAMINOPHEN 650 MG: 325 TABLET ORAL at 18:21

## 2023-10-01 RX ADMIN — MORPHINE SULFATE 2 MG: 2 INJECTION, SOLUTION INTRAMUSCULAR; INTRAVENOUS at 20:43

## 2023-10-01 RX ADMIN — PIPERACILLIN AND TAZOBACTAM 3375 MG: 3; .375 INJECTION, POWDER, LYOPHILIZED, FOR SOLUTION INTRAVENOUS at 11:09

## 2023-10-01 RX ADMIN — PIPERACILLIN AND TAZOBACTAM 3375 MG: 3; .375 INJECTION, POWDER, LYOPHILIZED, FOR SOLUTION INTRAVENOUS at 19:01

## 2023-10-01 RX ADMIN — TAMSULOSIN HYDROCHLORIDE 0.4 MG: 0.4 CAPSULE ORAL at 08:35

## 2023-10-01 RX ADMIN — KETOROLAC TROMETHAMINE 15 MG: 30 INJECTION, SOLUTION INTRAMUSCULAR; INTRAVENOUS at 18:57

## 2023-10-01 RX ADMIN — ATORVASTATIN CALCIUM 20 MG: 10 TABLET, FILM COATED ORAL at 08:35

## 2023-10-01 RX ADMIN — MORPHINE SULFATE 2 MG: 2 INJECTION, SOLUTION INTRAMUSCULAR; INTRAVENOUS at 17:19

## 2023-10-01 RX ADMIN — LOSARTAN POTASSIUM 100 MG: 100 TABLET, FILM COATED ORAL at 08:35

## 2023-10-01 RX ADMIN — AMLODIPINE BESYLATE 5 MG: 5 TABLET ORAL at 08:36

## 2023-10-01 RX ADMIN — Medication 2 PUFF: at 20:06

## 2023-10-01 RX ADMIN — PIPERACILLIN AND TAZOBACTAM 3375 MG: 3; .375 INJECTION, POWDER, LYOPHILIZED, FOR SOLUTION INTRAVENOUS at 04:41

## 2023-10-01 RX ADMIN — Medication 2 PUFF: at 07:26

## 2023-10-01 RX ADMIN — MORPHINE SULFATE 4 MG: 4 INJECTION, SOLUTION INTRAMUSCULAR; INTRAVENOUS at 01:12

## 2023-10-01 RX ADMIN — MORPHINE SULFATE 2 MG: 2 INJECTION, SOLUTION INTRAMUSCULAR; INTRAVENOUS at 08:37

## 2023-10-01 RX ADMIN — TIOTROPIUM BROMIDE INHALATION SPRAY 2 PUFF: 3.12 SPRAY, METERED RESPIRATORY (INHALATION) at 07:24

## 2023-10-01 RX ADMIN — METOPROLOL SUCCINATE 50 MG: 50 TABLET, EXTENDED RELEASE ORAL at 08:36

## 2023-10-01 ASSESSMENT — PAIN DESCRIPTION - LOCATION
LOCATION: ABDOMEN

## 2023-10-01 ASSESSMENT — PAIN SCALES - GENERAL
PAINLEVEL_OUTOF10: 7
PAINLEVEL_OUTOF10: 7
PAINLEVEL_OUTOF10: 4
PAINLEVEL_OUTOF10: 6
PAINLEVEL_OUTOF10: 9
PAINLEVEL_OUTOF10: 2
PAINLEVEL_OUTOF10: 9
PAINLEVEL_OUTOF10: 7

## 2023-10-01 ASSESSMENT — PAIN DESCRIPTION - DESCRIPTORS
DESCRIPTORS: SHOOTING;SHARP
DESCRIPTORS: SHARP;SHOOTING
DESCRIPTORS: SHARP
DESCRIPTORS: SHOOTING;SHARP
DESCRIPTORS: ACHING;SORE;SHARP

## 2023-10-01 ASSESSMENT — PAIN DESCRIPTION - ORIENTATION
ORIENTATION: ANTERIOR
ORIENTATION: LEFT;LOWER

## 2023-10-01 ASSESSMENT — PAIN DESCRIPTION - PAIN TYPE: TYPE: ACUTE PAIN

## 2023-10-01 ASSESSMENT — PAIN SCALES - WONG BAKER: WONGBAKER_NUMERICALRESPONSE: 2

## 2023-10-01 ASSESSMENT — PAIN DESCRIPTION - FREQUENCY: FREQUENCY: INTERMITTENT

## 2023-10-01 ASSESSMENT — PAIN DESCRIPTION - ONSET: ONSET: ON-GOING

## 2023-10-01 NOTE — SIGNIFICANT EVENT
Pending admission     Danielle Hein  is a 70 y.o. male     Past Medical History:   Diagnosis Date    A-fib ([address])     2/14/12    Acute conjunctivitis of both eyes     Acute on chronic respiratory failure with hypoxia (HCC) 2/13/2012    Acute respiratory failure with hypoxia (HCC) 2/13/2012    Atrial fibrillation with RVR ([address])     2/14/12     Avulsion fracture of ankle 9/21/2015    Benign localized hyperplasia of prostate with urinary obstruction and other lower urinary tract symptoms (LUTS)(600.21) 8/17/2016    Chronic systolic CHF (congestive heart failure) ([address]) 8/28/2017    Contusion of leg, right 9/21/2015    COPD (chronic obstructive pulmonary disease) ([address])     Fractures     GERD (gastroesophageal reflux disease) 6/15/2015    Hypertension     Hypertensive urgency 8/4/2019    Hypertrophy of prostate without urinary obstruction and other lower urinary tract symptoms (LUTS) 6/15/2015    No history of procedure     no previos colonoscopy    PAD (peripheral artery disease) ([address]) 10/9/2017    Pneumonia     Thoracic aortic aneurysm ([address])         Presented to [facility] ED with abdominal pain. Workup included a CT scan with findings concerning for acute diverticulitis and prostatomegaly. Zosyn IV ordered, along with morphine for pain. He has afib on xarelto, ckd, former smoker with copd, chronic respiratory failure on O2, with AAA. No fever or chills. Exam with pain in the LLQ. Diverticulitis  Med/surg w tele   expected stay ~ 3 days   No covid concerns.    Full Code     Sandoval Tang MD

## 2023-10-01 NOTE — CONSULTS
Left nephrolithiasis.     Assessment:     70year old male with history of HTN, HLD, A fib, COPD, CHF, GERD, BPH, PAD, admitted with acute uncomplicated sigmoid diverticulitis    Plan:   Continue supportive care  Broad spectrum antibiotics  Start clear liquids  Advance to low fiber as tolerated  Outpatient colonoscopy in 4-6wks  Resume home regimen for Afib, COPD, and CHF    Faith Denney MD, MD  12:16 PM 10/1/2023

## 2023-10-01 NOTE — H&P
V2.0  History and Physical      Name:  Nikki Underwood /Age/Sex: 1945  (70 y.o. male)   MRN & CSN:  [id number] & [id number] Encounter Date/Time: 10/1/2023 12:19 AM EDT   Location:   PCP: Alex Eastman MD       Hospital Day: 2    Assessment and Plan:   Nikki Underwood is a 70 y.o. male with a pmh of     Past Medical History:   Diagnosis Date    A-fib ([address])     12    Acute conjunctivitis of both eyes     Acute on chronic respiratory failure with hypoxia ([address]) 2012    Acute respiratory failure with hypoxia ([address]) 2012    Atrial fibrillation with RVR ([address])     12     Avulsion fracture of ankle 2015    Benign localized hyperplasia of prostate with urinary obstruction and other lower urinary tract symptoms (LUTS)(600.21) 2016    Chronic systolic CHF (congestive heart failure) ([address]) 2017    Contusion of leg, right 2015    COPD (chronic obstructive pulmonary disease) ([address])     Fractures     GERD (gastroesophageal reflux disease) 6/15/2015    Hypertension     Hypertensive urgency 2019    Hypertrophy of prostate without urinary obstruction and other lower urinary tract symptoms (LUTS) 6/15/2015    No history of procedure     no previos colonoscopy    PAD (peripheral artery disease) ([address]) 10/9/2017    Pneumonia     Thoracic aortic aneurysm ([address])          who presents with Diverticulitis    Hospital Problems             Last Modified POA    * (Principal) Diverticulitis 2023 Yes    Left lower quadrant abdominal pain 2023 Yes       Acute diverticulitis:   - zosyn IV   - npo   - NS @ 100 ml/hr   - morphine prn panel for pain   - he reports 2 colonoscopies previously - and reports they were negative, hx of 3 hernias previously     Afib, chronic heart failure:   - metoprolol 50 mg po daily   - rivaroxaban 20 mg po daily   - furosemide 40 mg po daily - HELD   - cont K supplement but follow K levels   - losartan 100 mg po daily   - baseline weight

## 2023-10-01 NOTE — ED NOTES
Transport booked with [facility] ETA of [address]  09/30/23 [id number]

## 2023-10-02 LAB
ANION GAP SERPL CALCULATED.3IONS-SCNC: 7 MMOL/L (ref 3–16)
BASOPHILS # BLD: 0.1 K/UL (ref 0–0.2)
BASOPHILS NFR BLD: 1.1 %
BUN SERPL-MCNC: 19 MG/DL (ref 7–20)
CALCIUM SERPL-MCNC: 8.7 MG/DL (ref 8.3–10.6)
CHLORIDE SERPL-SCNC: 105 MMOL/L (ref 99–110)
CO2 SERPL-SCNC: 24 MMOL/L (ref 21–32)
CREAT SERPL-MCNC: 1.7 MG/DL (ref 0.8–1.3)
DEPRECATED RDW RBC AUTO: 16.2 % (ref 12.4–15.4)
EOSINOPHIL # BLD: 0.5 K/UL (ref 0–0.6)
EOSINOPHIL NFR BLD: 6.8 %
GFR SERPLBLD CREATININE-BSD FMLA CKD-EPI: 41 ML/MIN/{1.73_M2}
GLUCOSE SERPL-MCNC: 98 MG/DL (ref 70–99)
HCT VFR BLD AUTO: 37.8 % (ref 40.5–52.5)
HGB BLD-MCNC: 12.5 G/DL (ref 13.5–17.5)
LYMPHOCYTES # BLD: 1.3 K/UL (ref 1–5.1)
LYMPHOCYTES NFR BLD: 19.4 %
MAGNESIUM SERPL-MCNC: 2.3 MG/DL (ref 1.8–2.4)
MCH RBC QN AUTO: 30.6 PG (ref 26–34)
MCHC RBC AUTO-ENTMCNC: 33.1 G/DL (ref 31–36)
MCV RBC AUTO: 92.3 FL (ref 80–100)
MONOCYTES # BLD: 0.6 K/UL (ref 0–1.3)
MONOCYTES NFR BLD: 8.1 %
NEUTROPHILS # BLD: 4.5 K/UL (ref 1.7–7.7)
NEUTROPHILS NFR BLD: 64.6 %
PLATELET # BLD AUTO: 161 K/UL (ref 135–450)
PMV BLD AUTO: 8.9 FL (ref 5–10.5)
POTASSIUM SERPL-SCNC: 4.1 MMOL/L (ref 3.5–5.1)
RBC # BLD AUTO: 4.09 M/UL (ref 4.2–5.9)
SODIUM SERPL-SCNC: 136 MMOL/L (ref 136–145)
WBC # BLD AUTO: 6.9 K/UL (ref 4–11)

## 2023-10-02 PROCEDURE — 6360000002 HC RX W HCPCS: Performed by: PEDIATRICS

## 2023-10-02 PROCEDURE — 6370000000 HC RX 637 (ALT 250 FOR IP): Performed by: PEDIATRICS

## 2023-10-02 PROCEDURE — 2580000003 HC RX 258: Performed by: PEDIATRICS

## 2023-10-02 PROCEDURE — 1200000000 HC SEMI PRIVATE

## 2023-10-02 PROCEDURE — 85025 COMPLETE CBC W/AUTO DIFF WBC: CPT

## 2023-10-02 PROCEDURE — 94640 AIRWAY INHALATION TREATMENT: CPT

## 2023-10-02 PROCEDURE — 2700000000 HC OXYGEN THERAPY PER DAY

## 2023-10-02 PROCEDURE — 6360000002 HC RX W HCPCS: Performed by: INTERNAL MEDICINE

## 2023-10-02 PROCEDURE — 36415 COLL VENOUS BLD VENIPUNCTURE: CPT

## 2023-10-02 PROCEDURE — 6370000000 HC RX 637 (ALT 250 FOR IP): Performed by: INTERNAL MEDICINE

## 2023-10-02 PROCEDURE — 94761 N-INVAS EAR/PLS OXIMETRY MLT: CPT

## 2023-10-02 PROCEDURE — 99232 SBSQ HOSP IP/OBS MODERATE 35: CPT | Performed by: INTERNAL MEDICINE

## 2023-10-02 PROCEDURE — 80048 BASIC METABOLIC PNL TOTAL CA: CPT

## 2023-10-02 PROCEDURE — 83735 ASSAY OF MAGNESIUM: CPT

## 2023-10-02 RX ORDER — ALBUTEROL SULFATE 2.5 MG/3ML
2.5 SOLUTION RESPIRATORY (INHALATION) 2 TIMES DAILY
Status: DISCONTINUED | OUTPATIENT
Start: 2023-10-02 | End: 2023-10-03 | Stop reason: HOSPADM

## 2023-10-02 RX ORDER — DOCUSATE SODIUM 100 MG/1
100 CAPSULE, LIQUID FILLED ORAL 2 TIMES DAILY
Status: DISCONTINUED | OUTPATIENT
Start: 2023-10-02 | End: 2023-10-03 | Stop reason: HOSPADM

## 2023-10-02 RX ADMIN — PIPERACILLIN AND TAZOBACTAM 3375 MG: 3; .375 INJECTION, POWDER, LYOPHILIZED, FOR SOLUTION INTRAVENOUS at 20:01

## 2023-10-02 RX ADMIN — PIPERACILLIN AND TAZOBACTAM 3375 MG: 3; .375 INJECTION, POWDER, LYOPHILIZED, FOR SOLUTION INTRAVENOUS at 10:49

## 2023-10-02 RX ADMIN — Medication 2 PUFF: at 07:18

## 2023-10-02 RX ADMIN — Medication 2 PUFF: at 07:26

## 2023-10-02 RX ADMIN — RIVAROXABAN 20 MG: 20 TABLET, FILM COATED ORAL at 09:20

## 2023-10-02 RX ADMIN — DOCUSATE SODIUM 100 MG: 100 CAPSULE, LIQUID FILLED ORAL at 19:53

## 2023-10-02 RX ADMIN — TAMSULOSIN HYDROCHLORIDE 0.4 MG: 0.4 CAPSULE ORAL at 09:20

## 2023-10-02 RX ADMIN — ALBUTEROL SULFATE 2.5 MG: 2.5 SOLUTION RESPIRATORY (INHALATION) at 19:03

## 2023-10-02 RX ADMIN — DOCUSATE SODIUM 100 MG: 100 CAPSULE, LIQUID FILLED ORAL at 12:29

## 2023-10-02 RX ADMIN — Medication 10 ML: at 09:21

## 2023-10-02 RX ADMIN — AMLODIPINE BESYLATE 5 MG: 5 TABLET ORAL at 09:20

## 2023-10-02 RX ADMIN — METOPROLOL SUCCINATE 50 MG: 50 TABLET, EXTENDED RELEASE ORAL at 09:20

## 2023-10-02 RX ADMIN — LOSARTAN POTASSIUM 100 MG: 100 TABLET, FILM COATED ORAL at 09:20

## 2023-10-02 RX ADMIN — ATORVASTATIN CALCIUM 20 MG: 10 TABLET, FILM COATED ORAL at 09:20

## 2023-10-02 RX ADMIN — Medication 2 PUFF: at 19:03

## 2023-10-02 RX ADMIN — MORPHINE SULFATE 2 MG: 2 INJECTION, SOLUTION INTRAMUSCULAR; INTRAVENOUS at 15:05

## 2023-10-02 RX ADMIN — TIOTROPIUM BROMIDE INHALATION SPRAY 2 PUFF: 3.12 SPRAY, METERED RESPIRATORY (INHALATION) at 07:18

## 2023-10-02 RX ADMIN — PIPERACILLIN AND TAZOBACTAM 3375 MG: 3; .375 INJECTION, POWDER, LYOPHILIZED, FOR SOLUTION INTRAVENOUS at 03:59

## 2023-10-02 RX ADMIN — PANTOPRAZOLE SODIUM 40 MG: 40 TABLET, DELAYED RELEASE ORAL at 06:13

## 2023-10-02 ASSESSMENT — PAIN DESCRIPTION - DESCRIPTORS: DESCRIPTORS: SHARP

## 2023-10-02 ASSESSMENT — PAIN - FUNCTIONAL ASSESSMENT: PAIN_FUNCTIONAL_ASSESSMENT: PREVENTS OR INTERFERES SOME ACTIVE ACTIVITIES AND ADLS

## 2023-10-02 ASSESSMENT — PAIN SCALES - WONG BAKER: WONGBAKER_NUMERICALRESPONSE: 2

## 2023-10-02 ASSESSMENT — PAIN DESCRIPTION - LOCATION: LOCATION: ABDOMEN

## 2023-10-02 ASSESSMENT — PAIN SCALES - GENERAL
PAINLEVEL_OUTOF10: 7
PAINLEVEL_OUTOF10: 3

## 2023-10-02 ASSESSMENT — PAIN DESCRIPTION - ORIENTATION: ORIENTATION: ANTERIOR

## 2023-10-02 NOTE — DISCHARGE INSTRUCTIONS
You should be able to recognize when too much exertion is being expended. Elements of Energy Conservation:   Prioritize/Plan: Decide what needs to be done today, and what can wait for a later date, write to do lists, plan ahead to avoid extra trips, and gather supplies and equipment needed before starting an activity. Position: Avoid tiring and awkward posture that may impair breathing. Pace: Slow and steady pace, never rushing! Pursed lip breathing. Pursed Lip Breathing: \"Smell the roses, blow out the candles\".

## 2023-10-02 NOTE — CONSULTS
follow-up appointment compliance. Next Appointment: 10/9/23 at [phone] with PCP Dr. Herminio Durbni  [x]  Emphasize daily weights: Instruct patient to call the MD if weight gain of 3 lbs in 1 day or 5 lbs in a week. [x]  Review sodium restriction diet. Encourage patient to not add table salt and avoid foods high in sodium. [x]  Educate further on fluid restriction of 48 oz - 64 oz with labeled pitcher during inpatient admission. [x]  Continue to educate on signs & symptoms of Heart Failure.   []  Other:            Electronically signed by Shabnam Urban, MSN, RN  on 10/2/2023 at 12:37 PM

## 2023-10-03 ENCOUNTER — TELEPHONE (OUTPATIENT)
Dept: PULMONOLOGY | Age: 78
End: 2023-10-03

## 2023-10-03 VITALS
WEIGHT: 255.7 LBS | TEMPERATURE: 98.1 F | DIASTOLIC BLOOD PRESSURE: 97 MMHG | HEART RATE: 83 BPM | SYSTOLIC BLOOD PRESSURE: 117 MMHG | RESPIRATION RATE: 20 BRPM | BODY MASS INDEX: 35.8 KG/M2 | HEIGHT: 71 IN | OXYGEN SATURATION: 96 %

## 2023-10-03 LAB
ANION GAP SERPL CALCULATED.3IONS-SCNC: 10 MMOL/L (ref 3–16)
BUN SERPL-MCNC: 15 MG/DL (ref 7–20)
CALCIUM SERPL-MCNC: 8.7 MG/DL (ref 8.3–10.6)
CHLORIDE SERPL-SCNC: 108 MMOL/L (ref 99–110)
CO2 SERPL-SCNC: 22 MMOL/L (ref 21–32)
CREAT SERPL-MCNC: 1.5 MG/DL (ref 0.8–1.3)
GFR SERPLBLD CREATININE-BSD FMLA CKD-EPI: 48 ML/MIN/{1.73_M2}
GLUCOSE SERPL-MCNC: 99 MG/DL (ref 70–99)
POTASSIUM SERPL-SCNC: 4.3 MMOL/L (ref 3.5–5.1)
REPORT: NORMAL
SODIUM SERPL-SCNC: 140 MMOL/L (ref 136–145)

## 2023-10-03 PROCEDURE — 2580000003 HC RX 258: Performed by: PEDIATRICS

## 2023-10-03 PROCEDURE — 99238 HOSP IP/OBS DSCHRG MGMT 30/<: CPT | Performed by: INTERNAL MEDICINE

## 2023-10-03 PROCEDURE — 2700000000 HC OXYGEN THERAPY PER DAY

## 2023-10-03 PROCEDURE — 80048 BASIC METABOLIC PNL TOTAL CA: CPT

## 2023-10-03 PROCEDURE — 94640 AIRWAY INHALATION TREATMENT: CPT

## 2023-10-03 PROCEDURE — 36415 COLL VENOUS BLD VENIPUNCTURE: CPT

## 2023-10-03 PROCEDURE — 6360000002 HC RX W HCPCS: Performed by: PEDIATRICS

## 2023-10-03 PROCEDURE — 6360000002 HC RX W HCPCS: Performed by: INTERNAL MEDICINE

## 2023-10-03 PROCEDURE — 94761 N-INVAS EAR/PLS OXIMETRY MLT: CPT

## 2023-10-03 PROCEDURE — 6370000000 HC RX 637 (ALT 250 FOR IP): Performed by: PEDIATRICS

## 2023-10-03 PROCEDURE — 6370000000 HC RX 637 (ALT 250 FOR IP): Performed by: INTERNAL MEDICINE

## 2023-10-03 RX ORDER — AMOXICILLIN AND CLAVULANATE POTASSIUM 875; 125 MG/1; MG/1
1 TABLET, FILM COATED ORAL 2 TIMES DAILY
Qty: 20 TABLET | Refills: 0 | Status: SHIPPED | OUTPATIENT
Start: 2023-10-03 | End: 2023-10-13

## 2023-10-03 RX ORDER — ONDANSETRON 4 MG/1
4 TABLET, FILM COATED ORAL EVERY 8 HOURS PRN
Qty: 20 TABLET | Refills: 0 | Status: SHIPPED | OUTPATIENT
Start: 2023-10-03

## 2023-10-03 RX ORDER — HYDROCODONE BITARTRATE AND ACETAMINOPHEN 5; 325 MG/1; MG/1
1 TABLET ORAL EVERY 8 HOURS PRN
Qty: 9 TABLET | Refills: 0 | Status: SHIPPED | OUTPATIENT
Start: 2023-10-03 | End: 2023-10-06

## 2023-10-03 RX ADMIN — METOPROLOL SUCCINATE 50 MG: 50 TABLET, EXTENDED RELEASE ORAL at 08:27

## 2023-10-03 RX ADMIN — TAMSULOSIN HYDROCHLORIDE 0.4 MG: 0.4 CAPSULE ORAL at 08:27

## 2023-10-03 RX ADMIN — RIVAROXABAN 20 MG: 20 TABLET, FILM COATED ORAL at 08:26

## 2023-10-03 RX ADMIN — ALBUTEROL SULFATE 2.5 MG: 2.5 SOLUTION RESPIRATORY (INHALATION) at 07:38

## 2023-10-03 RX ADMIN — LOSARTAN POTASSIUM 100 MG: 100 TABLET, FILM COATED ORAL at 08:27

## 2023-10-03 RX ADMIN — ATORVASTATIN CALCIUM 20 MG: 10 TABLET, FILM COATED ORAL at 08:27

## 2023-10-03 RX ADMIN — PANTOPRAZOLE SODIUM 40 MG: 40 TABLET, DELAYED RELEASE ORAL at 05:25

## 2023-10-03 RX ADMIN — Medication 10 ML: at 08:27

## 2023-10-03 RX ADMIN — AMLODIPINE BESYLATE 5 MG: 5 TABLET ORAL at 08:27

## 2023-10-03 RX ADMIN — TIOTROPIUM BROMIDE INHALATION SPRAY 2 PUFF: 3.12 SPRAY, METERED RESPIRATORY (INHALATION) at 07:39

## 2023-10-03 RX ADMIN — PIPERACILLIN AND TAZOBACTAM 3375 MG: 3; .375 INJECTION, POWDER, LYOPHILIZED, FOR SOLUTION INTRAVENOUS at 03:19

## 2023-10-03 RX ADMIN — DOCUSATE SODIUM 100 MG: 100 CAPSULE, LIQUID FILLED ORAL at 08:27

## 2023-10-03 RX ADMIN — Medication 2 PUFF: at 07:39

## 2023-10-03 NOTE — ACP (ADVANCE CARE PLANNING)
Advance Care Planning     General Advance Care Planning (ACP) Conversation    Date of Conversation: 9/30/2023  Conducted with: Patient with Decision Making Capacity    Healthcare Decision Maker:    Primary Decision Maker: Deep Lucio - [phone]  Click here to complete Healthcare Decision Makers including selection of the Healthcare Decision Maker Relationship (ie \"Primary\"). Today we documented Decision Maker(s) consistent with Legal Next of Kin hierarchy.     Content/Action Overview:  DECLINED ACP Conversation - will revisit periodically  Reviewed DNR/DNI and patient elects Full Code (Attempt Resuscitation)        Length of Voluntary ACP Conversation in minutes:  <16 minutes (Non-Billable)    Maryjane Vega, RN

## 2023-10-03 NOTE — TELEPHONE ENCOUNTER
Patient did not show for 6 mo f/u appointment  with Dr. Angelina Sam on 10/3/23    Same Day Cancellation: No    Patient rescheduled:  No    New appointment: n/a    Patient was also no show on: n/a    LOV 4/4/23    ASSESSMENT:  COPD   Upper lobe predominant paraseptal and centrilobular emphysema  Pulmonary Nodule left, 5 mm, stable   Chronic hypoxemic respiratory failure on 2 L oxygen HS  Atrial fibrillation on Xarelto     PLAN:  Oxygen 2 L HS - did not qualify for oxygen with exertion today   Trelegy 200 daily, sample X 1 today please  PRN nebulized bronchodilators, has albuterol MDI   Tobacco cessation has been achieved  See me in 6 months, can consider LDCT at that visit

## 2023-10-03 NOTE — CARE COORDINATION
Case Management Assessment  Initial Evaluation and discharge note    Date/Time of Evaluation: 10/3/2023 11:44 AM  Assessment Completed by: Abdulaziz Jimenez RN    If patient is discharged prior to next notation, then this note serves as note for discharge by case management. Patient Name: Bernard Campbell                   YOB: 1945  Diagnosis: Diverticulitis [K57.92]  Left lower quadrant abdominal pain [R10.32]                   Date / Time: 9/30/2023  6:29 PM    Patient Admission Status: Inpatient   Readmission Risk (Low < 19, Mod (19-27), High > 27): Readmission Risk Score: 12.3    Current PCP: Jean Gonzalez MD  PCP verified by CM? Yes (Gio)    Chart Reviewed: Yes      History Provided by: Patient  Patient Orientation: Alert and Oriented    Patient Cognition: Alert    Hospitalization in the last 30 days (Readmission):  No    If yes, Readmission Assessment in CM Navigator will be completed. Advance Directives:      Code Status: Full Code   Patient's Primary Decision Maker is: Patient Declined (Legal Next of Kin Remains as Decision Maker)    Primary Decision Maker: Warren Benitez  [phone]    Discharge Planning:    Patient lives with: Spouse/Significant Other Type of Home: Apartment  Primary Care Giver: Self  Patient Support Systems include: Spouse/Significant Other, Children, Family Members   Current Financial resources: Medicare ([employer] Medicare)  Current community resources: None  Current services prior to admission: Durable Medical Equipment            Current DME: Oxygen Therapy (Comment), Home Aerosol ([employer] [address] O2 3L [place]. Has portable tanks, concentrator)            Type of Home Care services:  None    ADLS  Prior functional level: Independent in ADLs/IADLs  Current functional level: Independent in ADLs/IADLs    PT AM-PAC:   /24  OT AM-PAC:   /24    Family can provide assistance at DC:  Yes  Would you like Case Management to discuss the discharge plan

## 2023-10-03 NOTE — PLAN OF CARE
HEART FAILURE CARE PLAN:    Comorbidities Reviewed: Yes   Patient has a past medical history of A-fib ([address]), Acute conjunctivitis of both eyes, Acute on chronic respiratory failure with hypoxia ([address]), Acute respiratory failure with hypoxia ([address]), Atrial fibrillation with RVR ([address]), Avulsion fracture of ankle, Benign localized hyperplasia of prostate with urinary obstruction and other lower urinary tract symptoms (NIVD)(351.41), Chronic systolic CHF (congestive heart failure) ([address]), Contusion of leg, right, COPD (chronic obstructive pulmonary disease) ([address]), Fractures, GERD (gastroesophageal reflux disease), Hypertension, Hypertensive urgency, Hypertrophy of prostate without urinary obstruction and other lower urinary tract symptoms (LUTS), No history of procedure, PAD (peripheral artery disease) ([address]), Pneumonia, and Thoracic aortic aneurysm ([address] ECHOCARDIOGRAM Reviewed: Yes   Patient's Ejection Fraction (EF) is greater than 40%    Weights Reviewed:  Yes   Admission weight: 250 lb (113.4 kg)   Wt Readings from Last 3 Encounters:   10/03/23 255 lb 11.2 oz (116 kg)   06/14/23 256 lb (116.1 kg)   04/04/23 245 lb (111.1 kg)     Intake & Output Reviewed: Yes     Intake/Output Summary (Last 24 hours) at 10/3/2023 0930  Last data filed at 10/3/2023 0327  Gross per 24 hour   Intake 843.63 ml   Output 975 ml   Net -131.37 ml     Medications Reviewed: Yes   SCHEDULED HOSPITAL MEDICATIONS:   albuterol  2.5 mg Nebulization BID    docusate sodium  100 mg Oral BID    sodium chloride flush  5-40 mL IntraVENous 2 times per day    piperacillin-tazobactam  3,375 mg IntraVENous Q8H    amLODIPine  5 mg Oral Daily    atorvastatin  20 mg Oral Daily    losartan  100 mg Oral Daily    metoprolol succinate  50 mg Oral Daily    pantoprazole  40 mg Oral Daily    rivaroxaban  20 mg Oral Daily with breakfast    tamsulosin  0.4 mg Oral Daily    budesonide-formoterol  2 puff Inhalation BID RT    And    tiotropium  2 puff Inhalation Daily RT
Problem: Discharge Planning  Goal: Discharge to home or other facility with appropriate resources  10/2/2023 2205 by Abigail Zamudio RN  Outcome: Progressing     Problem: Pain  Goal: Verbalizes/displays adequate comfort level or baseline comfort level  10/3/2023 0927 by Jeanne Michaels RN  Outcome: Progressing  10/2/2023 2205 by Abigail Zamudio RN  Outcome: Progressing     Problem: Safety - Adult  Goal: Free from fall injury  10/3/2023 0927 by Jeanne Michaels RN  Outcome: Progressing  10/2/2023 2205 by Abigail Zamudio RN  Outcome: Progressing     Problem: ABCDS Injury Assessment  Goal: Absence of physical injury  10/2/2023 2205 by Abigail Zamudio RN  Outcome: Progressing     Problem: Respiratory - Adult  Goal: Achieves optimal ventilation and oxygenation  10/2/2023 2205 by Abigail Zamudio RN  Outcome: Progressing     Problem: Cardiovascular - Adult  Goal: Maintains optimal cardiac output and hemodynamic stability  10/2/2023 2205 by Abigail Zamudio RN  Outcome: Progressing  Goal: Absence of cardiac dysrhythmias or at baseline  10/2/2023 2205 by Abigail Zamudio RN  Outcome: Progressing     Problem: Skin/Tissue Integrity - Adult  Goal: Skin integrity remains intact  10/2/2023 2205 by Abigail Zamudio RN  Outcome: Progressing  Flowsheets (Taken 10/2/2023 1208 by Joanne Smith RN)  Skin Integrity Remains Intact: Monitor for areas of redness and/or skin breakdown     Problem: Gastrointestinal - Adult  Goal: Minimal or absence of nausea and vomiting  10/3/2023 0927 by Jeanne Michaels RN  Outcome: Progressing  10/2/2023 2205 by Abigail Zamudio RN  Outcome: Progressing  Goal: Maintains or returns to baseline bowel function  10/3/2023 0927 by Jeanne Michaels RN  Outcome: Progressing  10/2/2023 2205 by Abigail Zamudio RN  Outcome: Progressing  Goal: Maintains adequate nutritional intake  10/3/2023 0927 by Jeanne Michaels RN  Outcome: Progressing  10/2/2023 2205 by Abigail Zamudio RN  Outcome: Progressing     Problem: Hematologic - Adult  Goal: Maintains
Problem: Discharge Planning  Goal: Discharge to home or other facility with appropriate resources  Outcome: Progressing     Problem: Pain  Goal: Verbalizes/displays adequate comfort level or baseline comfort level  Outcome: Progressing     Problem: ABCDS Injury Assessment  Goal: Absence of physical injury  Outcome: Progressing     Problem: Respiratory - Adult  Goal: Achieves optimal ventilation and oxygenation  Outcome: Progressing     Problem: Cardiovascular - Adult  Goal: Maintains optimal cardiac output and hemodynamic stability  Outcome: Progressing     Problem: Skin/Tissue Integrity - Adult  Goal: Skin integrity remains intact  Outcome: Progressing  Flowsheets (Taken 10/1/2023 1618 by Arun Darby RN)  Skin Integrity Remains Intact: Monitor for areas of redness and/or skin breakdown
Problem: Respiratory - Adult  Goal: Achieves optimal ventilation and oxygenation  Outcome: Progressing     Problem: Skin/Tissue Integrity - Adult  Goal: Skin integrity remains intact  Outcome: Progressing     Problem: Cardiovascular - Adult  Goal: Maintains optimal cardiac output and hemodynamic stability  Outcome: Progressing
10/2/2023 1208 by MARLYS Ritter  Skin Integrity Remains Intact: Monitor for areas of redness and/or skin breakdown     Problem: Gastrointestinal - Adult  Goal: Minimal or absence of nausea and vomiting  10/3/2023 1110 by Marquis Garvin RN  Outcome: Adequate for Discharge  10/3/2023 0927 by Marquis Garvin RN  Outcome: Progressing  10/2/2023 2205 by Virginia Cardona RN  Outcome: Progressing  Goal: Maintains or returns to baseline bowel function  10/3/2023 1110 by Marquis Garvin RN  Outcome: Adequate for Discharge  10/3/2023 0927 by Marquis Garvin RN  Outcome: Progressing  10/2/2023 2205 by Virginia Cardona RN  Outcome: Progressing  Goal: Maintains adequate nutritional intake  10/3/2023 1110 by Marquis Garvin RN  Outcome: Adequate for Discharge  10/3/2023 0927 by Marquis Garvin RN  Outcome: Progressing  10/2/2023 2205 by Virginia Cardona RN  Outcome: Progressing     Problem: Hematologic - Adult  Goal: Maintains hematologic stability  10/3/2023 1110 by Marquis Garvin RN  Outcome: Adequate for Discharge  10/2/2023 2205 by Virginia Cardona RN  Outcome: Progressing     Problem: Chronic Conditions and Co-morbidities  Goal: Patient's chronic conditions and co-morbidity symptoms are monitored and maintained or improved  10/3/2023 1110 by Marquis Garvin RN  Outcome: Adequate for Discharge  10/2/2023 2205 by Virginia Cardona RN  Outcome: Progressing
18)  Maintains or returns to baseline bowel function:   Assess bowel function   Encourage mobilization and activity   Encourage oral fluids to ensure adequate hydration   Administer ordered medications as needed  Goal: Maintains adequate nutritional intake  10/2/2023 2205 by Rashaun Costa RN  Outcome: Progressing  10/2/2023 1202 by Torie Barbour RN  Outcome: Progressing  Flowsheets (Taken 10/2/2023 1202)  Maintains adequate nutritional intake:   Monitor percentage of each meal consumed   Monitor intake and output, weight and lab values     Problem: Hematologic - Adult  Goal: Maintains hematologic stability  10/2/2023 2205 by Rashaun Costa RN  Outcome: Progressing  10/2/2023 1202 by Torie Barbour RN  Outcome: Progressing  Flowsheets (Taken 10/2/2023 1202)  Maintains hematologic stability: Monitor labs for bleeding or clotting disorders     Problem: Chronic Conditions and Co-morbidities  Goal: Patient's chronic conditions and co-morbidity symptoms are monitored and maintained or improved  10/2/2023 2205 by Rashaun Costa RN  Outcome: Progressing  10/2/2023 1202 by Torie Barbour RN  Outcome: Progressing  Flowsheets (Taken 10/2/2023 1202)  Care Plan - Patient's Chronic Conditions and Co-Morbidity Symptoms are Monitored and Maintained or Improved:   Monitor and assess patient's chronic conditions and comorbid symptoms for stability, deterioration, or improvement   Collaborate with multidisciplinary team to address chronic and comorbid conditions and prevent exacerbation or deterioration
baseline bowel function:   Assess bowel function   Encourage mobilization and activity   Encourage oral fluids to ensure adequate hydration   Administer ordered medications as needed  10/1/2023 2248 by Shani Snider RN  Outcome: Progressing  Goal: Maintains adequate nutritional intake  10/2/2023 1202 by Aaron Bautista RN  Outcome: Progressing  Flowsheets (Taken 10/2/2023 1202)  Maintains adequate nutritional intake:   Monitor percentage of each meal consumed   Monitor intake and output, weight and lab values  10/1/2023 2248 by Shani Snider RN  Outcome: Progressing     Problem: Hematologic - Adult  Goal: Maintains hematologic stability  10/2/2023 1202 by Aaron Bautista RN  Outcome: Progressing  Flowsheets (Taken 10/2/2023 1202)  Maintains hematologic stability: Monitor labs for bleeding or clotting disorders  10/1/2023 2248 by Shani Snider RN  Outcome: Progressing     Problem: Chronic Conditions and Co-morbidities  Goal: Patient's chronic conditions and co-morbidity symptoms are monitored and maintained or improved  10/2/2023 1202 by Aaron Bautista RN  Outcome: Progressing  Flowsheets (Taken 10/2/2023 1202)  Care Plan - Patient's Chronic Conditions and Co-Morbidity Symptoms are Monitored and Maintained or Improved:   Monitor and assess patient's chronic conditions and comorbid symptoms for stability, deterioration, or improvement   Collaborate with multidisciplinary team to address chronic and comorbid conditions and prevent exacerbation or deterioration  10/1/2023 2248 by Shani Snider RN  Outcome: Progressing

## 2023-10-03 NOTE — DISCHARGE SUMMARY
UROCIT-K  TAKE ONE TABLET BY MOUTH EVERY DAY     predniSONE 10 MG tablet  Commonly known as: DELTASONE  TAKE ONE TABLET BY MOUTH EVERY DAY AS NEEDED     rivaroxaban 20 MG Tabs tablet  Commonly known as: Xarelto  TAKE ONE TABLET BY MOUTH DAILY WITH BREAKFAST     tamsulosin 0.4 MG capsule  Commonly known as: FLOMAX  TAKE 1 CAPSULE BY MOUTH DAILY               Where to Get Your Medications        These medications were sent to [address] [facility], [address] [place] [address] Avenue  [address] [address]      Phone: [phone]   amoxicillin-clavulanate 875-125 MG per tablet  HYDROcodone-acetaminophen 5-325 MG per tablet  ondansetron 4 MG tablet           Discharge Condition/Location: stable/home     Follow Up:    PCP in 1 weeks      Time Spent on discharge is more than 28 minutes in the examination, evaluation, counseling and review of medications and discharge plan.       Colten Ho MD, 10/3/2023 10:26 AM

## 2023-10-04 ENCOUNTER — CARE COORDINATION (OUTPATIENT)
Dept: CASE MANAGEMENT | Age: 78
End: 2023-10-04

## 2023-10-04 RX ORDER — ALBUTEROL SULFATE 90 UG/1
AEROSOL, METERED RESPIRATORY (INHALATION)
Qty: 17 G | Refills: 0 | Status: SHIPPED | OUTPATIENT
Start: 2023-10-04

## 2023-10-04 NOTE — CARE COORDINATION
[id number] [address] [facility] Care Transitions Initial Follow Up Call    Call within 2 business days of discharge: Yes      Patient: Milvia Ewing Patient : 1945   MRN: [id number]  Reason for Admission: 3 days -> acute diverticulitis, A Fib on Xarelto, chronic dCHF, right lower lung field crackle, HTN, DLD, CKD3b, COPD, chronic resp failure on O2 2-3lpm, GERD, prostatomegaly, lung nodule / saccular aortic arch aneurysm, obesity -> home no services, O2 baseline ([facility]) at 2-3lpm, outpt colonoscopy in 4-6 weeks, low fiber diet  Discharge Date: 10/3/23 RARS: Readmission Risk Score: 12.3      Last Discharge Facility       Date Complaint Diagnosis Description Type Department Provider    23 Abdominal Pain Diverticulitis . .. ED to Hosp-Admission (Discharged) (ADMITTED) [facility] Danika Domínguez MD; Srinivasa Vera... 1st attempt - Unable to reach or leave message because voice mailbox is not set up.      Follow Up  Future Appointments   Date Time Provider [address] [place] [address]   10/9/2023  1:10 PM MD Abdulaziz Nichole Int None     Cassi Mohan, RN  Care Transition Nurse  [phone] mobile

## 2023-10-05 ENCOUNTER — FOLLOWUP TELEPHONE ENCOUNTER (OUTPATIENT)
Dept: ADMINISTRATIVE | Age: 78
End: 2023-10-05

## 2023-10-05 ENCOUNTER — CARE COORDINATION (OUTPATIENT)
Dept: CASE MANAGEMENT | Age: 78
End: 2023-10-05

## 2023-10-05 LAB
BACTERIA BLD CULT ORG #2: ABNORMAL
BACTERIA BLD CULT ORG #2: ABNORMAL
BACTERIA BLD CULT: NORMAL
ORGANISM: ABNORMAL

## 2023-10-05 NOTE — TELEPHONE ENCOUNTER
Heart Failure Follow-up Call:     1st Attempt on 10/4 by Cassi Mohan with no Answer or voicemail     2nd Attempt on 10/5 by Cassi Mohan with no Answer or voicemail     3rd and final Attempt on 10/5 by writer; No Answer or voicemail set up.      Electronically signed by JOSEPH Castro, RN  on 10/5/2023 at 12:08 PM

## 2023-10-05 NOTE — CARE COORDINATION
[facility] Care Transitions Initial Follow Up Call    Call within 2 business days of discharge: Yes      Patient: John Howard Patient : 1945   MRN: [id number]  Reason for Admission: 3 days -> acute diverticulitis, A Fib on Xarelto, chronic dCHF, right lower lung field crackle, HTN, DLD, CKD3b, COPD, chronic resp failure on O2 2-3lpm, GERD, prostatomegaly, lung nodule / saccular aortic arch aneurysm, obesity -> home no services, O2 baseline ([facility]) at 2-3lpm, outpt colonoscopy in 4-6 weeks, low fiber diet  Discharge Date: 10/3/23 RARS: Readmission Risk Score: 12.3      Last Discharge Facility       Date Complaint Diagnosis Description Type Department Provider    23 Abdominal Pain Diverticulitis . .. ED to Hosp-Admission (Discharged) (ADMITTED) [facility] Keli Chapman MD; ASYA Gregory.     2nd/final attempt - Unable to reach or leave message because voice mailbox is not set up. No further CTN outreach scheduled. Program closed at this time. TCM visit is scheduled within 7 days of discharge.      Follow Up  Future Appointments   Date Time Provider [address]   10/9/2023  1:10 PM MD Mio Elizabeth None     Prem Mann RN  Care Transition Nurse  [phone] mobile

## 2023-10-09 ENCOUNTER — OFFICE VISIT (OUTPATIENT)
Dept: INTERNAL MEDICINE CLINIC | Age: 78
End: 2023-10-09

## 2023-10-09 VITALS
WEIGHT: 253 LBS | RESPIRATION RATE: 12 BRPM | SYSTOLIC BLOOD PRESSURE: 120 MMHG | BODY MASS INDEX: 35.42 KG/M2 | HEIGHT: 71 IN | DIASTOLIC BLOOD PRESSURE: 80 MMHG | HEART RATE: 70 BPM

## 2023-10-09 DIAGNOSIS — I48.91 ATRIAL FIBRILLATION, CONTROLLED (HCC): ICD-10-CM

## 2023-10-09 DIAGNOSIS — I48.0 PAROXYSMAL A-FIB (HCC): ICD-10-CM

## 2023-10-09 DIAGNOSIS — D68.69 SECONDARY HYPERCOAGULABLE STATE (HCC): ICD-10-CM

## 2023-10-09 DIAGNOSIS — N18.30 STAGE 3 CHRONIC KIDNEY DISEASE, UNSPECIFIED WHETHER STAGE 3A OR 3B CKD (HCC): ICD-10-CM

## 2023-10-09 DIAGNOSIS — I10 ESSENTIAL HYPERTENSION: Primary | ICD-10-CM

## 2023-10-09 DIAGNOSIS — K21.9 GASTROESOPHAGEAL REFLUX DISEASE WITHOUT ESOPHAGITIS: ICD-10-CM

## 2023-10-09 DIAGNOSIS — J44.1 CHRONIC OBSTRUCTIVE PULMONARY DISEASE WITH ACUTE EXACERBATION (HCC): ICD-10-CM

## 2023-10-09 PROCEDURE — 1124F ACP DISCUSS-NO DSCNMKR DOCD: CPT | Performed by: INTERNAL MEDICINE

## 2023-10-09 PROCEDURE — 3074F SYST BP LT 130 MM HG: CPT | Performed by: INTERNAL MEDICINE

## 2023-10-09 PROCEDURE — 99214 OFFICE O/P EST MOD 30 MIN: CPT | Performed by: INTERNAL MEDICINE

## 2023-10-09 PROCEDURE — 3079F DIAST BP 80-89 MM HG: CPT | Performed by: INTERNAL MEDICINE

## 2023-10-09 RX ORDER — IPRATROPIUM BROMIDE AND ALBUTEROL SULFATE 2.5; .5 MG/3ML; MG/3ML
SOLUTION RESPIRATORY (INHALATION)
Qty: 360 ML | Refills: 0 | Status: SHIPPED | OUTPATIENT
Start: 2023-10-09

## 2023-10-09 RX ORDER — METOPROLOL SUCCINATE 50 MG/1
50 TABLET, EXTENDED RELEASE ORAL DAILY
Qty: 28 TABLET | Refills: 5 | Status: SHIPPED | OUTPATIENT
Start: 2023-10-09

## 2023-10-09 RX ORDER — ONDANSETRON 4 MG/1
4 TABLET, FILM COATED ORAL EVERY 8 HOURS PRN
Qty: 20 TABLET | Refills: 0 | Status: SHIPPED | OUTPATIENT
Start: 2023-10-09

## 2023-10-09 RX ORDER — ATORVASTATIN CALCIUM 20 MG/1
20 TABLET, FILM COATED ORAL DAILY
Qty: 28 TABLET | Refills: 5 | Status: SHIPPED | OUTPATIENT
Start: 2023-10-09

## 2023-10-09 RX ORDER — TAMSULOSIN HYDROCHLORIDE 0.4 MG/1
0.4 CAPSULE ORAL DAILY
Qty: 28 CAPSULE | Refills: 5 | Status: SHIPPED | OUTPATIENT
Start: 2023-10-09

## 2023-10-09 RX ORDER — POTASSIUM CITRATE 15 MEQ/1
15 TABLET, EXTENDED RELEASE ORAL DAILY
Qty: 28 TABLET | Refills: 5 | Status: SHIPPED | OUTPATIENT
Start: 2023-10-09

## 2023-10-09 RX ORDER — PANTOPRAZOLE SODIUM 40 MG/1
40 TABLET, DELAYED RELEASE ORAL DAILY
Qty: 28 TABLET | Refills: 5 | Status: SHIPPED | OUTPATIENT
Start: 2023-10-09

## 2023-10-09 RX ORDER — AMLODIPINE BESYLATE 5 MG/1
5 TABLET ORAL DAILY
Qty: 28 TABLET | Refills: 5 | Status: SHIPPED | OUTPATIENT
Start: 2023-10-09

## 2023-10-09 RX ORDER — ALBUTEROL SULFATE 90 UG/1
AEROSOL, METERED RESPIRATORY (INHALATION)
Qty: 17 G | Refills: 0 | Status: SHIPPED | OUTPATIENT
Start: 2023-10-09

## 2023-10-09 RX ORDER — FUROSEMIDE 40 MG/1
40 TABLET ORAL DAILY
Qty: 28 TABLET | Refills: 5 | Status: SHIPPED | OUTPATIENT
Start: 2023-10-09

## 2023-10-09 RX ORDER — PREDNISONE 10 MG/1
TABLET ORAL
Qty: 30 TABLET | Refills: 0 | Status: SHIPPED | OUTPATIENT
Start: 2023-10-09

## 2023-10-09 RX ORDER — LOSARTAN POTASSIUM 100 MG/1
100 TABLET ORAL DAILY
Qty: 28 TABLET | Refills: 5 | Status: SHIPPED | OUTPATIENT
Start: 2023-10-09

## 2023-10-09 ASSESSMENT — ENCOUNTER SYMPTOMS
SHORTNESS OF BREATH: 1
RHINORRHEA: 0
VOMITING: 0
NAUSEA: 0
WHEEZING: 0
BACK PAIN: 0
ABDOMINAL PAIN: 0

## 2023-10-09 ASSESSMENT — PATIENT HEALTH QUESTIONNAIRE - PHQ9
SUM OF ALL RESPONSES TO PHQ9 QUESTIONS 1 & 2: 0
SUM OF ALL RESPONSES TO PHQ QUESTIONS 1-9: 0
1. LITTLE INTEREST OR PLEASURE IN DOING THINGS: 0
SUM OF ALL RESPONSES TO PHQ QUESTIONS 1-9: 0
2. FEELING DOWN, DEPRESSED OR HOPELESS: 0

## 2023-10-09 NOTE — PROGRESS NOTES
Subjective:      Patient ID: Michael Kenney is a 70 y.o. male. HPI    Patient is here for a follow up. He was admitted for acute diverticulitis to the hospital. He spent 3 days. He is finishing his antibiotic. He feels better. He will need a repeat colonoscopy. CT scan showed prostate enlargement. He has a history of hypertension. It is well controlled. He is compliant and has no med side effects. No chest pain. Patient's COPD is controlled on present bronchodilator regimen. Patient is taking medications as instructed, no medication side effects noted, no significant ongoing wheezing or shortness of breath, using bronchodilator MDI less than twice a week. He quit smoking. He is doing well. He is on 3 L of oxygen at night. He has some pedal edema. He has history of a fib. He is in NSR. He takes Xarelto. No bleeding or bruising. He has GERD. It is stable. He has nocturia, no hematuria. He wakes up few times at night. Review of Systems   Constitutional:  Negative for activity change and appetite change. HENT:  Negative for postnasal drip and rhinorrhea. Respiratory:  Positive for shortness of breath. Negative for wheezing. Cardiovascular:  Negative for chest pain, palpitations and leg swelling. Gastrointestinal:  Negative for abdominal pain, nausea and vomiting. Genitourinary:  Positive for frequency. Negative for difficulty urinating. Musculoskeletal:  Negative for back pain and joint swelling. Skin:  Negative for rash. Neurological:  Negative for light-headedness. Psychiatric/Behavioral:  Negative for sleep disturbance.           Past Medical History:   Diagnosis Date    A-fib ([address])     2/14/12    Acute conjunctivitis of both eyes     Acute on chronic respiratory failure with hypoxia (HCC) 2/13/2012    Acute respiratory failure with hypoxia (HCC) 2/13/2012    Atrial fibrillation with RVR ([address])     2/14/12     Avulsion fracture of ankle 9/21/2015    Benign localized

## 2023-11-09 ENCOUNTER — TELEPHONE (OUTPATIENT)
Dept: INTERNAL MEDICINE CLINIC | Age: 78
End: 2023-11-09

## 2023-11-09 NOTE — TELEPHONE ENCOUNTER
----- Message from Mey Florentino MD sent at 11/9/2023  3:14 PM EST -----  Contact: [phone]  Ok to hold both for 5 days. ----- Message -----  From: Heather Aguilar  Sent: 11/9/2023   2:56 PM EST  To: Mey Florentino MD    Flomax and Xarelto are level X drug interactions  Lipitor is level D interaction. Please advise.  ----- Message -----  From: Mey Florentino MD  Sent: 11/9/2023  12:54 PM EST  To: Heather Traylor. If worse go to the ER  ----- Message -----  From: Vamshi Bias: 11/9/2023  11:21 AM EST  To: Mey Florentino MD    Pt states he tested Positive today for Covid. Symptoms for 4 days have been  cough, low grade fever, a lot of sputum coming up, hard to swallow and tonsils are swollen.  Please advise       Disha, [address] [place] [address] [address] - P [phone] - F [phone] (Ph: [phone])

## 2023-11-16 RX ORDER — ALBUTEROL SULFATE 90 UG/1
AEROSOL, METERED RESPIRATORY (INHALATION)
Qty: 8.5 G | Refills: 2 | Status: SHIPPED | OUTPATIENT
Start: 2023-11-16 | End: 2023-11-18

## 2023-11-18 ENCOUNTER — APPOINTMENT (OUTPATIENT)
Dept: GENERAL RADIOLOGY | Age: 78
DRG: 308 | End: 2023-11-18
Payer: MEDICARE

## 2023-11-18 ENCOUNTER — HOSPITAL ENCOUNTER (INPATIENT)
Age: 78
LOS: 3 days | Discharge: ANOTHER HOSPITAL | DRG: 308 | End: 2023-11-21
Attending: EMERGENCY MEDICINE | Admitting: INTERNAL MEDICINE
Payer: MEDICARE

## 2023-11-18 ENCOUNTER — APPOINTMENT (OUTPATIENT)
Dept: CT IMAGING | Age: 78
DRG: 308 | End: 2023-11-18
Payer: MEDICARE

## 2023-11-18 DIAGNOSIS — D72.829 LEUKOCYTOSIS, UNSPECIFIED TYPE: ICD-10-CM

## 2023-11-18 DIAGNOSIS — J96.11 CHRONIC RESPIRATORY FAILURE WITH HYPOXIA (HCC): ICD-10-CM

## 2023-11-18 DIAGNOSIS — I48.91 ATRIAL FIBRILLATION WITH RAPID VENTRICULAR RESPONSE (HCC): Primary | ICD-10-CM

## 2023-11-18 DIAGNOSIS — I50.9 ACUTE ON CHRONIC CONGESTIVE HEART FAILURE, UNSPECIFIED HEART FAILURE TYPE (HCC): ICD-10-CM

## 2023-11-18 DIAGNOSIS — R07.9 CHEST PAIN, UNSPECIFIED TYPE: ICD-10-CM

## 2023-11-18 DIAGNOSIS — R31.29 MICROSCOPIC HEMATURIA: ICD-10-CM

## 2023-11-18 DIAGNOSIS — R06.02 SHORTNESS OF BREATH: ICD-10-CM

## 2023-11-18 LAB
ALBUMIN SERPL-MCNC: 3.6 G/DL (ref 3.4–5)
ALBUMIN/GLOB SERPL: 1.3 {RATIO} (ref 1.1–2.2)
ALP SERPL-CCNC: 77 U/L (ref 40–129)
ALT SERPL-CCNC: 18 U/L (ref 10–40)
ANION GAP SERPL CALCULATED.3IONS-SCNC: 8 MMOL/L (ref 3–16)
AST SERPL-CCNC: 10 U/L (ref 15–37)
BACTERIA URNS QL MICRO: ABNORMAL /HPF
BASE EXCESS BLDA CALC-SCNC: -1.2 MMOL/L (ref -3–3)
BASE EXCESS BLDV CALC-SCNC: -0.8 MMOL/L (ref -3–3)
BASOPHILS # BLD: 0 K/UL (ref 0–0.2)
BASOPHILS NFR BLD: 0.1 %
BILIRUB SERPL-MCNC: 0.4 MG/DL (ref 0–1)
BILIRUB UR QL STRIP.AUTO: NEGATIVE
BUN SERPL-MCNC: 23 MG/DL (ref 7–20)
CALCIUM SERPL-MCNC: 8.9 MG/DL (ref 8.3–10.6)
CHLORIDE SERPL-SCNC: 102 MMOL/L (ref 99–110)
CLARITY UR: CLEAR
CO2 BLDA-SCNC: 22.8 MMOL/L
CO2 BLDV-SCNC: 25 MMOL/L
CO2 SERPL-SCNC: 24 MMOL/L (ref 21–32)
COHGB MFR BLDA: 0.9 % (ref 0–1.5)
COHGB MFR BLDV: 2 % (ref 0–1.5)
COLOR UR: YELLOW
CREAT SERPL-MCNC: 1 MG/DL (ref 0.8–1.3)
D DIMER: 1.47 UG/ML FEU (ref 0–0.6)
DEPRECATED RDW RBC AUTO: 15.8 % (ref 12.4–15.4)
EKG ATRIAL RATE: 163 BPM
EKG DIAGNOSIS: NORMAL
EKG Q-T INTERVAL: 378 MS
EKG QRS DURATION: 78 MS
EKG QTC CALCULATION (BAZETT): 564 MS
EKG R AXIS: 44 DEGREES
EKG T AXIS: 16 DEGREES
EKG VENTRICULAR RATE: 134 BPM
EOSINOPHIL # BLD: 0 K/UL (ref 0–0.6)
EOSINOPHIL NFR BLD: 0.3 %
EPI CELLS #/AREA URNS HPF: ABNORMAL /HPF (ref 0–5)
FLUAV RNA RESP QL NAA+PROBE: NOT DETECTED
FLUBV RNA RESP QL NAA+PROBE: NOT DETECTED
GFR SERPLBLD CREATININE-BSD FMLA CKD-EPI: >60 ML/MIN/{1.73_M2}
GLUCOSE SERPL-MCNC: 108 MG/DL (ref 70–99)
GLUCOSE UR STRIP.AUTO-MCNC: NEGATIVE MG/DL
HCO3 BLDA-SCNC: 21.9 MMOL/L (ref 21–29)
HCO3 BLDV-SCNC: 23.3 MMOL/L (ref 23–29)
HCT VFR BLD AUTO: 39.4 % (ref 40.5–52.5)
HGB BLD-MCNC: 13 G/DL (ref 13.5–17.5)
HGB BLDA-MCNC: 14.4 G/DL (ref 13.5–17.5)
HGB UR QL STRIP.AUTO: ABNORMAL
KETONES UR STRIP.AUTO-MCNC: NEGATIVE MG/DL
LACTATE BLDV-SCNC: 1.1 MMOL/L (ref 0.4–2)
LEUKOCYTE ESTERASE UR QL STRIP.AUTO: NEGATIVE
LYMPHOCYTES # BLD: 1 K/UL (ref 1–5.1)
LYMPHOCYTES NFR BLD: 6.9 %
MAGNESIUM SERPL-MCNC: 2.1 MG/DL (ref 1.8–2.4)
MAGNESIUM SERPL-MCNC: 2.1 MG/DL (ref 1.8–2.4)
MCH RBC QN AUTO: 29.9 PG (ref 26–34)
MCHC RBC AUTO-ENTMCNC: 33 G/DL (ref 31–36)
MCV RBC AUTO: 90.5 FL (ref 80–100)
METHGB MFR BLDA: 0.3 %
METHGB MFR BLDV: 0.3 %
MONOCYTES # BLD: 0.7 K/UL (ref 0–1.3)
MONOCYTES NFR BLD: 5.1 %
MUCOUS THREADS #/AREA URNS LPF: ABNORMAL /LPF
NEUTROPHILS # BLD: 12.4 K/UL (ref 1.7–7.7)
NEUTROPHILS NFR BLD: 87.6 %
NITRITE UR QL STRIP.AUTO: NEGATIVE
NT-PROBNP SERPL-MCNC: 934 PG/ML (ref 0–449)
O2 CT VFR BLDV CALC: 16 VOL %
O2 THERAPY: ABNORMAL
O2 THERAPY: ABNORMAL
PCO2 BLDA: 32.1 MMHG (ref 35–45)
PCO2 BLDV: 37.2 MMHG (ref 40–50)
PH BLDA: 7.45 [PH] (ref 7.35–7.45)
PH BLDV: 7.42 [PH] (ref 7.35–7.45)
PH UR STRIP.AUTO: 6.5 [PH] (ref 5–8)
PLATELET # BLD AUTO: 305 K/UL (ref 135–450)
PMV BLD AUTO: 7.9 FL (ref 5–10.5)
PO2 BLDA: 69.6 MMHG (ref 75–108)
PO2 BLDV: 46.1 MMHG (ref 25–40)
POTASSIUM SERPL-SCNC: 4.6 MMOL/L (ref 3.5–5.1)
PROCALCITONIN SERPL IA-MCNC: 0.03 NG/ML (ref 0–0.15)
PROT SERPL-MCNC: 6.3 G/DL (ref 6.4–8.2)
PROT UR STRIP.AUTO-MCNC: NEGATIVE MG/DL
RBC # BLD AUTO: 4.35 M/UL (ref 4.2–5.9)
RBC #/AREA URNS HPF: ABNORMAL /HPF (ref 0–4)
SAO2 % BLDA: 94.9 %
SAO2 % BLDV: 83 %
SARS-COV-2 RNA RESP QL NAA+PROBE: NOT DETECTED
SODIUM SERPL-SCNC: 134 MMOL/L (ref 136–145)
SP GR UR STRIP.AUTO: 1.01 (ref 1–1.03)
TROPONIN, HIGH SENSITIVITY: 19 NG/L (ref 0–22)
TROPONIN, HIGH SENSITIVITY: 20 NG/L (ref 0–22)
TROPONIN, HIGH SENSITIVITY: 21 NG/L (ref 0–22)
TSH SERPL DL<=0.005 MIU/L-ACNC: 2.28 UIU/ML (ref 0.27–4.2)
UA COMPLETE W REFLEX CULTURE PNL UR: ABNORMAL
UA DIPSTICK W REFLEX MICRO PNL UR: YES
URN SPEC COLLECT METH UR: ABNORMAL
UROBILINOGEN UR STRIP-ACNC: 0.2 E.U./DL
WBC # BLD AUTO: 14.2 K/UL (ref 4–11)
WBC #/AREA URNS HPF: ABNORMAL /HPF (ref 0–5)

## 2023-11-18 PROCEDURE — 6370000000 HC RX 637 (ALT 250 FOR IP): Performed by: INTERNAL MEDICINE

## 2023-11-18 PROCEDURE — 2580000003 HC RX 258: Performed by: EMERGENCY MEDICINE

## 2023-11-18 PROCEDURE — 83880 ASSAY OF NATRIURETIC PEPTIDE: CPT

## 2023-11-18 PROCEDURE — 94761 N-INVAS EAR/PLS OXIMETRY MLT: CPT

## 2023-11-18 PROCEDURE — 2700000000 HC OXYGEN THERAPY PER DAY

## 2023-11-18 PROCEDURE — 99285 EMERGENCY DEPT VISIT HI MDM: CPT

## 2023-11-18 PROCEDURE — 6360000004 HC RX CONTRAST MEDICATION: Performed by: EMERGENCY MEDICINE

## 2023-11-18 PROCEDURE — 93010 ELECTROCARDIOGRAM REPORT: CPT | Performed by: INTERNAL MEDICINE

## 2023-11-18 PROCEDURE — 82803 BLOOD GASES ANY COMBINATION: CPT

## 2023-11-18 PROCEDURE — 6360000002 HC RX W HCPCS: Performed by: NURSE PRACTITIONER

## 2023-11-18 PROCEDURE — 87077 CULTURE AEROBIC IDENTIFY: CPT

## 2023-11-18 PROCEDURE — 94640 AIRWAY INHALATION TREATMENT: CPT

## 2023-11-18 PROCEDURE — 2500000003 HC RX 250 WO HCPCS: Performed by: INTERNAL MEDICINE

## 2023-11-18 PROCEDURE — 96374 THER/PROPH/DIAG INJ IV PUSH: CPT

## 2023-11-18 PROCEDURE — 85025 COMPLETE CBC W/AUTO DIFF WBC: CPT

## 2023-11-18 PROCEDURE — 81001 URINALYSIS AUTO W/SCOPE: CPT

## 2023-11-18 PROCEDURE — 36600 WITHDRAWAL OF ARTERIAL BLOOD: CPT

## 2023-11-18 PROCEDURE — 99223 1ST HOSP IP/OBS HIGH 75: CPT | Performed by: NURSE PRACTITIONER

## 2023-11-18 PROCEDURE — 36415 COLL VENOUS BLD VENIPUNCTURE: CPT

## 2023-11-18 PROCEDURE — 87040 BLOOD CULTURE FOR BACTERIA: CPT

## 2023-11-18 PROCEDURE — 71260 CT THORAX DX C+: CPT

## 2023-11-18 PROCEDURE — 83605 ASSAY OF LACTIC ACID: CPT

## 2023-11-18 PROCEDURE — 84443 ASSAY THYROID STIM HORMONE: CPT

## 2023-11-18 PROCEDURE — 87636 SARSCOV2 & INF A&B AMP PRB: CPT

## 2023-11-18 PROCEDURE — 83735 ASSAY OF MAGNESIUM: CPT

## 2023-11-18 PROCEDURE — 6360000002 HC RX W HCPCS: Performed by: EMERGENCY MEDICINE

## 2023-11-18 PROCEDURE — 84484 ASSAY OF TROPONIN QUANT: CPT

## 2023-11-18 PROCEDURE — 85379 FIBRIN DEGRADATION QUANT: CPT

## 2023-11-18 PROCEDURE — 2580000003 HC RX 258: Performed by: INTERNAL MEDICINE

## 2023-11-18 PROCEDURE — 80053 COMPREHEN METABOLIC PANEL: CPT

## 2023-11-18 PROCEDURE — 84145 PROCALCITONIN (PCT): CPT

## 2023-11-18 PROCEDURE — 93005 ELECTROCARDIOGRAM TRACING: CPT | Performed by: EMERGENCY MEDICINE

## 2023-11-18 PROCEDURE — 6370000000 HC RX 637 (ALT 250 FOR IP): Performed by: EMERGENCY MEDICINE

## 2023-11-18 PROCEDURE — 71045 X-RAY EXAM CHEST 1 VIEW: CPT

## 2023-11-18 PROCEDURE — 6360000002 HC RX W HCPCS: Performed by: INTERNAL MEDICINE

## 2023-11-18 PROCEDURE — 2500000003 HC RX 250 WO HCPCS: Performed by: EMERGENCY MEDICINE

## 2023-11-18 PROCEDURE — 2060000000 HC ICU INTERMEDIATE R&B

## 2023-11-18 RX ORDER — ALBUTEROL SULFATE 90 UG/1
2 AEROSOL, METERED RESPIRATORY (INHALATION) EVERY 6 HOURS PRN
Status: DISCONTINUED | OUTPATIENT
Start: 2023-11-18 | End: 2023-11-20

## 2023-11-18 RX ORDER — ACETAMINOPHEN 325 MG/1
650 TABLET ORAL EVERY 6 HOURS PRN
Status: DISCONTINUED | OUTPATIENT
Start: 2023-11-18 | End: 2023-11-21 | Stop reason: HOSPADM

## 2023-11-18 RX ORDER — SODIUM CHLORIDE 0.9 % (FLUSH) 0.9 %
5-40 SYRINGE (ML) INJECTION EVERY 12 HOURS SCHEDULED
Status: DISCONTINUED | OUTPATIENT
Start: 2023-11-18 | End: 2023-11-21 | Stop reason: HOSPADM

## 2023-11-18 RX ORDER — ONDANSETRON 2 MG/ML
4 INJECTION INTRAMUSCULAR; INTRAVENOUS EVERY 6 HOURS PRN
Status: DISCONTINUED | OUTPATIENT
Start: 2023-11-18 | End: 2023-11-18

## 2023-11-18 RX ORDER — ACETAMINOPHEN 650 MG/1
650 SUPPOSITORY RECTAL EVERY 6 HOURS PRN
Status: DISCONTINUED | OUTPATIENT
Start: 2023-11-18 | End: 2023-11-21 | Stop reason: HOSPADM

## 2023-11-18 RX ORDER — IPRATROPIUM BROMIDE AND ALBUTEROL SULFATE 2.5; .5 MG/3ML; MG/3ML
1 SOLUTION RESPIRATORY (INHALATION)
Status: DISCONTINUED | OUTPATIENT
Start: 2023-11-18 | End: 2023-11-18

## 2023-11-18 RX ORDER — LOSARTAN POTASSIUM 100 MG/1
100 TABLET ORAL DAILY
Status: DISCONTINUED | OUTPATIENT
Start: 2023-11-18 | End: 2023-11-19

## 2023-11-18 RX ORDER — FUROSEMIDE 40 MG/1
40 TABLET ORAL DAILY
Status: DISCONTINUED | OUTPATIENT
Start: 2023-11-18 | End: 2023-11-19

## 2023-11-18 RX ORDER — SODIUM CHLORIDE 9 MG/ML
INJECTION, SOLUTION INTRAVENOUS PRN
Status: DISCONTINUED | OUTPATIENT
Start: 2023-11-18 | End: 2023-11-21 | Stop reason: HOSPADM

## 2023-11-18 RX ORDER — MORPHINE SULFATE 2 MG/ML
2 INJECTION, SOLUTION INTRAMUSCULAR; INTRAVENOUS ONCE
Status: COMPLETED | OUTPATIENT
Start: 2023-11-18 | End: 2023-11-18

## 2023-11-18 RX ORDER — PREDNISONE 10 MG/1
10 TABLET ORAL DAILY
Status: DISCONTINUED | OUTPATIENT
Start: 2023-11-18 | End: 2023-11-18

## 2023-11-18 RX ORDER — FUROSEMIDE 10 MG/ML
40 INJECTION INTRAMUSCULAR; INTRAVENOUS ONCE
Status: COMPLETED | OUTPATIENT
Start: 2023-11-18 | End: 2023-11-18

## 2023-11-18 RX ORDER — POLYETHYLENE GLYCOL 3350 17 G/17G
17 POWDER, FOR SOLUTION ORAL DAILY PRN
Status: DISCONTINUED | OUTPATIENT
Start: 2023-11-18 | End: 2023-11-21 | Stop reason: HOSPADM

## 2023-11-18 RX ORDER — ATORVASTATIN CALCIUM 10 MG/1
20 TABLET, FILM COATED ORAL DAILY
Status: DISCONTINUED | OUTPATIENT
Start: 2023-11-18 | End: 2023-11-21 | Stop reason: HOSPADM

## 2023-11-18 RX ORDER — SODIUM CHLORIDE 0.9 % (FLUSH) 0.9 %
5-40 SYRINGE (ML) INJECTION PRN
Status: DISCONTINUED | OUTPATIENT
Start: 2023-11-18 | End: 2023-11-21 | Stop reason: HOSPADM

## 2023-11-18 RX ORDER — TAMSULOSIN HYDROCHLORIDE 0.4 MG/1
0.4 CAPSULE ORAL DAILY
Status: DISCONTINUED | OUTPATIENT
Start: 2023-11-18 | End: 2023-11-21 | Stop reason: HOSPADM

## 2023-11-18 RX ORDER — BUDESONIDE AND FORMOTEROL FUMARATE DIHYDRATE 160; 4.5 UG/1; UG/1
2 AEROSOL RESPIRATORY (INHALATION)
Status: DISCONTINUED | OUTPATIENT
Start: 2023-11-18 | End: 2023-11-21 | Stop reason: HOSPADM

## 2023-11-18 RX ORDER — PROCHLORPERAZINE EDISYLATE 5 MG/ML
10 INJECTION INTRAMUSCULAR; INTRAVENOUS EVERY 6 HOURS PRN
Status: DISCONTINUED | OUTPATIENT
Start: 2023-11-18 | End: 2023-11-21 | Stop reason: HOSPADM

## 2023-11-18 RX ORDER — POTASSIUM CHLORIDE 20 MEQ/1
40 TABLET, EXTENDED RELEASE ORAL PRN
Status: DISCONTINUED | OUTPATIENT
Start: 2023-11-18 | End: 2023-11-21 | Stop reason: HOSPADM

## 2023-11-18 RX ORDER — DILTIAZEM HYDROCHLORIDE 5 MG/ML
10 INJECTION INTRAVENOUS ONCE
Status: COMPLETED | OUTPATIENT
Start: 2023-11-18 | End: 2023-11-18

## 2023-11-18 RX ORDER — POTASSIUM CHLORIDE 7.45 MG/ML
10 INJECTION INTRAVENOUS PRN
Status: DISCONTINUED | OUTPATIENT
Start: 2023-11-18 | End: 2023-11-21 | Stop reason: HOSPADM

## 2023-11-18 RX ORDER — IPRATROPIUM BROMIDE AND ALBUTEROL SULFATE 2.5; .5 MG/3ML; MG/3ML
1 SOLUTION RESPIRATORY (INHALATION)
Status: DISCONTINUED | OUTPATIENT
Start: 2023-11-18 | End: 2023-11-20

## 2023-11-18 RX ORDER — PANTOPRAZOLE SODIUM 40 MG/1
40 TABLET, DELAYED RELEASE ORAL DAILY
Status: DISCONTINUED | OUTPATIENT
Start: 2023-11-18 | End: 2023-11-21 | Stop reason: HOSPADM

## 2023-11-18 RX ORDER — IPRATROPIUM BROMIDE AND ALBUTEROL SULFATE 2.5; .5 MG/3ML; MG/3ML
1 SOLUTION RESPIRATORY (INHALATION) ONCE
Status: COMPLETED | OUTPATIENT
Start: 2023-11-18 | End: 2023-11-18

## 2023-11-18 RX ORDER — ONDANSETRON 4 MG/1
4 TABLET, ORALLY DISINTEGRATING ORAL EVERY 8 HOURS PRN
Status: DISCONTINUED | OUTPATIENT
Start: 2023-11-18 | End: 2023-11-21 | Stop reason: HOSPADM

## 2023-11-18 RX ORDER — METOPROLOL SUCCINATE 50 MG/1
50 TABLET, EXTENDED RELEASE ORAL DAILY
Status: DISCONTINUED | OUTPATIENT
Start: 2023-11-18 | End: 2023-11-21 | Stop reason: HOSPADM

## 2023-11-18 RX ORDER — FUROSEMIDE 10 MG/ML
40 INJECTION INTRAMUSCULAR; INTRAVENOUS 2 TIMES DAILY
Status: DISCONTINUED | OUTPATIENT
Start: 2023-11-18 | End: 2023-11-19

## 2023-11-18 RX ORDER — PREDNISONE 10 MG/1
10 TABLET ORAL PRN
Status: DISCONTINUED | OUTPATIENT
Start: 2023-11-18 | End: 2023-11-19

## 2023-11-18 RX ORDER — MAGNESIUM SULFATE IN WATER 40 MG/ML
2000 INJECTION, SOLUTION INTRAVENOUS PRN
Status: DISCONTINUED | OUTPATIENT
Start: 2023-11-18 | End: 2023-11-21 | Stop reason: HOSPADM

## 2023-11-18 RX ADMIN — IPRATROPIUM BROMIDE AND ALBUTEROL SULFATE 1 DOSE: 2.5; .5 SOLUTION RESPIRATORY (INHALATION) at 08:31

## 2023-11-18 RX ADMIN — MORPHINE SULFATE 2 MG: 2 INJECTION, SOLUTION INTRAMUSCULAR; INTRAVENOUS at 16:30

## 2023-11-18 RX ADMIN — Medication 10 ML: at 20:30

## 2023-11-18 RX ADMIN — FUROSEMIDE 40 MG: 10 INJECTION, SOLUTION INTRAMUSCULAR; INTRAVENOUS at 12:35

## 2023-11-18 RX ADMIN — DILTIAZEM HYDROCHLORIDE 5 MG/HR: 5 INJECTION, SOLUTION INTRAVENOUS at 12:38

## 2023-11-18 RX ADMIN — DILTIAZEM HYDROCHLORIDE 10 MG/HR: 5 INJECTION, SOLUTION INTRAVENOUS at 23:20

## 2023-11-18 RX ADMIN — CEFTRIAXONE SODIUM 1000 MG: 1 INJECTION, POWDER, FOR SOLUTION INTRAMUSCULAR; INTRAVENOUS at 16:31

## 2023-11-18 RX ADMIN — Medication 2 PUFF: at 19:18

## 2023-11-18 RX ADMIN — PREDNISONE 10 MG: 10 TABLET ORAL at 20:40

## 2023-11-18 RX ADMIN — METOPROLOL SUCCINATE 50 MG: 50 TABLET, EXTENDED RELEASE ORAL at 14:52

## 2023-11-18 RX ADMIN — FUROSEMIDE 40 MG: 10 INJECTION, SOLUTION INTRAMUSCULAR; INTRAVENOUS at 17:38

## 2023-11-18 RX ADMIN — DILTIAZEM HYDROCHLORIDE 10 MG: 5 INJECTION INTRAVENOUS at 08:35

## 2023-11-18 RX ADMIN — IPRATROPIUM BROMIDE AND ALBUTEROL SULFATE 1 DOSE: 2.5; .5 SOLUTION RESPIRATORY (INHALATION) at 19:18

## 2023-11-18 RX ADMIN — IPRATROPIUM BROMIDE AND ALBUTEROL SULFATE 1 DOSE: 2.5; .5 SOLUTION RESPIRATORY (INHALATION) at 15:48

## 2023-11-18 RX ADMIN — LOSARTAN POTASSIUM 100 MG: 100 TABLET, FILM COATED ORAL at 14:52

## 2023-11-18 RX ADMIN — AZITHROMYCIN MONOHYDRATE 500 MG: 500 INJECTION, POWDER, LYOPHILIZED, FOR SOLUTION INTRAVENOUS at 14:58

## 2023-11-18 RX ADMIN — IOPAMIDOL 75 ML: 755 INJECTION, SOLUTION INTRAVENOUS at 10:47

## 2023-11-18 RX ADMIN — RIVAROXABAN 20 MG: 20 TABLET, FILM COATED ORAL at 17:38

## 2023-11-18 RX ADMIN — NITROGLYCERIN 0.5 INCH: 20 OINTMENT TOPICAL at 08:35

## 2023-11-18 ASSESSMENT — PAIN SCALES - GENERAL: PAINLEVEL_OUTOF10: 8

## 2023-11-18 ASSESSMENT — PAIN DESCRIPTION - DESCRIPTORS: DESCRIPTORS: ACHING;DISCOMFORT

## 2023-11-18 ASSESSMENT — ENCOUNTER SYMPTOMS
VOMITING: 0
SHORTNESS OF BREATH: 1
CHEST TIGHTNESS: 1
ABDOMINAL PAIN: 0

## 2023-11-18 ASSESSMENT — PAIN DESCRIPTION - ORIENTATION: ORIENTATION: RIGHT

## 2023-11-18 ASSESSMENT — PAIN DESCRIPTION - LOCATION: LOCATION: CHEST

## 2023-11-18 NOTE — H&P
Hospital Medicine History & Physical      PCP: Brisa Plasencia MD    Date of Admission: 11/18/2023    Date of Service: Pt seen/examined on 11/18/2023     Chief Complaint:    Chief Complaint   Patient presents with    Shortness of Breath     Symptoms over last two days, on 3 liters all the time. History of afib, in RVR when EMS arrived. Was given [id number] from EMS       History Of Present Illness: The patient is a 70 y.o. male with a-fib, BPH, chronic systolic CHF, COPD, GERD, hypertension, PAD, thoracic aortic aneurysm who presents to [facility] with c/o shortness of breath and chest pain. States he woke up in the middle of the night and he was having chest pain. It radiated to his right arm. Feeling more short of breath than normal.  He wears 3 L at night and as needed. He f/w Dr. Naomi Tan. Previously saw Dr. Lincoln Albright in the past.  He has had a-fib in the past.  On Xarelto. States he had COVID 2 weeks ago. Found to be in a-fib with RVR. Patient tachypneic and requiring 3 L O2. Labs with leukocytosis. CXR with pulmonary edema likely related to congestive heart failure. CT negative for PE, several foci of airspace opacity, may represent developing airspace/infiltrate. Admitted to PCU on telemetry. Cardiology and pulmonology consulted.       Past Medical History:        Diagnosis Date    A-fib ([address])     2/14/12    Acute conjunctivitis of both eyes     Acute on chronic respiratory failure with hypoxia (HCC) 2/13/2012    Acute respiratory failure with hypoxia (HCC) 2/13/2012    Atrial fibrillation with RVR ([address])     2/14/12     Avulsion fracture of ankle 9/21/2015    Benign localized hyperplasia of prostate with urinary obstruction and other lower urinary tract symptoms (LUTS)(600.21) 8/17/2016    Chronic systolic CHF (congestive heart failure) ([address]) 8/28/2017    Contusion of leg, right 9/21/2015    COPD (chronic obstructive pulmonary disease) (HCC)     Fractures     GERD (gastroesophageal reflux

## 2023-11-18 NOTE — ED NOTES
Patient reports not taking any of his regular home medications for ~1 week at the direction of his PCP because of interactions with covid treatment he completed yesterday. Was due to restart home medications this morning, per patient.      Kriste Lombard, RN  11/18/23 2056

## 2023-11-18 NOTE — ED NOTES
Handoff report complete to [facility] RN. Patient transported upstairs with RN assistance in stable condition.      Claudio Arceo RN  11/18/23 4618

## 2023-11-18 NOTE — PROGRESS NOTES
Patient admitted to room 319 from ER. Patient oriented to room, call light, bed rails, phone, lights and bathroom. Patient instructed about the schedule of the day including: vital sign frequency, lab draws, possible tests, frequency of MD and staff rounds, daily weights, I &O's and prescribed diet. Telemetry box in place, patient aware of placement and reason. Bed locked, in lowest position, side rails up 2/4, call light within reach.

## 2023-11-18 NOTE — PROGRESS NOTES
4 Eyes Skin Assessment     NAME:  Emily Hodge  YOB: 1945  MEDICAL RECORD NUMBER:  [id number]    The patient is being assessed for  Admission    I agree that at least one RN has performed a thorough Head to Toe Skin Assessment on the patient. ALL assessment sites listed below have been assessed. Areas assessed by both nurses:    Head, Face, Ears, Shoulders, Back, Chest, Arms, Elbows, Hands, Sacrum. Buttock, Coccyx, Ischium, Legs. Feet and Heels, and Under Medical Devices   Scattered bruises, redness to coccyx that is blanchable as well as bilateral heels. Does the Patient have a Wound?  No noted wound(s)       Matt Prevention initiated by RN: Yes  Wound Care Orders initiated by RN: Yes    Pressure Injury (Stage 3,4, Unstageable, DTI, NWPT, and Complex wounds) if present, place Wound referral order by RN under : No    New Ostomies, if present place, Ostomy referral order under : No     Nurse 1 eSignature: Electronically signed by Karishma Pena RN on 11/18/23 at 5:08 PM EST    **SHARE this note so that the co-signing nurse can place an eSignature**    Nurse 2 eSignature: Electronically signed by Saman Patricia RN on 11/18/23 at 6:56 PM EST

## 2023-11-18 NOTE — ED PROVIDER NOTES
EMERGENCY DEPARTMENT ENCOUNTER        Pt Name: Di George  MRN: [id number]  [facility] 1945  Date of evaluation: 11/18/2023  Provider: Sierra Santana MD  PCP: Brisa Plasencia MD      CHIEF COMPLAINT       Chief Complaint   Patient presents with    Shortness of Breath     Symptoms over last two days, on 3 liters all the time. History of afib, in RVR when EMS arrived. Was given 01.24.65.77.00 from EMS       HISTORY OFPRESENT ILLNESS   (Location/Symptom, Timing/Onset, Context/Setting, Quality, Duration, Modifying Factors,Severity)  Note limiting factors. Di George is a 70 y.o. male presenting today due to concern for intermittent chest pain over the last 2 days but worse this morning associated with some right arm tingling and overall not feeling well. He has a history of atrial fibrillation and normally takes Xarelto along with metoprolol but states he has been off of his medications over the last week since he was placed on Paxlovid due to recent COVID infection and states he was planning on starting his normal medications again today but has not done so yet. He denies any current tingling in the arms or legs. He has chronic leg swelling but nothing out of the ordinary. He denies any urinary complaints. No abdominal pain. He did receive 324 mg aspirin by EMS prior to arrival.  He normally wears oxygen at home but when I went into the room he was on 15 L. He denies any history of blood clots. No falls or trauma or syncope. Due to concern for significant chest pain this morning along with being short of breath, he came to the ED for further evaluation. REVIEW OF SYSTEMS    (2-9 systems for level 4, 10 or more for level 5)     Review of Systems   Constitutional:  Positive for fatigue. Negative for fever. Respiratory:  Positive for chest tightness and shortness of breath. Cardiovascular:  Positive for chest pain and leg swelling (chronic).    Gastrointestinal: ipratropium 0.5 mg-albuterol 2.5 mg (DUONEB) nebulizer solution 1 Dose (1 Dose Inhalation Given 11/18/23 0831)   dilTIAZem injection 10 mg (10 mg IntraVENous Given 11/18/23 0835)   nitroglycerin (NITRO-BID) 2 % ointment 0.5 inch (0.5 inches Topical Given 11/18/23 0835)   iopamidol (ISOVUE-370) 76 % injection 75 mL (75 mLs IntraVENous Given 11/18/23 1047)   furosemide (LASIX) injection 40 mg (40 mg IntraVENous Given 11/18/23 1235)   cefTRIAXone (ROCEPHIN) 1,000 mg in sodium chloride 0.9 % 50 mL IVPB (mini-bag) (0 mg IntraVENous Stopped 11/18/23 1705)   azithromycin (ZITHROMAX) 500 mg in 250 mL addavial (0 mg IntraVENous Stopped 11/18/23 1605)   morphine (PF) injection 2 mg (2 mg IntraVENous Given 11/18/23 1630)   iopamidol (ISOVUE-370) 76 % injection 75 mL (75 mLs IntraVENous Given 11/19/23 1133)   cefTRIAXone (ROCEPHIN) 1,000 mg in sodium chloride 0.9 % 50 mL IVPB (mini-bag) (0 mg IntraVENous Stopped 11/19/23 1527)     Patient was evaluated due to worsening chest discomfort with shortness of breath over the past couple of days with recent diagnosis of COVID. I am considering bacterial pneumonia, worsening COVID, acute coronary syndrome, pulmonary embolism, CHF exacerbation, COPD exacerbation. He was in atrial fibrillation with rapid ventricular response on arrival and therefore I did order IV diltiazem. His blood pressure was elevated on arrival and therefore I did order nitroglycerin paste as well as see that help with his chest pain. He has a history of COPD and therefore I also ordered a DuoNeb to see if that help with his shortness of breath. He denies any current numbness or weakness unilaterally and therefore I do not suspect TIA/stroke.   His D-dimer did come back elevated and therefore I did order CT of the chest which did not show any pulmonary embolism but was concerning for possible pneumonia which could be COVID-related but I did order IV Rocephin along with IV azithromycin in case it is related to

## 2023-11-18 NOTE — PROGRESS NOTES
Telemetry Assignment Communication Form    Patient has orders for continuous telemetry OR pulse oximeter only orders    To be filled out by Clinical    Patient has Admission or Transfer orders and is assigned to Room Number: 319      (Once this top section is completed:  Select \"Route\" and send note to Fax number: [phone] ))      ___________________________________________________________________________      [facility]    Patient assigned to tele box number: __________________               (to be written in by [address] when telemetry box is assigned to patient)    ___________________________________________________________________________      Bedside RN confirming that the box listed above is in fact the telemetry box number being placed on the patient listed above.         X________________________________________ RN signature        __________________________________________RN assigned to Patient (please print)    _______________ Date    ____________ Time

## 2023-11-18 NOTE — ED NOTES
1211-Perfect Serve sent by Dr. Palak Rothman to Dr. Gary Primrose Hospitalist for consult. Manny Cummins  11/18/23 1213  1226-Consult completed admission orders placed by Dr. Gary Primrose.      Manny Cummins  11/18/23 1226

## 2023-11-18 NOTE — ED NOTES
Pt and wife both recently completed treatment for covid. Was positive 2 weeks ago, home test yesterday was negative.      Luis M Betancourt RN  11/18/23 5048

## 2023-11-18 NOTE — PLAN OF CARE
A. Fib RVR    On cardizem drip  Restart metoprolol     Stopped metoprolol and xarelto as he was given paxlovid for recent  COVID    Abnormal chest CT- needs follow up

## 2023-11-19 ENCOUNTER — APPOINTMENT (OUTPATIENT)
Dept: CT IMAGING | Age: 78
DRG: 308 | End: 2023-11-19
Payer: MEDICARE

## 2023-11-19 LAB
ALBUMIN SERPL-MCNC: 3.7 G/DL (ref 3.4–5)
ALP SERPL-CCNC: 78 U/L (ref 40–129)
ALT SERPL-CCNC: 20 U/L (ref 10–40)
AST SERPL-CCNC: 11 U/L (ref 15–37)
BASOPHILS # BLD: 0 K/UL (ref 0–0.2)
BASOPHILS NFR BLD: 0.1 %
BILIRUB DIRECT SERPL-MCNC: <0.2 MG/DL (ref 0–0.3)
BILIRUB INDIRECT SERPL-MCNC: ABNORMAL MG/DL (ref 0–1)
BILIRUB SERPL-MCNC: 0.4 MG/DL (ref 0–1)
DEPRECATED RDW RBC AUTO: 15.9 % (ref 12.4–15.4)
EKG ATRIAL RATE: 166 BPM
EKG DIAGNOSIS: NORMAL
EKG Q-T INTERVAL: 388 MS
EKG QRS DURATION: 86 MS
EKG QTC CALCULATION (BAZETT): 474 MS
EKG R AXIS: 44 DEGREES
EKG T AXIS: 57 DEGREES
EKG VENTRICULAR RATE: 90 BPM
EOSINOPHIL # BLD: 0 K/UL (ref 0–0.6)
EOSINOPHIL NFR BLD: 0.3 %
HCT VFR BLD AUTO: 41 % (ref 40.5–52.5)
HGB BLD-MCNC: 13.6 G/DL (ref 13.5–17.5)
LIPASE SERPL-CCNC: 30 U/L (ref 13–60)
LYMPHOCYTES # BLD: 1 K/UL (ref 1–5.1)
LYMPHOCYTES NFR BLD: 7.6 %
MCH RBC QN AUTO: 29.9 PG (ref 26–34)
MCHC RBC AUTO-ENTMCNC: 33.2 G/DL (ref 31–36)
MCV RBC AUTO: 90.3 FL (ref 80–100)
MONOCYTES # BLD: 0.7 K/UL (ref 0–1.3)
MONOCYTES NFR BLD: 5.7 %
NEUTROPHILS # BLD: 11 K/UL (ref 1.7–7.7)
NEUTROPHILS NFR BLD: 86.3 %
PLATELET # BLD AUTO: 312 K/UL (ref 135–450)
PMV BLD AUTO: 7.9 FL (ref 5–10.5)
PROT SERPL-MCNC: 7.1 G/DL (ref 6.4–8.2)
RBC # BLD AUTO: 4.54 M/UL (ref 4.2–5.9)
TROPONIN, HIGH SENSITIVITY: 22 NG/L (ref 0–22)
WBC # BLD AUTO: 12.8 K/UL (ref 4–11)

## 2023-11-19 PROCEDURE — 85025 COMPLETE CBC W/AUTO DIFF WBC: CPT

## 2023-11-19 PROCEDURE — 6370000000 HC RX 637 (ALT 250 FOR IP): Performed by: INTERNAL MEDICINE

## 2023-11-19 PROCEDURE — 2580000003 HC RX 258: Performed by: INTERNAL MEDICINE

## 2023-11-19 PROCEDURE — 94640 AIRWAY INHALATION TREATMENT: CPT

## 2023-11-19 PROCEDURE — 80076 HEPATIC FUNCTION PANEL: CPT

## 2023-11-19 PROCEDURE — 93010 ELECTROCARDIOGRAM REPORT: CPT | Performed by: INTERNAL MEDICINE

## 2023-11-19 PROCEDURE — 36415 COLL VENOUS BLD VENIPUNCTURE: CPT

## 2023-11-19 PROCEDURE — 99223 1ST HOSP IP/OBS HIGH 75: CPT | Performed by: INTERNAL MEDICINE

## 2023-11-19 PROCEDURE — 74177 CT ABD & PELVIS W/CONTRAST: CPT

## 2023-11-19 PROCEDURE — 6360000002 HC RX W HCPCS: Performed by: INTERNAL MEDICINE

## 2023-11-19 PROCEDURE — 2700000000 HC OXYGEN THERAPY PER DAY

## 2023-11-19 PROCEDURE — 94761 N-INVAS EAR/PLS OXIMETRY MLT: CPT

## 2023-11-19 PROCEDURE — 6360000004 HC RX CONTRAST MEDICATION: Performed by: INTERNAL MEDICINE

## 2023-11-19 PROCEDURE — 84484 ASSAY OF TROPONIN QUANT: CPT

## 2023-11-19 PROCEDURE — 83690 ASSAY OF LIPASE: CPT

## 2023-11-19 PROCEDURE — 2060000000 HC ICU INTERMEDIATE R&B

## 2023-11-19 PROCEDURE — 6360000002 HC RX W HCPCS: Performed by: NURSE PRACTITIONER

## 2023-11-19 PROCEDURE — 93005 ELECTROCARDIOGRAM TRACING: CPT | Performed by: INTERNAL MEDICINE

## 2023-11-19 PROCEDURE — 99233 SBSQ HOSP IP/OBS HIGH 50: CPT | Performed by: INTERNAL MEDICINE

## 2023-11-19 RX ORDER — MORPHINE SULFATE 2 MG/ML
1 INJECTION, SOLUTION INTRAMUSCULAR; INTRAVENOUS EVERY 4 HOURS PRN
Status: DISCONTINUED | OUTPATIENT
Start: 2023-11-19 | End: 2023-11-20

## 2023-11-19 RX ORDER — ISOSORBIDE DINITRATE 10 MG/1
5 TABLET ORAL EVERY 8 HOURS
Status: DISCONTINUED | OUTPATIENT
Start: 2023-11-19 | End: 2023-11-21 | Stop reason: HOSPADM

## 2023-11-19 RX ORDER — ENOXAPARIN SODIUM 100 MG/ML
1 INJECTION SUBCUTANEOUS 2 TIMES DAILY
Status: DISCONTINUED | OUTPATIENT
Start: 2023-11-19 | End: 2023-11-21 | Stop reason: HOSPADM

## 2023-11-19 RX ORDER — MORPHINE SULFATE 2 MG/ML
2 INJECTION, SOLUTION INTRAMUSCULAR; INTRAVENOUS EVERY 4 HOURS PRN
Status: DISCONTINUED | OUTPATIENT
Start: 2023-11-19 | End: 2023-11-20

## 2023-11-19 RX ORDER — FUROSEMIDE 10 MG/ML
40 INJECTION INTRAMUSCULAR; INTRAVENOUS DAILY
Status: DISCONTINUED | OUTPATIENT
Start: 2023-11-20 | End: 2023-11-19

## 2023-11-19 RX ORDER — LOSARTAN POTASSIUM 25 MG/1
25 TABLET ORAL DAILY
Status: DISCONTINUED | OUTPATIENT
Start: 2023-11-20 | End: 2023-11-21 | Stop reason: HOSPADM

## 2023-11-19 RX ADMIN — PREDNISONE 30 MG: 20 TABLET ORAL at 14:45

## 2023-11-19 RX ADMIN — IPRATROPIUM BROMIDE AND ALBUTEROL SULFATE 1 DOSE: 2.5; .5 SOLUTION RESPIRATORY (INHALATION) at 20:05

## 2023-11-19 RX ADMIN — ENOXAPARIN SODIUM 90 MG: 100 INJECTION SUBCUTANEOUS at 20:50

## 2023-11-19 RX ADMIN — ACETAMINOPHEN 650 MG: 325 TABLET ORAL at 09:13

## 2023-11-19 RX ADMIN — ISOSORBIDE DINITRATE 5 MG: 10 TABLET ORAL at 14:44

## 2023-11-19 RX ADMIN — IPRATROPIUM BROMIDE AND ALBUTEROL SULFATE 1 DOSE: 2.5; .5 SOLUTION RESPIRATORY (INHALATION) at 15:07

## 2023-11-19 RX ADMIN — IPRATROPIUM BROMIDE AND ALBUTEROL SULFATE 1 DOSE: 2.5; .5 SOLUTION RESPIRATORY (INHALATION) at 08:03

## 2023-11-19 RX ADMIN — FUROSEMIDE 40 MG: 10 INJECTION, SOLUTION INTRAMUSCULAR; INTRAVENOUS at 08:53

## 2023-11-19 RX ADMIN — ATORVASTATIN CALCIUM 20 MG: 10 TABLET, FILM COATED ORAL at 08:52

## 2023-11-19 RX ADMIN — ISOSORBIDE DINITRATE 5 MG: 10 TABLET ORAL at 20:50

## 2023-11-19 RX ADMIN — IOPAMIDOL 75 ML: 755 INJECTION, SOLUTION INTRAVENOUS at 11:33

## 2023-11-19 RX ADMIN — METOPROLOL SUCCINATE 50 MG: 50 TABLET, EXTENDED RELEASE ORAL at 08:52

## 2023-11-19 RX ADMIN — Medication 2 PUFF: at 20:05

## 2023-11-19 RX ADMIN — Medication 10 ML: at 20:55

## 2023-11-19 RX ADMIN — Medication 2 PUFF: at 08:03

## 2023-11-19 RX ADMIN — LOSARTAN POTASSIUM 100 MG: 100 TABLET, FILM COATED ORAL at 08:52

## 2023-11-19 RX ADMIN — CEFTRIAXONE SODIUM 1000 MG: 1 INJECTION, POWDER, FOR SOLUTION INTRAMUSCULAR; INTRAVENOUS at 14:57

## 2023-11-19 RX ADMIN — Medication 10 ML: at 08:59

## 2023-11-19 RX ADMIN — TAMSULOSIN HYDROCHLORIDE 0.4 MG: 0.4 CAPSULE ORAL at 08:59

## 2023-11-19 RX ADMIN — SODIUM CHLORIDE: 9 INJECTION, SOLUTION INTRAVENOUS at 14:56

## 2023-11-19 RX ADMIN — PANTOPRAZOLE SODIUM 40 MG: 40 TABLET, DELAYED RELEASE ORAL at 08:53

## 2023-11-19 RX ADMIN — MORPHINE SULFATE 2 MG: 2 INJECTION, SOLUTION INTRAMUSCULAR; INTRAVENOUS at 11:04

## 2023-11-19 ASSESSMENT — PAIN SCALES - GENERAL
PAINLEVEL_OUTOF10: 6
PAINLEVEL_OUTOF10: 7
PAINLEVEL_OUTOF10: 0

## 2023-11-19 ASSESSMENT — PAIN DESCRIPTION - DESCRIPTORS: DESCRIPTORS: DISCOMFORT

## 2023-11-19 ASSESSMENT — PAIN DESCRIPTION - LOCATION: LOCATION: CHEST

## 2023-11-19 NOTE — FLOWSHEET NOTE
11/19/23 1015   Vital Signs   Pulse 85   Heart Rate Source Monitor   Respirations 18   /71   MAP (Calculated) 89     PCA notified this RN of Pt c/o of active chest pain, R arm and SOB over past hour but just notified staff. [facility] Charge RN at bedside with this RN. Pt crying. Vitals taken and stable. Admitted for A-fib RVR on cardizem gtt at 10 ml/hr. MD notified. Emotional support provided. Addendum: 2417   Orders for trop; Morphine for pain.     [place], RN Ear Wedge Repair Text: A wedge excision was completed by carrying down an excision through the full thickness of the ear and cartilage with an inward facing Burow's triangle. The wound was then closed in a layered fashion.

## 2023-11-19 NOTE — PLAN OF CARE
HEART FAILURE CARE PLAN:    Comorbidities Reviewed: Yes   Patient has a past medical history of A-fib ([address]), Acute conjunctivitis of both eyes, Acute on chronic respiratory failure with hypoxia ([address]), Acute respiratory failure with hypoxia ([address]), Atrial fibrillation with RVR ([address]), Avulsion fracture of ankle, Benign localized hyperplasia of prostate with urinary obstruction and other lower urinary tract symptoms (YEEJ)(929.81), Chronic systolic CHF (congestive heart failure) ([address]), Contusion of leg, right, COPD (chronic obstructive pulmonary disease) ([address]), Fractures, GERD (gastroesophageal reflux disease), Hypertension, Hypertensive urgency, Hypertrophy of prostate without urinary obstruction and other lower urinary tract symptoms (LUTS), No history of procedure, PAD (peripheral artery disease) ([address]), Pneumonia, and Thoracic aortic aneurysm ([address]). ECHOCARDIOGRAM Reviewed: Yes   Patient's Ejection Fraction (EF) is greater than 40%    Weights Reviewed:  Yes   Admission weight: 113.2 kg (249 lb 8 oz)   Wt Readings from Last 3 Encounters:   11/19/23 111.8 kg (246 lb 6.4 oz)   10/09/23 114.8 kg (253 lb)   10/03/23 116 kg (255 lb 11.2 oz)     Intake & Output Reviewed: Yes     Intake/Output Summary (Last 24 hours) at 11/19/2023 1618  Last data filed at 11/19/2023 1250  Gross per 24 hour   Intake 642 ml   Output 3000 ml   Net -2358 ml     Medications Reviewed: Yes   SCHEDULED HOSPITAL MEDICATIONS:   [START ON 11/20/2023] furosemide  40 mg IntraVENous Daily    enoxaparin  1 mg/kg (Adjusted) SubCUTAneous BID    isosorbide dinitrate  5 mg Oral q8h    predniSONE  30 mg Oral Daily    atorvastatin  20 mg Oral Daily    metoprolol succinate  50 mg Oral Daily    losartan  100 mg Oral Daily    pantoprazole  40 mg Oral Daily    tamsulosin  0.4 mg Oral Daily    sodium chloride flush  5-40 mL IntraVENous 2 times per day    budesonide-formoterol  2 puff Inhalation BID RT    ipratropium 0.5 mg-albuterol 2.5 mg  1 Dose Inhalation 4x

## 2023-11-19 NOTE — CARE COORDINATION
ADLs/IADLs  Current functional level: Independent in ADLs/IADLs    PT AM-PAC:   /24  OT AM-PAC:   /24    Family can provide assistance at DC: Yes  Would you like Case Management to discuss the discharge plan with any other family members/significant others, and if so, who? Yes (Spouse Rachel)  Plans to Return to Present Housing: Yes  Other Identified Issues/Barriers to RETURNING to current housing: None  Potential Assistance needed at discharge: N/A            Potential DME:    Patient expects to discharge to: [address] [place] [address] Road for transportation at discharge: Family    Financial    Payor: Leitha Carrel / Plan: Jd Acevedo PPO / Product Type: Medicare /     Does insurance require precert for SNF: Yes    Potential assistance Purchasing Medications: Yes (At times if it is inbetween SS check times)  Meds-to-Beds request:        [facility] ([facility]) - [address] [phone] - F [phone]  [address] [place] [address]  [address] [facility] [address]  Phone: [phone] Fax: [phone]    [facility] - [place] [phone] - F [phone]  [address] [place] [address] [address]  [address] [place] [address]  Phone: [phone] Fax: [phone]    [facility] [phone] - [address], [address] [facility] 2 [phone] Kenna Spurling [phone]  [address] [place] [address] [address] [facility] [address]  Phone: [phone] Fax: [phone]      Notes:    Factors facilitating achievement of predicted outcomes: Family support, Motivated, Cooperative, Pleasant, and Sense of humor    Barriers to discharge: Decreased endurance and Medical complications    Additional Case Management Notes: IPTA;  Lives in [facility] with spouse; DMEs ar listed in assessment; Patient has O2 through [employer] no needs at this time    The Plan for Transition of Care is related to the following treatment goals of Shortness of breath [R06.02]  Microscopic hematuria [R31.29]  Atrial fibrillation with rapid ventricular response ([address]) [I48.91]  Chronic respiratory failure with hypoxia (HCC) [J96.11]  Leukocytosis, unspecified type [D72.829]  Chest pain, unspecified type [R07.9]  Acute on chronic congestive heart failure, unspecified heart failure type ([address]) [E01.4]    IF APPLICABLE: The Patient and/or patient representative Oscar Bruno and his family were provided with a choice of provider and agrees with the discharge plan. Freedom of choice list with basic dialogue that supports the patient's individualized plan of care/goals and shares the quality data associated with the providers was provided to:     Patient Representative Name:       The Patient and/or Patient Representative Agree with the Discharge Plan?       Zari Garza RN  Case Management Department  Ph: [phone]

## 2023-11-19 NOTE — CONSULTS
[address] [place] [address]  [phone]      Chief Complaint   Patient presents with    Shortness of Breath     Symptoms over last two days, on 3 liters all the time. History of afib, in RVR when EMS arrived. Was given 01.24.65.77.00 from EMS            History of Present Illness:  Virginia Keenan is a 70 y.o. patient who presented to the hospital with complaints of chest discomfort. I have been asked to provide consultation regarding further management and testing. The history was provided by the patient's wife, daughter and granddaughter. He reports that he used to work as a . He has had functional decline since his [facility]FP. So much so that they have had to move to single floor home. He previously was developing significant shortness of breath with exertion up the stairs that would cause him to stop. He blames his decline on his hip and knee. His wife reports that he now [place] out in a sweat\" walking across the room. He wears oxygen all the time and has checked a walking oxygen in some time. Inhalers has not been helping his symptoms. He came to the hospital for several weeks of intermittent central chest discomfort with associated right arm discomfort. It lasts several minutes. He states that he \"rubs his arm\" until it feels better. No radiation to his jaw or back. He states that it \"Feels like electricity. 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 He recently had covid and was told to stop some of his medications to take paxlovid. He reports that he has leg swelling and orthopnea. He likely has sleep apnea being that he \"stopped breathing 25 times in 4 hours\", but got in a disagreement with the sleep physician so he did not come back for the 8 hour titration study.  He was found to be in atrial fibrillation when he came to the hospital. He is not currently having any chest discomfort       Past Medical History:   has a past medical history of A-fib ([address]), 04/17/2012 09:35 AM    CALCIUM 8.9 11/18/2023 08:05 AM    BILITOT 0.4 11/18/2023 08:05 AM    ALKPHOS 77 11/18/2023 08:05 AM    AST 10 11/18/2023 08:05 AM    ALT 18 11/18/2023 08:05 AM     PT/INR:  No results found for: \"PTINR\"  Lab Results   Component Value Date    CKTOTAL 269 10/26/2020    TROPONINI 0.01 01/24/2023       EKG:  I have reviewed EKG with the following interpretation:  Impression:  atrial fibrillation with pvcs    Echo: Left ventricular systolic function is low normal with ejection fraction   estimated at 50-55 %. No regional wall motion abnormalities are noted. Left ventricular size is decreased. There is mild concentric left ventricular hypertrophy. Elevated left ventricular diastolic filling pressure: Septal E/e'' = 16.4 . Moderate posterior mitral annular calcification is present. Mild tricuspid regurgitation. Normal systolic pulmonary artery pressure (SPAP) estimated at 41 mmHg (RA   pressure 3 mmHg).   Stress: none  Cath: none  MRI/EP/Other: none    Old notes reviewed  Telemetry reviewd  Ekg personally reviewed  Chest xray personally reviewed  Echo, cath, and   Medications and labs reviewed  high complexity/medical decision making due to extensive data review, extensive history review, independent review of data, life threatening problem, monitoring for toxicity of anticoagulation (cbc) and diuretics (electrolytes)  high risk due to acute illness, evaluation of drug-drug interactions, medication management and diagnostic interventions  Discussed with primary team     Assessment  Patient Active Problem List   Diagnosis    Hoarseness of voice    Atrial fibrillation, controlled (HCC)    COPD (chronic obstructive pulmonary disease) (HCC)    GERD (gastroesophageal reflux disease)    Pulmonary nodule, right    Enlarged prostate with urinary obstruction    Thoracic aortic aneurysm without rupture (HCC)    Typical atrial flutter (HCC)    Dilated cardiomyopathy ([address])    Chronic systolic CHF

## 2023-11-19 NOTE — PROGRESS NOTES
Resting with eyes closed & respirations WNL on 3L NC. Call in easy reach. Continue to monitor closely.   Whit Shipman RN

## 2023-11-19 NOTE — PROGRESS NOTES
On-call cardiologist paged to clarify isosorbide dinitrate order dosed q8h @ 0600, 1400, and 2200 with no co-order for hydralazine. Per /Pepper:   Immediate release: Oral: Initial: 5 to 20 mg 2 to 3 times daily; maintenance dose: 10 to 40 mg 2 to 3 times daily; allow for a 14-hour nitrate-free period after the evening dose and before the morning dose to minimize risk of tolerance. Do not administer around the clock to prevent tolerance to nitrate effect; allow nitrate-free interval ?14 hours (immediate-release products) and >18 hours (sustained-release products). Tolerance: Appropriate dosing intervals are needed to minimize tolerance development. Tolerance can only be overcome by short periods of nitrate absence from the body. Dose escalation does not overcome this effect. When used for HF in combination with hydralazine, tolerance is less of a concern (Zhane Figueredo). Note: In patients with heart failure with reduced ejection fraction who are prescribed hydralazine in combination with isosorbide dinitrate, nitrate tolerance is prevented by coadministration of hydralazine. When prescribed for this indication and in combination with hydralazine, isosorbide dinitrate can be administered 3 or 4 times daily with no nitrate-free period. Per Dr. Tricia Torres (verbal conversation via phone) - he would like it administered every 8 hours as ordered. [facility] verified as ordered.      Tyson Sorensen, PharmD, [facility], 11/19/2023 2:13 PM

## 2023-11-19 NOTE — PLAN OF CARE
Problem: Discharge Planning  Goal: Discharge to home or other facility with appropriate resources  Outcome: Progressing  Flowsheets (Taken 11/18/2023 1657 by Monica Vicente RN)  Discharge to home or other facility with appropriate resources:   Identify barriers to discharge with patient and caregiver   Identify discharge learning needs (meds, wound care, etc)     Problem: Skin/Tissue Integrity  Goal: Absence of new skin breakdown  Description: 1. Monitor for areas of redness and/or skin breakdown  2. Assess vascular access sites hourly  3. Every 4-6 hours minimum:  Change oxygen saturation probe site  4. Every 4-6 hours:  If on nasal continuous positive airway pressure, respiratory therapy assess nares and determine need for appliance change or resting period.   Outcome: Progressing     Problem: Safety - Adult  Goal: Free from fall injury  Outcome: Progressing     Problem: ABCDS Injury Assessment  Goal: Absence of physical injury  Outcome: Progressing

## 2023-11-19 NOTE — PROGRESS NOTES
HEART FAILURE CARE PLAN:    Comorbidities Reviewed: Yes   Patient has a past medical history of A-fib ([address]), Acute conjunctivitis of both eyes, Acute on chronic respiratory failure with hypoxia ([address]), Acute respiratory failure with hypoxia ([address]), Atrial fibrillation with RVR ([address]), Avulsion fracture of ankle, Benign localized hyperplasia of prostate with urinary obstruction and other lower urinary tract symptoms (LQXW)(291.17), Chronic systolic CHF (congestive heart failure) ([address]), Contusion of leg, right, COPD (chronic obstructive pulmonary disease) ([address]), Fractures, GERD (gastroesophageal reflux disease), Hypertension, Hypertensive urgency, Hypertrophy of prostate without urinary obstruction and other lower urinary tract symptoms (LUTS), No history of procedure, PAD (peripheral artery disease) ([address]), Pneumonia, and Thoracic aortic aneurysm ([address]). ECHOCARDIOGRAM Reviewed: Yes   Patient's Ejection Fraction (EF) is greater than 40%    Weights Reviewed:  Yes   Admission weight: 113.2 kg (249 lb 8 oz)   Wt Readings from Last 3 Encounters:   11/18/23 113.3 kg (249 lb 12.8 oz)   10/09/23 114.8 kg (253 lb)   10/03/23 116 kg (255 lb 11.2 oz)     Intake & Output Reviewed: Yes     Intake/Output Summary (Last 24 hours) at 11/19/2023 0005  Last data filed at 11/18/2023 2319  Gross per 24 hour   Intake 300 ml   Output 2650 ml   Net -2350 ml     Medications Reviewed: Yes   SCHEDULED HOSPITAL MEDICATIONS:   atorvastatin  20 mg Oral Daily    furosemide  40 mg Oral Daily    metoprolol succinate  50 mg Oral Daily    losartan  100 mg Oral Daily    pantoprazole  40 mg Oral Daily    tamsulosin  0.4 mg Oral Daily    rivaroxaban  20 mg Oral Daily    sodium chloride flush  5-40 mL IntraVENous 2 times per day    budesonide-formoterol  2 puff Inhalation BID RT    furosemide  40 mg IntraVENous BID    ipratropium 0.5 mg-albuterol 2.5 mg  1 Dose Inhalation 4x Daily RT     ACE/ARB/ARNI is REQUIRED for EF </= 11% SYSTOLIC FAILURE:   ACE[de-identified]

## 2023-11-19 NOTE — FLOWSHEET NOTE
11/19/23 1700   Vital Signs   BP (!) 90/57   MAP (Calculated) 68   BP Location Right upper arm   BP Method Manual   Patient Position Up in chair       Patient reassessed. Sitting up in chair. No s/s of distress.     Electronically signed by Torsten Birch RN on 11/19/2023 at 5:27 PM

## 2023-11-20 LAB
ANION GAP SERPL CALCULATED.3IONS-SCNC: 9 MMOL/L (ref 3–16)
BASOPHILS # BLD: 0 K/UL (ref 0–0.2)
BASOPHILS NFR BLD: 0.2 %
BUN SERPL-MCNC: 42 MG/DL (ref 7–20)
CALCIUM SERPL-MCNC: 8.6 MG/DL (ref 8.3–10.6)
CHLORIDE SERPL-SCNC: 103 MMOL/L (ref 99–110)
CO2 SERPL-SCNC: 25 MMOL/L (ref 21–32)
CREAT SERPL-MCNC: 1.2 MG/DL (ref 0.8–1.3)
DEPRECATED RDW RBC AUTO: 16.6 % (ref 12.4–15.4)
EOSINOPHIL # BLD: 0 K/UL (ref 0–0.6)
EOSINOPHIL NFR BLD: 0.2 %
GFR SERPLBLD CREATININE-BSD FMLA CKD-EPI: >60 ML/MIN/{1.73_M2}
GLUCOSE SERPL-MCNC: 161 MG/DL (ref 70–99)
HCT VFR BLD AUTO: 39 % (ref 40.5–52.5)
HGB BLD-MCNC: 12.8 G/DL (ref 13.5–17.5)
LYMPHOCYTES # BLD: 1 K/UL (ref 1–5.1)
LYMPHOCYTES NFR BLD: 9.3 %
MCH RBC QN AUTO: 30.2 PG (ref 26–34)
MCHC RBC AUTO-ENTMCNC: 32.7 G/DL (ref 31–36)
MCV RBC AUTO: 92.3 FL (ref 80–100)
MONOCYTES # BLD: 0.3 K/UL (ref 0–1.3)
MONOCYTES NFR BLD: 3.2 %
NEUTROPHILS # BLD: 9 K/UL (ref 1.7–7.7)
NEUTROPHILS NFR BLD: 87.1 %
PLATELET # BLD AUTO: 297 K/UL (ref 135–450)
PMV BLD AUTO: 7.8 FL (ref 5–10.5)
POTASSIUM SERPL-SCNC: 4 MMOL/L (ref 3.5–5.1)
RBC # BLD AUTO: 4.22 M/UL (ref 4.2–5.9)
SODIUM SERPL-SCNC: 137 MMOL/L (ref 136–145)
TROPONIN, HIGH SENSITIVITY: 16 NG/L (ref 0–22)
WBC # BLD AUTO: 10.3 K/UL (ref 4–11)

## 2023-11-20 PROCEDURE — 99233 SBSQ HOSP IP/OBS HIGH 50: CPT

## 2023-11-20 PROCEDURE — 2700000000 HC OXYGEN THERAPY PER DAY

## 2023-11-20 PROCEDURE — 6370000000 HC RX 637 (ALT 250 FOR IP): Performed by: INTERNAL MEDICINE

## 2023-11-20 PROCEDURE — 6360000002 HC RX W HCPCS: Performed by: INTERNAL MEDICINE

## 2023-11-20 PROCEDURE — 99233 SBSQ HOSP IP/OBS HIGH 50: CPT | Performed by: INTERNAL MEDICINE

## 2023-11-20 PROCEDURE — 36415 COLL VENOUS BLD VENIPUNCTURE: CPT

## 2023-11-20 PROCEDURE — 80048 BASIC METABOLIC PNL TOTAL CA: CPT

## 2023-11-20 PROCEDURE — 94640 AIRWAY INHALATION TREATMENT: CPT

## 2023-11-20 PROCEDURE — 6370000000 HC RX 637 (ALT 250 FOR IP)

## 2023-11-20 PROCEDURE — 93005 ELECTROCARDIOGRAM TRACING: CPT | Performed by: INTERNAL MEDICINE

## 2023-11-20 PROCEDURE — 2580000003 HC RX 258: Performed by: INTERNAL MEDICINE

## 2023-11-20 PROCEDURE — 93306 TTE W/DOPPLER COMPLETE: CPT

## 2023-11-20 PROCEDURE — 2060000000 HC ICU INTERMEDIATE R&B

## 2023-11-20 PROCEDURE — 87040 BLOOD CULTURE FOR BACTERIA: CPT

## 2023-11-20 PROCEDURE — 85025 COMPLETE CBC W/AUTO DIFF WBC: CPT

## 2023-11-20 PROCEDURE — 94761 N-INVAS EAR/PLS OXIMETRY MLT: CPT

## 2023-11-20 PROCEDURE — 84484 ASSAY OF TROPONIN QUANT: CPT

## 2023-11-20 RX ORDER — MORPHINE SULFATE 2 MG/ML
2 INJECTION, SOLUTION INTRAMUSCULAR; INTRAVENOUS ONCE
Status: DISCONTINUED | OUTPATIENT
Start: 2023-11-20 | End: 2023-11-21 | Stop reason: HOSPADM

## 2023-11-20 RX ORDER — METOPROLOL SUCCINATE 25 MG/1
25 TABLET, EXTENDED RELEASE ORAL NIGHTLY
Status: DISCONTINUED | OUTPATIENT
Start: 2023-11-20 | End: 2023-11-21 | Stop reason: HOSPADM

## 2023-11-20 RX ORDER — ALBUTEROL SULFATE 90 UG/1
2 AEROSOL, METERED RESPIRATORY (INHALATION) EVERY 4 HOURS PRN
Status: DISCONTINUED | OUTPATIENT
Start: 2023-11-20 | End: 2023-11-21 | Stop reason: HOSPADM

## 2023-11-20 RX ORDER — IPRATROPIUM BROMIDE AND ALBUTEROL SULFATE 2.5; .5 MG/3ML; MG/3ML
1 SOLUTION RESPIRATORY (INHALATION)
Status: DISCONTINUED | OUTPATIENT
Start: 2023-11-20 | End: 2023-11-21 | Stop reason: HOSPADM

## 2023-11-20 RX ORDER — OXYCODONE HYDROCHLORIDE AND ACETAMINOPHEN 5; 325 MG/1; MG/1
1 TABLET ORAL EVERY 4 HOURS PRN
Status: DISCONTINUED | OUTPATIENT
Start: 2023-11-20 | End: 2023-11-21 | Stop reason: HOSPADM

## 2023-11-20 RX ADMIN — ENOXAPARIN SODIUM 90 MG: 100 INJECTION SUBCUTANEOUS at 20:52

## 2023-11-20 RX ADMIN — Medication 2 PUFF: at 20:01

## 2023-11-20 RX ADMIN — METOPROLOL SUCCINATE 25 MG: 25 TABLET, EXTENDED RELEASE ORAL at 20:53

## 2023-11-20 RX ADMIN — Medication 2 PUFF: at 08:34

## 2023-11-20 RX ADMIN — ENOXAPARIN SODIUM 90 MG: 100 INJECTION SUBCUTANEOUS at 09:33

## 2023-11-20 RX ADMIN — TAMSULOSIN HYDROCHLORIDE 0.4 MG: 0.4 CAPSULE ORAL at 09:33

## 2023-11-20 RX ADMIN — PREDNISONE 30 MG: 20 TABLET ORAL at 09:32

## 2023-11-20 RX ADMIN — Medication 10 ML: at 20:52

## 2023-11-20 RX ADMIN — LOSARTAN POTASSIUM 25 MG: 25 TABLET, FILM COATED ORAL at 09:32

## 2023-11-20 RX ADMIN — ISOSORBIDE DINITRATE 5 MG: 10 TABLET ORAL at 05:41

## 2023-11-20 RX ADMIN — OXYCODONE HYDROCHLORIDE AND ACETAMINOPHEN 1 TABLET: 5; 325 TABLET ORAL at 15:53

## 2023-11-20 RX ADMIN — IPRATROPIUM BROMIDE AND ALBUTEROL SULFATE 1 DOSE: 2.5; .5 SOLUTION RESPIRATORY (INHALATION) at 08:34

## 2023-11-20 RX ADMIN — IPRATROPIUM BROMIDE AND ALBUTEROL SULFATE 1 DOSE: 2.5; .5 SOLUTION RESPIRATORY (INHALATION) at 20:00

## 2023-11-20 RX ADMIN — PANTOPRAZOLE SODIUM 40 MG: 40 TABLET, DELAYED RELEASE ORAL at 09:33

## 2023-11-20 RX ADMIN — ISOSORBIDE DINITRATE 5 MG: 10 TABLET ORAL at 20:53

## 2023-11-20 RX ADMIN — METOPROLOL SUCCINATE 50 MG: 50 TABLET, EXTENDED RELEASE ORAL at 09:33

## 2023-11-20 RX ADMIN — ATORVASTATIN CALCIUM 20 MG: 10 TABLET, FILM COATED ORAL at 09:33

## 2023-11-20 RX ADMIN — Medication 10 ML: at 09:33

## 2023-11-20 RX ADMIN — Medication 2 PUFF: at 04:46

## 2023-11-20 RX ADMIN — ISOSORBIDE DINITRATE 5 MG: 10 TABLET ORAL at 13:36

## 2023-11-20 ASSESSMENT — PAIN DESCRIPTION - LOCATION: LOCATION: ARM

## 2023-11-20 ASSESSMENT — PAIN SCALES - GENERAL
PAINLEVEL_OUTOF10: 0
PAINLEVEL_OUTOF10: 7

## 2023-11-20 ASSESSMENT — PAIN DESCRIPTION - ORIENTATION: ORIENTATION: RIGHT

## 2023-11-20 ASSESSMENT — PAIN DESCRIPTION - DESCRIPTORS: DESCRIPTORS: SHARP

## 2023-11-20 NOTE — PLAN OF CARE
Problem: Pain  Goal: Verbalizes/displays adequate comfort level or baseline comfort level  Outcome: Progressing  Flowsheets (Taken 11/20/2023 0107)  Verbalizes/displays adequate comfort level or baseline comfort level:   Encourage patient to monitor pain and request assistance   Assess pain using appropriate pain scale     Problem: ABCDS Injury Assessment  Goal: Absence of physical injury  Outcome: Progressing  Flowsheets (Taken 11/20/2023 0107)  Absence of Physical Injury: Implement safety measures based on patient assessment     Problem: Safety - Adult  Goal: Free from fall injury  Outcome: Progressing  Flowsheets (Taken 11/20/2023 0107)  Free From Fall Injury: Instruct family/caregiver on patient safety     Problem: Skin/Tissue Integrity  Goal: Absence of new skin breakdown  Description: 1. Monitor for areas of redness and/or skin breakdown  2. Assess vascular access sites hourly  3. Every 4-6 hours minimum:  Change oxygen saturation probe site  4. Every 4-6 hours:  If on nasal continuous positive airway pressure, respiratory therapy assess nares and determine need for appliance change or resting period.   Outcome: Progressing     Problem: Discharge Planning  Goal: Discharge to home or other facility with appropriate resources  Outcome: Progressing  Flowsheets (Taken 11/20/2023 0107)  Discharge to home or other facility with appropriate resources: Identify barriers to discharge with patient and caregiver

## 2023-11-20 NOTE — CONSULTS
[address] [place] [address]   HEART FAILURE PROGRAM      NAME:  Milvia Ewing  AGE: 70 y.o. GENDER: male  : 1945  TODAY'S DATE:  2023    Subjective:     VISIT TYPE: Evaluation / Consult    ADMIT DATE: 2023    PAST MEDICAL HISTORY:      Diagnosis Date    A-fib ([address])     12    Acute conjunctivitis of both eyes     Acute on chronic respiratory failure with hypoxia (HCC) 2012    Acute respiratory failure with hypoxia (HCC) 2012    Atrial fibrillation with RVR ([address])     12     Avulsion fracture of ankle 2015    Benign localized hyperplasia of prostate with urinary obstruction and other lower urinary tract symptoms (LUTS)(600.21) 2016    Chronic systolic CHF (congestive heart failure) ([address]) 2017    Contusion of leg, right 2015    COPD (chronic obstructive pulmonary disease) ([address])     Fractures     GERD (gastroesophageal reflux disease) 6/15/2015    Hypertension     Hypertensive urgency 2019    Hypertrophy of prostate without urinary obstruction and other lower urinary tract symptoms (LUTS) 6/15/2015    No history of procedure     no previos colonoscopy    PAD (peripheral artery disease) ([address]) 10/9/2017    Pneumonia     Thoracic aortic aneurysm [facility])      HOME MEDICATIONS:  Prior to Admission medications    Medication Sig Start Date End Date Taking?  Authorizing Provider   rivaroxaban (XARELTO) 20 MG TABS tablet TAKE ONE TABLET BY MOUTH DAILY WITH BREAKFAST 10/9/23   Shannan Beckman MD   Potassium Citrate ER (UROCIT-K) 15 MEQ (1620 MG) TBCR extended release tablet Take 1 tablet by mouth daily 10/9/23   Shannan Beckman MD   pantoprazole (PROTONIX) 40 MG tablet Take 1 tablet by mouth daily 10/9/23   Shannan Beckman MD   atorvastatin (LIPITOR) 20 MG tablet Take 1 tablet by mouth daily 10/9/23   Shannan Beckman MD   losartan (COZAAR) 100 MG tablet Take 1 tablet by mouth daily 10/9/23   Shannan Beckman MD 10/09/23 114.8 kg (253 lb)   10/03/23 116 kg (255 lb 11.2 oz)     INTAKE & OUTPUT:   Intake/Output Summary (Last 24 hours) at 11/20/2023 1229  Last data filed at 11/20/2023 4228  Gross per 24 hour   Intake 402 ml   Output 530 ml   Net -128 ml     ECHOCARDIOGRAM: EF 30-35% 11/20/23, did not discuss with patient, waiting on Cardiology to review    Assessment:     Patient resting in bed at this time on  3 L O2. Pt with complaints of shortness of breath; no complaints of chest pain. Patient stated he lives at his baseline with SOB, feeling tired with no energy, and unable to lay flat at night. Patient does not weight self daily at home. Plans to return home with spouse at discharge. Patient is the primary  for him self and spouse. Patient okay with have [address] and a nurse to start coming after discharge. Patient has a bubble pack for his medications at local pharmacy. Stated if medications get changed around or new ones prescribed he will have to get bottles because they will not be in his pre filled bubble pack. Provided the Patient with Heart Failure education on: signs/symptoms to monitor, medications, daily weights, low sodium diet, 2000 ml fluid restriction, and activity. Reviewed HF Zones (green/yellow/red) and importance of reporting yellow symptoms on Magnet provided. Reinforced the importance of daily weights and to report weight gain of 3 lbs in one day and 5 lbs in one week to Provider. Provided patient with a paper copy weight log to keep track of daily weights. Reviewed low sodium diet and fluid restrictions. Provided 32 oz labeled water pitcher to monitor intake of fluids. Patients eats canned soup and grilled cheese sandwich's frequently at home. Started eating 50% reduced sodium chips. Patient is always thirsty. Stated he drinks multiple bottles of water each day. Drinks 4 20 oz bottles of water. Encouraged patient to cut down on fluid intake and monitor sodium.      Scheduled follow-up

## 2023-11-20 NOTE — PROGRESS NOTES
Bedside report and transfer of care given to [facility]. Pt currently resting in bed with the call light within reach. Pt denies any other care needs at this time. Pt stable at this time.       Marisol Rivera RN

## 2023-11-20 NOTE — PROGRESS NOTES
Nick NOVAK made aware of pt. Blood culture gram stain showing gram positive Rods. No new orders received at this time wants to assess pt. And will place orders if needed.

## 2023-11-20 NOTE — PLAN OF CARE
Problem: Discharge Planning  Goal: Discharge to home or other facility with appropriate resources  11/20/2023 0934 by Allen Henson RN  Outcome: Progressing  11/20/2023 0107 by Mei Smith RN  Outcome: Progressing  Flowsheets (Taken 11/20/2023 0107)  Discharge to home or other facility with appropriate resources: Identify barriers to discharge with patient and caregiver     Problem: Skin/Tissue Integrity  Goal: Absence of new skin breakdown  Description: 1. Monitor for areas of redness and/or skin breakdown  2. Assess vascular access sites hourly  3. Every 4-6 hours minimum:  Change oxygen saturation probe site  4. Every 4-6 hours:  If on nasal continuous positive airway pressure, respiratory therapy assess nares and determine need for appliance change or resting period.   11/20/2023 0934 by Allen Henson RN  Outcome: Progressing  11/20/2023 0107 by Mei Smith RN  Outcome: Progressing     Problem: Safety - Adult  Goal: Free from fall injury  11/20/2023 0934 by Allen Henson RN  Outcome: Progressing  11/20/2023 0107 by Mei Smith RN  Outcome: Progressing  Flowsheets (Taken 11/20/2023 0107)  Free From Fall Injury: Instruct family/caregiver on patient safety     Problem: ABCDS Injury Assessment  Goal: Absence of physical injury  11/20/2023 0934 by Allen Henson RN  Outcome: Progressing  11/20/2023 0107 by Mei Smith RN  Outcome: Progressing  Flowsheets (Taken 11/20/2023 0107)  Absence of Physical Injury: Implement safety measures based on patient assessment     Problem: Pain  Goal: Verbalizes/displays adequate comfort level or baseline comfort level  11/20/2023 0934 by Allen Henson RN  Outcome: Progressing  11/20/2023 0107 by Mei Smith RN  Outcome: Progressing  Flowsheets (Taken 11/20/2023 0107)  Verbalizes/displays adequate comfort level or baseline comfort level:   Encourage patient to monitor pain and request assistance   Assess pain using appropriate pain scale     Problem: Chronic Conditions and Co-morbidities  Goal: Patient's chronic conditions and co-morbidity symptoms are monitored and maintained or improved  Outcome: Progressing

## 2023-11-20 NOTE — CARE COORDINATION
Reviewed chart, met with pt and wife at bedside. Pt to get Echo today, may be going to [facility] tomorrow for angiogram. They are considering HC. Will continue to monitor.

## 2023-11-20 NOTE — CONSULTS
CARDIOLOGY PROGRESS NOTE      Patient Name: Camilla Hirsch  Date of admission: 11/18/2023  7:54 AM  Admission Dx: Shortness of breath [R06.02]  Microscopic hematuria [R31.29]  Atrial fibrillation with rapid ventricular response (HCC) [I48.91]  Chronic respiratory failure with hypoxia (HCC) [J96.11]  Leukocytosis, unspecified type [D72.829]  Chest pain, unspecified type [R07.9]  Acute on chronic congestive heart failure, unspecified heart failure type ([address]) [I50.9]  Reason for Consult:  Chest pain   Requesting Physician: Damien Denise MD  Primary Care physician: Radha Armendariz MD    Subjective:     Camilla Hirsch is a 70 y.o. patient with prior medical history notable for atrial fibrillation, chronic systolic congestive heart failure, COPD with O2 nightly, hypertension, PAD, TAA, who presented to the hospital with complaints of shortness of breath and chest pain. Last evaluated by Dr. Atiya Wagner 2020. Note made at that time that he should not receive amiodarone. Patient reported to have COVID-19 2 weeks ago. He was admitted with shortness of breath and chest pain. Found to be in atrial fibrillation with rapid ventricular rates on admission. EKG with rapid ventricular response, nonspecific ST and T wave changes chest x-ray consistent with pulmonary edema/CHF. CT negative for pulmonary emboli with several foci of airspace opacities/infiltrates. Patient was initially evaluated by my partner Dr. Marco Najjar 11/19. At that time noted he has had significant worsening functional status since his MCFP. Noted intermittent chest discomfort, right arm discomfort and exertional diaphoresis. Given his risk factors, high pretest probability, extensive coronary calcifications seen on ct, and marked functional decline, recommendations were given for the patient undergo a coronary angiogram . Troponins WNL. Pt evaluated today.  Difficult historian - He confirms today along with wife INHALE INTO THE LUNGS EVERY FOUR (4) HOURS AS NEEDED FOR SHORTNESS OF BREATH 10/9/23   Kellen Rosales MD   ondansetron (ZOFRAN) 4 MG tablet Take 1 tablet by mouth every 8 hours as needed for Nausea or Vomiting  Patient not taking: Reported on 11/18/2023 10/9/23   Kellen Rosales MD   fluticasone-umeclidin-vilant (TRELEGY ELLIPTA) 200-62.5-25 MCG/ACT AEPB inhaler Inhale 1 puff into the lungs daily 4/4/23   Jas Rodríguez MD   Nebulizers (COMPRESSOR/NEBULIZER) [facility] qid 3/2/23   Kellen Rosales MD   albuterol sulfate HFA (PROVENTIL HFA) 108 (90 Base) MCG/ACT inhaler Inhale 2 puffs into the lungs every 6 hours as needed for Wheezing (with spacer) 4/16/18   Kellen Rosales MD        CURRENT Medications:  ipratropium 0.5 mg-albuterol 2.5 mg (DUONEB) nebulizer solution 1 Dose, BID RT  oxyCODONE-acetaminophen (PERCOCET) 5-325 MG per tablet 1 tablet, Q4H PRN  enoxaparin (LOVENOX) injection 90 mg, BID  isosorbide dinitrate (ISORDIL) tablet 5 mg, q8h  predniSONE (DELTASONE) tablet 30 mg, Daily  losartan (COZAAR) tablet 25 mg, Daily  albuterol sulfate HFA (PROVENTIL;VENTOLIN;PROAIR) 108 (90 Base) MCG/ACT inhaler 2 puff, Q6H PRN  atorvastatin (LIPITOR) tablet 20 mg, Daily  metoprolol succinate (TOPROL XL) extended release tablet 50 mg, Daily  pantoprazole (PROTONIX) tablet 40 mg, Daily  tamsulosin (FLOMAX) capsule 0.4 mg, Daily  sodium chloride flush 0.9 % injection 5-40 mL, 2 times per day  sodium chloride flush 0.9 % injection 5-40 mL, PRN  0.9 % sodium chloride infusion, PRN  potassium chloride (KLOR-CON M) extended release tablet 40 mEq, PRN   Or  potassium bicarb-citric acid (EFFER-K) effervescent tablet 40 mEq, PRN   Or  potassium chloride 10 mEq/100 mL IVPB (Peripheral Line), PRN  magnesium sulfate 2000 mg in 50 mL IVPB premix, PRN  ondansetron (ZOFRAN-ODT) disintegrating tablet 4 mg, Q8H PRN  polyethylene glycol (GLYCOLAX) packet 17 g, Daily PRN  acetaminophen (TYLENOL) tablet 650

## 2023-11-20 NOTE — PLAN OF CARE
HEART FAILURE CARE PLAN:    Comorbidities Reviewed: Yes   Patient has a past medical history of A-fib ([address]), Acute conjunctivitis of both eyes, Acute on chronic respiratory failure with hypoxia ([address]), Acute respiratory failure with hypoxia ([address]), Atrial fibrillation with RVR ([address]), Avulsion fracture of ankle, Benign localized hyperplasia of prostate with urinary obstruction and other lower urinary tract symptoms (XBYM)(017.21), Chronic systolic CHF (congestive heart failure) ([address]), Contusion of leg, right, COPD (chronic obstructive pulmonary disease) ([address]), Fractures, GERD (gastroesophageal reflux disease), Hypertension, Hypertensive urgency, Hypertrophy of prostate without urinary obstruction and other lower urinary tract symptoms (LUTS), No history of procedure, PAD (peripheral artery disease) ([address]), Pneumonia, and Thoracic aortic aneurysm ([address]). ECHOCARDIOGRAM Reviewed: Yes   Patient's Ejection Fraction (EF) is greater than 40%    Weights Reviewed:  Yes   Admission weight: 113.2 kg (249 lb 8 oz)   Wt Readings from Last 3 Encounters:   11/20/23 112.9 kg (248 lb 12.8 oz)   10/09/23 114.8 kg (253 lb)   10/03/23 116 kg (255 lb 11.2 oz)     Intake & Output Reviewed: Yes     Intake/Output Summary (Last 24 hours) at 11/20/2023 1999  Last data filed at 11/20/2023 0445  Gross per 24 hour   Intake 232 ml   Output 980 ml   Net -748 ml     Medications Reviewed: Yes   SCHEDULED HOSPITAL MEDICATIONS:   enoxaparin  1 mg/kg (Adjusted) SubCUTAneous BID    isosorbide dinitrate  5 mg Oral q8h    predniSONE  30 mg Oral Daily    losartan  25 mg Oral Daily    atorvastatin  20 mg Oral Daily    metoprolol succinate  50 mg Oral Daily    pantoprazole  40 mg Oral Daily    tamsulosin  0.4 mg Oral Daily    sodium chloride flush  5-40 mL IntraVENous 2 times per day    budesonide-formoterol  2 puff Inhalation BID RT    ipratropium 0.5 mg-albuterol 2.5 mg  1 Dose Inhalation 4x Daily RT     ACE/ARB/ARNI is REQUIRED for EF </= 49% SYSTOLIC

## 2023-11-21 ENCOUNTER — HOSPITAL ENCOUNTER (INPATIENT)
Dept: CARDIAC CATH/INVASIVE PROCEDURES | Age: 78
LOS: 1 days | Discharge: HOME | DRG: 287 | End: 2023-11-22
Attending: INTERNAL MEDICINE | Admitting: STUDENT IN AN ORGANIZED HEALTH CARE EDUCATION/TRAINING PROGRAM
Payer: MEDICARE

## 2023-11-21 VITALS
OXYGEN SATURATION: 99 % | BODY MASS INDEX: 35.15 KG/M2 | SYSTOLIC BLOOD PRESSURE: 114 MMHG | TEMPERATURE: 97.9 F | WEIGHT: 251.06 LBS | HEIGHT: 71 IN | HEART RATE: 107 BPM | RESPIRATION RATE: 18 BRPM | DIASTOLIC BLOOD PRESSURE: 94 MMHG

## 2023-11-21 PROBLEM — I50.43 CHF (CONGESTIVE HEART FAILURE), NYHA CLASS I, ACUTE ON CHRONIC, COMBINED (HCC): Status: ACTIVE | Noted: 2023-11-21

## 2023-11-21 PROBLEM — Z98.890 STATUS POST CARDIAC CATHETERIZATION: Status: ACTIVE | Noted: 2023-11-21

## 2023-11-21 LAB
ANION GAP SERPL CALCULATED.3IONS-SCNC: 7 MMOL/L (ref 3–16)
BASOPHILS # BLD: 0 K/UL (ref 0–0.2)
BASOPHILS NFR BLD: 0.1 %
BUN SERPL-MCNC: 37 MG/DL (ref 7–20)
CALCIUM SERPL-MCNC: 8.2 MG/DL (ref 8.3–10.6)
CHLORIDE SERPL-SCNC: 106 MMOL/L (ref 99–110)
CO2 SERPL-SCNC: 23 MMOL/L (ref 21–32)
CREAT SERPL-MCNC: 1.4 MG/DL (ref 0.8–1.3)
DEPRECATED RDW RBC AUTO: 16.3 % (ref 12.4–15.4)
EKG ATRIAL RATE: 133 BPM
EKG DIAGNOSIS: NORMAL
EKG Q-T INTERVAL: 346 MS
EKG QRS DURATION: 84 MS
EKG QTC CALCULATION (BAZETT): 472 MS
EKG R AXIS: 21 DEGREES
EKG T AXIS: 58 DEGREES
EKG VENTRICULAR RATE: 112 BPM
EOSINOPHIL # BLD: 0 K/UL (ref 0–0.6)
EOSINOPHIL NFR BLD: 0.4 %
GFR SERPLBLD CREATININE-BSD FMLA CKD-EPI: 52 ML/MIN/{1.73_M2}
GLUCOSE SERPL-MCNC: 120 MG/DL (ref 70–99)
HCT VFR BLD AUTO: 36.7 % (ref 40.5–52.5)
HGB BLD-MCNC: 12.2 G/DL (ref 13.5–17.5)
LYMPHOCYTES # BLD: 1 K/UL (ref 1–5.1)
LYMPHOCYTES NFR BLD: 10 %
MCH RBC QN AUTO: 30.3 PG (ref 26–34)
MCHC RBC AUTO-ENTMCNC: 33.3 G/DL (ref 31–36)
MCV RBC AUTO: 91.1 FL (ref 80–100)
MONOCYTES # BLD: 0.6 K/UL (ref 0–1.3)
MONOCYTES NFR BLD: 6.3 %
NEUTROPHILS # BLD: 8.6 K/UL (ref 1.7–7.7)
NEUTROPHILS NFR BLD: 83.2 %
PLATELET # BLD AUTO: 275 K/UL (ref 135–450)
PMV BLD AUTO: 7.9 FL (ref 5–10.5)
POTASSIUM SERPL-SCNC: 4.3 MMOL/L (ref 3.5–5.1)
RBC # BLD AUTO: 4.03 M/UL (ref 4.2–5.9)
SODIUM SERPL-SCNC: 136 MMOL/L (ref 136–145)
WBC # BLD AUTO: 10.3 K/UL (ref 4–11)

## 2023-11-21 PROCEDURE — 99232 SBSQ HOSP IP/OBS MODERATE 35: CPT | Performed by: INTERNAL MEDICINE

## 2023-11-21 PROCEDURE — 94640 AIRWAY INHALATION TREATMENT: CPT

## 2023-11-21 PROCEDURE — 93460 R&L HRT ART/VENTRICLE ANGIO: CPT | Performed by: INTERNAL MEDICINE

## 2023-11-21 PROCEDURE — 2709999900 HC NON-CHARGEABLE SUPPLY: Performed by: INTERNAL MEDICINE

## 2023-11-21 PROCEDURE — 6360000002 HC RX W HCPCS

## 2023-11-21 PROCEDURE — 99238 HOSP IP/OBS DSCHRG MGMT 30/<: CPT

## 2023-11-21 PROCEDURE — 6370000000 HC RX 637 (ALT 250 FOR IP)

## 2023-11-21 PROCEDURE — 36415 COLL VENOUS BLD VENIPUNCTURE: CPT

## 2023-11-21 PROCEDURE — C1769 GUIDE WIRE: HCPCS | Performed by: INTERNAL MEDICINE

## 2023-11-21 PROCEDURE — C1894 INTRO/SHEATH, NON-LASER: HCPCS | Performed by: INTERNAL MEDICINE

## 2023-11-21 PROCEDURE — 4A023N8 MEASUREMENT OF CARDIAC SAMPLING AND PRESSURE, BILATERAL, PERCUTANEOUS APPROACH: ICD-10-PCS | Performed by: INTERNAL MEDICINE

## 2023-11-21 PROCEDURE — 2580000003 HC RX 258: Performed by: STUDENT IN AN ORGANIZED HEALTH CARE EDUCATION/TRAINING PROGRAM

## 2023-11-21 PROCEDURE — C1887 CATHETER, GUIDING: HCPCS | Performed by: INTERNAL MEDICINE

## 2023-11-21 PROCEDURE — 6370000000 HC RX 637 (ALT 250 FOR IP): Performed by: INTERNAL MEDICINE

## 2023-11-21 PROCEDURE — 2500000003 HC RX 250 WO HCPCS

## 2023-11-21 PROCEDURE — 85347 COAGULATION TIME ACTIVATED: CPT

## 2023-11-21 PROCEDURE — 99152 MOD SED SAME PHYS/QHP 5/>YRS: CPT | Performed by: INTERNAL MEDICINE

## 2023-11-21 PROCEDURE — 2580000003 HC RX 258: Performed by: INTERNAL MEDICINE

## 2023-11-21 PROCEDURE — 6360000002 HC RX W HCPCS: Performed by: STUDENT IN AN ORGANIZED HEALTH CARE EDUCATION/TRAINING PROGRAM

## 2023-11-21 PROCEDURE — 6370000000 HC RX 637 (ALT 250 FOR IP): Performed by: STUDENT IN AN ORGANIZED HEALTH CARE EDUCATION/TRAINING PROGRAM

## 2023-11-21 PROCEDURE — 85025 COMPLETE CBC W/AUTO DIFF WBC: CPT

## 2023-11-21 PROCEDURE — 93460 R&L HRT ART/VENTRICLE ANGIO: CPT

## 2023-11-21 PROCEDURE — 93010 ELECTROCARDIOGRAM REPORT: CPT | Performed by: INTERNAL MEDICINE

## 2023-11-21 PROCEDURE — 2700000000 HC OXYGEN THERAPY PER DAY

## 2023-11-21 PROCEDURE — 99233 SBSQ HOSP IP/OBS HIGH 50: CPT | Performed by: INTERNAL MEDICINE

## 2023-11-21 PROCEDURE — 1200000000 HC SEMI PRIVATE

## 2023-11-21 PROCEDURE — 80048 BASIC METABOLIC PNL TOTAL CA: CPT

## 2023-11-21 PROCEDURE — B2111ZZ FLUOROSCOPY OF MULTIPLE CORONARY ARTERIES USING LOW OSMOLAR CONTRAST: ICD-10-PCS | Performed by: INTERNAL MEDICINE

## 2023-11-21 PROCEDURE — 94761 N-INVAS EAR/PLS OXIMETRY MLT: CPT

## 2023-11-21 RX ORDER — ACETAMINOPHEN 650 MG/1
650 SUPPOSITORY RECTAL EVERY 6 HOURS PRN
Status: DISCONTINUED | OUTPATIENT
Start: 2023-11-21 | End: 2023-11-22 | Stop reason: HOSPADM

## 2023-11-21 RX ORDER — SODIUM CHLORIDE 0.9 % (FLUSH) 0.9 %
5-40 SYRINGE (ML) INJECTION EVERY 12 HOURS SCHEDULED
Status: DISCONTINUED | OUTPATIENT
Start: 2023-11-21 | End: 2023-11-22 | Stop reason: HOSPADM

## 2023-11-21 RX ORDER — ASPIRIN 325 MG
325 TABLET ORAL ONCE
Status: COMPLETED | OUTPATIENT
Start: 2023-11-21 | End: 2023-11-21

## 2023-11-21 RX ORDER — SODIUM CHLORIDE 9 MG/ML
INJECTION, SOLUTION INTRAVENOUS PRN
Status: DISCONTINUED | OUTPATIENT
Start: 2023-11-21 | End: 2023-11-22 | Stop reason: HOSPADM

## 2023-11-21 RX ORDER — SODIUM CHLORIDE 0.9 % (FLUSH) 0.9 %
5-40 SYRINGE (ML) INJECTION PRN
Status: DISCONTINUED | OUTPATIENT
Start: 2023-11-21 | End: 2023-11-22 | Stop reason: HOSPADM

## 2023-11-21 RX ORDER — ATORVASTATIN CALCIUM 10 MG/1
20 TABLET, FILM COATED ORAL DAILY
Status: DISCONTINUED | OUTPATIENT
Start: 2023-11-21 | End: 2023-11-22 | Stop reason: HOSPADM

## 2023-11-21 RX ORDER — HEPARIN SODIUM 1000 [USP'U]/ML
INJECTION, SOLUTION INTRAVENOUS; SUBCUTANEOUS
Status: COMPLETED | OUTPATIENT
Start: 2023-11-21 | End: 2023-11-21

## 2023-11-21 RX ORDER — IPRATROPIUM BROMIDE AND ALBUTEROL SULFATE 2.5; .5 MG/3ML; MG/3ML
1 SOLUTION RESPIRATORY (INHALATION) EVERY 4 HOURS PRN
Status: DISCONTINUED | OUTPATIENT
Start: 2023-11-21 | End: 2023-11-22 | Stop reason: HOSPADM

## 2023-11-21 RX ORDER — ONDANSETRON 4 MG/1
4 TABLET, ORALLY DISINTEGRATING ORAL EVERY 8 HOURS PRN
Status: DISCONTINUED | OUTPATIENT
Start: 2023-11-21 | End: 2023-11-22 | Stop reason: HOSPADM

## 2023-11-21 RX ORDER — METOPROLOL SUCCINATE 50 MG/1
50 TABLET, EXTENDED RELEASE ORAL DAILY
Status: DISCONTINUED | OUTPATIENT
Start: 2023-11-22 | End: 2023-11-22 | Stop reason: HOSPADM

## 2023-11-21 RX ORDER — FUROSEMIDE 40 MG/1
40 TABLET ORAL DAILY
Status: DISCONTINUED | OUTPATIENT
Start: 2023-11-21 | End: 2023-11-22 | Stop reason: HOSPADM

## 2023-11-21 RX ORDER — FENTANYL CITRATE 50 UG/ML
INJECTION, SOLUTION INTRAMUSCULAR; INTRAVENOUS
Status: COMPLETED | OUTPATIENT
Start: 2023-11-21 | End: 2023-11-21

## 2023-11-21 RX ORDER — ACETAMINOPHEN 325 MG/1
650 TABLET ORAL EVERY 6 HOURS PRN
Status: DISCONTINUED | OUTPATIENT
Start: 2023-11-21 | End: 2023-11-22 | Stop reason: HOSPADM

## 2023-11-21 RX ORDER — MAGNESIUM SULFATE IN WATER 40 MG/ML
2000 INJECTION, SOLUTION INTRAVENOUS PRN
Status: DISCONTINUED | OUTPATIENT
Start: 2023-11-21 | End: 2023-11-22 | Stop reason: HOSPADM

## 2023-11-21 RX ORDER — POTASSIUM CHLORIDE 20 MEQ/1
40 TABLET, EXTENDED RELEASE ORAL PRN
Status: DISCONTINUED | OUTPATIENT
Start: 2023-11-21 | End: 2023-11-22 | Stop reason: HOSPADM

## 2023-11-21 RX ORDER — ENOXAPARIN SODIUM 100 MG/ML
1 INJECTION SUBCUTANEOUS 2 TIMES DAILY
Status: COMPLETED | OUTPATIENT
Start: 2023-11-21 | End: 2023-11-21

## 2023-11-21 RX ORDER — ONDANSETRON 2 MG/ML
4 INJECTION INTRAMUSCULAR; INTRAVENOUS EVERY 6 HOURS PRN
Status: DISCONTINUED | OUTPATIENT
Start: 2023-11-21 | End: 2023-11-22 | Stop reason: HOSPADM

## 2023-11-21 RX ORDER — ALBUTEROL SULFATE 90 UG/1
2 AEROSOL, METERED RESPIRATORY (INHALATION) EVERY 6 HOURS PRN
Status: DISCONTINUED | OUTPATIENT
Start: 2023-11-21 | End: 2023-11-22 | Stop reason: HOSPADM

## 2023-11-21 RX ORDER — LOSARTAN POTASSIUM 100 MG/1
100 TABLET ORAL DAILY
Status: DISCONTINUED | OUTPATIENT
Start: 2023-11-21 | End: 2023-11-22 | Stop reason: HOSPADM

## 2023-11-21 RX ORDER — MIDAZOLAM HYDROCHLORIDE 1 MG/ML
INJECTION INTRAMUSCULAR; INTRAVENOUS
Status: COMPLETED | OUTPATIENT
Start: 2023-11-21 | End: 2023-11-21

## 2023-11-21 RX ORDER — POTASSIUM CHLORIDE 7.45 MG/ML
10 INJECTION INTRAVENOUS PRN
Status: DISCONTINUED | OUTPATIENT
Start: 2023-11-21 | End: 2023-11-22 | Stop reason: HOSPADM

## 2023-11-21 RX ORDER — PANTOPRAZOLE SODIUM 40 MG/1
40 TABLET, DELAYED RELEASE ORAL DAILY
Status: DISCONTINUED | OUTPATIENT
Start: 2023-11-21 | End: 2023-11-22 | Stop reason: HOSPADM

## 2023-11-21 RX ORDER — ACETAMINOPHEN 325 MG/1
650 TABLET ORAL EVERY 4 HOURS PRN
Status: DISCONTINUED | OUTPATIENT
Start: 2023-11-21 | End: 2023-11-21

## 2023-11-21 RX ORDER — SODIUM CHLORIDE 9 MG/ML
INJECTION, SOLUTION INTRAVENOUS CONTINUOUS
Status: ACTIVE | OUTPATIENT
Start: 2023-11-21 | End: 2023-11-21

## 2023-11-21 RX ORDER — POLYETHYLENE GLYCOL 3350 17 G/17G
17 POWDER, FOR SOLUTION ORAL DAILY PRN
Status: DISCONTINUED | OUTPATIENT
Start: 2023-11-21 | End: 2023-11-22 | Stop reason: HOSPADM

## 2023-11-21 RX ORDER — TAMSULOSIN HYDROCHLORIDE 0.4 MG/1
0.4 CAPSULE ORAL DAILY
Status: DISCONTINUED | OUTPATIENT
Start: 2023-11-21 | End: 2023-11-22 | Stop reason: HOSPADM

## 2023-11-21 RX ADMIN — SODIUM CHLORIDE: 9 INJECTION, SOLUTION INTRAVENOUS at 16:53

## 2023-11-21 RX ADMIN — PANTOPRAZOLE SODIUM 40 MG: 40 TABLET, DELAYED RELEASE ORAL at 08:32

## 2023-11-21 RX ADMIN — Medication 10 ML: at 19:51

## 2023-11-21 RX ADMIN — MIDAZOLAM HYDROCHLORIDE 0.5 MG: 1 INJECTION INTRAMUSCULAR; INTRAVENOUS at 11:55

## 2023-11-21 RX ADMIN — PREDNISONE 30 MG: 20 TABLET ORAL at 08:31

## 2023-11-21 RX ADMIN — Medication 2 PUFF: at 07:36

## 2023-11-21 RX ADMIN — IPRATROPIUM BROMIDE AND ALBUTEROL SULFATE 1 DOSE: 2.5; .5 SOLUTION RESPIRATORY (INHALATION) at 07:36

## 2023-11-21 RX ADMIN — LOSARTAN POTASSIUM 25 MG: 25 TABLET, FILM COATED ORAL at 08:31

## 2023-11-21 RX ADMIN — FENTANYL CITRATE 25 MCG: 50 INJECTION, SOLUTION INTRAMUSCULAR; INTRAVENOUS at 11:55

## 2023-11-21 RX ADMIN — ENOXAPARIN SODIUM 90 MG: 100 INJECTION SUBCUTANEOUS at 19:52

## 2023-11-21 RX ADMIN — Medication 2 PUFF: at 20:00

## 2023-11-21 RX ADMIN — TAMSULOSIN HYDROCHLORIDE 0.4 MG: 0.4 CAPSULE ORAL at 08:31

## 2023-11-21 RX ADMIN — IPRATROPIUM BROMIDE AND ALBUTEROL SULFATE 1 DOSE: 2.5; .5 SOLUTION RESPIRATORY (INHALATION) at 20:00

## 2023-11-21 RX ADMIN — METOPROLOL SUCCINATE 50 MG: 50 TABLET, EXTENDED RELEASE ORAL at 08:31

## 2023-11-21 RX ADMIN — Medication 325 MG: at 11:16

## 2023-11-21 RX ADMIN — ISOSORBIDE DINITRATE 5 MG: 10 TABLET ORAL at 05:52

## 2023-11-21 RX ADMIN — HEPARIN SODIUM 5000 UNITS: 1000 INJECTION, SOLUTION INTRAVENOUS; SUBCUTANEOUS at 12:10

## 2023-11-21 RX ADMIN — ATORVASTATIN CALCIUM 20 MG: 10 TABLET, FILM COATED ORAL at 08:31

## 2023-11-21 RX ADMIN — Medication 10 ML: at 08:32

## 2023-11-21 ASSESSMENT — PAIN SCALES - GENERAL
PAINLEVEL_OUTOF10: 0

## 2023-11-21 NOTE — PROGRESS NOTES
Called and spoke with [facility] medical records. Requested prior cardiac records including last cath, echo, OV notes.

## 2023-11-21 NOTE — PROGRESS NOTES
Pt. Report called to [address] at [facility] [place] cath lab. Pt. In stable condition and awaiting transport.

## 2023-11-21 NOTE — PLAN OF CARE
Problem: Discharge Planning  Goal: Discharge to home or other facility with appropriate resources  11/21/2023 0829 by Guillermo Brar RN  Outcome: Progressing  11/21/2023 0038 by Bi Parada RN  Outcome: Progressing     Problem: Skin/Tissue Integrity  Goal: Absence of new skin breakdown  Description: 1. Monitor for areas of redness and/or skin breakdown  2. Assess vascular access sites hourly  3. Every 4-6 hours minimum:  Change oxygen saturation probe site  4. Every 4-6 hours:  If on nasal continuous positive airway pressure, respiratory therapy assess nares and determine need for appliance change or resting period.   11/21/2023 0829 by Guillermo Brar RN  Outcome: Progressing  11/21/2023 0038 by Bi Parada RN  Outcome: Progressing     Problem: Safety - Adult  Goal: Free from fall injury  11/21/2023 0829 by Guillermo Brar RN  Outcome: Progressing  11/21/2023 0038 by Bi Parada RN  Outcome: Progressing     Problem: ABCDS Injury Assessment  Goal: Absence of physical injury  11/21/2023 0829 by Guillermo Brar RN  Outcome: Progressing  11/21/2023 0038 by Bi Parada RN  Outcome: Progressing     Problem: Pain  Goal: Verbalizes/displays adequate comfort level or baseline comfort level  11/21/2023 0829 by Guillermo Brar RN  Outcome: Progressing  11/21/2023 0038 by Bi Parada RN  Outcome: Progressing     Problem: Chronic Conditions and Co-morbidities  Goal: Patient's chronic conditions and co-morbidity symptoms are monitored and maintained or improved  11/21/2023 0829 by Guillermo Brar RN  Outcome: Progressing  11/21/2023 0038 by Bi Parada RN  Outcome: Progressing

## 2023-11-21 NOTE — PROGRESS NOTES
Cardiology Dr. Uriel Oropeza informed of pt. Need for cath and heart rate 90's to low 100's. Dr. Uriel Oropeza to call cath lab and lets us know its ok to set up transport after.

## 2023-11-21 NOTE — DISCHARGE SUMMARY
Name:  Brigitte Rothman  Room:  /5353-11  MRN:    [id number]    Discharge Summary      This discharge summary is in conjunction with a complete physical exam done on the day of discharge. Discharging Attending Physician: Dr. Daja Zhang: 11/18/2023  Discharge:  11/21/2023    HPI taken from admission H&P:    The patient is a 70 y.o. male with a-fib, BPH, chronic systolic CHF, COPD, GERD, hypertension, PAD, thoracic aortic aneurysm who presents to [facility] with c/o shortness of breath and chest pain. States he woke up in the middle of the night and he was having chest pain. It radiated to his right arm. Feeling more short of breath than normal.  He wears 3 L at night and as needed. He f/w Dr. Laci South. Previously saw Dr. Jailene Monreal in the past.  He has had a-fib in the past.  On Xarelto. States he had COVID 2 weeks ago. Found to be in a-fib with RVR. Patient tachypneic and requiring 3 L O2. Labs with leukocytosis. CXR with pulmonary edema likely related to congestive heart failure. CT negative for PE, several foci of airspace opacity, may represent developing airspace/infiltrate. Admitted to PCU on telemetry. Cardiology and pulmonology consulted.        Diagnoses this Admission and Hospital Course   #Atrial fibrillation with RVR   #Secondary hypercoagulable state   -rates initially in 150s, now stable in 80s   -remain on tele   -on xarelto for [facility] --> lovenox for now    -started on cardizem gtt and now weaned off   -continue metoprolol   -echo pending --> echo as below with reduced EF   -cardiology consulted      #Chest pain/abdominal pain   -could be 2/2 to above   -trops negative x3   -started on isordil   -cardiology consulted  -pain has resolved today   -likely needs cardiac cath after echo results --> going for cardiac cath today      #Cardiomyopathy   #Systolic HF   -last echo as above   -repeat echo as below with reduced EF   -IV lasix given, now stopped per cardiology   -daily weights,

## 2023-11-21 NOTE — H&P
Hospital Medicine History & Physical      Date of Admission: 11/21/2023    Date of Service:  11/21/2023    [x]Admitted to Inpatient with expected LOS greater than two midnights due to medical therapy. []Placed in Observation status. Chief Admission Complaint:  CHF    Presenting Admission History: See this is a 70-year-old male with past medical history of atrial fibrillation, chronic HFrEF, COPD, chronic hypoxic respiratory failure on 3 L of nasal cannula baseline hypertension, PAD, thoracic aortic aneurysm, BPH, CKD stage III who initially presented to [facility] with shortness of breath and chest discomfort. Patient was in A-fib with RVR at [facility]. A-fib treated with Cardizem initially and subsequently on metoprolol. He was found to be in congestive heart failure. Echocardiogram revealing reduced EF. Patient was transferred for further evaluation and cardiac catheterization. Patient did have bacteremia but thought to be contaminant. Patient underwent cardiac catheterization today with findings of nonobstructive CAD. Doing well post procedure. Cardiac catheterization site appears clean without hematoma         Assessment/Plan:      #Acute on chronic HFrEF  #Cardiomyopathy  Echocardiogram on 11/18 LVEF of 30-35%. Left heart catheterization today with nonobstructive CAD  Hold Lasix for today  Continue monitor ins and out/daily weight  Low-salt diet/fluid restriction  Cardiology following    #A-fib  -Continue metoprolol  --Lovenox for tonight if no bleeding to be transition to Xarelto tomorrow  Monitor telemetry  Currently rate controlled    #Hypertension-continue losartan/metoprolol    #PAD  #Nonobstructive CAD  -Continue aspirin/statin/beta-blocker      #Chronic hypoxic respiratory failure  #COPD  -Pneumonia? Patient was treated with azithromycin/Rocephin at [facility] since 11/18. . 2 more days remaine   -Prednisone 30 mg x 5 days  -DuoNebs as needed  Continue Trelegy  Supplemental oxygen as ENDOSCOPY, COLON, DIAGNOSTIC  11/13/2019    egd; esophagitis/gastritis    HERNIA REPAIR      PA XCAPSL CTRC RMVL INSJ IO LENS PROSTH W/O ECP Right 9/6/2018    PHACO EMULSIFICATION OF CATARACT WITH INTRAOCULAR LENS IMPLANT RIGHT EYE performed by Kristie Morales MD at [address] [facility] RMVL INSJ IO LENS PROSTH W/O ECP Left 10/18/2018    PHACO EMULSIFICATION OF CATARACT WITH  INTRAOCULAR LENS IMPLANT LEFT EYE performed by Kristie Morales MD at [address] [facility] 18 11/13/2019    EGD BIOPSY performed by Chip Grijalva MD at [address] [facility]       Medications Prior to Admission:   Prior to Admission medications    Medication Sig Start Date End Date Taking?  Authorizing Provider   rivaroxaban (XARELTO) 20 MG TABS tablet TAKE ONE TABLET BY MOUTH DAILY WITH BREAKFAST 10/9/23   Shailesh Escobar MD   Potassium Citrate ER (UROCIT-K) 15 MEQ (1620 MG) TBCR extended release tablet Take 1 tablet by mouth daily 10/9/23   Shailesh Escobar MD   pantoprazole (PROTONIX) 40 MG tablet Take 1 tablet by mouth daily 10/9/23   Shailesh Escobar MD   atorvastatin (LIPITOR) 20 MG tablet Take 1 tablet by mouth daily 10/9/23   Shailesh Escobar MD   losartan (COZAAR) 100 MG tablet Take 1 tablet by mouth daily 10/9/23   Shailesh Escobar MD   tamsulosin (FLOMAX) 0.4 MG capsule Take 1 capsule by mouth daily 10/9/23   Shailesh Escobar MD   furosemide (LASIX) 40 MG tablet Take 1 tablet by mouth daily 10/9/23   Shailesh Escobar MD   metoprolol succinate (TOPROL XL) 50 MG extended release tablet Take 1 tablet by mouth daily 10/9/23   Shailesh Escobar MD   amLODIPine (NORVASC) 5 MG tablet Take 1 tablet by mouth daily 10/9/23   Shailesh Escobar MD   predniSONE (DELTASONE) 10 MG tablet TAKE ONE TABLET BY MOUTH EVERY DAY AS NEEDED 10/9/23   Shailesh Escobar MD   ipratropium 0.5 mg-albuterol 2.5 mg (DUONEB) 0.5-2.5 (3) MG/3ML SOLN

## 2023-11-21 NOTE — PROGRESS NOTES
Physician Progress Note      Harsha Godwin  CSN #:                  [id number]  :                       1945  ADMIT DATE:       2023 7:54 AM  DISCH DATE:        2023 10:10 AM  RESPONDING  PROVIDER #:        Maria Luisa Reagan MD          QUERY TEXT:    Patient admitted with pneumonia. Pt noted to have BPH and was treated with   Flomax. ?If possible, please document in progress notes and discharge summary   if you are evaluating and/or treating any of the following: The medical record reflects the following:  Risk Factors: BPH  Clinical Indicators: note of BPH -on Flomax  Treatment: Flomax    Thank You Adonay Skinner RN, [email or web address]  Options provided:  -- BPH with partial/complete urinary obstruction  -- BPH with urinary retention without obstruction  -- Other - I will add my own diagnosis  -- Disagree - Not applicable / Not valid  -- Disagree - Clinically unable to determine / Unknown  -- Refer to Clinical Documentation Reviewer    PROVIDER RESPONSE TEXT:    This patient has BPH with urinary retention without obstruction. Query created by: Adonay Skinner on 2023 4:16 PM      QUERY TEXT:    Pt admitted with Shortness of Breath. Pt noted to have , RR 30, WBC   14.2. If possible, please document in the progress notes and discharge summary   if you are evaluating and /or treating any of the following: The medical record reflects the following:  Risk Factors: pneumonia  Clinical Indicators: , RR 30, WBC 14.2  Treatment: Zithromax, Rocephin, pulmonology consult, duonebs, albuterol prn    Thank You Adonay Skinner RN, [email or web address]  Options provided:  -- Sepsis due to pneumonia, present on admission  -- Pneumonia without Sepsis  -- Other - I will add my own diagnosis  -- Disagree - Not applicable / Not valid  -- Disagree - Clinically unable to determine / Unknown  -- Refer to Clinical Documentation Reviewer    PROVIDER RESPONSE TEXT:    Pt with

## 2023-11-21 NOTE — H&P
History and physical/sedation Pre-Procedure Note    Patient Name: Emily Hodge   YOB: 1945  Room/Bed: Cath Pool /[facility]  Medical Record Number: [id number]  Date: 11/21/2023   Time: 10:59 AM       Indication: New cardiomyopathy, angina    Consent: I have discussed with the patient and/or the patient representative the indication, alternatives, and the possible risks and/or complications of the planned procedure and the anesthesia methods. The patient and/or patient representative appear to understand and agree to proceed. Vital Signs: There were no vitals filed for this visit. Past Medical History:   has a past medical history of A-fib ([address]), Acute conjunctivitis of both eyes, Acute on chronic respiratory failure with hypoxia ([address]), Acute respiratory failure with hypoxia ([address]), Atrial fibrillation with RVR ([address]), Avulsion fracture of ankle, Benign localized hyperplasia of prostate with urinary obstruction and other lower urinary tract symptoms (FXFH)(194.15), Chronic systolic CHF (congestive heart failure) ([address]), Contusion of leg, right, COPD (chronic obstructive pulmonary disease) ([address]), Fractures, GERD (gastroesophageal reflux disease), Hypertension, Hypertensive urgency, Hypertrophy of prostate without urinary obstruction and other lower urinary tract symptoms (LUTS), No history of procedure, PAD (peripheral artery disease) ([address]), Pneumonia, and Thoracic aortic aneurysm ([address]). Past Surgical History:   has a past surgical history that includes hernia repair; Colonoscopy (2/24/2016); Cataract removal with implant (Right, 09/06/2018); pr xcapsl ctrc rmvl insj io lens prosth w/o ecp (Right, 9/6/2018); pr xcapsl ctrc rmvl insj io lens prosth w/o ecp (Left, 10/18/2018); Endoscopy, colon, diagnostic (11/13/2019); and Upper gastrointestinal endoscopy (N/A, 11/13/2019).     Medications:   Scheduled Meds:   Continuous Infusions:   PRN Meds:   Home Meds:   Prior to Admission medications

## 2023-11-21 NOTE — FLOWSHEET NOTE
11/20/23 2048   Assessment   Charting Type Shift assessment   Psychosocial   Psychosocial (WDL) WDL   Neurological   Neuro (WDL) WDL   Level of Consciousness 0   [place] Coma Scale   Eye Opening 4   Best Verbal Response 5   Best Motor Response 6   [place] Coma Scale Score 15   HEENT (Head, Ears, Eyes, Nose, & Throat)   HEENT (WDL) X   Right Ear Impaired hearing   Left Ear Impaired hearing   Throat Sore   Teeth Missing teeth   Respiratory   Respiratory (WDL) WDL   Respiratory Pattern Regular   Respiratory Depth Normal   Respiratory Quality/Effort Accessory muscle use   Chest Assessment Chest expansion symmetrical   L Breath Sounds Diminished   R Breath Sounds Diminished   Breath Sounds   Right Upper Lobe Diminished   Right Middle Lobe Diminished   Right Lower Lobe Diminished   Left Upper Lobe Diminished   Left Lower Lobe Diminished   Cardiac   Cardiac (WDL) X   Cardiac Regularity Irregular   Cardiac Rhythm Atrial fib   Cardiac Symptoms Chest pain;Peripheral edema   Cardiac Monitor   Telemetry Monitor Alarm Parameters PCU   Telemetry Box Number [id number]   Gastrointestinal   Abdominal (WDL) WDL   Genitourinary   Genitourinary (WDL) WDL   Suprapubic Tenderness No   Dysuria (Pain/Burning w/Urination) No   Peripheral Vascular   Peripheral Vascular (WDL) X   Edema Right lower extremity; Left lower extremity   RLE Edema +2;Pitting   LLE Edema +2;Pitting   Skin Integumentary    Skin Integumentary (WDL) WDL   Musculoskeletal   Musculoskeletal (WDL) X   RL Extremity Weakness   LL Extremity Weakness     Shift assessment completed. Patient awake in bed. A/Ox4. Wife at bedside. See flowsheet for vitals. Scheduled PM medications given per MAR. Nasal canula in place at 3L. Patient has no additional needs at this time. Call light and bedside table within reach.

## 2023-11-21 NOTE — PROGRESS NOTES
Patient called out with c/o chest pain. Patient states that he has had this before only this time it lasted longer and made him more short of breath. Patient rated his pain 10/10 during episode and asked for morphine. Patient stated the morphine helped with the pain previously. Patient was put in sitting position and oxygen was bumped up from 3L to 5L. Patient reported that it helped. Vitals were stable. CMU reported that there were no acute changes in heart rate or rhythm that deviated from patient's baseline. Charge and clinical notified and Hospitalist perfect served. Patient's pain subsided prior to MD orders being placed. Stat troponin, EKG and 2mg of a one time dose of  IV morphine ordered. Hopitalist notified of completed EKG. Patient is d/t go to [facility] cath lab tomorrow given heart rate comes down. Will continue to monitor.

## 2023-11-21 NOTE — PLAN OF CARE
HEART FAILURE CARE PLAN:    Comorbidities Reviewed: Yes   Patient has a past medical history of A-fib ([address]), Acute conjunctivitis of both eyes, Acute on chronic respiratory failure with hypoxia ([address]), Acute respiratory failure with hypoxia ([address]), Atrial fibrillation with RVR ([address]), Avulsion fracture of ankle, Benign localized hyperplasia of prostate with urinary obstruction and other lower urinary tract symptoms (YGMR)(655.06), Chronic systolic CHF (congestive heart failure) ([address]), Contusion of leg, right, COPD (chronic obstructive pulmonary disease) ([address]), Fractures, GERD (gastroesophageal reflux disease), Hypertension, Hypertensive urgency, Hypertrophy of prostate without urinary obstruction and other lower urinary tract symptoms (LUTS), No history of procedure, PAD (peripheral artery disease) ([address]), Pneumonia, and Thoracic aortic aneurysm ([address]). ECHOCARDIOGRAM Reviewed: Yes   Patient's Ejection Fraction (EF) is less than 40%    Weights Reviewed:  Yes   Admission weight: 113.2 kg (249 lb 8 oz)   Wt Readings from Last 3 Encounters:   11/20/23 112.9 kg (248 lb 12.8 oz)   10/09/23 114.8 kg (253 lb)   10/03/23 116 kg (255 lb 11.2 oz)     Intake & Output Reviewed: Yes     Intake/Output Summary (Last 24 hours) at 11/21/2023 0242  Last data filed at 11/21/2023 0143  Gross per 24 hour   Intake 520 ml   Output 1280 ml   Net -760 ml     Medications Reviewed: Yes   SCHEDULED HOSPITAL MEDICATIONS:   ipratropium 0.5 mg-albuterol 2.5 mg  1 Dose Inhalation BID RT    metoprolol succinate  25 mg Oral Nightly    morphine  2 mg IntraVENous Once    enoxaparin  1 mg/kg (Adjusted) SubCUTAneous BID    isosorbide dinitrate  5 mg Oral q8h    predniSONE  30 mg Oral Daily    losartan  25 mg Oral Daily    atorvastatin  20 mg Oral Daily    metoprolol succinate  50 mg Oral Daily    pantoprazole  40 mg Oral Daily    tamsulosin  0.4 mg Oral Daily    sodium chloride flush  5-40 mL IntraVENous 2 times per day    budesonide-formoterol  2 puff

## 2023-11-21 NOTE — PROCEDURES
CARDIAC CATHETERIZATION REPORT     Procedure Date:  2023  Patient Name: Anitha Magallon  MRN: [id number] : 1945      : Choco Gage MD      PROCEDURES PERFORMED  Left heart cath via right radial approach  Coronary angiography  Right heart cath via right antecubital approach  Moderate sedation 15 min CPT 07773 (Midazolam: 0.5 mg, Fentanyl: 25 mcg)  Sedation start time: 1154  Sed[facility] CPT 20467      INDICATION  New cardiomyopathy with LV dysfunction    PROCEDURE DESCRIPTION    Risks/benefits/alternatives/outcomes were discussed with with patient/family and informed consent was obtained. Patient was prepped and draped in the usual sterile fashion. Patient was sedated with midazolam. Local anaesthetic was applied over right IJ puncture site. Right IJ vein was selectively cannulated and a 8Fr sheath was inserted into right IJ. Carbon-Hernandez catheter was advanced through the sheath. Intracardiac pressures were recorded at various levels including RA, RV, pulmonary artery, and wedge position as well as blood samples taken from RA and PA for measurement of oxygen saturation. Subsequently, cardiac output was calculated using José method. At the conclusion of the procedure, catheter was removed from the sheath. There were no immediate complications. At the end of the procedure, Carbon-Hernandez catheter was removed without complications. Risks/benefits/alternatives/outcomes were discussed with patient and/or family in detail and informed consent was obtained. Patient was prepped and draped in the usual sterile fashion. Versed and fentanyl were used for conscious sedation. Local anaesthetic was applied over right antecubital IV site. Right antecubital vein was cannulated with a 5 Mauritanian sheath after an IV line exchanged over micropuncture wire. 5 Mauritanian Carbon-Hernandez catheter was advanced through the sheath.   Intracardiac pressures were recorded at

## 2023-11-22 ENCOUNTER — TELEPHONE (OUTPATIENT)
Dept: PULMONOLOGY | Age: 78
End: 2023-11-22

## 2023-11-22 ENCOUNTER — TELEPHONE (OUTPATIENT)
Dept: INTERNAL MEDICINE CLINIC | Age: 78
End: 2023-11-22

## 2023-11-22 VITALS
BODY MASS INDEX: 35.55 KG/M2 | WEIGHT: 253.9 LBS | RESPIRATION RATE: 16 BRPM | SYSTOLIC BLOOD PRESSURE: 138 MMHG | TEMPERATURE: 97.6 F | OXYGEN SATURATION: 96 % | HEART RATE: 105 BPM | DIASTOLIC BLOOD PRESSURE: 85 MMHG | HEIGHT: 71 IN

## 2023-11-22 LAB
ANION GAP SERPL CALCULATED.3IONS-SCNC: 10 MMOL/L (ref 3–16)
BACTERIA BLD CULT ORG #2: ABNORMAL
BACTERIA BLD CULT: NORMAL
BASOPHILS # BLD: 0 K/UL (ref 0–0.2)
BASOPHILS NFR BLD: 0.1 %
BUN SERPL-MCNC: 27 MG/DL (ref 7–20)
CALCIUM SERPL-MCNC: 8 MG/DL (ref 8.3–10.6)
CHLORIDE SERPL-SCNC: 109 MMOL/L (ref 99–110)
CO2 SERPL-SCNC: 19 MMOL/L (ref 21–32)
CREAT SERPL-MCNC: 1 MG/DL (ref 0.8–1.3)
DEPRECATED RDW RBC AUTO: 16.8 % (ref 12.4–15.4)
EOSINOPHIL # BLD: 0 K/UL (ref 0–0.6)
EOSINOPHIL NFR BLD: 0.3 %
GFR SERPLBLD CREATININE-BSD FMLA CKD-EPI: >60 ML/MIN/{1.73_M2}
GLUCOSE SERPL-MCNC: 89 MG/DL (ref 70–99)
HCT VFR BLD AUTO: 43.5 % (ref 40.5–52.5)
HGB BLD-MCNC: 13.9 G/DL (ref 13.5–17.5)
LYMPHOCYTES # BLD: 1.1 K/UL (ref 1–5.1)
LYMPHOCYTES NFR BLD: 16.4 %
MCH RBC QN AUTO: 30.3 PG (ref 26–34)
MCHC RBC AUTO-ENTMCNC: 32 G/DL (ref 31–36)
MCV RBC AUTO: 94.7 FL (ref 80–100)
MONOCYTES # BLD: 0.5 K/UL (ref 0–1.3)
MONOCYTES NFR BLD: 7 %
NEUTROPHILS # BLD: 5.1 K/UL (ref 1.7–7.7)
NEUTROPHILS NFR BLD: 76.2 %
ORGANISM: ABNORMAL
PLATELET # BLD AUTO: 220 K/UL (ref 135–450)
PMV BLD AUTO: 7.9 FL (ref 5–10.5)
POC ACT LR: 214 SEC
POTASSIUM SERPL-SCNC: 4.7 MMOL/L (ref 3.5–5.1)
RBC # BLD AUTO: 4.6 M/UL (ref 4.2–5.9)
SODIUM SERPL-SCNC: 138 MMOL/L (ref 136–145)
WBC # BLD AUTO: 6.7 K/UL (ref 4–11)

## 2023-11-22 PROCEDURE — 80048 BASIC METABOLIC PNL TOTAL CA: CPT

## 2023-11-22 PROCEDURE — 6370000000 HC RX 637 (ALT 250 FOR IP): Performed by: STUDENT IN AN ORGANIZED HEALTH CARE EDUCATION/TRAINING PROGRAM

## 2023-11-22 PROCEDURE — 97110 THERAPEUTIC EXERCISES: CPT

## 2023-11-22 PROCEDURE — 6360000002 HC RX W HCPCS: Performed by: STUDENT IN AN ORGANIZED HEALTH CARE EDUCATION/TRAINING PROGRAM

## 2023-11-22 PROCEDURE — 97535 SELF CARE MNGMENT TRAINING: CPT

## 2023-11-22 PROCEDURE — 2700000000 HC OXYGEN THERAPY PER DAY

## 2023-11-22 PROCEDURE — 36415 COLL VENOUS BLD VENIPUNCTURE: CPT

## 2023-11-22 PROCEDURE — 99232 SBSQ HOSP IP/OBS MODERATE 35: CPT | Performed by: NURSE PRACTITIONER

## 2023-11-22 PROCEDURE — 2580000003 HC RX 258: Performed by: STUDENT IN AN ORGANIZED HEALTH CARE EDUCATION/TRAINING PROGRAM

## 2023-11-22 PROCEDURE — 97166 OT EVAL MOD COMPLEX 45 MIN: CPT

## 2023-11-22 PROCEDURE — 97116 GAIT TRAINING THERAPY: CPT

## 2023-11-22 PROCEDURE — 97162 PT EVAL MOD COMPLEX 30 MIN: CPT

## 2023-11-22 PROCEDURE — 94761 N-INVAS EAR/PLS OXIMETRY MLT: CPT

## 2023-11-22 PROCEDURE — 94640 AIRWAY INHALATION TREATMENT: CPT

## 2023-11-22 PROCEDURE — 97530 THERAPEUTIC ACTIVITIES: CPT

## 2023-11-22 PROCEDURE — 85025 COMPLETE CBC W/AUTO DIFF WBC: CPT

## 2023-11-22 RX ORDER — AZITHROMYCIN 250 MG/1
250 TABLET, FILM COATED ORAL DAILY
Qty: 3 TABLET | Refills: 0 | Status: SHIPPED | OUTPATIENT
Start: 2023-11-22 | End: 2023-11-25

## 2023-11-22 RX ORDER — CEFUROXIME AXETIL 500 MG/1
500 TABLET ORAL 2 TIMES DAILY
Qty: 4 TABLET | Refills: 0 | Status: SHIPPED | OUTPATIENT
Start: 2023-11-22 | End: 2023-11-24

## 2023-11-22 RX ADMIN — SODIUM CHLORIDE 25 ML: 9 INJECTION, SOLUTION INTRAVENOUS at 00:07

## 2023-11-22 RX ADMIN — METOPROLOL SUCCINATE 50 MG: 50 TABLET, EXTENDED RELEASE ORAL at 08:11

## 2023-11-22 RX ADMIN — PREDNISONE 30 MG: 20 TABLET ORAL at 08:11

## 2023-11-22 RX ADMIN — SODIUM CHLORIDE, PRESERVATIVE FREE 10 ML: 5 INJECTION INTRAVENOUS at 08:12

## 2023-11-22 RX ADMIN — TIOTROPIUM BROMIDE INHALATION SPRAY 2 PUFF: 3.12 SPRAY, METERED RESPIRATORY (INHALATION) at 07:55

## 2023-11-22 RX ADMIN — IPRATROPIUM BROMIDE AND ALBUTEROL SULFATE 1 DOSE: 2.5; .5 SOLUTION RESPIRATORY (INHALATION) at 07:57

## 2023-11-22 RX ADMIN — AZITHROMYCIN MONOHYDRATE 250 MG: 500 INJECTION, POWDER, LYOPHILIZED, FOR SOLUTION INTRAVENOUS at 00:56

## 2023-11-22 RX ADMIN — PANTOPRAZOLE SODIUM 40 MG: 40 TABLET, DELAYED RELEASE ORAL at 08:11

## 2023-11-22 RX ADMIN — CEFTRIAXONE SODIUM 1000 MG: 1 INJECTION, POWDER, FOR SOLUTION INTRAMUSCULAR; INTRAVENOUS at 00:09

## 2023-11-22 RX ADMIN — TAMSULOSIN HYDROCHLORIDE 0.4 MG: 0.4 CAPSULE ORAL at 08:11

## 2023-11-22 RX ADMIN — Medication 2 PUFF: at 07:55

## 2023-11-22 RX ADMIN — RIVAROXABAN 20 MG: 20 TABLET, FILM COATED ORAL at 08:13

## 2023-11-22 RX ADMIN — LOSARTAN POTASSIUM 100 MG: 100 TABLET, FILM COATED ORAL at 08:11

## 2023-11-22 RX ADMIN — SODIUM CHLORIDE 25 ML: 9 INJECTION, SOLUTION INTRAVENOUS at 00:54

## 2023-11-22 RX ADMIN — ATORVASTATIN CALCIUM 20 MG: 10 TABLET, FILM COATED ORAL at 08:11

## 2023-11-22 NOTE — CARE COORDINATION
Patient provided with o2 tank per [facility]. No other needs.   Electronically signed by JAMAAL Paige on 11/22/2023 at 4:05 PM

## 2023-11-22 NOTE — DISCHARGE SUMMARY
Hospital Medicine Discharge Summary    Patient: Di George   : 1945     Admit Date: 2023   Discharge Date: 2023    Disposition:  [x]Home   []HHC  []SNF  []ECF  []Acute Rehab  []LTAC  []Hospice  Code status:  [x]Full  []DNR/CCA  []Limited (DNR/CCA with Do Not Intubate)  []DNRCC  Condition at Discharge: Stable  Primary Care Provider: Brisa Plasencia MD    Admitting Provider: Rachelle Pedro MD  Discharge Provider: CORRINE Fong - CNP     Discharge Diagnoses: Active Hospital Problems    Diagnosis     Status post cardiac catheterization [Z98.890]     CHF (congestive heart failure), NYHA class I, acute on chronic, combined ([address]) [I50.43]        Presenting Admission History:      43-year-old male with past medical history of atrial fibrillation, chronic HFrEF, COPD, chronic hypoxic respiratory failure on 3 L of nasal cannula baseline hypertension, PAD, thoracic aortic aneurysm, BPH, and CKD stage III who initially presented to [facility] with shortness of breath and chest discomfort. Patient was in A-fib with RVR at [facility]. A-fib treated with Cardizem initially and subsequently on metoprolol. He was found to be in congestive heart failure. Echocardiogram revealing reduced EF. Patient was transferred for further evaluation and cardiac catheterization. Patient did have bacteremia but thought to be contaminant. Patient underwent cardiac catheterization today with findings of nonobstructive CAD. Doing well post procedure. Cardiac catheterization site appears clean without hematoma        Assessment/Plan:      Acute on chronic HFrEF, Cardiomyopathy  Echocardiogram on  LVEF of 30-35%.  Left heart catheterization with nonobstructive CAD  Lasix resumed  Continue monitor ins and out/daily weight  Low-salt diet/fluid restriction  Cardiology following     A-fib  -Continue metoprolol  -Xarelto resumed  -Monitor telemetry  -Currently rate controlled identified, 1 in the left and 1 in the right upper lung zone and 1 in the right mid lung zone and infrahilar region. Tiny right nodular opacity adjacent to the fissure is unchanged. Mild bronchiectasis. Mild consolidation in the posterior lower lung zones. No effusions. No pneumothorax. Upper Abdomen: Limited images of the upper abdomen are unremarkable. Soft Tissues/Bones: No acute bone or soft tissue abnormality. No lytic or blastic lesion. No evidence of pulmonary embolism or acute pulmonary abnormality. Several foci of airspace opacity a somewhat nodular pattern on. These are nonspecific may represent developing airspace disease/infiltrate. Potentially these represent tiny infarcts in the correct clinical setting although no pulmonary emboli are identified. Recommend follow-up. No other acute finding or significant interval change. XR CHEST PORTABLE    Result Date: 11/18/2023  EXAMINATION: ONE XRAY VIEW OF THE CHEST 11/18/2023 8:19 am COMPARISON: October 1, 2023 HISTORY: ORDERING SYSTEM PROVIDED HISTORY: SOB TECHNOLOGIST PROVIDED HISTORY: Reason for exam:->SOB Reason for Exam: sob FINDINGS: Increased prominence of the pulmonary vasculature and interstitium. Possible small left-sided pleural effusion. No pneumothorax. Enlarged of the heart. Mediastinal silhouette is within normal limits. Atherosclerotic calcifications involving the thoracic aorta. Pulmonary edema likely related to congestive heart failure. Possible small left-sided pleural effusion. Stable cardiomegaly. Consults:     IP CONSULT TO HOME CARE NEEDS  IP CONSULT TO HEART FAILURE NURSE/COORDINATOR  IP CONSULT TO DIETITIAN    Labs:     Recent Labs     11/22/23  0625   WBC 6.7   HGB 13.9   HCT 43.5        Recent Labs     11/22/23  0625      K 4.7      CO2 19*   BUN 27*   CREATININE 1.0   CALCIUM 8.0*     No results for input(s): \"PROBNP\", \"TROPHS\" in the last 72 hours.     No results for input(s):

## 2023-11-22 NOTE — PROGRESS NOTES
Pt d/c'd home with spouse and daughter. Removed peripheral IV and stopped bleeding. Catheter intact. Pt tolerated well. No redness noted at site. Notified CMU and removed tele box. Reviewed d/c instructions, home meds, and  f/u information utilizing teach-back method. Scripts for two antibiotics picked up from [address] [place] [address] and given to patient. Patient verbalized understanding. Lali Lobo CM delivered O2 tank for patient to discharge home with as he requires 3liters of O2 and did not have tank. RN wheeled patient to personal vehicle.  Electronically signed by Rodríguez Guzman RN on 11/22/23 at 4:31 PM EST

## 2023-11-22 NOTE — PLAN OF CARE
Problem: Chronic Conditions and Co-morbidities  Goal: Patient's chronic conditions and co-morbidity symptoms are monitored and maintained or improved  Outcome: Adequate for Discharge     Problem: Discharge Planning  Goal: Discharge to home or other facility with appropriate resources  Outcome: Adequate for Discharge     Problem: Safety - Adult  Goal: Free from fall injury  Outcome: Adequate for Discharge     Problem: Skin/Tissue Integrity  Goal: Absence of new skin breakdown  Outcome: Adequate for Discharge     Problem: ABCDS Injury Assessment  Goal: Absence of physical injury  Outcome: Adequate for Discharge     Problem: Cardiovascular - Adult  Goal: Maintains optimal cardiac output and hemodynamic stability  Outcome: Adequate for Discharge     Problem: Musculoskeletal - Adult  Goal: Return mobility to safest level of function  Outcome: Adequate for Discharge

## 2023-11-22 NOTE — PROGRESS NOTES
4 Eyes Skin Assessment     NAME:  Bridger Escobar  YOB: 1945  MEDICAL RECORD NUMBER:  [id number]    The patient is being assessed for  Admission    I agree that at least one RN has performed a thorough Head to Toe Skin Assessment on the patient. ALL assessment sites listed below have been assessed. Areas assessed by both nurses:    Head, Face, Ears, Shoulders, Back, Chest, Arms, Elbows, Hands, Sacrum. Buttock, Coccyx, Ischium, Legs. Feet and Heels, and Under Medical Devices   Scattered bruising  Wound on buttcheeks  Does the Patient have a Wound? Yes wound(s) were present on assessment.  LDA wound assessment was Initiated and completed by RN       Matt Prevention initiated by RN: Yes  Wound Care Orders initiated by RN: Yes    Pressure Injury (Stage 3,4, Unstageable, DTI, NWPT, and Complex wounds) if present, place Wound referral order by RN under : Yes    New Ostomies, if present place, Ostomy referral order under : No     Nurse 1 eSignature: Electronically signed by Annette Hernandez RN on 11/21/23 at 7:55 PM EST    **SHARE this note so that the co-signing nurse can place an eSignature**    Nurse 2 eSignature: Electronically signed by Jc Castañeda RN on 11/21/23 at 7:56 PM EST

## 2023-11-22 NOTE — PROGRESS NOTES
Occupational Therapy  Facility/Department: [place] [address] [facility]  Occupational Therapy Initial Assessment and Treatment    Name: Parviz Yen  : 1945  MRN: [id number]  Date of Service: 2023    Discharge Recommendations:  24 hour supervision or assist, Home with Home health OT  OT Equipment Recommendations  Equipment Needed: No       Patient Diagnosis(es): There were no encounter diagnoses. Past Medical History:  has a past medical history of A-fib ([address]), Acute conjunctivitis of both eyes, Acute on chronic respiratory failure with hypoxia ([address]), Acute respiratory failure with hypoxia ([address]), Atrial fibrillation with RVR ([address]), Avulsion fracture of ankle, Benign localized hyperplasia of prostate with urinary obstruction and other lower urinary tract symptoms (LHJH)(377.52), Chronic systolic CHF (congestive heart failure) ([address]), Contusion of leg, right, COPD (chronic obstructive pulmonary disease) ([address]), Fractures, GERD (gastroesophageal reflux disease), Hypertension, Hypertensive urgency, Hypertrophy of prostate without urinary obstruction and other lower urinary tract symptoms (LUTS), No history of procedure, PAD (peripheral artery disease) ([address]), Pneumonia, and Thoracic aortic aneurysm ([address]). Past Surgical History:  has a past surgical history that includes hernia repair; Colonoscopy (2016); Cataract removal with implant (Right, 2018); pr xcapsl ctrc rmvl insj io lens prosth w/o ecp (Right, 2018); pr xcapsl ctrc rmvl insj io lens prosth w/o ecp (Left, 10/18/2018); Endoscopy, colon, diagnostic (2019); and Upper gastrointestinal endoscopy (N/A, 2019). Assessment   Performance deficits / Impairments: Decreased functional mobility ; Decreased safe awareness;Decreased balance;Decreased coordination;Decreased ADL status; Decreased high-level IADLs;Decreased strength  Assessment: Pt presents to [facility] with status post cardiac catheterization.  Pt PLOF IND for ADLs, exertion.    ------------------------------------------------------------------------------------------------------------------------------------         3) 5 P's of Energy Conservation    Pt was educated on the following aspects of Energy Conservation techniques. Prioritize/Plan: Decide what needs to be done today, and what can wait for a later date, write to do lists, Plan ahead to avoid extra trips, Gather supplies and equipment needed before starting an activity. Position: Avoid tiring and awkward posture that may impair breathing  Pace: Slow and steady pace, never rushing! Pursed lip breathing.   Pursed Lip Breathing: \"Smell the roses, blow out the candles\"  ------------------------------------------------------------------------------------------------------------------------------------         4) Fluid intake tracking    Pt participated in the following fluid tracking management   [] Identifying 4, 8, and 12 ounces of fluid size  [] Identify mL to cup conversion  [] Understanding Jello, watermelon and Ice cream contributing to fluid intake   [] Acknowledge current fluid restriction limits/orders  []Pt required assistance or correction of error in the following:   [x] Not appropriate for patient  ------------------------------------------------------------------------------------------------------------------------------------                  Education Given To: Patient  Education Provided: Role of Therapy;Transfer Training;Equipment;Plan of Care;Precautions;Orientation  Education Method: Demonstration;Verbal  Barriers to Learning: None  Education Outcome: Verbalized understanding;Demonstrated understanding;Continued education needed           AM-PAC Score        AM-PAC Inpatient Daily Activity Raw Score: 21 (11/22/23 1215)  AM-PAC Inpatient ADL T-Scale Score : 44.27 (11/22/23 1215)  ADL Inpatient CMS 0-100% Score: 32.79 (11/22/23 1215)  ADL Inpatient CMS G-Code Modifier : Melia Buck (11/22/23 1215)      Goals  Short Term Goals  Time Frame for Short Term Goals: 11/22, unless otherwise noted  Short Term Goal 1: Pt to complete functional mobility/toilet transfer with SVP  Short Term Goal 2: Pt to complete LB dressing with SVP  Short Term Goal 3: Pt to verbalized CHF education - GOAL MET       Therapy Time   Individual Concurrent Group Co-treatment   Time In       1018   Time Out       1059   Minutes       41   Timed Code Treatment Minutes: 31 Minutes (10 minute)       May Serge, OT

## 2023-11-22 NOTE — CARE COORDINATION
(wife)  Plans to Return to Present Housing: Yes  Other Identified Issues/Barriers to RETURNING to current housing: none  Potential Assistance needed at discharge: [phone] DME:    Patient expects to discharge to: [address] [facility] for transportation at discharge: Self    Financial    Payor: Anita Odom / Plan: Chepe Mcgill PPO / Product Type: Medicare /     Does insurance require precert for SNF: Yes    Potential assistance Purchasing Medications: No  Meds-to-Beds request: Yes      [facility] - [facility], [address] [place] [address] [phone] - F [phone]  [address] [place] [address]  [address] [facility] [address]  Phone: [phone] Fax: [phone]    [address] - [facility] [phone] - f [phone]  [address] [place] [address] [address]  [address] [facility] [address]  Phone: [phone] Fax: [phone]    Radha Pelaez [id number] - [address] [facility] , [place] - [address] [phone] [facility] [phone]  [address] [place] [address]  Phone: [phone] Fax: [phone]      Notes:    Factors facilitating achievement of predicted outcomes: Family support, Motivated, and Cooperative    Barriers to discharge: Medical complications    Additional Case Management Notes: Cm met with pt and wife at bedside. Pt from home with wife. IPTA, Uses 3L O2 and active with [facility]. Pt would like CM to check if [facility] has smaller portable O2 tanks. They would like home care at NH. CM called Lashell Dockery at [facility]. He will review what pt has at home and see if there is a smaller portable tank. CM made referral to [facility] for home care. Justin Erickson needs to get approval prior to accepting. If she cannot accept she will find another agency.      The Plan for Transition of Care is related to the following treatment goals of Status post cardiac catheterization [Z98.890]  CHF (congestive heart failure), NYHA class I, acute on chronic, combined ([address]) [E88.04]    IF APPLICABLE: The Patient and/or patient representative Jesus Harmon and his family were provided with a choice of provider and agrees with the discharge plan. Freedom of choice list with basic dialogue that supports the patient's individualized plan of care/goals and shares the quality data associated with the providers was provided to: Patient   Patient Representative Name:       The Patient and/or Patient Representative Agree with the Discharge Plan?  Yes    Cliff Diaz RN  Case Management Department  Ph: [phone]

## 2023-11-22 NOTE — CONSULTS
Patient waiting for discharge papers. Wound Care evaluated buttocks. Left buttocks had scab which fell off. Purple area around it but blanchable, not a pressure injury. Patient states he scratches buttocks with finger nails and it sometimes bleeds. Provided patient Zinc paste to take home, instructed spouse to use on any areas of concern on buttocks or arms, legs. Only small amount will do.

## 2023-11-22 NOTE — TELEPHONE ENCOUNTER
----- Message from Radha Armendariz MD sent at 11/22/2023  2:32 PM EST -----  Contact: Junior Marcos [phone]  ok  ----- Message -----  From: Austyn Werner  Sent: 11/22/2023  12:58 PM EST  To: Radha Armendariz MD    [facility] requesting verbal order for SN, PT, & OT. Patient is being released for [facility] today.   Please advise

## 2023-11-22 NOTE — PLAN OF CARE
Problem: Chronic Conditions and Co-morbidities  Goal: Patient's chronic conditions and co-morbidity symptoms are monitored and maintained or improved  Outcome: Progressing  Flowsheets (Taken 11/21/2023 1956)  Care Plan - Patient's Chronic Conditions and Co-Morbidity Symptoms are Monitored and Maintained or Improved: Monitor and assess patient's chronic conditions and comorbid symptoms for stability, deterioration, or improvement     Problem: Discharge Planning  Goal: Discharge to home or other facility with appropriate resources  Outcome: Progressing  Flowsheets  Taken 11/21/2023 1956 by Bryanna Eason RN  Discharge to home or other facility with appropriate resources:   Identify barriers to discharge with patient and caregiver   Arrange for needed discharge resources and transportation as appropriate   Identify discharge learning needs (meds, wound care, etc)    Problem: Safety - Adult  Goal: Free from fall injury  Outcome: Progressing    Problem: Skin/Tissue Integrity  Goal: Absence of new skin breakdown  Description: 1. Monitor for areas of redness and/or skin breakdown  2. Assess vascular access sites hourly  3. Every 4-6 hours minimum:  Change oxygen saturation probe site  4. Every 4-6 hours:  If on nasal continuous positive airway pressure, respiratory therapy assess nares and determine need for appliance change or resting period.   Outcome: Progressing     Problem: ABCDS Injury Assessment  Goal: Absence of physical injury  Outcome: Progressing       Problem: Cardiovascular - Adult  Goal: Maintains optimal cardiac output and hemodynamic stability  Outcome: Progressing     Problem: Musculoskeletal - Adult  Goal: Return mobility to safest level of function  Outcome: Progressing

## 2023-11-22 NOTE — DISCHARGE INSTRUCTIONS
FOLLOW-UP APPOINTMENTS    [place] [facility] - Follow-up appointment on 12/26/2023 at 930AM with Joanne Sands CNP. Please arrive 15 minutes early to complete necessary paperwork. [facility] Office [address]:  [phone]. If you are unable to make this appointment, please call to reschedule [phone]. To get to the office go to the side of the hospital at [facility], enter at the [facility], entrance that faces [address]. Take the elevator to the 3rd floor turn right off elevator then take hallway to the right.           Follow up with PCP/Pulmonology-Dr. Damaso Tabares    Continue home oxygen    Return for any concerns

## 2023-11-22 NOTE — CARE COORDINATION
[facility]    Received referral for homecare services. [facility] unable to staff in timely manner. [facility] [place], [facility]. has accepted for services at discharge. [facility] on A1 made aware. Electronically signed by Lizette Dubon LPN on 66/80/62 at 47:16 AM EST         3 [facility] Transition Nurse  [phone]

## 2023-11-22 NOTE — CARE COORDINATION
CASE MANAGEMENT DISCHARGE SUMMARY      Discharge to: home with wife and [facility], [facility] denies other d/c needs at this time. Precertification completed: [facility] Exemption Notification (HENS) completed: NA    IMM given: 11/22/23    New Durable Medical Equipment ordered/agency: NA    Transportation:    Family/car: family       Confirmed discharge plan with:     Patient: yes     Family:  yes     Facility/Agency, name:  [facility]/AVS to obtain from [place], name: Kendra Nunez    Note: Discharging nurse to complete J[facility]MINE, reconcile AVS, and place final copy with patient's discharge packet. RN to ensure that written prescriptions for  Level II medications are sent with patient to the facility as per protocol.

## 2023-11-22 NOTE — PROGRESS NOTES
Physical Therapy  Facility/Department: [address] [place] [address] REMOTE TELEMETRY  Physical Therapy Initial Assessment/treatment    Name: Jennifer Guerra  : 1945  MRN: [id number]  Date of Service: 2023    Discharge Recommendations:  Home with assist PRN   PT Equipment Recommendations  Equipment Needed: No  Other: no DME needs  If pt is unable to be seen after this session, please let this note serve as discharge summary. Please see case management note for discharge disposition. Thank you. Patient Diagnosis(es): There were no encounter diagnoses. Past Medical History:  has a past medical history of A-fib ([address]), Acute conjunctivitis of both eyes, Acute on chronic respiratory failure with hypoxia ([address]), Acute respiratory failure with hypoxia ([address]), Atrial fibrillation with RVR ([address]), Avulsion fracture of ankle, Benign localized hyperplasia of prostate with urinary obstruction and other lower urinary tract symptoms ([facility])(580.04), Chronic systolic CHF (congestive heart failure) ([address]), Contusion of leg, right, COPD (chronic obstructive pulmonary disease) ([address]), Fractures, GERD (gastroesophageal reflux disease), Hypertension, Hypertensive urgency, Hypertrophy of prostate without urinary obstruction and other lower urinary tract symptoms (LUTS), No history of procedure, PAD (peripheral artery disease) ([address]), Pneumonia, and Thoracic aortic aneurysm ([address]). Past Surgical History:  has a past surgical history that includes hernia repair; Colonoscopy (2016); Cataract removal with implant (Right, 2018); pr xcapsl ctrc rmvl insj io lens prosth w/o ecp (Right, 2018); pr xcapsl ctrc rmvl insj io lens prosth w/o ecp (Left, 10/18/2018); Endoscopy, colon, diagnostic (2019); and Upper gastrointestinal endoscopy (N/A, 2019). Assessment   Body Structures, Functions, Activity Limitations Requiring Skilled Therapeutic Intervention: Decreased functional mobility ; Decreased strength;Decreased safe decreased, functional  Strength: Generally decreased, functional  Coordination: Generally decreased, functional  Tone: Normal  Sensation: Intact                 Bed Mobility Training  Bed Mobility Training: Yes  Interventions: Manual cues; Safety awareness training  Supine to Sit: Adaptive equipment;Stand-by assistance (HOB elevated and rails used)  Scooting: Supervision (at EOB)  Balance  Sitting: Intact  Standing: Impaired  Standing - Static: Good  Standing - Dynamic: Fair;Occasional  Transfer Training  Transfer Training: Yes  Interventions: Safety awareness training;Verbal cues  Sit to Stand: Stand-by assistance (no AD)  Stand to Sit: Stand-by assistance  Bed to Chair: Stand-by assistance  Gait Training  Gait Training: Yes  Gait  Overall Level of Assistance: Contact-guard assistance (no AD, pt started veering with fatigue)  Distance (ft): 100 Feet  Gait Abnormalities: Path deviations  Wheelchair Management  Wheelchair Management: No                 Exercise Treatment: seated BLE AP, LAQ, marches, abd/add, clement breathing x10            AM-PAC Score  AM-PAC Inpatient Mobility Raw Score : 22 (11/22/23 1317)  AM-PAC Inpatient T-Scale Score : 53.28 (11/22/23 1317)  Mobility Inpatient CMS 0-100% Score: 20.91 (11/22/23 1317)  Mobility Inpatient CMS G-Code Modifier : CJ (11/22/23 1317)            Goals  Short Term Goals  Time Frame for Short Term Goals: 11/29  Short Term Goal 1: Pt will perform STS and bed to chair INDEP  Short Term Goal 2: pt will perform bed mobility INDEP  Short Term Goal 3: pt will meet 3/5 CHF goals  Short Term Goal 4: pt will ambulate 150 ft with no AD INDEP  Patient Goals   Patient Goals : \"to get stronger\"       Education  Patient Education  Education Given To: Patient; Family  Education Provided: Transfer Training;Role of Therapy;Plan of Care; Fall Prevention Strategies  Education Method: Verbal  Barriers to Learning: None  Education Outcome: Verbalized understanding;Continued education needed strenuous exercise they have ever experienced.)  []20  Maximal exertion. 3) Pt educated in and completed Therapeutic exercise (as indicated below for 1 set) to promote circulation and prevent complications of bedrest with patient verbalizes understanding of employing green zone, yellow zone and red zone to seek provider input and evaluation. Educated patient in :  []Supine    Level 1: Bed Exercise 10-15 reps, 2x/day  []Ankle Pumps  []Heel Slides  []Hip Abduction  []Buttocks Squeeze  []Diaphragmatic Breathing with TA set  []Shoulder Shrugs  []Bicep Curl  []Hands open/close                          [x]Sitting    Level 2: Seated Exercises 10-15 reps, 2x/day  [x]Toe raise/heel raise  [x]Long Arc Quad  [x]Seated March  [x]Seated Clamshell  [x]Diaphragmatic Breathing with TA set and BUE ER and IR  []Shoulder Shrugs  []Bicep Curls  []Hands open/close                         []Standing   Level 3: Standing Exercises 10-15 reps, 2x/day     []Sit to/from stand  []Standing march  []Standing side steps  []Standing bilateral heel raise  []Diaphragmatic breathing with TA set and BUE flex/ext  []Shoulder Shrugs  []Bicep Curls  []Hands open/close                        []WalkingLevel 1: Educated patient in initiating walking when in the Green zone and to Start with 2-5 minutes daily and work up to 10 minutes per day. Walk at a slow, comfortable pace with your exertion level Very Light (JHON 9) to Light (JHON 11)   You should be able to comfortably hold a conversation while walking. 4)Daily Weight check/Stepping on Scale  [x]Pt educated in importance of checking body weight daily and []demonstrate/[x]verbalizes safety in:stepping up to weigh self and complete downward gaze/head nod to read scale on standard scale  and safety stepping off scale. []Barriers to completion of Daily weight check:       5)Pt voices and demonstrates appropriate teach back of Heart Failure Education Program with : use of the   [x]1.  Identifying

## 2023-11-24 ENCOUNTER — CARE COORDINATION (OUTPATIENT)
Dept: CASE MANAGEMENT | Age: 78
End: 2023-11-24

## 2023-11-24 DIAGNOSIS — I50.9 ACUTE ON CHRONIC CONGESTIVE HEART FAILURE, UNSPECIFIED HEART FAILURE TYPE (HCC): Primary | ICD-10-CM

## 2023-11-24 LAB
BACTERIA BLD CULT ORG #2: NORMAL
BACTERIA BLD CULT: NORMAL

## 2023-11-24 PROCEDURE — 1111F DSCHRG MED/CURRENT MED MERGE: CPT | Performed by: INTERNAL MEDICINE

## 2023-11-24 NOTE — CARE COORDINATION
it. Pat the incision dry. Do not soak the catheter site until it is healed. Don't take a bath for 1 week, or until your doctor tells you it's okay. No lotions, powders or ointments near site for at least 5 days. No lifting lifting over 5 pounds for 5 days. If in groin-if you have to walk up stairs, walk up them without bending the affected leg. If you are bleeding, lie down and press on the area for 15 minutes to try to make it stop. If the bleeding does not stop, call your doctor or seek immediate medical care. If bleeding occurs from the site or a hematoma (lump), begins to increase in size, apply pressure directly over the and call 911 and return to the hospital   Call your doctor now or seek medical care if:  You have any coolness or numbness in the leg was done  You have any chills, fever, itching, red bumps or rash  You have signs of infection such as:    *Increased pain, swelling, warmth, or redness   *Red streaks leading from the incision   *Pus draining from the incision   *A fever  Call 911 anytime you think you may need emergency care. For example: You passed out (lost consciousness)  You have severe trouble breathing  You have sudden chest pain & shortness of breath, or you cough up blood  Patient/caregiver verbalized understanding. Care Transition Nurse reviewed discharge instructions, medical action plan, and red flags with patient who verbalized understanding. The patient was given an opportunity to ask questions and does not have any further questions or concerns at this time. Were discharge instructions available to patient? Yes. Reviewed appropriate site of care based on symptoms and resources available to patient including: PCP  Specialist  When to call 911. The patient agrees to contact the PCP office for questions related to their healthcare.      Advance Care Planning:   Does patient have an Advance Directive: not on file; education provided and declined referral .    Advance

## 2023-11-24 NOTE — PROGRESS NOTES
Physician Progress Note      PATIENT:               Juanjo Green  CSN #:                  [id number]  :                       1945  ADMIT DATE:       2023 7:54 AM  DISCH DATE:        2023 10:10 AM  RESPONDING  PROVIDER #:        Michael Merino MD          QUERY TEXT:    Pt admitted with  a-fib with RVR and has systolic CHF documented. If possible,   please document in progress notes and discharge summary further specificity   regarding the type and acuity of CHF:    The medical record reflects the following:  Risk Factors: systolic CHF noted  Clinical Indicators: ECHO - EF 30-35%  Treatment: IV lasix given, now stopped per cardiology , daily weights, intake   and output      Thank You Nayla Woo RN, [email or web address]  Options provided:  -- Acute on Chronic Systolic CHF/HFrEF  -- Acute on Chronic Systolic and Diastolic CHF  -- Acute Systolic CHF/HFrEF  -- Acute Systolic and Diastolic CHF  -- Chronic Systolic CHF/HFrEF  -- Chronic Systolic and Diastolic CHF  -- Other - I will add my own diagnosis  -- Disagree - Not applicable / Not valid  -- Disagree - Clinically unable to determine / Unknown  -- Refer to Clinical Documentation Reviewer    PROVIDER RESPONSE TEXT:    This patient is in acute on chronic systolic CHF/HFrEF.     Query created by: Nayla Woo on 2023 10:12 AM      Electronically signed by:  Michael Merino MD 2023 5:59 PM

## 2023-11-28 NOTE — RESULT ENCOUNTER NOTE
Culture result reviewed. No further treatment needed. This is most likely contaminant since only isolated in 1 blood culture and not in both blood cultures. On top of that, he had a repeat set of 2 cultures which were negative for any growth which was also reassuring. Hospitalist was aware of this at [facility] prior to transfer to [facility] for cardiac catheterization as well.

## 2023-11-29 ENCOUNTER — OFFICE VISIT (OUTPATIENT)
Dept: INTERNAL MEDICINE CLINIC | Age: 78
End: 2023-11-29

## 2023-11-29 ENCOUNTER — CARE COORDINATION (OUTPATIENT)
Dept: CASE MANAGEMENT | Age: 78
End: 2023-11-29

## 2023-11-29 VITALS
SYSTOLIC BLOOD PRESSURE: 98 MMHG | OXYGEN SATURATION: 95 % | HEIGHT: 71 IN | RESPIRATION RATE: 16 BRPM | DIASTOLIC BLOOD PRESSURE: 60 MMHG | WEIGHT: 249 LBS | HEART RATE: 63 BPM | BODY MASS INDEX: 34.86 KG/M2

## 2023-11-29 DIAGNOSIS — J44.1 COPD EXACERBATION (HCC): ICD-10-CM

## 2023-11-29 DIAGNOSIS — I50.21 ACUTE SYSTOLIC CHF (CONGESTIVE HEART FAILURE) (HCC): ICD-10-CM

## 2023-11-29 DIAGNOSIS — I25.10 CORONARY ARTERY DISEASE INVOLVING NATIVE CORONARY ARTERY OF NATIVE HEART WITHOUT ANGINA PECTORIS: ICD-10-CM

## 2023-11-29 DIAGNOSIS — I48.0 PAROXYSMAL A-FIB (HCC): ICD-10-CM

## 2023-11-29 DIAGNOSIS — Z09 HOSPITAL DISCHARGE FOLLOW-UP: Primary | ICD-10-CM

## 2023-11-29 PROCEDURE — 3078F DIAST BP <80 MM HG: CPT | Performed by: NURSE PRACTITIONER

## 2023-11-29 PROCEDURE — 1111F DSCHRG MED/CURRENT MED MERGE: CPT | Performed by: NURSE PRACTITIONER

## 2023-11-29 PROCEDURE — 3074F SYST BP LT 130 MM HG: CPT | Performed by: NURSE PRACTITIONER

## 2023-11-29 PROCEDURE — 99214 OFFICE O/P EST MOD 30 MIN: CPT | Performed by: NURSE PRACTITIONER

## 2023-11-29 PROCEDURE — 1123F ACP DISCUSS/DSCN MKR DOCD: CPT | Performed by: NURSE PRACTITIONER

## 2023-11-29 RX ORDER — LOSARTAN POTASSIUM 50 MG/1
50 TABLET ORAL DAILY
Qty: 90 TABLET | Refills: 0 | Status: SHIPPED | OUTPATIENT
Start: 2023-11-29

## 2023-11-29 ASSESSMENT — ENCOUNTER SYMPTOMS
COUGH: 0
SHORTNESS OF BREATH: 0

## 2023-11-29 NOTE — PROGRESS NOTES
lovenox for now    -started on cardizem gtt and now weaned off   -continue metoprolol , Xarelto.  -echo with reduced EF   -cardiology consulted   Send to [facility] for cath. #Chest pain/abdominal pain   -could be 2/2 to above   -trops negative x3   -cardiology consulted  -pain has resolved today   -likely needs cardiac cath after echo results --> went for cath. Showed non-obstructive disease. #Cardiomyopathy   #Systolic HF   -last echo as above   -repeat echo as below with reduced EF   -IV lasix given, now stopped per cardiology   -daily weights, intake and output   -has had -3 L output      #Elevated d dimer   -CTA chest without evidence of PE      #Acute duodenitis   -noted on CT scan   -patient is asymptomatic      #1 out of 2 +blood culture -likely contaminate   +gram positive rods   -afebrile, procalc negative, LA neg   -did have leukocytosis but that is now resolved   -repeat cultures NGTD      #COPD with AE   #Acute on chronic hypoxic respiratory failure  #Pulmonary nodule  -continue prn inhalers   -wears 3 L nightly and prn, requiring continuously  -on prednisone   -sputum culture pending   -pulmonology consulted   Completed antibiotic and prednisone     #CKD Stage III   -Cr stable     #HTN   -BP is borderline.   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   --Martha Morrison, APRN - CNP

## 2023-11-29 NOTE — PROGRESS NOTES
Physician Progress Note      PATIENT:               Concha Roman  [facility] #:                  [id number]  :                       1945  ADMIT DATE:       2023 10:45 AM  DISCH DATE:        2023 4:33 PM  RESPONDING  PROVIDER #:        Spring AMADO          QUERY TEXT:    Patient admitted with dilated cardiomyopathy. Noted documentation of Acute   Kidney Injury by Cards in the cardiac cath note dated . In order to   support the diagnosis of TRACY, please include additional clinical indicators in   your documentation. ? Or please document if the diagnosis of TRACY has been   ruled out after further study. The medical record reflects the following:  Risk Factors: diuretics, HTN    Clinical Indicators:    Cr 1.4-1.0 (prior to transfer 1.0-1.2)  Cr in Oct 1.7-1.5-1.7-1.5    Interventional Cards -  Admit to inpatient medicine for management of A-fib with RVR and IV hydration   for TRACY  Hold Lasix, gentle IV hydration for 8 to 10 hours    Treatment: IVFs    Defined by Kidney Disease Improving Global Outcomes (KDIGO) clinical practice   guideline for acute kidney injury:  -Increase in SCr by greater than or equal to 0.3 mg/dl within 48 hours; or  -Increase or decrease in SCr to greater than or equal to 1.5 times baseline,   which is known or presumed to have occurred within the prior 7 days; or  -Urine volume < 0.5ml/kg/h for 6 hours. Thank you,  Char Power RN, BSN, CRCR, CCDS, SMART  Clinical Documentation Improvement  Jose G [email or web address]. [email or web address]  [phone] or via Perfect Serve  Options provided:  -- Acute kidney injury evidenced by, Please document evidence as well as a   numerical baseline creatinine, if known.   -- Acute kidney injury ruled out after study  -- Other - I will add my own diagnosis  -- Disagree - Not applicable / Not valid  -- Disagree - Clinically unable to determine / Unknown  -- Refer to Clinical Documentation Reviewer    PROVIDER RESPONSE TEXT:    Acute kidney injury was ruled out after study.     Query created by: Kristie Chirinos on 11/27/2023 8:50 AM      Electronically signed by:  Rachel Mckeon 11/28/2023 7:32 PM

## 2023-11-29 NOTE — CARE COORDINATION
Care Transitions Outreach Attempt      Attempted to reach patient for transitions of care follow up. Unable to reach patient no vm box setup. Patient: Virginia Keenan Patient : 1945 MRN: [id number]    Last Discharge Facility       Date Complaint Diagnosis Description Type Department Provider    23   Admission (Discharged) from [facility] MD              Was this an external facility discharge? No Discharge Facility Name: n.a    Noted following upcoming appointments from discharge chart review:   [facility] follow up appointment(s):   Future Appointments   Date Time Provider [address]   2023  9:30 AM Kiara Parrish MD [facility] PULM [facility]   2023  9:30 AM CORRINE Mccabe - CNP P CLER CAR [facility]   2024 10:40 AM MD Ephraim Chamberlain Int None     Non-[facility]  follow up appointment(s):  Stacy VINCENT, RN, [facility]  Care Transition Nurse  [phone] mobile

## 2023-11-29 NOTE — PROGRESS NOTES
Physician Progress Note      PATIENT:               Eliud Singh  CSN #:                  [id number]  :                       1945  ADMIT DATE:       2023 10:45 AM  DISCH DATE:        2023 4:33 PM  RESPONDING  PROVIDER #:        Bree AMADO          QUERY TEXT:    Patient admitted with acute CHF, noted to have  atrial fibrillation and is   maintained on Xarelto. If possible, please document in progress notes and   discharge summary if you are evaluating and/or treating any of the following: The medical record reflects the following:  Risk Factors: COPD, age, CAD, HTN, CHF  Clinical Indicators: Per discharge summary \"A-fib, Continue metoprolol. Xarelto resumed\". Treatment: Xarelto, serial labs, supportive care, cardiology consult    Thank you,  Vero Storm RN BSN  Options provided:  -- Secondary hypercoagulable state in a patient with atrial fibrillation  -- Other - I will add my own diagnosis  -- Disagree - Not applicable / Not valid  -- Disagree - Clinically unable to determine / Unknown  -- Refer to Clinical Documentation Reviewer    PROVIDER RESPONSE TEXT:    This patient has secondary hypercoagulable state in a patient with atrial   fibrillation.     Query created by: Vero Storm on 2023 8:56 AM      Electronically signed by:  Abram Purdy 2023 7:35 PM

## 2023-12-05 ENCOUNTER — OFFICE VISIT (OUTPATIENT)
Dept: PULMONOLOGY | Age: 78
End: 2023-12-05
Payer: MEDICARE

## 2023-12-05 VITALS
DIASTOLIC BLOOD PRESSURE: 74 MMHG | HEIGHT: 71 IN | OXYGEN SATURATION: 96 % | HEART RATE: 83 BPM | WEIGHT: 247 LBS | SYSTOLIC BLOOD PRESSURE: 128 MMHG | BODY MASS INDEX: 34.58 KG/M2

## 2023-12-05 DIAGNOSIS — R93.89 ABNORMAL CT OF THE CHEST: ICD-10-CM

## 2023-12-05 DIAGNOSIS — R91.1 PULMONARY NODULE: Primary | ICD-10-CM

## 2023-12-05 PROCEDURE — 3074F SYST BP LT 130 MM HG: CPT | Performed by: INTERNAL MEDICINE

## 2023-12-05 PROCEDURE — 3078F DIAST BP <80 MM HG: CPT | Performed by: INTERNAL MEDICINE

## 2023-12-05 PROCEDURE — 1123F ACP DISCUSS/DSCN MKR DOCD: CPT | Performed by: INTERNAL MEDICINE

## 2023-12-05 PROCEDURE — 99214 OFFICE O/P EST MOD 30 MIN: CPT | Performed by: INTERNAL MEDICINE

## 2023-12-05 NOTE — PROGRESS NOTES
Pulmonary Follow-up Note  Chief Complaint: Shortness of breath, COPD  Referring Provider: hospital f/u    Interval History 12/5/23  - admitted in Nov 2023 with AFIB RVR and HFrEF, underwent C with NOCAD       Interval history 4/4/23:  significant dyspnea on exertion, improved temporarily with alb/atr. Needs new nebulizer. Not on any long acting BD. Presenting history:  8/4/2019 with a month long history of moderately increased shortness of breath, associated with wheezing and nonproductive cough, became severe. He is feeling better with inhaled bronchodilators given the emergency department. He has had previous episodes of COPD in the past.  He notes he has been waking up frequently at night with wheezing, benefits from his home nebulizer. reports that he quit smoking about 6 years ago. His smoking use included cigarettes. He has a 110.00 pack-year smoking history. He has never used smokeless tobacco.       Physical Exam:  Blood pressure 128/74, pulse 83, height 1.803 m (5' 11\"), weight 112 kg (247 lb), SpO2 96 %. Constitutional:  No acute distress. HENT:  Oropharynx is clear and moist.   Neck: No tracheal deviation present. Cardiovascular: Normal heart sounds. + lower extremity edema. Pulmonary/Chest: No wheezes. No rhonchi. No rales. + decreased breath sounds. No accessory muscle usage or stridor. Musculoskeletal: No cyanosis. No clubbing. Skin: Skin is warm and dry. Psychiatric: Normal mood and affect. Neurologic: speech fluent, alert and oriented, strength symmetric       Data:   - PFTs -   PFT 8/22/19 FEV1 2.24 L 70% 0.67 TLC 6.45 L 92% DLCO 15.42 53%    - Imaging -   CTPA 11/18/23  Several foci of airspace opacity a somewhat nodular pattern on. These are  nonspecific may represent developing airspace disease/infiltrate.     - Cytopathology & Labs -     - Sleep -     - Cardiac Testing -   ECHO 11/18/23 EF 30-35%, mild LVH, SPAP 26    [place] Nov 2023 mild NOCAD, LVEDP 14, PASP 28, PAOP

## 2023-12-06 ENCOUNTER — CARE COORDINATION (OUTPATIENT)
Dept: CASE MANAGEMENT | Age: 78
End: 2023-12-06

## 2023-12-08 ENCOUNTER — TELEPHONE (OUTPATIENT)
Dept: INTERNAL MEDICINE CLINIC | Age: 78
End: 2023-12-08

## 2023-12-08 NOTE — TELEPHONE ENCOUNTER
----- Message from Nico Patricio sent at 12/8/2023 11:36 AM EST -----  Contact: Mike Garcia [phone]  [facility], requesting patients current med list. Please advise     Fax - [phone]

## 2024-01-05 ENCOUNTER — CARE COORDINATION (OUTPATIENT)
Dept: CARE COORDINATION | Age: 79
End: 2024-01-05

## 2024-01-10 DIAGNOSIS — J44.9 CHRONIC OBSTRUCTIVE PULMONARY DISEASE, UNSPECIFIED COPD TYPE (HCC): ICD-10-CM

## 2024-01-10 RX ORDER — FLUTICASONE FUROATE, UMECLIDINIUM BROMIDE AND VILANTEROL TRIFENATATE 200; 62.5; 25 UG/1; UG/1; UG/1
POWDER RESPIRATORY (INHALATION)
Qty: 60 EACH | Refills: 5 | Status: SHIPPED | OUTPATIENT
Start: 2024-01-10

## 2024-01-15 ENCOUNTER — CARE COORDINATION (OUTPATIENT)
Dept: CARE COORDINATION | Age: 79
End: 2024-01-15

## 2024-01-16 ENCOUNTER — TELEPHONE (OUTPATIENT)
Dept: INTERNAL MEDICINE CLINIC | Age: 79
End: 2024-01-16

## 2024-01-16 NOTE — TELEPHONE ENCOUNTER
----- Message from Katie Emery sent at 1/15/2024  2:56 PM EST -----  Contact: [phone]  Waiting on Dr. Powers to sign.  ----- Message -----  From: Pasquale Powers MD  Sent: 1/15/2024   2:36 PM EST  To: Katie Lindes    Yes  ----- Message -----  From: Katie Emery  Sent: 1/15/2024  10:01 AM EST  To: Pasquale Powers MD    Okay to do letter?  ----- Message -----  From: Nini Carrasco APRN - CNP  Sent: 1/12/2024  11:47 AM EST  To: Katie Emery    We can write him a letter excusing him from jury duty    ----- Message -----  From: Grace Jones MA  Sent: 1/12/2024  10:53 AM EST  To: CORRINE Hedrick CNP    Patient called and is requesting that you write a letter excusing him from jury duty. He states that he just doesn't have the energy, medically, to do that at this current time. He gave me a fax number where the letter can be sent, [phone] and needs to include reference number: [id number].     Should we have any questions the patient said the paperwork states we can contact the [facility] commissioners office. Their umber is [phone] [phone]. [phone].

## 2024-01-18 ENCOUNTER — CARE COORDINATION (OUTPATIENT)
Dept: CARE COORDINATION | Age: 79
End: 2024-01-18

## 2024-01-31 ENCOUNTER — CARE COORDINATION (OUTPATIENT)
Dept: CARE COORDINATION | Age: 79
End: 2024-01-31

## 2024-01-31 NOTE — CARE COORDINATION
ACM outreach made from CTN referral. Spoke with patient who states that he's doing really well. Patient states he takes his medications everyday and has no issues getting prescriptions Patient states that he drives and has no issues getting to appointments. Patient states that he has a healthy appetite and stays hydrated. Patient states that he has no concerns/needs at this time. ACM left contact info if patient situation ever changed. No further outreaches at this time.

## 2024-02-01 RX ORDER — LOSARTAN POTASSIUM 50 MG/1
50 TABLET ORAL DAILY
Qty: 30 TABLET | Refills: 2 | Status: SHIPPED | OUTPATIENT
Start: 2024-02-01

## 2024-02-21 ENCOUNTER — OFFICE VISIT (OUTPATIENT)
Dept: PULMONOLOGY | Age: 79
End: 2024-02-21
Payer: MEDICARE

## 2024-02-21 VITALS
WEIGHT: 256.8 LBS | OXYGEN SATURATION: 94 % | RESPIRATION RATE: 18 BRPM | HEIGHT: 71 IN | BODY MASS INDEX: 35.95 KG/M2 | SYSTOLIC BLOOD PRESSURE: 144 MMHG | HEART RATE: 59 BPM | DIASTOLIC BLOOD PRESSURE: 84 MMHG

## 2024-02-21 DIAGNOSIS — J44.1 COPD EXACERBATION (HCC): ICD-10-CM

## 2024-02-21 DIAGNOSIS — R91.1 PULMONARY NODULE: Primary | ICD-10-CM

## 2024-02-21 DIAGNOSIS — J96.11 CHRONIC RESPIRATORY FAILURE WITH HYPOXIA (HCC): ICD-10-CM

## 2024-02-21 PROCEDURE — 1123F ACP DISCUSS/DSCN MKR DOCD: CPT | Performed by: INTERNAL MEDICINE

## 2024-02-21 PROCEDURE — 3077F SYST BP >= 140 MM HG: CPT | Performed by: INTERNAL MEDICINE

## 2024-02-21 PROCEDURE — 99214 OFFICE O/P EST MOD 30 MIN: CPT | Performed by: INTERNAL MEDICINE

## 2024-02-21 PROCEDURE — 3079F DIAST BP 80-89 MM HG: CPT | Performed by: INTERNAL MEDICINE

## 2024-02-21 RX ORDER — PREDNISONE 10 MG/1
TABLET ORAL
Qty: 50 TABLET | Refills: 0 | Status: SHIPPED | OUTPATIENT
Start: 2024-02-21

## 2024-02-21 RX ORDER — DOXYCYCLINE HYCLATE 100 MG
100 TABLET ORAL 2 TIMES DAILY
Qty: 14 TABLET | Refills: 0 | Status: SHIPPED | OUTPATIENT
Start: 2024-02-21 | End: 2024-02-28

## 2024-02-21 NOTE — PROGRESS NOTES
Pulmonary Follow-up Note  Chief Complaint: Shortness of breath, COPD  Referring Provider: hospital f/u    Interval History 2/21/24  - did not obtain f/u CT, he has been feeling unwell for 3-4 days, mostly sinus congestion and shortness of breath, some sputum.  Had COVID several weeks ago, tested negative in interim.        Interval History 12/5/23  - admitted in Nov 2023 with AFIB RVR and HFrEF, underwent LHC with NOCAD       Interval history 4/4/23:  significant dyspnea on exertion, improved temporarily with alb/atr.  Needs new nebulizer.  Not on any long acting BD.     Presenting history:  8/4/2019 with a month long history of moderately increased shortness of breath, associated with wheezing and nonproductive cough, became severe.  He is feeling better with inhaled bronchodilators given the emergency department.  He has had previous episodes of COPD in the past.  He notes he has been waking up frequently at night with wheezing, benefits from his home nebulizer.     reports that he quit smoking about 6 years ago. His smoking use included cigarettes. He started smoking about 61 years ago. He has a 110.0 pack-year smoking history. He has never used smokeless tobacco.       Physical Exam:  Blood pressure (!) 144/84, pulse 59, resp. rate 18, height 1.803 m (5' 11\"), weight 116.5 kg (256 lb 12.8 oz), SpO2 94 %.   Constitutional:  No acute distress.   HENT:  Oropharynx is clear and moist.   Neck: No tracheal deviation present.   Cardiovascular: Normal heart sounds.  + lower extremity edema.  Pulmonary/Chest: + wheezes. No rhonchi. No rales. + decreased breath sounds.  No accessory muscle usage or stridor.   Musculoskeletal: No cyanosis. No clubbing.  Skin: Skin is warm and dry.   Psychiatric: Normal mood and affect.  Neurologic: speech fluent, alert and oriented, strength symmetric       Data:   - PFTs -   PFT 8/22/19 FEV1 2.24 L 70% 0.67 TLC 6.45 L 92% DLCO 15.42 53%    - Imaging -   CTPA 11/18/23  Several foci of

## 2024-03-13 ENCOUNTER — HOSPITAL ENCOUNTER (OUTPATIENT)
Dept: CT IMAGING | Age: 79
Discharge: HOME | End: 2024-03-13
Payer: MEDICARE

## 2024-03-13 DIAGNOSIS — R91.1 PULMONARY NODULE: ICD-10-CM

## 2024-03-13 PROCEDURE — 71250 CT THORAX DX C-: CPT

## 2024-03-14 NOTE — RESULT ENCOUNTER NOTE
Tell pt his CT scan looks better, no new concerns.  He should consider repeat Low dose chest CT for lung cancer screening in 12 months.  I recommend he schedule this at his f/u pulmonary visit.

## 2024-03-26 RX ORDER — POTASSIUM CITRATE 15 MEQ/1
15 TABLET, EXTENDED RELEASE ORAL DAILY
Qty: 28 TABLET | Refills: 2 | Status: SHIPPED | OUTPATIENT
Start: 2024-03-26

## 2024-03-26 RX ORDER — FUROSEMIDE 40 MG/1
40 TABLET ORAL DAILY
Qty: 28 TABLET | Refills: 2 | Status: SHIPPED | OUTPATIENT
Start: 2024-03-26

## 2024-03-26 RX ORDER — ATORVASTATIN CALCIUM 20 MG/1
20 TABLET, FILM COATED ORAL DAILY
Qty: 28 TABLET | Refills: 2 | Status: SHIPPED | OUTPATIENT
Start: 2024-03-26

## 2024-03-26 RX ORDER — TAMSULOSIN HYDROCHLORIDE 0.4 MG/1
0.4 CAPSULE ORAL DAILY
Qty: 28 CAPSULE | Refills: 2 | Status: SHIPPED | OUTPATIENT
Start: 2024-03-26

## 2024-03-26 RX ORDER — PANTOPRAZOLE SODIUM 40 MG/1
40 TABLET, DELAYED RELEASE ORAL DAILY
Qty: 28 TABLET | Refills: 2 | Status: SHIPPED | OUTPATIENT
Start: 2024-03-26

## 2024-03-26 RX ORDER — METOPROLOL SUCCINATE 50 MG/1
50 TABLET, EXTENDED RELEASE ORAL DAILY
Qty: 28 TABLET | Refills: 2 | Status: SHIPPED | OUTPATIENT
Start: 2024-03-26

## 2024-03-27 ENCOUNTER — APPOINTMENT (OUTPATIENT)
Dept: GENERAL RADIOLOGY | Age: 79
DRG: 392 | End: 2024-03-27
Payer: MEDICARE

## 2024-03-27 ENCOUNTER — HOSPITAL ENCOUNTER (INPATIENT)
Age: 79
LOS: 1 days | Discharge: HOME | DRG: 392 | End: 2024-03-28
Attending: EMERGENCY MEDICINE | Admitting: INTERNAL MEDICINE
Payer: MEDICARE

## 2024-03-27 DIAGNOSIS — J44.1 COPD EXACERBATION (HCC): ICD-10-CM

## 2024-03-27 DIAGNOSIS — R07.9 CHEST PAIN, UNSPECIFIED TYPE: Primary | ICD-10-CM

## 2024-03-27 LAB
ALBUMIN SERPL-MCNC: 3.9 G/DL (ref 3.4–5)
ALBUMIN/GLOB SERPL: 1.3 {RATIO} (ref 1.1–2.2)
ALP SERPL-CCNC: 102 U/L (ref 40–129)
ALT SERPL-CCNC: 6 U/L (ref 10–40)
ANION GAP SERPL CALCULATED.3IONS-SCNC: 12 MMOL/L (ref 3–16)
AST SERPL-CCNC: 14 U/L (ref 15–37)
BASOPHILS # BLD: 0.1 K/UL (ref 0–0.2)
BASOPHILS NFR BLD: 1.2 %
BILIRUB SERPL-MCNC: 0.5 MG/DL (ref 0–1)
BUN SERPL-MCNC: 18 MG/DL (ref 7–20)
CALCIUM SERPL-MCNC: 9 MG/DL (ref 8.3–10.6)
CHLORIDE SERPL-SCNC: 104 MMOL/L (ref 99–110)
CO2 SERPL-SCNC: 26 MMOL/L (ref 21–32)
CREAT SERPL-MCNC: 1.6 MG/DL (ref 0.8–1.3)
DEPRECATED RDW RBC AUTO: 15.9 % (ref 12.4–15.4)
EKG ATRIAL RATE: 416 BPM
EKG DIAGNOSIS: NORMAL
EKG Q-T INTERVAL: 338 MS
EKG QRS DURATION: 86 MS
EKG QTC CALCULATION (BAZETT): 424 MS
EKG R AXIS: 33 DEGREES
EKG T AXIS: 45 DEGREES
EKG VENTRICULAR RATE: 95 BPM
EOSINOPHIL # BLD: 0.3 K/UL (ref 0–0.6)
EOSINOPHIL NFR BLD: 4.1 %
FLUAV RNA RESP QL NAA+PROBE: NOT DETECTED
FLUBV RNA RESP QL NAA+PROBE: NOT DETECTED
GFR SERPLBLD CREATININE-BSD FMLA CKD-EPI: 44 ML/MIN/{1.73_M2}
GLUCOSE SERPL-MCNC: 124 MG/DL (ref 70–99)
HCT VFR BLD AUTO: 42.2 % (ref 40.5–52.5)
HGB BLD-MCNC: 13.9 G/DL (ref 13.5–17.5)
LYMPHOCYTES # BLD: 1.5 K/UL (ref 1–5.1)
LYMPHOCYTES NFR BLD: 22.8 %
MCH RBC QN AUTO: 29.3 PG (ref 26–34)
MCHC RBC AUTO-ENTMCNC: 32.8 G/DL (ref 31–36)
MCV RBC AUTO: 89.3 FL (ref 80–100)
MONOCYTES # BLD: 0.5 K/UL (ref 0–1.3)
MONOCYTES NFR BLD: 7.6 %
NEUTROPHILS # BLD: 4.3 K/UL (ref 1.7–7.7)
NEUTROPHILS NFR BLD: 64.3 %
PLATELET # BLD AUTO: 197 K/UL (ref 135–450)
PMV BLD AUTO: 8.4 FL (ref 5–10.5)
POTASSIUM SERPL-SCNC: 4.7 MMOL/L (ref 3.5–5.1)
PROT SERPL-MCNC: 6.9 G/DL (ref 6.4–8.2)
RBC # BLD AUTO: 4.73 M/UL (ref 4.2–5.9)
SARS-COV-2 RNA RESP QL NAA+PROBE: NOT DETECTED
SODIUM SERPL-SCNC: 142 MMOL/L (ref 136–145)
TROPONIN, HIGH SENSITIVITY: 24 NG/L (ref 0–22)
TROPONIN, HIGH SENSITIVITY: 26 NG/L (ref 0–22)
TROPONIN, HIGH SENSITIVITY: 27 NG/L (ref 0–22)
TROPONIN, HIGH SENSITIVITY: 27 NG/L (ref 0–22)
WBC # BLD AUTO: 6.7 K/UL (ref 4–11)

## 2024-03-27 PROCEDURE — 6370000000 HC RX 637 (ALT 250 FOR IP): Performed by: NURSE PRACTITIONER

## 2024-03-27 PROCEDURE — A4216 STERILE WATER/SALINE, 10 ML: HCPCS | Performed by: EMERGENCY MEDICINE

## 2024-03-27 PROCEDURE — 71045 X-RAY EXAM CHEST 1 VIEW: CPT

## 2024-03-27 PROCEDURE — 2500000003 HC RX 250 WO HCPCS: Performed by: EMERGENCY MEDICINE

## 2024-03-27 PROCEDURE — 2060000000 HC ICU INTERMEDIATE R&B

## 2024-03-27 PROCEDURE — 85025 COMPLETE CBC W/AUTO DIFF WBC: CPT

## 2024-03-27 PROCEDURE — 6360000002 HC RX W HCPCS: Performed by: NURSE PRACTITIONER

## 2024-03-27 PROCEDURE — 94640 AIRWAY INHALATION TREATMENT: CPT

## 2024-03-27 PROCEDURE — 94761 N-INVAS EAR/PLS OXIMETRY MLT: CPT

## 2024-03-27 PROCEDURE — 93010 ELECTROCARDIOGRAM REPORT: CPT | Performed by: INTERNAL MEDICINE

## 2024-03-27 PROCEDURE — 80053 COMPREHEN METABOLIC PANEL: CPT

## 2024-03-27 PROCEDURE — 2580000003 HC RX 258: Performed by: NURSE PRACTITIONER

## 2024-03-27 PROCEDURE — 99223 1ST HOSP IP/OBS HIGH 75: CPT | Performed by: NURSE PRACTITIONER

## 2024-03-27 PROCEDURE — 87636 SARSCOV2 & INF A&B AMP PRB: CPT

## 2024-03-27 PROCEDURE — 6370000000 HC RX 637 (ALT 250 FOR IP): Performed by: EMERGENCY MEDICINE

## 2024-03-27 PROCEDURE — 36415 COLL VENOUS BLD VENIPUNCTURE: CPT

## 2024-03-27 PROCEDURE — 99285 EMERGENCY DEPT VISIT HI MDM: CPT

## 2024-03-27 PROCEDURE — 2580000003 HC RX 258: Performed by: EMERGENCY MEDICINE

## 2024-03-27 PROCEDURE — 96374 THER/PROPH/DIAG INJ IV PUSH: CPT

## 2024-03-27 PROCEDURE — 84484 ASSAY OF TROPONIN QUANT: CPT

## 2024-03-27 PROCEDURE — 93005 ELECTROCARDIOGRAM TRACING: CPT | Performed by: EMERGENCY MEDICINE

## 2024-03-27 PROCEDURE — 2700000000 HC OXYGEN THERAPY PER DAY

## 2024-03-27 RX ORDER — ACETAMINOPHEN 325 MG/1
650 TABLET ORAL EVERY 6 HOURS PRN
Status: DISCONTINUED | OUTPATIENT
Start: 2024-03-27 | End: 2024-03-28 | Stop reason: HOSPADM

## 2024-03-27 RX ORDER — IPRATROPIUM BROMIDE AND ALBUTEROL SULFATE 2.5; .5 MG/3ML; MG/3ML
1 SOLUTION RESPIRATORY (INHALATION) ONCE
Status: COMPLETED | OUTPATIENT
Start: 2024-03-27 | End: 2024-03-27

## 2024-03-27 RX ORDER — ONDANSETRON 2 MG/ML
4 INJECTION INTRAMUSCULAR; INTRAVENOUS EVERY 6 HOURS PRN
Status: DISCONTINUED | OUTPATIENT
Start: 2024-03-27 | End: 2024-03-28 | Stop reason: HOSPADM

## 2024-03-27 RX ORDER — SODIUM CHLORIDE 0.9 % (FLUSH) 0.9 %
5-40 SYRINGE (ML) INJECTION PRN
Status: DISCONTINUED | OUTPATIENT
Start: 2024-03-27 | End: 2024-03-28 | Stop reason: HOSPADM

## 2024-03-27 RX ORDER — IPRATROPIUM BROMIDE AND ALBUTEROL SULFATE 2.5; .5 MG/3ML; MG/3ML
1 SOLUTION RESPIRATORY (INHALATION) EVERY 4 HOURS PRN
Status: DISCONTINUED | OUTPATIENT
Start: 2024-03-27 | End: 2024-03-28 | Stop reason: HOSPADM

## 2024-03-27 RX ORDER — PANTOPRAZOLE SODIUM 40 MG/1
40 TABLET, DELAYED RELEASE ORAL DAILY
Status: DISCONTINUED | OUTPATIENT
Start: 2024-03-27 | End: 2024-03-28 | Stop reason: HOSPADM

## 2024-03-27 RX ORDER — ACETAMINOPHEN 650 MG/1
650 SUPPOSITORY RECTAL EVERY 6 HOURS PRN
Status: DISCONTINUED | OUTPATIENT
Start: 2024-03-27 | End: 2024-03-28 | Stop reason: HOSPADM

## 2024-03-27 RX ORDER — SODIUM CHLORIDE 9 MG/ML
INJECTION, SOLUTION INTRAVENOUS PRN
Status: DISCONTINUED | OUTPATIENT
Start: 2024-03-27 | End: 2024-03-28 | Stop reason: HOSPADM

## 2024-03-27 RX ORDER — ONDANSETRON 4 MG/1
4 TABLET, ORALLY DISINTEGRATING ORAL EVERY 8 HOURS PRN
Status: DISCONTINUED | OUTPATIENT
Start: 2024-03-27 | End: 2024-03-28 | Stop reason: HOSPADM

## 2024-03-27 RX ORDER — POLYETHYLENE GLYCOL 3350 17 G/17G
17 POWDER, FOR SOLUTION ORAL DAILY PRN
Status: DISCONTINUED | OUTPATIENT
Start: 2024-03-27 | End: 2024-03-28 | Stop reason: HOSPADM

## 2024-03-27 RX ORDER — PREDNISONE 20 MG/1
40 TABLET ORAL DAILY
Status: DISCONTINUED | OUTPATIENT
Start: 2024-03-29 | End: 2024-03-28 | Stop reason: HOSPADM

## 2024-03-27 RX ORDER — IPRATROPIUM BROMIDE AND ALBUTEROL SULFATE 2.5; .5 MG/3ML; MG/3ML
1 SOLUTION RESPIRATORY (INHALATION)
Status: DISCONTINUED | OUTPATIENT
Start: 2024-03-27 | End: 2024-03-28

## 2024-03-27 RX ORDER — SODIUM CHLORIDE 0.9 % (FLUSH) 0.9 %
5-40 SYRINGE (ML) INJECTION EVERY 12 HOURS SCHEDULED
Status: DISCONTINUED | OUTPATIENT
Start: 2024-03-27 | End: 2024-03-28 | Stop reason: HOSPADM

## 2024-03-27 RX ADMIN — Medication 10 ML: at 21:22

## 2024-03-27 RX ADMIN — PANTOPRAZOLE SODIUM 40 MG: 40 TABLET, DELAYED RELEASE ORAL at 18:27

## 2024-03-27 RX ADMIN — WATER 40 MG: 1 INJECTION INTRAMUSCULAR; INTRAVENOUS; SUBCUTANEOUS at 18:27

## 2024-03-27 RX ADMIN — IPRATROPIUM BROMIDE AND ALBUTEROL SULFATE 1 DOSE: 2.5; .5 SOLUTION RESPIRATORY (INHALATION) at 19:02

## 2024-03-27 RX ADMIN — IPRATROPIUM BROMIDE AND ALBUTEROL SULFATE 1 DOSE: 2.5; .5 SOLUTION RESPIRATORY (INHALATION) at 22:48

## 2024-03-27 RX ADMIN — IPRATROPIUM BROMIDE AND ALBUTEROL SULFATE 1 DOSE: 2.5; .5 SOLUTION RESPIRATORY (INHALATION) at 13:14

## 2024-03-27 RX ADMIN — Medication: at 18:43

## 2024-03-27 RX ADMIN — FAMOTIDINE 20 MG: 10 INJECTION, SOLUTION INTRAVENOUS at 13:12

## 2024-03-27 ASSESSMENT — PAIN - FUNCTIONAL ASSESSMENT: PAIN_FUNCTIONAL_ASSESSMENT: 0-10

## 2024-03-27 ASSESSMENT — PAIN SCALES - GENERAL: PAINLEVEL_OUTOF10: 7

## 2024-03-27 ASSESSMENT — PAIN DESCRIPTION - ORIENTATION: ORIENTATION: MID

## 2024-03-27 ASSESSMENT — PAIN DESCRIPTION - LOCATION: LOCATION: CHEST

## 2024-03-27 ASSESSMENT — PAIN DESCRIPTION - FREQUENCY: FREQUENCY: INTERMITTENT

## 2024-03-27 NOTE — CONSULTS
Pharmacy Medication Reconciliation Note     List of medications Ulises Whitman is currently taking is in progress     Source of information:   1. Dispense report    Notes regarding home medications:   1. Removed duoneb since last time filled was 2018  2. All other medications appeared to be accurate. No other changes were made    Nursing and provider should confirm these medications with the patient if possible and reorder home meds as appropriate.    Eric Blizzard, Pharm D. [facility]  3/27/2024  4:34 PM

## 2024-03-27 NOTE — PROGRESS NOTES
Report given to EZEKIEL hirsch. Patient in stable condition at this time and denies further needs

## 2024-03-27 NOTE — PROGRESS NOTES
Admission questions complete, home medications have been verified, and a head-to-toe assessment has been completed. Patient has been orientated to the unit and the room. Call light is in reach and has been explained. No further needs noted at this time.

## 2024-03-27 NOTE — ED PROVIDER NOTES
[facility] Emergency Department      CHIEF COMPLAINT  Chest Pain (Patient arrives via EMS with c/o chest pain that began at 11 am when he took his meds. Patient describes this as burning pain. EMS received 0.4 Nitro SL and 324mg asa po en route. 20# IV placed. )      HISTORY OF PRESENT ILLNESS  Ulises Whitman is a 78 y.o. male with a history of COPD on home oxygen also with a history of A-fib anticoagulated on Xarelto.  History of nonischemic cardiomyopathy with an EF of 30% presents with chest burning.  He was taking his morning pills and developed burning in the mid sternum.  He thought maybe his pills were stuck.  He drank some water and handled that fine.  The pills seem to go down but he still had some burning in his chest.  It persisted so he called 911.  He was given aspirin and nitroglycerin by EMS.  On arrival here he is feeling better although the burning is not completely resolved.  He has chronic shortness of breath and a chronic cough that does not seem to be worse than baseline.   No other complaints, modifying factors or associated symptoms.     History obtained from the patient.    I have reviewed the following from the nursing documentation.    Past Medical History:   Diagnosis Date    A-fib ([facility])     2/14/12    Acute conjunctivitis of both eyes     Acute on chronic respiratory failure with hypoxia ([facility]) 2/13/2012    Acute respiratory failure with hypoxia ([facility]) 2/13/2012    Atrial fibrillation with RVR ([facility])     2/14/12     Avulsion fracture of ankle 9/21/2015    Benign localized hyperplasia of prostate with urinary obstruction and other lower urinary tract symptoms (LUTS)(600.21) 8/17/2016    Chronic systolic CHF (congestive heart failure) ([facility]) 8/28/2017    Contusion of leg, right 9/21/2015    COPD (chronic obstructive pulmonary disease) ([facility])     Fractures     GERD (gastroesophageal reflux disease) 6/15/2015    Hypertension     Hypertensive urgency 8/4/2019    Hypertrophy of  mg/dL    Total Protein 6.9 6.4 - 8.2 g/dL    Albumin 3.9 3.4 - 5.0 g/dL    Albumin/Globulin Ratio 1.3 1.1 - 2.2    Total Bilirubin 0.5 0.0 - 1.0 mg/dL    Alkaline Phosphatase 102 40 - 129 U/L    ALT 6 (L) 10 - 40 U/L    AST 14 (L) 15 - 37 U/L   Troponin   Result Value Ref Range    Troponin, High Sensitivity 27 (H) 0 - 22 ng/L   Troponin   Result Value Ref Range    Troponin, High Sensitivity 27 (H) 0 - 22 ng/L   EKG 12 Lead   Result Value Ref Range    Ventricular Rate 95 BPM    Atrial Rate 416 BPM    QRS Duration 86 ms    Q-T Interval 338 ms    QTc Calculation (Bazett) 424 ms    R Axis 33 degrees    T Axis 45 degrees    Diagnosis       Atrial fibrillationNonspecific ST abnormalityWhen compared with ECG of 20-NOV-2023 22:21,No significant change was foundConfirmed by ANIKA SALINAS MD ([id number]) on 3/27/2024 1:43:41 PM       EKG  The Ekg interpreted as interpreted by me:  atrial fibrillation with a rate of 95  Axis is   Normal  QTc is  normal  Intervals and Durations are unremarkable.      No specific ST-T wave changes appreciated.  No evidence of acute ischemia.   No significant change from prior EKG dated 11/20/2023 except rate today is normal as compared to A-fib with RVR.      RADIOLOGY  X-RAYS: ALL IMAGES INCLUDING PLAIN FILMS, CT, ULTRASOUND AND MRI HAVE BEEN READ BY THE RADIOLOGIST.   XR CHEST PORTABLE   Final Result   No acute cardiopulmonary process.                  I have personally reviewed Xray and Ultrasound images and radiology confirms the interpretation:  Chest x-ray with no pneumonia or pneumothorax.      Medications administered. (Dose and Route):  DuoNeb 1 ampoule inhaled.        Sepsis:  Is this patient to be included in the SEP-1 Core Measure due to severe sepsis or septic shock?   No   Exclusion criteria - the patient is NOT to be included for SEP-1 Core Measure due to:  Infection is not suspected             ED COURSE/MDM:  Patient seen and evaluated. Here the patient is afebrile with normal

## 2024-03-27 NOTE — PROGRESS NOTES
Shift assessment complete. See flow sheet. Scheduled meds given. See MAR.  Patients head-toe complete, VS are logged, and active bowel sound noted in all four quadrants.     Patient on 3L O2 NC. A/O x4. Denies pain. Patient states burning is still in throat/chest but improving.     Patient sitting in bed, denies further needs at this time. Call light and bedside table are within reach. The bed is locked and is in the lowest position. Wife at bedside.

## 2024-03-27 NOTE — ED NOTES
Pt does not know what medications he takes and sts that [facility] puts his medications in pill box for him. Will put in pharm consult

## 2024-03-27 NOTE — PROGRESS NOTES
RT Inhaler-Nebulizer Bronchodilator Protocol Note    There is a bronchodilator order in the chart from a provider indicating to follow the RT Bronchodilator Protocol and there is an “Initiate RT Inhaler-Nebulizer Bronchodilator Protocol” order as well (see protocol at bottom of note).    CXR Findings:  XR CHEST PORTABLE    Result Date: 3/27/2024  No acute cardiopulmonary process.       The findings from the last RT Protocol Assessment were as follows:   History Pulmonary Disease: (P) Chronic pulmonary disease  Respiratory Pattern: (P) Dyspnea on exertion or RR 21-25 bpm  Breath Sounds: (P) Slightly diminished and/or crackles  Cough: (P) Strong, spontaneous, non-productive  Indication for Bronchodilator Therapy: (P) Decreased or absent breath sounds  Bronchodilator Assessment Score: (P) 6    Aerosolized bronchodilator medication orders have been revised according to the RT Inhaler-Nebulizer Bronchodilator Protocol below.    Respiratory Therapist to perform RT Therapy Protocol Assessment initially then follow the protocol.  Repeat RT Therapy Protocol Assessment PRN for score 0-3 or on second treatment, BID, and PRN for scores above 3.    No Indications - adjust the frequency to every 6 hours PRN wheezing or bronchospasm, if no treatments needed after 48 hours then discontinue using Per Protocol order mode.     If indication present, adjust the RT bronchodilator orders based on the Bronchodilator Assessment Score as indicated below.  Use Inhaler orders unless patient has one or more of the following: on home nebulizer, not able to hold breath for 10 seconds, is not alert and oriented, cannot activate and use MDI correctly, or respiratory rate 25 breaths per minute or more, then use the equivalent nebulizer order(s) with same Frequency and PRN reasons based on the score.  If a patient is on this medication at home then do not decrease Frequency below that used at home.    0-3 - enter or revise RT bronchodilator order(s)

## 2024-03-27 NOTE — PROGRESS NOTES
4 Eyes Skin Assessment     NAME:  Ulises Whitman  YOB: 1945  MEDICAL RECORD NUMBER:  [id number]    The patient is being assessed for  Admission    I agree that at least one RN has performed a thorough Head to Toe Skin Assessment on the patient. ALL assessment sites listed below have been assessed.      Areas assessed by both nurses:    Other per patient no known wounds or skin issues         Does the Patient have a Wound? No noted wound(s)       Matt Prevention initiated by RN: No  Wound Care Orders initiated by RN: No    Pressure Injury (Stage 3,4, Unstageable, DTI, NWPT, and Complex wounds) if present, place Wound referral order by RN under : No    New Ostomies, if present place, Ostomy referral order under : No     Nurse 1 eSignature: Electronically signed by Paula Hopper RN on 3/27/24 at 6:14 PM EDT    **SHARE this note so that the co-signing nurse can place an eSignature**    Nurse 2 eSignature: Electronically signed by Alis Cummings RN on 3/27/24 at 6:50 PM EDT

## 2024-03-27 NOTE — H&P
ONE TABLET BY MOUTH EVERY DAY 3/26/24   Pasquale Powers MD   Potassium Citrate ER (UROCIT-K) 15 MEQ (1620 MG) TBCR extended release tablet TAKE ONE TABLET BY MOUTH EVERY DAY 3/26/24   Psaquale Powers MD   atorvastatin (LIPITOR) 20 MG tablet TAKE ONE TABLET BY MOUTH EVERY DAY 3/26/24   Pasquale Powers MD   furosemide (LASIX) 40 MG tablet TAKE ONE TABLET BY MOUTH EVERY DAY 3/26/24   Pasquale Powers MD   tamsulosin (FLOMAX) 0.4 MG capsule TAKE ONE (1) CAPSULE BY MOUTH DAILY 3/26/24   Pasquale Powers MD   metoprolol succinate (TOPROL XL) 50 MG extended release tablet TAKE ONE TABLET BY MOUTH EVERY DAY 3/26/24   Pasquale Powers MD   predniSONE (DELTASONE) 10 MG tablet Three tabs daily for 7 days then one tab daily as needed for cough 2/21/24   Willard Whitley MD   losartan (COZAAR) 50 MG tablet TAKE 1 TABLET BY MOUTH EVERY DAY 2/1/24   Nini Carrasco APRN - CNP   TRELEGY ELLIPTA 200-62.5-25 MCG/ACT AEPB inhaler INHALE ONE (1) PUFF INTO THE LUNGS DAILY 1/10/24   Pasquale Powers MD   ipratropium 0.5 mg-albuterol 2.5 mg (DUONEB) 0.5-2.5 (3) MG/3ML SOLN nebulizer solution USE ONE UNIT DOSE (3ML) IN NEBULIZER AND INHALE INTO THE LUNGS EVERY FOUR (4) HOURS AS NEEDED FOR SHORTNESS OF BREATH 10/9/23   Pasquale Powers MD   ondansetron (ZOFRAN) 4 MG tablet Take 1 tablet by mouth every 8 hours as needed for Nausea or Vomiting 10/9/23   Pasquale Powers MD   Nebulizers (COMPRESSOR/NEBULIZER) MISC HHN qid 3/2/23   Pasquale Powers MD   albuterol sulfate HFA (PROVENTIL HFA) 108 (90 Base) MCG/ACT inhaler Inhale 2 puffs into the lungs every 6 hours as needed for Wheezing (with spacer) 4/16/18   Pasquale Powers MD       Allergies:  Amiodarone    Social History:  The patient currently lives home    TOBACCO:   reports that he quit smoking about 6 years ago. His smoking use included cigarettes. He started smoking about 61 years ago. He has a  03/27/2024 01:24 PM    TROPHS 27 03/27/2024 12:12 PM    TROPHS 16 11/20/2023 09:58 PM         U/A:    Lab Results   Component Value Date/Time    COLORU Yellow 11/18/2023 08:24 AM    WBCUA 0-2 11/18/2023 08:24 AM    RBCUA 3-4 11/18/2023 08:24 AM    MUCUS Rare 11/18/2023 08:24 AM    BACTERIA Rare 11/18/2023 08:24 AM    CLARITYU Clear 11/18/2023 08:24 AM    SPECGRAV 1.015 11/18/2023 08:24 AM    LEUKOCYTESUR Negative 11/18/2023 08:24 AM    BLOODU TRACE-INTACT 11/18/2023 08:24 AM    GLUCOSEU Negative 11/18/2023 08:24 AM    GLUCOSEU Neg 04/10/2012 02:31 PM    AMORPHOUS 1+ 09/30/2023 06:45 PM       ABG    Lab Results   Component Value Date/Time    WWN4VZR 21.9 11/18/2023 05:00 PM    BEART -1.2 11/18/2023 05:00 PM    U2ASVZDF 94.9 11/18/2023 05:00 PM    PHART 7.451 11/18/2023 05:00 PM    QOW5DDZ 32.1 11/18/2023 05:00 PM    PO2ART 69.6 11/18/2023 05:00 PM    DUT5YHE 22.8 11/18/2023 05:00 PM       CULTURES  Results       Procedure Component Value Units Date/Time    COVID-19 & Influenza Combo [[id number]]     Order Status: Sent Specimen: Nasopharyngeal Swab              EKG:      Encounter Date: 03/27/24   EKG 12 Lead   Result Value    Ventricular Rate 95    Atrial Rate 416    QRS Duration 86    Q-T Interval 338    QTc Calculation (Bazett) 424    R Axis 33    T Axis 45    Diagnosis      Atrial fibrillationNonspecific ST abnormalityWhen compared with ECG of 20-NOV-2023 22:21,No significant change was foundConfirmed by ANIKA SALINAS MD ([id number]) on 3/27/2024 1:43:41 PM        RADIOLOGY    XR CHEST PORTABLE   Final Result   No acute cardiopulmonary process.              Echo 11/20/23  Conclusions      Summary   Global left ventricular systolic function is moderately decreased with EF   estimated 30-35%.   Global hypokinesis. Pt in a-fib throughout exam.   There is mild concentric left ventricular hypertrophy.   Elevated left ventricular filling pressure.   Biatrial enlargement.   The aortic root is mildly dilated 4.1cm.   Mild

## 2024-03-27 NOTE — PROGRESS NOTES
Consult has been called to Dr. Whitley on 3/27/24. Spoke with Melissa. 6:09 PM    Elda Abreu  3/27/2024

## 2024-03-28 VITALS
SYSTOLIC BLOOD PRESSURE: 118 MMHG | TEMPERATURE: 97.5 F | HEART RATE: 88 BPM | DIASTOLIC BLOOD PRESSURE: 84 MMHG | WEIGHT: 253 LBS | BODY MASS INDEX: 35.42 KG/M2 | HEIGHT: 71 IN | OXYGEN SATURATION: 97 % | RESPIRATION RATE: 20 BRPM

## 2024-03-28 DIAGNOSIS — Z72.0 TOBACCO USE: Primary | ICD-10-CM

## 2024-03-28 PROBLEM — I48.21 PERMANENT ATRIAL FIBRILLATION (HCC): Status: ACTIVE | Noted: 2024-03-28

## 2024-03-28 LAB
ANION GAP SERPL CALCULATED.3IONS-SCNC: 11 MMOL/L (ref 3–16)
BASOPHILS # BLD: 0 K/UL (ref 0–0.2)
BASOPHILS NFR BLD: 0.4 %
BUN SERPL-MCNC: 20 MG/DL (ref 7–20)
CALCIUM SERPL-MCNC: 8.6 MG/DL (ref 8.3–10.6)
CHLORIDE SERPL-SCNC: 104 MMOL/L (ref 99–110)
CO2 SERPL-SCNC: 24 MMOL/L (ref 21–32)
CREAT SERPL-MCNC: 1.5 MG/DL (ref 0.8–1.3)
DEPRECATED RDW RBC AUTO: 15.8 % (ref 12.4–15.4)
EOSINOPHIL # BLD: 0 K/UL (ref 0–0.6)
EOSINOPHIL NFR BLD: 0 %
GFR SERPLBLD CREATININE-BSD FMLA CKD-EPI: 48 ML/MIN/{1.73_M2}
GLUCOSE SERPL-MCNC: 159 MG/DL (ref 70–99)
HCT VFR BLD AUTO: 40.4 % (ref 40.5–52.5)
HGB BLD-MCNC: 13.5 G/DL (ref 13.5–17.5)
LYMPHOCYTES # BLD: 0.6 K/UL (ref 1–5.1)
LYMPHOCYTES NFR BLD: 10.1 %
MCH RBC QN AUTO: 29.7 PG (ref 26–34)
MCHC RBC AUTO-ENTMCNC: 33.4 G/DL (ref 31–36)
MCV RBC AUTO: 88.8 FL (ref 80–100)
MONOCYTES # BLD: 0.1 K/UL (ref 0–1.3)
MONOCYTES NFR BLD: 2.1 %
NEUTROPHILS # BLD: 4.9 K/UL (ref 1.7–7.7)
NEUTROPHILS NFR BLD: 87.4 %
PLATELET # BLD AUTO: 189 K/UL (ref 135–450)
PMV BLD AUTO: 8.5 FL (ref 5–10.5)
POTASSIUM SERPL-SCNC: 4.8 MMOL/L (ref 3.5–5.1)
RBC # BLD AUTO: 4.55 M/UL (ref 4.2–5.9)
SODIUM SERPL-SCNC: 139 MMOL/L (ref 136–145)
WBC # BLD AUTO: 5.6 K/UL (ref 4–11)

## 2024-03-28 PROCEDURE — 6370000000 HC RX 637 (ALT 250 FOR IP): Performed by: INTERNAL MEDICINE

## 2024-03-28 PROCEDURE — 6370000000 HC RX 637 (ALT 250 FOR IP): Performed by: NURSE PRACTITIONER

## 2024-03-28 PROCEDURE — 92526 ORAL FUNCTION THERAPY: CPT

## 2024-03-28 PROCEDURE — 94640 AIRWAY INHALATION TREATMENT: CPT

## 2024-03-28 PROCEDURE — 2700000000 HC OXYGEN THERAPY PER DAY

## 2024-03-28 PROCEDURE — 85025 COMPLETE CBC W/AUTO DIFF WBC: CPT

## 2024-03-28 PROCEDURE — 36415 COLL VENOUS BLD VENIPUNCTURE: CPT

## 2024-03-28 PROCEDURE — 99222 1ST HOSP IP/OBS MODERATE 55: CPT | Performed by: INTERNAL MEDICINE

## 2024-03-28 PROCEDURE — 2580000003 HC RX 258: Performed by: NURSE PRACTITIONER

## 2024-03-28 PROCEDURE — 99223 1ST HOSP IP/OBS HIGH 75: CPT | Performed by: INTERNAL MEDICINE

## 2024-03-28 PROCEDURE — 92610 EVALUATE SWALLOWING FUNCTION: CPT

## 2024-03-28 PROCEDURE — 80048 BASIC METABOLIC PNL TOTAL CA: CPT

## 2024-03-28 PROCEDURE — 94761 N-INVAS EAR/PLS OXIMETRY MLT: CPT

## 2024-03-28 PROCEDURE — 99239 HOSP IP/OBS DSCHRG MGMT >30: CPT

## 2024-03-28 PROCEDURE — 6360000002 HC RX W HCPCS: Performed by: NURSE PRACTITIONER

## 2024-03-28 PROCEDURE — 94010 BREATHING CAPACITY TEST: CPT

## 2024-03-28 RX ORDER — FUROSEMIDE 40 MG/1
40 TABLET ORAL DAILY
Status: DISCONTINUED | OUTPATIENT
Start: 2024-03-28 | End: 2024-03-28 | Stop reason: HOSPADM

## 2024-03-28 RX ORDER — IPRATROPIUM BROMIDE AND ALBUTEROL SULFATE 2.5; .5 MG/3ML; MG/3ML
1 SOLUTION RESPIRATORY (INHALATION)
Status: DISCONTINUED | OUTPATIENT
Start: 2024-03-28 | End: 2024-03-28 | Stop reason: HOSPADM

## 2024-03-28 RX ORDER — METOPROLOL SUCCINATE 50 MG/1
50 TABLET, EXTENDED RELEASE ORAL DAILY
Status: DISCONTINUED | OUTPATIENT
Start: 2024-03-28 | End: 2024-03-28 | Stop reason: HOSPADM

## 2024-03-28 RX ORDER — ATORVASTATIN CALCIUM 10 MG/1
20 TABLET, FILM COATED ORAL DAILY
Status: DISCONTINUED | OUTPATIENT
Start: 2024-03-28 | End: 2024-03-28 | Stop reason: HOSPADM

## 2024-03-28 RX ORDER — LOSARTAN POTASSIUM 50 MG/1
50 TABLET ORAL DAILY
Status: DISCONTINUED | OUTPATIENT
Start: 2024-03-28 | End: 2024-03-28 | Stop reason: HOSPADM

## 2024-03-28 RX ORDER — TAMSULOSIN HYDROCHLORIDE 0.4 MG/1
0.4 CAPSULE ORAL DAILY
Status: DISCONTINUED | OUTPATIENT
Start: 2024-03-28 | End: 2024-03-28 | Stop reason: HOSPADM

## 2024-03-28 RX ADMIN — IPRATROPIUM BROMIDE AND ALBUTEROL SULFATE 1 DOSE: 2.5; .5 SOLUTION RESPIRATORY (INHALATION) at 15:53

## 2024-03-28 RX ADMIN — PANTOPRAZOLE SODIUM 40 MG: 40 TABLET, DELAYED RELEASE ORAL at 09:28

## 2024-03-28 RX ADMIN — ATORVASTATIN CALCIUM 20 MG: 10 TABLET, FILM COATED ORAL at 11:51

## 2024-03-28 RX ADMIN — WATER 40 MG: 1 INJECTION INTRAMUSCULAR; INTRAVENOUS; SUBCUTANEOUS at 05:18

## 2024-03-28 RX ADMIN — IPRATROPIUM BROMIDE AND ALBUTEROL SULFATE 1 DOSE: 2.5; .5 SOLUTION RESPIRATORY (INHALATION) at 11:24

## 2024-03-28 RX ADMIN — IPRATROPIUM BROMIDE AND ALBUTEROL SULFATE 1 DOSE: 2.5; .5 SOLUTION RESPIRATORY (INHALATION) at 08:19

## 2024-03-28 RX ADMIN — RIVAROXABAN 20 MG: 20 TABLET, FILM COATED ORAL at 09:28

## 2024-03-28 RX ADMIN — Medication 10 ML: at 09:29

## 2024-03-28 ASSESSMENT — COPD QUESTIONNAIRES
QUESTION7_SLEEPQUALITY: 0
QUESTION4_WALKINCLINE: 5
QUESTION8_ENERGYLEVEL: 5
CAT_TOTALSCORE: 22
QUESTION3_CHESTTIGHTNESS: 0
QUESTION5_HOMEACTIVITIES: 5
QUESTION6_LEAVINGHOUSE: 1
QUESTION1_COUGHFREQUENCY: 5
QUESTION2_CHESTPHLEGM: 1

## 2024-03-28 NOTE — PROGRESS NOTES
Shift assessment completed, see flow sheet.   Pt is lying in bed awake A/O x4.  Pt denies any pain and did not have any overnight.  Denies any N/V.  Pt denies any SOB or cough    SpO2 97%. Respirations are easy, even, and unlabored.   Bilateral lung sounds diminished. On 3 L which is baseline for patient    VSS  Afib on the monitor        PIV, WNL     All lines and monitoring devices in place. Bed in lowest position with wheels locked. No needs expressed at this time. Will continue to monitor.

## 2024-03-28 NOTE — PROGRESS NOTES
Hand off report given to  EZEKIEL Pham.   Patient is stable showing no signs of distress and has no current needs at this time.   Call light is in reach and bed is in lowest position.    Care is transferred at this time.

## 2024-03-28 NOTE — CONSULTS
[facility]   COPD / HEART FAILURE PROGRAM      NAME:  Ulises Whitman  AGE: 78 y.o.   GENDER: male  : 1945  TODAY'S DATE:  3/28/2024    Subjective:     VISIT TYPE: Evaluation / Consult    ADMIT DATE: 3/27/2024    PAST MEDICAL HISTORY:      Diagnosis Date    A-fib ([facility])     12    Acute conjunctivitis of both eyes     Acute on chronic respiratory failure with hypoxia ([facility]) 2012    Acute respiratory failure with hypoxia ([facility]) 2012    Atrial fibrillation with RVR ([facility])     12     Avulsion fracture of ankle 2015    Benign localized hyperplasia of prostate with urinary obstruction and other lower urinary tract symptoms (LUTS)(600.21) 2016    Chronic systolic CHF (congestive heart failure) ([facility]) 2017    Contusion of leg, right 2015    COPD (chronic obstructive pulmonary disease) ([facility])     Fractures     GERD (gastroesophageal reflux disease) 6/15/2015    Hypertension     Hypertensive urgency 2019    Hypertrophy of prostate without urinary obstruction and other lower urinary tract symptoms (LUTS) 6/15/2015    No history of procedure     no previos colonoscopy    PAD (peripheral artery disease) ([facility]) 10/9/2017    Pneumonia     Thoracic aortic aneurysm ([facility])      HOME MEDICATIONS:  Prior to Admission medications    Medication Sig Start Date End Date Taking? Authorizing Provider   rivaroxaban (XARELTO) 20 MG TABS tablet TAKE ONE TABLET BY MOUTH DAILY WITH BREAKFAST 3/26/24   Pasquale Powers MD   pantoprazole (PROTONIX) 40 MG tablet TAKE ONE TABLET BY MOUTH EVERY DAY 3/26/24   Pasquale Powers MD   Potassium Citrate ER (UROCIT-K) 15 MEQ (1620 MG) TBCR extended release tablet TAKE ONE TABLET BY MOUTH EVERY DAY 3/26/24   Pasquale Powers MD   atorvastatin (LIPITOR) 20 MG tablet TAKE ONE TABLET BY MOUTH EVERY DAY 3/26/24   Pasquale Powers MD   furosemide (LASIX) 40 MG tablet TAKE ONE TABLET BY MOUTH EVERY DAY 3/26/24

## 2024-03-28 NOTE — PROGRESS NOTES
Speech-Language Pathology  Swallowing Disorders and Dysphagia     SLP Attempt Note           Name: Ulises Whitman  : 1945  Medical Diagnosis: Chest pain [R07.9]  Chest pain, unspecified type [R07.9]    Attempting to see the patient for dysphagia eval. Per EZEKIEL Pham, pt is NPO for cardiology. Will re-attempt at later time pending SLP schedule and pt availability. No charges filed. Thank you,    Selene Marquez M.A., [facility]-SLP #[id number]  Speech-Language Pathologist  "[employer] Phone (inpatient): [phone]  Speech Desk (outpatient)- [phone]    Certified to provide:  FEES, MBS, Vital Stim, and [facility] Dysphagia Therapy Program (MDTP)

## 2024-03-28 NOTE — PROGRESS NOTES
Blood pressure 123/79, pulse 75, temperature 98.1 °F (36.7 °C), temperature source Oral, resp. rate 18, height 1.803 m (5' 11\"), weight 113.4 kg (250 lb), SpO2 96 %.  Shift assessment completed see flow sheet. Patient in bed alert and oriented x4. Patient on 3L, showing no signs of distress. Evening medications given per order. Iv in RAC not flushing. Iv removed, catheter intact. Dressing placed. New 20g iv placed in right forearm.  Patient has no other needs at this time. Wife at bedside. Patient agrees to call out appropriately.

## 2024-03-28 NOTE — CONSULTS
[facility]   CONSULTATION  [phone]        Reason for Consultation/Chief Complaint: \"I have been having neck and chest pain after swallowing pills.\"  Cardiology consulted for chest pain with normal cath in November per Lisa Riggs NP  Patient has not followed with cardiologist as outpatient. Last OV Dr. Cameron 1/20    History of Present Illness:    Ulises Whitman is a 78 y.o. patient who [place] [facility] [place] with c/o neck and chest pain. He has PMH Afib on xarelto, BPH, chronic systolic CHF, COPD, GERD, HTN, PAD and thoracic aortic aneurysm.  Ulises-Nuc 5/16/2019 with fixed defect inferior basal wall c/w artifact vs sub endocardial infarct; EF=45%; Echo 11/20/2023 EF=30-35%; global HK; mild cLVH; YAW; mild TR; AV sclerosis; RHC/LHC 11/21/2023 noted minimal CAD suggesting NICM most likely related to uncontrolled Afib; RHC near normal numbers.     He arrived by EMS with c/o initial neck pain radiating down to chest that developed immediately after taking multiple morning pills without water. Felt \"stuck in upper throat\" and then burning started.  He received 0.4 Nitro SL and 324 mg Aspirin en route.  Admission Testing:  CXR noted no acute cardiopulmonary process.  EKG noted Atrial fibrillation; Nonspecific ST abnormality; 95bpm (no change 11/23). Admitting LABS: , K 4.7, BUN/Cr 18/1.6, ALT 6, AST 14, H/H 13.9/43.5 and Xi 27, 27, 26 and 24. He did receive a Duoneb treatment for wheezing. Patient with no c/o SOB, palpitations, dizziness, edema, or orthopnea/PND. I have been asked to provide consultation regarding further management and testing.      Past Medical History:   has a past medical history of A-fib ([facility]), Acute conjunctivitis of both eyes, Acute on chronic respiratory failure with hypoxia ([facility]), Acute respiratory failure with hypoxia ([facility]), Atrial fibrillation with RVR ([facility]), Avulsion fracture of ankle, Benign localized hyperplasia of prostate with urinary obstruction and other  gallop   Abdomen:   Soft, non-tender, bowel sounds active all four quadrants,  no masses, no organomegaly           Extremities: Extremities normal, atraumatic, no cyanosis or edema   Pulses: 2+ and symmetric   Skin: Skin color, texture, turgor normal, no rashes or lesions   Pysch: Normal mood and affect   Neurologic: Normal gross motor and sensory exam.         Labs  CBC:   Lab Results   Component Value Date/Time    WBC 5.6 03/28/2024 05:18 AM    RBC 4.55 03/28/2024 05:18 AM    HGB 13.5 03/28/2024 05:18 AM    HCT 40.4 03/28/2024 05:18 AM    MCV 88.8 03/28/2024 05:18 AM    RDW 15.8 03/28/2024 05:18 AM     03/28/2024 05:18 AM     CMP:    Lab Results   Component Value Date/Time     03/28/2024 05:18 AM    K 4.8 03/28/2024 05:18 AM     03/28/2024 05:18 AM    CO2 24 03/28/2024 05:18 AM    BUN 20 03/28/2024 05:18 AM    CREATININE 1.5 03/28/2024 05:18 AM    GFRAA 55 03/25/2022 02:09 PM    GFRAA >60 04/17/2012 09:35 AM    AGRATIO 1.3 03/27/2024 12:12 PM    LABGLOM 48 03/28/2024 05:18 AM    GLUCOSE 159 03/28/2024 05:18 AM    PROT 6.9 03/27/2024 12:12 PM    PROT 7.4 04/17/2012 09:35 AM    CALCIUM 8.6 03/28/2024 05:18 AM    BILITOT 0.5 03/27/2024 12:12 PM    ALKPHOS 102 03/27/2024 12:12 PM    AST 14 03/27/2024 12:12 PM    ALT 6 03/27/2024 12:12 PM     PT/INR:  No results found for: \"PTINR\"  Lab Results   Component Value Date    CKTOTAL 269 10/26/2020    TROPONINI 0.01 01/24/2023       EKG:  I have reviewed EKG with the following interpretation:  Impression:  See HPI    Assessment:  Ulises Whitman is a 78 y.o. patient who presented to [facility] 3/27/2024 with c/o neck and chest pain. He has PMH Afib on xarelto, BPH, chronic systolic CHF, COPD, GERD, HTN, PAD and thoracic aortic aneurysm.  Ulises-Nuc 5/16/2019 with fixed defect inferior basal wall c/w artifact vs sub endocardial infarct; EF=45%; Echo 11/20/2023 EF=30-35%; global HK; mild cLVH; YAW; mild TR; AV sclerosis; RHC/LHC 11/21/2023 noted minimal CAD

## 2024-03-28 NOTE — DISCHARGE SUMMARY
discharge. Pharynx clear.   Neck: No JVD.  Trachea midline.  Resp: No accessory muscle use. Diminished throughout. +scattered expiratory wheezes. No crackles.  No rhonchi.   CV:  irregularly irregular.  No murmur.  No rub. Trace pedal edema.   GI: Non-tender. Non-distended.  Normal bowel sounds.   Skin: Warm and dry. No nodule on exposed extremities. No rash on exposed extremities.   M/S: No cyanosis. No joint deformity. No clubbing.   Neuro: Awake. Grossly nonfocal    Psych: Oriented x 3. No anxiety or agitation.    Lab Results   Component Value Date    WBC 5.6 03/28/2024    HGB 13.5 03/28/2024    HCT 40.4 (L) 03/28/2024    MCV 88.8 03/28/2024     03/28/2024     Lab Results   Component Value Date/Time     03/28/2024 05:18 AM    K 4.8 03/28/2024 05:18 AM     03/28/2024 05:18 AM    CO2 24 03/28/2024 05:18 AM    BUN 20 03/28/2024 05:18 AM    CREATININE 1.5 03/28/2024 05:18 AM    GLUCOSE 159 03/28/2024 05:18 AM    CALCIUM 8.6 03/28/2024 05:18 AM    LABGLOM 48 03/28/2024 05:18 AM      Lab Results   Component Value Date/Time    ALKPHOS 102 03/27/2024 12:12 PM    ALT 6 03/27/2024 12:12 PM    AST 14 03/27/2024 12:12 PM    PROT 6.9 03/27/2024 12:12 PM    PROT 7.4 04/17/2012 09:35 AM    BILITOT 0.5 03/27/2024 12:12 PM    BILIDIR <0.2 11/19/2023 11:07 AM    LABALBU 3.9 03/27/2024 12:12 PM     Lab Results   Component Value Date/Time    TROPHS 24 03/27/2024 06:11 PM    TROPHS 26 03/27/2024 03:42 PM    TROPHS 27 03/27/2024 01:24 PM       CULTURES  Results       Procedure Component Value Units Date/Time    Culture, Respiratory [[id number]]     Order Status: No result Specimen: Sputum Expectorated     COVID-19 & Influenza Combo [[id number]] Collected: 03/27/24 1542    Order Status: Completed Specimen: Nasopharyngeal Swab Updated: 03/27/24 1613     SARS-CoV-2 RNA, RT PCR NOT DETECTED     Comment: Not Detected results do not preclude SARS-CoV-2 infection and  should not be used as the sole basis for patient  TABLET BY MOUTH EVERY DAY     furosemide 40 MG tablet  Commonly known as: LASIX  TAKE ONE TABLET BY MOUTH EVERY DAY     losartan 50 MG tablet  Commonly known as: COZAAR  TAKE 1 TABLET BY MOUTH EVERY DAY     metoprolol succinate 50 MG extended release tablet  Commonly known as: TOPROL XL  TAKE ONE TABLET BY MOUTH EVERY DAY     pantoprazole 40 MG tablet  Commonly known as: PROTONIX  TAKE ONE TABLET BY MOUTH EVERY DAY     Potassium Citrate ER 15 MEQ (1620 MG) Tbcr extended release tablet  Commonly known as: UROCIT-K  TAKE ONE TABLET BY MOUTH EVERY DAY     predniSONE 10 MG tablet  Commonly known as: DELTASONE  Three tabs daily for 7 days then one tab daily as needed for cough     rivaroxaban 20 MG Tabs tablet  Commonly known as: Xarelto  TAKE ONE TABLET BY MOUTH DAILY WITH BREAKFAST     tamsulosin 0.4 MG capsule  Commonly known as: FLOMAX  TAKE ONE (1) CAPSULE BY MOUTH DAILY     Trelegy Ellipta 200-62.5-25 MCG/ACT Aepb inhaler  Generic drug: fluticasone-umeclidin-vilant  INHALE ONE (1) PUFF INTO THE LUNGS DAILY                Discharged in stable condition to home    Follow Up:  Follow up with PCP in 1 week

## 2024-03-28 NOTE — CARE COORDINATION
DISCHARGE ORDER  Date/Time 3/28/2024 1:56 PM  Completed by: Marly Tucker, Case Management    Patient Name: Ulises Whitman      : 1945  Admitting Diagnosis: Chest pain [R07.9]  Chest pain, unspecified type [R07.9]      Admit order Date and Status:3/27/24  (verify MD's last order for status of admission)      Noted discharge order.   If applicable PT/OT recommendation at Discharge: NA  DME recommendation by PT/OT:NA  Confirmed discharge plan  (): Yes  with whom_patient and pt wife ______________  If pt confirmed DC plan does family need to be contacted by CM No if yes who______  Discharge Plan: Pt to dc home with wife.  3L O2 baseline with [facility].  No additional CM needs identified.     Date of Last IMM Given: 3/27/24    Reviewed chart.  Role of discharge planner explained and patient verbalized understanding. Discharge order is noted.    Has Home O2 in place on admit:  Yes  Informed of need to bring portable home O2 tank on day of discharge for nursing to connect prior to leaving:   Yes  Verbalized agreement/Understanding:   Yes  Pt is being d/c'd to home  today. Pt's O2 sats are 96% on 3L.    Discharge timeout done with CM/PT/RN. All discharge needs and concerns addressed.

## 2024-03-28 NOTE — CONSULTS
Laterality Date    CATARACT REMOVAL WITH IMPLANT Right 09/06/2018     PHACO EMULSIFICATION OF CATARACT WITH INTRAOCULAR LENS IMPLANT RIGHT EYE    COLONOSCOPY  2/24/2016    sigmoid polyps    ENDOSCOPY, COLON, DIAGNOSTIC  11/13/2019    egd; esophagitis/gastritis    HERNIA REPAIR      TN XCAPSL CTRC RMVL INSJ IO LENS PROSTH W/O ECP Right 9/6/2018    PHACO EMULSIFICATION OF CATARACT WITH INTRAOCULAR LENS IMPLANT RIGHT [place] [facility]    [place] XCAPSL CTRC RMVL INSJ IO LENS PROSTH W/O ECP Left 10/18/2018    PHACO EMULSIFICATION OF CATARACT WITH  INTRAOCULAR LENS IMPLANT LEFT EYE [place] [facility]    UPPER GASTROINTESTINAL ENDOSCOPY N/A 11/13/2019    EGD BIOPSY performed [place] Silva [place] [facility] [place]     FAMILY HISTORY:  family history includes Alcohol Abuse in his sister.    SOCIAL HISTORY:   reports that he quit smoking about 6 years ago. His smoking use included cigarettes. He started smoking about 61 years ago. He has a 110.0 pack-year smoking history. He has never used smokeless tobacco.    Scheduled Meds:   ipratropium 0.5 mg-albuterol 2.5 mg  1 Dose Inhalation 4x Daily RT    sodium chloride flush  5-40 mL IntraVENous 2 times per day    methylPREDNISolone  40 mg IntraVENous Q12H    Followed by    [START ON 3/29/2024] predniSONE  40 mg Oral Daily    pantoprazole  40 mg Oral Daily    rivaroxaban  20 mg Oral Daily with breakfast     Continuous Infusions:   sodium chloride       PRN Meds:  sodium chloride flush, sodium chloride, ondansetron **OR** ondansetron, polyethylene glycol, acetaminophen **OR** acetaminophen, ipratropium 0.5 mg-albuterol 2.5 mg    ALLERGIES:  Patient is allergic to amiodarone.    REVIEW OF SYSTEMS:  Constitutional: Negative for fever  HENT: Negative for sore throat  Eyes: Negative for redness   Respiratory: + for dyspnea, cough  Cardiovascular: Negative for chest pain  Gastrointestinal: Negative for vomiting, diarrhea  nodular consolidations seen on exam of November 18, 2023, have  resolved.  Patient can resume low-dose annual lung cancer screening CT.  2. Mild to moderate centrilobular emphysema.  3. Atherosclerosis and coronary artery disease.    Echo November 2023: EF 30-35%.  Global hypokinesis.  A-fib throughout exam.  Mild cLVH.  Elevated LV filling pressure.  Biatrial enlargement.  Mildly dilated aortic root 4.1 cm.  Mild TR.  SPAP normal 26 mmHg.    ASSESSMENT:  Chronic hypoxemic respiratory failure; baseline 2 L O2 HS   Chest pain/epigastric pain  Dysphagia   COPD (Upper lobe predominant paraseptal and centrilobular emphysema) - with acute exacerbation   Abnormal CT chest, h/o nodular ASD - resolved on recent imaging 3/13/24  Chronic systolic CHF, EF 30-35%  AFIB on Xarelto  CKD  Former smoker     PLAN:  Supplemental O2 to maintain SaO2 >92%; wean as tolerated    IV solumedrol - d/c   Inhaled bronchodilators   Cardiology has been consulted  On home Xarelto   D/C planning OK from a pulmonary perspective.  F/U in March 2025 for Low dose chest CT for lung cancer screening with pulmonary.  Patient aware of recommendation     Screening CT scan was considered in a lung cancer screening counseling and shared decision making visit today that included the following elements:   Eligibility: Age: 78.  There are no signs or symptoms of lung cancer.  Tobacco History 110 pack-years, quit 6 years ago  Verbal counseling has been performed by me to include benefits and harms of screening, follow-up diagnostic testing, over-diagnosis, false positive rate, and total radiation exposure;   I have counseled on the importance of adherence to annual lung cancer LDCT screening, the impact of comorbidities and patient is willing to undergo diagnosis and treatment;   I have provided counseling on the importance of maintaining cigarette smoking abstinence if former smoker; or the importance of smoking cessation if current smoker and, if appropriate,

## 2024-03-28 NOTE — PROGRESS NOTES
Patient okay for discharge per MD. Discharge instructions and script given. IV removed and site assessment clean, dry, and intact.  Telebox removed and CMU notified. Patient discharged home in stable conditions with all belongings including cell phone.  .  No questions or concerns at this time. Patient escorted to personal car.

## 2024-03-28 NOTE — PLAN OF CARE
Problem: SLP Adult - Impaired Swallowing  Goal: By Discharge: Advance to least restrictive diet without signs or symptoms of aspiration for planned discharge setting.  See evaluation for individualized goals.  Note: SLP completed evaluation. Please refer to notes in EMR.    Bonnie Chavez M.A., CF-SLP #[id number]  Speech-Language Pathologist  Desk: [phone]

## 2024-03-28 NOTE — PROGRESS NOTES
Speech Language Pathology  Swallowing Disorders and Dysphagia  Clinical Bedside Swallow Assessment  Facility/Department: [facility] [place] [facility]    Instrumentation: Not clinically indicated at this time  Diet recommendation: IDDSI 7 Regular- Easy To Chew; IDDSI 0 Thin Liquids; Meds whole with thin liquids  Risk management: upright for all intake, stay upright for at least 30 mins after intake, small bites/sips, oral care 2-3x/day to reduce adverse affects in the event of aspiration, increase physical mobility as able, slow rate of intake, and hold PO and contact SLP if s/s of aspiration or worsening respiratory status develop.     NAME:Ulises Whitman  : 1945 (78 y.o.)   MRN: [id number]  ROOM: /[id number]  ADMISSION DATE: 3/27/2024  PATIENT DIAGNOSIS(ES): Chest pain [R07.9]  Chest pain, unspecified type [R07.9]  Chief Complaint   Patient presents with    Chest Pain     Patient arrives via EMS with c/o chest pain that began at 11 am when he took his meds. Patient describes this as burning pain. EMS received 0.4 Nitro SL and 324mg asa po en route. 20# IV placed.      Patient Active Problem List    Diagnosis Date Noted    Chronic diastolic CHF (congestive heart failure) (HCC) 2022    Status post cardiac catheterization 2023    CHF (congestive heart failure), NYHA class I, acute on chronic, combined (HCC) 2023    Atrial fibrillation with rapid ventricular response (HCC) 2023    Acute on chronic congestive heart failure (HCC) 2023    Leukocytosis 2023    Diverticulitis 2023    Left lower quadrant abdominal pain 2023    Severe obesity (BMI 35.0-39.9) with comorbidity (HCC) 2023    Secondary hypercoagulable state (HCC) 2023    Paroxysmal atrial fibrillation (HCC)     Stage 3 chronic kidney disease (HCC)     Abnormal CT of the chest     Chronic respiratory failure with hypoxia (HCC)     Paroxysmal A-fib (HCC)     Chronic anticoagulation     Chest pain      esophagitis/gastritis    HERNIA REPAIR      MI XCAPSL CTRC RMVL INSJ IO LENS PROSTH W/O ECP Right 9/6/2018    PHACO EMULSIFICATION OF CATARACT WITH INTRAOCULAR LENS IMPLANT RIGHT EYE performed by Mike Alvarez MD at [facility] OR    MI XCAPSL CTRC RMVL INSJ IO LENS PROSTH W/O ECP Left 10/18/2018    PHACO EMULSIFICATION OF CATARACT WITH  INTRAOCULAR LENS IMPLANT LEFT EYE performed by Mike Alvarez MD at [facility] OR    UPPER GASTROINTESTINAL ENDOSCOPY N/A 11/13/2019    EGD BIOPSY performed by Colton Silva MD at [facility] ENDOSCOPY         Problem Relation Age of Onset    Alcohol Abuse Sister      Allergies   Allergen Reactions    Amiodarone Anaphylaxis     Thyriod toxic       DATE ONSET: 3/27/2024    Date of Evaluation: 3/28/2024   Evaluating Therapist: FRANCISCO Murphy    Chart Reviewed: : [x] Yes [] No     Pain: The patient does not complain of pain.           Data Review:        Current Diet: Diet NPO    Lab Values:    CBC:  Lab Results   Component Value Date    WBC 5.6 03/28/2024    HGB 13.5 03/28/2024    HCT 40.4 (L) 03/28/2024    MCV 88.8 03/28/2024     03/28/2024         Basic Metabolic Panel  Lab Results   Component Value Date/Time     03/28/2024 05:18 AM     03/28/2024 05:18 AM    CO2 24 03/28/2024 05:18 AM    GLUCOSE 159 03/28/2024 05:18 AM    BUN 20 03/28/2024 05:18 AM    CREATININE 1.5 03/28/2024 05:18 AM       HEPATIC PANEL   Lab Results   Component Value Date/Time    AST 14 03/27/2024 12:12 PM    ALT 6 03/27/2024 12:12 PM       Lab Results   Component Value Date    CKTOTAL 269 10/26/2020    TROPONINI 0.01 01/24/2023       Lab Results   Component Value Date/Time    INR 1.49 01/13/2020 11:10 AM    INR 2.22 08/19/2017 02:27 AM    INR 1.08 04/17/2012 09:35 AM       Magnesium:    Lab Results   Component Value Date/Time    MG 2.10 11/18/2023 03:14 PM       U/A:    Lab Results   Component Value Date/Time    COLORU Yellow 11/18/2023 08:24 AM    WBCUA 0-2 11/18/2023 08:24 AM    RBCUA 3-4

## 2024-03-28 NOTE — DISCHARGE INSTR - DIET
Good nutrition is important when healing from an illness, injury, or surgery.  Follow any nutrition recommendations given to you during your hospital stay.   If you were given an oral nutrition supplement while in the hospital, continue to take this supplement at home.  You can take it with meals, in-between meals, and/or before bedtime. These supplements can be purchased at most local grocery stores, pharmacies, and chain [employer]-stores.   If you have any questions about your diet or nutrition, call the hospital and ask for the dietitian.  DIet regular soft and bite size

## 2024-03-29 ENCOUNTER — TELEPHONE (OUTPATIENT)
Dept: PULMONOLOGY | Age: 79
End: 2024-03-29

## 2024-03-29 ENCOUNTER — CARE COORDINATION (OUTPATIENT)
Dept: CASE MANAGEMENT | Age: 79
End: 2024-03-29

## 2024-03-29 NOTE — CARE COORDINATION
Care Transitions Initial Follow Up Call    Call within 2 business days of discharge: Yes    Patient: Ulises Whitman Patient : 1945   MRN: [id number]  Reason for Admission: 1 day -> chest pain-epigastric non cardiac, NOCAD, chronic hypoxic resp failure, aeCOPD, HTN with hypotension, dysphagia, chronic sCHF, cardiomyopathy EF 30-35%, CKD3b, A Fib on Xarelto, BPH -> home no services, baseline O2 2L HS, f/up Nancy Santiago referred to pul rehab  Discharge Date: 3/28/24 RARS: Readmission Risk Score: 14.5    Last Discharge Facility       Date Complaint Diagnosis Description Type Department Provider    3/27/24 Chest Pain Chest pain, unspecified type ... ED to Hosp-Admission (Discharged) (ADMITTED) [facility] [place] [facility] Mercy Conrad MD; Tiffanie Hoffman...     1st attempt - unable to reach or leave message (vm not set up)    Is follow up appointment scheduled within 7 days of discharge? No-PCP HFU scheduled by CHF navigator prior to discharge - 11 days after DC to home.    Follow Up  Future Appointments   Date Time Provider Department Center   2024 11:20 AM Niin Carrasco APRN - CNP [place] [facility] None   2024  1:30 PM Pasquale Powers MD [place] [facility] None   2024  9:45 AM Artem Baldwin APRN - CNP [facility]   2024  9:30 AM Willard [facility] MD [facility] [place]     Felicia Maldonado, RN  Care Transition Nurse  [phone] mobile

## 2024-03-29 NOTE — TELEPHONE ENCOUNTER
Low dose chest CT for lung cancer screening in late March 2025, see pulmonary after.  Okay to d/c f/u appt scheduled with me and reschedule for March.

## 2024-03-29 NOTE — TELEPHONE ENCOUNTER
Attempted to contact pt, NA, NVM set up. Appt cancelled with reminder set to call pt to r/s    Low dose chest CT for lung cancer screening in late March 2025, see pulmonary after.  Okay to d/c f/u appt scheduled with me and reschedule for March.

## 2024-03-31 ENCOUNTER — FOLLOWUP TELEPHONE ENCOUNTER (OUTPATIENT)
Dept: ADMINISTRATIVE | Age: 79
End: 2024-03-31

## 2024-04-01 ENCOUNTER — CARE COORDINATION (OUTPATIENT)
Dept: CASE MANAGEMENT | Age: 79
End: 2024-04-01

## 2024-04-01 NOTE — CARE COORDINATION
Care Transitions Initial Follow Up Call    Call within 2 business days of discharge: Yes    Patient: Ulises Whitman Patient : 1945   MRN: [id number]  Reason for Admission: 1 day -> chest pain-epigastric non cardiac, NOCAD, chronic hypoxic resp failure, aeCOPD, HTN with hypotension, dysphagia, chronic sCHF, cardiomyopathy EF 30-35%, CKD3b, A Fib on Xarelto, BPH -> home no services, baseline O2 2L HS, f/up Nancy Santiago referred to pulm rehab   Discharge Date: 3/28/24 RARS: Readmission Risk Score: 14.5      Last Discharge Facility       Date Complaint Diagnosis Description Type Department Provider    3/27/24 Chest Pain Chest pain, unspecified type ... [facility]; Tiffanie Hoffman...     2nd/final attempt - voice mailbox not set up. Care transition program closed because not able to reach.     Follow Up  Future Appointments   Date Time Provider Department Center   2024 11:20 AM Nini Carrasco APRN - CNP [place] [facility] None   2024  2:00 PM Willard Whitley MD [facility] [place]   2024  1:30 PM Pasquale Powers MD [place] [facility] None   2024  9:45 AM Artem Baldwin APRN - CNP [facility] JAROD Maldonado, RN  Care Transition Nurse  [phone] mobile

## 2024-04-04 RX ORDER — ALBUTEROL SULFATE 90 UG/1
AEROSOL, METERED RESPIRATORY (INHALATION)
Qty: 8.5 G | Refills: 2 | Status: SHIPPED | OUTPATIENT
Start: 2024-04-04

## 2024-04-09 ENCOUNTER — TELEPHONE (OUTPATIENT)
Dept: CARDIAC REHAB | Age: 79
End: 2024-04-09

## 2024-04-09 ENCOUNTER — OFFICE VISIT (OUTPATIENT)
Dept: PULMONOLOGY | Age: 79
End: 2024-04-09
Payer: MEDICARE

## 2024-04-09 VITALS
HEIGHT: 71 IN | SYSTOLIC BLOOD PRESSURE: 118 MMHG | BODY MASS INDEX: 36.12 KG/M2 | OXYGEN SATURATION: 94 % | RESPIRATION RATE: 16 BRPM | WEIGHT: 258 LBS | HEART RATE: 94 BPM | DIASTOLIC BLOOD PRESSURE: 76 MMHG

## 2024-04-09 DIAGNOSIS — J44.9 CHRONIC OBSTRUCTIVE PULMONARY DISEASE, UNSPECIFIED COPD TYPE (HCC): Primary | ICD-10-CM

## 2024-04-09 DIAGNOSIS — E66.01 SEVERE OBESITY (BMI 35.0-39.9) WITH COMORBIDITY (HCC): ICD-10-CM

## 2024-04-09 PROCEDURE — 3074F SYST BP LT 130 MM HG: CPT | Performed by: INTERNAL MEDICINE

## 2024-04-09 PROCEDURE — 3078F DIAST BP <80 MM HG: CPT | Performed by: INTERNAL MEDICINE

## 2024-04-09 PROCEDURE — 99214 OFFICE O/P EST MOD 30 MIN: CPT | Performed by: INTERNAL MEDICINE

## 2024-04-09 PROCEDURE — 1123F ACP DISCUSS/DSCN MKR DOCD: CPT | Performed by: INTERNAL MEDICINE

## 2024-04-09 RX ORDER — ALBUTEROL SULFATE 90 UG/1
AEROSOL, METERED RESPIRATORY (INHALATION)
Qty: 8.5 G | Refills: 5 | Status: SHIPPED | OUTPATIENT
Start: 2024-04-09

## 2024-04-09 RX ORDER — FLUTICASONE FUROATE, UMECLIDINIUM BROMIDE AND VILANTEROL TRIFENATATE 200; 62.5; 25 UG/1; UG/1; UG/1
POWDER RESPIRATORY (INHALATION)
Qty: 60 EACH | Refills: 11 | Status: SHIPPED | OUTPATIENT
Start: 2024-04-09

## 2024-04-09 NOTE — PROGRESS NOTES
a written order for lung cancer screening with LDCT.     Order for Screening chest CT scan should be placed with documentation as below:  Beneficiary date of birth;   Actual pack - year smoking history (number) from above;   Current smoking status, and for former smokers, the number of years since quitting smoking from above  Beneficiary is asymptomatic   National Provider Identifier (NPI) for [facility] [id number]

## 2024-04-09 NOTE — TELEPHONE ENCOUNTER
Attempted to reach pt re referral for Pulmonary Rehab. No answer and no voicemail noted. Will attempt again at later time.

## 2024-04-17 ENCOUNTER — OFFICE VISIT (OUTPATIENT)
Dept: INTERNAL MEDICINE CLINIC | Age: 79
End: 2024-04-17

## 2024-04-17 VITALS
DIASTOLIC BLOOD PRESSURE: 80 MMHG | RESPIRATION RATE: 12 BRPM | SYSTOLIC BLOOD PRESSURE: 130 MMHG | WEIGHT: 255 LBS | BODY MASS INDEX: 35.7 KG/M2 | HEIGHT: 71 IN | HEART RATE: 70 BPM

## 2024-04-17 DIAGNOSIS — K63.5 POLYP OF COLON, UNSPECIFIED PART OF COLON, UNSPECIFIED TYPE: ICD-10-CM

## 2024-04-17 DIAGNOSIS — Z00.00 MEDICARE ANNUAL WELLNESS VISIT, SUBSEQUENT: Primary | ICD-10-CM

## 2024-04-17 DIAGNOSIS — I25.10 CORONARY ARTERY DISEASE INVOLVING NATIVE CORONARY ARTERY OF NATIVE HEART WITHOUT ANGINA PECTORIS: ICD-10-CM

## 2024-04-17 DIAGNOSIS — I48.91 ATRIAL FIBRILLATION, CONTROLLED (HCC): ICD-10-CM

## 2024-04-17 DIAGNOSIS — I73.9 PAD (PERIPHERAL ARTERY DISEASE) (HCC): ICD-10-CM

## 2024-04-17 DIAGNOSIS — J96.11 CHRONIC RESPIRATORY FAILURE WITH HYPOXIA (HCC): ICD-10-CM

## 2024-04-17 DIAGNOSIS — I71.20 THORACIC AORTIC ANEURYSM WITHOUT RUPTURE, UNSPECIFIED PART (HCC): ICD-10-CM

## 2024-04-17 DIAGNOSIS — I42.8 NICM (NONISCHEMIC CARDIOMYOPATHY) (HCC): ICD-10-CM

## 2024-04-17 DIAGNOSIS — D68.69 SECONDARY HYPERCOAGULABLE STATE (HCC): ICD-10-CM

## 2024-04-17 DIAGNOSIS — I50.22 CHRONIC SYSTOLIC CHF (CONGESTIVE HEART FAILURE) (HCC): ICD-10-CM

## 2024-04-17 DIAGNOSIS — N18.32 CHRONIC KIDNEY DISEASE, STAGE 3B (HCC): ICD-10-CM

## 2024-04-17 DIAGNOSIS — I50.32 CHRONIC DIASTOLIC CHF (CONGESTIVE HEART FAILURE) (HCC): ICD-10-CM

## 2024-04-17 DIAGNOSIS — J44.9 CHRONIC OBSTRUCTIVE PULMONARY DISEASE, UNSPECIFIED COPD TYPE (HCC): ICD-10-CM

## 2024-04-17 PROBLEM — I50.43 CHF (CONGESTIVE HEART FAILURE), NYHA CLASS I, ACUTE ON CHRONIC, COMBINED (HCC): Status: RESOLVED | Noted: 2023-11-21 | Resolved: 2024-04-17

## 2024-04-17 PROCEDURE — G0439 PPPS, SUBSEQ VISIT: HCPCS | Performed by: INTERNAL MEDICINE

## 2024-04-17 PROCEDURE — 1123F ACP DISCUSS/DSCN MKR DOCD: CPT | Performed by: INTERNAL MEDICINE

## 2024-04-17 PROCEDURE — 3075F SYST BP GE 130 - 139MM HG: CPT | Performed by: INTERNAL MEDICINE

## 2024-04-17 PROCEDURE — 3079F DIAST BP 80-89 MM HG: CPT | Performed by: INTERNAL MEDICINE

## 2024-04-17 RX ORDER — PREDNISONE 10 MG/1
10 TABLET ORAL DAILY PRN
COMMUNITY

## 2024-04-17 ASSESSMENT — PATIENT HEALTH QUESTIONNAIRE - PHQ9
SUM OF ALL RESPONSES TO PHQ QUESTIONS 1-9: 0
SUM OF ALL RESPONSES TO PHQ9 QUESTIONS 1 & 2: 0
SUM OF ALL RESPONSES TO PHQ QUESTIONS 1-9: 0
1. LITTLE INTEREST OR PLEASURE IN DOING THINGS: NOT AT ALL
2. FEELING DOWN, DEPRESSED OR HOPELESS: NOT AT ALL
SUM OF ALL RESPONSES TO PHQ QUESTIONS 1-9: 0
SUM OF ALL RESPONSES TO PHQ QUESTIONS 1-9: 0

## 2024-04-17 ASSESSMENT — LIFESTYLE VARIABLES
HOW MANY STANDARD DRINKS CONTAINING ALCOHOL DO YOU HAVE ON A TYPICAL DAY: PATIENT DOES NOT DRINK
HOW OFTEN DO YOU HAVE A DRINK CONTAINING ALCOHOL: NEVER

## 2024-04-17 NOTE — PROGRESS NOTES
Medicare Annual Wellness Visit    Ulises Whitman is here for Medicare AWV    Assessment & Plan   Medicare annual wellness visit, subsequent  Polyp of colon, unspecified part of colon, unspecified type  -     [facility] - Colton Silva MD, [facility]ER[facility] & E[facility]  Chronic diastolic CHF (congestive heart failure) (HCC)  Atrial fibrillation, controlled (HCC)  Chronic obstructive pulmonary disease, unspecified COPD type (HCC)  Thoracic aortic aneurysm without rupture, unspecified part (HCC)  NICM (nonischemic cardiomyopathy) (HCC)  Chronic systolic CHF (congestive heart failure) (HCC)  Chronic respiratory failure with hypoxia (HCC)  Secondary hypercoagulable state (HCC)  Chronic kidney disease, stage 3b (HCC)  PAD (peripheral artery disease) (HCC)  Coronary artery disease involving native coronary artery of native heart without angina pectoris  -     Lipid Panel; Future      -Medicare exam done  - Colon polyp. Colonoscopy done  - Chronic diastolic CHF stable  - COPD. Sees pulmonary  - NICM stable  - A fib. On Xarelto  - CKD stable.     Recommendations for Preventive Services Due: see orders and patient instructions/AVS.  Recommended screening schedule for the next 5-10 years is provided to the patient in written form: see Patient Instructions/AVS.     No follow-ups on file.     Subjective     Patient is here for a Medicare exam.    He has a history of hypertension. It is well controlled. He is compliant and has no med side effects. No chest pain.  Patient's COPD is controlled on present bronchodilator regimen. Patient is taking medications as instructed, no medication side effects noted, no significant ongoing wheezing or shortness of breath, using bronchodilator MDI less than twice a week. He quit smoking. He is doing well. He is on 3 L of oxygen at night.  He has some pedal edema.   He has history of a fib. He is in NSR. He takes Xarelto. No bleeding or bruising.  He has GERD. It is stable.

## 2024-04-17 NOTE — PATIENT INSTRUCTIONS
how heavy a smoker you are or were.  To figure out your pack years, multiply how many packs a day on average (assuming 20 cigarettes per pack) you have smoked by how many years you have smoked. For example:  If you smoked 1 pack a day for 20 years, that's 1 times 20. So you have a smoking history of 20 pack years.  If you smoked 2 packs a day for 10 years, that's 2 times 10. So you have a smoking history of 20 pack years.  Experts agree that screening is for people who have a high risk of lung cancer. But experts don't agree on what high risk means. Some say people age 50 or older with at least a 20-pack-year smoking history are high risk. Others say it's people age 55 or older with a 30-pack-year history.  To see if you could benefit from screening, first find out if you are at high risk for lung cancer. Your doctor can help you decide your lung cancer risk.  What are the risks of screening?  CT screening for lung cancer isn't perfect. It can show an abnormal result when it turns out there wasn't any cancer. This is called a false-positive result. This means you may need more tests to make sure you don't have cancer. These tests can be harmful and cause a lot of worry.  These tests may include more CT scans and invasive testing like a lung biopsy. In a biopsy, the doctor takes a sample of tissue from inside your lung so it can be looked at under a microscope. A biopsy is the only way to tell if you have lung cancer. If the biopsy finds cancer, you and your doctor will have to decide how or whether to treat it.  Some lung cancers found on CT scans are harmless and would not have caused a problem if they had not been found through screening. But because doctors can't tell which ones will turn out to be harmless, most will be treated. This means that you may get treatment--including surgery, radiation, or chemotherapy--that you don't need.  There is a risk of damage to cells or tissue from being exposed to radiation,

## 2024-04-18 LAB
CHOLEST SERPL-MCNC: 136 MG/DL (ref 0–199)
HDLC SERPL-MCNC: 48 MG/DL (ref 40–60)
LDLC SERPL CALC-MCNC: 73 MG/DL
TRIGL SERPL-MCNC: 77 MG/DL (ref 0–150)
VLDLC SERPL CALC-MCNC: 15 MG/DL

## 2024-04-25 RX ORDER — LOSARTAN POTASSIUM 50 MG/1
50 TABLET ORAL DAILY
Qty: 28 TABLET | Refills: 2 | Status: SHIPPED | OUTPATIENT
Start: 2024-04-25

## 2024-04-29 ENCOUNTER — OFFICE VISIT (OUTPATIENT)
Dept: CARDIOLOGY CLINIC | Age: 79
End: 2024-04-29
Payer: MEDICARE

## 2024-04-29 VITALS
DIASTOLIC BLOOD PRESSURE: 72 MMHG | HEIGHT: 71 IN | WEIGHT: 256 LBS | SYSTOLIC BLOOD PRESSURE: 128 MMHG | HEART RATE: 98 BPM | BODY MASS INDEX: 35.84 KG/M2 | OXYGEN SATURATION: 94 %

## 2024-04-29 DIAGNOSIS — I50.20 HFREF (HEART FAILURE WITH REDUCED EJECTION FRACTION) (HCC): ICD-10-CM

## 2024-04-29 DIAGNOSIS — I48.19 PERSISTENT ATRIAL FIBRILLATION (HCC): Primary | ICD-10-CM

## 2024-04-29 DIAGNOSIS — I42.8 NICM (NONISCHEMIC CARDIOMYOPATHY) (HCC): ICD-10-CM

## 2024-04-29 PROCEDURE — 93000 ELECTROCARDIOGRAM COMPLETE: CPT | Performed by: NURSE PRACTITIONER

## 2024-04-29 PROCEDURE — 3074F SYST BP LT 130 MM HG: CPT | Performed by: NURSE PRACTITIONER

## 2024-04-29 PROCEDURE — 99214 OFFICE O/P EST MOD 30 MIN: CPT | Performed by: NURSE PRACTITIONER

## 2024-04-29 PROCEDURE — 3078F DIAST BP <80 MM HG: CPT | Performed by: NURSE PRACTITIONER

## 2024-04-29 PROCEDURE — 1123F ACP DISCUSS/DSCN MKR DOCD: CPT | Performed by: NURSE PRACTITIONER

## 2024-04-29 RX ORDER — LOSARTAN POTASSIUM 50 MG/1
50 TABLET ORAL DAILY
Qty: 30 TABLET | Refills: 3
Start: 2024-04-29

## 2024-04-29 ASSESSMENT — ENCOUNTER SYMPTOMS
GASTROINTESTINAL NEGATIVE: 1
SHORTNESS OF BREATH: 1

## 2024-04-29 NOTE — PROGRESS NOTES
cardiac monitor to assess adequacy of atrial fibrillation rate control, he declines at this time  7. Optimize GDMT and reassess EF in follow up, if EF remains <35%, consider ICD  8.  Follow-up with Dr. Hernandez, discuss possible cardioversion after uninterrupted anticoagulation    CORRINE [facility]  [phone]

## 2024-04-29 NOTE — PATIENT INSTRUCTIONS
Start jardiance 10 mg daily for heart strength  We discussed having you see the electrophysiologist, heart , for afib  Blood work in 2 weeks  Continue other medications  Follow up in 1 month with me and 2 months with Dr. Hernandez

## 2024-04-30 ENCOUNTER — TELEPHONE (OUTPATIENT)
Dept: CARDIOLOGY CLINIC | Age: 79
End: 2024-04-30

## 2024-04-30 NOTE — TELEPHONE ENCOUNTER
JANICE requesting pt to have appt with RKG in 1 month at [place].  Attempted to reach pt but he does not have a VM that is set up.  Called and spoke with pt wife.  Ok per HIPAA.  Appt made for 6/5/24 at 8:15 in [place].

## 2024-05-22 NOTE — PROGRESS NOTES
[facility] [place] Office Note  5/22/2024     Subjective:  Mr. Whitman is  being seen today for  follow up of  Paroxysmal aflutter, Afib, CMP, HTN, systolic CHF, COPD;        [place]:    He was admitted to hospital 1/11/19 for COPD, HCAP. He reports feeling better. He is wearing oxygen at night and PRN during the day. Denies chest pain, shortness of breath, edema, dizziness, palpitations and syncope. Amiodarone was discontinued, due to thyrotoxicosis but is on his med list post discharge. I personally called the pharmacy he was prescribed Amiodarone at discharge from hospital          PMH:   aflutter, afib, cmp, htn, systolic chf, copd  Admitted 12/23/19 for increased SOB x 2 weeks and diagnosed with COPD exacerbation. Uses 2-L supplemental O2 PRN at home. Now on 5L. Admit EKG 12/23/19 showed NSR; Nonspecific ST abnormality (no change from 11/1/19 EKG). Note CXR 12/25/19 showed developing mild bibasilar segmental atelectasis versus pneumonia. FINN <0.01, <0.01, <0.01; BNP=30; BUN/Cr=33/1.3; K+ 4.9.               12/26/19 developed CP starting under both breasts but radiating into lower mid-chest and mid-epigastrium region. Felt sharp and lasted 5 minutes. No associated symptoms. Relieved with PRN morphine but returned and 2nd dose of pain med relieved entirely. Note he admits to running out of PPI and off for about 1 month. PPI resumed. D/venancio to home treated with steroid taper and Zithromax. Readmission 1/11/19 COPD ex., HCAP, sepsis      Review of Systems:  12 point ROS negative in all areas as listed below except as in [place]  Constitutional, EENT, Cardiovascular, pulmonary, GI, , Musculoskeletal, skin, neurological, hematological, endocrine, Psychiatric        Reviewed past medical history, social, and family history.   Smoked 55 yrs until 19 months ago when quit. Smoked 1-3 packs per day.  Past Medical History:   Diagnosis Date    A-fib ([facility])     2/14/12    Acute conjunctivitis of both eyes     Acute on chronic

## 2024-05-29 NOTE — CARE COORDINATION
DISCHARGE ORDER  Date/Time 10/29/2020 10:23 AM  Completed by: Amarilis Gimenez, Case Management    Patient Name: Roverto Carrasco      : 1945  Admitting Diagnosis: COPD exacerbation ([address] [J44.1]      Admit order Date and Status: 10/26/20 inpatient  (verify MD's last order for status of admission)      Noted discharge order. If applicable PT/OT recommendation at Discharge: N/A  DME recommendation by PT/OT:  Confirmed discharge plan  : Yes  with whom patient  If pt confirmed DC plan does family need to be contacted by CM No   Discharge Plan: Order for dc noted. Spoke with pt at bedside and plan remains for home with wife. Discussed HHC and pt declines. Noted has home O2 in place prior to admit. Chart reviewed and no other dc needs identified,        Reviewed chart. Role of discharge planner explained and patient verbalized understanding. Discharge order is noted. Has Home O2 in place on admit:  Yes  Informed of need to bring portable home O2 tank on day of discharge for nursing to connect prior to leaving:   Yes  Verbalized agreement/Understanding:   Yes  Pt is being d/c'd to home today. Pt's O2 sats are 93% on 3L [place]. Discharge timeout done with nsg, CM and pt. All discharge needs and concerns addressed. no

## 2024-06-05 ENCOUNTER — TELEPHONE (OUTPATIENT)
Dept: CARDIOLOGY CLINIC | Age: 79
End: 2024-06-05

## 2024-06-05 ENCOUNTER — OFFICE VISIT (OUTPATIENT)
Dept: CARDIOLOGY CLINIC | Age: 79
End: 2024-06-05
Payer: MEDICARE

## 2024-06-05 VITALS
OXYGEN SATURATION: 94 % | DIASTOLIC BLOOD PRESSURE: 72 MMHG | BODY MASS INDEX: 35.95 KG/M2 | SYSTOLIC BLOOD PRESSURE: 114 MMHG | HEART RATE: 76 BPM | HEIGHT: 71 IN | WEIGHT: 256.8 LBS

## 2024-06-05 DIAGNOSIS — I50.22 CHRONIC SYSTOLIC CONGESTIVE HEART FAILURE (HCC): Primary | ICD-10-CM

## 2024-06-05 DIAGNOSIS — I48.11 LONGSTANDING PERSISTENT ATRIAL FIBRILLATION (HCC): ICD-10-CM

## 2024-06-05 DIAGNOSIS — I77.810 AORTIC ROOT DILATATION (HCC): ICD-10-CM

## 2024-06-05 DIAGNOSIS — E78.2 MIXED HYPERLIPIDEMIA: ICD-10-CM

## 2024-06-05 DIAGNOSIS — D68.69 SECONDARY HYPERCOAGULABLE STATE (HCC): ICD-10-CM

## 2024-06-05 DIAGNOSIS — I42.0 DILATED CARDIOMYOPATHY (HCC): ICD-10-CM

## 2024-06-05 DIAGNOSIS — R06.09 DYSPNEA ON EXERTION: ICD-10-CM

## 2024-06-05 DIAGNOSIS — I10 ESSENTIAL HYPERTENSION: ICD-10-CM

## 2024-06-05 PROBLEM — E11.9 TYPE 2 DIABETES MELLITUS (HCC): Status: ACTIVE | Noted: 2024-06-05

## 2024-06-05 PROCEDURE — 3074F SYST BP LT 130 MM HG: CPT | Performed by: INTERNAL MEDICINE

## 2024-06-05 PROCEDURE — 3078F DIAST BP <80 MM HG: CPT | Performed by: INTERNAL MEDICINE

## 2024-06-05 PROCEDURE — 1123F ACP DISCUSS/DSCN MKR DOCD: CPT | Performed by: INTERNAL MEDICINE

## 2024-06-05 PROCEDURE — 99214 OFFICE O/P EST MOD 30 MIN: CPT | Performed by: INTERNAL MEDICINE

## 2024-06-05 NOTE — TELEPHONE ENCOUNTER
Pt seen in office today with ОЛЕГ. We started three patient assistance forms for the pt after he left the office. Attempted to call pt, no answer. LMOVM for pt to return call back to office. I have questions regarding the [employer] application. These questions are regarding the pt's income and insurances and I do not have that information. Will call pt again at a later time.

## 2024-06-05 NOTE — PATIENT INSTRUCTIONS
Plan:  Labs reviewed in epic and discussed with patient.  Current medications reviewed.  Refills given as warranted.  Recommend that you do not take aspirin, motrin, aleve.  Recommend starting Entresto 24-26 mg two times a day.  If I can get you approved for financial assistance then I will stop your losartan and you will start taking entreso.   No cardiac testing at this time.    Follow up with me in 2-3 months

## 2024-06-05 NOTE — PROGRESS NOTES
[facility] Office Note  6/5/2024     Subjective:  Mr. Whitman is  being seen today for  follow up of  Paroxysmal aflutter,  persistent Afib, Non ischemic CMP, HTN, systolic CHF, COPD;    C/o shortness of breath with exertion  No chest pain   former smoker  55 yrs quit 2013 has underlying Emphysema     [place]:   He has had multiple admissions since LOV 1/29/2020 for shortness of breath, COPD exacerbation, diverticulitis, AFib RVR.  Echo 11/20/23 showed EF=30-35%. Global hypokinesis. Mild cLVH. Elevated LV filling pressures. Aortic Root 4.1 cm. Mild TR.  AV sclerotic but opens appropriately.  CT Chest 3/13/24 showed mild-moderate emphysemia.     Today, 6/5/24 he reports he never skips his blood thinner. He bruises very easily.  He takes aspirin once in a while. His shortness of breath is not any worse than normal.  He has been following with Dr. Whitley for his lungs.  He had a colonoscopy two weeks ago and it was good. He pays $100 for a 30 day supply of all of his medications that come in a bubble pack. Patient denies current edema, chest pain, palpitations, dizziness or syncope.  Patient is taking all cardiac medications as prescribed and tolerates them well.        PMH:   aflutter, afib, cmp, htn, systolic chf, copd    He was admitted to hospital 1/11/20 for COPD, HCAP. Amiodarone was discontinued, due to thyrotoxicosis    Admitted 12/23/19 for increased SOB x 2 weeks and diagnosed with COPD exacerbation. Uses 2-L supplemental O2 PRN at home. Now on 5L. Admit EKG 12/23/19 showed NSR; Nonspecific ST abnormality (no change from 11/1/19 EKG). Note CXR 12/25/19 showed developing mild bibasilar segmental atelectasis versus pneumonia. FINN <0.01, <0.01, <0.01; BNP=30; BUN/Cr=33/1.3; K+ 4.9.               12/26/19 developed CP starting under both breasts but radiating into lower mid-chest and mid-epigastrium region. Felt sharp and lasted 5 minutes. No associated symptoms. Relieved with PRN morphine but returned and

## 2024-06-06 NOTE — TELEPHONE ENCOUNTER
Called and spoke to pt, he answered the questions I needed for the [employer] patient assistance forms. Will have RKG sign. Faxing to the company, and scanning forms into chart.

## 2024-06-20 RX ORDER — FUROSEMIDE 40 MG/1
40 TABLET ORAL DAILY
Qty: 90 TABLET | Refills: 0 | Status: SHIPPED | OUTPATIENT
Start: 2024-06-20

## 2024-06-20 RX ORDER — ATORVASTATIN CALCIUM 20 MG/1
20 TABLET, FILM COATED ORAL DAILY
Qty: 90 TABLET | Refills: 0 | Status: SHIPPED | OUTPATIENT
Start: 2024-06-20

## 2024-06-20 RX ORDER — PANTOPRAZOLE SODIUM 40 MG/1
40 TABLET, DELAYED RELEASE ORAL DAILY
Qty: 90 TABLET | Refills: 0 | Status: SHIPPED | OUTPATIENT
Start: 2024-06-20

## 2024-06-20 RX ORDER — METOPROLOL SUCCINATE 50 MG/1
50 TABLET, EXTENDED RELEASE ORAL DAILY
Qty: 90 TABLET | Refills: 0 | Status: SHIPPED | OUTPATIENT
Start: 2024-06-20

## 2024-06-20 RX ORDER — TAMSULOSIN HYDROCHLORIDE 0.4 MG/1
0.4 CAPSULE ORAL DAILY
Qty: 90 CAPSULE | Refills: 0 | Status: SHIPPED | OUTPATIENT
Start: 2024-06-20

## 2024-06-20 RX ORDER — POTASSIUM CITRATE 15 MEQ/1
15 TABLET, EXTENDED RELEASE ORAL DAILY
Qty: 90 TABLET | Refills: 0 | Status: SHIPPED | OUTPATIENT
Start: 2024-06-20

## 2024-06-27 ENCOUNTER — TELEPHONE (OUTPATIENT)
Dept: CARDIOLOGY CLINIC | Age: 79
End: 2024-06-27

## 2024-06-27 NOTE — TELEPHONE ENCOUNTER
[facility] pharmacy stated that they recently spoke with the pt and he advised them that he has not received the 30 day supply of samples of Entresto from us that we advised him that we would give. The pharmacy also noted that pt is suppose to stop Losartan prior to starting Entresto. The pharmacy would like to confirm that pt has not started Entresto. Please advise.

## 2024-06-27 NOTE — TELEPHONE ENCOUNTER
Called [facility] back.  They will take losartan out of his pill pack and put in the entresto when they can fill it.  He will come to  samples until then.   PT DAILY TREATMENT NOTE 2-15 Patient Name: Candy Medina Date:3/20/2019 : 1962 [x]  Patient  Verified Payor: Linda Pearson / Plan: Jaimie Gtz PPO / Product Type: PPO / In time: 10:4o pm  Out time: 2:50 pm 
Total Treatment Time (min): 65 Visit #: 7 Treatment Area: Left leg pain [M79.605] SUBJECTIVE Pain Level (0-10 scale): 3/10 Any medication changes, allergies to medications, adverse drug reactions, diagnosis change, or new procedure performed?: [x] No    [] Yes (see summary sheet for update) Subjective functional status/changes:   [] No changes reported Pt reports that she saw her doctor and they are pushing off her RTW to potentially 2019. She will see the doctor 2019 to determine the plan. He is happy with progress and thinks that her symtpoms are consistent with how long she has been having difficulty presurgery. OBJECTIVE Modality rationale: decrease inflammation, decrease pain and increase tissue extensibility to improve the patients ability to sit with less pain Type Additional Details  
[] Estim: []Att   []Unatt    []TENS instruct []IFC  []Premod   []NMES []Other:  []w/US   []w/ice   []w/heat Position: Location:   
[]  Traction: [] Cervical       []Lumbar 
                     [] Prone          []Supine []Intermittent   []Continuous Lbs: 
[] before manual 
[] after manual 
[]w/heat  
[]  Ultrasound: []Continuous   [] Pulsed  
                    at: []1MHz   []3MHz Location: 
W/cm2:  
[] Paraffin Location:  
[]w/heat  
[]  Ice     []  Heat 
[]  Ice massage Position: Location:  
[]  Laser 
[]  Other: Position: Location:  
[x]  Vasopneumatic Device Pressure:       [] lo [x] med [] hi  
Temperature: 34 to L hip and Lumber spine  
[x] Skin assessment post-treatment:  [x]intact [x]redness- no adverse reaction 
  []redness  adverse reaction:  
 
 
30 min Therapeutic Exercise:  [] See flow sheet :  
 Rationale: increase ROM, increase strength, improve coordination, improve balance and increase proprioception to improve the patients ability to return to N ADL skills 15 min Neuromuscular Re-education:  [] See flow sheet :  
Rationale: increase ROM, increase strength, improve coordination, improve balance and increase proprioception to improve the patients ability to return to N ADL skills With 
 [] TE 
 [] TA 
 [] neuro 
 [] other: Patient Education: [x] Review HEP [] Progressed/Changed HEP based on:  
[] positioning   [] body mechanics   [] transfers   [] heat/ice application   
[] other:   
 
Other Objective/Functional Measures: Antalgic gait noted at beginning of session. No antalgia present at end of session. Pain Level (0-10 scale) post treatment: 1/10 ASSESSMENT/Changes in Function:  
 
Patient will continue to benefit from skilled PT services to modify and progress therapeutic interventions, address functional mobility deficits, address ROM deficits, address strength deficits, analyze and address soft tissue restrictions, analyze and cue movement patterns, analyze and modify body mechanics/ergonomics, assess and modify postural abnormalities, address imbalance/dizziness and instruct in home and community integration to attain remaining goals. []  See Plan of Care 
[]  See progress note/recertification 
[]  See Discharge Summary Progress towards goals / Updated goals: 
Pt is improving in tolerance of WB with ability to tolerate WB balance activities today. PLAN [x]  Upgrade activities as tolerated     [x]  Continue plan of care [x]  Update interventions per flow sheet      
[]  Discharge due to:_ 
[]  Other:_ Barbara Guillen, PT, DPT, [facility] 3/20/2019

## 2024-07-09 NOTE — PROGRESS NOTES
Admit to med-surg  COVID rule out  COPD exacerbation    Hernandez Deng FNP-C  10/26/2020 O2 Device/Concentration: Room air with HME    Plan of Care: patient non-compliment, refuses trach care, mother does trach care. Patient is  not in respiratory distress, patient communicating without issue, family at bedside and BIPAP at bedside, wears at night.

## 2024-07-29 RX ORDER — EMPAGLIFLOZIN 10 MG/1
10 TABLET, FILM COATED ORAL DAILY
Qty: 30 TABLET | Refills: 3 | Status: SHIPPED | OUTPATIENT
Start: 2024-07-29

## 2024-07-30 ENCOUNTER — OFFICE VISIT (OUTPATIENT)
Dept: INTERNAL MEDICINE CLINIC | Age: 79
End: 2024-07-30

## 2024-07-30 VITALS
DIASTOLIC BLOOD PRESSURE: 70 MMHG | BODY MASS INDEX: 35.65 KG/M2 | SYSTOLIC BLOOD PRESSURE: 100 MMHG | HEART RATE: 70 BPM | RESPIRATION RATE: 12 BRPM | HEIGHT: 70 IN | WEIGHT: 249 LBS

## 2024-07-30 DIAGNOSIS — I10 ESSENTIAL HYPERTENSION: Primary | ICD-10-CM

## 2024-07-30 DIAGNOSIS — I50.22 CHRONIC SYSTOLIC CHF (CONGESTIVE HEART FAILURE) (HCC): ICD-10-CM

## 2024-07-30 DIAGNOSIS — I10 ESSENTIAL HYPERTENSION: ICD-10-CM

## 2024-07-30 DIAGNOSIS — J44.9 CHRONIC OBSTRUCTIVE PULMONARY DISEASE, UNSPECIFIED COPD TYPE (HCC): ICD-10-CM

## 2024-07-30 DIAGNOSIS — J44.1 CHRONIC OBSTRUCTIVE PULMONARY DISEASE WITH ACUTE EXACERBATION (HCC): ICD-10-CM

## 2024-07-30 DIAGNOSIS — I48.91 ATRIAL FIBRILLATION, CONTROLLED (HCC): ICD-10-CM

## 2024-07-30 DIAGNOSIS — E78.2 MIXED HYPERLIPIDEMIA: ICD-10-CM

## 2024-07-30 DIAGNOSIS — I50.32 CHRONIC DIASTOLIC CHF (CONGESTIVE HEART FAILURE) (HCC): ICD-10-CM

## 2024-07-30 DIAGNOSIS — I73.9 PAD (PERIPHERAL ARTERY DISEASE) (HCC): ICD-10-CM

## 2024-07-30 DIAGNOSIS — I48.3 TYPICAL ATRIAL FLUTTER (HCC): ICD-10-CM

## 2024-07-30 PROCEDURE — 1123F ACP DISCUSS/DSCN MKR DOCD: CPT | Performed by: INTERNAL MEDICINE

## 2024-07-30 PROCEDURE — 99214 OFFICE O/P EST MOD 30 MIN: CPT | Performed by: INTERNAL MEDICINE

## 2024-07-30 PROCEDURE — 3074F SYST BP LT 130 MM HG: CPT | Performed by: INTERNAL MEDICINE

## 2024-07-30 PROCEDURE — 3078F DIAST BP <80 MM HG: CPT | Performed by: INTERNAL MEDICINE

## 2024-07-30 RX ORDER — METOPROLOL SUCCINATE 50 MG/1
50 TABLET, EXTENDED RELEASE ORAL DAILY
Qty: 90 TABLET | Refills: 1 | Status: SHIPPED | OUTPATIENT
Start: 2024-07-30

## 2024-07-30 RX ORDER — POTASSIUM CITRATE 15 MEQ/1
15 TABLET, EXTENDED RELEASE ORAL DAILY
Qty: 90 TABLET | Refills: 1 | Status: SHIPPED | OUTPATIENT
Start: 2024-07-30

## 2024-07-30 RX ORDER — ATORVASTATIN CALCIUM 20 MG/1
20 TABLET, FILM COATED ORAL DAILY
Qty: 90 TABLET | Refills: 1 | Status: SHIPPED | OUTPATIENT
Start: 2024-07-30

## 2024-07-30 RX ORDER — PANTOPRAZOLE SODIUM 40 MG/1
40 TABLET, DELAYED RELEASE ORAL DAILY
Qty: 90 TABLET | Refills: 1 | Status: SHIPPED | OUTPATIENT
Start: 2024-07-30

## 2024-07-30 RX ORDER — TAMSULOSIN HYDROCHLORIDE 0.4 MG/1
0.4 CAPSULE ORAL DAILY
Qty: 90 CAPSULE | Refills: 1 | Status: SHIPPED | OUTPATIENT
Start: 2024-07-30

## 2024-07-30 RX ORDER — FUROSEMIDE 40 MG/1
40 TABLET ORAL DAILY
Qty: 90 TABLET | Refills: 1 | Status: SHIPPED | OUTPATIENT
Start: 2024-07-30

## 2024-07-30 RX ORDER — AMOXICILLIN AND CLAVULANATE POTASSIUM 875; 125 MG/1; MG/1
1 TABLET, FILM COATED ORAL 2 TIMES DAILY
Qty: 14 TABLET | Refills: 0 | Status: SHIPPED | OUTPATIENT
Start: 2024-07-30 | End: 2024-08-06

## 2024-07-30 ASSESSMENT — ENCOUNTER SYMPTOMS
VOMITING: 0
WHEEZING: 0
BACK PAIN: 0
NAUSEA: 0
ABDOMINAL PAIN: 0
SHORTNESS OF BREATH: 1
RHINORRHEA: 0

## 2024-07-30 NOTE — PROGRESS NOTES
Subjective:      Patient ID: Ulises Whitman is a 79 y.o. male.    HPI    Patient is here for a follow up.    He has some areas in the gluteal area which he has scratched and are hurting.      He has a history of hypertension. It is well controlled. He is compliant and has no med side effects. No chest pain.    Patient's COPD is controlled on present bronchodilator regimen. Patient is taking medications as instructed, no medication side effects noted, no significant ongoing wheezing or shortness of breath, using bronchodilator MDI less than twice a week. He quit smoking. He is doing well. He is on 3 L of oxygen at night.  He has some pedal edema.     He has history of a fib. He is in NSR. He takes Xarelto. No bleeding or bruising.    He has GERD. It is stable.      He has nocturia, no hematuria. He wakes up few times at night.    He has chronic systolic CHF. He was recently started on Entresto. He has only been taking it once a day.      Review of Systems   Constitutional:  Negative for activity change and appetite change.   HENT:  Negative for postnasal drip and rhinorrhea.    Respiratory:  Positive for shortness of breath. Negative for wheezing.    Cardiovascular:  Negative for chest pain, palpitations and leg swelling.   Gastrointestinal:  Negative for abdominal pain, nausea and vomiting.   Genitourinary:  Positive for frequency. Negative for difficulty urinating.   Musculoskeletal:  Negative for back pain and joint swelling.   Skin:  Negative for rash.   Neurological:  Negative for light-headedness.   Psychiatric/Behavioral:  Negative for sleep disturbance.          Past Medical History:   Diagnosis Date    A-fib ([facility])     2/14/12    Acute conjunctivitis of both eyes     Acute on chronic respiratory failure with hypoxia ([facility]) 2/13/2012    Acute respiratory failure with hypoxia ([facility]) 2/13/2012    Atrial fibrillation with RVR ([facility])     2/14/12     Avulsion fracture of ankle 9/21/2015    Benign localized

## 2024-07-31 LAB
ALBUMIN SERPL-MCNC: 4.4 G/DL (ref 3.4–5)
ALBUMIN/GLOB SERPL: 1.5 {RATIO} (ref 1.1–2.2)
ALP SERPL-CCNC: 102 U/L (ref 40–129)
ALT SERPL-CCNC: 9 U/L (ref 10–40)
ANION GAP SERPL CALCULATED.3IONS-SCNC: 13 MMOL/L (ref 3–16)
AST SERPL-CCNC: 12 U/L (ref 15–37)
BASOPHILS # BLD: 0.1 K/UL (ref 0–0.2)
BASOPHILS NFR BLD: 1.1 %
BILIRUB SERPL-MCNC: 0.6 MG/DL (ref 0–1)
BUN SERPL-MCNC: 18 MG/DL (ref 7–20)
CALCIUM SERPL-MCNC: 9.2 MG/DL (ref 8.3–10.6)
CHLORIDE SERPL-SCNC: 102 MMOL/L (ref 99–110)
CO2 SERPL-SCNC: 28 MMOL/L (ref 21–32)
CREAT SERPL-MCNC: 1.7 MG/DL (ref 0.8–1.3)
DEPRECATED RDW RBC AUTO: 17 % (ref 12.4–15.4)
EOSINOPHIL # BLD: 0.6 K/UL (ref 0–0.6)
EOSINOPHIL NFR BLD: 6.3 %
GFR SERPLBLD CREATININE-BSD FMLA CKD-EPI: 41 ML/MIN/{1.73_M2}
GLUCOSE SERPL-MCNC: 98 MG/DL (ref 70–99)
HCT VFR BLD AUTO: 45.3 % (ref 40.5–52.5)
HGB BLD-MCNC: 14.6 G/DL (ref 13.5–17.5)
LYMPHOCYTES # BLD: 1.6 K/UL (ref 1–5.1)
LYMPHOCYTES NFR BLD: 17.6 %
MCH RBC QN AUTO: 29.9 PG (ref 26–34)
MCHC RBC AUTO-ENTMCNC: 32.2 G/DL (ref 31–36)
MCV RBC AUTO: 93 FL (ref 80–100)
MONOCYTES # BLD: 0.6 K/UL (ref 0–1.3)
MONOCYTES NFR BLD: 6.9 %
NEUTROPHILS # BLD: 6.3 K/UL (ref 1.7–7.7)
NEUTROPHILS NFR BLD: 68.1 %
PLATELET # BLD AUTO: 216 K/UL (ref 135–450)
PMV BLD AUTO: 8.9 FL (ref 5–10.5)
POTASSIUM SERPL-SCNC: 4.5 MMOL/L (ref 3.5–5.1)
PROT SERPL-MCNC: 7.4 G/DL (ref 6.4–8.2)
RBC # BLD AUTO: 4.87 M/UL (ref 4.2–5.9)
SODIUM SERPL-SCNC: 143 MMOL/L (ref 136–145)
URATE SERPL-MCNC: 9.1 MG/DL (ref 3.5–7.2)
WBC # BLD AUTO: 9.2 K/UL (ref 4–11)

## 2024-08-01 NOTE — PROGRESS NOTES
[facility] Office Note  8/14/2024     Subjective:  Mr. Whitman is  being seen today for  follow up of  Paroxysmal aflutter,  persistent Afib, Non ischemic CMP, HTN, systolic CHF, COPD;    former smoker  55 yrs quit 2013 has underlying Emphysema   No cardiac complaints today.    [place]:   Today, 8/14/24 he reports he is feeling great  He is not having any problems.  Patient denies current edema, chest pain, shortness of breath, palpitations, dizziness or syncope.  Patient is taking all cardiac medications as prescribed and tolerates them well.        PMH:   aflutter, afib, cmp, htn, systolic chf, copd    He was admitted to hospital 1/11/20 for COPD, HCAP. Amiodarone was discontinued, due to thyrotoxicosis    Admitted 12/23/19 for increased SOB x 2 weeks and diagnosed with COPD exacerbation. Uses 2-L supplemental O2 PRN at home. Now on 5L. Admit EKG 12/23/19 showed NSR; Nonspecific ST abnormality (no change from 11/1/19 EKG). Note CXR 12/25/19 showed developing mild bibasilar segmental atelectasis versus pneumonia. FINN <0.01, <0.01, <0.01; BNP=30; BUN/Cr=33/1.3; K+ 4.9.               12/26/19 developed CP starting under both breasts but radiating into lower mid-chest and mid-epigastrium region. Felt sharp and lasted 5 minutes. No associated symptoms. Relieved with PRN morphine but returned and 2nd dose of pain med relieved entirely. Note he admits to running out of PPI and off for about 1 month. PPI resumed. D/venancio to home treated with steroid taper and Zithromax. Readmission 1/11/19 COPD ex., HCAP, sepsis      Review of Systems:  12 point ROS negative in all areas as listed below except as in [place]  Constitutional, EENT, Cardiovascular, pulmonary, GI, , Musculoskeletal, skin, neurological, hematological, endocrine, Psychiatric        Reviewed past medical history, social, and family history.   Smoked 55 yrs until  he quit in 2013   Smoked 1-3 packs per day.    Past Medical History:   Diagnosis Date    A-fib

## 2024-08-06 ENCOUNTER — TELEPHONE (OUTPATIENT)
Dept: INTERNAL MEDICINE CLINIC | Age: 79
End: 2024-08-06

## 2024-08-06 RX ORDER — AMOXICILLIN AND CLAVULANATE POTASSIUM 875; 125 MG/1; MG/1
1 TABLET, FILM COATED ORAL 2 TIMES DAILY
Qty: 14 TABLET | Refills: 0 | Status: SHIPPED | OUTPATIENT
Start: 2024-08-06 | End: 2024-08-13

## 2024-08-06 NOTE — TELEPHONE ENCOUNTER
----- Message from Pasquale Powers MD sent at 8/6/2024  1:22 PM EDT -----  Contact: Patient [phone]  7 more day  ----- Message -----  From: Jaymie Verde  Sent: 8/6/2024  11:30 AM EDT  To: Pasquale Powers MD    Patient requesting a refill on amoxicillin-clavulanate (AUGMENTIN) 875-125 MG per tablet.  Patient states you told him if he needed more to let you know.  Please advise        [facility] - [place], [place] - [address] - P [phone] - F [phone]  [address] [place] [address] [address], [facility] [address]  Phone: [phone]  Fax: [phone]

## 2024-08-14 ENCOUNTER — OFFICE VISIT (OUTPATIENT)
Dept: CARDIOLOGY CLINIC | Age: 79
End: 2024-08-14
Payer: MEDICARE

## 2024-08-14 VITALS
HEIGHT: 70 IN | HEART RATE: 98 BPM | OXYGEN SATURATION: 95 % | WEIGHT: 251 LBS | BODY MASS INDEX: 35.93 KG/M2 | SYSTOLIC BLOOD PRESSURE: 116 MMHG | DIASTOLIC BLOOD PRESSURE: 64 MMHG

## 2024-08-14 DIAGNOSIS — D68.69 SECONDARY HYPERCOAGULABLE STATE (HCC): ICD-10-CM

## 2024-08-14 DIAGNOSIS — I50.22 CHRONIC SYSTOLIC CHF (CONGESTIVE HEART FAILURE) (HCC): ICD-10-CM

## 2024-08-14 DIAGNOSIS — J43.1 PANLOBULAR EMPHYSEMA (HCC): ICD-10-CM

## 2024-08-14 DIAGNOSIS — E78.2 MIXED HYPERLIPIDEMIA: ICD-10-CM

## 2024-08-14 DIAGNOSIS — I42.8 NICM (NONISCHEMIC CARDIOMYOPATHY) (HCC): ICD-10-CM

## 2024-08-14 DIAGNOSIS — I10 ESSENTIAL HYPERTENSION: ICD-10-CM

## 2024-08-14 DIAGNOSIS — I48.11 LONGSTANDING PERSISTENT ATRIAL FIBRILLATION (HCC): Primary | ICD-10-CM

## 2024-08-14 PROCEDURE — 3074F SYST BP LT 130 MM HG: CPT | Performed by: INTERNAL MEDICINE

## 2024-08-14 PROCEDURE — 99214 OFFICE O/P EST MOD 30 MIN: CPT | Performed by: INTERNAL MEDICINE

## 2024-08-14 PROCEDURE — 3078F DIAST BP <80 MM HG: CPT | Performed by: INTERNAL MEDICINE

## 2024-08-14 PROCEDURE — 1123F ACP DISCUSS/DSCN MKR DOCD: CPT | Performed by: INTERNAL MEDICINE

## 2024-08-14 NOTE — PATIENT INSTRUCTIONS
Plan:  Labs reviewed in epic and discussed with patient.  Current medications reviewed.  Refills given as warranted.  Recommend that you do not take aspirin, motrin, aleve.  No cardiac testing at this time.    Follow up with me in 8 months

## 2024-08-16 ENCOUNTER — TELEPHONE (OUTPATIENT)
Dept: INTERNAL MEDICINE CLINIC | Age: 79
End: 2024-08-16

## 2024-08-16 RX ORDER — AMOXICILLIN AND CLAVULANATE POTASSIUM 875; 125 MG/1; MG/1
1 TABLET, FILM COATED ORAL 2 TIMES DAILY
Qty: 20 TABLET | Refills: 0 | Status: SHIPPED | OUTPATIENT
Start: 2024-08-16 | End: 2024-08-26

## 2024-08-16 NOTE — TELEPHONE ENCOUNTER
----- Message from Dr. Pasquale Powers MD sent at 8/16/2024  2:40 PM EDT -----  Contact: [phone]  Augmentin 875 twice daily for 10 days  ----- Message -----  From: Selene Geller  Sent: 8/16/2024  12:32 PM EDT  To: Pasquale Powers MD    Patient requesting a refill on amoxicillin-clavulanate (AUGMENTIN) 875-125 MG per tablet.  Patient states you told him if he needed more to let you know. Pt states he thinks the round will finally get it cleared. Please advise    [facility] - [facility] [address] [place] [address] [address] - P [phone] - F [phone]  [address] [place] [address] [address] [address]  Phone: [phone]  Fax: [phone]

## 2024-08-26 RX ORDER — IPRATROPIUM BROMIDE AND ALBUTEROL SULFATE 2.5; .5 MG/3ML; MG/3ML
SOLUTION RESPIRATORY (INHALATION)
Qty: 360 ML | Refills: 0 | Status: SHIPPED | OUTPATIENT
Start: 2024-08-26

## 2024-09-16 RX ORDER — IPRATROPIUM BROMIDE AND ALBUTEROL SULFATE 2.5; .5 MG/3ML; MG/3ML
SOLUTION RESPIRATORY (INHALATION)
Qty: 360 ML | Refills: 0 | Status: SHIPPED | OUTPATIENT
Start: 2024-09-16

## 2024-09-25 RX ORDER — PREDNISONE 10 MG/1
10 TABLET ORAL DAILY
Qty: 90 TABLET | Refills: 1 | Status: SHIPPED | OUTPATIENT
Start: 2024-09-25

## 2024-09-25 RX ORDER — PREDNISONE 10 MG/1
TABLET ORAL
Qty: 50 TABLET | Refills: 0 | OUTPATIENT
Start: 2024-09-25

## 2024-09-30 ENCOUNTER — HOSPITAL ENCOUNTER (EMERGENCY)
Age: 79
Discharge: ANOTHER HOSPITAL | End: 2024-10-01
Attending: STUDENT IN AN ORGANIZED HEALTH CARE EDUCATION/TRAINING PROGRAM
Payer: MEDICARE

## 2024-09-30 ENCOUNTER — APPOINTMENT (OUTPATIENT)
Dept: GENERAL RADIOLOGY | Age: 79
End: 2024-09-30
Payer: MEDICARE

## 2024-09-30 DIAGNOSIS — I48.91 ATRIAL FIBRILLATION WITH RVR (HCC): Primary | ICD-10-CM

## 2024-09-30 DIAGNOSIS — R06.02 SHORTNESS OF BREATH: ICD-10-CM

## 2024-09-30 DIAGNOSIS — J18.9 PNEUMONIA OF BOTH LOWER LOBES DUE TO INFECTIOUS ORGANISM: ICD-10-CM

## 2024-09-30 DIAGNOSIS — A41.9 SEPSIS, DUE TO UNSPECIFIED ORGANISM, UNSPECIFIED WHETHER ACUTE ORGAN DYSFUNCTION PRESENT (HCC): ICD-10-CM

## 2024-09-30 LAB
ALBUMIN SERPL-MCNC: 3.9 G/DL (ref 3.4–5)
ALBUMIN/GLOB SERPL: 1.2 {RATIO} (ref 1.1–2.2)
ALP SERPL-CCNC: 94 U/L (ref 40–129)
ALT SERPL-CCNC: 10 U/L (ref 10–40)
ANION GAP SERPL CALCULATED.3IONS-SCNC: 12 MMOL/L (ref 3–16)
AST SERPL-CCNC: 11 U/L (ref 15–37)
BASE EXCESS BLDV CALC-SCNC: 1.5 MMOL/L (ref -3–3)
BASOPHILS # BLD: 0.1 K/UL (ref 0–0.2)
BASOPHILS NFR BLD: 0.8 %
BILIRUB SERPL-MCNC: 0.5 MG/DL (ref 0–1)
BUN SERPL-MCNC: 21 MG/DL (ref 7–20)
CALCIUM SERPL-MCNC: 9.1 MG/DL (ref 8.3–10.6)
CHLORIDE SERPL-SCNC: 102 MMOL/L (ref 99–110)
CO2 BLDV-SCNC: 25 MMOL/L
CO2 SERPL-SCNC: 25 MMOL/L (ref 21–32)
COHGB MFR BLDV: 1.7 % (ref 0–1.5)
CREAT SERPL-MCNC: 1.6 MG/DL (ref 0.8–1.3)
D-DIMER QUANTITATIVE: 0.33 UG/ML FEU (ref 0–0.6)
DEPRECATED RDW RBC AUTO: 16.6 % (ref 12.4–15.4)
EOSINOPHIL # BLD: 0.3 K/UL (ref 0–0.6)
EOSINOPHIL NFR BLD: 3 %
FLUAV RNA UPPER RESP QL NAA+PROBE: NEGATIVE
FLUBV AG NPH QL: NEGATIVE
GFR SERPLBLD CREATININE-BSD FMLA CKD-EPI: 44 ML/MIN/{1.73_M2}
GLUCOSE SERPL-MCNC: 164 MG/DL (ref 70–99)
HCO3 BLDV-SCNC: 24.3 MMOL/L (ref 23–29)
HCT VFR BLD AUTO: 44.4 % (ref 40.5–52.5)
HGB BLD-MCNC: 14.4 G/DL (ref 13.5–17.5)
INR PPP: 1.66 (ref 0.85–1.15)
LACTATE BLDV-SCNC: 1.9 MMOL/L (ref 0.4–1.9)
LYMPHOCYTES # BLD: 0.9 K/UL (ref 1–5.1)
LYMPHOCYTES NFR BLD: 8.6 %
MCH RBC QN AUTO: 29.8 PG (ref 26–34)
MCHC RBC AUTO-ENTMCNC: 32.4 G/DL (ref 31–36)
MCV RBC AUTO: 92.2 FL (ref 80–100)
METHGB MFR BLDV: 0.3 %
MONOCYTES # BLD: 0.5 K/UL (ref 0–1.3)
MONOCYTES NFR BLD: 4.9 %
NEUTROPHILS # BLD: 9.1 K/UL (ref 1.7–7.7)
NEUTROPHILS NFR BLD: 82.7 %
NT-PROBNP SERPL-MCNC: 73 PG/ML (ref 0–449)
O2 THERAPY: ABNORMAL
PCO2 BLDV: 33.2 MMHG (ref 40–50)
PH BLDV: 7.48 [PH] (ref 7.35–7.45)
PLATELET # BLD AUTO: 176 K/UL (ref 135–450)
PMV BLD AUTO: 8.3 FL (ref 5–10.5)
PO2 BLDV: 47.4 MMHG (ref 25–40)
POTASSIUM SERPL-SCNC: 4.2 MMOL/L (ref 3.5–5.1)
PROT SERPL-MCNC: 7.1 G/DL (ref 6.4–8.2)
PROTHROMBIN TIME: 19.8 SEC (ref 11.9–14.9)
RBC # BLD AUTO: 4.82 M/UL (ref 4.2–5.9)
SAO2 % BLDV: 87 %
SARS-COV-2 RDRP RESP QL NAA+PROBE: NOT DETECTED
SODIUM SERPL-SCNC: 139 MMOL/L (ref 136–145)
TROPONIN, HIGH SENSITIVITY: 20 NG/L (ref 0–22)
TROPONIN, HIGH SENSITIVITY: 20 NG/L (ref 0–22)
WBC # BLD AUTO: 11 K/UL (ref 4–11)

## 2024-09-30 PROCEDURE — 96375 TX/PRO/DX INJ NEW DRUG ADDON: CPT

## 2024-09-30 PROCEDURE — 87040 BLOOD CULTURE FOR BACTERIA: CPT

## 2024-09-30 PROCEDURE — 87804 INFLUENZA ASSAY W/OPTIC: CPT

## 2024-09-30 PROCEDURE — 6360000002 HC RX W HCPCS: Performed by: STUDENT IN AN ORGANIZED HEALTH CARE EDUCATION/TRAINING PROGRAM

## 2024-09-30 PROCEDURE — 36415 COLL VENOUS BLD VENIPUNCTURE: CPT

## 2024-09-30 PROCEDURE — 96365 THER/PROPH/DIAG IV INF INIT: CPT

## 2024-09-30 PROCEDURE — 71045 X-RAY EXAM CHEST 1 VIEW: CPT

## 2024-09-30 PROCEDURE — 85610 PROTHROMBIN TIME: CPT

## 2024-09-30 PROCEDURE — 99285 EMERGENCY DEPT VISIT HI MDM: CPT

## 2024-09-30 PROCEDURE — 85025 COMPLETE CBC W/AUTO DIFF WBC: CPT

## 2024-09-30 PROCEDURE — 87635 SARS-COV-2 COVID-19 AMP PRB: CPT

## 2024-09-30 PROCEDURE — 84484 ASSAY OF TROPONIN QUANT: CPT

## 2024-09-30 PROCEDURE — 85379 FIBRIN DEGRADATION QUANT: CPT

## 2024-09-30 PROCEDURE — 83880 ASSAY OF NATRIURETIC PEPTIDE: CPT

## 2024-09-30 PROCEDURE — 80053 COMPREHEN METABOLIC PANEL: CPT

## 2024-09-30 PROCEDURE — 6370000000 HC RX 637 (ALT 250 FOR IP): Performed by: STUDENT IN AN ORGANIZED HEALTH CARE EDUCATION/TRAINING PROGRAM

## 2024-09-30 PROCEDURE — 93005 ELECTROCARDIOGRAM TRACING: CPT | Performed by: STUDENT IN AN ORGANIZED HEALTH CARE EDUCATION/TRAINING PROGRAM

## 2024-09-30 PROCEDURE — 82803 BLOOD GASES ANY COMBINATION: CPT

## 2024-09-30 PROCEDURE — 83605 ASSAY OF LACTIC ACID: CPT

## 2024-09-30 PROCEDURE — 2500000003 HC RX 250 WO HCPCS: Performed by: STUDENT IN AN ORGANIZED HEALTH CARE EDUCATION/TRAINING PROGRAM

## 2024-09-30 PROCEDURE — 2580000003 HC RX 258: Performed by: STUDENT IN AN ORGANIZED HEALTH CARE EDUCATION/TRAINING PROGRAM

## 2024-09-30 PROCEDURE — 96368 THER/DIAG CONCURRENT INF: CPT

## 2024-09-30 PROCEDURE — 96366 THER/PROPH/DIAG IV INF ADDON: CPT

## 2024-09-30 RX ORDER — 0.9 % SODIUM CHLORIDE 0.9 %
500 INTRAVENOUS SOLUTION INTRAVENOUS ONCE
Status: DISCONTINUED | OUTPATIENT
Start: 2024-10-01 | End: 2024-09-30

## 2024-09-30 RX ORDER — LEVALBUTEROL 1.25 MG/.5ML
1.25 SOLUTION, CONCENTRATE RESPIRATORY (INHALATION) EVERY 8 HOURS PRN
Status: DISCONTINUED | OUTPATIENT
Start: 2024-09-30 | End: 2024-10-01 | Stop reason: HOSPADM

## 2024-09-30 RX ORDER — METOPROLOL TARTRATE 1 MG/ML
2.5 INJECTION, SOLUTION INTRAVENOUS ONCE
Status: DISCONTINUED | OUTPATIENT
Start: 2024-10-01 | End: 2024-10-01

## 2024-09-30 RX ORDER — ACETAMINOPHEN 500 MG
1000 TABLET ORAL ONCE
Status: COMPLETED | OUTPATIENT
Start: 2024-10-01 | End: 2024-09-30

## 2024-09-30 RX ORDER — DIGOXIN 0.25 MG/ML
500 INJECTION INTRAMUSCULAR; INTRAVENOUS ONCE
Status: COMPLETED | OUTPATIENT
Start: 2024-10-01 | End: 2024-10-01

## 2024-09-30 RX ADMIN — LEVALBUTEROL 1.25 MG: 1.25 SOLUTION, CONCENTRATE RESPIRATORY (INHALATION) at 21:57

## 2024-09-30 RX ADMIN — WATER 125 MG: 1 INJECTION INTRAMUSCULAR; INTRAVENOUS; SUBCUTANEOUS at 22:00

## 2024-09-30 RX ADMIN — DILTIAZEM HYDROCHLORIDE 2.5 MG/HR: 5 INJECTION, SOLUTION INTRAVENOUS at 22:17

## 2024-09-30 RX ADMIN — AZITHROMYCIN DIHYDRATE 500 MG: 500 INJECTION, POWDER, LYOPHILIZED, FOR SOLUTION INTRAVENOUS at 23:52

## 2024-09-30 RX ADMIN — ACETAMINOPHEN 1000 MG: 500 TABLET ORAL at 23:48

## 2024-09-30 RX ADMIN — SODIUM CHLORIDE 1000 MG: 900 INJECTION INTRAVENOUS at 23:20

## 2024-09-30 RX ADMIN — SODIUM CHLORIDE 500 ML: 9 INJECTION, SOLUTION INTRAVENOUS at 23:50

## 2024-09-30 ASSESSMENT — PAIN - FUNCTIONAL ASSESSMENT: PAIN_FUNCTIONAL_ASSESSMENT: NONE - DENIES PAIN

## 2024-10-01 ENCOUNTER — HOSPITAL ENCOUNTER (INPATIENT)
Age: 79
LOS: 1 days | Discharge: HOME HEALTH | DRG: 871 | End: 2024-10-02
Attending: INTERNAL MEDICINE | Admitting: INTERNAL MEDICINE
Payer: MEDICARE

## 2024-10-01 VITALS
HEIGHT: 70 IN | OXYGEN SATURATION: 94 % | HEART RATE: 93 BPM | WEIGHT: 240 LBS | BODY MASS INDEX: 34.36 KG/M2 | TEMPERATURE: 100.7 F | RESPIRATION RATE: 24 BRPM | SYSTOLIC BLOOD PRESSURE: 106 MMHG | DIASTOLIC BLOOD PRESSURE: 73 MMHG

## 2024-10-01 PROBLEM — I48.91 ATRIAL FIBRILLATION WITH RVR (HCC): Status: ACTIVE | Noted: 2024-10-01

## 2024-10-01 LAB
ALBUMIN SERPL-MCNC: 3.9 G/DL (ref 3.4–5)
ALBUMIN/GLOB SERPL: 1.4 {RATIO}
ALP SERPL-CCNC: 82 U/L (ref 40–129)
ALT SERPL-CCNC: 6 U/L (ref 10–40)
ANION GAP SERPL CALCULATED.3IONS-SCNC: 11 MMOL/L (ref 3–16)
AST SERPL-CCNC: 13 U/L (ref 15–37)
BASOPHILS # BLD: 0.01 K/UL (ref 0–0.2)
BASOPHILS NFR BLD: 0 %
BILIRUB DIRECT SERPL-MCNC: 0.3 MG/DL (ref 0–0.3)
BILIRUB INDIRECT SERPL-MCNC: 0.2 MG/DL (ref 0–1)
BILIRUB SERPL-MCNC: 0.5 MG/DL (ref 0–1)
BUN SERPL-MCNC: 23 MG/DL (ref 7–20)
CALCIUM SERPL-MCNC: 8.7 MG/DL (ref 8.3–10.6)
CHLORIDE SERPL-SCNC: 105 MMOL/L (ref 99–110)
CO2 SERPL-SCNC: 24 MMOL/L (ref 21–32)
CREAT SERPL-MCNC: 1.8 MG/DL (ref 0.8–1.3)
EKG DIAGNOSIS: NORMAL
EKG Q-T INTERVAL: 306 MS
EKG QRS DURATION: 86 MS
EKG QTC CALCULATION (BAZETT): 465 MS
EKG R AXIS: 46 DEGREES
EKG T AXIS: 35 DEGREES
EKG VENTRICULAR RATE: 139 BPM
EOSINOPHIL # BLD: 0.01 K/UL (ref 0–0.6)
EOSINOPHILS RELATIVE PERCENT: 0 %
ERYTHROCYTE [DISTWIDTH] IN BLOOD BY AUTOMATED COUNT: 15.2 % (ref 12.4–15.4)
GFR, ESTIMATED: 39 ML/MIN/1.73M2
GLUCOSE SERPL-MCNC: 179 MG/DL (ref 70–99)
HCT VFR BLD AUTO: 42.9 % (ref 40.5–52.5)
HGB BLD-MCNC: 13.9 G/DL (ref 13.5–17.5)
IMM GRANULOCYTES # BLD AUTO: 0.04 K/UL (ref 0–0.5)
IMM GRANULOCYTES NFR BLD: 0 %
INR PPP: 1.8 (ref 0.9–1.2)
LYMPHOCYTES NFR BLD: 0.39 K/UL (ref 1–5.1)
LYMPHOCYTES RELATIVE PERCENT: 3 %
MCH RBC QN AUTO: 29.8 PG (ref 26–34)
MCHC RBC AUTO-ENTMCNC: 32.4 G/DL (ref 31–36)
MCV RBC AUTO: 92.1 FL (ref 80–100)
MONOCYTES NFR BLD: 0.08 K/UL (ref 0–1.3)
MONOCYTES NFR BLD: 1 %
NEUTROPHILS NFR BLD: 96 %
NEUTS SEG NFR BLD: 11.58 K/UL (ref 1.7–7.7)
PLATELET # BLD AUTO: 177 K/UL (ref 135–450)
PMV BLD AUTO: 10.6 FL
POTASSIUM SERPL-SCNC: 4.8 MMOL/L (ref 3.5–5.1)
PROCALCITONIN SERPL-MCNC: 0.65 NG/ML (ref 0–0.15)
PROT SERPL-MCNC: 6.8 G/DL (ref 6.4–8.2)
PROTHROMBIN TIME: 21 SEC (ref 11.9–14.9)
RBC # BLD AUTO: 4.66 M/UL (ref 4.2–5.9)
SODIUM SERPL-SCNC: 139 MMOL/L (ref 136–145)
WBC OTHER # BLD: 12.1 K/UL (ref 4–11)

## 2024-10-01 PROCEDURE — 85025 COMPLETE CBC W/AUTO DIFF WBC: CPT

## 2024-10-01 PROCEDURE — 80053 COMPREHEN METABOLIC PANEL: CPT

## 2024-10-01 PROCEDURE — 2000000000 HC ICU R&B

## 2024-10-01 PROCEDURE — 6360000002 HC RX W HCPCS: Performed by: STUDENT IN AN ORGANIZED HEALTH CARE EDUCATION/TRAINING PROGRAM

## 2024-10-01 PROCEDURE — 97535 SELF CARE MNGMENT TRAINING: CPT

## 2024-10-01 PROCEDURE — 97166 OT EVAL MOD COMPLEX 45 MIN: CPT

## 2024-10-01 PROCEDURE — 6370000000 HC RX 637 (ALT 250 FOR IP): Performed by: INTERNAL MEDICINE

## 2024-10-01 PROCEDURE — 6370000000 HC RX 637 (ALT 250 FOR IP): Performed by: NURSE PRACTITIONER

## 2024-10-01 PROCEDURE — 2700000000 HC OXYGEN THERAPY PER DAY

## 2024-10-01 PROCEDURE — 82248 BILIRUBIN DIRECT: CPT

## 2024-10-01 PROCEDURE — 96375 TX/PRO/DX INJ NEW DRUG ADDON: CPT

## 2024-10-01 PROCEDURE — 85610 PROTHROMBIN TIME: CPT

## 2024-10-01 PROCEDURE — 6360000002 HC RX W HCPCS: Performed by: INTERNAL MEDICINE

## 2024-10-01 PROCEDURE — 97530 THERAPEUTIC ACTIVITIES: CPT

## 2024-10-01 PROCEDURE — 2580000003 HC RX 258: Performed by: INTERNAL MEDICINE

## 2024-10-01 PROCEDURE — 94761 N-INVAS EAR/PLS OXIMETRY MLT: CPT

## 2024-10-01 PROCEDURE — 84145 PROCALCITONIN (PCT): CPT

## 2024-10-01 PROCEDURE — 99232 SBSQ HOSP IP/OBS MODERATE 35: CPT | Performed by: NURSE PRACTITIONER

## 2024-10-01 PROCEDURE — 93010 ELECTROCARDIOGRAM REPORT: CPT | Performed by: INTERNAL MEDICINE

## 2024-10-01 PROCEDURE — 94640 AIRWAY INHALATION TREATMENT: CPT

## 2024-10-01 PROCEDURE — 99222 1ST HOSP IP/OBS MODERATE 55: CPT | Performed by: INTERNAL MEDICINE

## 2024-10-01 RX ORDER — METOPROLOL TARTRATE 1 MG/ML
5 INJECTION, SOLUTION INTRAVENOUS EVERY 6 HOURS PRN
Status: DISCONTINUED | OUTPATIENT
Start: 2024-10-01 | End: 2024-10-02 | Stop reason: HOSPADM

## 2024-10-01 RX ORDER — METOPROLOL SUCCINATE 50 MG/1
50 TABLET, EXTENDED RELEASE ORAL DAILY
Status: DISCONTINUED | OUTPATIENT
Start: 2024-10-01 | End: 2024-10-02 | Stop reason: HOSPADM

## 2024-10-01 RX ORDER — ARFORMOTEROL TARTRATE 15 UG/2ML
15 SOLUTION RESPIRATORY (INHALATION)
Status: DISCONTINUED | OUTPATIENT
Start: 2024-10-01 | End: 2024-10-02 | Stop reason: HOSPADM

## 2024-10-01 RX ORDER — SENNOSIDES A AND B 8.6 MG/1
1 TABLET, FILM COATED ORAL DAILY PRN
Status: DISCONTINUED | OUTPATIENT
Start: 2024-10-01 | End: 2024-10-02 | Stop reason: HOSPADM

## 2024-10-01 RX ORDER — LANOLIN ALCOHOL/MO/W.PET/CERES
6 CREAM (GRAM) TOPICAL NIGHTLY PRN
Status: DISCONTINUED | OUTPATIENT
Start: 2024-10-01 | End: 2024-10-02 | Stop reason: HOSPADM

## 2024-10-01 RX ORDER — GUAIFENESIN 600 MG/1
600 TABLET, EXTENDED RELEASE ORAL 2 TIMES DAILY
Status: DISCONTINUED | OUTPATIENT
Start: 2024-10-01 | End: 2024-10-01

## 2024-10-01 RX ORDER — CODEINE PHOSPHATE AND GUAIFENESIN 10; 100 MG/5ML; MG/5ML
5 SOLUTION ORAL EVERY 8 HOURS PRN
Status: DISCONTINUED | OUTPATIENT
Start: 2024-10-01 | End: 2024-10-02 | Stop reason: HOSPADM

## 2024-10-01 RX ORDER — ATORVASTATIN CALCIUM 10 MG/1
20 TABLET, FILM COATED ORAL DAILY
Status: DISCONTINUED | OUTPATIENT
Start: 2024-10-01 | End: 2024-10-02 | Stop reason: HOSPADM

## 2024-10-01 RX ORDER — MAGNESIUM SULFATE IN WATER 40 MG/ML
2000 INJECTION, SOLUTION INTRAVENOUS PRN
Status: DISCONTINUED | OUTPATIENT
Start: 2024-10-01 | End: 2024-10-02 | Stop reason: HOSPADM

## 2024-10-01 RX ORDER — SODIUM CHLORIDE 0.9 % (FLUSH) 0.9 %
10 SYRINGE (ML) INJECTION PRN
Status: DISCONTINUED | OUTPATIENT
Start: 2024-10-01 | End: 2024-10-02 | Stop reason: HOSPADM

## 2024-10-01 RX ORDER — PREDNISONE 10 MG/1
10 TABLET ORAL DAILY
Status: DISCONTINUED | OUTPATIENT
Start: 2024-10-01 | End: 2024-10-01

## 2024-10-01 RX ORDER — ONDANSETRON 2 MG/ML
4 INJECTION INTRAMUSCULAR; INTRAVENOUS EVERY 6 HOURS PRN
Status: DISCONTINUED | OUTPATIENT
Start: 2024-10-01 | End: 2024-10-02 | Stop reason: HOSPADM

## 2024-10-01 RX ORDER — PANTOPRAZOLE SODIUM 40 MG/1
40 TABLET, DELAYED RELEASE ORAL
Status: DISCONTINUED | OUTPATIENT
Start: 2024-10-01 | End: 2024-10-02 | Stop reason: HOSPADM

## 2024-10-01 RX ORDER — FUROSEMIDE 20 MG
40 TABLET ORAL DAILY
Status: DISCONTINUED | OUTPATIENT
Start: 2024-10-01 | End: 2024-10-02 | Stop reason: HOSPADM

## 2024-10-01 RX ORDER — POTASSIUM CHLORIDE 1500 MG/1
40 TABLET, EXTENDED RELEASE ORAL PRN
Status: DISCONTINUED | OUTPATIENT
Start: 2024-10-01 | End: 2024-10-02 | Stop reason: HOSPADM

## 2024-10-01 RX ORDER — BUDESONIDE 0.5 MG/2ML
1 INHALANT ORAL
Status: DISCONTINUED | OUTPATIENT
Start: 2024-10-01 | End: 2024-10-02 | Stop reason: HOSPADM

## 2024-10-01 RX ORDER — POTASSIUM CHLORIDE 7.45 MG/ML
10 INJECTION INTRAVENOUS PRN
Status: DISCONTINUED | OUTPATIENT
Start: 2024-10-01 | End: 2024-10-02 | Stop reason: HOSPADM

## 2024-10-01 RX ORDER — TAMSULOSIN HYDROCHLORIDE 0.4 MG/1
0.4 CAPSULE ORAL DAILY
Status: DISCONTINUED | OUTPATIENT
Start: 2024-10-01 | End: 2024-10-02 | Stop reason: HOSPADM

## 2024-10-01 RX ORDER — PREDNISONE 20 MG/1
60 TABLET ORAL DAILY
Status: DISCONTINUED | OUTPATIENT
Start: 2024-10-01 | End: 2024-10-02 | Stop reason: HOSPADM

## 2024-10-01 RX ORDER — LEVALBUTEROL INHALATION SOLUTION 0.63 MG/3ML
0.63 SOLUTION RESPIRATORY (INHALATION) EVERY 4 HOURS PRN
Status: DISCONTINUED | OUTPATIENT
Start: 2024-10-01 | End: 2024-10-02 | Stop reason: HOSPADM

## 2024-10-01 RX ORDER — ACETAMINOPHEN 325 MG/1
650 TABLET ORAL EVERY 6 HOURS PRN
Status: DISCONTINUED | OUTPATIENT
Start: 2024-10-01 | End: 2024-10-02 | Stop reason: HOSPADM

## 2024-10-01 RX ORDER — ACETAMINOPHEN 650 MG/1
650 SUPPOSITORY RECTAL EVERY 6 HOURS PRN
Status: DISCONTINUED | OUTPATIENT
Start: 2024-10-01 | End: 2024-10-02 | Stop reason: HOSPADM

## 2024-10-01 RX ORDER — SODIUM CHLORIDE 9 MG/ML
INJECTION, SOLUTION INTRAVENOUS PRN
Status: DISCONTINUED | OUTPATIENT
Start: 2024-10-01 | End: 2024-10-02 | Stop reason: HOSPADM

## 2024-10-01 RX ADMIN — BUDESONIDE 1000 MCG: 0.5 SUSPENSION RESPIRATORY (INHALATION) at 19:52

## 2024-10-01 RX ADMIN — ARFORMOTEROL TARTRATE 15 MCG: 15 SOLUTION RESPIRATORY (INHALATION) at 09:17

## 2024-10-01 RX ADMIN — EMPAGLIFLOZIN 10 MG: 10 TABLET, FILM COATED ORAL at 13:53

## 2024-10-01 RX ADMIN — ATORVASTATIN CALCIUM 20 MG: 10 TABLET, FILM COATED ORAL at 09:33

## 2024-10-01 RX ADMIN — FUROSEMIDE 40 MG: 20 TABLET ORAL at 09:33

## 2024-10-01 RX ADMIN — GUAIFENESIN 600 MG: 600 TABLET, EXTENDED RELEASE ORAL at 09:33

## 2024-10-01 RX ADMIN — WATER 1000 MG: 1 INJECTION INTRAMUSCULAR; INTRAVENOUS; SUBCUTANEOUS at 22:26

## 2024-10-01 RX ADMIN — PANTOPRAZOLE SODIUM 40 MG: 40 TABLET, DELAYED RELEASE ORAL at 09:33

## 2024-10-01 RX ADMIN — RIVAROXABAN 20 MG: 20 TABLET, FILM COATED ORAL at 09:33

## 2024-10-01 RX ADMIN — PREDNISONE 60 MG: 20 TABLET ORAL at 12:34

## 2024-10-01 RX ADMIN — Medication 2 PUFF: at 09:17

## 2024-10-01 RX ADMIN — ARFORMOTEROL TARTRATE 15 MCG: 15 SOLUTION RESPIRATORY (INHALATION) at 19:52

## 2024-10-01 RX ADMIN — AZITHROMYCIN MONOHYDRATE 500 MG: 500 INJECTION, POWDER, LYOPHILIZED, FOR SOLUTION INTRAVENOUS at 09:42

## 2024-10-01 RX ADMIN — BUDESONIDE 1000 MCG: 0.5 SUSPENSION RESPIRATORY (INHALATION) at 09:17

## 2024-10-01 RX ADMIN — WATER 20 MG: 1 INJECTION INTRAMUSCULAR; INTRAVENOUS; SUBCUTANEOUS at 09:33

## 2024-10-01 RX ADMIN — METOPROLOL SUCCINATE 50 MG: 50 TABLET, EXTENDED RELEASE ORAL at 09:33

## 2024-10-01 RX ADMIN — DIGOXIN 500 MCG: 0.25 INJECTION INTRAMUSCULAR; INTRAVENOUS at 00:10

## 2024-10-01 RX ADMIN — SACUBITRIL AND VALSARTAN 1 TABLET: 24; 26 TABLET, FILM COATED ORAL at 21:08

## 2024-10-01 RX ADMIN — SACUBITRIL AND VALSARTAN 1 TABLET: 24; 26 TABLET, FILM COATED ORAL at 09:33

## 2024-10-01 RX ADMIN — TAMSULOSIN HYDROCHLORIDE 0.4 MG: 0.4 CAPSULE ORAL at 09:33

## 2024-10-01 ASSESSMENT — ENCOUNTER SYMPTOMS
SHORTNESS OF BREATH: 1
NAUSEA: 0
COUGH: 0
GASTROINTESTINAL NEGATIVE: 1
VOMITING: 0
WHEEZING: 1
BACK PAIN: 0
SHORTNESS OF BREATH: 0
CHEST TIGHTNESS: 0

## 2024-10-01 ASSESSMENT — PAIN SCALES - GENERAL
PAINLEVEL_OUTOF10: 0

## 2024-10-01 NOTE — ED PROVIDER NOTES
bilateral opacities.  No pneumothorax [ER]   2157 Stable kidney dysfunction with creatinine of 1.6.  No electrolyte abnormalities. [ER]   2157 Liver function testing unremarkable [ER]   2203 BNP within normal limits [ER]   2203 Troponin within normal limits [ER]   2255 Lactate within normal limits [ER]   2255 Troponin within normal limits [ER]   2303 CXR: IMPRESSION:  Bibasilar infiltrates which may represent atelectasis or pneumonia. [ER]   2355 Spoke to Dr Teague, hospitalist at [facility].  He requested discontinuation of diltiazem, his preference would be for Lopressor push.  He also agreed with plan to give digoxin, recommended 500 mcg.  Patient's blood pressure has dropped a little bit on the diltiazem, so we will assess for blood pressure improvement prior to giving any Lopressor, nursing aware. [ER]   Tue Oct 01, 2024   0142 At the time of transport, patient's heart rate has down trended to within normal range.  Patient did not receive metoprolol. [ER]      ED Course User Index  [ER] Jayleen Beltran MD        CONSULTS:   I independently discussed presentation and work-up with hospitalist team who accepted the patient for admission.        SCREENINGS:       [place] Coma Scale  Eye Opening: Spontaneous  Best Verbal Response: Oriented  Best Motor Response: Obeys commands  [place] Coma Scale Score: 15                CIWA Assessment  BP: 106/73  Pulse: 93           Is this patient to be included in the SEP-1 Core Measure due to severe sepsis or septic shock?   No   Exclusion criteria - the patient is NOT to be included for SEP-1 Core Measure due to:  May have criteria for sepsis, but does not meet criteria for severe sepsis or septic shock      TREATMENT:  During the patient's ED course, the patient was given:  Medications   levalbuterol (XOPENEX) nebulizer solution 1.25 mg (1.25 mg Nebulization Given 9/30/24 2157)   methylPREDNISolone sodium succ (SOLU-MEDROL) 125 mg in sterile water 2 mL injection (125

## 2024-10-01 NOTE — H&P
CREATININE 1.6*   CALCIUM 9.1     Recent Labs     09/30/24 2130   AST 11*   ALT 10   BILITOT 0.5   ALKPHOS 94     Recent Labs     09/30/24 2130   PROBNP 73   INR 1.66*     No results for input(s): \"NITRU\", \"COLORU\", \"PHUR\", \"LABCAST\", \"WBCUA\", \"RBCUA\", \"MUCUS\", \"SPECGRAV\", \"LEUKOCYTESUR\", \"BLOODU\", \"GLUCOSEU\", \"KETUA\" in the last 72 hours.    No orders to display       ASSESSMENT / PLAN:     Atrial fibrillation with rapid ventricular response likely from   Pneumonia, community acquired   COPD with acute exacerbation, mild  Chronic O2 dependence   Chronic heart failure with reduced EF, stable    Trop x2 stable.  HR improving at admission to 90s.  BP borderline, but pt asymptomatic, suspect from diltiazem given earlier.  Rapid Covid/Flu negative.  Viral vs bacterial pna    - check procalcitonin  - c/w IV ceftriaxone, azithromycin  - digoxin loaded at [place], consider continuation if needed  - keep off diltiazem gtt  - IV metoprolol PRN   - c/w solumedrol 20mg q8h  - duonebs / mucinex  - resume rest of home meds  - continuous O2 at 2-3L, keep sats > 90%  - tele monitoring   - cardiology and pulmonary evaluation       Dispo: Admit to Inpatient PCU.  Expected LOS greater than 2 midnights.  PPx:  xarelto  PT/OT:  Not indicated    Code status:  Full Code         Aydin Teague MD  Hospitalist

## 2024-10-01 NOTE — CONSULTS
[facility]      GENERAL CARDIOLOGY CONSULTATION  [phone]        Inpatient consult to Cardiology  Consult performed by: Crystal Epps DO  Consult ordered by: Aydin Teague MD  Reason for consult: AFIB RVR          CC:  SOB       ASSESSMENT/PLAN:  Atrial fibrillation with rapid ventricular response (HCC)  Known history of chronic AFIB  Presented with RVR, pneumonia as arrhythmogenic trigger  Currently controlled heart rate, blood pressure stable  Continue BB, maintained on OAC      NICM (nonischemic cardiomyopathy) (HCC)  Known NICM, stable  [facility] 2023 normal coronary  Not volume overloaded on exam  BNP 73  Maintained on PO diuretic    Cardiology consult placed for AFIB RVR which is stable.  No further inpatient Cardiac needs.  Recommend routine outpatient follow up with known primary Cardiologist for chronic cardiovascular comorbidities.      History of Present Illness:  Ulises Whitman is a 79 y.o. male patient presenting with shortness of breath, productive cough and fever.  Seen by ER at [facility] diagnosed with AFIB RVR, pneumonia, sepsis.  ECG 9/30/2024 AFIB  bpm with NSST.  CXR cardiac size within normal limits, basilar infiltrate may represent pneumonia.  Pertinent labs show CKD, mild leukocytosis, troponin negative x2. BNP 73.  External records reviewed.  Patient comfortable at bedside.  States he is feeling better.  Denies chest pain.  Last Thursday started to feel unwell with symptoms of diaphoresis, warm, fever, sob, cough.  Denies missed doses of medications.    Medical history reviewed, significant for HFrEF, NICM, chronic atrial fibrillation on Xarelto with severe LAE, COPD with emphysema on home oxygen, PAD, GERD, BPH, atherosclerosis of the thoracic aorta (NANCY 2017) and mild aortic root aneurysm 4.1 cm.  Recent outpatient Cardiology office note 6/5/2024 reviewed; history of amiodarone toxicity, not on spironolactone due to renal dysfunction.  [facility] 2023.     Relevant available

## 2024-10-01 NOTE — ASSESSMENT & PLAN NOTE
Known history of chronic AFIB  Presented with RVR, pneumonia as arrhythmogenic trigger  Currently controlled heart rate, blood pressure stable  Continue BB, maintained on OAC

## 2024-10-01 NOTE — PLAN OF CARE
Problem: Chronic Conditions and Co-morbidities  Goal: Patient's chronic conditions and co-morbidity symptoms are monitored and maintained or improved  10/1/2024 0839 by Doretha Cervantes RN  Outcome: Progressing  Flowsheets (Taken 10/1/2024 0814)  Care Plan - Patient's Chronic Conditions and Co-Morbidity Symptoms are Monitored and Maintained or Improved:   Monitor and assess patient's chronic conditions and comorbid symptoms for stability, deterioration, or improvement   Collaborate with multidisciplinary team to address chronic and comorbid conditions and prevent exacerbation or deterioration   Update acute care plan with appropriate goals if chronic or comorbid symptoms are exacerbated and prevent overall improvement and discharge  10/1/2024 0325 by Mady Licea RN  Outcome: Progressing  Flowsheets (Taken 10/1/2024 0300)  Care Plan - Patient's Chronic Conditions and Co-Morbidity Symptoms are Monitored and Maintained or Improved:   Monitor and assess patient's chronic conditions and comorbid symptoms for stability, deterioration, or improvement   Collaborate with multidisciplinary team to address chronic and comorbid conditions and prevent exacerbation or deterioration   Update acute care plan with appropriate goals if chronic or comorbid symptoms are exacerbated and prevent overall improvement and discharge     Problem: Discharge Planning  Goal: Discharge to home or other facility with appropriate resources  10/1/2024 0839 by Doretha Cervantes, RN  Outcome: Progressing  Flowsheets  Taken 10/1/2024 0814 by Doretha Cervantes RN  Discharge to home or other facility with appropriate resources:   Identify barriers to discharge with patient and caregiver   Arrange for needed discharge resources and transportation as appropriate   Identify discharge learning needs (meds, wound care, etc)   Refer to discharge planning if patient needs post-hospital services based on physician order or complex needs related to functional status,

## 2024-10-01 NOTE — PLAN OF CARE
Problem: Discharge Planning  Goal: Discharge to home or other facility with appropriate resources  Outcome: Not Progressing     Problem: Chronic Conditions and Co-morbidities  Goal: Patient's chronic conditions and co-morbidity symptoms are monitored and maintained or improved  Outcome: Progressing     Problem: Safety - Adult  Goal: Free from fall injury  Outcome: Progressing     Problem: ABCDS Injury Assessment  Goal: Absence of physical injury  Outcome: Progressing     Problem: Discharge Planning  Goal: Discharge to home or other facility with appropriate resources  Outcome: Not Progressing

## 2024-10-01 NOTE — CONSULTS
Pulmonary Consult Note      Reason for Consult: COPD   Requesting Physician: Zahida  Subjective:     CHIEF COMPLAINT / HPI:      The patient is a 79 y.o. male with extensive past medical history significant for HTN, COPD on 3L O2 at home, HFrEF, chronic AF on xarelto, GERD, BPH, PAD and aortic aneurysm who presented to [facility] ER with on 9/30/24 with a 2-3 day complaint of worsening shortness of breath, found to be in afib with RVR 130s, started on a cardizem gtt and transferred to [facility] as per the chart.  We are consulted for COPD exacerbation.       Past Medical History:      Diagnosis Date    A-fib ([facility])     2/14/12    Acute conjunctivitis of both eyes     Acute on chronic respiratory failure with hypoxia 2/13/2012    Acute respiratory failure with hypoxia 2/13/2012    Atrial fibrillation with RVR ([facility])     2/14/12     Avulsion fracture of ankle 9/21/2015    Benign localized hyperplasia of prostate with urinary obstruction and other lower urinary tract symptoms (LUTS)(600.21) 8/17/2016    Chronic systolic CHF (congestive heart failure) ([facility]) 8/28/2017    Contusion of leg, right 9/21/2015    COPD (chronic obstructive pulmonary disease) ([facility])     Fractures     GERD (gastroesophageal reflux disease) 6/15/2015    Hypertension     Hypertensive urgency 8/4/2019    Hypertrophy of prostate without urinary obstruction and other lower urinary tract symptoms (LUTS) 6/15/2015    No history of procedure     no previos colonoscopy    PAD (peripheral artery disease) ([facility]) 10/9/2017    Pneumonia     Thoracic aortic aneurysm ([facility])       Past Surgical History:        Procedure Laterality Date    CATARACT REMOVAL WITH IMPLANT Right 09/06/2018     PHACO EMULSIFICATION OF CATARACT WITH INTRAOCULAR LENS IMPLANT RIGHT EYE    COLONOSCOPY  2/24/2016    sigmoid polyps    ENDOSCOPY, COLON, DIAGNOSTIC  11/13/2019    egd; esophagitis/gastritis    HERNIA REPAIR      GA XCAPSL CTRC RMVL INSJ IO LENS PROSTH W/O ECP Right 9/6/2018    PHACO

## 2024-10-01 NOTE — CARE COORDINATION
Case Management Assessment  Initial Evaluation    Date/Time of Evaluation: 10/1/2024 11:32 AM  Assessment Completed by: Shannan Rojas RN    If patient is discharged prior to next notation, then this note serves as note for discharge by case management.    Patient Name: Ulises Whitman                   YOB: 1945  Diagnosis: Atrial fibrillation with RVR (HCC) [I48.91]  Atrial fibrillation with rapid ventricular response (HCC) [I48.91]                   Date / Time: 10/1/2024  2:52 AM    Patient Admission Status: Inpatient   Readmission Risk (Low < 19, Mod (19-27), High > 27): Readmission Risk Score: 18.3    Current PCP: Pasquale Powers MD  PCP verified by CM?      Chart Reviewed: Yes      History Provided by:    Patient Orientation:      Patient Cognition:      Hospitalization in the last 30 days (Readmission):  No    If yes, Readmission Assessment in CM Navigator will be completed.    Advance Directives:      Code Status: Full Code   Patient's Primary Decision Maker is:      Primary Decision Maker: Rachel Whitman - Spouse - [phone]    Discharge Planning:    Patient lives with: Spouse/Significant Other Type of Home: Apartment  Primary Care Giver:    Patient Support Systems include: Spouse/Significant Other, Children, Family Members   Current Financial resources:    Current community resources:    Current services prior to admission: Durable Medical Equipment            Current DME: Oxygen Therapy (Comment) (3L as needed)            Type of Home Care services:  None    ADLS  Prior functional level:    Current functional level:      PT AM-PAC:   /24  OT AM-PAC:   /24    Family can provide assistance at DC:    Would you like Case Management to discuss the discharge plan with any other family members/significant others, and if so, who?    Plans to Return to Present Housing:    Other Identified Issues/Barriers to RETURNING to current housing: none  Potential Assistance needed at

## 2024-10-02 VITALS
TEMPERATURE: 98.1 F | OXYGEN SATURATION: 92 % | BODY MASS INDEX: 34.34 KG/M2 | DIASTOLIC BLOOD PRESSURE: 73 MMHG | RESPIRATION RATE: 27 BRPM | HEART RATE: 87 BPM | WEIGHT: 239.86 LBS | HEIGHT: 70 IN | SYSTOLIC BLOOD PRESSURE: 101 MMHG

## 2024-10-02 LAB
ANION GAP SERPL CALCULATED.3IONS-SCNC: 10 MMOL/L (ref 3–16)
BASOPHILS # BLD: 0.02 K/UL (ref 0–0.2)
BASOPHILS NFR BLD: 0 %
BUN SERPL-MCNC: 31 MG/DL (ref 7–20)
CALCIUM SERPL-MCNC: 8.8 MG/DL (ref 8.3–10.6)
CHLORIDE SERPL-SCNC: 107 MMOL/L (ref 99–110)
CO2 SERPL-SCNC: 23 MMOL/L (ref 21–32)
CREAT SERPL-MCNC: 1.5 MG/DL (ref 0.8–1.3)
EOSINOPHIL # BLD: 0 K/UL (ref 0–0.6)
EOSINOPHILS RELATIVE PERCENT: 0 %
ERYTHROCYTE [DISTWIDTH] IN BLOOD BY AUTOMATED COUNT: 15.3 % (ref 12.4–15.4)
GFR, ESTIMATED: 46 ML/MIN/1.73M2
GLUCOSE SERPL-MCNC: 155 MG/DL (ref 70–99)
HCT VFR BLD AUTO: 44.6 % (ref 40.5–52.5)
HGB BLD-MCNC: 14.3 G/DL (ref 13.5–17.5)
IMM GRANULOCYTES # BLD AUTO: 0.06 K/UL (ref 0–0.5)
IMM GRANULOCYTES NFR BLD: 0 %
LYMPHOCYTES NFR BLD: 0.81 K/UL (ref 1–5.1)
LYMPHOCYTES RELATIVE PERCENT: 4 %
MCH RBC QN AUTO: 29.9 PG (ref 26–34)
MCHC RBC AUTO-ENTMCNC: 32.1 G/DL (ref 31–36)
MCV RBC AUTO: 93.1 FL (ref 80–100)
MONOCYTES NFR BLD: 0.97 K/UL (ref 0–1.3)
MONOCYTES NFR BLD: 5 %
NEUTROPHILS NFR BLD: 90 %
NEUTS SEG NFR BLD: 16.69 K/UL (ref 1.7–7.7)
PLATELET # BLD AUTO: 205 K/UL (ref 135–450)
PMV BLD AUTO: 10.2 FL
POTASSIUM SERPL-SCNC: 4.8 MMOL/L (ref 3.5–5.1)
RBC # BLD AUTO: 4.79 M/UL (ref 4.2–5.9)
SODIUM SERPL-SCNC: 140 MMOL/L (ref 136–145)
WBC OTHER # BLD: 18.6 K/UL (ref 4–11)

## 2024-10-02 PROCEDURE — 6370000000 HC RX 637 (ALT 250 FOR IP): Performed by: INTERNAL MEDICINE

## 2024-10-02 PROCEDURE — 80048 BASIC METABOLIC PNL TOTAL CA: CPT

## 2024-10-02 PROCEDURE — 6360000002 HC RX W HCPCS: Performed by: INTERNAL MEDICINE

## 2024-10-02 PROCEDURE — 2700000000 HC OXYGEN THERAPY PER DAY

## 2024-10-02 PROCEDURE — 99231 SBSQ HOSP IP/OBS SF/LOW 25: CPT | Performed by: NURSE PRACTITIONER

## 2024-10-02 PROCEDURE — 6370000000 HC RX 637 (ALT 250 FOR IP): Performed by: NURSE PRACTITIONER

## 2024-10-02 PROCEDURE — 2580000003 HC RX 258: Performed by: INTERNAL MEDICINE

## 2024-10-02 PROCEDURE — 94761 N-INVAS EAR/PLS OXIMETRY MLT: CPT

## 2024-10-02 PROCEDURE — 85025 COMPLETE CBC W/AUTO DIFF WBC: CPT

## 2024-10-02 PROCEDURE — 94640 AIRWAY INHALATION TREATMENT: CPT

## 2024-10-02 RX ORDER — PREDNISONE 20 MG/1
60 TABLET ORAL DAILY
Qty: 9 TABLET | Refills: 0 | Status: SHIPPED | OUTPATIENT
Start: 2024-10-03 | End: 2024-10-06

## 2024-10-02 RX ORDER — LEVOFLOXACIN 750 MG/1
750 TABLET, FILM COATED ORAL DAILY
Qty: 4 TABLET | Refills: 0 | Status: SHIPPED | OUTPATIENT
Start: 2024-10-02 | End: 2024-10-06

## 2024-10-02 RX ORDER — PREDNISONE 10 MG/1
10 TABLET ORAL DAILY
Qty: 90 TABLET | Refills: 1
Start: 2024-10-07

## 2024-10-02 RX ADMIN — PANTOPRAZOLE SODIUM 40 MG: 40 TABLET, DELAYED RELEASE ORAL at 08:22

## 2024-10-02 RX ADMIN — PREDNISONE 60 MG: 20 TABLET ORAL at 08:23

## 2024-10-02 RX ADMIN — ATORVASTATIN CALCIUM 20 MG: 10 TABLET, FILM COATED ORAL at 08:22

## 2024-10-02 RX ADMIN — TAMSULOSIN HYDROCHLORIDE 0.4 MG: 0.4 CAPSULE ORAL at 08:23

## 2024-10-02 RX ADMIN — RIVAROXABAN 20 MG: 20 TABLET, FILM COATED ORAL at 08:23

## 2024-10-02 RX ADMIN — SACUBITRIL AND VALSARTAN 1 TABLET: 24; 26 TABLET, FILM COATED ORAL at 08:24

## 2024-10-02 RX ADMIN — FUROSEMIDE 40 MG: 20 TABLET ORAL at 08:23

## 2024-10-02 RX ADMIN — AZITHROMYCIN MONOHYDRATE 500 MG: 500 INJECTION, POWDER, LYOPHILIZED, FOR SOLUTION INTRAVENOUS at 08:32

## 2024-10-02 RX ADMIN — ARFORMOTEROL TARTRATE 15 MCG: 15 SOLUTION RESPIRATORY (INHALATION) at 07:56

## 2024-10-02 RX ADMIN — Medication 2 PUFF: at 07:57

## 2024-10-02 RX ADMIN — LEVALBUTEROL HYDROCHLORIDE 0.63 MG: 0.63 SOLUTION RESPIRATORY (INHALATION) at 05:57

## 2024-10-02 RX ADMIN — BUDESONIDE 1000 MCG: 0.5 SUSPENSION RESPIRATORY (INHALATION) at 07:56

## 2024-10-02 RX ADMIN — EMPAGLIFLOZIN 10 MG: 10 TABLET, FILM COATED ORAL at 08:23

## 2024-10-02 RX ADMIN — METOPROLOL SUCCINATE 50 MG: 50 TABLET, EXTENDED RELEASE ORAL at 08:23

## 2024-10-02 ASSESSMENT — 6 MINUTE WALK TEST (6MWT)
O2 SATURATION: 94
HEART RATE: 80
SYMPTOMS: SOB
OXYGEN DEVICE: ROOM AIR
ADDTIONAL O2 FLOW RATE (L/MIN): YES
HEART RATE: 86
O2 FLOW RATE (L/MIN): 21

## 2024-10-02 ASSESSMENT — PAIN SCALES - GENERAL
PAINLEVEL_OUTOF10: 0

## 2024-10-02 NOTE — DISCHARGE INSTR - COC
Continuity of Care Form    Patient Name: Ulises Whitman   :  1945  MRN:  [id number]    Admit date:  10/1/2024  Discharge date:  ***    Code Status Order: Full Code   Advance Directives:   Advance Care Flowsheet Documentation             Admitting Physician:  Aydin Teague MD  PCP: Pasquale Powers MD    Discharging Nurse: ***  Discharging Hospital Unit/Room#: 2611/2611-01  Discharging Unit Phone Number: ***    Emergency Contact:   Extended Emergency Contact Information  Primary Emergency Contact: Rachel Whitman  Address: [address] [place], [place] [address] [facility]  Home Phone: [phone]  Mobile Phone: [phone]  Relation: Spouse    Past Surgical History:  Past Surgical History:   Procedure Laterality Date    CATARACT REMOVAL WITH IMPLANT Right 2018     PHACO EMULSIFICATION OF CATARACT WITH INTRAOCULAR LENS IMPLANT RIGHT EYE    COLONOSCOPY  2016    sigmoid polyps    ENDOSCOPY, COLON, DIAGNOSTIC  2019    egd; esophagitis/gastritis    HERNIA REPAIR      IL XCAPSL CTRC RMVL INSJ IO LENS PROSTH W/O ECP Right 2018    PHACO EMULSIFICATION OF CATARACT WITH INTRAOCULAR LENS IMPLANT RIGHT EYE performed by Mike Alvarez MD at [facility] OR    IL XCAPSL CTRC RMVL INSJ IO LENS PROSTH W/O ECP Left 10/18/2018    PHACO EMULSIFICATION OF CATARACT WITH  INTRAOCULAR LENS IMPLANT LEFT EYE performed by Mike Alvarez MD at [facility] OR    UPPER GASTROINTESTINAL ENDOSCOPY N/A 2019    EGD BIOPSY performed by Colton Silva MD at [facility] ENDOSCOPY       Immunization History:   Immunization History   Administered Date(s) Administered    COVID-19, MODERNA BLUE border, Primary or Immunocompromised, (age 12y+), IM, 100 mcg/0.5mL 2021, 2021, 2021    Influenza Vaccine, unspecified formulation 2016    Influenza Whole 09/15/2011    Influenza, FLUARIX, FLULAVAL, FLUZONE (age 6 mo+) and AFLURIA, (age 3 y+), Quadv PF, 0.5mL 2019,

## 2024-10-02 NOTE — PROGRESS NOTES
Hospitalist Progress Note      Name:  Ulises Whitman /Age/Sex: 1945  (79 y.o. male)   MRN & CSN:  [id number] & [id number] Admission Date/Time: 10/1/2024  2:52 AM   Location:  2611/2611-01 PCP: Pasquale Powers MD         Hospital Day: 2    Assessment and Plan:   Ulises Whitman is a 79 y.o.  male with past medical history of COPD with chronic hypoxic respiratory failure on supplemental oxygen via nasal cannula at 3 L/min at night, chronic HFrEF, chronic atrial fibrillation on chronic anticoagulation with Xarelto, BPH, history of aortic aneurysm, GERD, PAD, sent from outside ED for evaluation of worsening shortness of breath since past 2 to 3 days. Noted to have A-fib with RVR upon admission/community acquired pneumonia.    Assessment    Atrial fibrillation with RVR  Sepsis secondary to community-acquired pneumonia-present on admission  COPD with acute exacerbation  Leukocytosis-likely reactive secondary to steroid use  Chronic hypoxic respiratory failure  Chronic HFrEF-stable      Plan    Telemetry monitoring  Patient loaded with digoxin at outside ED.  Currently heart rate controlled  Cardiology eval appreciated appreciated.  No further inpatient cardiac needs.  Continue IV ceftriaxone/azithromycin  Continue Xopenex/Trelegy/prednisone/Mucinex  Currently off of supplemental oxygen, saturating well into 90s.  At home uses oxygen at nighttime.  Wean as able.  Walk test shows no indication for continuous home O2.  Pulmonary evaluation appreciated  Continue Xarelto  Continue Lasix/Lipitor/Entresto/PPI  Continue Flomax    Diet ADULT DIET; Regular; Low Sodium (2 gm)   DVT Prophylaxis [] Lovenox, []  Heparin, [] SCDs, [] Ambulation, on Xarelto   GI Prophylaxis [x] PPI,  [] H2 Blocker,  [] Carafate,  [] Diet/Tube Feeds   Code Status Full Code   Disposition Patient requires continued admission due to pulmonary eval.  Discharge once okay with pulmonary   MDM [] Low, [] Moderate,[x]  High  Patient's 
         Hospitalist Progress Note      Name:  Ulises Whitman /Age/Sex: 1945  (79 y.o. male)   MRN & CSN:  [id number] & [id number] Admission Date/Time: 10/1/2024  2:52 AM   Location:  261/2611-01 PCP: Pasquale Powers MD         Hospital Day: 1    Assessment and Plan:   Ulises Whitman is a 79 y.o.  male with past medical history of COPD with chronic hypoxic respiratory failure on supplemental oxygen via nasal cannula at 3 L/min at night, chronic HFrEF, chronic atrial fibrillation on chronic anticoagulation with Xarelto, BPH, history of aortic aneurysm, GERD, PAD, sent from outside ED for evaluation of worsening shortness of breath since past 2 to 3 days.  Noted to have A-fib with RVR upon admission/community acquired pneumonia.    Assessment    Atrial fibrillation with RVR  Community-acquired pneumonia  COPD with acute exacerbation  Chronic hypoxic respiratory failure  Chronic HFrEF-stable      Plan    Telemetry monitoring  Patient loaded with digoxin at outside ED.  Currently heart rate controlled  Cardiology eval  Continue IV ceftriaxone/azithromycin  Continue Xopenex/Trelegy/prednisone/Mucinex  Currently requiring supplemental oxygen via nasal cannula 2-3 L/min.  At home uses oxygen at nighttime.  Wean as able.  Walk test.  Pulmonary evaluation appreciated  Continue Xarelto  Continue Lasix/Lipitor/Entresto/PPI  Continue Flomax      Diet ADULT DIET; Regular; Low Sodium (2 gm)   DVT Prophylaxis [] Lovenox, []  Heparin, [] SCDs, [] Ambulation, on Xarelto   GI Prophylaxis [x] PPI,  [] H2 Blocker,  [] Carafate,  [] Diet/Tube Feeds   Code Status Full Code   Disposition Patient requires continued admission due to cardiology eval/IV antibiotics   MDM [] Low, [] Moderate,[x]  High  Patient's risk as above      History of Present Illness:     Chief Complaint: Atrial fibrillation with RVR (HCC)    Patient seen and examined at bedside.  Resting comfortably.  Denies any worsening of shortness of breath or 
   10/01/24 1555   Encounter Summary   Encounter Overview/Reason Attempted Encounter   Service Provided For Patient not available   Referral/Consult From Rounding   Last Encounter  10/01/24  (Patient was sleeping/CK)       
   10/02/24 0936   Resting (Room Air)   SpO2 84   HR 80   During Walk (Room Air)   SpO2 91   HR 86   Rate of Dyspnea 1   Symptoms Shortness of breath   During Walk (On O2)   SpO2 96   HR 90   O2 Device Nasal cannula   O2 Flow Rate (l/min) 2 l/min   Rate of Dyspnea 1   Symptoms Shortness of breath   After Walk   SpO2 96   HR 92   Does the Patient Qualify for Home O2 Yes   Liter Flow at Rest 0   Liter Flow on Exertion 2     Pt dropped to 91% walking. With 2l of O2 his sats were 96% and pt states sob was slightly better  
  [facility]      Physical Therapy  2611/2611-01  [facility] 2 ICU  Discussed with patient's Rn  Evon Das who noted patient is up and walking around the room independently and not in need of physical therapy and will have PT order discontinued as currently no clinical indication for therapy.  Please re-refer as medically appropriate.  Marilee Amaro, PT          
4 Eyes Skin Assessment     NAME:  Ulises Whitman  YOB: 1945  MEDICAL RECORD NUMBER:  [id number]    The patient is being assessed for  Admission    I agree that at least one RN has performed a thorough Head to Toe Skin Assessment on the patient. ALL assessment sites listed below have been assessed.      Areas assessed by both nurses:    Head, Face, Ears, Shoulders, Back, Chest, Arms, Elbows, Hands, Sacrum. Buttock, Coccyx, Ischium, Legs. Feet and Heels, and Under Medical Devices         Does the Patient have a Wound? No noted wound(s)       Matt Prevention initiated by RN: Yes  Wound Care Orders initiated by RN: No    Pressure Injury (Stage 3,4, Unstageable, DTI, NWPT, and Complex wounds) if present, place Wound referral order by RN under : No    New Ostomies, if present place, Ostomy referral order under : No     Nurse 1 eSignature: Electronically signed by Mady Licea RN on 10/1/24 at 3:18 AM EDT    **SHARE this note so that the co-signing nurse can place an eSignature**    Nurse 2 eSignature: {Esignature:[id number]}    
Patient A&O x4. Continues in Afib, rate controlled. Continues on IV antibiotics. IV steroids changed to PO. Oxygen weaned to room air. Lungs wheezy throughout this AM, now diminished. Patient states he feels much better and his dyspnea on exertion has improved to only mild. Appetite good, consuming >75% of meals. Ambulated in sesay with OT, sat in chair and has sat edge of bed multiple times. Voiding per bathroom, +BM. Patient's wife at bedside. Patient hoping to d/c home tomorrow.   
Patient Direct Admit from [facility] ER for AFIB/RVR.  Arrived via stretcher with son at bedside.  Afib, rate controlled [id number].  BETANCOURT, but not short of breath at rest.  O2 at 2L NC, SPO2 95-98%.  Wears 3L at home HS and PRN.  Frequent non-productive cough.  Afebrile.  Lungs diminished, but wheezy.  Denies pain, SOB, chest pain, swelling.  Ambulated to bathroom without difficulty.  No needs voiced.  Dr. Teauge arrived at bedside for admission.  New orders received.    
Patient Wheezy throughout. His home breathing treatments have not been ordered. Dr. Kelley notified via perfect serve.   
Patient asking for his home medication Jardiance. Dr. Kelley notified this was not ordered by admitting physician.   
Patient slept most of shift with no distress noted.  VSS.  AFIB on monitor.  Rate controlled.  Afebrile.  Ambulated several times to bathroom wthout difficulty.   BM x1.  Remained on room air until HS-placed on home O2 3L NC.  Tolerated well.  States he feels \"so much better\".  Wheezy this am with activity.  Xopenex given with improvement.     
Pulmonology Progress Note    Admit Date:  10/1/2024  Reason for Consult: COPD exacerbation    Interval History:  Remains on room air during the day  6 minute walk test completed; no oxygen required    CHIEF COMPLAINT / HPI:       The patient is a 79 y.o. male with extensive past medical history significant for HTN, COPD on 3L O2 at home, HFrEF, chronic AF on xarelto, GERD, BPH, PAD and aortic aneurysm who presented to [facility] ER with on 9/30/24 with a 2-3 day complaint of worsening shortness of breath, found to be in afib with RVR 130s, started on a cardizem gtt and transferred to [facility] as per the chart.  We are consulted for COPD exacerbation.     ROS  Pt states he is doing better.  Denies pain/chest pain/pressure. Denies shortness of breath. States breathing is better.    /73   Pulse 87   Temp 98.1 °F (36.7 °C) (Oral)   Resp 27   Ht 1.778 m (5' 10\")   Wt 108.8 kg (239 lb 13.8 oz)   SpO2 92%   BMI 34.42 kg/m²     Intake/Output Summary (Last 24 hours) at 10/2/2024 1138  Last data filed at 10/1/2024 2000  Gross per 24 hour   Intake 840 ml   Output --   Net 840 ml      Physical Exam  Vitals and nursing note reviewed.   Constitutional:       Appearance: He is obese.   HENT:      Head: Normocephalic.   Cardiovascular:      Rate and Rhythm: Normal rate and regular rhythm.   Pulmonary:      Effort: Pulmonary effort is normal.      Breath sounds: Normal breath sounds.   Abdominal:      General: Bowel sounds are normal.      Palpations: There is no mass.   Musculoskeletal:      Cervical back: Normal range of motion.      Right lower leg: No edema.      Left lower leg: No edema.   Skin:     General: Skin is warm.      Capillary Refill: Capillary refill takes less than 2 seconds.   Neurological:      General: No focal deficit present.      Mental Status: He is alert.          COPD exacerbation  Followed outpatient with Dr. Whitley: LOV 4/9/2024  Home meds:  on Home O2 3L at night, trelegy, PRN nebulized 
Sepsis present as evidenced by, Please document evidence.  -- Sepsis was ruled out after study  -- Other - I will add my own diagnosis  -- Disagree - Not applicable / Not valid  -- Disagree - Clinically unable to determine / Unknown  -- Refer to Clinical Documentation Reviewer    PROVIDER RESPONSE TEXT:    Sepsis is present as evidenced by WBC/temperature/heart rate    Query created by: Marilee Mccauley on 10/1/2024 2:49 PM      Electronically signed by:  Marvin Kelley MD 10/2/2024 7:54 AM          
techniques  __________________________________________________________________________________     Education  Barriers To Learning: none  Patient Education: patient educated on goals, OT role and benefits, plan of care, energy conservation, transfer training, discharge recommendations  Learning Assessment:  patient verbalizes and demonstrates understanding        Patient Disposition at end of session:  patient left in chair, chair alarm in place, call light within reach, gait belt, patient at risk for falls, and family/caregiver present    Plan  Frequency: Eval with same day discharge.  No follow up required.      Goals  Patient Goals: Pt states that he is feeling much better and does not have any therapy specific goals.    Short Term Goals:  Time Frame: By discharge unless noted otherwise  Patient eval with same day discharge.  No goals set as patient is at baseline status.    Above goals reviewed on 10/1/2024.  All goals are ongoing at this time unless indicated above.       Therapy Session Time     Individual Group Co-treatment   Time In 1255      Time Out 1340      Minutes 45           Timed Code Treatment Minutes: 30 Minutes (15 minute OT evaluation)  Total Treatment Minutes:  45 minutes       If co-treatement indicated, patient seen for co-treatment due to: requirement of two skilled therapists, patient safety, and/or cognitive status.      Electronically Signed By: Sruthi Patel, OT

## 2024-10-02 NOTE — DISCHARGE SUMMARY
Discharge Summary           Name:  Ulises Whitman /Age/Sex: 1945  (79 y.o. male)   MRN & CSN:  [id number] & [id number] Admission Date/Time: 10/1/2024  2:52 AM   Attending:  Marvin Kelley MD Discharging Physician: Marvin Kelley MD     Hospital Course:   Ulises Whitman is a 79 y.o.  male with past medical history of COPD with chronic hypoxic respiratory failure on supplemental oxygen via nasal cannula at 3 L/min at night, chronic HFrEF, chronic atrial fibrillation on chronic anticoagulation with Xarelto, BPH, history of aortic aneurysm, GERD, PAD, sent from outside ED for evaluation of worsening shortness of breath since past 2 to 3 days. Noted to have A-fib with RVR upon admission/community acquired pneumonia.  Upon arrival, heart rate was stable.  Patient was seen by cardiology and recommended no inpatient cardiac intervention.  Recommend outpatient follow-up with his cardiologist.  Patient was also seen by pulmonary for COPD exacerbation.  Concern for right basilar airspace disease.  Started on steroids/antibiotics.  Recommend patient to follow-up with his outpatient pulmonologist where he has already established care.  Patient would like to go home today.  Walk test was done and patient does not qualify for continuous home O2.  Patient clinically improved.  Patient is currently being discharged in stable condition on 10/2/2024 with recommendation of close follow-up with PCP/cardiology/pulmonary.  The patient expressed appropriate understanding of and agreement with the discharge recommendations, medications, and plan.     Discharge diagnosis    Atrial fibrillation with RVR  Sepsis secondary to community-acquired pneumonia-present on admission  COPD with acute exacerbation  Leukocytosis-likely reactive secondary to steroid use  Chronic hypoxic respiratory failure  Chronic HFrEF-stable        Consults this admission:  IP CONSULT TO PULMONOLOGY  IP CONSULT TO CARDIOLOGY  IP CONSULT TO HOME CARE

## 2024-10-02 NOTE — CARE COORDINATION
CASE MANAGEMENT DISCHARGE SUMMARY      Discharge to: home with [facility] (PT/OT/HHA)     IMM given: 10/1/24    Transportation:    Family/car: yes       Confirmed discharge plan with:     Patient: yes        RN, name: Evon

## 2024-10-02 NOTE — PLAN OF CARE
Problem: Discharge Planning  Goal: Discharge to home or other facility with appropriate resources  10/1/2024 2006 by Mady Licea RN  Outcome: Not Progressing  10/1/2024 0839 by Doretha Cervantes RN  Outcome: Progressing  Flowsheets  Taken 10/1/2024 0814 by Doretha Cervantes RN  Discharge to home or other facility with appropriate resources:   Identify barriers to discharge with patient and caregiver   Arrange for needed discharge resources and transportation as appropriate   Identify discharge learning needs (meds, wound care, etc)   Refer to discharge planning if patient needs post-hospital services based on physician order or complex needs related to functional status, cognitive ability or social support system  Taken 10/1/2024 0330 by Mady Licea RN  Discharge to home or other facility with appropriate resources:   Identify barriers to discharge with patient and caregiver   Arrange for needed discharge resources and transportation as appropriate   Identify discharge learning needs (meds, wound care, etc)   Refer to discharge planning if patient needs post-hospital services based on physician order or complex needs related to functional status, cognitive ability or social support system     Problem: Chronic Conditions and Co-morbidities  Goal: Patient's chronic conditions and co-morbidity symptoms are monitored and maintained or improved  10/1/2024 2006 by Mady Licea RN  Outcome: Progressing  10/1/2024 0839 by Doretha Cervantes RN  Outcome: Progressing  Flowsheets (Taken 10/1/2024 0814)  Care Plan - Patient's Chronic Conditions and Co-Morbidity Symptoms are Monitored and Maintained or Improved:   Monitor and assess patient's chronic conditions and comorbid symptoms for stability, deterioration, or improvement   Collaborate with multidisciplinary team to address chronic and comorbid conditions and prevent exacerbation or deterioration   Update acute care plan with appropriate goals if chronic or comorbid symptoms are

## 2024-10-02 NOTE — RT PROTOCOL NOTE
RT Nebulizer Bronchodilator Protocol Note    There is a bronchodilator order in the chart from a provider indicating to follow the RT Bronchodilator Protocol and there is an “Initiate RT Bronchodilator Protocol” order as well (see protocol at bottom of note).    CXR Findings:  XR CHEST PORTABLE    Result Date: 9/30/2024  Bibasilar infiltrates which may represent atelectasis or pneumonia.       The findings from the last RT Protocol Assessment were as follows:  Smoking: Chronic pulmonary disease  Respiratory Pattern: Regular pattern and RR 12-20 bpm  Breath Sounds: Inspiratory and expiratory or bilateral wheezing and/or rhonchi  Cough: Strong, spontaneous, non-productive  Indication for Bronchodilator Therapy: Decreased or absent breath sounds, Wheezing associated with pulm disorder  Bronchodilator Assessment Score: 8    Aerosolized bronchodilator medication orders have been revised according to the RT Nebulizer Bronchodilator Protocol below.    Respiratory Therapist to perform RT Therapy Protocol Assessment initially then follow the protocol.  Repeat RT Therapy Protocol Assessment PRN for score 0-3 or on second treatment, BID, and PRN for scores above 3.    No Indications - adjust the frequency to every 6 hours PRN wheezing or bronchospasm, if no treatments needed after 48 hours then discontinue using Per Protocol order mode.     If indication present, adjust the RT bronchodilator orders based on the Bronchodilator Assessment Score as indicated below.  If a patient is on this medication at home then do not decrease Frequency below that used at home.    0-3 - enter or revise RT bronchodilator order(s) to equivalent RT Bronchodilator order with Frequency of every 4 hours PRN for wheezing or increased work of breathing using Per Protocol order mode.       4-6 - enter or revise RT Bronchodilator order(s) to two equivalent RT bronchodilator orders with one order with BID Frequency and one order with Frequency of every 4

## 2024-10-02 NOTE — PLAN OF CARE
Problem: Chronic Conditions and Co-morbidities  Goal: Patient's chronic conditions and co-morbidity symptoms are monitored and maintained or improved  Outcome: Progressing  Flowsheets (Taken 10/2/2024 0800)  Care Plan - Patient's Chronic Conditions and Co-Morbidity Symptoms are Monitored and Maintained or Improved:   Monitor and assess patient's chronic conditions and comorbid symptoms for stability, deterioration, or improvement   Collaborate with multidisciplinary team to address chronic and comorbid conditions and prevent exacerbation or deterioration   Update acute care plan with appropriate goals if chronic or comorbid symptoms are exacerbated and prevent overall improvement and discharge     Problem: Discharge Planning  Goal: Discharge to home or other facility with appropriate resources  Outcome: Progressing  Flowsheets (Taken 10/2/2024 0800)  Discharge to home or other facility with appropriate resources:   Identify barriers to discharge with patient and caregiver   Arrange for needed discharge resources and transportation as appropriate   Identify discharge learning needs (meds, wound care, etc)   Arrange for interpreters to assist at discharge as needed     Problem: Safety - Adult  Goal: Free from fall injury  Outcome: Progressing  Flowsheets (Taken 10/2/2024 1048)  Free From Fall Injury:   Instruct family/caregiver on patient safety   Based on caregiver fall risk screen, instruct family/caregiver to ask for assistance with transferring infant if caregiver noted to have fall risk factors     Problem: ABCDS Injury Assessment  Goal: Absence of physical injury  Outcome: Progressing

## 2024-10-05 LAB
BACTERIA BLD CULT ORG #2: NORMAL
BACTERIA BLD CULT: NORMAL

## 2024-10-07 DIAGNOSIS — J44.1 CHRONIC OBSTRUCTIVE PULMONARY DISEASE WITH ACUTE EXACERBATION (HCC): Primary | ICD-10-CM

## 2024-10-07 RX ORDER — IPRATROPIUM BROMIDE AND ALBUTEROL SULFATE 2.5; .5 MG/3ML; MG/3ML
SOLUTION RESPIRATORY (INHALATION)
Qty: 360 ML | Refills: 0 | Status: SHIPPED | OUTPATIENT
Start: 2024-10-07 | End: 2024-10-10 | Stop reason: SDUPTHER

## 2024-10-08 ENCOUNTER — CARE COORDINATION (OUTPATIENT)
Dept: CASE MANAGEMENT | Age: 79
End: 2024-10-08

## 2024-10-08 NOTE — CARE COORDINATION
Care Transitions Note    Follow Up Call     Patient: Ulises Whitman                 Patient : 1945   MRN: [id number]                             Reason for Admission: [facility] ICU x 33 hours -> A Fib with RVR, sepsis 2/2 CAP-POA, aeCOPD, leukocytosis 2/2 likely steroids, chronic hypoxic resp failure, chronic CHF-stable (EF 30-35% 2023) -> home no services vs [facility] PT OT HHA (no orders?), f/up pulm 2-3 weeks (was patient of Dr Whitley), baseline O2 3L HS  Discharge Date: 10/2/24       RURS: Readmission Risk Score: 18.5      Patient Current Location:  driving in [place]    Care Transition Nurse contacted the spouse/partner  by telephone. Verified name and  as identifiers.    Additional needs identified to be addressed with provider   No needs identified         Method of communication with provider: none.    Care Summary Note:   Spoke with spouse while patient was driving. Reports he is doing great. No further wheezing or SOB. Sees PCP NP in 2 days. CTN reviewed time. He will be there as scheduled. Denies needs/concerns today. Agreeable to CTN follow up.     Plan of care updates since last contact:  Review of patient management of conditions/medications:       Advance Care Planning:   Does patient have an Advance Directive: reviewed during previous call, see note. .    Medication Review:  No changes since last call.     Remote Patient Monitoring:  Offered patient enrollment in the Remote Patient Monitoring (RPM) program for in-home monitoring: not eligible-PCP .    Assessments:  No changes since last call    Follow Up Appointment:   Reviewed upcoming appointment(s).    Future Appointments         Provider Specialty Dept Phone    10/10/2024 1:20 PM Nini Carrasco APRN - CNP Internal Medicine [phone]    10/16/2024 10:45 AM Roque Hein MD Pulmonology [phone]    2024 2:50 PM Pasquale Powers MD Internal Medicine [phone]    2025 10:00 AM Sukh Hernandez MD Cardiology [phone]

## 2024-10-10 ENCOUNTER — OFFICE VISIT (OUTPATIENT)
Dept: INTERNAL MEDICINE CLINIC | Age: 79
End: 2024-10-10

## 2024-10-10 VITALS
SYSTOLIC BLOOD PRESSURE: 104 MMHG | WEIGHT: 241 LBS | RESPIRATION RATE: 16 BRPM | HEIGHT: 70 IN | BODY MASS INDEX: 34.5 KG/M2 | OXYGEN SATURATION: 97 % | DIASTOLIC BLOOD PRESSURE: 60 MMHG | HEART RATE: 97 BPM

## 2024-10-10 DIAGNOSIS — J44.1 CHRONIC OBSTRUCTIVE PULMONARY DISEASE WITH ACUTE EXACERBATION (HCC): ICD-10-CM

## 2024-10-10 DIAGNOSIS — Z09 HOSPITAL DISCHARGE FOLLOW-UP: Primary | ICD-10-CM

## 2024-10-10 DIAGNOSIS — J44.9 CHRONIC OBSTRUCTIVE PULMONARY DISEASE, UNSPECIFIED COPD TYPE (HCC): ICD-10-CM

## 2024-10-10 PROCEDURE — 3078F DIAST BP <80 MM HG: CPT | Performed by: NURSE PRACTITIONER

## 2024-10-10 PROCEDURE — 99214 OFFICE O/P EST MOD 30 MIN: CPT | Performed by: NURSE PRACTITIONER

## 2024-10-10 PROCEDURE — 1111F DSCHRG MED/CURRENT MED MERGE: CPT | Performed by: NURSE PRACTITIONER

## 2024-10-10 PROCEDURE — 1123F ACP DISCUSS/DSCN MKR DOCD: CPT | Performed by: NURSE PRACTITIONER

## 2024-10-10 PROCEDURE — 3074F SYST BP LT 130 MM HG: CPT | Performed by: NURSE PRACTITIONER

## 2024-10-10 RX ORDER — ATORVASTATIN CALCIUM 20 MG/1
20 TABLET, FILM COATED ORAL DAILY
Qty: 90 TABLET | Refills: 1 | Status: SHIPPED | OUTPATIENT
Start: 2024-10-10

## 2024-10-10 RX ORDER — FLUTICASONE FUROATE, UMECLIDINIUM BROMIDE AND VILANTEROL TRIFENATATE 200; 62.5; 25 UG/1; UG/1; UG/1
POWDER RESPIRATORY (INHALATION)
Qty: 60 EACH | Refills: 11 | Status: SHIPPED | OUTPATIENT
Start: 2024-10-10

## 2024-10-10 RX ORDER — PANTOPRAZOLE SODIUM 40 MG/1
40 TABLET, DELAYED RELEASE ORAL DAILY
Qty: 90 TABLET | Refills: 1 | Status: SHIPPED | OUTPATIENT
Start: 2024-10-10

## 2024-10-10 RX ORDER — METOPROLOL SUCCINATE 50 MG/1
50 TABLET, EXTENDED RELEASE ORAL DAILY
Qty: 90 TABLET | Refills: 1 | Status: SHIPPED | OUTPATIENT
Start: 2024-10-10

## 2024-10-10 RX ORDER — POTASSIUM CITRATE 15 MEQ/1
15 TABLET, EXTENDED RELEASE ORAL DAILY
Qty: 90 TABLET | Refills: 1 | Status: SHIPPED | OUTPATIENT
Start: 2024-10-10

## 2024-10-10 RX ORDER — TAMSULOSIN HYDROCHLORIDE 0.4 MG/1
0.4 CAPSULE ORAL DAILY
Qty: 90 CAPSULE | Refills: 1 | Status: SHIPPED | OUTPATIENT
Start: 2024-10-10

## 2024-10-10 RX ORDER — FUROSEMIDE 40 MG/1
40 TABLET ORAL DAILY
Qty: 90 TABLET | Refills: 1 | Status: SHIPPED | OUTPATIENT
Start: 2024-10-10

## 2024-10-10 RX ORDER — ALBUTEROL SULFATE 90 UG/1
INHALANT RESPIRATORY (INHALATION)
Qty: 8.5 G | Refills: 5 | Status: SHIPPED | OUTPATIENT
Start: 2024-10-10

## 2024-10-10 RX ORDER — IPRATROPIUM BROMIDE AND ALBUTEROL SULFATE 2.5; .5 MG/3ML; MG/3ML
1 SOLUTION RESPIRATORY (INHALATION) EVERY 4 HOURS PRN
Qty: 360 ML | Refills: 0 | Status: SHIPPED | OUTPATIENT
Start: 2024-10-10 | End: 2024-11-11

## 2024-10-10 NOTE — PROGRESS NOTES
Post-Discharge Transitional Care Follow Up      Ulises Whitman   YOB: 1945    Date of Office Visit:  10/10/2024  Date of Hospital Admission: 10/1/24  Date of Hospital Discharge: 10/2/24  Readmission Risk Score (high >=14%. Medium >=10%):Readmission Risk Score: 18.5      Care management risk score Rising risk (score 2-5) and Complex Care (Scores >=6): No Risk Score On File     Non face to face  following discharge, date last encounter closed (first attempt may have been earlier): 10/03/2024     Call initiated 2 business days of discharge: Yes     Hospital discharge follow-up  -     CT DISCHARGE MEDS RECONCILED W/ CURRENT OUTPATIENT MED LIST  Chronic obstructive pulmonary disease, unspecified COPD type (HCC)  -     fluticasone-umeclidin-vilant (TRELEGY ELLIPTA) 200-62.5-25 MCG/ACT AEPB inhaler; INHALE ONE (1) PUFF INTO THE LUNGS DAILY, Disp-60 each, R-11Normal  Chronic obstructive pulmonary disease with acute exacerbation (HCC)      Medical Decision Making: low complexity  No follow-ups on file.    On this date 10/10/2024 I have spent 40 minutes reviewing previous notes, test results and face to face with the patient discussing the diagnosis and importance of compliance with the treatment plan as well as documenting on the day of the visit.       Subjective:   HPI    Inpatient course: Discharge summary reviewed- see chart.    Interval history/Current status: Patient presents for hospital follow up.    Admitted to [facility] 10/1/2024-10/2/24.  Admitted for pneumonia.  Was in a-fib with RVR.  Seen by cardiology.  Recommended no inpatient cardiac intervention.  He is to follow up with his cardiologist.  Also seen by pulmonology.  Treated for COPD exacerbation, pneumonia.  D/c home on Levaquin and prednisone.   Has an appointment to see pulmonology next week.   He is feeling improved now.     Patient Active Problem List   Diagnosis    Hoarseness of voice    Atrial fibrillation, controlled

## 2024-10-15 ENCOUNTER — CARE COORDINATION (OUTPATIENT)
Dept: CASE MANAGEMENT | Age: 79
End: 2024-10-15

## 2024-10-15 NOTE — CARE COORDINATION
Care Transitions Note    Follow Up Call     Patient: Ulises Whitman                 Patient : 1945   MRN: [id number]                             Reason for Admission: [facility] ICU x 33 hours -> A Fib with RVR, sepsis 2/2 CAP-POA, aeCOPD, leukocytosis 2/2 likely steroids, chronic hypoxic resp failure, chronic CHF-stable (EF 30-35% 2023) -> home no services vs [facility] PT OT HHA (no orders?), f/up pulm 2-3 weeks (was patient of Dr Whitley), baseline O2 3L HS  Discharge Date: 10/2/24       RURS: Readmission Risk Score: 18.5    Attempted to reach patient for transitions of care follow up.  Unable to reach patient.      Outreach Attempts:   Unable to leave message.   Voice mailbox not set up.     Care Summary Note: Ulises completed HFU 10/10/2024 as scheduled per chart review. CTN will follow up at another date/time.     Follow Up Appointment:   Future Appointments         Provider Specialty Dept Phone    10/16/2024 10:45 AM Roque Hein MD Pulmonology [phone]    2024 2:50 PM Pasquale Powers MD Internal Medicine [phone]    2025 10:00 AM Sukh Hernandez MD Cardiology [phone]          Plan for follow-up call in 6-10 days based on severity of symptoms and risk factors. Plan for next call: symptom management-CAP COPD CHF    Felicia Maldonado RN

## 2024-10-16 ENCOUNTER — HOSPITAL ENCOUNTER (OUTPATIENT)
Age: 79
Discharge: HOME | End: 2024-10-16
Payer: MEDICARE

## 2024-10-16 ENCOUNTER — HOSPITAL ENCOUNTER (OUTPATIENT)
Dept: GENERAL RADIOLOGY | Age: 79
Discharge: HOME | End: 2024-10-16
Attending: INTERNAL MEDICINE
Payer: MEDICARE

## 2024-10-16 ENCOUNTER — OFFICE VISIT (OUTPATIENT)
Dept: PULMONOLOGY | Age: 79
End: 2024-10-16
Payer: MEDICARE

## 2024-10-16 VITALS
HEART RATE: 52 BPM | RESPIRATION RATE: 18 BRPM | SYSTOLIC BLOOD PRESSURE: 98 MMHG | BODY MASS INDEX: 33.54 KG/M2 | WEIGHT: 239.6 LBS | HEIGHT: 71 IN | DIASTOLIC BLOOD PRESSURE: 62 MMHG | OXYGEN SATURATION: 97 %

## 2024-10-16 DIAGNOSIS — R91.1 LUNG NODULE: ICD-10-CM

## 2024-10-16 DIAGNOSIS — R91.1 LUNG NODULE: Primary | ICD-10-CM

## 2024-10-16 PROCEDURE — 3074F SYST BP LT 130 MM HG: CPT | Performed by: INTERNAL MEDICINE

## 2024-10-16 PROCEDURE — 99214 OFFICE O/P EST MOD 30 MIN: CPT | Performed by: INTERNAL MEDICINE

## 2024-10-16 PROCEDURE — 1123F ACP DISCUSS/DSCN MKR DOCD: CPT | Performed by: INTERNAL MEDICINE

## 2024-10-16 PROCEDURE — 71046 X-RAY EXAM CHEST 2 VIEWS: CPT

## 2024-10-16 PROCEDURE — 3078F DIAST BP <80 MM HG: CPT | Performed by: INTERNAL MEDICINE

## 2024-10-16 RX ORDER — LEVOFLOXACIN 500 MG/1
500 TABLET, FILM COATED ORAL DAILY
Qty: 5 TABLET | Refills: 0 | Status: SHIPPED | OUTPATIENT
Start: 2024-10-16 | End: 2024-10-21

## 2024-10-16 NOTE — PROGRESS NOTES
[facility]                                 Pulmonary Consult /Progress Note :                                                                    Chief Complaint: follow up for pneumonia   Was in hospital on 10./2   Shortness of breath, COPD  Was treated for pneumonia  He has a 110.0 pack-year smoking history. He has never used smokeless tobacco.  He quit 2015     Has pneumonia symptoms with elevated wbc  Use Trelegy       Interval History 4/9/24 - admitted after choking on pill, had mild exacerbation.  CT scan completed in ED.  Doing well overall       Interval History 12/5/23  - admitted in Nov 2023 with AFIB RVR and HFrEF, underwent LHC with NOCAD        Interval history 4/4/23:  significant dyspnea on exertion, improved temporarily with alb/atr.  Needs new nebulizer.  Not on any long acting BD.      Presenting history:  8/4/2019 with a month long history of moderately increased shortness of breath, associated with wheezing and nonproductive cough, became severe.  He is feeling better with inhaled bronchodilators given the emergency department.  He has had previous episodes of COPD in the past.  He notes he has been waking up frequently at night with wheezing, benefits from his home nebulizer.      reports that he quit smoking about 6 years ago. His smoking use included cigarettes. He started smoking about 61 years ago.         Physical Exam:  Vitals:    10/16/24 1040   BP: 98/62   Pulse: 52   Resp: 18   SpO2: 97%       Constitutional:  No acute distress.   HENT:  Oropharynx is clear and moist.   Neck: No tracheal deviation present.   Cardiovascular: Normal heart sounds.  Minimal lower extremity edema.  Pulmonary/Chest: No wheezes. No rhonchi. No rales. + decreased breath sounds.  No accessory muscle usage or stridor.   Musculoskeletal: No cyanosis. No clubbing.  Skin: Skin is warm and dry.   Psychiatric: Normal mood and

## 2024-10-22 ENCOUNTER — CARE COORDINATION (OUTPATIENT)
Dept: CASE MANAGEMENT | Age: 79
End: 2024-10-22

## 2024-10-22 NOTE — CARE COORDINATION
Care Transitions Note    Final Call     Patient: Ulises Whitman                 Patient : 1945   MRN: [id number]                             Reason for Admission: [facility] ICU x 33 hours -> A Fib with RVR, sepsis 2/2 CAP-POA, aeCOPD, leukocytosis 2/2 likely steroids, chronic hypoxic resp failure, chronic CHF-stable (EF 30-35% 2023) -> home no services vs [facility] PT OT HHA (no orders?), f/up pulm 2-3 weeks (was patient of Dr Whitley), baseline O2 3L HS  Discharge Date: 10/2/24       RURS: Readmission Risk Score: 18.5    Attempted to reach patient for transitions of care follow up.  Unable to reach patient.      Outreach Attempts:   Unable to leave message.     Patient closed (unable to reach patient) from the Care Transitions program on 10/22/2024.    Upcoming Appointments:    Future Appointments         Provider Specialty Dept Phone    2024 2:50 PM Pasquale Powers MD Internal Medicine [phone]    2025 10:00 AM Sukh Hernandez MD Cardiology [phone]    2025 2:00 PM (Arrive by 1:30 PM) [facility] CT RM 1 Radiology [phone]    2025 2:45 PM Roque Hein MD Pulmonology [phone]          Felicia Maldonado RN

## 2024-11-09 DIAGNOSIS — J44.1 CHRONIC OBSTRUCTIVE PULMONARY DISEASE WITH ACUTE EXACERBATION (HCC): ICD-10-CM

## 2024-11-11 RX ORDER — IPRATROPIUM BROMIDE AND ALBUTEROL SULFATE 2.5; .5 MG/3ML; MG/3ML
SOLUTION RESPIRATORY (INHALATION)
Qty: 360 ML | Refills: 0 | Status: SHIPPED | OUTPATIENT
Start: 2024-11-11

## 2024-11-12 ENCOUNTER — OFFICE VISIT (OUTPATIENT)
Dept: INTERNAL MEDICINE CLINIC | Age: 79
End: 2024-11-12

## 2024-11-12 VITALS
HEIGHT: 70 IN | OXYGEN SATURATION: 94 % | HEART RATE: 53 BPM | BODY MASS INDEX: 34.79 KG/M2 | RESPIRATION RATE: 12 BRPM | SYSTOLIC BLOOD PRESSURE: 110 MMHG | WEIGHT: 243 LBS | DIASTOLIC BLOOD PRESSURE: 80 MMHG

## 2024-11-12 DIAGNOSIS — I10 ESSENTIAL HYPERTENSION: Primary | ICD-10-CM

## 2024-11-12 DIAGNOSIS — I50.22 CHRONIC SYSTOLIC CHF (CONGESTIVE HEART FAILURE) (HCC): ICD-10-CM

## 2024-11-12 DIAGNOSIS — I50.32 CHRONIC DIASTOLIC CHF (CONGESTIVE HEART FAILURE) (HCC): ICD-10-CM

## 2024-11-12 DIAGNOSIS — Z79.01 CHRONIC ANTICOAGULATION: ICD-10-CM

## 2024-11-12 DIAGNOSIS — N18.32 CHRONIC KIDNEY DISEASE, STAGE 3B (HCC): ICD-10-CM

## 2024-11-12 DIAGNOSIS — I48.91 ATRIAL FIBRILLATION, CONTROLLED (HCC): ICD-10-CM

## 2024-11-12 DIAGNOSIS — I73.9 PAD (PERIPHERAL ARTERY DISEASE) (HCC): ICD-10-CM

## 2024-11-12 DIAGNOSIS — E78.2 MIXED HYPERLIPIDEMIA: ICD-10-CM

## 2024-11-12 PROCEDURE — G0008 ADMIN INFLUENZA VIRUS VAC: HCPCS | Performed by: INTERNAL MEDICINE

## 2024-11-12 PROCEDURE — 90662 IIV NO PRSV INCREASED AG IM: CPT | Performed by: INTERNAL MEDICINE

## 2024-11-12 PROCEDURE — 1159F MED LIST DOCD IN RCRD: CPT | Performed by: INTERNAL MEDICINE

## 2024-11-12 PROCEDURE — 3079F DIAST BP 80-89 MM HG: CPT | Performed by: INTERNAL MEDICINE

## 2024-11-12 PROCEDURE — 1123F ACP DISCUSS/DSCN MKR DOCD: CPT | Performed by: INTERNAL MEDICINE

## 2024-11-12 PROCEDURE — 1160F RVW MEDS BY RX/DR IN RCRD: CPT | Performed by: INTERNAL MEDICINE

## 2024-11-12 PROCEDURE — 3074F SYST BP LT 130 MM HG: CPT | Performed by: INTERNAL MEDICINE

## 2024-11-12 PROCEDURE — 99214 OFFICE O/P EST MOD 30 MIN: CPT | Performed by: INTERNAL MEDICINE

## 2024-11-12 ASSESSMENT — ENCOUNTER SYMPTOMS
BACK PAIN: 0
ABDOMINAL PAIN: 0
NAUSEA: 0
SHORTNESS OF BREATH: 1
VOMITING: 0
RHINORRHEA: 0
WHEEZING: 0

## 2024-11-12 NOTE — PROGRESS NOTES
Subjective:      Patient ID: Ulises Whitman is a 79 y.o. male.    HPI    Patient is here for a follow up.       He has a history of hypertension. It is well controlled. He is compliant and has no med side effects. No chest pain.    Patient's COPD is controlled on present bronchodilator regimen. Patient is taking medications as instructed, no medication side effects noted, no significant ongoing wheezing or shortness of breath, using bronchodilator MDI less than twice a week. He quit smoking. He is doing well. He is on 3 L of oxygen at night.  He has some pedal edema.  No ER visits.     He has history of a fib. He is in NSR. He takes Xarelto. No bleeding or bruising.    He has GERD. It is stable.      He has nocturia, no hematuria. He wakes up few times at night.    He has chronic systolic CHF. He was recently started on Entresto. He has only been taking it bid.  No flare up      Review of Systems   Constitutional:  Negative for activity change and appetite change.   HENT:  Negative for postnasal drip and rhinorrhea.    Respiratory:  Positive for shortness of breath. Negative for wheezing.    Cardiovascular:  Negative for chest pain, palpitations and leg swelling.   Gastrointestinal:  Negative for abdominal pain, nausea and vomiting.   Genitourinary:  Positive for frequency. Negative for difficulty urinating.   Musculoskeletal:  Negative for back pain and joint swelling.   Skin:  Negative for rash.   Neurological:  Negative for light-headedness.   Psychiatric/Behavioral:  Negative for sleep disturbance.          Past Medical History:   Diagnosis Date    A-fib ([facility])     2/14/12    Acute conjunctivitis of both eyes     Acute on chronic respiratory failure with hypoxia 2/13/2012    Acute respiratory failure with hypoxia 2/13/2012    Atrial fibrillation with RVR ([facility])     2/14/12     Avulsion fracture of ankle 9/21/2015    Benign localized hyperplasia of prostate with urinary obstruction and other lower urinary tract

## 2024-11-24 ENCOUNTER — HOSPITAL ENCOUNTER (INPATIENT)
Age: 79
LOS: 1 days | Discharge: HOME | DRG: 194 | End: 2024-11-25
Attending: EMERGENCY MEDICINE | Admitting: INTERNAL MEDICINE
Payer: MEDICARE

## 2024-11-24 ENCOUNTER — APPOINTMENT (OUTPATIENT)
Dept: GENERAL RADIOLOGY | Age: 79
DRG: 194 | End: 2024-11-24
Payer: MEDICARE

## 2024-11-24 DIAGNOSIS — J18.9 PNEUMONIA DUE TO INFECTIOUS ORGANISM, UNSPECIFIED LATERALITY, UNSPECIFIED PART OF LUNG: Primary | ICD-10-CM

## 2024-11-24 PROBLEM — R09.02 HYPOXIA: Status: ACTIVE | Noted: 2024-11-24

## 2024-11-24 PROBLEM — J44.1 COPD EXACERBATION (HCC): Status: ACTIVE | Noted: 2024-11-24

## 2024-11-24 LAB
ALBUMIN SERPL-MCNC: 3.7 G/DL (ref 3.4–5)
ALBUMIN/GLOB SERPL: 1.2 {RATIO} (ref 1.1–2.2)
ALP SERPL-CCNC: 86 U/L (ref 40–129)
ALT SERPL-CCNC: 6 U/L (ref 10–40)
ANION GAP SERPL CALCULATED.3IONS-SCNC: 11 MMOL/L (ref 3–16)
AST SERPL-CCNC: 13 U/L (ref 15–37)
BASOPHILS # BLD: 0.1 K/UL (ref 0–0.2)
BASOPHILS NFR BLD: 0.8 %
BILIRUB SERPL-MCNC: 0.4 MG/DL (ref 0–1)
BILIRUB UR QL STRIP.AUTO: NEGATIVE
BUN SERPL-MCNC: 25 MG/DL (ref 7–20)
CALCIUM SERPL-MCNC: 8.4 MG/DL (ref 8.3–10.6)
CHLORIDE SERPL-SCNC: 105 MMOL/L (ref 99–110)
CLARITY UR: CLEAR
CO2 SERPL-SCNC: 25 MMOL/L (ref 21–32)
COLOR UR: YELLOW
CREAT SERPL-MCNC: 1.4 MG/DL (ref 0.8–1.3)
DEPRECATED RDW RBC AUTO: 16.6 % (ref 12.4–15.4)
EKG ATRIAL RATE: 109 BPM
EKG DIAGNOSIS: NORMAL
EKG P-R INTERVAL: 96 MS
EKG Q-T INTERVAL: 322 MS
EKG QRS DURATION: 84 MS
EKG QTC CALCULATION (BAZETT): 433 MS
EKG R AXIS: 42 DEGREES
EKG T AXIS: 21 DEGREES
EKG VENTRICULAR RATE: 109 BPM
EOSINOPHIL # BLD: 0.1 K/UL (ref 0–0.6)
EOSINOPHIL NFR BLD: 0.6 %
FLUAV RNA RESP QL NAA+PROBE: NOT DETECTED
FLUBV RNA RESP QL NAA+PROBE: NOT DETECTED
GFR SERPLBLD CREATININE-BSD FMLA CKD-EPI: 52 ML/MIN/{1.73_M2}
GLUCOSE SERPL-MCNC: 121 MG/DL (ref 70–99)
GLUCOSE UR STRIP.AUTO-MCNC: >=1000 MG/DL
HCT VFR BLD AUTO: 44.8 % (ref 40.5–52.5)
HGB BLD-MCNC: 14.9 G/DL (ref 13.5–17.5)
HGB UR QL STRIP.AUTO: NEGATIVE
KETONES UR STRIP.AUTO-MCNC: NEGATIVE MG/DL
LACTATE BLDV-SCNC: 1.6 MMOL/L (ref 0.4–1.9)
LEUKOCYTE ESTERASE UR QL STRIP.AUTO: NEGATIVE
LYMPHOCYTES # BLD: 1.5 K/UL (ref 1–5.1)
LYMPHOCYTES NFR BLD: 9.7 %
MCH RBC QN AUTO: 30.7 PG (ref 26–34)
MCHC RBC AUTO-ENTMCNC: 33.3 G/DL (ref 31–36)
MCV RBC AUTO: 92.3 FL (ref 80–100)
MONOCYTES # BLD: 0.8 K/UL (ref 0–1.3)
MONOCYTES NFR BLD: 5.3 %
NEUTROPHILS # BLD: 13 K/UL (ref 1.7–7.7)
NEUTROPHILS NFR BLD: 83.6 %
NITRITE UR QL STRIP.AUTO: NEGATIVE
NT-PROBNP SERPL-MCNC: 215 PG/ML (ref 0–449)
PH UR STRIP.AUTO: 7.5 [PH] (ref 5–8)
PLATELET # BLD AUTO: 276 K/UL (ref 135–450)
PMV BLD AUTO: 8.1 FL (ref 5–10.5)
POTASSIUM SERPL-SCNC: 4.2 MMOL/L (ref 3.5–5.1)
PROCALCITONIN SERPL IA-MCNC: 0.05 NG/ML (ref 0–0.15)
PROT SERPL-MCNC: 6.7 G/DL (ref 6.4–8.2)
PROT UR STRIP.AUTO-MCNC: NEGATIVE MG/DL
RBC # BLD AUTO: 4.85 M/UL (ref 4.2–5.9)
SARS-COV-2 RNA RESP QL NAA+PROBE: NOT DETECTED
SODIUM SERPL-SCNC: 141 MMOL/L (ref 136–145)
SP GR UR STRIP.AUTO: 1.01 (ref 1–1.03)
TROPONIN, HIGH SENSITIVITY: 19 NG/L (ref 0–22)
TROPONIN, HIGH SENSITIVITY: 21 NG/L (ref 0–22)
UA COMPLETE W REFLEX CULTURE PNL UR: ABNORMAL
UA DIPSTICK W REFLEX MICRO PNL UR: ABNORMAL
URN SPEC COLLECT METH UR: ABNORMAL
UROBILINOGEN UR STRIP-ACNC: 1 E.U./DL
WBC # BLD AUTO: 15.5 K/UL (ref 4–11)

## 2024-11-24 PROCEDURE — 99223 1ST HOSP IP/OBS HIGH 75: CPT | Performed by: INTERNAL MEDICINE

## 2024-11-24 PROCEDURE — 6360000002 HC RX W HCPCS: Performed by: INTERNAL MEDICINE

## 2024-11-24 PROCEDURE — 96365 THER/PROPH/DIAG IV INF INIT: CPT

## 2024-11-24 PROCEDURE — 6370000000 HC RX 637 (ALT 250 FOR IP): Performed by: INTERNAL MEDICINE

## 2024-11-24 PROCEDURE — 87449 NOS EACH ORGANISM AG IA: CPT

## 2024-11-24 PROCEDURE — 83880 ASSAY OF NATRIURETIC PEPTIDE: CPT

## 2024-11-24 PROCEDURE — 93005 ELECTROCARDIOGRAM TRACING: CPT | Performed by: EMERGENCY MEDICINE

## 2024-11-24 PROCEDURE — 83605 ASSAY OF LACTIC ACID: CPT

## 2024-11-24 PROCEDURE — 87636 SARSCOV2 & INF A&B AMP PRB: CPT

## 2024-11-24 PROCEDURE — 84145 PROCALCITONIN (PCT): CPT

## 2024-11-24 PROCEDURE — 94640 AIRWAY INHALATION TREATMENT: CPT

## 2024-11-24 PROCEDURE — 2580000003 HC RX 258: Performed by: INTERNAL MEDICINE

## 2024-11-24 PROCEDURE — 87641 MR-STAPH DNA AMP PROBE: CPT

## 2024-11-24 PROCEDURE — 36415 COLL VENOUS BLD VENIPUNCTURE: CPT

## 2024-11-24 PROCEDURE — 2700000000 HC OXYGEN THERAPY PER DAY

## 2024-11-24 PROCEDURE — 85025 COMPLETE CBC W/AUTO DIFF WBC: CPT

## 2024-11-24 PROCEDURE — 6360000002 HC RX W HCPCS: Performed by: EMERGENCY MEDICINE

## 2024-11-24 PROCEDURE — 84484 ASSAY OF TROPONIN QUANT: CPT

## 2024-11-24 PROCEDURE — 2580000003 HC RX 258: Performed by: EMERGENCY MEDICINE

## 2024-11-24 PROCEDURE — 6370000000 HC RX 637 (ALT 250 FOR IP): Performed by: EMERGENCY MEDICINE

## 2024-11-24 PROCEDURE — 81003 URINALYSIS AUTO W/O SCOPE: CPT

## 2024-11-24 PROCEDURE — 94761 N-INVAS EAR/PLS OXIMETRY MLT: CPT

## 2024-11-24 PROCEDURE — 71045 X-RAY EXAM CHEST 1 VIEW: CPT

## 2024-11-24 PROCEDURE — 87040 BLOOD CULTURE FOR BACTERIA: CPT

## 2024-11-24 PROCEDURE — 93010 ELECTROCARDIOGRAM REPORT: CPT | Performed by: INTERNAL MEDICINE

## 2024-11-24 PROCEDURE — 80053 COMPREHEN METABOLIC PANEL: CPT

## 2024-11-24 PROCEDURE — 99285 EMERGENCY DEPT VISIT HI MDM: CPT

## 2024-11-24 PROCEDURE — 1200000000 HC SEMI PRIVATE

## 2024-11-24 RX ORDER — POLYETHYLENE GLYCOL 3350 17 G/17G
17 POWDER, FOR SOLUTION ORAL DAILY PRN
Status: DISCONTINUED | OUTPATIENT
Start: 2024-11-24 | End: 2024-11-25 | Stop reason: HOSPADM

## 2024-11-24 RX ORDER — SODIUM CHLORIDE 9 MG/ML
1000 INJECTION, SOLUTION INTRAVENOUS ONCE
Status: COMPLETED | OUTPATIENT
Start: 2024-11-24 | End: 2024-11-24

## 2024-11-24 RX ORDER — ATORVASTATIN CALCIUM 40 MG/1
40 TABLET, FILM COATED ORAL NIGHTLY
Status: DISCONTINUED | OUTPATIENT
Start: 2024-11-24 | End: 2024-11-25 | Stop reason: HOSPADM

## 2024-11-24 RX ORDER — IPRATROPIUM BROMIDE AND ALBUTEROL SULFATE 2.5; .5 MG/3ML; MG/3ML
1 SOLUTION RESPIRATORY (INHALATION)
Status: DISCONTINUED | OUTPATIENT
Start: 2024-11-24 | End: 2024-11-25 | Stop reason: HOSPADM

## 2024-11-24 RX ORDER — SODIUM CHLORIDE 0.9 % (FLUSH) 0.9 %
5-40 SYRINGE (ML) INJECTION EVERY 12 HOURS SCHEDULED
Status: DISCONTINUED | OUTPATIENT
Start: 2024-11-24 | End: 2024-11-25 | Stop reason: HOSPADM

## 2024-11-24 RX ORDER — ACETAMINOPHEN 500 MG
1000 TABLET ORAL
Status: COMPLETED | OUTPATIENT
Start: 2024-11-24 | End: 2024-11-24

## 2024-11-24 RX ORDER — METOPROLOL SUCCINATE 50 MG/1
50 TABLET, EXTENDED RELEASE ORAL DAILY
Status: DISCONTINUED | OUTPATIENT
Start: 2024-11-24 | End: 2024-11-25 | Stop reason: HOSPADM

## 2024-11-24 RX ORDER — IPRATROPIUM BROMIDE AND ALBUTEROL SULFATE 2.5; .5 MG/3ML; MG/3ML
1 SOLUTION RESPIRATORY (INHALATION)
Status: DISCONTINUED | OUTPATIENT
Start: 2024-11-24 | End: 2024-11-24

## 2024-11-24 RX ORDER — GUAIFENESIN 600 MG/1
600 TABLET, EXTENDED RELEASE ORAL 2 TIMES DAILY
Status: DISCONTINUED | OUTPATIENT
Start: 2024-11-24 | End: 2024-11-25 | Stop reason: HOSPADM

## 2024-11-24 RX ORDER — ALBUTEROL SULFATE 0.83 MG/ML
2.5 SOLUTION RESPIRATORY (INHALATION)
Status: DISCONTINUED | OUTPATIENT
Start: 2024-11-24 | End: 2024-11-24

## 2024-11-24 RX ORDER — ONDANSETRON 4 MG/1
4 TABLET, ORALLY DISINTEGRATING ORAL EVERY 8 HOURS PRN
Status: DISCONTINUED | OUTPATIENT
Start: 2024-11-24 | End: 2024-11-25 | Stop reason: HOSPADM

## 2024-11-24 RX ORDER — ACETAMINOPHEN 650 MG/1
650 SUPPOSITORY RECTAL EVERY 6 HOURS PRN
Status: DISCONTINUED | OUTPATIENT
Start: 2024-11-24 | End: 2024-11-25 | Stop reason: HOSPADM

## 2024-11-24 RX ORDER — SODIUM CHLORIDE 9 MG/ML
INJECTION, SOLUTION INTRAVENOUS PRN
Status: DISCONTINUED | OUTPATIENT
Start: 2024-11-24 | End: 2024-11-25 | Stop reason: HOSPADM

## 2024-11-24 RX ORDER — TAMSULOSIN HYDROCHLORIDE 0.4 MG/1
0.4 CAPSULE ORAL DAILY
Status: DISCONTINUED | OUTPATIENT
Start: 2024-11-24 | End: 2024-11-25 | Stop reason: HOSPADM

## 2024-11-24 RX ORDER — DOXYCYCLINE HYCLATE 100 MG
100 TABLET ORAL EVERY 12 HOURS SCHEDULED
Status: DISCONTINUED | OUTPATIENT
Start: 2024-11-24 | End: 2024-11-25 | Stop reason: HOSPADM

## 2024-11-24 RX ORDER — ENOXAPARIN SODIUM 100 MG/ML
30 INJECTION SUBCUTANEOUS 2 TIMES DAILY
Status: DISCONTINUED | OUTPATIENT
Start: 2024-11-24 | End: 2024-11-24

## 2024-11-24 RX ORDER — ONDANSETRON 2 MG/ML
4 INJECTION INTRAMUSCULAR; INTRAVENOUS EVERY 6 HOURS PRN
Status: DISCONTINUED | OUTPATIENT
Start: 2024-11-24 | End: 2024-11-25 | Stop reason: HOSPADM

## 2024-11-24 RX ORDER — GUAIFENESIN/DEXTROMETHORPHAN 100-10MG/5
5 SYRUP ORAL EVERY 4 HOURS PRN
Status: DISCONTINUED | OUTPATIENT
Start: 2024-11-24 | End: 2024-11-25 | Stop reason: HOSPADM

## 2024-11-24 RX ORDER — ACETAMINOPHEN 325 MG/1
650 TABLET ORAL EVERY 6 HOURS PRN
Status: DISCONTINUED | OUTPATIENT
Start: 2024-11-24 | End: 2024-11-25 | Stop reason: HOSPADM

## 2024-11-24 RX ORDER — PANTOPRAZOLE SODIUM 40 MG/1
40 TABLET, DELAYED RELEASE ORAL
Status: DISCONTINUED | OUTPATIENT
Start: 2024-11-25 | End: 2024-11-25 | Stop reason: HOSPADM

## 2024-11-24 RX ORDER — PREDNISONE 20 MG/1
40 TABLET ORAL DAILY
Status: DISCONTINUED | OUTPATIENT
Start: 2024-11-24 | End: 2024-11-24

## 2024-11-24 RX ORDER — SODIUM CHLORIDE 0.9 % (FLUSH) 0.9 %
5-40 SYRINGE (ML) INJECTION PRN
Status: DISCONTINUED | OUTPATIENT
Start: 2024-11-24 | End: 2024-11-25 | Stop reason: HOSPADM

## 2024-11-24 RX ADMIN — IPRATROPIUM BROMIDE AND ALBUTEROL SULFATE 1 DOSE: 2.5; .5 SOLUTION RESPIRATORY (INHALATION) at 15:03

## 2024-11-24 RX ADMIN — SACUBITRIL AND VALSARTAN 1 TABLET: 24; 26 TABLET, FILM COATED ORAL at 10:41

## 2024-11-24 RX ADMIN — SODIUM CHLORIDE, PRESERVATIVE FREE 10 ML: 5 INJECTION INTRAVENOUS at 10:35

## 2024-11-24 RX ADMIN — GUAIFENESIN 600 MG: 600 TABLET, EXTENDED RELEASE ORAL at 19:59

## 2024-11-24 RX ADMIN — DOXYCYCLINE HYCLATE 100 MG: 100 TABLET, COATED ORAL at 19:59

## 2024-11-24 RX ADMIN — ATORVASTATIN CALCIUM 40 MG: 40 TABLET, FILM COATED ORAL at 19:59

## 2024-11-24 RX ADMIN — DOXYCYCLINE HYCLATE 100 MG: 100 TABLET, COATED ORAL at 12:29

## 2024-11-24 RX ADMIN — SODIUM CHLORIDE, PRESERVATIVE FREE 10 ML: 5 INJECTION INTRAVENOUS at 20:14

## 2024-11-24 RX ADMIN — TAMSULOSIN HYDROCHLORIDE 0.4 MG: 0.4 CAPSULE ORAL at 10:41

## 2024-11-24 RX ADMIN — RIVAROXABAN 20 MG: 20 TABLET, FILM COATED ORAL at 17:17

## 2024-11-24 RX ADMIN — ALBUTEROL SULFATE 2.5 MG: 2.5 SOLUTION RESPIRATORY (INHALATION) at 11:00

## 2024-11-24 RX ADMIN — METOPROLOL SUCCINATE 50 MG: 50 TABLET, EXTENDED RELEASE ORAL at 10:41

## 2024-11-24 RX ADMIN — IPRATROPIUM BROMIDE AND ALBUTEROL SULFATE 1 DOSE: 2.5; .5 SOLUTION RESPIRATORY (INHALATION) at 20:10

## 2024-11-24 RX ADMIN — CEFTRIAXONE SODIUM 2000 MG: 2 INJECTION, POWDER, FOR SOLUTION INTRAMUSCULAR; INTRAVENOUS at 12:38

## 2024-11-24 RX ADMIN — SODIUM CHLORIDE 1000 ML: 9 INJECTION, SOLUTION INTRAVENOUS at 05:34

## 2024-11-24 RX ADMIN — METHYLPREDNISOLONE SODIUM SUCCINATE 40 MG: 40 INJECTION INTRAMUSCULAR; INTRAVENOUS at 22:52

## 2024-11-24 RX ADMIN — SACUBITRIL AND VALSARTAN 1 TABLET: 24; 26 TABLET, FILM COATED ORAL at 19:59

## 2024-11-24 RX ADMIN — PIPERACILLIN AND TAZOBACTAM 3375 MG: 3; .375 INJECTION, POWDER, LYOPHILIZED, FOR SOLUTION INTRAVENOUS at 02:11

## 2024-11-24 RX ADMIN — ACETAMINOPHEN 1000 MG: 500 TABLET ORAL at 02:56

## 2024-11-24 RX ADMIN — PREDNISONE 40 MG: 20 TABLET ORAL at 10:41

## 2024-11-24 RX ADMIN — GUAIFENESIN 600 MG: 600 TABLET, EXTENDED RELEASE ORAL at 12:29

## 2024-11-24 RX ADMIN — METHYLPREDNISOLONE SODIUM SUCCINATE 40 MG: 40 INJECTION INTRAMUSCULAR; INTRAVENOUS at 12:29

## 2024-11-24 ASSESSMENT — PAIN SCALES - GENERAL: PAINLEVEL_OUTOF10: 7

## 2024-11-24 ASSESSMENT — LIFESTYLE VARIABLES
HOW OFTEN DO YOU HAVE A DRINK CONTAINING ALCOHOL: NEVER
HOW OFTEN DO YOU HAVE A DRINK CONTAINING ALCOHOL: NEVER
HOW MANY STANDARD DRINKS CONTAINING ALCOHOL DO YOU HAVE ON A TYPICAL DAY: PATIENT DOES NOT DRINK

## 2024-11-24 ASSESSMENT — PAIN - FUNCTIONAL ASSESSMENT: PAIN_FUNCTIONAL_ASSESSMENT: NONE - DENIES PAIN

## 2024-11-24 ASSESSMENT — PAIN DESCRIPTION - ORIENTATION: ORIENTATION: LOWER

## 2024-11-24 ASSESSMENT — PAIN DESCRIPTION - DESCRIPTORS: DESCRIPTORS: SHARP;ACHING

## 2024-11-24 ASSESSMENT — PAIN DESCRIPTION - LOCATION: LOCATION: BACK

## 2024-11-24 NOTE — H&P
[facility] Medicine History & Physical      Patient Name: Ulises Whitman    : 1945    PCP: Pasquale Powers MD    Date of Service:  Patient seen and examined on 24     Chief Complaint: Shortness of    History Of Present Illness:    Ulises Whitman is a 79 y.o. male with a PMH of hypertension and COPD on 3 L oxygen at night, HFrEF, chronic A-fib on Xarelto, GERD, BPH, PAD, history of aortic aneurysm who presented to ED with complaint of shortness of breath  Complaints of shortness of breath with worsening cough and chest discomfort.  States that it feels like when he has had pneumonia in the past.  She uses 3 L of oxygen at night.  At nursing facility was found to have temperature 102 brought to the ED for further evaluation  Blood pressure 85/60 pulse of 98 temperature 98.7 respiration 27 saturating 89%  Labs show elevated BUN and creatinine, glucose of 121, proBNP of 215.  Troponin 19/21.  LFT stable.  WBC 15.5 hemoglobin 14.9.  Influenza A/B/COVID-negative.  Urinalysis not indicative of UTI.  Blood culture x 2 sent.  Chest x-ray shows right perihilar opacities increased may reflect atelectasis/infiltrates    In the ED patient received Zosyn 3.375 mg,    Past Medical History:    Patient has a past medical history of A-fib ([facility]), Acute conjunctivitis of both eyes, Acute on chronic respiratory failure with hypoxia, Acute respiratory failure with hypoxia, Atrial fibrillation with RVR ([facility]), Avulsion fracture of ankle, Benign localized hyperplasia of prostate with urinary obstruction and other lower urinary tract symptoms (LUTS)(600.21), Chronic systolic CHF (congestive heart failure) ([facility]), Contusion of leg, right, COPD (chronic obstructive pulmonary disease) ([facility]), Fractures, GERD (gastroesophageal reflux disease), Hypertension, Hypertensive urgency, Hypertrophy of prostate without urinary obstruction and other lower urinary tract symptoms (LUTS), No history of procedure, PAD

## 2024-11-24 NOTE — PROGRESS NOTES
RT Inhaler-Nebulizer Bronchodilator Protocol Note    There is a bronchodilator order in the chart from a provider indicating to follow the RT Bronchodilator Protocol and there is an “Initiate RT Inhaler-Nebulizer Bronchodilator Protocol” order as well (see protocol at bottom of note).    CXR Findings:  XR CHEST PORTABLE    Result Date: 11/24/2024  Right perihilar opacities appear increased, which may reflect atelectasis or infiltrate. Similar appearance of basilar opacities favoring subsegmental atelectasis or scarring.       The findings from the last RT Protocol Assessment were as follows:   History Pulmonary Disease: (P) Chronic pulmonary disease  Respiratory Pattern: (P) Regular pattern and RR 12-20 bpm  Breath Sounds: (P) Slightly diminished and/or crackles  Cough: (P) Strong, spontaneous, non-productive  Indication for Bronchodilator Therapy: (P) Decreased or absent breath sounds  Bronchodilator Assessment Score: (P) 4    Aerosolized bronchodilator medication orders have been revised according to the RT Inhaler-Nebulizer Bronchodilator Protocol below.    Respiratory Therapist to perform RT Therapy Protocol Assessment initially then follow the protocol.  Repeat RT Therapy Protocol Assessment PRN for score 0-3 or on second treatment, BID, and PRN for scores above 3.    No Indications - adjust the frequency to every 6 hours PRN wheezing or bronchospasm, if no treatments needed after 48 hours then discontinue using Per Protocol order mode.     If indication present, adjust the RT bronchodilator orders based on the Bronchodilator Assessment Score as indicated below.  Use Inhaler orders unless patient has one or more of the following: on home nebulizer, not able to hold breath for 10 seconds, is not alert and oriented, cannot activate and use MDI correctly, or respiratory rate 25 breaths per minute or more, then use the equivalent nebulizer order(s) with same Frequency and PRN reasons based on the score.  If a

## 2024-11-24 NOTE — ED PROVIDER NOTES
[facility] TELEMETRY  EMERGENCY DEPARTMENT ENCOUNTER      Pt Name: Ulises Whitman  MRN: [id number]  Birthdate 1945  Date of evaluation: 11/24/2024  Provider: Evon Araiza MD    CHIEF COMPLAINT       Chief Complaint   Patient presents with    Shortness of Breath     Patient brought by the squad with O@ inhalation via nasal cannula @ 2LPM. Increased SOB for the last 3 days, with history of COPD. With chest pain during respiration. O2 sat at 88-89 at room air. With history of A fib.          HISTORY OF PRESENT ILLNESS   (Location/Symptom, Timing/Onset, Context/Setting, Quality, Duration, Modifying Factors, Severity)  Note limiting factors.   Ulises Whitman is a 79 y.o. male who presents to the emergency department with shortness of breath.  Patient with a history of CHF, COPD, A-fib, and multiple prior episodes of pneumonia has had 2 days of gradually worsening cough with shortness of breath.  He does sleep at 3 L at night but O2 found to be 88 to 89% on room air.  He does have some chest discomfort with respirations.  No nausea or vomiting.  No leg swelling or calf pain.  He states this feels like when he has had pneumonia in the past.  He did not have a fever at the nursing facility but EMS got skin temperature of 102.      This patient is at risk for a communicable infection. Therefore, personal protection equipment consisting of a mask and gloves worn for the exam.     Nursing Notes were reviewed.    REVIEW OF SYSTEMS    (2-9 systems for level 4, 10 or more for level 5)     As per HPI    Except as noted above the remainder of the review of systems was reviewed and negative.       PAST MEDICAL HISTORY     Past Medical History:   Diagnosis Date    A-fib ([facility])     2/14/12    Acute conjunctivitis of both eyes     Acute on chronic respiratory failure with hypoxia 2/13/2012    Acute respiratory failure with hypoxia 2/13/2012    Atrial fibrillation with RVR ([facility])     2/14/12     Avulsion fracture of ankle

## 2024-11-24 NOTE — PROGRESS NOTES
Patient admitted to room 312 from ER. Patient oriented to room, call light, bed rails, phone, lights and bathroom. Patient instructed about the schedule of the day including: vital sign frequency, lab draws, possible tests, frequency of MD and staff rounds, daily weights, I &O's and prescribed diet.  Telemetry box in place, patient aware of placement and reason. Bed locked, in lowest position, side rails up 2/4, call light within reach.        Recliner Assessment  Patient is able to demonstrate the ability to move from a reclining position to an upright position within the recliner.       4 Eyes Skin Assessment     NAME:  Ulises Whitman  YOB: 1945  MEDICAL RECORD NUMBER:  [id number]    The patient is being assessed for  Admission by EZEKIEL Hunt and EZEKIEL Stanton    I agree that at least one RN has performed a thorough Head to Toe Skin Assessment on the patient. ALL assessment sites listed below have been assessed.      Areas assessed by both nurses:    Head, Face, Ears, Shoulders, Back, Chest, Arms, Elbows, Hands, Sacrum. Buttock, Coccyx, Ischium, Legs. Feet and Heels, and Under Medical Devices         Does the Patient have a Wound? Yes wound(s) were present on assessment. LDA wound assessment was Initiated and completed by RN       Matt Prevention initiated by RN: Yes  Wound Care Orders initiated by RN: Yes    Pressure Injury (Stage 3,4, Unstageable, DTI, NWPT, and Complex wounds) if present, place Wound referral order by RN under : Yes    New Ostomies, if present place, Ostomy referral order under : No     Nurse 1 eSignature: Electronically signed by Marilee Marques RN on 11/24/24 at 12:15 PM EST    **SHARE this note so that the co-signing nurse can place an eSignature**    Nurse 2 eSignature: Electronically signed by Maximino Lechuga RN on 11/24/24 at 1:39 PM EST     L buttock wound

## 2024-11-24 NOTE — PROGRESS NOTES
Consult has been called to Dr. Lopez on 11/24/24. Spoke with Galina. 8:06 AM    Elda Abreu  11/24/2024

## 2024-11-24 NOTE — PROGRESS NOTES
Page has been called to Dr. Lopez on 11/24/24. Spoke with Sun. 11:55 AM    Elda Abreu  11/24/2024

## 2024-11-25 VITALS
DIASTOLIC BLOOD PRESSURE: 74 MMHG | TEMPERATURE: 97.5 F | BODY MASS INDEX: 34.19 KG/M2 | SYSTOLIC BLOOD PRESSURE: 105 MMHG | WEIGHT: 244.2 LBS | HEART RATE: 72 BPM | HEIGHT: 71 IN | RESPIRATION RATE: 20 BRPM | OXYGEN SATURATION: 92 %

## 2024-11-25 LAB
ALBUMIN SERPL-MCNC: 3 G/DL (ref 3.4–5)
ALBUMIN/GLOB SERPL: 1 {RATIO} (ref 1.1–2.2)
ALP SERPL-CCNC: 62 U/L (ref 40–129)
ALT SERPL-CCNC: <5 U/L (ref 10–40)
ANION GAP SERPL CALCULATED.3IONS-SCNC: 11 MMOL/L (ref 3–16)
AST SERPL-CCNC: 8 U/L (ref 15–37)
BASOPHILS # BLD: 0 K/UL (ref 0–0.2)
BASOPHILS NFR BLD: 0 %
BILIRUB SERPL-MCNC: 0.4 MG/DL (ref 0–1)
BUN SERPL-MCNC: 25 MG/DL (ref 7–20)
CALCIUM SERPL-MCNC: 8.2 MG/DL (ref 8.3–10.6)
CHLORIDE SERPL-SCNC: 108 MMOL/L (ref 99–110)
CO2 SERPL-SCNC: 17 MMOL/L (ref 21–32)
CREAT SERPL-MCNC: 1 MG/DL (ref 0.8–1.3)
DEPRECATED RDW RBC AUTO: 17.4 % (ref 12.4–15.4)
EOSINOPHIL # BLD: 0 K/UL (ref 0–0.6)
EOSINOPHIL NFR BLD: 0 %
GFR SERPLBLD CREATININE-BSD FMLA CKD-EPI: 77 ML/MIN/{1.73_M2}
GLUCOSE SERPL-MCNC: 146 MG/DL (ref 70–99)
HCT VFR BLD AUTO: 42.3 % (ref 40.5–52.5)
HGB BLD-MCNC: 13.5 G/DL (ref 13.5–17.5)
LEGIONELLA AG UR QL: NORMAL
LYMPHOCYTES # BLD: 0.6 K/UL (ref 1–5.1)
LYMPHOCYTES NFR BLD: 4.6 %
MCH RBC QN AUTO: 30.4 PG (ref 26–34)
MCHC RBC AUTO-ENTMCNC: 31.9 G/DL (ref 31–36)
MCV RBC AUTO: 95.2 FL (ref 80–100)
MONOCYTES # BLD: 0.2 K/UL (ref 0–1.3)
MONOCYTES NFR BLD: 1.3 %
MRSA DNA SPEC QL NAA+PROBE: NORMAL
NEUTROPHILS # BLD: 11.2 K/UL (ref 1.7–7.7)
NEUTROPHILS NFR BLD: 94.1 %
PLATELET # BLD AUTO: 213 K/UL (ref 135–450)
PMV BLD AUTO: 8.3 FL (ref 5–10.5)
POTASSIUM SERPL-SCNC: 5.1 MMOL/L (ref 3.5–5.1)
PROT SERPL-MCNC: 6 G/DL (ref 6.4–8.2)
RBC # BLD AUTO: 4.44 M/UL (ref 4.2–5.9)
SODIUM SERPL-SCNC: 136 MMOL/L (ref 136–145)
WBC # BLD AUTO: 11.9 K/UL (ref 4–11)

## 2024-11-25 PROCEDURE — 85025 COMPLETE CBC W/AUTO DIFF WBC: CPT

## 2024-11-25 PROCEDURE — 6370000000 HC RX 637 (ALT 250 FOR IP): Performed by: INTERNAL MEDICINE

## 2024-11-25 PROCEDURE — 94761 N-INVAS EAR/PLS OXIMETRY MLT: CPT

## 2024-11-25 PROCEDURE — 94640 AIRWAY INHALATION TREATMENT: CPT

## 2024-11-25 PROCEDURE — 2580000003 HC RX 258: Performed by: INTERNAL MEDICINE

## 2024-11-25 PROCEDURE — 36415 COLL VENOUS BLD VENIPUNCTURE: CPT

## 2024-11-25 PROCEDURE — 99233 SBSQ HOSP IP/OBS HIGH 50: CPT | Performed by: INTERNAL MEDICINE

## 2024-11-25 PROCEDURE — 2700000000 HC OXYGEN THERAPY PER DAY

## 2024-11-25 PROCEDURE — 80053 COMPREHEN METABOLIC PANEL: CPT

## 2024-11-25 PROCEDURE — 6360000002 HC RX W HCPCS: Performed by: INTERNAL MEDICINE

## 2024-11-25 RX ORDER — FUROSEMIDE 40 MG/1
40 TABLET ORAL DAILY
Status: DISCONTINUED | OUTPATIENT
Start: 2024-11-25 | End: 2024-11-25 | Stop reason: HOSPADM

## 2024-11-25 RX ORDER — DOXYCYCLINE HYCLATE 100 MG
100 TABLET ORAL EVERY 12 HOURS SCHEDULED
Qty: 7 TABLET | Refills: 0 | Status: SHIPPED | OUTPATIENT
Start: 2024-11-25 | End: 2024-11-29

## 2024-11-25 RX ORDER — CEFDINIR 300 MG/1
300 CAPSULE ORAL 2 TIMES DAILY
Qty: 10 CAPSULE | Refills: 0 | Status: SHIPPED | OUTPATIENT
Start: 2024-11-25 | End: 2024-11-30

## 2024-11-25 RX ORDER — GUAIFENESIN 600 MG/1
600 TABLET, EXTENDED RELEASE ORAL 2 TIMES DAILY
COMMUNITY
Start: 2024-11-25

## 2024-11-25 RX ORDER — PREDNISONE 10 MG/1
TABLET ORAL
Qty: 12 TABLET | Refills: 0 | Status: SHIPPED | OUTPATIENT
Start: 2024-11-26 | End: 2024-12-02

## 2024-11-25 RX ADMIN — TAMSULOSIN HYDROCHLORIDE 0.4 MG: 0.4 CAPSULE ORAL at 08:54

## 2024-11-25 RX ADMIN — SACUBITRIL AND VALSARTAN 1 TABLET: 24; 26 TABLET, FILM COATED ORAL at 08:54

## 2024-11-25 RX ADMIN — IPRATROPIUM BROMIDE AND ALBUTEROL SULFATE 1 DOSE: 2.5; .5 SOLUTION RESPIRATORY (INHALATION) at 11:31

## 2024-11-25 RX ADMIN — GUAIFENESIN 600 MG: 600 TABLET, EXTENDED RELEASE ORAL at 08:54

## 2024-11-25 RX ADMIN — CEFTRIAXONE SODIUM 2000 MG: 2 INJECTION, POWDER, FOR SOLUTION INTRAMUSCULAR; INTRAVENOUS at 11:57

## 2024-11-25 RX ADMIN — SODIUM CHLORIDE, PRESERVATIVE FREE 10 ML: 5 INJECTION INTRAVENOUS at 08:54

## 2024-11-25 RX ADMIN — METOPROLOL SUCCINATE 50 MG: 50 TABLET, EXTENDED RELEASE ORAL at 08:54

## 2024-11-25 RX ADMIN — FUROSEMIDE 40 MG: 40 TABLET ORAL at 08:54

## 2024-11-25 RX ADMIN — DOXYCYCLINE HYCLATE 100 MG: 100 TABLET, COATED ORAL at 08:54

## 2024-11-25 RX ADMIN — METHYLPREDNISOLONE SODIUM SUCCINATE 40 MG: 40 INJECTION INTRAMUSCULAR; INTRAVENOUS at 11:45

## 2024-11-25 RX ADMIN — IPRATROPIUM BROMIDE AND ALBUTEROL SULFATE 1 DOSE: 2.5; .5 SOLUTION RESPIRATORY (INHALATION) at 07:11

## 2024-11-25 RX ADMIN — PANTOPRAZOLE SODIUM 40 MG: 40 TABLET, DELAYED RELEASE ORAL at 06:10

## 2024-11-25 NOTE — PROGRESS NOTES
Patient educated on discharge instructions as well as new medications use, dosage, administration and possible side effects.  Patient verified knowledge. IV removed without difficulty and dry dressing in place. Telemetry monitor removed and returned to [facility].

## 2024-11-25 NOTE — PLAN OF CARE
Problem: Chronic Conditions and Co-morbidities  Goal: Patient's chronic conditions and co-morbidity symptoms are monitored and maintained or improved  11/25/2024 1006 by Patel Toussaint, RN  Outcome: Progressing  Flowsheets (Taken 11/25/2024 0845)  Care Plan - Patient's Chronic Conditions and Co-Morbidity Symptoms are Monitored and Maintained or Improved: Monitor and assess patient's chronic conditions and comorbid symptoms for stability, deterioration, or improvement  11/24/2024 2020 by Jillian Abreu, RN  Outcome: Progressing     Problem: Safety - Adult  Goal: Free from fall injury  11/25/2024 1006 by Patel Toussaint, RN  Outcome: Progressing  11/24/2024 2020 by Jillian Abreu, RN  Outcome: Progressing     
Marilee Marques, RN  Outcome: Progressing  Flowsheets (Taken 11/24/2024 1218)  Incisions, Wounds, or Drain Sites Healing Without Sign and Symptoms of Infection: ADMISSION and DAILY: Assess and document risk factors for pressure ulcer development     
succinate (TOPROL XL) 50 MG extended release tablet Take 1 tablet by mouth daily 10/10/24   Nini Carrasco APRN - CNP   furosemide (LASIX) 40 MG tablet Take 1 tablet by mouth daily 10/10/24   Nini Carrasco APRN - CNP   atorvastatin (LIPITOR) 20 MG tablet Take 1 tablet by mouth daily 10/10/24   Nini Carrasco APRN - CNP   albuterol sulfate HFA (PROVENTIL;VENTOLIN;PROAIR) 108 (90 Base) MCG/ACT inhaler INHALE TWO (2) PUFFS INTO THE LUNGS EVERY SIX (6) HOURS AS NEEDED FOR WHEEZING 10/10/24   Nini Carrasco APRN - CNP   fluticasone-umeclidin-vilant (TRELEGY ELLIPTA) 200-62.5-25 MCG/ACT AEPB inhaler INHALE ONE (1) PUFF INTO THE LUNGS DAILY 10/10/24   Nini Carrasco APRN - CNP   predniSONE (DELTASONE) 10 MG tablet Take 1 tablet by mouth daily 10/7/24   Marvin Kelley MD   sacubitril-valsartan (ENTRESTO) 24-26 MG per tablet Take 1 tablet by mouth 2 times daily 8/14/24   Sukh Hernandez MD   empagliflozin (JARDIANCE) 10 MG tablet Take 1 tablet by mouth daily 8/14/24   Sukh Hernandez MD      Diet Reviewed: Yes   ADULT DIET; Regular; Low Sodium (2 gm); 2000 ml    Goal of Care Reviewed: Yes   Patient and/or Family's stated Goal of Care this Admission: Reduce shortness of breath, increase activity tolerance, better understand heart failure and disease management, be more comfortable, and reduce lower extremity edema prior to discharge.     Electronically signed by Marilee Marques RN on 11/24/2024 at 12:19 PM

## 2024-11-25 NOTE — CONSULTS
Wound Referral Progress Note       NAME:  Ulises Whitman  MEDICAL RECORD NUMBER:  [id number]  AGE: 79 y.o.   GENDER: male  : 1945  TODAY'S DATE:  2024    Subjective: pt seen awake, alert, and oriented in bed with family member at bedside   Reason for WOCN Evaluation and Assessment: left buttock      Ulises Whitman is a 79 y.o. male referred by:   Nursing    Wound Identification:  Wound Type: pressure  Contributing Factors: chronic pressure, decreased mobility, and shear force    Pt presented to the ER for increased SOB and chest pain. Pt wears oxygen at night. Pt found to have pneumonia and admitted to PCU.     Patient Care Goal:  Wound Healing        PAST MEDICAL HISTORY        Diagnosis Date    A-fib ([facility])     12    Acute conjunctivitis of both eyes     Acute on chronic respiratory failure with hypoxia 2012    Acute respiratory failure with hypoxia 2012    Atrial fibrillation with RVR ([facility])     12     Avulsion fracture of ankle 2015    Benign localized hyperplasia of prostate with urinary obstruction and other lower urinary tract symptoms (LUTS)(600.21) 2016    Chronic systolic CHF (congestive heart failure) ([facility]) 2017    Contusion of leg, right 2015    COPD (chronic obstructive pulmonary disease) ([facility])     Fractures     GERD (gastroesophageal reflux disease) 6/15/2015    Hypertension     Hypertensive urgency 2019    Hypertrophy of prostate without urinary obstruction and other lower urinary tract symptoms (LUTS) 6/15/2015    No history of procedure     no previos colonoscopy    PAD (peripheral artery disease) ([facility]) 10/9/2017    Pneumonia     Thoracic aortic aneurysm ([facility])        PAST SURGICAL HISTORY    Past Surgical History:   Procedure Laterality Date    CATARACT REMOVAL WITH IMPLANT Right 2018     PHACO EMULSIFICATION OF CATARACT WITH INTRAOCULAR LENS IMPLANT RIGHT EYE    COLONOSCOPY  2016    sigmoid polyps    ENDOSCOPY, COLON, 
EMULSIFICATION OF CATARACT WITH INTRAOCULAR LENS IMPLANT RIGHT EYE performed by [place] [facility] [place]    MD XCAPSL CTRC RMVL INSJ IO LENS PROSTH W/O ECP Left 10/18/2018    PHACO EMULSIFICATION OF CATARACT WITH  INTRAOCULAR LENS IMPLANT LEFT EYE performed by Mike Alvarez [place] at [facility]    UPPER GASTROINTESTINAL ENDOSCOPY N/A 11/13/2019    EGD BIOPSY performed by Colton Silva [place] [facility]       FAMILY HISTORY:  family history includes Alcohol Abuse in his sister.    SOCIAL HISTORY:   reports that he quit smoking about 7 years ago. His smoking use included cigarettes. He started smoking about 62 years ago. He has a 110 pack-year smoking history. He has never used smokeless tobacco.    Scheduled Meds:   sodium chloride flush  5-40 mL IntraVENous 2 times per day    albuterol  2.5 mg Nebulization 4x Daily RT    tamsulosin  0.4 mg Oral Daily    rivaroxaban  20 mg Oral Daily    [START ON 11/25/2024] pantoprazole  40 mg Oral QAM AC    atorvastatin  40 mg Oral Nightly    metoprolol succinate  50 mg Oral Daily    sacubitril-valsartan  1 tablet Oral BID    predniSONE  40 mg Oral Daily    doxycycline hyclate  100 mg Oral 2 times per day     Continuous Infusions:   sodium chloride       PRN Meds:  sodium chloride flush, sodium chloride, ondansetron **OR** ondansetron, polyethylene glycol, acetaminophen **OR** acetaminophen, guaiFENesin-dextromethorphan    ALLERGIES:  Patient is allergic to amiodarone.    REVIEW OF SYSTEMS:  Constitutional: Negative for fever  HENT: Negative for sore throat  Eyes: Negative for redness   Respiratory: + Dyspnea, cough  Cardiovascular: Negative for chest pain  Gastrointestinal: Negative for vomiting, diarrhea   Genitourinary: Negative for hematuria   Musculoskeletal: Negative for arthralgias   Skin: Negative for rash  Neurological: Negative for syncope  Hematological: Negative for adenopathy  Psychiatric/Behavorial: Negative for anxiety    PHYSICAL EXAM:  Blood 
Additional Safety/Bands:

## 2024-11-25 NOTE — PROGRESS NOTES
11/24/24 2000   RT Protocol   History Pulmonary Disease 2   Respiratory pattern 0   Breath sounds 2   Cough 0   Indications for Bronchodilator Therapy Decreased or absent breath sounds   Bronchodilator Assessment Score 4     RT Inhaler-Nebulizer Bronchodilator Protocol Note    There is a bronchodilator order in the chart from a provider indicating to follow the RT Bronchodilator Protocol and there is an “Initiate RT Inhaler-Nebulizer Bronchodilator Protocol” order as well (see protocol at bottom of note).    CXR Findings:  XR CHEST PORTABLE    Result Date: 11/24/2024  Right perihilar opacities appear increased, which may reflect atelectasis or infiltrate. Similar appearance of basilar opacities favoring subsegmental atelectasis or scarring.       The findings from the last RT Protocol Assessment were as follows:   History Pulmonary Disease: Chronic pulmonary disease  Respiratory Pattern: Regular pattern and RR 12-20 bpm  Breath Sounds: Slightly diminished and/or crackles  Cough: Strong, spontaneous, non-productive  Indication for Bronchodilator Therapy: Decreased or absent breath sounds  Bronchodilator Assessment Score: 4    Aerosolized bronchodilator medication orders have been revised according to the RT Inhaler-Nebulizer Bronchodilator Protocol below.    Respiratory Therapist to perform RT Therapy Protocol Assessment initially then follow the protocol.  Repeat RT Therapy Protocol Assessment PRN for score 0-3 or on second treatment, BID, and PRN for scores above 3.    No Indications - adjust the frequency to every 6 hours PRN wheezing or bronchospasm, if no treatments needed after 48 hours then discontinue using Per Protocol order mode.     If indication present, adjust the RT bronchodilator orders based on the Bronchodilator Assessment Score as indicated below.  Use Inhaler orders unless patient has one or more of the following: on home nebulizer, not able to hold breath for 10 seconds, is not alert and

## 2024-11-25 NOTE — DISCHARGE SUMMARY
(DELTASONE) 10 MG tablet Take 3 tablets by mouth daily for 2 days, THEN 2 tablets daily for 2 days, THEN 1 tablet daily for 2 days.  Qty: 12 tablet, Refills: 0           Current Discharge Medication List        CONTINUE these medications which have NOT CHANGED    Details   ipratropium 0.5 mg-albuterol 2.5 mg (DUONEB) 0.5-2.5 (3) MG/3ML SOLN nebulizer solution TAKE THREE (3) MLS BY NEBULIZATION EVERY FOUR (4) HOURS AS NEEDED FOR SHORTNESS OF BREATH OR WHEEZING  Qty: 360 mL, Refills: 0    Associated Diagnoses: Chronic obstructive pulmonary disease with acute exacerbation (HCC)      tamsulosin (FLOMAX) 0.4 MG capsule Take 1 capsule by mouth daily  Qty: 90 capsule, Refills: 1      rivaroxaban (XARELTO) 20 MG TABS tablet TAKE ONE TABLET BY MOUTH DAILY WITH BREAKFAST  Qty: 90 tablet, Refills: 1      Potassium Citrate ER (UROCIT-K) 15 MEQ (1620 MG) TBCR extended release tablet Take 1 tablet by mouth daily  Qty: 90 tablet, Refills: 1      pantoprazole (PROTONIX) 40 MG tablet Take 1 tablet by mouth daily  Qty: 90 tablet, Refills: 1      metoprolol succinate (TOPROL XL) 50 MG extended release tablet Take 1 tablet by mouth daily  Qty: 90 tablet, Refills: 1      furosemide (LASIX) 40 MG tablet Take 1 tablet by mouth daily  Qty: 90 tablet, Refills: 1      atorvastatin (LIPITOR) 20 MG tablet Take 1 tablet by mouth daily  Qty: 90 tablet, Refills: 1      albuterol sulfate HFA (PROVENTIL;VENTOLIN;PROAIR) 108 (90 Base) MCG/ACT inhaler INHALE TWO (2) PUFFS INTO THE LUNGS EVERY SIX (6) HOURS AS NEEDED FOR WHEEZING  Qty: 8.5 g, Refills: 5      fluticasone-umeclidin-vilant (TRELEGY ELLIPTA) 200-62.5-25 MCG/ACT AEPB inhaler INHALE ONE (1) PUFF INTO THE LUNGS DAILY  Qty: 60 each, Refills: 11    Associated Diagnoses: Chronic obstructive pulmonary disease, unspecified COPD type (HCC)      sacubitril-valsartan (ENTRESTO) 24-26 MG per tablet Take 1 tablet by mouth 2 times daily  Qty: 60 tablet, Refills: 11      empagliflozin (JARDIANCE) 10 MG

## 2024-11-25 NOTE — ACP (ADVANCE CARE PLANNING)
Advance Care Planning     General Advance Care Planning (ACP) Conversation    Date of Conversation: 11/25/2024  Conducted with: Patient with Decision Making Capacity  Other persons present: Spouse rachel    Healthcare Decision Maker:   Primary Decision Maker: Rachel Whitman - Spouse - [phone]       Content/Action Overview:  DECLINED ACP Conversation - will revisit periodically  Reviewed DNR/DNI and patient elects Full Code (Attempt Resuscitation)        Length of Voluntary ACP Conversation in minutes:  <16 minutes (Non-Billable)    Suri Cole RN

## 2024-11-25 NOTE — CARE COORDINATION
Case Management Assessment  Initial Evaluation    Date/Time of Evaluation: 11/25/2024 8:52 AM  Assessment Completed by: Suri Cole RN    If patient is discharged prior to next notation, then this note serves as note for discharge by case management.    Patient Name: Ulises Whitman                   YOB: 1945  Diagnosis: Pneumonia due to infectious organism, unspecified laterality, unspecified part of lung [J18.9]                   Date / Time: 11/24/2024  1:27 AM    Patient Admission Status: Inpatient   Readmission Risk (Low < 19, Mod (19-27), High > 27): Readmission Risk Score: 17.3    Current PCP: Pasquale Powers MD  PCP verified by CM? Yes    Chart Reviewed: Yes      History Provided by: Patient  Patient Orientation: Alert and Oriented    Patient Cognition: Alert    Hospitalization in the last 30 days (Readmission):  No    If yes, Readmission Assessment in  Navigator will be completed.    Advance Directives:      Code Status: Full Code   Patient's Primary Decision Maker is: Legal Next of Kin    Primary Decision Maker: Rachel Whitman - Spouse - [phone]    Discharge Planning:    Patient lives with: Spouse/Significant Other Type of Home: Apartment  Primary Care Giver: Self  Patient Support Systems include: Family Members, Children   Current Financial resources: Medicare  Current community resources: None  Current services prior to admission: Durable Medical Equipment, Oxygen Therapy (3 liters @[facility])            Current DME: Cane, Walker, Shower Chair            Type of Home Care services:  None    ADLS  Prior functional level: Independent in ADLs/IADLs  Current functional level: Independent in ADLs/IADLs    PT AM-PAC:   /24  OT AM-PAC:   /24    Family can provide assistance at DC: Yes  Would you like Case Management to discuss the discharge plan with any other family members/significant others, and if so, who? No  Plans to Return to Present Housing: Yes  Other Identified

## 2024-11-25 NOTE — PROGRESS NOTES
Pulmonary Progress Note  CC: pneumonia    Subjective:  feeling better      EXAM: /74   Pulse 72   Temp 97.5 °F (36.4 °C) (Oral)   Resp 20   Ht 1.803 m (5' 11\")   Wt 110.8 kg (244 lb 3.2 oz)   SpO2 92%   BMI 34.06 kg/m²  on 3L  Constitutional:  No acute distress.   Eyes: PERRL. Conjunctivae anicteric.   ENT: Normal nose. Normal tongue.    Neck:  Trachea is midline.   Respiratory: No accessory muscle usage.  decreased breath sounds. No wheezes. No rales. No Rhonchi.  Cardiovascular: Normal S1S2. No digit clubbing. No digit cyanosis. No LE edema.   Psychiatric: No anxiety or Agitation. Alert and Oriented to person, place and time.    Scheduled Meds:   furosemide  40 mg Oral Daily    sodium chloride flush  5-40 mL IntraVENous 2 times per day    tamsulosin  0.4 mg Oral Daily    rivaroxaban  20 mg Oral Daily    pantoprazole  40 mg Oral QAM AC    atorvastatin  40 mg Oral Nightly    metoprolol succinate  50 mg Oral Daily    sacubitril-valsartan  1 tablet Oral BID    doxycycline hyclate  100 mg Oral 2 times per day    guaiFENesin  600 mg Oral BID    methylPREDNISolone  40 mg IntraVENous Q12H    cefTRIAXone (ROCEPHIN) IV  2,000 mg IntraVENous Q24H    ipratropium 0.5 mg-albuterol 2.5 mg  1 Dose Inhalation 4x Daily RT     Continuous Infusions:   sodium chloride       PRN Meds:  sodium chloride flush, sodium chloride, ondansetron **OR** ondansetron, polyethylene glycol, acetaminophen **OR** acetaminophen, guaiFENesin-dextromethorphan    Labs:  CBC:   Recent Labs     11/24/24  0140 11/25/24  0433   WBC 15.5* 11.9*   HGB 14.9 13.5   HCT 44.8 42.3   MCV 92.3 95.2    213     BMP:   Recent Labs     11/24/24  0140 11/25/24  0433    136   K 4.2 5.1    108   CO2 25 17*   BUN 25* 25*   CREATININE 1.4* 1.0     Microbiology:  11/24 COVID-19 and influenza not detected  11/24 BC     Imaging:  Chest x-ray 11/24 images independently reviewed by me and showed:  Right perihilar airspace disease

## 2024-11-25 NOTE — PROGRESS NOTES
Patient provided a COPD Educational Folder that includes the following materials:     [x]  Unmetric Booklet: Managing your COPD  [x]  ALA: Getting the Most Out of Medication Delivery Devices  [x]  ALA: My COPD Action Plan  [x]  Better Breathers Club: [facility]   [x]  Smoking Cessation Classes  [x]  Outpatient Spiritual Care Services  [x]  Magnet: Signs of COPD    PATIENT/CAREGIVER TEACHING:   Level of patient/caregiver understanding able to:   [x] Verbalize understanding   [] Demonstrate understanding       [] Teach back        [] Needs reinforcement     []  Other:     Electronically signed by Patel Toussaint RN on 11/25/2024 at 8:54 AM

## 2024-11-25 NOTE — PROGRESS NOTES
PM assessment completed. Scheduled medications given per MAR. VSS 3 liter NC (baseline 3 liter NC), A/O x4. SOB with exertion although patient stating he is feeling better. Patient does not appear in distress and denies any needs at this time. Call light in reach, will monitor, bed alarm on, wife at bedside.

## 2024-11-25 NOTE — PROGRESS NOTES
Bedside report and transfer of care given to EZEKIEL Walsh. Pt currently resting in bed with the call light within reach. Pt denies any other care needs at this time. Pt stable at this time.

## 2024-11-26 ENCOUNTER — TELEPHONE (OUTPATIENT)
Dept: INTERNAL MEDICINE CLINIC | Age: 79
End: 2024-11-26

## 2024-11-26 ENCOUNTER — CARE COORDINATION (OUTPATIENT)
Dept: CASE MANAGEMENT | Age: 79
End: 2024-11-26

## 2024-11-26 DIAGNOSIS — I10 ESSENTIAL HYPERTENSION: Primary | ICD-10-CM

## 2024-11-26 PROCEDURE — 1111F DSCHRG MED/CURRENT MED MERGE: CPT | Performed by: INTERNAL MEDICINE

## 2024-11-26 NOTE — CARE COORDINATION
Care Transitions Note    Initial Call - Call within 2 business days of discharge: Yes    Patient Current Location:  Home: [address] [place] [address]  [address]  [facility] [address]    Care Transition Nurse contacted the patient by telephone to perform post hospital discharge assessment, verified name and  as identifiers. Provided introduction to self, and explanation of the Care Transition Nurse role.     Patient: Ulises Whitman    Patient : 1945   MRN: [id number]    Reason for Admission: PNA - has home O2 and declined HHC  Discharge Date: 24  RURS: Readmission Risk Score: 17.5      Last Discharge Facility       Date Complaint Diagnosis Description Type Department Provider    24 Shortness of Breath Pneumonia due to infectious organism, unspecified laterality, unspecified part of lung ED to Hosp-Admission (Discharged) (ADMITTED) [facility] PCU Yanet Stevens DO; Evon Araiza..            Was this an external facility discharge? No    Additional needs identified to be addressed with provider   High priority: attempted to schedule HFU with PCP and Nini Carrasco NP and no time slots available. [place] [facility] [place]         Method of communication with provider: chart routing.    Patients top risk factors for readmission: functional physical ability    Interventions to address risk factors:   Education: respiratory status    Care Summary Note: Patient answered call and verified . Patient pleasant and agreeable to transition call. Patient is happy to be back home. Breathing is easy and wearing oxygen as instructed. Patient is wearing at night and during the day if needed. Patient stated O2 is set at 3l/nc and last O2 sat was 96%. Patient not new and aware of oxygen safety.  Patient to  new prescriptions this morning. Stated he had just woken up but going to pharmacy today. Reviewed purpose and importance of taking all antibiotic until gone and stated understanding. Medications

## 2024-11-26 NOTE — TELEPHONE ENCOUNTER
----- Message from Dr. Pasquale Powers MD sent at 11/26/2024  1:59 PM EST -----  Check to make sure he is doing ok.  ----- Message -----  From: Laura Villegas  Sent: 11/26/2024  10:55 AM EST  To: Pasquale Powers MD    High priority: attempted to schedule HFU with PCP and Nini Carrasco NP and no time slots available. Patient was d/c from [facility] 11/25/24 dx PNA. Please call patient directly to [phone] to assist with scheduling within the 7 day timeframe. Please advise

## 2024-11-26 NOTE — TELEPHONE ENCOUNTER
Patient states he is doing much better since being in the hospital and does not feel that he needs to see Dr Powers.  Dr Powers is aware and ok with this.  Patient will call us if he needs anything.

## 2024-11-28 LAB
BACTERIA BLD CULT ORG #2: NORMAL
BACTERIA BLD CULT: NORMAL
BACTERIA BLD CULT: NORMAL

## 2024-12-03 ENCOUNTER — CARE COORDINATION (OUTPATIENT)
Dept: CASE MANAGEMENT | Age: 79
End: 2024-12-03

## 2024-12-03 NOTE — CARE COORDINATION
Care Transitions Note    Follow Up Call     Attempted to reach patient for transitions of care follow up.  Unable to reach patient.      Outreach Attempts:   Unable to leave message.     Care Summary Note: patient noted that he was going to be on vacation this week with family  Message from PCP office noted and patient did not feel that he needed to see PCP. Dr Powers is aware    Follow Up Appointment:   Future Appointments         Provider Specialty Dept Phone    4/1/2025 1:50 PM Pasquale Powers MD Internal Medicine [phone]    4/7/2025 10:00 AM Sukh Hernandez MD Cardiology [phone]    4/16/2025 2:00 PM (Arrive by 1:30 PM) [facility] RM 1 Radiology [phone]    4/29/2025 2:45 PM Roque Hein MD Pulmonology [phone]            Kelli Flores RN

## 2024-12-09 ENCOUNTER — CARE COORDINATION (OUTPATIENT)
Dept: CASE MANAGEMENT | Age: 79
End: 2024-12-09

## 2024-12-09 NOTE — CARE COORDINATION
Care Transitions Note    Follow Up Call     Attempted to reach patient for transitions of care follow up.  Unable to reach patient.      Outreach Attempts:   Patient stated he was driving and unable to take transition call. Request a call back tomorrow. CTN rescheduled call    Care Summary Note:     Follow Up Appointment:   Future Appointments         Provider Specialty Dept Phone    4/1/2025 1:50 PM Pasquale Powers MD Internal Medicine [phone]    4/7/2025 10:00 AM Sukh Hernandez MD Cardiology [phone]    4/16/2025 2:00 PM (Arrive by 1:30 PM) [facility] RM 1 Radiology [phone]    4/29/2025 2:45 PM Roque Hein MD Pulmonology [phone]            Kelli Flores RN

## 2024-12-10 ENCOUNTER — CARE COORDINATION (OUTPATIENT)
Dept: CASE MANAGEMENT | Age: 79
End: 2024-12-10

## 2024-12-10 NOTE — CARE COORDINATION
Care Transitions Note    Follow Up Call     Attempted to reach patient for transitions of care follow up.  Unable to reach patient.  Transition episode ended d/t unsuccessful contact    Outreach Attempts:   HIPAA compliant voicemail left for patient.         Follow Up Appointment:   Future Appointments         Provider Specialty Dept Phone    4/1/2025 1:50 PM Pasquale Powers MD Internal Medicine [phone]    4/7/2025 10:00 AM Sukh Hernandez MD Cardiology [phone]    4/16/2025 2:00 PM (Arrive by 1:30 PM) [facility]  1 Radiology [phone]    4/29/2025 2:45 PM Roque Hein MD Pulmonology [phone]          Kelli Flores RN

## 2025-02-17 DIAGNOSIS — J44.1 CHRONIC OBSTRUCTIVE PULMONARY DISEASE WITH ACUTE EXACERBATION (HCC): ICD-10-CM

## 2025-02-17 RX ORDER — IPRATROPIUM BROMIDE AND ALBUTEROL SULFATE 2.5; .5 MG/3ML; MG/3ML
SOLUTION RESPIRATORY (INHALATION)
Qty: 360 ML | Refills: 0 | Status: SHIPPED | OUTPATIENT
Start: 2025-02-17

## 2025-02-27 RX ORDER — PREDNISONE 10 MG/1
10 TABLET ORAL DAILY
Qty: 30 TABLET | Refills: 0 | Status: SHIPPED | OUTPATIENT
Start: 2025-02-27

## 2025-02-27 RX ORDER — PREDNISONE 10 MG/1
10 TABLET ORAL DAILY
Qty: 30 TABLET | Refills: 5 | OUTPATIENT
Start: 2025-02-27

## 2025-03-10 ENCOUNTER — OFFICE VISIT (OUTPATIENT)
Dept: INTERNAL MEDICINE CLINIC | Age: 80
End: 2025-03-10

## 2025-03-10 VITALS
BODY MASS INDEX: 35.84 KG/M2 | WEIGHT: 256 LBS | RESPIRATION RATE: 12 BRPM | HEIGHT: 71 IN | DIASTOLIC BLOOD PRESSURE: 80 MMHG | SYSTOLIC BLOOD PRESSURE: 120 MMHG | HEART RATE: 70 BPM

## 2025-03-10 DIAGNOSIS — J96.11 CHRONIC HYPOXEMIC RESPIRATORY FAILURE (HCC): ICD-10-CM

## 2025-03-10 DIAGNOSIS — Z87.891 PERSONAL HISTORY OF TOBACCO USE: ICD-10-CM

## 2025-03-10 DIAGNOSIS — Z79.01 CHRONIC ANTICOAGULATION: ICD-10-CM

## 2025-03-10 DIAGNOSIS — N13.8 ENLARGED PROSTATE WITH URINARY OBSTRUCTION: ICD-10-CM

## 2025-03-10 DIAGNOSIS — I50.22 CHRONIC SYSTOLIC CHF (CONGESTIVE HEART FAILURE) (HCC): ICD-10-CM

## 2025-03-10 DIAGNOSIS — N40.1 ENLARGED PROSTATE WITH URINARY OBSTRUCTION: ICD-10-CM

## 2025-03-10 DIAGNOSIS — J44.9 CHRONIC OBSTRUCTIVE PULMONARY DISEASE, UNSPECIFIED COPD TYPE (HCC): ICD-10-CM

## 2025-03-10 DIAGNOSIS — Z00.00 MEDICARE ANNUAL WELLNESS VISIT, SUBSEQUENT: Primary | ICD-10-CM

## 2025-03-10 DIAGNOSIS — N18.32 CHRONIC KIDNEY DISEASE, STAGE 3B (HCC): ICD-10-CM

## 2025-03-10 DIAGNOSIS — K21.9 GASTROESOPHAGEAL REFLUX DISEASE, UNSPECIFIED WHETHER ESOPHAGITIS PRESENT: ICD-10-CM

## 2025-03-10 DIAGNOSIS — I50.32 CHRONIC DIASTOLIC CHF (CONGESTIVE HEART FAILURE) (HCC): ICD-10-CM

## 2025-03-10 DIAGNOSIS — D68.69 SECONDARY HYPERCOAGULABLE STATE: ICD-10-CM

## 2025-03-10 DIAGNOSIS — I48.91 ATRIAL FIBRILLATION, CONTROLLED (HCC): ICD-10-CM

## 2025-03-10 DIAGNOSIS — I10 ESSENTIAL HYPERTENSION: ICD-10-CM

## 2025-03-10 DIAGNOSIS — E78.2 MIXED HYPERLIPIDEMIA: ICD-10-CM

## 2025-03-10 DIAGNOSIS — E66.01 SEVERE OBESITY (BMI 35.0-39.9) WITH COMORBIDITY: ICD-10-CM

## 2025-03-10 PROCEDURE — G0296 VISIT TO DETERM LDCT ELIG: HCPCS | Performed by: INTERNAL MEDICINE

## 2025-03-10 PROCEDURE — 3074F SYST BP LT 130 MM HG: CPT | Performed by: INTERNAL MEDICINE

## 2025-03-10 PROCEDURE — 1123F ACP DISCUSS/DSCN MKR DOCD: CPT | Performed by: INTERNAL MEDICINE

## 2025-03-10 PROCEDURE — 1159F MED LIST DOCD IN RCRD: CPT | Performed by: INTERNAL MEDICINE

## 2025-03-10 PROCEDURE — 1160F RVW MEDS BY RX/DR IN RCRD: CPT | Performed by: INTERNAL MEDICINE

## 2025-03-10 PROCEDURE — 3079F DIAST BP 80-89 MM HG: CPT | Performed by: INTERNAL MEDICINE

## 2025-03-10 PROCEDURE — G0439 PPPS, SUBSEQ VISIT: HCPCS | Performed by: INTERNAL MEDICINE

## 2025-03-10 RX ORDER — SACUBITRIL AND VALSARTAN 24; 26 MG/1; MG/1
1 TABLET, FILM COATED ORAL 2 TIMES DAILY
Qty: 56 TABLET | Refills: 0 | Status: SHIPPED | COMMUNITY
Start: 2025-03-10

## 2025-03-10 RX ORDER — PANTOPRAZOLE SODIUM 40 MG/1
40 TABLET, DELAYED RELEASE ORAL DAILY
Qty: 90 TABLET | Refills: 1 | Status: SHIPPED | OUTPATIENT
Start: 2025-03-10

## 2025-03-10 RX ORDER — TAMSULOSIN HYDROCHLORIDE 0.4 MG/1
0.4 CAPSULE ORAL DAILY
Qty: 90 CAPSULE | Refills: 1 | Status: SHIPPED | OUTPATIENT
Start: 2025-03-10

## 2025-03-10 RX ORDER — PREDNISONE 10 MG/1
10 TABLET ORAL DAILY
Qty: 28 TABLET | Refills: 0 | OUTPATIENT
Start: 2025-03-10

## 2025-03-10 RX ORDER — POTASSIUM CITRATE 15 MEQ/1
15 TABLET, EXTENDED RELEASE ORAL DAILY
Qty: 90 TABLET | Refills: 1 | Status: SHIPPED | OUTPATIENT
Start: 2025-03-10

## 2025-03-10 RX ORDER — FUROSEMIDE 40 MG/1
40 TABLET ORAL DAILY
Qty: 90 TABLET | Refills: 1 | Status: SHIPPED | OUTPATIENT
Start: 2025-03-10

## 2025-03-10 RX ORDER — ATORVASTATIN CALCIUM 20 MG/1
20 TABLET, FILM COATED ORAL DAILY
Qty: 90 TABLET | Refills: 1 | Status: SHIPPED | OUTPATIENT
Start: 2025-03-10

## 2025-03-10 RX ORDER — SACUBITRIL AND VALSARTAN 24; 26 MG/1; MG/1
1 TABLET, FILM COATED ORAL 2 TIMES DAILY
Qty: 60 TABLET | Refills: 11 | Status: SHIPPED | OUTPATIENT
Start: 2025-03-10 | End: 2025-03-10 | Stop reason: SDUPTHER

## 2025-03-10 RX ORDER — METOPROLOL SUCCINATE 50 MG/1
50 TABLET, EXTENDED RELEASE ORAL DAILY
Qty: 90 TABLET | Refills: 1 | Status: SHIPPED | OUTPATIENT
Start: 2025-03-10

## 2025-03-10 ASSESSMENT — PATIENT HEALTH QUESTIONNAIRE - PHQ9
SUM OF ALL RESPONSES TO PHQ QUESTIONS 1-9: 0
1. LITTLE INTEREST OR PLEASURE IN DOING THINGS: NOT AT ALL
2. FEELING DOWN, DEPRESSED OR HOPELESS: NOT AT ALL

## 2025-03-10 ASSESSMENT — LIFESTYLE VARIABLES
HOW OFTEN DO YOU HAVE A DRINK CONTAINING ALCOHOL: MONTHLY OR LESS
HOW MANY STANDARD DRINKS CONTAINING ALCOHOL DO YOU HAVE ON A TYPICAL DAY: 1 OR 2

## 2025-03-10 NOTE — PROGRESS NOTES
deformity, nasal mucosa and turbinates normal without polyps  Neck: supple and non-tender without mass, no thyromegaly or thyroid nodules, no cervical lymphadenopathy  Pulmonary/Chest: clear to auscultation bilaterally- no wheezes, rales or rhonchi, normal air movement, no respiratory distress  Cardiovascular: normal rate, regular rhythm, normal S1 and S2, no murmurs, rubs, clicks, or gallops, distal pulses intact, no carotid bruits  Abdomen: soft, non-tender, non-distended, normal bowel sounds, no masses or organomegaly  Extremities: no cyanosis, clubbing or edema  Musculoskeletal: normal range of motion, no joint swelling, deformity or tenderness  Neurologic: reflexes normal and symmetric, no cranial nerve deficit, gait, coordination and speech normal            Allergies   Allergen Reactions    Amiodarone Anaphylaxis     Thyriod toxic     Prior to Visit Medications    Medication Sig Taking? Authorizing Provider   atorvastatin (LIPITOR) 20 MG tablet Take 1 tablet by mouth daily Yes Pasquale Powers MD   furosemide (LASIX) 40 MG tablet Take 1 tablet by mouth daily Yes Pasquale Powers MD   metoprolol succinate (TOPROL XL) 50 MG extended release tablet Take 1 tablet by mouth daily Yes Pasquale Powers MD   pantoprazole (PROTONIX) 40 MG tablet Take 1 tablet by mouth daily Yes Pasquale Powers MD   Potassium Citrate ER (UROCIT-K) 15 MEQ (1620 MG) TBCR extended release tablet Take 1 tablet by mouth daily Yes Pasquale Powers MD   rivaroxaban (XARELTO) 20 MG TABS tablet TAKE ONE TABLET BY MOUTH DAILY WITH BREAKFAST Yes Pasquale Powers MD   tamsulosin (FLOMAX) 0.4 MG capsule Take 1 capsule by mouth daily Yes Pasquale Powers MD   sacubitril-valsartan (ENTRESTO) 24-26 MG per tablet Take 1 tablet by mouth 2 times daily Yes Pasquale Powers MD   predniSONE (DELTASONE) 10 MG tablet TAKE ONE (1) TABLET BY MOUTH DAILY  Pasquale Powers MD   ipratropium

## 2025-03-10 NOTE — PATIENT INSTRUCTIONS
yourself. This person is called a health care agent (health care proxy, health care surrogate). The form is also called a durable power of  for health care.  If you do not have an advance directive, decisions about your medical care may be made by a family member, or by a doctor or a  who doesn't know you.  It may help to think of an advance directive as a gift to the people who care for you. If you have one, they won't have to make tough decisions by themselves.  For more information, including forms for your state, see the [employer] website (www.caringinfo.org/planning/advance-directives/).  Follow-up care is a key part of your treatment and safety. Be sure to make and go to all appointments, and call your doctor if you are having problems. It's also a good idea to know your test results and keep a list of the medicines you take.  What should you include in an advance directive?  Many states have a unique advance directive form. (It may ask you to address specific issues.) Or you might use a universal form that's approved by many states.  If your form doesn't tell you what to address, it may be hard to know what to include in your advance directive. Use the questions below to help you get started.  Who do you want to make decisions about your medical care if you are not able to?  What life-support measures do you want if you have a serious illness that gets worse over time or can't be cured?  What are you most afraid of that might happen? (Maybe you're afraid of having pain, losing your independence, or being kept alive by machines.)  Where would you prefer to die? (Your home? A hospital? A nursing home?)  Do you want to donate your organs when you die?  Do you want certain Rastafari practices performed before you die?  When should you call for help?  Be sure to contact your doctor if you have any questions.  Where can you learn more?  Go to https://www.healthwise.net/patientEd and enter R264 to

## 2025-03-11 ENCOUNTER — RESULTS FOLLOW-UP (OUTPATIENT)
Dept: INTERNAL MEDICINE CLINIC | Age: 80
End: 2025-03-11

## 2025-03-11 DIAGNOSIS — J44.1 CHRONIC OBSTRUCTIVE PULMONARY DISEASE WITH ACUTE EXACERBATION (HCC): ICD-10-CM

## 2025-03-11 RX ORDER — IPRATROPIUM BROMIDE AND ALBUTEROL SULFATE 2.5; .5 MG/3ML; MG/3ML
SOLUTION RESPIRATORY (INHALATION)
Qty: 360 ML | Refills: 0 | Status: SHIPPED | OUTPATIENT
Start: 2025-03-11

## 2025-03-17 ENCOUNTER — TELEPHONE (OUTPATIENT)
Dept: CASE MANAGEMENT | Age: 80
End: 2025-03-17

## 2025-03-17 NOTE — TELEPHONE ENCOUNTER
Pt is scheduled for a CT Chest Low Dose 4/16/2025 which will have cost sharing for the patient. Patient meets criteria for CT Lung Screening with no cost sharing to patient. If you should choose to change the order. You will have to also change it on the schedule.    Thank you,  Edilma Jackson RN  [facility] Lung Navigator  [phone]

## 2025-03-26 RX ORDER — PREDNISONE 10 MG/1
10 TABLET ORAL DAILY
Qty: 28 TABLET | Refills: 2 | Status: SHIPPED | OUTPATIENT
Start: 2025-03-26

## 2025-04-07 ENCOUNTER — OFFICE VISIT (OUTPATIENT)
Dept: CARDIOLOGY CLINIC | Age: 80
End: 2025-04-07
Payer: MEDICARE

## 2025-04-07 VITALS
BODY MASS INDEX: 36.03 KG/M2 | OXYGEN SATURATION: 94 % | SYSTOLIC BLOOD PRESSURE: 116 MMHG | WEIGHT: 257.4 LBS | DIASTOLIC BLOOD PRESSURE: 78 MMHG | HEIGHT: 71 IN | HEART RATE: 83 BPM

## 2025-04-07 DIAGNOSIS — I50.22 CHRONIC SYSTOLIC CONGESTIVE HEART FAILURE (HCC): Primary | ICD-10-CM

## 2025-04-07 DIAGNOSIS — E78.2 MIXED HYPERLIPIDEMIA: ICD-10-CM

## 2025-04-07 DIAGNOSIS — J44.1 CHRONIC OBSTRUCTIVE PULMONARY DISEASE WITH ACUTE EXACERBATION (HCC): ICD-10-CM

## 2025-04-07 DIAGNOSIS — R60.0 LOCALIZED EDEMA: ICD-10-CM

## 2025-04-07 DIAGNOSIS — I10 ESSENTIAL HYPERTENSION: ICD-10-CM

## 2025-04-07 DIAGNOSIS — I73.9 PAD (PERIPHERAL ARTERY DISEASE): ICD-10-CM

## 2025-04-07 DIAGNOSIS — I48.91 ATRIAL FIBRILLATION, CONTROLLED (HCC): ICD-10-CM

## 2025-04-07 DIAGNOSIS — I42.8 NICM (NONISCHEMIC CARDIOMYOPATHY) (HCC): ICD-10-CM

## 2025-04-07 PROCEDURE — 1159F MED LIST DOCD IN RCRD: CPT | Performed by: INTERNAL MEDICINE

## 2025-04-07 PROCEDURE — 3078F DIAST BP <80 MM HG: CPT | Performed by: INTERNAL MEDICINE

## 2025-04-07 PROCEDURE — 1160F RVW MEDS BY RX/DR IN RCRD: CPT | Performed by: INTERNAL MEDICINE

## 2025-04-07 PROCEDURE — 99214 OFFICE O/P EST MOD 30 MIN: CPT | Performed by: INTERNAL MEDICINE

## 2025-04-07 PROCEDURE — 1123F ACP DISCUSS/DSCN MKR DOCD: CPT | Performed by: INTERNAL MEDICINE

## 2025-04-07 PROCEDURE — 3074F SYST BP LT 130 MM HG: CPT | Performed by: INTERNAL MEDICINE

## 2025-04-07 NOTE — PROGRESS NOTES
W/O ECP Left 10/18/2018    PHACO EMULSIFICATION OF CATARACT WITH  INTRAOCULAR LENS IMPLANT LEFT EYE performed by Mike Alvarez MD at [facility] OR    UPPER GASTROINTESTINAL ENDOSCOPY N/A 11/13/2019    EGD BIOPSY performed by Colton Silva MD at [facility] ENDOSCOPY       Objective:   /78   Pulse 83   Ht 1.803 m (5' 10.98\")   Wt 116.8 kg (257 lb 6.4 oz)   SpO2 94%   BMI 35.92 kg/m²     Wt Readings from Last 3 Encounters:   04/07/25 116.8 kg (257 lb 6.4 oz)   03/10/25 116.1 kg (256 lb)   11/24/24 110.8 kg (244 lb 3.2 oz)       Physical Exam:  General: No Respiratory distress, appears well developed and well nourished.   Eyes:  Sclera nonicteric  Nose/Sinuses:  negative findings: nose shows no deformity, asymmetry, or inflammation, nasal mucosa normal, septum midline with no perforation or bleeding  Back:  no pain to palpation  Joint:  no active joint inflammation  Musculoskeletal:  negative  Skin:  Warm and dry  Neck:  Negative for JVD and Carotid Bruits.   Chest: crackles at left base , respiration easy  Cardiovascular:  irregular 96 bpm,  diminished heart sounds, no murmur, no rub or thrill.  Abdomen:  Soft normal liver and spleen  Extremities:   trace edema, clubbing, cyanosis,  Absent pedal pulses 2+ left femoral,   absent right femoral  Neuro: intact    Medications:   Outpatient Encounter Medications as of 4/7/2025   Medication Sig Dispense Refill    empagliflozin (JARDIANCE) 10 MG tablet Take 1 tablet by mouth daily 90 tablet 3    sacubitril-valsartan (ENTRESTO) 24-26 MG per tablet Take 1 tablet by mouth 2 times daily 180 tablet 3    predniSONE (DELTASONE) 10 MG tablet TAKE ONE (1) TABLET BY MOUTH DAILY 28 tablet 2    ipratropium 0.5 mg-albuterol 2.5 mg (DUONEB) 0.5-2.5 (3) MG/3ML SOLN nebulizer solution TAKE (3) MLS BY NEBULIZATION EVERY (4) HOURS AS NEEDED FOR SHORTNESS OF BREATH AND WHEEZING 360 mL 0    atorvastatin (LIPITOR) 20 MG tablet Take 1 tablet by mouth daily 90 tablet 1    furosemide

## 2025-04-07 NOTE — PATIENT INSTRUCTIONS
Plan:  Labs reviewed in epic and discussed with patient.  Current medications reviewed.  Refills given as warranted.  No cardiac testing at this time.  Please ask Dr. Hein to send a copy of your CT scan to me also.    Follow up with me in 6 months.

## 2025-04-08 ENCOUNTER — TELEPHONE (OUTPATIENT)
Dept: CARDIOLOGY CLINIC | Age: 80
End: 2025-04-08

## 2025-04-08 DIAGNOSIS — I50.9 CONGESTIVE HEART FAILURE, UNSPECIFIED HF CHRONICITY, UNSPECIFIED HEART FAILURE TYPE (HCC): ICD-10-CM

## 2025-04-08 DIAGNOSIS — R06.02 SHORTNESS OF BREATH: ICD-10-CM

## 2025-04-08 DIAGNOSIS — I48.91 ATRIAL FIBRILLATION, UNSPECIFIED TYPE (HCC): Primary | ICD-10-CM

## 2025-04-08 NOTE — TELEPHONE ENCOUNTER
Attempted to reach pt but unable to leave a VM.      Please let pt know that RKG wants him to get a limited echo to look at his heart strength.  Please give central scheduling number to pt.   Tavo Luis(Attending)

## 2025-04-10 ENCOUNTER — APPOINTMENT (OUTPATIENT)
Dept: GENERAL RADIOLOGY | Age: 80
End: 2025-04-10
Payer: MEDICARE

## 2025-04-10 ENCOUNTER — HOSPITAL ENCOUNTER (EMERGENCY)
Age: 80
Discharge: HOME | End: 2025-04-10
Attending: EMERGENCY MEDICINE
Payer: MEDICARE

## 2025-04-10 VITALS
OXYGEN SATURATION: 95 % | HEART RATE: 88 BPM | RESPIRATION RATE: 18 BRPM | BODY MASS INDEX: 35.7 KG/M2 | WEIGHT: 255 LBS | TEMPERATURE: 98.4 F | HEIGHT: 71 IN | DIASTOLIC BLOOD PRESSURE: 74 MMHG | SYSTOLIC BLOOD PRESSURE: 133 MMHG

## 2025-04-10 DIAGNOSIS — I48.20 CHRONIC ATRIAL FIBRILLATION (HCC): ICD-10-CM

## 2025-04-10 DIAGNOSIS — Z79.01: Primary | ICD-10-CM

## 2025-04-10 DIAGNOSIS — J44.1 CHRONIC OBSTRUCTIVE PULMONARY DISEASE WITH ACUTE EXACERBATION (HCC): ICD-10-CM

## 2025-04-10 LAB
ALBUMIN SERPL-MCNC: 3.8 G/DL (ref 3.4–5)
ALBUMIN/GLOB SERPL: 1 {RATIO} (ref 1.1–2.2)
ALP SERPL-CCNC: 80 U/L (ref 40–129)
ALT SERPL-CCNC: 6 U/L (ref 10–40)
ANION GAP SERPL CALCULATED.3IONS-SCNC: 11 MMOL/L (ref 3–16)
APTT BLD: 38.7 SEC (ref 22.1–36.4)
AST SERPL-CCNC: 9 U/L (ref 15–37)
BASOPHILS # BLD: 0.1 K/UL (ref 0–0.2)
BASOPHILS NFR BLD: 1.2 %
BILIRUB SERPL-MCNC: 0.4 MG/DL (ref 0–1)
BUN SERPL-MCNC: 22 MG/DL (ref 7–20)
CALCIUM SERPL-MCNC: 8.9 MG/DL (ref 8.3–10.6)
CHLORIDE SERPL-SCNC: 105 MMOL/L (ref 99–110)
CO2 SERPL-SCNC: 27 MMOL/L (ref 21–32)
CREAT SERPL-MCNC: 1.4 MG/DL (ref 0.8–1.3)
DEPRECATED RDW RBC AUTO: 16.7 % (ref 12.4–15.4)
EKG DIAGNOSIS: NORMAL
EKG Q-T INTERVAL: 300 MS
EKG QRS DURATION: 78 MS
EKG QTC CALCULATION (BAZETT): 385 MS
EKG R AXIS: 59 DEGREES
EKG T AXIS: 61 DEGREES
EKG VENTRICULAR RATE: 99 BPM
EOSINOPHIL # BLD: 0.2 K/UL (ref 0–0.6)
EOSINOPHIL NFR BLD: 2.5 %
GFR SERPLBLD CREATININE-BSD FMLA CKD-EPI: 51 ML/MIN/{1.73_M2}
GLUCOSE SERPL-MCNC: 102 MG/DL (ref 70–99)
HCT VFR BLD AUTO: 45.7 % (ref 40.5–52.5)
HGB BLD-MCNC: 14.7 G/DL (ref 13.5–17.5)
INR PPP: 2.22 (ref 0.85–1.15)
LYMPHOCYTES # BLD: 1.2 K/UL (ref 1–5.1)
LYMPHOCYTES NFR BLD: 15.7 %
MCH RBC QN AUTO: 29.7 PG (ref 26–34)
MCHC RBC AUTO-ENTMCNC: 32.1 G/DL (ref 31–36)
MCV RBC AUTO: 92.8 FL (ref 80–100)
MONOCYTES # BLD: 0.7 K/UL (ref 0–1.3)
MONOCYTES NFR BLD: 9 %
NEUTROPHILS # BLD: 5.3 K/UL (ref 1.7–7.7)
NEUTROPHILS NFR BLD: 71.6 %
PLATELET # BLD AUTO: 212 K/UL (ref 135–450)
PMV BLD AUTO: 8 FL (ref 5–10.5)
POTASSIUM SERPL-SCNC: 3.8 MMOL/L (ref 3.5–5.1)
PROT SERPL-MCNC: 7.5 G/DL (ref 6.4–8.2)
PROTHROMBIN TIME: 24.6 SEC (ref 11.9–14.9)
RBC # BLD AUTO: 4.93 M/UL (ref 4.2–5.9)
SODIUM SERPL-SCNC: 143 MMOL/L (ref 136–145)
TROPONIN, HIGH SENSITIVITY: 20 NG/L (ref 0–22)
WBC # BLD AUTO: 7.4 K/UL (ref 4–11)

## 2025-04-10 PROCEDURE — 93010 ELECTROCARDIOGRAM REPORT: CPT | Performed by: INTERNAL MEDICINE

## 2025-04-10 PROCEDURE — 2500000003 HC RX 250 WO HCPCS: Performed by: EMERGENCY MEDICINE

## 2025-04-10 PROCEDURE — 85730 THROMBOPLASTIN TIME PARTIAL: CPT

## 2025-04-10 PROCEDURE — 85025 COMPLETE CBC W/AUTO DIFF WBC: CPT

## 2025-04-10 PROCEDURE — 80053 COMPREHEN METABOLIC PANEL: CPT

## 2025-04-10 PROCEDURE — 71045 X-RAY EXAM CHEST 1 VIEW: CPT

## 2025-04-10 PROCEDURE — 93005 ELECTROCARDIOGRAM TRACING: CPT | Performed by: EMERGENCY MEDICINE

## 2025-04-10 PROCEDURE — 96374 THER/PROPH/DIAG INJ IV PUSH: CPT

## 2025-04-10 PROCEDURE — 6370000000 HC RX 637 (ALT 250 FOR IP): Performed by: EMERGENCY MEDICINE

## 2025-04-10 PROCEDURE — 84484 ASSAY OF TROPONIN QUANT: CPT

## 2025-04-10 PROCEDURE — 85610 PROTHROMBIN TIME: CPT

## 2025-04-10 PROCEDURE — 99285 EMERGENCY DEPT VISIT HI MDM: CPT

## 2025-04-10 PROCEDURE — 6360000002 HC RX W HCPCS: Performed by: EMERGENCY MEDICINE

## 2025-04-10 PROCEDURE — 36415 COLL VENOUS BLD VENIPUNCTURE: CPT

## 2025-04-10 RX ORDER — PREDNISONE 10 MG/1
TABLET ORAL
Qty: 30 TABLET | Refills: 0 | Status: SHIPPED | OUTPATIENT
Start: 2025-04-10 | End: 2025-04-22

## 2025-04-10 RX ORDER — DOXYCYCLINE 100 MG/1
100 CAPSULE ORAL 2 TIMES DAILY
Qty: 20 CAPSULE | Refills: 0 | Status: SHIPPED | OUTPATIENT
Start: 2025-04-10 | End: 2025-04-20

## 2025-04-10 RX ORDER — IPRATROPIUM BROMIDE AND ALBUTEROL SULFATE 2.5; .5 MG/3ML; MG/3ML
1 SOLUTION RESPIRATORY (INHALATION) ONCE
Status: COMPLETED | OUTPATIENT
Start: 2025-04-10 | End: 2025-04-10

## 2025-04-10 RX ADMIN — WATER 125 MG: 1 INJECTION INTRAMUSCULAR; INTRAVENOUS; SUBCUTANEOUS at 14:23

## 2025-04-10 RX ADMIN — IPRATROPIUM BROMIDE AND ALBUTEROL SULFATE 1 DOSE: 2.5; .5 SOLUTION RESPIRATORY (INHALATION) at 14:21

## 2025-04-10 ASSESSMENT — PAIN - FUNCTIONAL ASSESSMENT
PAIN_FUNCTIONAL_ASSESSMENT: NONE - DENIES PAIN

## 2025-04-10 ASSESSMENT — ENCOUNTER SYMPTOMS
COUGH: 1
SHORTNESS OF BREATH: 1

## 2025-04-10 NOTE — DISCHARGE INSTRUCTIONS
Continue all your present medications  Add prednisone tapering dose as I have given you  Take doxycycline 100 mg twice a day x 10 full days  Use your nebulizer machine every 3-4 hours  Increase your oxygen if needed by 1 to 2 L

## 2025-04-10 NOTE — ED PROVIDER NOTES
[facility] EMERGENCY DEPARTMENT  EMERGENCY DEPARTMENT ENCOUNTER      Pt Name: Ulises Whitman  MRN: [id number]  Birthdate 1945  Date of evaluation: 4/10/2025  Provider: JOHN RAMIREZ MD    CHIEF COMPLAINT       Chief Complaint   Patient presents with    Shortness of Breath     C/o worsening SOB x approx 2-3 wks         HISTORY OF PRESENT ILLNESS   (Location/Symptom, Timing/Onset, Context/Setting, Quality, Duration, Modifying Factors, Severity)  Note limiting factors.     Ulises Whitman is a 79 y.o. male who presents to the emergency department     Patient has a history of bad COPD quit smoking 13 years ago.  Patient states that he normally wears 3 L of oxygen almost 24/7.  Patient is followed by pulmonologist also has history of chronic atrial fibs and is anticoagulated on Xarelto who presents with increased nasal congestion and pulmonary congestion he presently is on prednisone but does not know the daily dose right now he denies any chest pain just more shortness of breath he does have a nebulizer at home he did take 1 nebulized treatment at home.          Nursing Notes were reviewed.    REVIEW OF SYSTEMS    (2-9 systems for level 4, 10 or more for level 5)     Review of Systems   Constitutional:  Positive for activity change and fatigue.   Respiratory:  Positive for cough and shortness of breath.    All other systems reviewed and are negative.      Except as noted above the remainder of the review of systems was reviewed and negative.       PAST MEDICAL HISTORY     Past Medical History:   Diagnosis Date    A-fib ([facility])     2/14/12    Acute conjunctivitis of both eyes     Acute on chronic respiratory failure with hypoxia 2/13/2012    Acute respiratory failure with hypoxia 2/13/2012    Atrial fibrillation with RVR ([facility])     2/14/12     Avulsion fracture of ankle 9/21/2015    Benign localized hyperplasia of prostate with urinary obstruction and other lower urinary tract symptoms (LUTS)(600.21) 8/17/2016     basal scarring.                 LABS:  Results for orders placed or performed during the hospital encounter of 04/10/25   CBC with Auto Differential   Result Value Ref Range    WBC 7.4 4.0 - 11.0 K/uL    RBC 4.93 4.20 - 5.90 M/uL    Hemoglobin 14.7 13.5 - 17.5 g/dL    Hematocrit 45.7 40.5 - 52.5 %    MCV 92.8 80.0 - 100.0 fL    MCH 29.7 26.0 - 34.0 pg    MCHC 32.1 31.0 - 36.0 g/dL    RDW 16.7 (H) 12.4 - 15.4 %    Platelets 212 135 - 450 K/uL    MPV 8.0 5.0 - 10.5 fL    Neutrophils % 71.6 %    Lymphocytes % 15.7 %    Monocytes % 9.0 %    Eosinophils % 2.5 %    Basophils % 1.2 %    Neutrophils Absolute 5.3 1.7 - 7.7 K/uL    Lymphocytes Absolute 1.2 1.0 - 5.1 K/uL    Monocytes Absolute 0.7 0.0 - 1.3 K/uL    Eosinophils Absolute 0.2 0.0 - 0.6 K/uL    Basophils Absolute 0.1 0.0 - 0.2 K/uL   Comprehensive Metabolic Panel w/ Reflex to MG   Result Value Ref Range    Sodium 143 136 - 145 mmol/L    Potassium reflex Magnesium 3.8 3.5 - 5.1 mmol/L    Chloride 105 99 - 110 mmol/L    CO2 27 21 - 32 mmol/L    Anion Gap 11 3 - 16    Glucose 102 (H) 70 - 99 mg/dL    BUN 22 (H) 7 - 20 mg/dL    Creatinine 1.4 (H) 0.8 - 1.3 mg/dL    Est, Glom Filt Rate 51 (A) >60    Calcium 8.9 8.3 - 10.6 mg/dL    Total Protein 7.5 6.4 - 8.2 g/dL    Albumin 3.8 3.4 - 5.0 g/dL    Albumin/Globulin Ratio 1.0 (L) 1.1 - 2.2    Total Bilirubin 0.4 0.0 - 1.0 mg/dL    Alkaline Phosphatase 80 40 - 129 U/L    ALT 6 (L) 10 - 40 U/L    AST 9 (L) 15 - 37 U/L   Troponin   Result Value Ref Range    Troponin, High Sensitivity 20 0 - 22 ng/L   APTT   Result Value Ref Range    aPTT 38.7 (H) 22.1 - 36.4 sec   EKG 12 Lead   Result Value Ref Range    Ventricular Rate 99 BPM    QRS Duration 78 ms    Q-T Interval 300 ms    QTc Calculation (Bazett) 385 ms    R Axis 59 degrees    T Axis 61 degrees    Diagnosis       Atrial fibrillationLow voltage QRSAbnormal ECGWhen compared with ECG of 24-NOV-2024 01:31,Previous ECG has undetermined rhythm, needs review            EMERGENCY

## 2025-04-11 ENCOUNTER — CARE COORDINATION (OUTPATIENT)
Dept: CARE COORDINATION | Age: 80
End: 2025-04-11

## 2025-04-11 NOTE — CARE COORDINATION
Ambulatory Care Coordination Note     4/11/2025 4:02 PM     Patient outreach attempt by this ACM today to offer care management services. ACM was unable to reach the patient by telephone today;   No VM set up . ER follow up for anticoagulation and copd      ACM: Martha Reinoso, RN     Care Summary Note: none     PCP/Specialist follow up:   Future Appointments         Provider Specialty Dept Phone    4/16/2025 2:00 PM (Arrive by 1:30 PM) [facility] CT RM 1 Radiology [phone]    4/28/2025 11:45 AM Roque Hein MD Pulmonology [phone]    4/29/2025 11:30 AM (Arrive by 11:00 AM) [facility] ECHO 1 Cardiology [phone]    7/2/2025 11:50 AM Pasquale Powers MD Internal Medicine [phone]    10/15/2025 10:00 AM Sukh Hernandez MD Cardiology [phone]            Follow Up:   Plan for next ACM outreach in approximately 1-2 days  to complete:  2nd ER follow up call  .

## 2025-04-14 ENCOUNTER — CARE COORDINATION (OUTPATIENT)
Dept: CARE COORDINATION | Age: 80
End: 2025-04-14

## 2025-04-14 NOTE — CARE COORDINATION
Ambulatory Care Coordination Note     4/14/2025 5:20 PM     Patient outreach attempt by this ACM today to offer care management services. AC was unable to reach the patient by telephone today;   No VM set up at this time   2nd Attempt to reach      ACM: Martha Reinoso, RN     Care Summary Note: none     PCP/Specialist follow up:   Future Appointments         Provider Specialty Dept Phone    4/16/2025 2:00 PM (Arrive by 1:30 PM) [facility] CT RM 1 Radiology [phone]    4/28/2025 11:45 AM Roque Hein MD Pulmonology [phone]    4/29/2025 11:30 AM (Arrive by 11:00 AM) [facility] ECHO 1 Cardiology [phone]    7/2/2025 11:50 AM Pasquale Powers MD Internal Medicine [phone]    10/15/2025 10:00 AM Sukh Hernandez MD Cardiology [phone]            Follow Up:   Plan for next AC outreach in approximately 1 week to complete:  - outreach attempt to offer care management services  Final outreach ER call needed. .

## 2025-04-16 ENCOUNTER — HOSPITAL ENCOUNTER (OUTPATIENT)
Dept: CT IMAGING | Age: 80
Discharge: HOME | End: 2025-04-16
Attending: INTERNAL MEDICINE
Payer: MEDICARE

## 2025-04-16 DIAGNOSIS — R91.1 LUNG NODULE: ICD-10-CM

## 2025-04-16 PROCEDURE — 71250 CT THORAX DX C-: CPT

## 2025-04-17 ENCOUNTER — TELEPHONE (OUTPATIENT)
Dept: CARDIOLOGY CLINIC | Age: 80
End: 2025-04-17

## 2025-04-17 NOTE — TELEPHONE ENCOUNTER
Pt called office to let us know he completed his cat scan in [facility] and and asked medical staff to send our offices a copy.

## 2025-04-22 ENCOUNTER — CARE COORDINATION (OUTPATIENT)
Dept: CARE COORDINATION | Age: 80
End: 2025-04-22

## 2025-04-22 NOTE — CARE COORDINATION
Ambulatory Care Coordination Note     4/22/2025 2:30 PM     patient outreach attempt by this ACM today to offer care management services. ACM was unable to reach the patient by telephone today;   No VM . UTR x 3      Patient closed (unable to reach patient) from the High Risk Care Management program on 4/22/2025.  Patient  UTR x 3  .  Care management goals have been completed. No further Ambulatory Care Manager follow up scheduled.

## 2025-04-28 ENCOUNTER — OFFICE VISIT (OUTPATIENT)
Dept: PULMONOLOGY | Age: 80
End: 2025-04-28
Payer: MEDICARE

## 2025-04-28 VITALS
RESPIRATION RATE: 17 BRPM | WEIGHT: 253 LBS | HEIGHT: 71 IN | OXYGEN SATURATION: 96 % | BODY MASS INDEX: 35.42 KG/M2 | SYSTOLIC BLOOD PRESSURE: 102 MMHG | DIASTOLIC BLOOD PRESSURE: 62 MMHG | HEART RATE: 103 BPM

## 2025-04-28 DIAGNOSIS — R91.1 LUNG NODULE: Primary | ICD-10-CM

## 2025-04-28 PROCEDURE — 3074F SYST BP LT 130 MM HG: CPT | Performed by: INTERNAL MEDICINE

## 2025-04-28 PROCEDURE — 1123F ACP DISCUSS/DSCN MKR DOCD: CPT | Performed by: INTERNAL MEDICINE

## 2025-04-28 PROCEDURE — 1159F MED LIST DOCD IN RCRD: CPT | Performed by: INTERNAL MEDICINE

## 2025-04-28 PROCEDURE — 3078F DIAST BP <80 MM HG: CPT | Performed by: INTERNAL MEDICINE

## 2025-04-28 PROCEDURE — 99214 OFFICE O/P EST MOD 30 MIN: CPT | Performed by: INTERNAL MEDICINE

## 2025-04-28 NOTE — PROGRESS NOTES
[facility]                                 Pulmonary Consult /Progress Note :                                                                    HPI   He has been having chest pain X 2   Has 2 echo   Declined going ED  Now with no chest pain  But he get BETANCOURT   Run out of Trelegy for the last few days   Cough ,no sputum   He has a 110.0 pack-year smoking history. He has never used smokeless tobacco.  He quit 2015     Has pneumonia symptoms with elevated wbc  Use Trelegy       Interval History 4/9/24 - admitted after choking on pill, had mild exacerbation.  CT scan completed in ED.  Doing well overall       Interval History 12/5/23  - admitted in Nov 2023 with AFIB RVR and HFrEF, underwent LHC with NOCAD        Interval history 4/4/23:  significant dyspnea on exertion, improved temporarily with alb/atr.  Needs new nebulizer.  Not on any long acting BD.      Presenting history:  8/4/2019 with a month long history of moderately increased shortness of breath, associated with wheezing and nonproductive cough, became severe.  He is feeling better with inhaled bronchodilators given the emergency department.  He has had previous episodes of COPD in the past.  He notes he has been waking up frequently at night with wheezing, benefits from his home nebulizer.      reports that he quit smoking about 6 years ago. His smoking use included cigarettes. He started smoking about 61 years ago.         Physical Exam:  Vitals:    04/28/25 1213   BP: 102/62   Pulse: (!) 103   Resp: 17   SpO2: 96%       Constitutional:  No acute distress.   HENT:  Oropharynx is clear and moist.   Neck: No tracheal deviation present.   Cardiovascular: Normal heart sounds.  Minimal lower extremity edema.  Pulmonary/Chest: No wheezes. No rhonchi. No rales. + decreased breath sounds.  No accessory muscle usage or stridor.   Musculoskeletal: No cyanosis. No

## 2025-04-29 ENCOUNTER — HOSPITAL ENCOUNTER (OUTPATIENT)
Age: 80
Discharge: HOME | End: 2025-05-01
Attending: INTERNAL MEDICINE
Payer: MEDICARE

## 2025-04-29 VITALS
HEIGHT: 71 IN | WEIGHT: 253 LBS | SYSTOLIC BLOOD PRESSURE: 102 MMHG | DIASTOLIC BLOOD PRESSURE: 62 MMHG | BODY MASS INDEX: 35.42 KG/M2

## 2025-04-29 DIAGNOSIS — I50.9 CONGESTIVE HEART FAILURE, UNSPECIFIED HF CHRONICITY, UNSPECIFIED HEART FAILURE TYPE (HCC): ICD-10-CM

## 2025-04-29 DIAGNOSIS — I48.91 ATRIAL FIBRILLATION, UNSPECIFIED TYPE (HCC): ICD-10-CM

## 2025-04-29 DIAGNOSIS — R06.02 SHORTNESS OF BREATH: ICD-10-CM

## 2025-04-29 LAB
ECHO BSA: 2.4 M2
ECHO EST RA PRESSURE: 3 MMHG
ECHO LV EDV A2C: 35 ML
ECHO LV EDV A4C: 86 ML
ECHO LV EDV INDEX A4C: 37 ML/M2
ECHO LV EDV NDEX A2C: 15 ML/M2
ECHO LV EF PHYSICIAN: 38 %
ECHO LV EJECTION FRACTION A2C: 22 %
ECHO LV EJECTION FRACTION A4C: 40 %
ECHO LV EJECTION FRACTION BIPLANE: 31 % (ref 55–100)
ECHO LV ESV A2C: 27 ML
ECHO LV ESV A4C: 52 ML
ECHO LV ESV INDEX A2C: 12 ML/M2
ECHO LV ESV INDEX A4C: 22 ML/M2
ECHO RIGHT VENTRICULAR SYSTOLIC PRESSURE (RVSP): 32 MMHG
ECHO TV REGURGITANT MAX VELOCITY: 2.71 M/S
ECHO TV REGURGITANT PEAK GRADIENT: 29 MMHG

## 2025-04-29 PROCEDURE — 93325 DOPPLER ECHO COLOR FLOW MAPG: CPT

## 2025-04-30 ENCOUNTER — RESULTS FOLLOW-UP (OUTPATIENT)
Dept: CARDIOLOGY CLINIC | Age: 80
End: 2025-04-30

## 2025-05-01 ENCOUNTER — RESULTS FOLLOW-UP (OUTPATIENT)
Dept: PULMONOLOGY | Age: 80
End: 2025-05-01

## 2025-05-02 DIAGNOSIS — J44.1 CHRONIC OBSTRUCTIVE PULMONARY DISEASE WITH ACUTE EXACERBATION (HCC): ICD-10-CM

## 2025-05-02 RX ORDER — IPRATROPIUM BROMIDE AND ALBUTEROL SULFATE 2.5; .5 MG/3ML; MG/3ML
SOLUTION RESPIRATORY (INHALATION)
Qty: 360 ML | Refills: 0 | Status: SHIPPED | OUTPATIENT
Start: 2025-05-02

## 2025-06-02 RX ORDER — ALBUTEROL SULFATE 90 UG/1
INHALANT RESPIRATORY (INHALATION)
Qty: 6.7 G | Refills: 2 | Status: SHIPPED | OUTPATIENT
Start: 2025-06-02

## 2025-06-04 DIAGNOSIS — J44.1 CHRONIC OBSTRUCTIVE PULMONARY DISEASE WITH ACUTE EXACERBATION (HCC): ICD-10-CM

## 2025-06-04 RX ORDER — IPRATROPIUM BROMIDE AND ALBUTEROL SULFATE 2.5; .5 MG/3ML; MG/3ML
SOLUTION RESPIRATORY (INHALATION)
Qty: 360 ML | Refills: 0 | Status: SHIPPED | OUTPATIENT
Start: 2025-06-04

## 2025-06-12 ENCOUNTER — OFFICE VISIT (OUTPATIENT)
Dept: INTERNAL MEDICINE CLINIC | Age: 80
End: 2025-06-12

## 2025-06-12 VITALS
HEIGHT: 71 IN | OXYGEN SATURATION: 95 % | TEMPERATURE: 97.5 F | DIASTOLIC BLOOD PRESSURE: 80 MMHG | SYSTOLIC BLOOD PRESSURE: 130 MMHG | RESPIRATION RATE: 14 BRPM | WEIGHT: 252 LBS | HEART RATE: 75 BPM | BODY MASS INDEX: 35.28 KG/M2

## 2025-06-12 DIAGNOSIS — J44.1 COPD EXACERBATION (HCC): Primary | ICD-10-CM

## 2025-06-12 PROCEDURE — 1159F MED LIST DOCD IN RCRD: CPT | Performed by: INTERNAL MEDICINE

## 2025-06-12 PROCEDURE — 1160F RVW MEDS BY RX/DR IN RCRD: CPT | Performed by: INTERNAL MEDICINE

## 2025-06-12 PROCEDURE — 3079F DIAST BP 80-89 MM HG: CPT | Performed by: INTERNAL MEDICINE

## 2025-06-12 PROCEDURE — 1123F ACP DISCUSS/DSCN MKR DOCD: CPT | Performed by: INTERNAL MEDICINE

## 2025-06-12 PROCEDURE — 3075F SYST BP GE 130 - 139MM HG: CPT | Performed by: INTERNAL MEDICINE

## 2025-06-12 PROCEDURE — 99213 OFFICE O/P EST LOW 20 MIN: CPT | Performed by: INTERNAL MEDICINE

## 2025-06-12 RX ORDER — PREDNISONE 10 MG/1
TABLET ORAL
Qty: 30 TABLET | Refills: 0 | Status: SHIPPED | OUTPATIENT
Start: 2025-06-12

## 2025-06-12 RX ORDER — LEVOFLOXACIN 500 MG/1
500 TABLET, FILM COATED ORAL DAILY
Qty: 7 TABLET | Refills: 0 | Status: SHIPPED | OUTPATIENT
Start: 2025-06-12 | End: 2025-06-19

## 2025-06-12 ASSESSMENT — ENCOUNTER SYMPTOMS
WHEEZING: 1
COUGH: 1
SHORTNESS OF BREATH: 1

## 2025-06-17 RX ORDER — PREDNISONE 10 MG/1
10 TABLET ORAL DAILY
Qty: 28 TABLET | Refills: 2 | OUTPATIENT
Start: 2025-06-17

## 2025-06-23 ENCOUNTER — TELEPHONE (OUTPATIENT)
Dept: INTERNAL MEDICINE CLINIC | Age: 80
End: 2025-06-23

## 2025-06-23 DIAGNOSIS — M25.552 PAIN OF LEFT HIP: Primary | ICD-10-CM

## 2025-06-23 NOTE — TELEPHONE ENCOUNTER
----- Message from Dr. Pasquale Powers MD sent at 6/23/2025  1:15 PM EDT -----  Contact: patient [phone]  Dr Abbott  ----- Message -----  From: Jaymie Verde  Sent: 6/20/2025   1:26 PM EDT  To: Pasquale Powers MD    Patient wanting to know if you figure out who you were going to send him to locally ([place]) for his left hip?  Patient aware Dr REID won't be in until Monday.  Please advise

## 2025-06-24 RX ORDER — PREDNISONE 10 MG/1
10 TABLET ORAL DAILY
Qty: 28 TABLET | Refills: 2 | Status: SHIPPED | OUTPATIENT
Start: 2025-06-24

## 2025-07-01 ENCOUNTER — OFFICE VISIT (OUTPATIENT)
Dept: INTERNAL MEDICINE CLINIC | Age: 80
End: 2025-07-01

## 2025-07-01 VITALS
WEIGHT: 256 LBS | HEIGHT: 71 IN | DIASTOLIC BLOOD PRESSURE: 80 MMHG | HEART RATE: 80 BPM | RESPIRATION RATE: 12 BRPM | BODY MASS INDEX: 35.84 KG/M2 | SYSTOLIC BLOOD PRESSURE: 120 MMHG

## 2025-07-01 DIAGNOSIS — I50.32 CHRONIC DIASTOLIC CHF (CONGESTIVE HEART FAILURE) (HCC): ICD-10-CM

## 2025-07-01 DIAGNOSIS — I10 PRIMARY HYPERTENSION: Primary | ICD-10-CM

## 2025-07-01 DIAGNOSIS — I48.91 ATRIAL FIBRILLATION, CONTROLLED (HCC): ICD-10-CM

## 2025-07-01 DIAGNOSIS — I50.22 CHRONIC SYSTOLIC CHF (CONGESTIVE HEART FAILURE) (HCC): ICD-10-CM

## 2025-07-01 DIAGNOSIS — Z79.01 CHRONIC ANTICOAGULATION: ICD-10-CM

## 2025-07-01 DIAGNOSIS — N18.32 CHRONIC KIDNEY DISEASE, STAGE 3B (HCC): ICD-10-CM

## 2025-07-01 DIAGNOSIS — E78.2 MIXED HYPERLIPIDEMIA: ICD-10-CM

## 2025-07-01 PROCEDURE — 3074F SYST BP LT 130 MM HG: CPT | Performed by: INTERNAL MEDICINE

## 2025-07-01 PROCEDURE — 1160F RVW MEDS BY RX/DR IN RCRD: CPT | Performed by: INTERNAL MEDICINE

## 2025-07-01 PROCEDURE — 3079F DIAST BP 80-89 MM HG: CPT | Performed by: INTERNAL MEDICINE

## 2025-07-01 PROCEDURE — 1159F MED LIST DOCD IN RCRD: CPT | Performed by: INTERNAL MEDICINE

## 2025-07-01 PROCEDURE — 99214 OFFICE O/P EST MOD 30 MIN: CPT | Performed by: INTERNAL MEDICINE

## 2025-07-01 PROCEDURE — 1123F ACP DISCUSS/DSCN MKR DOCD: CPT | Performed by: INTERNAL MEDICINE

## 2025-07-01 ASSESSMENT — ENCOUNTER SYMPTOMS
ABDOMINAL PAIN: 0
VOMITING: 0
SHORTNESS OF BREATH: 1
NAUSEA: 0
WHEEZING: 0
BACK PAIN: 0
RHINORRHEA: 0

## 2025-07-01 NOTE — PROGRESS NOTES
Subjective:      Patient ID: Ulises Whitman is a 80 y.o. male.    HPI    Patient is here for a follow up.      He has a history of hypertension. It is well controlled. He is compliant and has no med side effects. No chest pain.    Patient's COPD is controlled on present bronchodilator regimen. He had a flare up two weeks ago and saw me. He is better.  Patient is taking medications as instructed, no medication side effects noted, no significant ongoing wheezing or shortness of breath, using bronchodilator MDI less than twice a week. He quit smoking. He is doing well. He is on 3 L of oxygen at night.  He has some pedal edema.  No ER visits.   He is on chronic prednisone.    He has history of a fib.  He takes Xarelto. No bleeding but he has bruising.    He has GERD. It is stable.      He has nocturia, no hematuria. He wakes up few times at night.    He has chronic systolic CHF. He was recently started on Entresto. It is doing ok.      Review of Systems   Constitutional:  Negative for activity change and appetite change.   HENT:  Negative for postnasal drip and rhinorrhea.    Respiratory:  Positive for shortness of breath. Negative for wheezing.    Cardiovascular:  Negative for chest pain, palpitations and leg swelling.   Gastrointestinal:  Negative for abdominal pain, nausea and vomiting.   Genitourinary:  Positive for frequency. Negative for difficulty urinating.   Musculoskeletal:  Negative for back pain and joint swelling.   Skin:  Negative for rash.   Neurological:  Negative for light-headedness.   Psychiatric/Behavioral:  Negative for sleep disturbance.          Past Medical History:   Diagnosis Date    A-fib ([facility])     2/14/12    Acute conjunctivitis of both eyes     Acute on chronic respiratory failure with hypoxia ([facility]) 2/13/2012    Acute respiratory failure with hypoxia ([facility]) 2/13/2012    Atrial fibrillation with RVR ([facility])     2/14/12     Avulsion fracture of ankle 9/21/2015    Benign localized hyperplasia of

## 2025-07-07 DIAGNOSIS — J44.1 CHRONIC OBSTRUCTIVE PULMONARY DISEASE WITH ACUTE EXACERBATION (HCC): ICD-10-CM

## 2025-07-07 RX ORDER — IPRATROPIUM BROMIDE AND ALBUTEROL SULFATE 2.5; .5 MG/3ML; MG/3ML
SOLUTION RESPIRATORY (INHALATION)
Qty: 360 ML | Refills: 0 | Status: SHIPPED | OUTPATIENT
Start: 2025-07-07

## 2025-07-15 ENCOUNTER — OFFICE VISIT (OUTPATIENT)
Dept: ORTHOPEDIC SURGERY | Age: 80
End: 2025-07-15

## 2025-07-15 VITALS — HEIGHT: 71 IN | BODY MASS INDEX: 35.84 KG/M2 | WEIGHT: 256 LBS

## 2025-07-15 DIAGNOSIS — M70.62 GREATER TROCHANTERIC BURSITIS OF LEFT HIP: ICD-10-CM

## 2025-07-15 DIAGNOSIS — M25.552 LEFT HIP PAIN: Primary | ICD-10-CM

## 2025-07-15 RX ORDER — METHYLPREDNISOLONE ACETATE 40 MG/ML
40 INJECTION, SUSPENSION INTRA-ARTICULAR; INTRALESIONAL; INTRAMUSCULAR; SOFT TISSUE ONCE
Status: COMPLETED | OUTPATIENT
Start: 2025-07-15 | End: 2025-07-15

## 2025-07-15 RX ADMIN — METHYLPREDNISOLONE ACETATE 40 MG: 40 INJECTION, SUSPENSION INTRA-ARTICULAR; INTRALESIONAL; INTRAMUSCULAR; SOFT TISSUE at 11:36

## 2025-07-15 RX ADMIN — Medication 4 ML: at 11:36

## 2025-07-15 NOTE — PROGRESS NOTES
Date:  7/15/2025    Name:  Ulises Wihtman  Address:  [address] [place] [address]  [address] [place] [id number]    :  1945      Age:   80 y.o.    SSN:  [id number]      Medical Record Number:  [id number]    Reason for Visit:    Chief Complaint    Hip Pain (Ck let hip)      DOS:7/15/2025     HPI: Ulises Whitman is a 80 y.o. male here today for new patient evaluation regarding his left hip    History of Present Illness  The patient is a 80-year-old male here today for a new patient evaluation regarding his left hip. He was referred by Dr. REID due to persistent pain in his left hip, which he describes as a constant dull ache. The pain is localized to the hip and does not radiate down his leg. He reports no back pain, except when bending over. He has not sought any treatment for this issue but has previously taken antibiotics.        Pain Assessment  Location of Pain: Hip  Location Modifiers: Left  Severity of Pain: 8  Quality of Pain: Aching  Duration of Pain: Persistent  Frequency of Pain: Constant  Aggravating Factors: Standing, Walking, Stairs, Bending, Straightening  Limiting Behavior: Some  Relieving Factors: Rest  Result of Injury: No  Work-Related Injury: No  Are there other pain locations you wish to document?: No  ROS: All systems reviewed on patient intake form.  Pertinent items are noted in HPI.        Past Medical History:   Diagnosis Date    A-fib ([facility])     12    Acute conjunctivitis of both eyes     Acute on chronic respiratory failure with hypoxia ([facility]) 2012    Acute respiratory failure with hypoxia ([facility]) 2012    Atrial fibrillation with RVR ([facility])     12     Avulsion fracture of ankle 2015    Benign localized hyperplasia of prostate with urinary obstruction and other lower urinary tract symptoms (LUTS)(600.21) 2016    Chronic systolic CHF (congestive heart failure) ([facility]) 2017    Contusion of leg, right 2015    COPD (chronic obstructive pulmonary disease) ([facility]) 
: Yes

## 2025-07-28 DIAGNOSIS — J44.1 CHRONIC OBSTRUCTIVE PULMONARY DISEASE WITH ACUTE EXACERBATION (HCC): ICD-10-CM

## 2025-07-28 RX ORDER — IPRATROPIUM BROMIDE AND ALBUTEROL SULFATE 2.5; .5 MG/3ML; MG/3ML
SOLUTION RESPIRATORY (INHALATION)
Qty: 360 ML | Refills: 0 | Status: SHIPPED | OUTPATIENT
Start: 2025-07-28

## 2025-08-04 ENCOUNTER — CLINICAL DOCUMENTATION (OUTPATIENT)
Age: 80
End: 2025-08-04

## 2025-08-04 RX ORDER — ALBUTEROL SULFATE 90 UG/1
INHALANT RESPIRATORY (INHALATION)
Qty: 18 G | Refills: 2 | Status: SHIPPED | OUTPATIENT
Start: 2025-08-04

## (undated) DEVICE — GLOVE ORANGE PI 8   MSG9080

## (undated) DEVICE — NEEDLE HYPO 25GA L1.5IN BLU POLYPR HUB S STL REG BVL STR

## (undated) DEVICE — CONMED SCOPE SAVER BITE BLOCK, 20X27 MM: Brand: SCOPE SAVER

## (undated) DEVICE — ELECTRODE ECG MONITR FOAM TEAR DROP ADLT RED

## (undated) DEVICE — CANNULA NSL 13FT TUBE AD ETCO2 DIV SAMP M

## (undated) DEVICE — 6 ML SYRINGE LUER-LOCK TIP: Brand: MONOJECT

## (undated) DEVICE — Device

## (undated) DEVICE — FORCEPS ENDOSCP BX L230CM DIA28MM ALGTR CUP SPEC RETRV GI

## (undated) DEVICE — 1010 S-DRAPE TOWEL DRAPE 10/BX: Brand: STERI-DRAPE™

## (undated) DEVICE — GAUZE,SPONGE,4"X4",8PLY,STRL,LF,10/TRAY: Brand: MEDLINE

## (undated) DEVICE — PREP SOL PVP IODINE 4%  4 OZ/BTL

## (undated) DEVICE — ENDO CARRY-ON PROCEDURE KIT INCLUDES SUCTION TUBING, LUBRICANT, GAUZE, BIOHAZARD STICKER, TRANSPORT PAD AND INTERCEPT BEDSIDE KIT.: Brand: ENDO CARRY-ON PROCEDURE KIT

## (undated) DEVICE — SOLUTION IV IRRIG WATER 1000ML POUR BRL 2F7114

## (undated) DEVICE — SURGICAL PROCEDURE PACK EYE CLERMONT